# Patient Record
Sex: MALE | Race: WHITE | HISPANIC OR LATINO | Employment: OTHER | URBAN - METROPOLITAN AREA
[De-identification: names, ages, dates, MRNs, and addresses within clinical notes are randomized per-mention and may not be internally consistent; named-entity substitution may affect disease eponyms.]

---

## 2017-02-14 ENCOUNTER — ALLSCRIPTS OFFICE VISIT (OUTPATIENT)
Dept: OTHER | Facility: OTHER | Age: 62
End: 2017-02-14

## 2017-02-14 ENCOUNTER — HOSPITAL ENCOUNTER (OUTPATIENT)
Dept: RADIOLOGY | Facility: HOSPITAL | Age: 62
Discharge: HOME | End: 2017-02-14
Attending: ORTHOPAEDIC SURGERY
Payer: COMMERCIAL

## 2017-02-14 DIAGNOSIS — Z96.652 PRESENCE OF LEFT ARTIFICIAL KNEE JOINT: ICD-10-CM

## 2017-02-14 PROCEDURE — 73560 X-RAY EXAM OF KNEE 1 OR 2: CPT

## 2017-05-16 ENCOUNTER — ALLSCRIPTS OFFICE VISIT (OUTPATIENT)
Dept: OTHER | Facility: OTHER | Age: 62
End: 2017-05-16

## 2017-05-16 DIAGNOSIS — M17.11 PRIMARY OSTEOARTHRITIS OF RIGHT KNEE: ICD-10-CM

## 2017-06-21 ENCOUNTER — GENERIC CONVERSION - ENCOUNTER (OUTPATIENT)
Dept: OTHER | Facility: OTHER | Age: 62
End: 2017-06-21

## 2017-06-21 DIAGNOSIS — E11.9 TYPE 2 DIABETES MELLITUS WITHOUT COMPLICATIONS (HCC): ICD-10-CM

## 2017-06-21 DIAGNOSIS — M17.11 PRIMARY OSTEOARTHRITIS OF RIGHT KNEE: ICD-10-CM

## 2017-12-21 ENCOUNTER — ALLSCRIPTS OFFICE VISIT (OUTPATIENT)
Dept: OTHER | Facility: OTHER | Age: 62
End: 2017-12-21

## 2017-12-22 NOTE — PROGRESS NOTES
Assessment   1  Primary osteoarthritis of right knee (715 16) (M17 11)   2  Acute bilateral low back pain with bilateral sciatica (724 2,724 3) (M54 42,M54 41)      71-year-old male with arthritic right knee that would benefit from injection of corticosteroid for pain relief purposes  It is advised, excepted, administers outlined above  With regards back and bilateral lower extremity pain, he requires a spinal pain evaluation for workup and treatment of this disabling malady  I would welcome the opportunity see back in 6 weeks time for follow-up       Plan   Acute bilateral low back pain with bilateral sciatica    · *1 - [address] and treat     patient with back pain as well as bilateral sciatica  Status: Active  Requested for:    [id number]  Care Summary provided  : Yes   · Follow-up visit in 6 weeks Evaluation and Treatment  Follow-up  Status: Hold For -    Scheduling  Requested for: 53Nzk1022    History of Present Illness   HPI: Six months since his most recent visit, this 71-year-old male describes persistent pain in the right knee  His attempts at weight loss have not been met with significant success  He describes return to pain at the level of the right knee joint, the pain is made worse bearing weight, the pain worsens with increased activities  It has begun to interfere greatly with his pursuit of the activities of daily living  Recently, however this patient describes onset of back and bilateral lower extremity symptoms that consist with back pain and bilateral sciatica  Review of Systems        Constitutional: No fever or chills, feels well, no tiredness, no recent weight loss or weight gain  Eyes: No complaints of red eyes, no eyesight problems  ENT: no complaints of loss of hearing, no nosebleeds, no sore throat  Cardiovascular: No complaints of chest pain, no palpitations, no leg claudication or lower extremity edema        Respiratory: No complaints of shortness of breath, no wheezing, no cough  Gastrointestinal: No complaints of abdominal pain, no constipation, no nausea or vomiting, no diarrhea or bloody stools  Genitourinary: No complaints of dysuria or incontinence, no hesitancy, no nocturia  Musculoskeletal: as noted in HPI  Integumentary: No complaints of skin rash or lesion, no itching or dry skin, no skin wounds  Neurological: No complaints of headache, no confusion, no numbness or tingling, no dizziness  Psychiatric: No suicidal thoughts, no anxiety, no depression  Endocrine: No muscle weakness, no frequent urination, no excessive thirst, no feelings of weakness  Active Problems   1  Acute bilateral low back pain with bilateral sciatica (724 2,724 3) (M54 42,M54 41)   2  Aftercare following left knee joint replacement surgery (V54 81,V43 65) (Z47 1,Z96 652)   3  Asymptomatic varicose veins, unspecified laterality (454 9) (I83 90)   4  Bilateral lower extremity edema (782 3) (R60 0)   5  Cellulitis of left ankle (682 6) (L03 116)   6  Diabetes (250 00) (E11 9)   7  Discoloration of skin of lower leg (709 00) (L81 9)   8  Hypertension (401 9) (I10)   9  Knee pain, bilateral (719 46) (M25 561,M25 562)   10  Primary osteoarthritis of left knee (715 16) (M17 12)   11  Primary osteoarthritis of right knee (715 16) (M17 11)   12  S/P total knee replacement using cement, left (V43 65) (P74 572)    Past Medical History      The active problems and past medical history were reviewed and updated today  Surgical History      The surgical history was reviewed and updated today  Family History   Father    · Family history of lung cancer (V16 1) (Z80 1)  Family History    · Family history of Arthritis (716 90) (M19 90)   · Family history of Osteoporosis (733 00) (M81 0)    Social History    · Caffeine use (V49 89) (F15 90)   · Never a smoker    Current Meds    1   Budesonide 32 MCG/ACT Nasal Suspension; Therapy: 93YTV4603 to (Evaluate:27Aug2015) Recorded   2  Cephalexin 500 MG Oral Tablet (Cephalexin Monohydrate); Take 4 tablets, one hour     prior to dental procedures; Therapy: 36Rbf8285 to (Last Rx:46Tiw3748)  Requested for: 21Jul2016 Ordered   3  CloNIDine HCl - 0 1 MG Oral Tablet; Therapy: 23XXV4875 to (Evaluate:28Jun2015) Recorded   4  Ferrous Sulfate 325 (65 Fe) MG Oral Tablet; TAKE 1 TABLET TWICE DAILY; Therapy: 93DIU6046 to (Evaluate:21Aug2017)  Requested for: 82BSR3193; Last     Rx:22Jun2017 Ordered   5  Folic Acid 1 MG Oral Tablet; TAKE 1 TABLET DAILY AS DIRECTED; Therapy: 77XXI6450 to (Evaluate:21Aug2017)  Requested for: 26WCJ7221; Last     Rx:22Jun2017 Ordered   6  Gabapentin 100 MG Oral Capsule; Therapy: (Recorded:36Epk4050) to Recorded   7  Hydrocodone-Acetaminophen  MG Oral Tablet; Therapy: 85TMT6343 to (Evaluate:28Jun2015) Recorded   8  Hydrocodone-Acetaminophen 5-325 MG Oral Tablet; Therapy: (Recorded:73Tbh5700) to Recorded   9  Ketoconazole 2 % External Cream;     Therapy: 56Mhz9020 to (Evaluate:28Jun2015) Recorded   10  Lisinopril-Hydrochlorothiazide 10-12 5 MG Oral Tablet; Therapy: 72Xaj3420 to (Evaluate:27Aug2015) Recorded   11  LORazepam 0 5 MG Oral Tablet; Therapy: 46VKS4339 to (Evaluate:28Jun2015) Recorded   12  MetFORMIN HCl  MG Oral Tablet Extended Release 24 Hour; Therapy: 24PNG9631 to (Evaluate:27Aug2015) Recorded   13  OxyCONTIN 40 MG Oral Tablet ER 12 Hour Abuse-Deterrent; Therapy: 76MBO9480 to (Evaluate:28Jun2015) Recorded   14  OxyCONTIN 60 MG Oral Tablet ER 12 Hour Abuse-Deterrent; Therapy: 07Egq8623 to (Evaluate:13Jun2015) Recorded   15  OxyCONTIN 80 MG Oral Tablet ER 12 Hour Abuse-Deterrent; Therapy: 30YYL6680 to () Recorded   16  Pantoprazole Sodium 40 MG Oral Tablet Delayed Release; Therapy: 31FPO1580 to (Evaluate:71Ogt0723) Recorded   17   Pramipexole Dihydrochloride 0 25 MG Oral Tablet; Therapy: 78LOM7550 to (Evaluate:13Jun2015) Recorded   18  Promethazine HCl - 25 MG Oral Tablet; Therapy: 79Vtz7014 to (Evaluate:05Jun2015) Recorded   19  Sulfamethoxazole-Trimethoprim 800-160 MG Oral Tablet (Bactrim DS); TAKE 1 TABLET      TWICE DAILY UNTIL FINISHED; Therapy: 10YEK6275 to (Evaluate:26Jun2016)  Requested for: 02CZR6017; Last      Rx:16Jun2016 Ordered   20  Vitamin C 500 MG Oral Tablet; Take 1 tablet twice daily; Therapy: 62FRW8987 to (Evaluate:21Aug2017)  Requested for: 29BXL3977; Last      Rx:22Jun2017 Ordered   21  Vitamin D (Ergocalciferol) 14425 UNIT Oral Capsule; Therapy: 64LHK0174 to (Evaluate:27Aug2015) Recorded    Allergies   1  No Known Drug Allergies    Vitals   Signs   Heart Rate: 647  Systolic: 326  Diastolic: 75  Height: 5 ft 6 in  Weight: 264 lb 8 oz  BMI Calculated: 42 69  BSA Calculated: 2 25    Physical Exam   Gait pattern is antalgic  Breathing is not labored  Lumbar spine is there is a midline tenderness  There is no paraspinal spasm  The right thighs devoid of atrophy right knee is in varus  There is no effusion today  There is bony enlargement in this medially  There is crepitation flexion extension  There is no palpable warmth of the synovium  He has a positive straight leg raise bilaterally at 45Â°  Procedure      Procedure: Injection of the right knee joint  Indication:  Joint pain-- and-- Osteoarthritis  Risk, benefits and alternatives were discussed with the patient  Verbal consent was obtained prior to the procedure  Alcohol was used to prep the area  ethyl chloride spray was used as a topical anesthetic  Using sterile technique, the aspiration/injection needle was then directed from a Anterolateral aspect  A 22-gauge was used to inject 2 mL of 1% Lidocaine,-- 2 mL of 0 25% Bupivacaine-- and-- 2 mL of 6mg/mL betamethasone  A bandage was applied  the patient tolerated the procedure well  Complications: none  Follow-up in the office in 6 week(s)  Future Appointments      Date/Time Provider Specialty Site   02/01/2018 01:00 PM SHARON Rincon   Orthopedic Surgery [facility]     Signatures    Electronically signed by : SHARON Boyle ; Dec 21 2017  6:15PM EST                       (Author)

## 2018-01-07 ENCOUNTER — HOSPITAL ENCOUNTER (EMERGENCY)
Facility: HOSPITAL | Age: 63
Discharge: HOME | End: 2018-01-07
Attending: EMERGENCY MEDICINE
Payer: COMMERCIAL

## 2018-01-07 VITALS
BODY MASS INDEX: 42.61 KG/M2 | SYSTOLIC BLOOD PRESSURE: 134 MMHG | HEART RATE: 73 BPM | DIASTOLIC BLOOD PRESSURE: 78 MMHG | RESPIRATION RATE: 17 BRPM | OXYGEN SATURATION: 94 % | WEIGHT: 264 LBS | TEMPERATURE: 97.7 F

## 2018-01-07 DIAGNOSIS — M79.606 LEG PAIN: Primary | ICD-10-CM

## 2018-01-07 LAB
CRP SERPL QL: 9.9 MG/L
ERYTHROCYTE [SEDIMENTATION RATE] IN BLOOD: 28 MM/HOUR (ref 0–10)

## 2018-01-07 PROCEDURE — 99285 EMERGENCY DEPT VISIT HI MDM: CPT

## 2018-01-07 PROCEDURE — 86140 C-REACTIVE PROTEIN: CPT | Performed by: EMERGENCY MEDICINE

## 2018-01-07 PROCEDURE — 36415 COLL VENOUS BLD VENIPUNCTURE: CPT | Performed by: EMERGENCY MEDICINE

## 2018-01-07 PROCEDURE — 96374 THER/PROPH/DIAG INJ IV PUSH: CPT

## 2018-01-07 PROCEDURE — 85652 RBC SED RATE AUTOMATED: CPT | Performed by: EMERGENCY MEDICINE

## 2018-01-07 RX ORDER — IBUPROFEN 800 MG/1
800 TABLET ORAL 3 TIMES DAILY
Qty: 21 TABLET | Refills: 0 | Status: SHIPPED | OUTPATIENT
Start: 2018-01-07 | End: 2019-10-07 | Stop reason: ALTCHOICE

## 2018-01-07 RX ORDER — KETOROLAC TROMETHAMINE 30 MG/ML
15 INJECTION, SOLUTION INTRAMUSCULAR; INTRAVENOUS ONCE
Status: COMPLETED | OUTPATIENT
Start: 2018-01-07 | End: 2018-01-07

## 2018-01-07 RX ADMIN — KETOROLAC TROMETHAMINE 15 MG: 30 INJECTION, SOLUTION INTRAMUSCULAR at 16:37

## 2018-01-07 NOTE — ED PROVIDER NOTES
History  Chief Complaint   Patient presents with    Extremity Weakness     Patient reports weakness in legs that has been progressively been getting worse since "the hols"  Denies CP and SOB     This is a 60-year-old male presenting to the emergency department for evaluation of bilateral lower extremity pain and weakness  He states that his symptoms have been getting progressively worse "since the  "approximately 2-3 weeks ago  He describes some aching pain in the bilateral proximal lower extremities mostly in the back on his gluteal area radiating down towards the knees  This is equal bilaterally  He has had no numbness or tingling  He states that he has diabetes and has baseline decreased feeling in his feet this is not changed  Additionally he has some increased low back pain  Patient has had sciatica before but this feels different  He states that the pain has caused difficulty with walking and particularly over the past several days he has been getting worse  The patient does have bilateral numbness in his feet but states this is chronic and unchanged recently  It is due to his longstanding diabetes  He does state that recently has had some difficulty controlling his sugars that his A1c is about 6  He denies any fevers or chills  He denies any back tenderness  He denies any saddle anesthesia or bowel or bladder incontinence  Prior to Admission Medications   Prescriptions Last Dose Informant Patient Reported? Taking? Ergocalciferol (VITAMIN D2 PO) 2018 at Unknown time  Yes Yes   Sig: Take 50,000 Units by mouth once a week  HYDROcodone-acetaminophen (NORCO) 5-325 mg per tablet Unknown at Unknown time  No No   Si tabs po q4-6 hrs prn pain   LORazepam (ATIVAN) 0 5 mg tablet Past Week at Unknown time  Yes Yes   Sig: Take 0 5 mg by mouth daily at bedtime     albuterol (PROVENTIL HFA,VENTOLIN HFA) 90 mcg/act inhaler 2018 at Unknown time  Yes Yes   Sig: Inhale 2 puffs every 6 (six) hours as needed for wheezing  ascorbic acid (VITAMIN C) 500 mg tablet 2018 at Unknown time  Yes Yes   Sig: Take 500 mg by mouth 2 (two) times a day  budesonide (RINOCORT AQUA) 32 MCG/ACT nasal spray 2018 at Unknown time  Yes Yes   Si spray into each nostril 2 (two) times a day  cloNIDine (CATAPRES) 0 1 mg tablet 2018 at Unknown time  Yes Yes   Sig: Take 0 1 mg by mouth 3 (three) times a day  ferrous sulfate 325 (65 Fe) mg tablet 2018 at Unknown time  Yes Yes   Sig: Take 325 mg by mouth 2 (two) times a day  fluticasone (FLOVENT HFA) 220 mcg/act inhaler 2018 at Unknown time  Yes Yes   Sig: Inhale 1 puff daily as needed  folic acid (FOLVITE) 1 mg tablet 2018 at Unknown time  Yes Yes   Sig: Take 1 mg by mouth daily  furosemide (LASIX) 40 mg tablet   Yes No   Sig: Take 40 mg by mouth 2 (two) times a day as needed  glipiZIDE (GLUCOTROL) 5 mg tablet 2018 at Unknown time  Yes Yes   Sig: Take 5 mg by mouth daily  lisinopril-hydrochlorothiazide (PRINZIDE,ZESTORETIC) 10-12 5 MG per tablet 2018 at Unknown time  Yes Yes   Sig: Take 1 tablet by mouth daily  metFORMIN (GLUCOPHAGE-XR) 750 mg 24 hr tablet 2018 at Unknown time  Yes Yes   Sig: Take 750 mg by mouth 2 (two) times a day  potassium chloride (K-DUR) 10 mEq tablet 2018 at Unknown time  Yes Yes   Sig: Take 10 mEq by mouth 2 (two) times a day as needed  testosterone (ANDROGEL) 1% Unknown at Unknown time  Yes No   Sig: Apply 50 mg topically daily        Facility-Administered Medications: None       Past Medical History:   Diagnosis Date    Acid reflux     Anxiety     Arthritis     Cellulitis     left ankle    Diabetes mellitus ([address] )     Hypertension     Sleep apnea     Twitching        Past Surgical History:   Procedure Laterality Date    APPENDECTOMY      CHOLECYSTECTOMY      KNEE ARTHROSCOPY      UT TOTAL KNEE ARTHROPLASTY Left 2016    Procedure: TOTAL  KNEE REPLACEMENT ;  Surgeon: Beau Marks MD;  Location: [facility];  Service: Orthopedics       Family History   Problem Relation Age of Onset    Diabetes Mother     Diabetes Father     Cancer Father      I have reviewed and agree with the history as documented  Social History   Substance Use Topics    Smoking status: Former Smoker     Quit date: 1997    Smokeless tobacco: Never Used    Alcohol use No        Review of Systems   Constitutional: Negative for appetite change, chills, fatigue and fever  HENT: Negative for sneezing and sore throat  Eyes: Negative for visual disturbance  Respiratory: Negative for cough, choking, chest tightness, shortness of breath and wheezing  Cardiovascular: Negative for chest pain and palpitations  Gastrointestinal: Negative for abdominal pain, constipation, diarrhea, nausea and vomiting  Genitourinary: Negative for difficulty urinating and dysuria  Musculoskeletal: Positive for arthralgias, gait problem and myalgias  Neurological: Positive for weakness  Negative for dizziness, light-headedness, numbness and headaches  All other systems reviewed and are negative  Physical Exam  ED Triage Vitals [01/07/18 1310]   Temperature Pulse Respirations Blood Pressure SpO2   97 7 °F (36 5 °C) 105 18 152/99 95 %      Temp Source Heart Rate Source Patient Position - Orthostatic VS BP Location FiO2 (%)   Oral Monitor Sitting Left arm --      Pain Score       9           Orthostatic Vital Signs  Vitals:    01/07/18 1600 01/07/18 1700 01/07/18 1710 01/07/18 1817   BP: 146/77 122/68 122/68 134/78   Pulse: 82 82 76 73   Patient Position - Orthostatic VS:   Lying Lying       Physical Exam   Constitutional: He is oriented to person, place, and time  He appears well-developed and well-nourished  No distress  HENT:   Head: Normocephalic and atraumatic  Mouth/Throat: Oropharynx is clear and moist    Eyes: EOM are normal  Pupils are equal, round, and reactive to light     Neck: No JVD present  No tracheal deviation present  Cardiovascular: Normal rate, regular rhythm, normal heart sounds and intact distal pulses  Exam reveals no gallop and no friction rub  No murmur heard  Pulmonary/Chest: Effort normal and breath sounds normal  No respiratory distress  He has no wheezes  He has no rales  Abdominal: Soft  Bowel sounds are normal  He exhibits no distension  There is no tenderness  There is no rebound and no guarding  Musculoskeletal:   Strength is 5/5 in the bilateral extremities  Patient has 5/5 proximal flexion and extension the hips  There is some tenderness over the muscles of posterior proximal thighs bilaterally  The patient has intact sensation   Neurological: He is alert and oriented to person, place, and time  No cranial nerve deficit  He exhibits normal muscle tone  GCS eye subscore is 4  GCS verbal subscore is 5  GCS motor subscore is 6    5/5 strength in all extremities  The patient does have gait difficulty which is likely secondary to pain with bearing weight and ambulating  Skin: Skin is warm and dry  He is not diaphoretic  No pallor  Psychiatric: He has a normal mood and affect  His behavior is normal    Nursing note and vitals reviewed        ED Medications  Medications   ketorolac (TORADOL) injection 15 mg (15 mg Intravenous Given 1/7/18 1637)       Diagnostic Studies  Results Reviewed     Procedure Component Value Units Date/Time    Sedimentation rate, automated [[id number]]  (Abnormal) Collected:  01/07/18 1710    Lab Status:  Final result Specimen:  Blood from Arm, Right Updated:  01/07/18 1832     Sed Rate 28 (H) mm/hour     C-reactive protein [[id number]]  (Abnormal) Collected:  01/07/18 1642    Lab Status:  Final result Specimen:  Blood from Arm, Left Updated:  01/07/18 1721     CRP 9 9 (H) mg/L                  No orders to display         Procedures  Procedures      Phone Consults  ED Phone Contact    ED Course  ED Course MDM  Number of Diagnoses or Management Options  Leg pain:   Diagnosis management comments: 55-year-old male with low back pain and bilateral leg pain  This is likely spinal stenosis versus polymyositis versus sciatic pain which she has had past   Bedside ultrasound does not reveal any large abdominal aortic aneurysms  Will check ESR and CRP and give NSAIDs  Will defer on steroid treatment given his difficulty with controlling his blood sugars recently  Will likely refer to orthopedics for further evaluation    CritCare Time    Disposition  Final diagnoses:   Leg pain     Time reflects when diagnosis was documented in both MDM as applicable and the Disposition within this note     Time User Action Codes Description Comment    1/7/2018  6:15 PM Arti Douglas Add [M79 606] Leg pain       ED Disposition     ED Disposition Condition Comment    Discharge  Christen Serum discharge to home/self care      Condition at discharge: Stable        Follow-up Information     Follow up With Specialties Details Why [address]gron MD Orthopedic Surgery   [address] [place] [address] [address] [place] [address]          Discharge Medication List as of 1/7/2018  6:39 PM      START taking these medications    Details   ibuprofen (MOTRIN) 800 mg tablet Take 1 tablet by mouth 3 (three) times a day, Starting Sun 1/7/2018, Print         CONTINUE these medications which have NOT CHANGED    Details   albuterol (PROVENTIL HFA,VENTOLIN HFA) 90 mcg/act inhaler Inhale 2 puffs every 6 (six) hours as needed for wheezing , Until Discontinued, Historical Med      ascorbic acid (VITAMIN C) 500 mg tablet Take 500 mg by mouth 2 (two) times a day   , Until Discontinued, Historical Med      budesonide (RINOCORT AQUA) 32 MCG/ACT nasal spray 1 spray into each nostril 2 (two) times a day   , Until Discontinued, Historical Med      cloNIDine (CATAPRES) 0 1 mg tablet Take 0 1 mg by mouth 3 (three) times a day , Until Discontinued, Historical Med      Ergocalciferol (VITAMIN D2 PO) Take 50,000 Units by mouth once a week , Until Discontinued, Historical Med      ferrous sulfate 325 (65 Fe) mg tablet Take 325 mg by mouth 2 (two) times a day   , Until Discontinued, Historical Med      fluticasone (FLOVENT HFA) 220 mcg/act inhaler Inhale 1 puff daily as needed  , Until Discontinued, Historical Med      folic acid (FOLVITE) 1 mg tablet Take 1 mg by mouth daily  , Until Discontinued, Historical Med      glipiZIDE (GLUCOTROL) 5 mg tablet Take 5 mg by mouth daily  , Until Discontinued, Historical Med      lisinopril-hydrochlorothiazide (PRINZIDE,ZESTORETIC) 10-12 5 MG per tablet Take 1 tablet by mouth daily  , Until Discontinued, Historical Med      LORazepam (ATIVAN) 0 5 mg tablet Take 0 5 mg by mouth daily at bedtime  , Until Discontinued, Historical Med      metFORMIN (GLUCOPHAGE-XR) 750 mg 24 hr tablet Take 750 mg by mouth 2 (two) times a day   , Until Discontinued, Historical Med      potassium chloride (K-DUR) 10 mEq tablet Take 10 mEq by mouth 2 (two) times a day as needed  , Until Discontinued, Historical Med      furosemide (LASIX) 40 mg tablet Take 40 mg by mouth 2 (two) times a day as needed  , Until Discontinued, Historical Med      HYDROcodone-acetaminophen (NORCO) 5-325 mg per tablet 2 tabs po q4-6 hrs prn pain, No Print      testosterone (ANDROGEL) 1% Apply 50 mg topically daily  , Until Discontinued, Historical Med           No discharge procedures on file  ED Provider  Attending physically available and evaluated Marco Whaley I managed the patient along with the ED Attending      Electronically Signed by         Arnaud Soliman MD  Resident  01/07/18 7938

## 2018-01-07 NOTE — ED NOTES
Dr Flash Kruse at the bedside explaining results to pt and discharging pt     Nel Goel, AMANDEEP  01/07/18 6933

## 2018-01-07 NOTE — DISCHARGE INSTRUCTIONS
Leg Pain   WHAT YOU NEED TO KNOW:   Leg pain may be caused by a variety of health conditions  Your tests did not show any broken bones or blood clots  DISCHARGE INSTRUCTIONS:   Return to the emergency department if:   · You have a fever  · Your leg starts to swell  · Your leg pain gets worse  · You have numbness or tingling in your leg or toes  · You cannot put any weight on or move your leg  Contact your healthcare provider if:   · Your pain does not decrease, even after treatment  · You have questions or concerns about your condition or care  Medicines:   · NSAIDs , such as ibuprofen, help decrease swelling, pain, and fever  This medicine is available with or without a doctor's order  NSAIDs can cause stomach bleeding or kidney problems in certain people  If you take blood thinner medicine, always ask your healthcare provider if NSAIDs are safe for you  Always read the medicine label and follow directions  · Take your medicine as directed  Contact your healthcare provider if you think your medicine is not helping or if you have side effects  Tell him of her if you are allergic to any medicine  Keep a list of the medicines, vitamins, and herbs you take  Include the amounts, and when and why you take them  Bring the list or the pill bottles to follow-up visits  Carry your medicine list with you in case of an emergency  Follow up with your healthcare provider as directed: You may need more tests to find the cause of your leg pain  You may need to see an orthopedic specialist or a physical therapist  Write down your questions so you remember to ask them during your visits  Manage your leg pain:   · Rest  your injured leg so that it can heal  You may need an immobilizer, brace, or splint to limit the movement of your leg  You may need to avoid putting any weight on your leg for at least 48 hours  Return to normal activities as directed      · Ice  the injury for 20 minutes every 4 hours for up to 24 hours, or as directed  Use an ice pack, or put crushed ice in a plastic bag  Cover it with a towel to protect your skin  Ice helps prevent tissue damage and decreases swelling and pain  · Elevate  your injured leg above the level of your heart as often as you can  This will help decrease swelling and pain  If possible, prop your leg on pillows or blankets to keep the area elevated comfortably  · Use assistive devices as directed  You may need to use a cane or crutches  Assistive devices help decrease pain and pressure on your leg when you walk  Ask your healthcare provider for more information about assistive devices and how to use them correctly  · Maintain a healthy weight  Extra body weight can cause pressure and pain in your hip, knee, and ankle joints  Ask your healthcare provider how much you should weigh  Ask him to help you create a weight loss plan if you are overweight  © 2017 [employer] Information is for End User's use only and may not be sold, redistributed or otherwise used for commercial purposes  All illustrations and images included in CareNotes® are the copyrighted property of [employer] , [employer]  or [employer]  The above information is an  only  It is not intended as medical advice for individual conditions or treatments  Talk to your doctor, nurse or pharmacist before following any medical regimen to see if it is safe and effective for you

## 2018-01-07 NOTE — ED ATTENDING ATTESTATION
Jassi Washington MD, saw and evaluated the patient  I have discussed the patient with the resident/non-physician practitioner and agree with the resident's/non-physician practitioner's findings, Plan of Care, and MDM as documented in the resident's/non-physician practitioner's note, except where noted  All available labs and Radiology studies were reviewed  At this point I agree with the current assessment done in the Emergency Department  I have conducted an independent evaluation of this patient a history and physical is as follows: osteoarthritis  Now progressive weakness with ambulating over several weeks  Now using walker  OK at rest  DM with poor sugar control  No red flags  Strength testing ok         Critical Care Time  CritCare Time    Procedures

## 2018-01-10 NOTE — PROGRESS NOTES
Preliminary Nursing Report                Patient Information    Initial Encounter Entry Date:   2016 2:34 PM EST (Automated Transmission Automated Transmission)       Last Modified:   {Erlin Lloyd}              Legal Name: Justin Dent        Social  Number:        YOB: 1955        Age (years): 64        Gender: M        Body Mass Index (BMI): 43 kg/m2        Height: 66 in  Weight: 265 lbs (120 kgs)           Address:   [place] [id number]               Phone: [phone]   (consent to leave messages)        Email:        Ethnicity: Decline to State        Taoism:        Marital Status:        Preferred Language: English        Race: Other Race                    Patient Insurance Information        Primary Insurance Information Carrier Name: {Primary  CarrierName}           Carrier Address:   {Primary  CarrierAddress}              Carrier Phone: {Primary  CarrierPhone}          Group Number: {Primary  GroupNumber}          Policy Number: {Primary  PolicyNumber}          Insured Name: {Primary  InsuredName}          Insured : {Primary  InsuredDOB}          Relationship to Insured: {Primary  RelationshiptoInsured}           Secondary Insurance Information Carrier Name: {Secondary  CarrierName}           Carrier Address:   {Secondary  CarrierAddress}              Carrier Phone: {Secondary  CarrierPhone}          Group Number: {Secondary  GroupNumber}          Policy Number: {Secondary  PolicyNumber}          Insured Name: {Secondary  InsuredName}          Insured : {Secondary  InsuredDOB}          Relationship to Insured: {Secondary  RelationshiptoInsured}                       Health Profile   Booking #:   Sophia Mayen #: [id number]               DOS: 2016    Surgery : TOTAL KNEE REPLACEMENT    Add'l Procedures/Notes:     Surgery Risk: Intermediate          Precautions     Diabetes                   Allergies    No Known Drug Allergies Medications    Budesonide 32 MCG/ACT Nasal Suspension       CloNIDine HCl - 0 1 MG Oral Tablet       Hydrocodone-Acetaminophen  MG Oral Tablet       Ketoconazole 2 % External Cream       Lisinopril-Hydrochlorothiazide 10-12 5 MG Oral Tablet       LORazepam 0 5 MG Oral Tablet       MetFORMIN HCl  MG Oral Tablet Extended Release 24 Hour       Mupirocin 2 % External Ointment       OxyCONTIN 40 MG Oral Tablet ER 12 Hour Abuse-Deterrent       OxyCONTIN 60 MG Oral Tablet ER 12 Hour Abuse-Deterrent       OxyCONTIN 80 MG Oral Tablet ER 12 Hour Abuse-Deterrent       Pantoprazole Sodium 40 MG Oral Tablet Delayed Release       Pramipexole Dihydrochloride 0 25 MG Oral Tablet       Promethazine HCl - 25 MG Oral Tablet       Sulfamethoxazole-Trimethoprim 800-160 MG Oral Tablet       Vitamin D (Ergocalciferol) 46294 UNIT Oral Capsule               Conditions    Asymptomatic varicose veins, unspecified laterality       Bilateral lower extremity edema       Cellulitis of left ankle       Diabetes       Discoloration of skin of lower leg       Hypertension       Knee pain, bilateral       Primary osteoarthritis of left knee       Primary osteoarthritis of right knee               Family History    None             Surgical History    None             Social History    Caffeine use       Never a smoker                               Patient Instructions       ? ACE/ARB (Blood Pressure Medication) 1  Please continue the following medications up to the evening before surgery, but do not take it on the day of surgery  Please restart your medications as soon as clinically feasible  Triggered by: Lisinopril-Hydrochlorothiazide 10-12 5 MG Oral Tablet         ? Alpha 2 Agonist (Blood Pressure Medication) 2, 4, 6, 3, 5  Please continue to take this medication on your normal schedule  If this is an oral medication and you take in the morning, you may do so with a sip of water    Triggered by: CloNIDine HCl - 0 1 MG Oral Tablet completed and normal within last six months, repeat test is not necessary  Triggered by: Diabetes, Hypertension         ? Blood Glucose on Day of Surgery t  Please check the blood sugar on the morning of surgery  Triggered by: Diabetes         ? Complete Blood Count (CBC) t, client, client  If test was completed and normal within last six months, repeat test is not necessary  Triggered by: Cellulitis of left ankle, Age or Facility Rec         ? Electrocardiogram (ECG) t  Patient does not need new test if normal ECG is present within the last six months and no change in clinical condition  Triggered by: Diabetes, Hypertension, Age or Facility Rec         ? Hemoglobin A1c (HbA1c) client  If test was completed and normal within the last three months, repeat test is not necessary  Triggered by: Diabetes, Age or Facility Rec         ? Type and Screen client  Type and Screen - Blood: If there is anticipated or possible large blood loss with this procedure, then a Type and Screen for Blood should be ordered  Triggered by: Age or Facility Rec               Consultations       ? Primary Care Physician Evaluation   Primary care physician may need to evaluate patient prior to surgery  This is likely NOT necessary if the listed conditions are chronic and stable  Triggered by: Cellulitis of left ankle               Miscellaneous Questions         Question: Are you able to walk up a flight of stairs, walk up a hill or do heavy housework WITHOUT having chest pain or shortness of breath? Answer: YES                   Allergies/Conditions/Medications Not Found        The following were not recognized by our system when generating the recommendations  Please consider if this would impact any preoperative protocols  ? Caffeine use       ? Discoloration of skin of lower leg       ? Never a smoker       ? Budesonide 32 MCG/ACT Nasal Suspension       ? Hydrocodone-Acetaminophen  MG Oral Tablet       ?  LORazepam 0 5 MG Oral Tablet                  Appointment Information         Date:    07/13/2016        Location:    [facility]        Address:           Directions:                      Footnotes revision 14      ?? Denotes a free-text entry  Legal Disclaimer: Any and all recommendations and services provided herein are designed to assist in the preoperative care of the patient  Nothing contained herein is designed to replace, eliminate or alleviate the responsibility of the attending physician to supervise and determine the patient?s preoperative care and course of treatment  Failure to provide complete, accurate information may negatively impact the system?s ability to recommend the proper preoperative protocol  THE ATTENDING PHYSICIAN IS RESPONSIBLE TO REVIEW THE SUGGESTED PREOPERATIVE PROTOCOLS/COURSE OF TREATMENT AND PRESCRIBE THE FINAL COURSE OF PREOPERATIVE TREATMENT IN CONSULTATION WITH THE PATIENT  THE ePREOP SYSTEM AND ITS MATERIALS ARE PROVIDED ? AS IS? WITHOUT WARRANTY OF ANY KIND, EXPRESS OR IMPLIED, INCLUDING, BUT NOT LIMITED TO, WARRANTIES OF PERFORMANCE OR MERCHANTABILITY OR FITNESS FOR A PARTICULAR PURPOSE  PATIENT AND PHYSICIANS HEREBY AGREE THAT THEIR USE OF THE MATERIALS AND RESOURCES ACT AS A CONSENT TO RELEASE AND WAIVE ePREOP FROM ANY AND ALL CLAIMS OF WARRANTY, TORT OR CONTRACT LAW OF ANY KIND  Electronically signed by:Zack QUINTEROS    Jul 7 2016  5:39PM EST

## 2018-01-12 VITALS
DIASTOLIC BLOOD PRESSURE: 69 MMHG | WEIGHT: 263 LBS | SYSTOLIC BLOOD PRESSURE: 118 MMHG | HEART RATE: 77 BPM | BODY MASS INDEX: 42.27 KG/M2 | HEIGHT: 66 IN

## 2018-01-12 NOTE — PROCEDURES
Procedures by Yudith Miller RN at 7/12/2016   8:55 AM      Author:  Yudith Miller RN Service:  (none) Author Type:  Registered Nurse     Filed:  7/12/2016  8:58 AM Date of Service:  7/12/2016  8:55 AM Status:  Signed     :  Yudith Miller RN (Registered Nurse)         Procedure Orders:       1  Insert PICC line [[id number]] ordered by Bradly Chapman MD at 07/12/16 0854                    Insert PICC line  Date/Time: 7/12/2016 8:55 AM  Performed by: Gregg Nissen  Authorized by: Ted Ojeda     Patient location:  Bedside  Other Assisting Provider:  Yes (comment)    Consent:     Consent obtained:  Written    Consent given by:  Patient    Procedural risks discussed: consent obtained by MD     Alternatives discussed: consent obtained by md   Universal protocol:     Procedure explained and questions answered to patient or proxy's satisfaction: yes      Relevant documents present and verified: yes      Test results available and properly labeled: yes       Imaging studies available: yes      Required blood products, implants, devices, and special equipment available: yes      Site/side marked: yes      Immediately prior to procedure, a time out was called: yes      Patient identity confirmed:  Verbally with patient and hospital-assigned identification number  Pre-procedure details:     Hand hygiene: Hand hygiene performed prior to insertion      Sterile barrier technique: All elements of maximal sterile technique followed      Skin preparation:  ChloraPrep    Skin preparation agent: Skin preparation agent completely dried prior to procedure    Indications:     PICC line indications: vascular access    Anesthesia (see MAR for exact dosages):      Anesthesia method:  Local infiltration    Local anesthetic:  Lidocaine 1% w/o epi  Procedure details:     Location:  Basilic    Vessel type: vein      Laterality:  Right    Approach: percutaneous technique used      Patient position:  Flat    Catheter size:  5 Fr Landmarks identified: yes      Ultrasound guidance: yes      Sterile ultrasound techniques: Sterile gel and sterile probe covers were used      Number of attempts:  2    Successful placement: no      Total catheter length (cm):  43    Catheter out on skin (cm):  25    Max flow rate:  999    Arm circumference:  37  Post-procedure details:     Post-procedure:  Dressing applied    Assessment:  Blood return through all ports    Patient tolerance of procedure: Tolerated with difficulty  Comments:      Unable to advance PICC line to proper position  Pt sent to IR for PICC repo  Dr Ayala Flair office made aware  Pt escorted to radiology in wheelchair                       Received for:Provider  EPIC   Jul 12 2016  8:57AM [facility] Standard Time

## 2018-01-13 VITALS — SYSTOLIC BLOOD PRESSURE: 144 MMHG | HEART RATE: 81 BPM | DIASTOLIC BLOOD PRESSURE: 83 MMHG

## 2018-01-13 NOTE — PROGRESS NOTES
Preliminary Nursing Report                Patient Information    Initial Encounter Entry Date:   2017 3:39 PM EST (Automated Transmission Automated Transmission)       Last Modified:   {MattH  ModifiedOn}              Legal Name: Ofelia Pierce        Social Security Number:        YOB: 1955        Age (years): 58        Gender: M        Body Mass Index (BMI): 42 kg/m2        Height: 66 in  Weight: 263 lbs (119 kgs)           Address:   [place] [id number]               Phone: [phone]   (consent to leave messages)        Email:        Ethnicity: Decline to State        [place]:        Marital Status:        Preferred Language: English        Race: Other Race                    Patient Insurance Information        Primary Insurance Information Carrier Name: {Primary  CarrierName}           Carrier Address:   {Primary  CarrierAddress}              Carrier Phone: {Primary  CarrierPhone}          Group Number: {Primary  GroupNumber}          Policy Number: {Primary  PolicyNumber}          Insured Name: {Primary  InsuredName}          Insured : {Primary  InsuredDOB}          Relationship to Insured: {Primary  RelationshiptoInsured}           Secondary Insurance Information Carrier Name: {Secondary  CarrierName}           Carrier Address:   {Secondary  CarrierAddress}              Carrier Phone: {Secondary  CarrierPhone}          Group Number: {Secondary  GroupNumber}          Policy Number: {Secondary  PolicyNumber}          Insured Name: {Secondary  InsuredName}          Insured : {Secondary  InsuredDOB}          Relationship to Insured: {Secondary  RelationshiptoInsured}                       Health Profile   Booking #:   [facility] #: [id number]               DOS: 2017    Surgery : TOTAL KNEE REPLACEMENT    Add'l Procedures/Notes:     Surgery Risk: Intermediate          Precautions     Age or Facility Rec       BMI       Diabetes       Hypertension Allergies    No Known Drug Allergies             Medications    Budesonide 32 MCG/ACT Nasal Suspension       Cephalexin 500 MG Oral Tablet       CloNIDine HCl - 0 1 MG Oral Tablet       Gabapentin 100 MG Oral Capsule       Hydrocodone-Acetaminophen  MG Oral Tablet       Hydrocodone-Acetaminophen 5-325 MG Oral Tablet       Ketoconazole 2 % External Cream       Lisinopril-Hydrochlorothiazide 10-12 5 MG Oral Tablet       LORazepam 0 5 MG Oral Tablet       MetFORMIN HCl  MG Oral Tablet Extended Release 24 Hour       OxyCONTIN 40 MG Oral Tablet ER 12 Hour Abuse-Deterrent       OxyCONTIN 60 MG Oral Tablet ER 12 Hour Abuse-Deterrent       OxyCONTIN 80 MG Oral Tablet ER 12 Hour Abuse-Deterrent       Pantoprazole Sodium 40 MG Oral Tablet Delayed Release       Pramipexole Dihydrochloride 0 25 MG Oral Tablet       Promethazine HCl - 25 MG Oral Tablet       Sulfamethoxazole-Trimethoprim 800-160 MG Oral Tablet       Vitamin D (Ergocalciferol) 34788 UNIT Oral Capsule               Conditions    Aftercare following left knee joint replacement surgery       Asymptomatic varicose veins, unspecified laterality       Bilateral lower extremity edema       Cellulitis of left ankle       Diabetes       Discoloration of skin of lower leg       Hypertension       Knee pain, bilateral       Primary osteoarthritis of left knee       Primary osteoarthritis of right knee       S/P total knee replacement using cement, left               Family History    None             Surgical History    None             Social History    Caffeine use       Never a smoker                               Patient Instructions       Medical Procedure  ERP NPO Instructions TJ  Please do not eat anything for at least 8 hours prior to your surgery  You may continue drinking clear liquids up until 2 hours before surgery  Please drink provided carbohydrate beverage and finish 2 hours before surgery   If you are supposed to take any of your medications, do so with a sip of water  If you have any questions, please contact your institution for further instructions  Lisinopril-Hydrochlorothiazide 10-12 5 MG Oral Tablet  Medication Instruction (ACE/ARB - Blood Pressure Medication) 1  Please continue the following medications up to the evening before surgery, but do not take it on the day of surgery  Please restart your medications as soon as clinically feasible  CloNIDine HCl - 0 1 MG Oral Tablet  Medication Instruction (Alpha 2 Agonist- Blood Pressure Medication) 2, 4, 6, 3, 5  Please continue to take this medication on your normal schedule  If this is an oral medication and you take in the morning, you may do so with a sip of water  LORazepam 0 5 MG Oral Tablet  Medication Instruction (Benzodiazepine) 15  Please continue the following medications, if needed, up to and including the day of surgery (with a sip of water)  Diabetes, , MetFORMIN HCl  MG Oral Tablet Extended Release 24 Hour  Medication Instruction (Diabetic Medication)   Please decrease your morning insulin dose to one-half of normal dose  If you are taking oral diabetes medications, the morning dose should be omitted  If taking metformin (Glucophage), discontinue the medication for 24 hours prior to surgery  If you have an insulin pump, continue at a basal rate only  Pramipexole Dihydrochloride 0 25 MG Oral Tablet  Medication Instruction (Dopamine Agonists) 30, 31, 32  Please continue to take this medication on your normal schedule  If this is an oral medication and you take in the morning, you may do so with a sip of water  Pantoprazole Sodium 40 MG Oral Tablet Delayed Release  Medication Instruction (Antacids) 34, 35  Please continue to take this medication on your normal schedule  If this is an oral medication and you take in the morning, you may do so with a sip of water           Gabapentin 100 MG Oral Capsule  Medication Instruction (Neurological Medication) 8  Please continue to take this medication on your normal schedule  If this is an oral medication and you take in the morning, you may do so with a sip of water  OxyCONTIN 60 MG Oral Tablet ER 12 Hour Abuse-Deterrent, , OxyCONTIN 80 MG Oral Tablet ER 12 Hour Abuse-Deterrent, , OxyCONTIN 40 MG Oral Tablet ER 12 Hour Abuse-Deterrent, , Hydrocodone-Acetaminophen 5-325 MG Oral Tablet  Medication Instruction (Opioids - Pain Medication) 62  Please continue the following medications, if needed, up to and including the day of surgery (with a sip of water)  Medical Procedure  ERP-Education TJ  Preoperative counseling, education, and expectations provided               Testing Considerations       ? Basic Metabolic Panel (BMP) t  If test was completed and normal within last six months, repeat test is not necessary  Triggered by: Diabetes, Hypertension         ? Blood Glucose on Day of Surgery t  Please check the blood sugar on the morning of surgery  Triggered by: Diabetes         ? Complete Blood Count (CBC) t, client, client  If test was completed and normal within last six months, repeat test is not necessary  Triggered by: Cellulitis of left ankle, Age or Facility Rec         ? Electrocardiogram (ECG) t  Patient does not need new test if normal ECG is present within the last six months and no change in clinical condition  Triggered by: Diabetes, Hypertension, Age or Facility Rec         ? Hemoglobin A1c (HbA1c) client, client  If test was completed and normal within the last three months, repeat test is not necessary  Triggered by: Diabetes, Age or Facility Rec         ? Type and Screen client  Type and Screen - Blood: If there is anticipated or possible large blood loss with this procedure, then a Type and Screen for Blood should be ordered  Triggered by: Age or Facility Rec               Consultations       ?  Primary Care Physician Evaluation   Primary care physician may need to evaluate patient prior to surgery  This is likely NOT necessary if the listed conditions are chronic and stable  Triggered by: Cellulitis of left ankle         ? Sleep Apnea Screening   Patient is at high risk for obstructive sleep apnea  Consider need for further evaluation  Triggered by: Hypertension, Age or Facility Rec, BMI               Miscellaneous Questions         Question: Are you able to walk up a flight of stairs, walk up a hill or do heavy housework WITHOUT having chest pain or shortness of breath? Answer: YES                   Allergies/Conditions/Medications Not Found        The following were not recognized by our system when generating the recommendations  Please consider if this would impact any preoperative protocols  ? Caffeine use       ? Discoloration of skin of lower leg       ? Never a smoker                  Appointment Information         Date:    09/06/2017        Location:    [facility]        Address:           Directions:                      Footnotes revision 14      ?? Denotes a free-text entry  Legal Disclaimer: Any and all recommendations and services provided herein are designed to assist in the preoperative care of the patient  Nothing contained herein is designed to replace, eliminate or alleviate the responsibility of the attending physician to supervise and determine the patient?s preoperative care and course of treatment  Failure to provide complete, accurate information may negatively impact the system?s ability to recommend the proper preoperative protocol  THE ATTENDING PHYSICIAN IS RESPONSIBLE TO REVIEW THE SUGGESTED PREOPERATIVE PROTOCOLS/COURSE OF TREATMENT AND PRESCRIBE THE FINAL COURSE OF PREOPERATIVE TREATMENT IN CONSULTATION WITH THE PATIENT  THE [employer] SYSTEM AND ITS MATERIALS ARE PROVIDED ? AS IS? WITHOUT WARRANTY OF ANY KIND, EXPRESS OR IMPLIED, INCLUDING, BUT NOT LIMITED TO, WARRANTIES OF PERFORMANCE OR MERCHANTABILITY OR FITNESS FOR A PARTICULAR PURPOSE  PATIENT AND PHYSICIANS HEREBY AGREE THAT THEIR USE OF THE MATERIALS AND RESOURCES ACT AS A CONSENT TO RELEASE AND WAIVE ePREOP FROM ANY AND ALL CLAIMS OF WARRANTY, TORT OR CONTRACT LAW OF ANY KIND  Electronically signed by:Zack QUINTEROS    Jun 22 2017  5:05PM EST

## 2018-01-18 NOTE — PROGRESS NOTES
Preliminary Nursing Report                Patient Information    Initial Encounter Entry Date:   2016 4:20 PM EST (Automated Transmission Automated Transmission)       Last Modified:   {ParH  ModifiedOn}              Legal Name: Yudith España        Social  Number:        YOB: 1955        Age (years): 64        Gender: M        Body Mass Index (BMI): 42 kg/m2        Height: 66 in  Weight: 260 lbs (118 kgs)           Address:   [place] [id number]               Phone: [phone]   (consent to leave messages)        Email:        Ethnicity: Decline to State        Alevism:        Marital Status:        Preferred Language: English        Race: Other Race                    Patient Insurance Information        Primary Insurance Information Carrier Name: {Primary  CarrierName}           Carrier Address:   {Primary  CarrierAddress}              Carrier Phone: {Primary  CarrierPhone}          Group Number: {Primary  GroupNumber}          Policy Number: {Primary  PolicyNumber}          Insured Name: {Primary  InsuredName}          Insured : {Primary  InsuredDOB}          Relationship to Insured: {Primary  RelationshiptoInsured}           Secondary Insurance Information Carrier Name: {Secondary  CarrierName}           Carrier Address:   {Secondary  CarrierAddress}              Carrier Phone: {Secondary  CarrierPhone}          Group Number: {Secondary  GroupNumber}          Policy Number: {Secondary  PolicyNumber}          Insured Name: {Secondary  InsuredName}          Insured : {Secondary  InsuredDOB}          Relationship to Insured: {Secondary  RelationshiptoInsured}                       Health Profile   Booking #:   Serg Savage #: [id number]               DOS: 2016    Surgery : TOTAL KNEE REPLACEMENT    Add'l Procedures/Notes:     Surgery Risk: Intermediate          Precautions     Diabetes                   Allergies    No Known Drug Allergies Medications    Budesonide 32 MCG/ACT Nasal Suspension       CloNIDine HCl - 0 1 MG Oral Tablet       Farxiga 10 MG Oral Tablet       Hydrocodone-Acetaminophen  MG Oral Tablet       Ketoconazole 2 % External Cream       Lisinopril-Hydrochlorothiazide 10-12 5 MG Oral Tablet       LORazepam 0 5 MG Oral Tablet       MetFORMIN HCl  MG Oral Tablet Extended Release 24 Hour       Mupirocin 2 % External Ointment       OxyCONTIN 40 MG Oral Tablet ER 12 Hour Abuse-Deterrent       OxyCONTIN 60 MG Oral Tablet ER 12 Hour Abuse-Deterrent       OxyCONTIN 80 MG Oral Tablet ER 12 Hour Abuse-Deterrent       Pantoprazole Sodium 40 MG Oral Tablet Delayed Release       Pramipexole Dihydrochloride 0 25 MG Oral Tablet       Promethazine HCl - 25 MG Oral Tablet       Vitamin D (Ergocalciferol) 03766 UNIT Oral Capsule               Conditions    Diabetes       Hypertension       Knee pain, bilateral       Primary osteoarthritis of left knee       Primary osteoarthritis of right knee               Family History    None             Surgical History    None             Social History    Caffeine use       Never a smoker                               Patient Instructions       ? NPO Instructions   The day before surgery it is recommended to have a light dinner at your usual time and you are allowed a light snack early in the evening  Do not eat anything heavy or eat a big meal after 7pm  Do not eat or drink anything after midnight prior to your surgery  If you are supposed to take any of your medications, do so with a sip of water  Failure to follow these instructions can lead to an increased risk of lung complications and may result in a delay or cancellation of your procedure  If you have any questions, contact your institution for further instructions  No candy, no gum, no mints, no chewing tobacco   Triggered by: Medical Procedure Risk         ?  ACE/ARB (Blood Pressure Medication) 1  Please continue the following medications up to the evening before surgery, but do not take it on the day of surgery  Please restart your medications as soon as clinically feasible  Triggered by: Lisinopril-Hydrochlorothiazide 10-12 5 MG Oral Tablet         ? Alpha 2 Agonist (Blood Pressure Medication) 2, 4, 6, 3, 5  Please continue to take this medication on your normal schedule  If this is an oral medication and you take in the morning, you may do so with a sip of water  Triggered by: CloNIDine HCl - 0 1 MG Oral Tablet         ? Antacids 34, 35  Please continue to take this medication on your normal schedule  If this is an oral medication and you take in the morning, you may do so with a sip of water  Triggered by: Pantoprazole Sodium 40 MG Oral Tablet Delayed Release         ? Diabetic Medication   Please decrease your morning insulin dose to one-half of normal dose  If you are taking oral diabetes medications, the morning dose should be omitted  If taking metformin (Glucophage), discontinue the medication for 24 hours prior to surgery  If you have an insulin pump, continue at a basal rate only  Triggered by: Diabetes, , MetFORMIN HCl  MG Oral Tablet Extended Release 24 Hour         ? Dopamine Agonists 30, 31, 32  Please continue to take this medication on your normal schedule  If this is an oral medication and you take in the morning, you may do so with a sip of water  Triggered by: Pramipexole Dihydrochloride 0 25 MG Oral Tablet         ? Opioids (Pain Medication) 62  Please continue the following medications, if needed, up to and including the day of surgery (with a sip of water)  Triggered by: OxyCONTIN 60 MG Oral Tablet ER 12 Hour Abuse-Deterrent, , OxyCONTIN 80 MG Oral Tablet ER 12 Hour Abuse-Deterrent, , OxyCONTIN 40 MG Oral Tablet ER 12 Hour Abuse-Deterrent               Testing Considerations       ? Basic Metabolic Panel (BMP) t  If test was completed and normal within last six months, repeat test is not necessary    Triggered by: Diabetes, Hypertension         ? Blood Glucose on Day of Surgery t  Please check the blood sugar on the morning of surgery  Triggered by: Diabetes         ? Complete Blood Count (CBC) t, client, client  If test was completed and normal within last six months, repeat test is not necessary  Triggered by: Age or Facility Rec         ? Electrocardiogram (ECG) t  Patient does not need new test if normal ECG is present within the last six months and no change in clinical condition  Triggered by: Diabetes, Hypertension, Age or Facility Rec         ? Hemoglobin A1c (HbA1c) client  If test was completed and normal within the last three months, repeat test is not necessary  Triggered by: Diabetes, Age or Facility Rec         ? Type and Screen client  Type and Screen - Blood: If there is anticipated or possible large blood loss with this procedure, then a Type and Screen for Blood should be ordered  Triggered by: Age or Facility Rec               Consultations       ? Pain Management Notes client  The patient has listed conditions or takes medication that may affect their pain management  Triggered by: OxyCONTIN 60 MG Oral Tablet ER 12 Hour Abuse-Deterrent, , OxyCONTIN 80 MG Oral Tablet ER 12 Hour Abuse-Deterrent, , OxyCONTIN 40 MG Oral Tablet ER 12 Hour Abuse-Deterrent, Age or Facility Rec               Miscellaneous Questions         Question: Are you able to walk up a flight of stairs, walk up a hill or do heavy housework WITHOUT having chest pain or shortness of breath? Answer: YES                   Allergies/Conditions/Medications Not Found        The following were not recognized by our system when generating the recommendations  Please consider if this would impact any preoperative protocols  ? Caffeine use       ? Never a smoker       ? Budesonide 32 MCG/ACT Nasal Suspension       ? Farxiga 10 MG Oral Tablet       ? Hydrocodone-Acetaminophen  MG Oral Tablet       ?  LORazepam 0 5 MG Oral Tablet Appointment Information         Date:    06/29/2016        Location:    [facility]        Address:           Directions:                      Footnotes revision 14      ?? Denotes a free-text entry  Legal Disclaimer: Any and all recommendations and services provided herein are designed to assist in the preoperative care of the patient  Nothing contained herein is designed to replace, eliminate or alleviate the responsibility of the attending physician to supervise and determine the patient?s preoperative care and course of treatment  Failure to provide complete, accurate information may negatively impact the system?s ability to recommend the proper preoperative protocol  THE ATTENDING PHYSICIAN IS RESPONSIBLE TO REVIEW THE SUGGESTED PREOPERATIVE PROTOCOLS/COURSE OF TREATMENT AND PRESCRIBE THE FINAL COURSE OF PREOPERATIVE TREATMENT IN CONSULTATION WITH THE PATIENT  THE ePREOP SYSTEM AND ITS MATERIALS ARE PROVIDED ? AS IS? WITHOUT WARRANTY OF ANY KIND, EXPRESS OR IMPLIED, INCLUDING, BUT NOT LIMITED TO, WARRANTIES OF PERFORMANCE OR MERCHANTABILITY OR FITNESS FOR A PARTICULAR PURPOSE  PATIENT AND PHYSICIANS HEREBY AGREE THAT THEIR USE OF THE MATERIALS AND RESOURCES ACT AS A CONSENT TO RELEASE AND WAIVE ePREOP FROM ANY AND ALL CLAIMS OF WARRANTY, TORT OR CONTRACT LAW OF ANY KIND  Electronically signed by:Zack QUINTEROS    May 17 2016  7:38AM EST

## 2018-01-19 ENCOUNTER — CLINICAL SUPPORT (OUTPATIENT)
Dept: OTHER | Facility: OTHER | Age: 63
End: 2018-01-19

## 2018-01-19 DIAGNOSIS — M48.062 SPINAL STENOSIS OF LUMBAR REGION WITH NEUROGENIC CLAUDICATION: ICD-10-CM

## 2018-01-19 DIAGNOSIS — M54.16 RADICULOPATHY OF LUMBAR REGION: ICD-10-CM

## 2018-01-20 NOTE — CONSULTS
Assessment   Assessed    1  Lumbar stenosis with neurogenic claudication (724 03) (M48 062)   2  Lumbar radiculopathy (724 4) (M54 16)   3  Lumbar herniated disc (722 10) (M51 26)    Plan   Lumbar radiculopathy    · Gabapentin 100 MG Oral Capsule; TAKE 1 CAPSULE BEDTIME MAY INCREASE TO    3 CAPSULES AT BEDTIME AS TOLERATED   Rx By: Nataly Ortega; Dispense: 30 Days ; #:90 Capsule; Refill: 2;For: Lumbar radiculopathy; KEVIN = N; Verified Transmission to [address] [place] [address]; Last Updated By: System, SureScAudigence; 1/19/2018 8:52:18 AM  Lumbar radiculopathy, Lumbar stenosis with neurogenic claudication    · *1 - [facility]  Consult: evaluate and treat    please evaluate if pt is a candidate for Gianna based therapy  Status: Active     Requested for: 82IBV9371   Ordered; For: Lumbar radiculopathy, Lumbar stenosis with neurogenic claudication; Ordered By: Nataly Ortega Performed:  Due: 79ZLR2899  Care Summary provided  : Yes   · 1 - Jerzy ROCHA, [facility]  (Orthopedic Surgery) Co-Management  Consult: evaluate and    treat  Status: Active  Requested for: 45USQ5915   Ordered; For: Lumbar radiculopathy, Lumbar stenosis with neurogenic claudication; Ordered By: Nataly Ortega Performed:  Due: 22ZCK9455  Care Summary provided  : Yes      77-year-old male presenting for initial consultation regarding lumbosacral back pain with radiculopathy in the L4, L5, and S1 distributions of his lower extremities secondary to multilevel lumbar stenosis from L3-4 to L5-S1  The patient has not done any physical therapy for his low back and lower extremity symptoms, nor has he tried any membrane stabilizers  The patient is currently managed on high-dose opioid medications including oxycodone ER 80 mg q 8 hours and Norco 10/325 mg q 6 hours p r n  by his PCP  The patient states that this regimen gives him mild-to-moderate relief       I did have a nohemi discussion with the patient regarding his opioid medications as he is currently on 400 oral morphine equivalents daily which is more than 4 times the recommended dosing for chronic non cancer pain  I advised the patient that it would be in his best interest to discuss with his PCP about trying to wean down on these opioid medications as much as possible as I do feel that he is hyperalgesia from the medication  The patient did verbalize understanding  It does appear that he has a genuine desire to wean down and off of the medication if possible  I discussed the various treatment modalities including membrane stabilizers, physical therapy, epidural steroid injections, and I do feel that considering the degree of stenosis, a surgical consultation is warranted  The patient did verbalize understanding regarding this as well in wanted to speak with the surgeon before undergoing any epidural steroid injections which I think is more than reasonable  1  I will provide the patient with a referral for Dr Kitty Gutiérrez for surgical consultation      2  the patient will continue with his opioid prescriptions per his PCP and the above-mentioned recommendations were made      3  I will initiate gabapentin 100 mg q h s  and titrate  Up to 300 mg q h s  for his neuropathic complaints  I did discuss with the patient that occasionally will need to titrate up to 1800 mg daily to reach optimal therapeutic effect  The patient was apprised of the most common side effects of gabapentin and he was given a titration schedule at today's visit  4  the patient may benefit from bilateral L5 TFESI, however will await recommendations from Orthopedic Spine      5  I will provide the patient with a prescription for physical therapy to reduced pain and improved function     6  I will follow up the patient in 2 months       Discussion/Summary   The patient has the current Goals: Reduced pain and improved function  The patent has the current Barriers: Obesity, diabetes, osteoarthritis, and lumbar stenosis      Patient is able to Self-Care  Possible side effects of new medications were reviewed with the patient/guardian today  The treatment plan was reviewed with the patient/guardian  The patient/guardian understands and agrees with the treatment plan    The patient was counseled regarding diagnostic results,-- instructions for management,-- risk factor reductions,-- prognosis,-- patient and family education,-- impressions,-- risks and benefits of treatment options-- and-- importance of compliance with treatment  total time of encounter was 30 minutes  Self Referrals: No      Chief Complaint   Chief Complaints    1  Back Pain  Low back and leg pain      History of Present Illness   This is a very pleasant 57-year-old male referred by Dr Salomon Mishra for lumbosacral back pain that radiates into the posterolateral aspects of his lower extremities and occasionally into the anterior aspects of the shins to the feet with associated numbness and subjective weakness  He denies any bladder or bowel incontinence or saddle anesthesia  The patient did have a left total knee replacement in July of 2016 and has recovered quite well from this  He is also being treated by Orthopedics for arthritis of the right knee and recently had an injection  He has done physical therapy for his knees, however never for his low back  The patient states that his low back pain has been longstanding, however his lower extremity symptoms have occurred over the past 2-4 months  He denies any trauma or inciting event  patient did have a recent MRI of his lumbar spine which reveals degenerative disc disease and disc bulges / herniations at L3-4, L4-5, and L5-S1 resulting in moderate central and bilateral foraminal stenosis at L3-4  There is moderate to severe central with moderate to severe bilateral foraminal stenosis at L4-5  There is moderate to severe central, with mild left and moderate right foraminal stenosis at L5-S1   There was grade 1 spondylolisthesis of L5-S1  patient is currently taking ibuprofen p r n ,Meloxicam 15 mg daily p r n , oxycodone ER 80 mg q 8 hours, and Norco 10/325 mg q 6 hours p r n  which is provided by his PCP  The patient has never tried any membrane stabilizers yet  He has been on opioid medications for a few years now  This regimen gives him mild to moderate relief  patient rates his pain a 10/10 on the pain is nearly constant  The pain is worse in the morning  The pain is described as burning, shooting, and numbness  The pain is decreased with lying down, sitting, and relaxation  The pain is increased with standing, bending at the waist, walking, exercise, and bowel movements  He has got moderate relief from exercise and excellent relief from heat and ice application  He currently ambulates with a cane and walker  have personally reviewed and/or updated the patient's past medical history, past surgical history, family history, social history, allergies, and vital signs today  Other than as stated above, the patient denies any interval changes in medications, medical condition, mental condition, symptoms, or allergies since the last office visit  Referring physician is  Dr [facility]      Primary Care physician is  Dulce Maria Quevedo presents with complaints of constant episodes of bilateral lower back pain, described as burning, radiating to the bilateral buttock and bilateral lower leg  On a scale of 1 to 10, the patient rates the pain as 10  Review of Systems        Constitutional: no fever,-- no recent weight gain-- and-- no recent weight loss  Eyes: double vision-- and-- blurry vision  Cardiovascular: chest pain, but-- no palpitations-- and-- no lower extremity edema  Respiratory: wheezing, but-- no complaints of shortness of breath        Musculoskeletal: difficulty walking, but-- no muscle weakness,-- no joint stiffness,-- no joint swelling,-- no limb swelling,-- no pain in extremity-- and-- no decreased range of motion  Neurological: no dizziness,-- no difficulty swallowing,-- no memory loss,-- no loss of consciousness-- and-- no seizures  Gastrointestinal: constipation, but-- no nausea,-- no vomiting-- and-- no diarrhea  Genitourinary: difficulty initiating urine stream, but-- no genital pain-- and-- no frequent urination  Integumentary: no complaints of skin rash  Psychiatric: no depression  Endocrine: no excessive thirst,-- no adrenal disease,-- no hypothyroidism-- and-- no hyperthyroidism  Hematologic/Lymphatic: no tendency for easy bruising-- and-- no tendency for easy bleeding  ROS reviewed  Active Problems   Problems    1  Acute bilateral low back pain with bilateral sciatica (724 2,724 3) (M54 42,M54 41)   2  Aftercare following left knee joint replacement surgery (V54 81,V43 65) (Z47 1,Z96 652)   3  Asymptomatic varicose veins, unspecified laterality (454 9) (I83 90)   4  Bilateral lower extremity edema (782 3) (R60 0)   5  Cellulitis of left ankle (682 6) (L03 116)   6  Diabetes (250 00) (E11 9)   7  Discoloration of skin of lower leg (709 00) (L81 9)   8  Hypertension (401 9) (I10)   9  Knee pain, bilateral (719 46) (M25 561,M25 562)   10  Primary osteoarthritis of left knee (715 16) (M17 12)   11  Primary osteoarthritis of right knee (715 16) (M17 11)   12  S/P total knee replacement using cement, left (V43 65) (A75 805)    Past Medical History   Problems    1  History of Anxiety (300 00) (F41 9)   2  History of asthma (V12 69) (Z87 09)   3  History of diabetes mellitus (V12 29) (Z86 39)   4  History of gastroesophageal reflux (GERD) (V12 79) (Z87 19)   5  Hypertension (401 9) (I10)   6  Primary osteoarthritis of left knee (715 16) (M17 12)   7  Primary osteoarthritis of right knee (715 16) (M17 11)    Surgical History   Problems    1  History of Appendectomy   2  History of Cataract Surgery   3  History of Cholecystectomy   4   History of Knee Replacement    Family History   Father    1  Family history of lung cancer (V16 1) (Z80 1)  Unknown    2  Family history of diabetes mellitus (V18 0) (Z83 3)   3  Family history of hypertension (V17 49) (Z82 49)  Family History    4  Family history of Arthritis (716 90) (M19 90)   5  Family history of Osteoporosis (733 00) (M81 0)    Social History   Problems    · Caffeine use (V49 89) (F15 90)   ·    · Never a smoker   · No alcohol use    Current Meds    1  Budesonide 32 MCG/ACT Nasal Suspension; Therapy: 74LYT5551 to (Evaluate:27Aug2015) Recorded   2  Cephalexin 500 MG Oral Tablet; Take 4 tablets, one hour prior to dental procedures; Therapy: 84Ojp1752 to (Last Rx:21Jul2016)  Requested for: 21Jul2016 Ordered   3  CloNIDine HCl - 0 1 MG Oral Tablet; Therapy: 17HZS6142 to (Evaluate:28Jun2015) Recorded   4  Ferrous Sulfate 325 (65 Fe) MG Oral Tablet; TAKE 1 TABLET TWICE DAILY; Therapy: 47PXE2321 to (Evaluate:21Aug2017)  Requested for: 51INH3819; Last     Rx:22Jun2017 Ordered   5  Hydrocodone-Acetaminophen  MG Oral Tablet; Therapy: 58FJE4217 to (Evaluate:28Jun2015) Recorded   6  Ketoconazole 2 % External Cream;     Therapy: 44Trw5616 to (Evaluate:28Jun2015) Recorded   7  Lisinopril-Hydrochlorothiazide 10-12 5 MG Oral Tablet; Therapy: 70Lgk4681 to (Evaluate:27Aug2015) Recorded   8  LORazepam 0 5 MG Oral Tablet; Therapy: 77MEV3317 to (Evaluate:28Jun2015) Recorded   9  MetFORMIN HCl  MG Oral Tablet Extended Release 24 Hour; Therapy: 74MWR3832 to (Evaluate:27Aug2015) Recorded   10  OxyCONTIN 80 MG Oral Tablet ER 12 Hour Abuse-Deterrent; Therapy: 06JRW0641 to (490 08 485) Recorded   11  Pantoprazole Sodium 40 MG Oral Tablet Delayed Release; Therapy: 39THI4797 to (Evaluate:27Aug2015) Recorded   12  Vitamin D (Ergocalciferol) 41162 UNIT Oral Capsule; Therapy: 65PEQ1021 to (Evaluate:27Aug2015) Recorded    Allergies   Medication    1   No Known Drug Allergies    Vitals   Vital Signs    Recorded: 94LNI1498 07:53AM   Temperature 98 3 F   Heart Rate 428   Systolic 217   Diastolic 73   Height 5 ft 6 in   Weight 264 lb    BMI Calculated 42 61   BSA Calculated 2 25   Pain Scale 8     Physical Exam        Constitutional      General appearance: Abnormal  -- obese  Eyes      Sclera: anicteric      HEENT      Hearing grossly intact  Neck      Neck: Supple, symmetric, trachea midline, no masses  Pulmonary      Respiratory effort: Even and unlabored  Abdomen      Abdomen: Soft, non-tender, non-distended  Skin      Skin and subcutaneous tissue: Normal without rashes or lesions, well hydrated  Psychiatric      Mood and affect: Mood and affect appropriate  Neurologic      the muscle tone was normal      Musculoskeletal      Gait and station: Abnormal  -- antalgic gait and ambulates with walker  Lumbar/Sacral Spine examination demonstrates Lumbosacral Spine:      Appearance: Normal       Tenderness: right paraspinal,-- left paraspinal,-- right sciatic notch-- and-- left sciatic notch  Palpatory Findings include bilateral muscle spasms  Lumbosacral Spine Sensory:   -- Decreased sensation to light touch on the posterolateral aspects of his calves from the knee to the ankles  ROM Lumbosacral Spine: Full except as noted: Flexion was restricted-- and-- was painful  Extension was restricted-- and-- was painful  Left lateral flexion was restricted  Right lateral flexion was restricted  ROM Hips: Full except as noted: Left hip internal rotation was restricted-- and-- was not painful  Right hip internal rotation was restricted-- and-- was not painful  Foot and ankle strength was normal bilaterally  Knee strength was normal bilaterally  Hip strength was normal bilaterally        Evaluation of Muscle Stretch Reflexes on the right side demonstrates 2/4 Knee Jerk Reflex,-- 2/4 Ankle Jerk Reflex-- and-- negative right ankle clonus  Evaluation of Muscle Stretch Reflexes on the left side demonstrates negative left ankle clonus, but-- 2/4 Knee Jerk Reflex-- and-- 2/4 Ankle Jerk Reflex  Special Tests: positive Straight Leg Raise on right-- and-- positive Straight Leg Raise on left, but-- negative Zack's Maneuver on right-- and-- negative Zack's Maneuver on lef  Results/Data      Results     I personally reviewed the films/images in the office today  Future Appointments      Date/Time Provider Specialty Site   02/01/2018 01:00 PM SHARON Huston   Orthopedic Surgery [facility]     Signatures    Electronically signed by : Alexsander Ann DO; Jan 19 2018  4:21PM EST                       (Author)

## 2018-01-22 VITALS
WEIGHT: 264.5 LBS | BODY MASS INDEX: 42.51 KG/M2 | SYSTOLIC BLOOD PRESSURE: 139 MMHG | HEIGHT: 66 IN | HEART RATE: 103 BPM | DIASTOLIC BLOOD PRESSURE: 75 MMHG

## 2018-01-22 VITALS
DIASTOLIC BLOOD PRESSURE: 73 MMHG | SYSTOLIC BLOOD PRESSURE: 141 MMHG | HEART RATE: 103 BPM | HEIGHT: 66 IN | BODY MASS INDEX: 42.43 KG/M2 | TEMPERATURE: 98.3 F | WEIGHT: 264 LBS

## 2018-01-29 ENCOUNTER — EVALUATION (OUTPATIENT)
Dept: PHYSICAL THERAPY | Facility: REHABILITATION | Age: 63
End: 2018-01-29
Payer: COMMERCIAL

## 2018-01-29 DIAGNOSIS — M48.062 SPINAL STENOSIS OF LUMBAR REGION WITH NEUROGENIC CLAUDICATION: Primary | ICD-10-CM

## 2018-01-29 DIAGNOSIS — M54.16 RADICULOPATHY OF LUMBAR REGION: ICD-10-CM

## 2018-01-29 PROCEDURE — 97110 THERAPEUTIC EXERCISES: CPT | Performed by: PHYSICAL THERAPIST

## 2018-01-29 PROCEDURE — 97162 PT EVAL MOD COMPLEX 30 MIN: CPT | Performed by: PHYSICAL THERAPIST

## 2018-01-29 PROCEDURE — G8991 OTHER PT/OT GOAL STATUS: HCPCS | Performed by: PHYSICAL THERAPIST

## 2018-01-29 PROCEDURE — G8990 OTHER PT/OT CURRENT STATUS: HCPCS | Performed by: PHYSICAL THERAPIST

## 2018-01-29 NOTE — PROGRESS NOTES
PT Evaluation     Today's date: 2018  Patient name: Jason Roque  : 1955  MRN: [id number]  Referring provider: Brad Velarde DO  Dx:   Encounter Diagnoses   Name Primary?  Spinal stenosis of lumbar region with neurogenic claudication Yes    Radiculopathy of lumbar region                   Assessment  Impairments: abnormal gait, abnormal or restricted ROM, impaired balance, impaired physical strength, lacks appropriate home exercise program and pain with function    Assessment details: Patient presents with pain, decreased lumbar ROM, decreased hip and core strength, decreased LE flexibility, postural deficits and decreased function secondary to lumbar radiculopathy  Patient would benefit from skilled PT intervention to address these issues and to maximize function  Thank you for the referral   Understanding of Dx/Px/POC: good   Prognosis: good    Goals  Short Term:  Pt will report decreased levels of pain by at least 2 subjective ratings in 4 weeks  Pt will demonstrate improved ROM by at least 25% in 4 weeks  Pt will demonstrate improved strength by 1/2 grade MMT in 4 weeks  Long Term:   Pt will be independent in their HEP in 8 weeks  Pt will demonstrate improved FOTO, > 44  Pt will be independent with all ADL's    Plan    Planned modality interventions: thermotherapy: hydrocollator packs, cryotherapy and TENS  Planned therapy interventions: abdominal trunk stabilization, aquatic therapy, joint mobilization, manual therapy, neuromuscular re-education, patient education, postural training, therapeutic exercise, home exercise program and flexibility  Frequency: 2x week  Duration in weeks: 6        Subjective Evaluation    History of Present Illness  Mechanism of injury: Pt is a 58 y o male with a c/o ongoing back pain for the last 2 months of insidious onset  Pt states he was feeling unbalanced and had shooting pain down b/l LE's to his toes  Pt saw PCP and was referred to Pain Management    Pt had MRI of lumbar spine, which showed multilevel nerve compression, as per pt  Pt was offered injections, but pt has deferred at this time  Pt is now referred for PT at this time and Pt was also advised to schedule appointment with Surgeon  Pt reports pain/difficulty with everything (dressing, cooking, showering, household chores (unable currently), standing (limited tolerance to 15 mins), ambulation (currently using RW which he started to use 2 weeks ago), steps (using SPC), sleep (intermittent disturbance)  Pt would like to resume exercising at the [facility]  Pain  At best pain ratin  At worst pain rating: 10  Location: lumbar spine radiating down b/l LE (posterior aspect) to his feet  Relieving factors: medications, change in position and heat      Diagnostic Tests  MRI studies: abnormal        Objective     Special Questions      Additional Special Questions  Patient denies loss of bowel/bladder control, saddle anesthesia, unexplained weight loss, history of cancer  Neurological Testing     Reflexes   Left   Patellar (L4): trace (1+)  Achilles (S1): normal (2+)    Right   Patellar (L4): normal (2+)  Achilles (S1): normal (2+)    Active Range of Motion     Additional Active Range of Motion Details  Lumbar flexion=75%  Repeated flexion=increased tenseness lumbar spine  Extension=25% with pain  Repeated extension=numbness in lumbar spine  BSB=25% with pain  B rot=50% with pain    Strength/Myotome Testing     Left Hip   Planes of Motion   Flexion: 4  Abduction: 2    Right Hip   Planes of Motion   Flexion: 4-  Abduction: 3-    Left Knee   Flexion: 5  Extension: 5    Right Knee   Flexion: 5  Extension: 5    Left Ankle/Foot   Dorsiflexion: 5    Right Ankle/Foot   Dorsiflexion: 5    Tests       Thoracic   Negative slump  Lumbar   Negative slump  Additional Tests Details  (-)SLR, (-)ASLR, (+)ROMY for tightness, DKTC=relief, FRANCOIS=not tested  Decreased flexibility b/l Hs, piriformis    Poor tolerance to laying prone  Mild TTP R lumbar paraspinals  Precautions: Oa, diabetes, HTN, L TKA      Daily Treatment Diary     Manual  1/29            None Currently                                                                     Exercise Diary  1/29            Recumbent bike             Knee to chest stretch             HS stretch             Piriformis stretch             LTR-if able             DLS ab brace             DLS march             DLS kicks             Bridges             [facility]             Posture-chair             Standing hip abd/ext             Mini squats             TB LPD             TB rows                                                                                  Modalities  1/29            MH/CP PRN

## 2018-01-31 ENCOUNTER — OFFICE VISIT (OUTPATIENT)
Dept: PHYSICAL THERAPY | Facility: REHABILITATION | Age: 63
End: 2018-01-31
Payer: COMMERCIAL

## 2018-01-31 DIAGNOSIS — M54.16 RADICULOPATHY OF LUMBAR REGION: ICD-10-CM

## 2018-01-31 DIAGNOSIS — M48.062 SPINAL STENOSIS OF LUMBAR REGION WITH NEUROGENIC CLAUDICATION: Primary | ICD-10-CM

## 2018-01-31 PROCEDURE — 97110 THERAPEUTIC EXERCISES: CPT

## 2018-01-31 PROCEDURE — 97112 NEUROMUSCULAR REEDUCATION: CPT

## 2018-01-31 NOTE — PROGRESS NOTES
Daily Note     Today's date: 2018  Patient name: Dwayne Sandifer  : 1955  MRN: [id number]  Referring provider: Robinson Orlando DO  Dx: No diagnosis found  Subjective: Pt reported intermittent discomfort noted; R > L       Objective: See treatment diary below  Daily Treatment Diary      Manual                       None Currently                                                                                                                             Exercise Diary                       Recumbent bike  10 mins                     Knee to chest stretch  10"x10                     HS stretch  10"x10                     Piriformis stretch  10"x10                     LTR-if able                       DLS ab brace  3"Z 2min                     DLS  ea                      DLS kicks  03 ea                      Bridges                       [facility]                       Posture-chair  2 min                     Standing hip abd/ext                       Mini squats                       TB LPD OTB 10                     TB rows OTb 10                                                                                                                                                   Modalities                       MH/CP PRN  home                                                                          Assessment: Tolerated treatment well  Patient demonstrated fatigue post treatment and exhibited good technique with therapeutic exercises  Good tolerance with initiation of session  Deferred modalities to home  Plan: Continue per plan of care

## 2018-02-05 ENCOUNTER — OFFICE VISIT (OUTPATIENT)
Dept: PHYSICAL THERAPY | Facility: REHABILITATION | Age: 63
End: 2018-02-05
Payer: COMMERCIAL

## 2018-02-05 DIAGNOSIS — M48.062 SPINAL STENOSIS OF LUMBAR REGION WITH NEUROGENIC CLAUDICATION: Primary | ICD-10-CM

## 2018-02-05 DIAGNOSIS — M54.16 RADICULOPATHY OF LUMBAR REGION: ICD-10-CM

## 2018-02-05 PROCEDURE — 97110 THERAPEUTIC EXERCISES: CPT

## 2018-02-05 PROCEDURE — 97112 NEUROMUSCULAR REEDUCATION: CPT

## 2018-02-05 NOTE — PROGRESS NOTES
Daily Note     Today's date: 2018  Patient name: Raul Nino  : 1955  MRN: [id number]  Referring provider: Mari Hazel DO  Dx:   Encounter Diagnoses   Name Primary?  Spinal stenosis of lumbar region with neurogenic claudication Yes    Radiculopathy of lumbar region                   Subjective: Pt reported soreness and fatigue after Lv  Objective: See treatment diary below  Daily Treatment Diary      Manual                       None Currently                                                                                                                             Exercise Diary    2                   Recumbent bike  10 mins  10 min                   Knee to chest stretch  10"x10  10"x10                   HS stretch  10"x10  10"x10                   Piriformis stretch  10"x10  10"x10                   LTR-if able                       DLS ab brace  4"F 2min  5"x2 min                   DLS  ea   32 ea                    DLS kicks   ea   15 ea                    Bridges                       [facility]                       Posture-chair  2 min  2 min                   Standing hip abd/ext                       Mini squats                       TB LPD OTB 10  15                   TB rows OTb 10  15                                                                                                                                                 Modalities                       MH/CP PRN  home                                                                               Assessment: Tolerated treatment well  Patient demonstrated fatigue post treatment  Constant Vc"s needed for correct technique throughout session  Plan: Continue per plan of care

## 2018-02-07 ENCOUNTER — APPOINTMENT (OUTPATIENT)
Dept: PHYSICAL THERAPY | Facility: REHABILITATION | Age: 63
End: 2018-02-07
Payer: COMMERCIAL

## 2018-02-08 ENCOUNTER — TELEPHONE (OUTPATIENT)
Dept: PAIN MEDICINE | Facility: MEDICAL CENTER | Age: 63
End: 2018-02-08

## 2018-02-08 ENCOUNTER — TELEPHONE (OUTPATIENT)
Dept: PAIN MEDICINE | Facility: CLINIC | Age: 63
End: 2018-02-08

## 2018-02-08 NOTE — TELEPHONE ENCOUNTER
S/W pt  Pt stated that he was seen by Rica Yanez on 1/19/18  Pt continues to go to PT  Pt unable to work at this time  and has not worked since 1/19/18  Pt is using a cane and sometimes a  walker to walk  Pt has been for his consultation with Dr Celeste Robin  Pt states, he works for [employer] and needs a  letter for being out of work  Advise will make JW aware and call back with his recommendations

## 2018-02-08 NOTE — TELEPHONE ENCOUNTER
[facility]- Patient called inquiring if Dr Tyson Moyer will give him a letter for work  Stated he wsa in the office on 01/19/18 but forgot to ask for the letter  Patient stated he has been out of work   C/b [phone]

## 2018-02-09 ENCOUNTER — OFFICE VISIT (OUTPATIENT)
Dept: PHYSICAL THERAPY | Facility: REHABILITATION | Age: 63
End: 2018-02-09
Payer: COMMERCIAL

## 2018-02-09 DIAGNOSIS — M48.062 SPINAL STENOSIS OF LUMBAR REGION WITH NEUROGENIC CLAUDICATION: Primary | ICD-10-CM

## 2018-02-09 DIAGNOSIS — M54.16 RADICULOPATHY OF LUMBAR REGION: ICD-10-CM

## 2018-02-09 PROCEDURE — 97110 THERAPEUTIC EXERCISES: CPT

## 2018-02-09 PROCEDURE — 97112 NEUROMUSCULAR REEDUCATION: CPT

## 2018-02-09 NOTE — PROGRESS NOTES
Daily Note     Today's date: 2018  Patient name: Kaykay Arce  : 1955  MRN: [id number]  Referring provider: Rica Quinn DO  Dx:   Encounter Diagnoses   Name Primary?  Spinal stenosis of lumbar region with neurogenic claudication Yes    Radiculopathy of lumbar region                   Subjective: Pt reported intermittent R low back soreness  Pt noted some relief since starting PT  Objective: See treatment diary below  Manual                       None Currently                                                                                                                             Exercise Diary                   Recumbent bike  10 mins  10 min  10 min                 Knee to chest stretch  10"x10  10"x10  10"x10                 HS stretch  10"x10  10"x10  10"x10                 Piriformis stretch  10"x10  10"x10  10"x10                 LTR-if able                       DLS ab brace  0"C 2min  5"x2 min  5"x2 min                 DLS  ea   89 ea   10 ea                  DLS kicks  33 ea   12 ea   10 ea                  Bridges                       Clamshells                       Posture-chair  2 min  2 min  2 min                 Standing hip abd/ext      10 ea                  Mini squats      10                 TB LPD OTB 10  15  15                 TB rows OTb 10  15  15                                                                                                                                               Modalities                       MH/CP PRN  home                                                                            Assessment: Tolerated treatment well  Patient exhibited good technique with therapeutic exercises  Added standing abd/ext and mini squats to good tolerance  MH deferred to home  Plan: Continue per plan of care

## 2018-02-09 NOTE — TELEPHONE ENCOUNTER
Our office does not provide work notes other than for the days of the patient's procedures or visits  The patient will need to go to his primary care physician for this

## 2018-02-12 ENCOUNTER — OFFICE VISIT (OUTPATIENT)
Dept: PHYSICAL THERAPY | Facility: REHABILITATION | Age: 63
End: 2018-02-12
Payer: COMMERCIAL

## 2018-02-12 DIAGNOSIS — M48.062 SPINAL STENOSIS OF LUMBAR REGION WITH NEUROGENIC CLAUDICATION: Primary | ICD-10-CM

## 2018-02-12 DIAGNOSIS — M54.16 RADICULOPATHY OF LUMBAR REGION: ICD-10-CM

## 2018-02-12 PROCEDURE — 97112 NEUROMUSCULAR REEDUCATION: CPT

## 2018-02-12 PROCEDURE — 97110 THERAPEUTIC EXERCISES: CPT

## 2018-02-12 NOTE — PROGRESS NOTES
Daily Note     Today's date: 2018  Patient name: Estelle Garza  : 1955  MRN: [id number]  Referring provider: Rivka Feliciano DO  Dx:   Encounter Diagnoses   Name Primary?  Spinal stenosis of lumbar region with neurogenic claudication Yes    Radiculopathy of lumbar region                   Subjective: Pt reported intermittent discomfort noted  Pt compliant with HEP  Objective: See treatment diary below  FOTO 47; initial FOTO 15  Manual                       None Currently                                                                                                                             Exercise Diary                 Recumbent bike  10 mins  10 min  10 min  10 min               Knee to chest stretch  10"x10  10"x10  10"x10  10"x10               HS stretch  10"x10  10"x10  10"x10  10"x10               Piriformis stretch  10"x10  10"x10  10"x10  10"x10               LTR-if able                       DLS ab brace  5"U 2min  5"x2 min  5"x2 min  5"x2 min               DLS  ea   51 ea   10 ea   10 ea                DLS kicks  60 ea   77 ea   10 ea  10 ea                Bridges                       Clamshells                       Posture-chair  2 min  2 min  2 min  2 min               Standing hip abd/ext      10 ea   10 ea                Mini squats      10  10               TB LPD OTB 10  15  15  gtb 20               TB rows OTb 10  15  15  gtb 20                                                                                                                                             Modalities                       MH/CP PRN  home                                                                            Assessment: Tolerated treatment well  Patient exhibited good technique with therapeutic exercises  Constant Vc"s needed for correct technique  Good tolerance with progressions  Plan: Continue per plan of care   MD appt 2-19 (awais); 3-16 (lucinda)

## 2018-02-14 ENCOUNTER — OFFICE VISIT (OUTPATIENT)
Dept: PHYSICAL THERAPY | Facility: REHABILITATION | Age: 63
End: 2018-02-14
Payer: COMMERCIAL

## 2018-02-14 DIAGNOSIS — M54.16 RADICULOPATHY OF LUMBAR REGION: ICD-10-CM

## 2018-02-14 DIAGNOSIS — M48.062 SPINAL STENOSIS OF LUMBAR REGION WITH NEUROGENIC CLAUDICATION: Primary | ICD-10-CM

## 2018-02-14 PROCEDURE — 97112 NEUROMUSCULAR REEDUCATION: CPT

## 2018-02-14 PROCEDURE — 97110 THERAPEUTIC EXERCISES: CPT

## 2018-02-14 NOTE — PROGRESS NOTES
Daily Note     Today's date: 2018  Patient name: Delvin Barnhart  : 1955  MRN: [id number]  Referring provider: Rom Adan DO  Dx:   Encounter Diagnoses   Name Primary?  Spinal stenosis of lumbar region with neurogenic claudication Yes    Radiculopathy of lumbar region                   Subjective: Pt reported feeling okay today  A little more soreness noted  Objective: See treatment diary below  Manual                       None Currently                                                                                                                             Exercise Diary               Recumbent bike  10 mins  10 min  10 min  10 min  10 min             Knee to chest stretch  10"x10  10"x10  10"x10  10"x10  10"x10             HS stretch  10"x10  10"x10  10"x10  10"x10  10"x10             Piriformis stretch  10"x10  10"x10  10"x10  10"x10  10"x10             LTR-if able                       DLS ab brace  4"Y 2min  5"x2 min  5"x2 min  5"x2 min  5"x2 min             DLS  ea   76 ea   10 ea   10 ea   15 ea              DLS kicks  58 ea   71 ea   10 ea  10 ea   15 ea              Bridges                       Clamshells                       Posture-chair  2 min  2 min  2 min  2 min  2 mins             Standing hip abd/ext      10 ea   10 ea   20 ea              Mini squats      10  10  20             TB LPD OTB 10  15  15  gtb 20  gtb 20             TB rows OTb 10  15  15  gtb 20  gtb 20                                                                                                                                           Modalities                       MH/CP PRN  home                                                                         Assessment: Tolerated treatment well  Patient demonstrated fatigue post treatment and exhibited good technique with therapeutic exercises  Good tolerance with progressions  Challenged with piriformis stretch  Plan: Continue per plan of care

## 2018-02-19 ENCOUNTER — OFFICE VISIT (OUTPATIENT)
Dept: PHYSICAL THERAPY | Facility: REHABILITATION | Age: 63
End: 2018-02-19
Payer: COMMERCIAL

## 2018-02-19 ENCOUNTER — HOSPITAL ENCOUNTER (OUTPATIENT)
Dept: RADIOLOGY | Facility: HOSPITAL | Age: 63
Discharge: HOME | End: 2018-02-19
Attending: ORTHOPAEDIC SURGERY
Payer: COMMERCIAL

## 2018-02-19 ENCOUNTER — OFFICE VISIT (OUTPATIENT)
Dept: OBGYN CLINIC | Facility: HOSPITAL | Age: 63
End: 2018-02-19
Payer: COMMERCIAL

## 2018-02-19 VITALS
SYSTOLIC BLOOD PRESSURE: 132 MMHG | BODY MASS INDEX: 42.43 KG/M2 | HEART RATE: 90 BPM | DIASTOLIC BLOOD PRESSURE: 72 MMHG | WEIGHT: 264 LBS | HEIGHT: 66 IN

## 2018-02-19 DIAGNOSIS — M54.40 LOW BACK PAIN WITH SCIATICA, SCIATICA LATERALITY UNSPECIFIED, UNSPECIFIED BACK PAIN LATERALITY, UNSPECIFIED CHRONICITY: Primary | ICD-10-CM

## 2018-02-19 DIAGNOSIS — M54.16 RADICULOPATHY OF LUMBAR REGION: Primary | ICD-10-CM

## 2018-02-19 PROCEDURE — 97110 THERAPEUTIC EXERCISES: CPT

## 2018-02-19 PROCEDURE — 72110 X-RAY EXAM L-2 SPINE 4/>VWS: CPT

## 2018-02-19 PROCEDURE — 97140 MANUAL THERAPY 1/> REGIONS: CPT

## 2018-02-19 PROCEDURE — 97112 NEUROMUSCULAR REEDUCATION: CPT

## 2018-02-19 PROCEDURE — 99213 OFFICE O/P EST LOW 20 MIN: CPT | Performed by: ORTHOPAEDIC SURGERY

## 2018-02-19 NOTE — PROGRESS NOTES
Daily Note     Today's date: 2018  Patient name: Marc Arias  : 1955  MRN: [id number]  Referring provider: Martell Cheney DO  Dx:   Encounter Diagnosis   Name Primary?  Radiculopathy of lumbar region Yes                  Subjective: Pt states that he is not feeling well today  Objective: See treatment diary below  Manual                       None Currently                                                                                                                             Exercise Diary             Recumbent bike  10 mins  10 min  10 min  10 min  10 min  10 min           Knee to chest stretch  10"x10  10"x10  10"x10  10"x10  10"x10  10"x10           HS stretch  10"x10  10"x10  10"x10  10"x10  10"x10  10"x10           Piriformis stretch  10"x10  10"x10  10"x10  10"x10  10"x10  manual           LTR-if able            10"x10           DLS ab brace  0"G 2min  5"x2 min  5"x2 min  5"x2 min  5"x2 min  5"x2 min           DLS  ea   83 ea   10 ea   10 ea   15 ea   15 ea           DLS kicks  95 ea   22 ea   10 ea  10 ea   15 ea   15 ea           Bridges            5" 2x10           Clamshells                       Posture-chair  2 min  2 min  2 min  2 min  2 mins             Standing hip abd/ext      10 ea   10 ea   20 ea   20 ea           Mini squats      10  10  20             TB LPD OTB 10  15  15  gtb 20  gtb 20  gtb 20           TB rows OTb 10  15  15  gtb 20  gtb 20  gtb 20                                                                                                                                         Modalities                       MH/CP PRN  home                                                                         Assessment: Patient requires frequent verbal and tactile cuing to correct TE form  Plan: Continue per plan of care

## 2018-02-19 NOTE — PROGRESS NOTES
Assessment/Plan:    Low back pain with sciatica  Multilevel lumbar DDD  Ambulatory dysfunction/neurogenic claudication  Unfortunately patient does not bring his MRI of the lumbar spine for my review today nor does he brings a report  On physical exam he is ambulating with the assistance of a walker  X-rays today in the office demonstrates multilevel areas of spondylotic changes and DDD  He will return in the very near future with his MRI for my review for further recommendations  Problem List Items Addressed This Visit     Low back pain with sciatica - Primary     Multilevel lumbar DDD  Ambulatory dysfunction/neurogenic claudication  Unfortunately patient does not bring his MRI of the lumbar spine for my review today nor does he brings a report  On physical exam he is ambulating with the assistance of a walker  X-rays today in the office demonstrates multilevel areas of spondylotic changes and DDD  He will return in the very near future with his MRI for my review for further recommendations  Relevant Orders    XR spine lumbar minimum 4 views non injury            Subjective:      Patient ID: Raul Nino is a 58 y o  male  HPI  Patient presents to the office for back and bilateral leg pain that started insidiously 7 weeks ago  He was referred to us by Dr Reggie Cho  He feels unsteady on his feet  His pain increases with activity such as walking  He has been going to physical therapy with no relief of symptoms  He has not had any steroid injections  He has a history of diabetes  He has a surgical history of left TKA by Dr Tran Dural     The following portions of the patient's history were reviewed and updated as appropriate: current medications, past family history, past medical history, past social history, past surgical history and problem list     Review of Systems   Constitutional: Negative  HENT: Negative  Eyes: Negative  Respiratory: Negative  Cardiovascular: Negative  Gastrointestinal: Negative  Endocrine: Negative  Genitourinary: Negative  Skin: Negative  Allergic/Immunologic: Negative  Hematological: Negative  Psychiatric/Behavioral: Negative  Objective:      /72   Pulse 90   Ht 5' 6" (1 676 m)   Wt 120 kg (264 lb)   BMI 42 61 kg/m²          Physical Exam   Constitutional: He is oriented to person, place, and time  He appears well-developed and well-nourished  HENT:   Head: Normocephalic and atraumatic  Eyes: Pupils are equal, round, and reactive to light  Cardiovascular: Intact distal pulses  Pulmonary/Chest: Breath sounds normal    Neurological: He is alert and oriented to person, place, and time  Skin: Skin is warm and dry  Psychiatric: He has a normal mood and affect  His behavior is normal        Patient ambulates with assistance of a walker  Modified straight leg raise negative bilaterally  Strength 5/5 L2-S1 bilaterally  Sensation intact bilaterally  Lower extremities are warm and well perfused  Imaging  X-ray lumbar spine demonstrates multilevel areas of spondylosis with significant anterior osteophyte formation  Disc space narrowing and grade 1 spondylolisthesis at L5-S1

## 2018-02-19 NOTE — ASSESSMENT & PLAN NOTE
Multilevel lumbar DDD  Ambulatory dysfunction/neurogenic claudication  Unfortunately patient does not bring his MRI of the lumbar spine for my review today nor does he brings a report  On physical exam he is ambulating with the assistance of a walker  X-rays today in the office demonstrates multilevel areas of spondylotic changes and DDD  He will return in the very near future with his MRI for my review for further recommendations

## 2018-02-21 ENCOUNTER — OFFICE VISIT (OUTPATIENT)
Dept: PHYSICAL THERAPY | Facility: REHABILITATION | Age: 63
End: 2018-02-21
Payer: COMMERCIAL

## 2018-02-21 DIAGNOSIS — M48.062 SPINAL STENOSIS OF LUMBAR REGION WITH NEUROGENIC CLAUDICATION: ICD-10-CM

## 2018-02-21 DIAGNOSIS — M54.16 RADICULOPATHY OF LUMBAR REGION: Primary | ICD-10-CM

## 2018-02-21 PROCEDURE — 97112 NEUROMUSCULAR REEDUCATION: CPT

## 2018-02-21 PROCEDURE — 97110 THERAPEUTIC EXERCISES: CPT

## 2018-02-21 NOTE — PROGRESS NOTES
Daily Note     Today's date: 2018  Patient name: Jose Dye  : 1955  MRN: [id number]  Referring provider: Mikayla Green DO  Dx:   Encounter Diagnosis     ICD-10-CM    1  Radiculopathy of lumbar region M54 16    2  Spinal stenosis of lumbar region with neurogenic claudication M48 [id number]                   Subjective: Pt reported feeling good today  Intermittent discomfort noted  Objective: See treatment diary below  Manual                       None Currently                                                                                                                             Exercise Diary           Recumbent bike  10 mins  10 min  10 min  10 min  10 min  10 min  10 min         Knee to chest stretch  10"x10  10"x10  10"x10  10"x10  10"x10  10"x10  10"x10         HS stretch  10"x10  10"x10  10"x10  10"x10  10"x10  10"x10  10"x10         Piriformis stretch  10"x10  10"x10  10"x10  10"x10  10"x10  manual  10"x10         LTR-if able            10"x10  10"x10         DLS ab brace  5"K 2min  5"x2 min  5"x2 min  5"x2 min  5"x2 min  5"x2 min  5"x2 min         DLS  ea   72 ea   10 ea   10 ea   15 ea   15 ea  15 ea          DLS kicks  10 ea   99 ea   10 ea   10 ea   15 ea   15 ea  15 ea          Bridges            5" 2x10  3"x15         Clamshells                       Posture-chair  2 min  2 min  2 min  2 min  2 mins    2 min         Standing hip abd/ext      10 ea   10 ea   20 ea   20 ea  20 ea          Mini squats      10  10  20    20         TB LPD OTB 10  15  15  gtb 20  gtb 20  gtb 20  gtb 20         TB rows OTb 10  15  15  gtb 20  gtb 20  gtb 20  gtb 20                                                                                                                                       Modalities                       MH/CP PRN  home                                                                            Assessment: Tolerated treatment well  Patient demonstrated fatigue post treatment and exhibited good technique with therapeutic exercises  Continue to progress as tolerated  Plan: Continue per plan of care   MD appt 3/16

## 2018-02-26 ENCOUNTER — OFFICE VISIT (OUTPATIENT)
Dept: PHYSICAL THERAPY | Facility: REHABILITATION | Age: 63
End: 2018-02-26
Payer: COMMERCIAL

## 2018-02-26 ENCOUNTER — TELEPHONE (OUTPATIENT)
Dept: OBGYN CLINIC | Facility: HOSPITAL | Age: 63
End: 2018-02-26

## 2018-02-26 DIAGNOSIS — M54.16 RADICULOPATHY OF LUMBAR REGION: Primary | ICD-10-CM

## 2018-02-26 DIAGNOSIS — M48.062 SPINAL STENOSIS OF LUMBAR REGION WITH NEUROGENIC CLAUDICATION: ICD-10-CM

## 2018-02-26 PROCEDURE — 97110 THERAPEUTIC EXERCISES: CPT | Performed by: PHYSICAL THERAPIST

## 2018-02-26 PROCEDURE — 97112 NEUROMUSCULAR REEDUCATION: CPT | Performed by: PHYSICAL THERAPIST

## 2018-02-26 NOTE — TELEPHONE ENCOUNTER
Caller: patient  Call back number: [phone]  patient's doctor: Dr Terrie Diego    Patient states he dropped off his tests for review  He states he received a call from the doctor to speak about the MRI results  He is just returning that call   Please advise

## 2018-02-26 NOTE — PROGRESS NOTES
Daily Note     Today's date: 2018  Patient name: Abby Fry  : 1955  MRN: [id number]  Referring provider: Alo Mathew DO  Dx:   Encounter Diagnosis     ICD-10-CM    1  Radiculopathy of lumbar region M54 16    2  Spinal stenosis of lumbar region with neurogenic claudication M48 062               1on1 [id number] and pt performed remaining TE's as part of Fitness program       Subjective: Pt reports decreased N/T in his Le's  Pt also notes improved LE strength and has been able to take some steps while walking without use of RW  Objective: See treatment diary below  Manual                       None Currently                                                                                                                             Exercise Diary         Recumbent bike  10 mins  10 min  10 min  10 min  10 min  10 min  10 min  10'       Knee to chest stretch  10"x10  10"x10  10"x10  10"x10  10"x10  10"x10  10"x10  10"x10       HS stretch  10"x10  10"x10  10"x10  10"x10  10"x10  10"x10  10"x10  10"x10       Piriformis stretch  10"x10  10"x10  10"x10  10"x10  10"x10  manual  10"x10  10"x10       LTR-if able            10"x10  10"x10  10"x10       DLS ab brace  5"B 2min  5"x2 min  5"x2 min  5"x2 min  5"x2 min  5"x2 min  5"x2 min  5"x2 mins       DLS  ea   28 ea   10 ea   10 ea   15 ea   15 ea  15 ea   15ea       DLS kicks  58 ea   30 ea   10 ea   10 ea   15 ea   15 ea  15 ea  Bibi Smiles       Bridges            5" 2x10  3"x15  3"x15       Clamshells                       Posture-chair  2 min  2 min  2 min  2 min  2 mins    2 min  2 min       Standing hip abd/ext      10 ea   10 ea   20 ea   20 ea  20 ea   20ea       Mini squats      10  10  20    20  20       TB LPD OTB 10  15  15  gtb 20  gtb 20  gtb 20  gtb 20  gtb 20       TB rows OTb 10  15  15  gtb 20  gtb 20  gtb 20  gtb 20  gtb 20                                                                                                                                     Modalities  1/31                     MH/CP PRN  home                                                                           Assessment: Tolerated treatment well  VC's required for correct technique with Te's  No adverse response to today's session  Patient would benefit from continued PT      Plan: Continue per plan of care

## 2018-02-26 NOTE — TELEPHONE ENCOUNTER
Dr Kitty Gutiérrez called patient back and left a message  If he calls back he will need to schedule an appointment with us for review either this Wednesday or next Monday    Thanks

## 2018-02-28 ENCOUNTER — OFFICE VISIT (OUTPATIENT)
Dept: PHYSICAL THERAPY | Facility: REHABILITATION | Age: 63
End: 2018-02-28
Payer: COMMERCIAL

## 2018-02-28 DIAGNOSIS — M54.16 RADICULOPATHY OF LUMBAR REGION: Primary | ICD-10-CM

## 2018-02-28 PROCEDURE — 97112 NEUROMUSCULAR REEDUCATION: CPT

## 2018-02-28 PROCEDURE — 97110 THERAPEUTIC EXERCISES: CPT

## 2018-02-28 NOTE — PROGRESS NOTES
Daily Note     Today's date: 2018  Patient name: Malik Pablo  : 1955  MRN: [id number]  Referring provider: Za Montilla DO  Dx:   Encounter Diagnosis     ICD-10-CM    1  Radiculopathy of lumbar region M54 16                   Subjective: Pt  reports no change in status upon arrival         Objective: See treatment diary below    Manual                       None Currently                                                                                                                             Exercise Diary       Recumbent bike  10 mins  10 min  10 min  10 min  10 min  10 min  10 min  10'  10'     Knee to chest stretch  10"x10  10"x10  10"x10  10"x10  10"x10  10"x10  10"x10  10"x10  10"x10     HS stretch  10"x10  10"x10  10"x10  10"x10  10"x10  10"x10  10"x10  10"x10  10"x10     Piriformis stretch  10"x10  10"x10  10"x10  10"x10  10"x10  manual  10"x10  10"x10  10"x10     LTR-if able            10"x10  10"x10  10"x10  10"x10     DLS ab brace  4"C 2min  5"x2 min  5"x2 min  5"x2 min  5"x2 min  5"x2 min  5"x2 min  5"x2 mins  5"x2'     DLS  ea   37 ea   10 ea   10 ea   15 ea   15 ea  15 ea   64OT  04MQ     DLS kicks  63 ea   91 ea   10 ea   10 ea   15 ea   15 ea  15 ea  Sadia Altamirano Sadia Altamirano     Bridges            5" 2x10  3"x15  3"x15  3"x15     Clamshells                       Posture-chair  2 min  2 min  2 min  2 min  2 mins    2 min  2 min  2'     Standing hip abd/ext      10 ea   10 ea   20 ea   20 ea  20 ea   20ea  20ea     Mini squats      10  10  20    20  20  20     TB LPD OTB 10  15  15  gtb 20  gtb 20  gtb 20  gtb 20  gtb 20  GTB 20     TB rows OTb 10  15  15  gtb 20  gtb 20  gtb 20  gtb 20  gtb 20  GTB 20                                                                                                                                   Modalities                       MH/CP PRN  home                                                                             Assessment: Tolerated treatment fair  Patient demonstrated fatigue post treatment and would benefit from continued PT   Pt  able to complete all exercises with no increase in pain during or after session  Pt 1:1 with PTA [id number], IEP for duration  Plan: Continue per plan of care

## 2018-03-05 ENCOUNTER — OFFICE VISIT (OUTPATIENT)
Dept: PHYSICAL THERAPY | Facility: REHABILITATION | Age: 63
End: 2018-03-05
Payer: COMMERCIAL

## 2018-03-05 DIAGNOSIS — M48.062 SPINAL STENOSIS OF LUMBAR REGION WITH NEUROGENIC CLAUDICATION: ICD-10-CM

## 2018-03-05 DIAGNOSIS — M54.16 RADICULOPATHY OF LUMBAR REGION: Primary | ICD-10-CM

## 2018-03-05 PROCEDURE — 97112 NEUROMUSCULAR REEDUCATION: CPT

## 2018-03-05 NOTE — PROGRESS NOTES
Daily Note     Today's date: 3/5/2018  Patient name: Lucia Lange  : 1955  MRN: [id number]  Referring provider: Jason Rivera DO  Dx:   Encounter Diagnosis     ICD-10-CM    1  Radiculopathy of lumbar region M54 16    2  Spinal stenosis of lumbar region with neurogenic claudication M48 062                   Subjective: Pt reported improved symptoms today  Objective: See treatment diary below  Manual                       None Currently                                                                                                                             Exercise Diary  1/31  2/5  2/9  2/12  2/14  2/19  2/21  2/26  2/28  3/5   Recumbent bike  10 mins  10 min  10 min  10 min  10 min  10 min  10 min  10'  10'  10 min   Knee to chest stretch  10"x10  10"x10  10"x10  10"x10  10"x10  10"x10  10"x10  10"x10  10"x10  10"x10   HS stretch  10"x10  10"x10  10"x10  10"x10  10"x10  10"x10  10"x10  10"x10  10"x10  10"x10   Piriformis stretch  10"x10  10"x10  10"x10  10"x10  10"x10  manual  10"x10  10"x10  10"x10  10"x10   LTR-if able            10"x10  10"x10  10"x10  10"x10  10"x5 ea  DLS ab brace  2"K 2min  5"x2 min  5"x2 min  5"x2 min  5"x2 min  5"x2 min  5"x2 min  5"x2 mins  5"x2'  5"x2 min   DLS  ea   32 ea   10 ea   10 ea   15 ea   15 ea  15 ea   31HX  20VM  15 ea  DLS kicks  45 ea   87 ea   10 ea  10 ea   15 ea   15 ea  15 ea  Emmanuel High  15 ea  Bridges            5" 2x10  3"x15  3"x15  3"x15  3"x15   Clamshells                       Posture-chair  2 min  2 min  2 min  2 min  2 mins    2 min  2 min  2'  2 min   Standing hip abd/ext      10 ea   10 ea   20 ea   20 ea  20 ea   20ea  20ea  20 ea     Mini squats      10  10  20    20  20  20  20   TB LPD 10  15  15  gtb 20  gtb 20  gtb 20  gtb 20  gtb 20  GTB 20  gtb 20   TB rows  10  15  15  gtb 20  gtb 20  gtb 20  gtb 20  gtb 20  GTB 20  gtb 20                                                                                                                                 Modalities  1/31                     MH/CP PRN  home                                                                            Assessment: Tolerated treatment well  Patient exhibited good technique with therapeutic exercises  Constant Vc's needed for correct technique throughout session  Plan: Continue per plan of care   1 on 1 with PTA for 25 mins; performed exercises under fitness program

## 2018-03-07 ENCOUNTER — APPOINTMENT (OUTPATIENT)
Dept: PHYSICAL THERAPY | Facility: REHABILITATION | Age: 63
End: 2018-03-07
Payer: COMMERCIAL

## 2018-03-09 ENCOUNTER — TELEPHONE (OUTPATIENT)
Dept: PAIN MEDICINE | Facility: CLINIC | Age: 63
End: 2018-03-09

## 2018-03-09 NOTE — TELEPHONE ENCOUNTER
lmom for pt to cb need to r/s appt on 3/16 change in provider schedule   Please r/s to next avail sovs

## 2018-03-12 ENCOUNTER — OFFICE VISIT (OUTPATIENT)
Dept: OBGYN CLINIC | Facility: HOSPITAL | Age: 63
End: 2018-03-12
Payer: COMMERCIAL

## 2018-03-12 ENCOUNTER — EVALUATION (OUTPATIENT)
Dept: PHYSICAL THERAPY | Facility: REHABILITATION | Age: 63
End: 2018-03-12
Payer: COMMERCIAL

## 2018-03-12 VITALS
BODY MASS INDEX: 42.43 KG/M2 | HEIGHT: 66 IN | WEIGHT: 264 LBS | SYSTOLIC BLOOD PRESSURE: 164 MMHG | HEART RATE: 77 BPM | DIASTOLIC BLOOD PRESSURE: 80 MMHG

## 2018-03-12 DIAGNOSIS — M48.062 SPINAL STENOSIS OF LUMBAR REGION WITH NEUROGENIC CLAUDICATION: ICD-10-CM

## 2018-03-12 DIAGNOSIS — M51.36 DDD (DEGENERATIVE DISC DISEASE), LUMBAR: ICD-10-CM

## 2018-03-12 DIAGNOSIS — M54.16 RADICULOPATHY OF LUMBAR REGION: Primary | ICD-10-CM

## 2018-03-12 DIAGNOSIS — M48.061 SPINAL STENOSIS OF LUMBAR REGION WITHOUT NEUROGENIC CLAUDICATION: ICD-10-CM

## 2018-03-12 DIAGNOSIS — G89.29 CHRONIC BILATERAL LOW BACK PAIN WITH BILATERAL SCIATICA: Primary | ICD-10-CM

## 2018-03-12 DIAGNOSIS — M54.41 CHRONIC BILATERAL LOW BACK PAIN WITH BILATERAL SCIATICA: Primary | ICD-10-CM

## 2018-03-12 DIAGNOSIS — M54.42 CHRONIC BILATERAL LOW BACK PAIN WITH BILATERAL SCIATICA: Primary | ICD-10-CM

## 2018-03-12 PROBLEM — M51.369 DDD (DEGENERATIVE DISC DISEASE), LUMBAR: Status: ACTIVE | Noted: 2018-03-12

## 2018-03-12 PROCEDURE — G8990 OTHER PT/OT CURRENT STATUS: HCPCS | Performed by: PHYSICAL THERAPIST

## 2018-03-12 PROCEDURE — 99213 OFFICE O/P EST LOW 20 MIN: CPT | Performed by: ORTHOPAEDIC SURGERY

## 2018-03-12 PROCEDURE — 97110 THERAPEUTIC EXERCISES: CPT | Performed by: PHYSICAL THERAPIST

## 2018-03-12 PROCEDURE — G8991 OTHER PT/OT GOAL STATUS: HCPCS | Performed by: PHYSICAL THERAPIST

## 2018-03-12 PROCEDURE — 97112 NEUROMUSCULAR REEDUCATION: CPT | Performed by: PHYSICAL THERAPIST

## 2018-03-12 NOTE — PROGRESS NOTES
Assessment/Plan:    Patient seen examined by Dr Ni Batres and myself  He presents with chronic low back pain radiating down both lower extremities with numbness and tingling  These symptoms have significantly improved and basically resolved with PT and oral medications  MRI of the lumbar spine without contrast reveals multilevel lumbar degenerative disc disease as well as moderate to severe central canal stenosis worse at L3-4 and L4-5  Neurologically he is intact L2 through S1  At this time our recommendation is to continue physical therapy and gabapentin  No surgical intervention from our standpoint given his improvement  Can follow up with us on a p r n  basis  He knows to call the office with any questions  Problem List Items Addressed This Visit     Low back pain with sciatica - Primary    DDD (degenerative disc disease), lumbar    Spinal stenosis, lumbar            Subjective:      Patient ID: Dwayne Sandifer is a 58 y o  male  HPI     The patient is a 28-year-old male who presents for a follow-up appointment  He presents for MRI review  Today the patient states that over the last month or so his transverse low back pain that radiates down both buttocks thigh down to his feet has significantly improved if not resolved with physical therapy and gabapentin  These symptoms were associated with intermittent numbness and tingling of the anterior thighs and shin region as well as reported saddle anesthesia  The symptoms as I have also resolved  He denies any bowel or bladder incontinence  His legs feel significantly stronger and he has no news today  The following portions of the patient's history were reviewed and updated as appropriate: allergies, current medications, past family history, past medical history, past social history, past surgical history and problem list     Review of Systems   Constitutional: Positive for activity change  Negative for chills, diaphoresis, fatigue and fever  Respiratory: Negative for cough, shortness of breath and wheezing  Cardiovascular: Negative for chest pain and leg swelling  Gastrointestinal: Negative for abdominal pain, constipation and vomiting  Genitourinary: Negative for decreased urine volume, difficulty urinating and urgency  Musculoskeletal: Positive for arthralgias  Negative for back pain  Skin: Negative for rash and wound  Neurological: Negative for weakness and numbness  Objective:      /80   Pulse 77   Ht 5' 6" (1 676 m)   Wt 120 kg (264 lb)   BMI 42 61 kg/m²          Physical Exam   Constitutional: He is oriented to person, place, and time  He appears well-developed and well-nourished  No distress  HENT:   Head: Normocephalic and atraumatic  Cardiovascular: Intact distal pulses  Pulmonary/Chest: Effort normal  No respiratory distress  He has no wheezes  Abdominal: Soft  Musculoskeletal: He exhibits no edema or tenderness  Neurological: He is alert and oriented to person, place, and time  Skin: Skin is warm and dry  No rash noted  He is not diaphoretic  No erythema  Psychiatric: He has a normal mood and affect  Nursing note and vitals reviewed  No acute distress  Gait is normal  Inspection reveals no open wounds or erythema  Lumbosacral region and SI joints are nontender to palpation  Negative modified straight leg raise bilaterally, negative ROMY bilaterally  Strength is 5/5 bilateral lower extremities L2 through S1, sensation is equal and intact  Reflexes at L4 and S1 are hypoactive bilaterally  Feet are warm well perfused there is no calf tenderness or pitting edema

## 2018-03-12 NOTE — PROGRESS NOTES
PT Re-Evaluation     Today's date: 3/12/2018  Patient name: Dwayne Sandifer  : 1955  MRN: [id number]  Referring provider: Robinson Olrando DO  Dx:   Encounter Diagnosis     ICD-10-CM    1  Radiculopathy of lumbar region M54 16    2  Spinal stenosis of lumbar region with neurogenic claudication M48 062                   Assessment  Impairments: abnormal gait, abnormal or restricted ROM, activity intolerance, impaired balance, impaired physical strength and pain with function    Assessment details: Pt has demonstrated improvement in lumbar AROM, LE flexibility, LE strength, and function with a decrease in pain since starting therapy  Pt is no longer experiencing radicular pain in his Le's  Pt continues to present with intermittent LBP, decreased lumbar AROM, decreased LE flexibility, decreased LE strength, and decreased activity tolerance  Pt would benefit from continued skilled PT intervention to address these issues and to maximize function      Understanding of Dx/Px/POC: good   Prognosis: good    Goals  Short Term:  Pt will report decreased levels of pain by at least 2 subjective ratings in 4 weeks-met  Pt will demonstrate improved ROM by at least 25% in 4 weeks-partially met  Pt will demonstrate improved strength by 1/2 grade MMT in 4 weeks-met  Long Term:   Pt will be independent in their HEP in 8 weeks-partially met  Pt will demonstrate improved FOTO, > 44-partially met  Pt will be independent with all ADL's-partially met    Plan  Planned modality interventions: thermotherapy: hydrocollator packs and cryotherapy  Planned therapy interventions: abdominal trunk stabilization, balance, body mechanics training, manual therapy, neuromuscular re-education, patient education, postural training, therapeutic exercise, flexibility and home exercise program  Frequency: 2x week  Duration in weeks: 6  Treatment plan discussed with: patient        Subjective Evaluation    History of Present Illness  Mechanism of injury: Pt reports 50% improvement since starting Pt  FOTO improved to (was 15 at eval)  Pt reports some pain/difficulty persists with car transfers, static standing (still reports 15 min tolerance), bending down, household chores (limited tolerance), steps (SPC and railing), ambulation (starting to wean from use of RW at home, but still uses when outdoors due to still feeling unbalanced at times)  Pt notes improvement with dressing, cooking, showering, and sleep  Pt is also no longer experiencing shooting pain down either Le  Pain  At best pain ratin  At worst pain ratin  Location: back          Objective     Active Range of Motion     Additional Active Range of Motion Details  L/S AROM flex:  Wnl, ext=25% with c/o tightness  BSB=50% with some tightness and B rot=75% with some tightness     Strength/Myotome Testing     Left Hip   Planes of Motion   Flexion: 4+  Abduction: 3+    Right Hip   Planes of Motion   Flexion: 4+  Abduction: 3+          Precautions: Oa, HTN, diabetes, L TKA  Daily Treatment Diary       Manual                       None Currently                                                                                                                             Exercise Diary  3/12            Recumbent bike  10 mins            Knee to chest stretch  10"x10            HS stretch  10"x10            Piriformis stretch  10"x10            LTR-if able  10"x10ea            DLS ab brace  2"H 2min            DLS  ea  DLS kicks  64 ea              Bridges  3"x15            Clamshells  10ea                     Posture-chair  2 min            Standing hip abd/ext  20ea            Mini squats  20            TB LPD gtb 20            TB rows  gtb 20                                                                                                                                          Modalities                       MH/CP PRN  home                                                                         Updated measurements and functional status taken this session      1on1 205-300 and performed remaining TE's as part of Fitness program

## 2018-03-14 ENCOUNTER — OFFICE VISIT (OUTPATIENT)
Dept: PHYSICAL THERAPY | Facility: REHABILITATION | Age: 63
End: 2018-03-14
Payer: COMMERCIAL

## 2018-03-14 DIAGNOSIS — M48.062 SPINAL STENOSIS OF LUMBAR REGION WITH NEUROGENIC CLAUDICATION: ICD-10-CM

## 2018-03-14 DIAGNOSIS — M54.16 RADICULOPATHY OF LUMBAR REGION: Primary | ICD-10-CM

## 2018-03-14 PROCEDURE — 97110 THERAPEUTIC EXERCISES: CPT

## 2018-03-14 PROCEDURE — 97112 NEUROMUSCULAR REEDUCATION: CPT

## 2018-03-14 NOTE — PROGRESS NOTES
Daily Note     Today's date: 3/14/2018  Patient name: Marc Arias  : 1955  MRN: [id number]  Referring provider: Martell Cheney DO  Dx:   Encounter Diagnosis     ICD-10-CM    1  Radiculopathy of lumbar region M54 16    2  Spinal stenosis of lumbar region with neurogenic claudication M48 062                   Subjective: Pt reports improvement upon arrival, using 2 SPC instead of walker for this session  Objective: See treatment diary below    Precautions: Oa, HTN, diabetes, L TKA  Daily Treatment Diary       Manual                       None Currently                                                                                                                             Exercise Diary  3/12 3/14           Recumbent bike  10 mins 10'           Knee to chest stretch  10"x10 10x10"           HS stretch  10"x10 10x10"           Piriformis stretch  10"x10 10x10"           LTR-if able  10"x10ea 10x10"ea           DLS ab brace  9"U 2min 5"x 2min           DLS  ea  15ea           DLS kicks  92 ea  15ea           Bridges  3"x15 3"x15           Clamshells  10ea  10ea                   Posture-chair  2 min 2'           Standing hip abd/ext  20ea 20ea           Mini squats  20 20           TB LPD gtb 20 GTB 20           TB rows  gtb 20 GTB 20                                                                                                                                         Modalities                       MH/CP PRN  home                                                                         Assessment: Tolerated treatment fair  Patient demonstrated fatigue post treatment and would benefit from continued PT  Pt requires extra time to complete exercises  Adjusted SPC for size  Pt did fairly well with exercises, noted he feels a little more mobile  Pt  able to complete all exercises with no increase in pain during or after session  Pt  1:1 with PTA for entirety          Plan: Continue per plan of care

## 2018-03-19 ENCOUNTER — OFFICE VISIT (OUTPATIENT)
Dept: PHYSICAL THERAPY | Facility: REHABILITATION | Age: 63
End: 2018-03-19
Payer: COMMERCIAL

## 2018-03-19 DIAGNOSIS — M54.16 RADICULOPATHY OF LUMBAR REGION: Primary | ICD-10-CM

## 2018-03-19 DIAGNOSIS — M48.062 SPINAL STENOSIS OF LUMBAR REGION WITH NEUROGENIC CLAUDICATION: ICD-10-CM

## 2018-03-19 PROCEDURE — 97110 THERAPEUTIC EXERCISES: CPT

## 2018-03-19 PROCEDURE — 97112 NEUROMUSCULAR REEDUCATION: CPT

## 2018-03-19 NOTE — PROGRESS NOTES
Daily Note     Today's date: 3/19/2018  Patient name: Dwayne Sandifer  : 1955  MRN: [id number]  Referring provider: Robinson Orlando DO  Dx:   Encounter Diagnosis     ICD-10-CM    1  Radiculopathy of lumbar region M54 16    2  Spinal stenosis of lumbar region with neurogenic claudication M48 062                   Subjective: Pt arrived to PT with 1 SPC  He fatigues but not as much as he use to  He also noted able to perform stairs  Objective: See treatment diary below  Manual                       None Currently                                                                                                                             Exercise Diary  3/12 3/14 3/19                 Recumbent bike  10 mins 10'  10 min                 Knee to chest stretch  10"x10 10x10"  10"x10                 HS stretch  10"x10 10x10"  10"x10                 Piriformis stretch  10"x10 10x10"  10"x10                 LTR-if able  10"x10ea 10x10"ea  10"x10 ea                  DLS ab brace  7"X 2min 5"x 2min  5"x2 min                 DLS  ea  15ea  15 ea                  DLS kick ea  15ea  15 ea                  Bridges  3"x15 3"x15  3"x15                 Clamshells  10ea  10ea Illyria Lary ea                  Posture-chair  2 min 2'  2 min                 Standing hip abd/ext  20ea 20ea  20 ea                  Mini squats  20 20  20                 TB LPD gtb 20 GTB 20  gtb 30                 TB rows  gtb 20 GTB 20  gtb 30                                                                                                                                               Modalities                       MH/CP PRN  home                                                                            Assessment: Tolerated treatment well  Patient demonstrated fatigue post treatment and exhibited good technique with therapeutic exercises  Relief noted with self stretches  VC's needed for correct technique         Plan: Continue per plan of care   1 on 1 with PTA for 60 mins; performed exercises as part of fitness program

## 2018-03-21 ENCOUNTER — APPOINTMENT (OUTPATIENT)
Dept: PHYSICAL THERAPY | Facility: REHABILITATION | Age: 63
End: 2018-03-21
Payer: COMMERCIAL

## 2018-03-23 ENCOUNTER — OFFICE VISIT (OUTPATIENT)
Dept: PHYSICAL THERAPY | Facility: REHABILITATION | Age: 63
End: 2018-03-23
Payer: COMMERCIAL

## 2018-03-23 DIAGNOSIS — M54.16 RADICULOPATHY OF LUMBAR REGION: Primary | ICD-10-CM

## 2018-03-23 DIAGNOSIS — M48.062 SPINAL STENOSIS OF LUMBAR REGION WITH NEUROGENIC CLAUDICATION: ICD-10-CM

## 2018-03-23 PROCEDURE — 97110 THERAPEUTIC EXERCISES: CPT

## 2018-03-23 PROCEDURE — 97112 NEUROMUSCULAR REEDUCATION: CPT

## 2018-03-23 NOTE — PROGRESS NOTES
Daily Note     Today's date: 3/23/2018  Patient name: Liss Funes  : 1955  MRN: [id number]  Referring provider: Nabil Dinh DO  Dx:   Encounter Diagnosis     ICD-10-CM    1  Radiculopathy of lumbar region M54 16    2  Spinal stenosis of lumbar region with neurogenic claudication M48 062                   Subjective: Pt reported intermittent symptoms from day to day  Objective: See treatment diary below  Manual                       None Currently                                                                                                                             Exercise Diary  3/12 3/14 3/19  3/23               Recumbent bike  10 mins 10'  10 min  10 min               Knee to chest stretch  10"x10 10x10"  10"x10  10"x10               HS stretch  10"x10 10x10"  10"x10  10"x10               Piriformis stretch  10"x10 10x10"  10"x10  10"x10               LTR-if able  10"x10ea 10x10"ea  10"x10 ea   np               DLS ab brace  6"Q 2min 5"x 2min  5"x2 min  5"x2min               DLS march  10 ea  15ea  15 ea   15 ea                DLS kicks  13 ea  15ea  15 ea   17 ea                Bridges  3"x15 3"x15  3"x15  3"x15               Clamshells  10ea  10ea  15 ea   15 ea                Posture-chair  2 min 2'  2 min  2 min               Standing hip abd/ext  20ea 20ea  20 ea   20 ea                Mini squats  20 20  20  20               TB LPD gtb 20 GTB 20  gtb 30  gtb 30               TB rows  gtb 20 GTB 20  gtb 30  gtb 30                leg press       40# x20                                                                                                                     Modalities                       MH/CP PRN  home                                                                            Assessment: Tolerated treatment well  Patient exhibited good technique with therapeutic exercises  VC's needed when performing self stretches due to overstretching   Added leg press to good tolerance  Plan: Continue per plan of care

## 2018-03-26 ENCOUNTER — OFFICE VISIT (OUTPATIENT)
Dept: PHYSICAL THERAPY | Facility: REHABILITATION | Age: 63
End: 2018-03-26
Payer: COMMERCIAL

## 2018-03-26 DIAGNOSIS — M54.16 RADICULOPATHY OF LUMBAR REGION: Primary | ICD-10-CM

## 2018-03-26 PROCEDURE — 97112 NEUROMUSCULAR REEDUCATION: CPT | Performed by: PHYSICAL THERAPIST

## 2018-03-26 PROCEDURE — 97110 THERAPEUTIC EXERCISES: CPT | Performed by: PHYSICAL THERAPIST

## 2018-03-26 NOTE — PROGRESS NOTES
Daily Note     Today's date: 3/26/2018  Patient name: Sacha Woods  : 1955  MRN: [id number]  Referring provider: Charles Escobar DO  Dx:   Encounter Diagnosis     ICD-10-CM    1  Radiculopathy of lumbar region M54 16               1on1 230-325 and performed remaining TE's as part of Fitness program       Subjective: Pt reports increased soreness in lumbar spine yesterday for reasons unknown  Pt performed HEP an applied heat which helped to decrease his symptoms  Minimal soreness reported today  Pt presents into clinic ambulating with 1 SPC this session (was using RW previously and 2 SPC's last week)  Objective: See treatment diary below  Exercise Diary  3/12 3/14 3/19  3/23  3/26             Recumbent bike  10 mins 10'  10 min  10 min  10'             Knee to chest stretch  10"x10 10x10"  10"x10  10"x10  10"x10             HS stretch  10"x10 10x10"  10"x10  10"x10  10"x10             Piriformis stretch  10"x10 10x10"  10"x10  10"x10  10"x10             LTR-if able  10"x10ea 10x10"ea  10"x10 ea   np  10"x10             DLS ab brace  9"G 2min 5"x 2min  5"x2 min  5"x2min  5"x2'             DLS  ea  15ea  15 ea  Luba Null             DLS kicks  12 ea  15ea  15 ea  Luba Null             Bridges  3"x15 3"x15  3"x15  3"x15  3"x15             Clamshells  10ea  10ea  15 ea  Luba Null             Posture-chair  2 min 2'  2 min  2 min  2'             Standing hip abd/ext  20ea 20ea  20 ea   20 ea   20ea             Mini squats  20 20  20  20  20             TB LPD gtb 20 GTB 20  gtb 30  gtb 30  gtb 30  btb           TB rows  gtb 20 GTB 20  gtb 30  gtb 30  btb 30              leg press       40# x20  55#, 2x10                                                                                                                   Assessment: Tolerated treatment well  VC's required for correct technique with Te's  No pain with TE performance    Patient would benefit from continued PT      Plan: Continue per plan of care

## 2018-03-28 ENCOUNTER — OFFICE VISIT (OUTPATIENT)
Dept: PHYSICAL THERAPY | Facility: REHABILITATION | Age: 63
End: 2018-03-28
Payer: COMMERCIAL

## 2018-03-28 DIAGNOSIS — M48.062 SPINAL STENOSIS OF LUMBAR REGION WITH NEUROGENIC CLAUDICATION: ICD-10-CM

## 2018-03-28 DIAGNOSIS — M54.16 RADICULOPATHY OF LUMBAR REGION: Primary | ICD-10-CM

## 2018-03-28 PROCEDURE — 97112 NEUROMUSCULAR REEDUCATION: CPT

## 2018-03-28 PROCEDURE — 97110 THERAPEUTIC EXERCISES: CPT

## 2018-03-28 NOTE — PROGRESS NOTES
Daily Note     Today's date: 3/28/2018  Patient name: Raul Nino  : 1955  MRN: [id number]  Referring provider: Mari Hazel DO  Dx:   Encounter Diagnosis     ICD-10-CM    1  Radiculopathy of lumbar region M54 16    2  Spinal stenosis of lumbar region with neurogenic claudication M48 062                   Subjective: Pt reported intermittent discomfort in low back  Objective: See treatment diary below  Exercise Diary  3/12 3/14 3/19  3/23  3/26  3/28           Recumbent bike  10 mins 10'  10 min  10 min  10'  10'           Knee to chest stretch  10"x10 10x10"  10"x10  10"x10  10"x10  10"x10           HS stretch  10"x10 10x10"  10"x10  10"x10  10"x10  10"x10           Piriformis stretch  10"x10 10x10"  10"x10  10"x10  10"x10  10"x10           LTR-if able  10"x10ea 10x10"ea  10"x10 ea   np  10"x10  np           DLS ab brace  3"J 2min 5"x 2min  5"x2 min  5"x2min  5"x2'  5"x2 min           DLS  ea  15ea  15 ea   15 ea   15  15 ea            DLS kicks  54 ea  15ea  15 ea   16 ea   14  15 ea            Bridges Sapphire Narendra           Clamshells  10ea  10ea  15 ea   15 ea   13  13 ea            Posture-chair  2 min 2'  2 min  2 min  2'  2'           Standing hip abd/ext  20ea 20ea  20 ea   20 ea   20ea  30 ea            Mini squats  20 20  20  20  20  30           TB LPD gtb 20 GTB 20  gtb 30  gtb 30  gtb 30  btb 30           TB rows  gtb 20 GTB 20  gtb 30  gtb 30  btb 30  btb 30            leg press       40# x20  55#, 2x10  70# 20x                                                                                                                     Assessment: Tolerated treatment well  Patient demonstrated fatigue post treatment and exhibited good technique with therapeutic exercises  Occasional cuing needed for correct technique  Pt performs modalities at home  Plan: Continue per plan of care

## 2018-04-02 ENCOUNTER — APPOINTMENT (OUTPATIENT)
Dept: PHYSICAL THERAPY | Facility: REHABILITATION | Age: 63
End: 2018-04-02
Payer: COMMERCIAL

## 2018-04-04 ENCOUNTER — OFFICE VISIT (OUTPATIENT)
Dept: PHYSICAL THERAPY | Facility: REHABILITATION | Age: 63
End: 2018-04-04
Payer: COMMERCIAL

## 2018-04-04 DIAGNOSIS — M48.062 SPINAL STENOSIS OF LUMBAR REGION WITH NEUROGENIC CLAUDICATION: ICD-10-CM

## 2018-04-04 DIAGNOSIS — M54.16 RADICULOPATHY OF LUMBAR REGION: Primary | ICD-10-CM

## 2018-04-04 PROCEDURE — 97110 THERAPEUTIC EXERCISES: CPT

## 2018-04-04 PROCEDURE — 97112 NEUROMUSCULAR REEDUCATION: CPT

## 2018-04-04 NOTE — PROGRESS NOTES
Daily Note     Today's date: 2018  Patient name: Nils Murray  : 1955  MRN: [id number]  Referring provider: Harper Arce DO  Dx:   Encounter Diagnosis     ICD-10-CM    1  Radiculopathy of lumbar region M54 16    2  Spinal stenosis of lumbar region with neurogenic claudication M48 062                   Subjective: Pt reported intermittent discomfort  Objective: See treatment diary below  Exercise Diary  3/12 3/14 3/19  3/23  3/26  3/28  4/4         Recumbent bike  10 mins 10'  10 min  10 min  10'  10'  10 min         Knee to chest stretch  10"x10 10x10"  10"x10  10"x10  10"x10  10"x10  10"x10         HS stretch  10"x10 10x10"  10"x10  10"x10  10"x10  10"x10  10"x10         Piriformis stretch  10"x10 10x10"  10"x10  10"x10  10"x10  10"x10  10"x10         LTR-if able  10"x10ea 10x10"ea  10"x10 ea   np  10"x10  np           DLS ab brace  4"I 2min 5"x 2min  5"x2 min  5"x2min  5"x2'  5"x2 min  5"x2 min         DLS march  62 ea  15ea  15 ea   15 ea   15  15 ea   15 ea          DLS kicks  36 ea  15ea  15 Beula Victor Manuel Husain  10ea Mataalii Brusett  12 ea   15 ea   14  14 ea   16 ea          Posture-chair  2 min 2'  2 min  2 min  2'  2'  2'         Standing hip abd/ext  20ea 20ea  20 ea   20 ea   20ea  30 ea   30 ea          Mini squats  20 20  20  20  20  30  30         TB LPD gtb 20 GTB 20  gtb 30  gtb 30  gtb 30  btb 30  btb 30         TB rows  gtb 20 GTB 20  gtb 30  gtb 30  btb 30  btb 30  btb 30          leg press       40# x20  55#, 2x10  70# 20x  70#x30                                                                                                                Assessment: Tolerated treatment well  Patient demonstrated fatigue post treatment and exhibited good technique with therapeutic exercises  Pt moving slower today due to rainy damp weather  Plan: Continue per plan of care   1 on 1 with PTA for 70 mins; performed rest of session under fitness program

## 2018-04-09 ENCOUNTER — OFFICE VISIT (OUTPATIENT)
Dept: PHYSICAL THERAPY | Facility: REHABILITATION | Age: 63
End: 2018-04-09
Payer: COMMERCIAL

## 2018-04-09 DIAGNOSIS — M54.16 RADICULOPATHY OF LUMBAR REGION: Primary | ICD-10-CM

## 2018-04-09 DIAGNOSIS — M48.062 SPINAL STENOSIS OF LUMBAR REGION WITH NEUROGENIC CLAUDICATION: ICD-10-CM

## 2018-04-09 PROCEDURE — 97110 THERAPEUTIC EXERCISES: CPT

## 2018-04-09 PROCEDURE — 97112 NEUROMUSCULAR REEDUCATION: CPT

## 2018-04-09 NOTE — PROGRESS NOTES
Daily Note     Today's date: 2018  Patient name: Dominic Carmona  : 1955  MRN: [id number]  Referring provider: Sharyn Blakely DO  Dx:   Encounter Diagnosis     ICD-10-CM    1  Radiculopathy of lumbar region M54 16    2  Spinal stenosis of lumbar region with neurogenic claudication M48 062                   Subjective: Pt reported improved ambulation without assistive device while walking around his home  Objective: See treatment diary below  Exercise Diary  3/12 3/14 3/19  3/23  3/26  3/28  4/4 4/9       Recumbent bike  10 mins 10'  10 min  10 min  10'  10'  10 min  10 min       Knee to chest stretch  10"x10 10x10"  10"x10  10"x10  10"x10  10"x10  10"x10  10"x10       HS stretch  10"x10 10x10"  10"x10  10"x10  10"x10  10"x10  10"x10  10"x10       Piriformis stretch  10"x10 10x10"  10"x10  10"x10  10"x10  10"x10  10"x10  10"x10       LTR-if able  10"x10ea 10x10"ea  10"x10 ea   np  10"x10  np           DLS ab brace  6"Q 2min 5"x 2min  5"x2 min  5"x2min  5"x2'  5"x2 min  5"x2 min  5"x2 min       DLS  ea  15ea  15 ea   15 ea   15  15 ea   15 ea  [id number]        DLS kicks  10 ea  15ea [place] Harps       Clamshells  10ea [facility] ea   15 ea   13  13 ea   17 ea   15 ea        Posture-chair  2 min 2'  2 min  2 min  2'  2'  2'  2'       Standing hip abd/ext  20ea 20ea  20 ea   20 ea   20ea  30 ea   30 ea   30 ea        Mini squats  20 20  20  20  20  30  30  30       TB LPD gtb 20 GTB 20  gtb 30  gtb 30  gtb 30  btb 30  btb 30  btb 30       TB rows  gtb 20 GTB 20  gtb 30  gtb 30  btb 30  btb 30  btb 30  btb 30        leg press       40# x20  55#, 2x10  70# 20x  70#x30  85#x30                                                                                                             Assessment: Tolerated treatment well  Patient exhibited good technique with therapeutic exercises   VC's needed for correct technique  Plan: Continue per plan of care

## 2018-04-11 ENCOUNTER — OFFICE VISIT (OUTPATIENT)
Dept: PHYSICAL THERAPY | Facility: REHABILITATION | Age: 63
End: 2018-04-11
Payer: COMMERCIAL

## 2018-04-11 DIAGNOSIS — M54.16 RADICULOPATHY OF LUMBAR REGION: Primary | ICD-10-CM

## 2018-04-11 DIAGNOSIS — M48.062 SPINAL STENOSIS OF LUMBAR REGION WITH NEUROGENIC CLAUDICATION: ICD-10-CM

## 2018-04-11 PROCEDURE — 97112 NEUROMUSCULAR REEDUCATION: CPT | Performed by: PHYSICAL THERAPIST

## 2018-04-11 PROCEDURE — 97110 THERAPEUTIC EXERCISES: CPT | Performed by: PHYSICAL THERAPIST

## 2018-04-11 NOTE — PROGRESS NOTES
Daily Note     Today's date: 2018  Patient name: Angeles Mason  : 1955  MRN: [id number]  Referring provider: Inderjit Manning DO  Dx:   Encounter Diagnosis     ICD-10-CM    1  Radiculopathy of lumbar region M54 16    2  Spinal stenosis of lumbar region with neurogenic claudication M48 062                   Subjective: Pt reports some fatigue upon arrival and minimal pain in his knees  Pt notes most difficulty with ascending steps due to R knee pain  Pt states he feels 85% improved and has been ambulating more at home without use of SPC  Objective: See treatment diary below  Exercise Diary  3/12 3/14 3/19  3/23  3/26  3/28  4/4 4/9  4/11     Recumbent bike  10 mins 10'  10 min  10 min  10'  10'  10 min  10 min  10'     Knee to chest stretch  10"x10 10x10"  10"x10  10"x10  10"x10  10"x10  10"x10  10"x10  10"x10     HS stretch  10"x10 10x10"  10"x10  10"x10  10"x10  10"x10  10"x10  10"x10  10"x10     Piriformis stretch  10"x10 10x10"  10"x10  10"x10  10"x10  10"x10  10"x10  10"x10  10"x10     LTR-if able  10"x10ea 10x10"ea  10"x10 ea   np  10"x10  np           DLS ab brace  0"D 2min 5"x 2min  5"x2 min  5"x2min  5"x2'  5"x2 min  5"x2 min  5"x2 min  5"x2'     DLS  ea  15ea  15 ea   15 ea   15  15 ea   15 ea  Mckeon Spurr   20     DLS kicks  67 ea   15ea  15 Lena Ano     Clamshells  10ea Aniya Kiang  12 ea   15 ea   15  15 ea   12 ea   14 ea   21ea     Posture-chair  2 min 2'  2 min  2 min  2'  2'  2'  2'  2'     Standing hip abd/ext  20ea 20ea  20 ea   20 ea   20ea  30 ea   30 ea   30 ea   30ea     Mini squats  20 20  20  20  20  30  30  30  30     TB LPD gtb 20 GTB 20  gtb 30  gtb 30  gtb 30  btb 30  btb 30  btb 30  btb 30     TB rows  gtb 20 GTB 20  gtb 30  gtb 30  btb 30  btb 30  btb 30  btb 30  btb 30      leg press       40# x20  55#, 2x10  70# 20x  70#x30  85#x30  85# x30                                                                                                           Assessment: Tolerated treatment well  Able to progress reps with multiple TE's to good tolerance  No pain reported during or after session  Patient would benefit from continued PT      Plan: Continue per plan of care

## 2018-04-16 ENCOUNTER — OFFICE VISIT (OUTPATIENT)
Dept: PHYSICAL THERAPY | Facility: REHABILITATION | Age: 63
End: 2018-04-16
Payer: COMMERCIAL

## 2018-04-16 DIAGNOSIS — M54.16 RADICULOPATHY OF LUMBAR REGION: Primary | ICD-10-CM

## 2018-04-16 DIAGNOSIS — M48.062 SPINAL STENOSIS OF LUMBAR REGION WITH NEUROGENIC CLAUDICATION: ICD-10-CM

## 2018-04-16 PROCEDURE — 97112 NEUROMUSCULAR REEDUCATION: CPT

## 2018-04-16 PROCEDURE — 97110 THERAPEUTIC EXERCISES: CPT

## 2018-04-16 NOTE — PROGRESS NOTES
Daily Note     Today's date: 2018  Patient name: Wilfredo Wong  : 1955  MRN: [id number]  Referring provider: Stiven Ramos DO  Dx:   Encounter Diagnosis     ICD-10-CM    1  Radiculopathy of lumbar region M54 16    2  Spinal stenosis of lumbar region with neurogenic claudication M48 062                   Subjective: Pt reported increased symptoms since last Friday  Objective: See treatment diary below  Exercise Diary  3/12 3/14 3/19  3/23  3/26  3/28  4/4 4/9  4/11  4/16   Recumbent bike  10 mins 10'  10 min  10 min  10'  10'  10 min  10 min  10'  10'   Knee to chest stretch  10"x10 10x10"  10"x10  10"x10  10"x10  10"x10  10"x10  10"x10  10"x10  np   HS stretch  10"x10 10x10"  10"x10  10"x10  10"x10  10"x10  10"x10  10"x10  10"x10  10"x10   Piriformis stretch  10"x10 10x10"  10"x10  10"x10  10"x10  10"x10  10"x10  10"x10  10"x10  np   LTR-if able  10"x10ea 10x10"ea  10"x10 ea   np  10"x10  np        np   DLS ab brace  3"R 2min 5"x 2min  5"x2 min  5"x2min  5"x2'  5"x2 min  5"x2 min  5"x2 min  5"x2'  np   DLS  ea  15ea  15 ea   15 ea   15  15 ea   15 ea  Michelle Cortney   20  np   DLS kicks  56 ea  15ea Mollie Litten  np   Clamshells  10ea Simms Pill  15 ea   15 ea   15  15 ea   12 ea   14 ea   21ea  np   Posture-chair  2 min 2'  2 min  2 min  2'  2'  2'  2'  2'  2'   Standing hip abd/ext  20ea 20ea  20 ea   20 ea   20ea  30 ea   30 ea   30 ea   30ea  30 ea     Mini squats  20 20  20  20  20  30  30  30  30  30   TB LPD gtb 20 GTB 20  gtb 30  gtb 30  gtb 30  btb 30  btb 30  btb 30  btb 30 btb 30   TB rows  gtb 20 GTB 20  gtb 30  gtb 30  btb 30  btb 30  btb 30  btb 30  btb 30  btb 30    leg press       40# x20  55#, 2x10  70# 20x  70#x30  85#x30  85# x30  held                                                                                                          Assessment: Tolerated treatment fair  Patient demonstrated fatigue post treatment  Held most of exercises due to not feeling right  Monitor Nv  Plan: Continue per plan of care

## 2018-04-18 ENCOUNTER — OFFICE VISIT (OUTPATIENT)
Dept: PHYSICAL THERAPY | Facility: REHABILITATION | Age: 63
End: 2018-04-18
Payer: COMMERCIAL

## 2018-04-18 DIAGNOSIS — M54.16 RADICULOPATHY OF LUMBAR REGION: Primary | ICD-10-CM

## 2018-04-18 DIAGNOSIS — M48.062 SPINAL STENOSIS OF LUMBAR REGION WITH NEUROGENIC CLAUDICATION: ICD-10-CM

## 2018-04-18 PROCEDURE — 97112 NEUROMUSCULAR REEDUCATION: CPT

## 2018-04-18 PROCEDURE — 97110 THERAPEUTIC EXERCISES: CPT

## 2018-04-18 NOTE — PROGRESS NOTES
Daily Note     Today's date: 2018  Patient name: Wilfredo Wong  : 1955  MRN: [id number]  Referring provider: Stiven Ramos DO  Dx:   Encounter Diagnosis     ICD-10-CM    1  Radiculopathy of lumbar region M54 16    2  Spinal stenosis of lumbar region with neurogenic claudication M48 062                   Subjective: Pt reported improved symptoms since Lv  Objective: See treatment diary below  Exercise Diary              Recumbent bike  10 mins            Knee to chest stretch  10"x10          np   HS stretch  10"x10          10"x10   Piriformis stretch  10"x10          np   LTR-if able np             np   DLS ab brace  4"B 2min         5"x2'  np   DLS  ea           np   DLS kicks  35 ea           np   Bridges  3"x20          np   Clamshells  20ea          np   Posture-chair  2 min          2'   Standing hip abd/ext  30ea            Mini squats  30            TB LPD btb 30            TB rows  btb 30             leg press  85#x30                                                                                                                     Assessment: Tolerated treatment well  Patient demonstrated fatigue post treatment and exhibited good technique with therapeutic exercises  VC's needed for correct technique throughout session  Plan: Continue per plan of care   1 on  with PTA and PT

## 2018-04-20 ENCOUNTER — EVALUATION (OUTPATIENT)
Dept: PHYSICAL THERAPY | Facility: REHABILITATION | Age: 63
End: 2018-04-20
Payer: COMMERCIAL

## 2018-04-20 DIAGNOSIS — M54.16 RADICULOPATHY OF LUMBAR REGION: Primary | ICD-10-CM

## 2018-04-20 PROCEDURE — G8991 OTHER PT/OT GOAL STATUS: HCPCS | Performed by: PHYSICAL THERAPIST

## 2018-04-20 PROCEDURE — 97110 THERAPEUTIC EXERCISES: CPT | Performed by: PHYSICAL THERAPIST

## 2018-04-20 PROCEDURE — G8990 OTHER PT/OT CURRENT STATUS: HCPCS | Performed by: PHYSICAL THERAPIST

## 2018-04-20 NOTE — PROGRESS NOTES
PT Re-Evaluation     Today's date: 2018  Patient name: Dominic Carmona  : 1955  MRN: [id number]  Referring provider: Sharyn Blakely DO  Dx:   Encounter Diagnosis     ICD-10-CM    1  Radiculopathy of lumbar region M54 16                   Assessment    Assessment details: Pt has progressed well, demonstrating improvement in lumbar AROM, LE flexibility, hip/core strength, and function with a decrease in pain  Pt will be seen one more time to establish an ongoing HEP  Thank you for the referral     Understanding of Dx/Px/POC: good   Prognosis: good    Goals  Short Term:  Pt will report decreased levels of pain by at least 2 subjective ratings in 4 weeks-met  Pt will demonstrate improved ROM by at least 25% in 4 weeks-partially met  Pt will demonstrate improved strength by 1/2 grade MMT in 4 weeks-met  Long Term:   Pt will be independent in their HEP in 8 weeks-partially met  Pt will demonstrate improved FOTO, > 44- met  Pt will be independent with all ADL's-met    Plan  Planned therapy interventions: abdominal trunk stabilization, manual therapy, neuromuscular re-education, patient education, postural training, therapeutic exercise, flexibility and home exercise program  Treatment plan discussed with: patient  Plan details: Pt will be seen 1 more time to establish an ongoing HEP  Subjective Evaluation    History of Present Illness  Mechanism of injury: Pt reports at least 85% improvement since starting therapy  FOTO improved to 60 (was 36 at eval)  Pt reports some pain/difficulty persists with steps (reciprocal but slow paced, primarily due to R knee pain/OA), static standing>15 mins  Pt reports improvement with car transfers, bending down, household chores, ambulation (using SPC now and no longer using Rw)  Pt taking gabapentin at night only      Pain  At best pain ratin  At worst pain ratin  Location: lumbar spine          Objective     Active Range of Motion     Lumbar   Flexion: [facility]    Additional Active Range of Motion Details  Ext=50%, BSB=75% and B rot=75%  Strength/Myotome Testing     Left Hip   Planes of Motion   Flexion: 5  Abduction: 5    Right Hip   Planes of Motion   Flexion: 5  Abduction: 5          Precautions: Oa, HTN, diabetes, L TKA    Daily Treatment Diary   Exercise Diary  4/18 4/20                   Recumbent bike  10 mins  10'                   Knee to chest stretch  10"x10                    HS stretch  10"x10                    Piriformis stretch  10"x10                    LTR-if able np                    DLS ab brace  8"H 2min                   DLS march 20 ea                     DLS kicks  34 ea                     Bridges  3"x20                    Clamshells  20ea                    Posture-chair  2 min                    Standing hip abd/ext  30ea                    Mini squats  30                    TB LPD btb 30                    TB rows  btb 30                     leg press  85#x30                                                                                              Updated measurements and functional status taken this session

## 2018-04-23 ENCOUNTER — OFFICE VISIT (OUTPATIENT)
Dept: PHYSICAL THERAPY | Facility: REHABILITATION | Age: 63
End: 2018-04-23
Payer: COMMERCIAL

## 2018-04-23 DIAGNOSIS — M54.16 RADICULOPATHY OF LUMBAR REGION: Primary | ICD-10-CM

## 2018-04-23 PROCEDURE — 97110 THERAPEUTIC EXERCISES: CPT | Performed by: PHYSICAL THERAPIST

## 2018-04-23 PROCEDURE — 97112 NEUROMUSCULAR REEDUCATION: CPT | Performed by: PHYSICAL THERAPIST

## 2018-04-23 NOTE — PROGRESS NOTES
Daily Note     Today's date: 2018  Patient name: Justin Joyce  : 1955  MRN: [id number]  Referring provider: Kishore Ordonez DO  Dx:   Encounter Diagnosis     ICD-10-CM    1  Radiculopathy of lumbar region M54 16                   Subjective: Pt offers no new complaints upon arrival        Objective: See treatment diary below  Exercise Diary                     Recumbent bike  10 mins  10'                   Knee to chest stretch  10"x10  10"x10                   HS stretch  10"x10  10"x10                   Piriformis stretch  10"x10  10"x10                   LTR-if able np                     DLS ab brace  0"D 2min  5"x2'                   DLS  PY   24                   DLS kicks  17 XA   32                   Bridges  3"x20  3"x20                   Clamshells  20ea  20                   Posture-chair  2 min  2'                   Standing hip abd/ext  30ea  30ea                   Mini squats  30  30                   TB LPD btb 30  btb 30                   TB rows  btb 30  btb 30                    leg press  85#x30 85#x30                                                                         Assessment: Tolerated treatment well  Patient exhibited good technique with therapeutic exercises and demonstrates good understanding of current TE program         Plan: Pt will be d/c to ongoing HEP  Updated HEP was reviewed and issued to pt

## 2018-04-25 ENCOUNTER — APPOINTMENT (OUTPATIENT)
Dept: PHYSICAL THERAPY | Facility: REHABILITATION | Age: 63
End: 2018-04-25
Payer: COMMERCIAL

## 2018-04-27 ENCOUNTER — APPOINTMENT (OUTPATIENT)
Dept: PHYSICAL THERAPY | Facility: REHABILITATION | Age: 63
End: 2018-04-27
Payer: COMMERCIAL

## 2018-05-06 ENCOUNTER — APPOINTMENT (OUTPATIENT)
Dept: LAB | Facility: HOSPITAL | Age: 63
End: 2018-05-06
Payer: COMMERCIAL

## 2018-05-06 ENCOUNTER — TRANSCRIBE ORDERS (OUTPATIENT)
Dept: LAB | Facility: HOSPITAL | Age: 63
End: 2018-05-06

## 2018-05-06 DIAGNOSIS — E78.00 PURE HYPERCHOLESTEROLEMIA: ICD-10-CM

## 2018-05-06 DIAGNOSIS — E11.9 DIABETES MELLITUS WITHOUT COMPLICATION (HCC): Primary | ICD-10-CM

## 2018-05-06 DIAGNOSIS — I10 ESSENTIAL HYPERTENSION, MALIGNANT: ICD-10-CM

## 2018-05-06 DIAGNOSIS — E11.9 DIABETES MELLITUS WITHOUT COMPLICATION (HCC): ICD-10-CM

## 2018-05-06 LAB
ALBUMIN SERPL BCP-MCNC: 3.5 G/DL (ref 3.5–5)
ALP SERPL-CCNC: 93 U/L (ref 46–116)
ALT SERPL W P-5'-P-CCNC: 24 U/L (ref 12–78)
ANION GAP SERPL CALCULATED.3IONS-SCNC: 5 MMOL/L (ref 4–13)
AST SERPL W P-5'-P-CCNC: 17 U/L (ref 5–45)
BILIRUB SERPL-MCNC: 0.63 MG/DL (ref 0.2–1)
BUN SERPL-MCNC: 36 MG/DL (ref 5–25)
CALCIUM SERPL-MCNC: 8.5 MG/DL (ref 8.3–10.1)
CHLORIDE SERPL-SCNC: 96 MMOL/L (ref 100–108)
CHOLEST SERPL-MCNC: 153 MG/DL (ref 50–200)
CO2 SERPL-SCNC: 33 MMOL/L (ref 21–32)
CREAT SERPL-MCNC: 1.34 MG/DL (ref 0.6–1.3)
CREAT UR-MCNC: 129 MG/DL
EST. AVERAGE GLUCOSE BLD GHB EST-MCNC: 163 MG/DL
GFR SERPL CREATININE-BSD FRML MDRD: 56 ML/MIN/1.73SQ M
GLUCOSE P FAST SERPL-MCNC: 146 MG/DL (ref 65–99)
HBA1C MFR BLD: 7.3 % (ref 4.2–6.3)
HDLC SERPL-MCNC: 52 MG/DL (ref 40–60)
LDLC SERPL CALC-MCNC: 84 MG/DL (ref 0–100)
MICROALBUMIN UR-MCNC: 18.6 MG/L (ref 0–20)
MICROALBUMIN/CREAT 24H UR: 14 MG/G CREATININE (ref 0–30)
NONHDLC SERPL-MCNC: 101 MG/DL
POTASSIUM SERPL-SCNC: 4.6 MMOL/L (ref 3.5–5.3)
PROT SERPL-MCNC: 8.1 G/DL (ref 6.4–8.2)
SODIUM SERPL-SCNC: 134 MMOL/L (ref 136–145)
TRIGL SERPL-MCNC: 84 MG/DL

## 2018-05-06 PROCEDURE — 82043 UR ALBUMIN QUANTITATIVE: CPT

## 2018-05-06 PROCEDURE — 36415 COLL VENOUS BLD VENIPUNCTURE: CPT

## 2018-05-06 PROCEDURE — 80053 COMPREHEN METABOLIC PANEL: CPT

## 2018-05-06 PROCEDURE — 80061 LIPID PANEL: CPT

## 2018-05-06 PROCEDURE — 82570 ASSAY OF URINE CREATININE: CPT

## 2018-05-06 PROCEDURE — 83036 HEMOGLOBIN GLYCOSYLATED A1C: CPT

## 2018-05-08 ENCOUNTER — TELEPHONE (OUTPATIENT)
Dept: PAIN MEDICINE | Facility: CLINIC | Age: 63
End: 2018-05-08

## 2018-05-15 ENCOUNTER — TELEPHONE (OUTPATIENT)
Dept: OBGYN CLINIC | Facility: HOSPITAL | Age: 63
End: 2018-05-15

## 2018-05-15 NOTE — TELEPHONE ENCOUNTER
Zina Trevizo from Dr Wyatt Horvath office says she did not receive the records, Please refax # [phone]

## 2018-05-23 ENCOUNTER — TELEPHONE (OUTPATIENT)
Dept: NEUROLOGY | Facility: CLINIC | Age: 63
End: 2018-05-23

## 2018-08-29 ENCOUNTER — OFFICE VISIT (OUTPATIENT)
Dept: NEUROLOGY | Facility: CLINIC | Age: 63
End: 2018-08-29
Payer: COMMERCIAL

## 2018-08-29 VITALS
SYSTOLIC BLOOD PRESSURE: 128 MMHG | DIASTOLIC BLOOD PRESSURE: 78 MMHG | BODY MASS INDEX: 42.43 KG/M2 | WEIGHT: 264 LBS | RESPIRATION RATE: 14 BRPM | HEIGHT: 66 IN | HEART RATE: 76 BPM

## 2018-08-29 DIAGNOSIS — R26.89 IMBALANCE: ICD-10-CM

## 2018-08-29 DIAGNOSIS — M25.562 ARTHRALGIA OF BOTH KNEES: ICD-10-CM

## 2018-08-29 DIAGNOSIS — R41.3 SHORT-TERM MEMORY LOSS: ICD-10-CM

## 2018-08-29 DIAGNOSIS — G31.84 MILD COGNITIVE IMPAIRMENT: ICD-10-CM

## 2018-08-29 DIAGNOSIS — G25.3 MYOCLONUS: Primary | ICD-10-CM

## 2018-08-29 DIAGNOSIS — M25.561 ARTHRALGIA OF BOTH KNEES: ICD-10-CM

## 2018-08-29 PROCEDURE — 99244 OFF/OP CNSLTJ NEW/EST MOD 40: CPT | Performed by: PSYCHIATRY & NEUROLOGY

## 2018-08-29 NOTE — LETTER
August 30, 2018     Linnette Acuña DO  [address] [place] [address]    Patient: Isra Hills   YOB: 1955   Date of Visit: 8/29/2018       Dear Dr Hawkins Doctor: Thank you for referring Isra Hills to me for evaluation  Below are my notes for this consultation  If you have questions, please do not hesitate to call me  I look forward to following your patient along with you  Sincerely,        Cali Randa Kerr DO        CC: No Recipients  Aylin Kerr DO  8/29/2018 12:35 PM  Sign at close encounter  Patient ID: Isra Hills is a 61 y o  male  Assessment/Plan:    No problem-specific Assessment & Plan notes found for this encounter  Diagnoses and all orders for this visit:    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 twice a day- on review of his recent blood work 5/2018 Cr 1 34/ BUN 36  Certainly worsening renal dysfunction can cause myoclonus compounded on his pain medication  -     EEG awake or drowsy routine; Future  -     Vitamin B12; Future  -     Folate; Future  -     TSH, 3rd generation with T4 reflex; Future  -     TU Screen w/ Reflex to Titer/Pattern; Future  -     Protein electrophoresis, serum; Future  -     Immunoglobulin free LT chains blood; Future  -     Rheumatoid Factor; Future  -     Sjogren's Antibodies; Future  -     Comprehensive metabolic panel; Future  -     Ammonia; Future  -     MRI brain with and without contrast; Future    Short-term memory loss  -     EEG awake or drowsy routine; Future  -     Vitamin B12; Future  -     Folate; Future  -     TSH, 3rd generation with T4 reflex; Future  -     TU Screen w/ Reflex to Titer/Pattern; Future  -     Protein electrophoresis, serum; Future  -     Immunoglobulin free LT chains blood; Future  -     Sjogren's Antibodies; Future  -     Comprehensive metabolic panel;  Future  -     MRI brain with and without contrast; Future    Mild cognitive impairment  -     Comprehensive metabolic panel; Future    Imbalance  -     Vitamin B12; Future  -     Folate; Future  -     TSH, 3rd generation with T4 reflex; Future  -     Protein electrophoresis, serum; Future  -     Immunoglobulin free LT chains blood; Future  -     Sjogren's Antibodies; Future  -     Comprehensive metabolic panel; Future    Arthralgia of both knees               Subjective:    HPI       Mr Wen Wallis is a pleasant 60 yo male, hx L spine DDD and  seen in consultation for jerking movements that can happen in any part of his body- states can occur once a twice a day and he has no control over it  States memory not as good as before but also on lots of pain medication oxycontin 80 12 hr tab and has been weaning him off the medication- starting with hydrocodone recently  Tells me the jerking movements can happen any time of the day and no trigger other than when he is tired and about to fall asleep- increased frequency  No confusion with the jerking movements specifically  States years ago he had episodes of altered awareness for which he had extensive work up including EEG- MRI brain which was negative per him and states after his life stressors resolved he came back to normal      States has significant arthritis and b/l severe knee pain states s/p left knee surgery and now having sevee right knee pain and low back pain and seeing Dr Bertrand and Dr Linda- spine and pain specialists respectively  States did PT and this is helpful and now back to work  States always misplacing things new since pain medication dose increase  No hx similar prior  States at one time had slurred speech for a year with prolonged speaking, giving speeches etc, but associated with being more anxious  States no personal history of seizures, states mom with epilepsy    States no hx of head injury, head trauma, no hx of stroke or TIA  Does have HTN- well controlled and DM moderately well controlled 7 3 HgbA1C recently  Does walk with a cane, balance issues due to significant MSK issues- see above  Denies tobacco use now- states 25 years ago last   States no alcohol use now- 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  Denies personal hx CA, ICH, cerebral aneurysm  States no family hx of above other than lung CA in dad  The following portions of the patient's history were reviewed and updated as appropriate: allergies, current medications, past family history, past medical history, past social history, past surgical history and problem list          Objective: There were no vitals taken for this visit  Physical Exam    Neurological Exam      ROS:    Review of Systems   Constitutional: Negative  Negative for appetite change and fever  HENT: Negative  Negative for hearing loss, tinnitus, trouble swallowing and voice change  Hoarseness     Eyes: Negative  Negative for photophobia and pain  Respiratory: Negative  Negative for shortness of breath  Cardiovascular: Negative  Negative for palpitations  Gastrointestinal: Positive for abdominal pain  Negative for nausea and vomiting  Endocrine: Negative  Negative for cold intolerance and heat intolerance  Genitourinary: Negative  Negative for dysuria, frequency and urgency  Hair loss  Erection difficulties  Loss of sexual drive   Musculoskeletal: Negative  Negative for myalgias and neck pain  Muscle pain  Joint pain  Pain while walking    Skin: Negative  Negative for rash  Neurological: Positive for tremors  Negative for dizziness, seizures, syncope, facial asymmetry, speech difficulty, weakness, light-headedness, numbness and headaches  Increased sleepiness  Trouble falling asleep  Waking up at night  Memory problems  Balance problems  Difficulty walking  Twitching     Hematological: Negative  Does not bruise/bleed easily  Psychiatric/Behavioral: Negative for confusion, hallucinations and sleep disturbance  The patient is nervous/anxious           Depression

## 2018-08-29 NOTE — PROGRESS NOTES
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 twice a day- on review of his recent blood work 5/2018 Cr 1 34/ BUN 36  Certainly worsening renal dysfunction can cause myoclonus compounded on his pain medication- he is weaning from pain meds with help from his PCP per him  Further work up as below  -     EEG awake or drowsy routine; Future  -     Vitamin B12; Future  -     Folate; Future  -     TSH, 3rd generation with T4 reflex; Future  -     TU Screen w/ Reflex to Titer/Pattern; Future  -     Protein electrophoresis, serum; Future  -     Immunoglobulin free LT chains blood; Future  -     Rheumatoid Factor; Future  -     Sjogren's Antibodies; Future  -     Comprehensive metabolic panel; Future  -     Ammonia; Future  -     MRI brain with and without contrast; Future    Short-term memory loss  -     EEG awake or drowsy routine; Future  -     Vitamin B12; Future  -     Folate; Future  -     TSH, 3rd generation with T4 reflex; Future  -     TU Screen w/ Reflex to Titer/Pattern; Future  -     Protein electrophoresis, serum; Future  -     Immunoglobulin free LT chains blood; Future  -     Sjogren's Antibodies; Future  -     Comprehensive metabolic panel; Future  -     MRI brain with and without contrast; Future    Mild cognitive impairment- highly suspect secondary to CNS affecting medications that he is on- opiates for refractory knee pain- arthritis  -     Comprehensive metabolic panel; Future    Imbalance  -     Vitamin B12; Future  -     Folate; Future  -     TSH, 3rd generation with T4 reflex; Future  -     Protein electrophoresis, serum; Future  -     Immunoglobulin free LT chains blood; Future  -     Sjogren's Antibodies; Future  -     Comprehensive metabolic panel;  Future    Arthralgia of both knees      Follow up in three months time         Subjective:    HPI       Mr Radha Mittal is a pleasant 60 yo male, hx L spine DDD and  seen in consultation for jerking movements that can happen in any part of his body- states can occur once a twice a day and he has no control over it  States memory not as good as before but also on lots of pain medication oxycontin 80 12 hr tab and has been weaning him off the medication- starting with hydrocodone recently  Tells me the jerking movements can happen any time of the day and no trigger other than when he is tired and about to fall asleep- increased frequency  No confusion with the jerking movements specifically  States years ago he had episodes of altered awareness for which he had extensive work up including EEG- MRI brain which was negative per him and states after his life stressors resolved he came back to normal      States has significant arthritis and b/l severe knee pain states s/p left knee surgery and now having sevee right knee pain and low back pain and seeing Dr Bertrand and Dr Linda- spine and pain specialists respectively  States did PT and this is helpful and now back to work  States always misplacing things new since pain medication dose increase  No hx similar prior  States at one time had slurred speech for a year with prolonged speaking, giving speeches etc, but associated with being more anxious  States no personal history of seizures, states mom with epilepsy  States no hx of head injury, head trauma, no hx of stroke or TIA  Does have HTN- well controlled and DM moderately well controlled 7 3 HgbA1C recently  Does walk with a cane, balance issues due to significant MSK issues- see above  Denies tobacco use now- states 25 years ago last   States no alcohol use now- 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  Denies personal hx CA, ICH, cerebral aneurysm    States no family hx of above other than lung CA in dad  The following portions of the patient's history were reviewed and updated as appropriate: allergies, current medications, past family history, past medical history, past social history, past surgical history and problem list          Objective:    Blood pressure 128/78, pulse 76, resp  rate 14, height 5' 6" (1 676 m), weight 120 kg (264 lb)  Physical Exam   Constitutional: He appears well-developed and well-nourished  HENT:   Head: Normocephalic and atraumatic  Eyes: Conjunctivae and EOM are normal  Pupils are equal, round, and reactive to light  Neck: Normal range of motion  Neck supple  Cardiovascular: Normal rate and regular rhythm  Pulmonary/Chest: Effort normal    Musculoskeletal: Normal range of motion  Neurological: He has normal strength  A sensory deficit is present  Coordination normal    Reflex Scores:       Patellar reflexes are 1+ on the right side and 1+ on the left side  Achilles reflexes are 1+ on the right side and 1+ on the left side  Nursing note and vitals reviewed  Neurological Exam    Mental Status  The patient is alert and oriented to person, place, time, and situation  and recalls 2 of 3 objects at five minutes  He has no dysarthria  He is able to name object, read and repeat  He has normal attention span and concentration  He follows multi-step commands  He has a normal fund of knowledge  Cranial Nerves    CN II: The patient's visual acuity and visual fields are normal   CN III, IV, VI: The patient's pupils are equally round and reactive to light and ocular movements are normal   CN V: The patient has normal facial sensation  CN VII:  The patient has symmetric facial movement  CN VIII:  The patient's hearing is normal   CN IX, X: The patient has symmetric palate movement and normal gag reflex  CN XI: The patient's shoulder shrug strength is normal   CN XII: The patient's tongue is midline without atrophy or fasciculations  Motor  The patient has normal muscle bulk throughout  His overall muscle tone is normal throughout  His strength is 5/5 throughout all four extremities  Limited ROM knee flexion ext right knee and secondary 4/5 strength     Sensory  The patient's sensation is to light touch, temperature and vibration  He has a dermatomal sensory deficit  He has no right-sided and no left-sided hemispatial neglect  Reduced sensation to vibration distal LE, PP, temp     Reflexes  Deep tendon reflexes are 2+ and symmetric except as noted  Right                     Left  Patellar                                 1+                         1+  Achilles                                1+                         1+    Gait and Coordination   He has a wide stance  He has abnormal tandem gait  Romberg's sign is negative  He has normal coordination bilaterally  Wide based antalgic gait  ROS:    Review of Systems   Constitutional: Negative  Negative for appetite change and fever  HENT: Negative  Negative for hearing loss, tinnitus, trouble swallowing and voice change  Hoarseness     Eyes: Negative  Negative for photophobia and pain  Respiratory: Negative  Negative for shortness of breath  Cardiovascular: Negative  Negative for palpitations  Gastrointestinal: Positive for abdominal pain  Negative for nausea and vomiting  Endocrine: Negative  Negative for cold intolerance and heat intolerance  Genitourinary: Negative  Negative for dysuria, frequency and urgency  Hair loss  Erection difficulties  Loss of sexual drive   Musculoskeletal: Negative  Negative for myalgias and neck pain  Muscle pain  Joint pain  Pain while walking    Skin: Negative  Negative for rash  Neurological: Positive for tremors  Negative for dizziness, seizures, syncope, facial asymmetry, speech difficulty, weakness, light-headedness, numbness and headaches          Increased sleepiness  Trouble falling asleep  Waking up at night  Memory problems  Balance problems  Difficulty walking  Twitching     Hematological: Negative  Does not bruise/bleed easily  Psychiatric/Behavioral: Negative for confusion, hallucinations and sleep disturbance  The patient is nervous/anxious           Depression

## 2018-09-12 ENCOUNTER — TRANSCRIBE ORDERS (OUTPATIENT)
Dept: LAB | Facility: HOSPITAL | Age: 63
End: 2018-09-12

## 2018-09-12 ENCOUNTER — APPOINTMENT (OUTPATIENT)
Dept: LAB | Facility: HOSPITAL | Age: 63
End: 2018-09-12
Attending: PSYCHIATRY & NEUROLOGY
Payer: COMMERCIAL

## 2018-09-12 DIAGNOSIS — R26.89 IMBALANCE: ICD-10-CM

## 2018-09-12 DIAGNOSIS — G25.3 MYOCLONUS: ICD-10-CM

## 2018-09-12 DIAGNOSIS — G31.84 MILD COGNITIVE IMPAIRMENT: ICD-10-CM

## 2018-09-12 DIAGNOSIS — R41.3 SHORT-TERM MEMORY LOSS: ICD-10-CM

## 2018-09-12 DIAGNOSIS — G25.3 MYOCLONUS: Primary | ICD-10-CM

## 2018-09-12 LAB
ALBUMIN SERPL BCP-MCNC: 3.2 G/DL (ref 3.5–5)
ALP SERPL-CCNC: 89 U/L (ref 46–116)
ALT SERPL W P-5'-P-CCNC: 24 U/L (ref 12–78)
AMMONIA PLAS-SCNC: 16 UMOL/L (ref 11–35)
ANION GAP SERPL CALCULATED.3IONS-SCNC: 5 MMOL/L (ref 4–13)
AST SERPL W P-5'-P-CCNC: 19 U/L (ref 5–45)
BILIRUB SERPL-MCNC: 0.9 MG/DL (ref 0.2–1)
BUN SERPL-MCNC: 15 MG/DL (ref 5–25)
CALCIUM SERPL-MCNC: 8.8 MG/DL (ref 8.3–10.1)
CHLORIDE SERPL-SCNC: 96 MMOL/L (ref 100–108)
CO2 SERPL-SCNC: 32 MMOL/L (ref 21–32)
CREAT SERPL-MCNC: 0.96 MG/DL (ref 0.6–1.3)
FOLATE SERPL-MCNC: 18.6 NG/ML (ref 3.1–17.5)
GFR SERPL CREATININE-BSD FRML MDRD: 84 ML/MIN/1.73SQ M
GLUCOSE P FAST SERPL-MCNC: 108 MG/DL (ref 65–99)
POTASSIUM SERPL-SCNC: 4 MMOL/L (ref 3.5–5.3)
PROT SERPL-MCNC: 8.1 G/DL (ref 6.4–8.2)
SODIUM SERPL-SCNC: 133 MMOL/L (ref 136–145)
TSH SERPL DL<=0.05 MIU/L-ACNC: 2.6 UIU/ML (ref 0.36–3.74)
VIT B12 SERPL-MCNC: 521 PG/ML (ref 100–900)

## 2018-09-12 PROCEDURE — 84443 ASSAY THYROID STIM HORMONE: CPT

## 2018-09-12 PROCEDURE — 86235 NUCLEAR ANTIGEN ANTIBODY: CPT

## 2018-09-12 PROCEDURE — 86039 ANTINUCLEAR ANTIBODIES (ANA): CPT

## 2018-09-12 PROCEDURE — 82746 ASSAY OF FOLIC ACID SERUM: CPT

## 2018-09-12 PROCEDURE — 84165 PROTEIN E-PHORESIS SERUM: CPT

## 2018-09-12 PROCEDURE — 86430 RHEUMATOID FACTOR TEST QUAL: CPT

## 2018-09-12 PROCEDURE — 82607 VITAMIN B-12: CPT

## 2018-09-12 PROCEDURE — 86038 ANTINUCLEAR ANTIBODIES: CPT

## 2018-09-12 PROCEDURE — 82140 ASSAY OF AMMONIA: CPT

## 2018-09-12 PROCEDURE — 83883 ASSAY NEPHELOMETRY NOT SPEC: CPT

## 2018-09-12 PROCEDURE — 80053 COMPREHEN METABOLIC PANEL: CPT

## 2018-09-12 PROCEDURE — 36415 COLL VENOUS BLD VENIPUNCTURE: CPT

## 2018-09-12 PROCEDURE — 84165 PROTEIN E-PHORESIS SERUM: CPT | Performed by: PATHOLOGY

## 2018-09-13 ENCOUNTER — HOSPITAL ENCOUNTER (OUTPATIENT)
Dept: NEUROLOGY | Facility: HOSPITAL | Age: 63
Discharge: HOME | End: 2018-09-13
Attending: PSYCHIATRY & NEUROLOGY
Payer: COMMERCIAL

## 2018-09-13 DIAGNOSIS — G25.3 MYOCLONUS: ICD-10-CM

## 2018-09-13 DIAGNOSIS — R41.3 SHORT-TERM MEMORY LOSS: ICD-10-CM

## 2018-09-13 LAB
ENA SS-A AB SER-ACNC: <0.2 AI (ref 0–0.9)
ENA SS-B AB SER-ACNC: 0.3 AI (ref 0–0.9)
KAPPA LC FREE SER-MCNC: 47.2 MG/L (ref 3.3–19.4)
KAPPA LC FREE/LAMBDA FREE SER: 1.99 {RATIO} (ref 0.26–1.65)
LAMBDA LC FREE SERPL-MCNC: 23.7 MG/L (ref 5.7–26.3)
RHEUMATOID FACT SER QL LA: NEGATIVE

## 2018-09-13 PROCEDURE — 95816 EEG AWAKE AND DROWSY: CPT

## 2018-09-13 PROCEDURE — 95816 EEG AWAKE AND DROWSY: CPT | Performed by: PSYCHIATRY & NEUROLOGY

## 2018-09-14 LAB
ALBUMIN SERPL ELPH-MCNC: 3.81 G/DL (ref 3.5–5)
ALBUMIN SERPL ELPH-MCNC: 48.2 % (ref 52–65)
ALPHA1 GLOB SERPL ELPH-MCNC: 0.36 G/DL (ref 0.1–0.4)
ALPHA1 GLOB SERPL ELPH-MCNC: 4.6 % (ref 2.5–5)
ALPHA2 GLOB SERPL ELPH-MCNC: 0.95 G/DL (ref 0.4–1.2)
ALPHA2 GLOB SERPL ELPH-MCNC: 12 % (ref 7–13)
ANA HOMOGEN SER QL IF: NORMAL
ANA HOMOGEN TITR SER: NORMAL {TITER}
BETA GLOB ABNORMAL SERPL ELPH-MCNC: 0.55 G/DL (ref 0.4–0.8)
BETA1 GLOB SERPL ELPH-MCNC: 6.9 % (ref 5–13)
BETA2 GLOB SERPL ELPH-MCNC: 7.1 % (ref 2–8)
BETA2+GAMMA GLOB SERPL ELPH-MCNC: 0.56 G/DL (ref 0.2–0.5)
GAMMA GLOB ABNORMAL SERPL ELPH-MCNC: 1.67 G/DL (ref 0.5–1.6)
GAMMA GLOB SERPL ELPH-MCNC: 21.2 % (ref 12–22)
IGG/ALB SER: 0.93 {RATIO} (ref 1.1–1.8)
PROT SERPL-MCNC: 7.9 G/DL (ref 6.4–8.2)
RYE IGE QN: POSITIVE

## 2018-09-24 DIAGNOSIS — D89.2 HYPERGAMMAGLOBULINEMIA: Primary | ICD-10-CM

## 2018-09-25 ENCOUNTER — TELEPHONE (OUTPATIENT)
Dept: NEUROLOGY | Facility: CLINIC | Age: 63
End: 2018-09-25

## 2018-09-25 NOTE — TELEPHONE ENCOUNTER
Called and left a message on pt's answering machine for a call back    ----- Message from [address], DO sent at 9/24/2018 10:07 AM EDT -----  Regarding: abnormal blood work  Please let the patient know that some of his proteins in his blood are abnormal and I am referring him to hematology/oncology for further work up to see if this is something that needs to just be monitored or evaluated and treated

## 2018-09-27 DIAGNOSIS — R94.01 ABNORMAL EEG: Primary | ICD-10-CM

## 2018-09-28 ENCOUNTER — TELEPHONE (OUTPATIENT)
Dept: NEUROLOGY | Facility: CLINIC | Age: 63
End: 2018-09-28

## 2018-09-28 NOTE — TELEPHONE ENCOUNTER
----- Message from [address], DO sent at 9/27/2018  5:20 PM EDT -----  Regarding: schedule amb 24 hr eeg  Please let patient know that his routine EEG showed slowing and I would like to evaluate this further with 24 hour EEG  Please schedule    At this time no evidence of seizures    Thanks

## 2018-09-28 NOTE — TELEPHONE ENCOUNTER
I called and spoke to pt and advised him of results, he is agreeable to 24 hr EEG  Call transferred to [facility] to assist with scheduling

## 2018-10-04 NOTE — TELEPHONE ENCOUNTER
LMOM for patient to call back  4th attempt to contact patient, letter mailed home to patient to contact our office

## 2018-10-25 ENCOUNTER — TRANSCRIBE ORDERS (OUTPATIENT)
Dept: ADMINISTRATIVE | Facility: HOSPITAL | Age: 63
End: 2018-10-25

## 2018-10-25 DIAGNOSIS — G47.33 OBSTRUCTIVE SLEEP APNEA (ADULT) (PEDIATRIC): Primary | ICD-10-CM

## 2018-10-26 ENCOUNTER — TRANSCRIBE ORDERS (OUTPATIENT)
Dept: SLEEP CENTER | Facility: CLINIC | Age: 63
End: 2018-10-26

## 2019-02-08 ENCOUNTER — APPOINTMENT (OUTPATIENT)
Dept: LAB | Facility: HOSPITAL | Age: 64
End: 2019-02-08
Payer: COMMERCIAL

## 2019-02-08 ENCOUNTER — TRANSCRIBE ORDERS (OUTPATIENT)
Dept: LAB | Facility: HOSPITAL | Age: 64
End: 2019-02-08

## 2019-02-08 DIAGNOSIS — E08.00 DIABETES MELLITUS DUE TO UNDERLYING CONDITION WITH HYPEROSMOLARITY WITHOUT COMA, WITHOUT LONG-TERM CURRENT USE OF INSULIN (HCC): ICD-10-CM

## 2019-02-08 DIAGNOSIS — I10 ESSENTIAL HYPERTENSION, MALIGNANT: ICD-10-CM

## 2019-02-08 DIAGNOSIS — E08.00 DIABETES MELLITUS DUE TO UNDERLYING CONDITION WITH HYPEROSMOLARITY WITHOUT COMA, WITHOUT LONG-TERM CURRENT USE OF INSULIN (HCC): Primary | ICD-10-CM

## 2019-02-08 LAB
ALBUMIN SERPL BCP-MCNC: 3.5 G/DL (ref 3.5–5)
ALP SERPL-CCNC: 87 U/L (ref 46–116)
ALT SERPL W P-5'-P-CCNC: 23 U/L (ref 12–78)
ANION GAP SERPL CALCULATED.3IONS-SCNC: 6 MMOL/L (ref 4–13)
AST SERPL W P-5'-P-CCNC: 21 U/L (ref 5–45)
BILIRUB SERPL-MCNC: 0.78 MG/DL (ref 0.2–1)
BUN SERPL-MCNC: 22 MG/DL (ref 5–25)
CALCIUM SERPL-MCNC: 8.8 MG/DL (ref 8.3–10.1)
CHLORIDE SERPL-SCNC: 98 MMOL/L (ref 100–108)
CO2 SERPL-SCNC: 33 MMOL/L (ref 21–32)
CREAT SERPL-MCNC: 1.08 MG/DL (ref 0.6–1.3)
EST. AVERAGE GLUCOSE BLD GHB EST-MCNC: 137 MG/DL
GFR SERPL CREATININE-BSD FRML MDRD: 73 ML/MIN/1.73SQ M
GLUCOSE P FAST SERPL-MCNC: 122 MG/DL (ref 65–99)
HBA1C MFR BLD: 6.4 % (ref 4.2–6.3)
POTASSIUM SERPL-SCNC: 3.9 MMOL/L (ref 3.5–5.3)
PROT SERPL-MCNC: 7.8 G/DL (ref 6.4–8.2)
SODIUM SERPL-SCNC: 137 MMOL/L (ref 136–145)

## 2019-02-08 PROCEDURE — 83036 HEMOGLOBIN GLYCOSYLATED A1C: CPT

## 2019-02-08 PROCEDURE — 36415 COLL VENOUS BLD VENIPUNCTURE: CPT

## 2019-02-08 PROCEDURE — 80053 COMPREHEN METABOLIC PANEL: CPT

## 2019-06-07 ENCOUNTER — OFFICE VISIT (OUTPATIENT)
Dept: VASCULAR SURGERY | Facility: CLINIC | Age: 64
End: 2019-06-07
Payer: COMMERCIAL

## 2019-06-07 VITALS
SYSTOLIC BLOOD PRESSURE: 134 MMHG | HEART RATE: 88 BPM | BODY MASS INDEX: 42.59 KG/M2 | TEMPERATURE: 99.1 F | DIASTOLIC BLOOD PRESSURE: 86 MMHG | WEIGHT: 265 LBS | HEIGHT: 66 IN

## 2019-06-07 DIAGNOSIS — I87.2 CHRONIC VENOUS INSUFFICIENCY: Primary | ICD-10-CM

## 2019-06-07 DIAGNOSIS — R60.0 BILATERAL LOWER EXTREMITY EDEMA: ICD-10-CM

## 2019-06-07 PROCEDURE — 99213 OFFICE O/P EST LOW 20 MIN: CPT | Performed by: NURSE PRACTITIONER

## 2019-06-07 RX ORDER — CEPHALEXIN 500 MG/1
CAPSULE ORAL
COMMUNITY
Start: 2019-03-25

## 2019-06-21 ENCOUNTER — APPOINTMENT (OUTPATIENT)
Dept: LAB | Facility: HOSPITAL | Age: 64
End: 2019-06-21
Payer: COMMERCIAL

## 2019-06-21 ENCOUNTER — TRANSCRIBE ORDERS (OUTPATIENT)
Dept: LAB | Facility: HOSPITAL | Age: 64
End: 2019-06-21

## 2019-06-21 DIAGNOSIS — E08.00 DIABETES MELLITUS DUE TO UNDERLYING CONDITION WITH HYPEROSMOLARITY WITHOUT COMA, WITHOUT LONG-TERM CURRENT USE OF INSULIN (HCC): Primary | ICD-10-CM

## 2019-06-21 DIAGNOSIS — E78.00 PURE HYPERCHOLESTEROLEMIA: ICD-10-CM

## 2019-06-21 DIAGNOSIS — E29.1 3-OXO-5 ALPHA-STEROID DELTA 4-DEHYDROGENASE DEFICIENCY: ICD-10-CM

## 2019-06-21 DIAGNOSIS — I10 ESSENTIAL HYPERTENSION, MALIGNANT: ICD-10-CM

## 2019-06-21 DIAGNOSIS — E08.00 DIABETES MELLITUS DUE TO UNDERLYING CONDITION WITH HYPEROSMOLARITY WITHOUT COMA, WITHOUT LONG-TERM CURRENT USE OF INSULIN (HCC): ICD-10-CM

## 2019-06-21 LAB
ALBUMIN SERPL BCP-MCNC: 3.6 G/DL (ref 3.5–5)
ALP SERPL-CCNC: 82 U/L (ref 46–116)
ALT SERPL W P-5'-P-CCNC: 24 U/L (ref 12–78)
ANION GAP SERPL CALCULATED.3IONS-SCNC: 1 MMOL/L (ref 4–13)
AST SERPL W P-5'-P-CCNC: 20 U/L (ref 5–45)
BILIRUB SERPL-MCNC: 0.53 MG/DL (ref 0.2–1)
BUN SERPL-MCNC: 21 MG/DL (ref 5–25)
CALCIUM SERPL-MCNC: 9.3 MG/DL (ref 8.3–10.1)
CHLORIDE SERPL-SCNC: 103 MMOL/L (ref 100–108)
CHOLEST SERPL-MCNC: 171 MG/DL (ref 50–200)
CO2 SERPL-SCNC: 34 MMOL/L (ref 21–32)
CREAT SERPL-MCNC: 1.11 MG/DL (ref 0.6–1.3)
CREAT UR-MCNC: 110 MG/DL
EST. AVERAGE GLUCOSE BLD GHB EST-MCNC: 171 MG/DL
GFR SERPL CREATININE-BSD FRML MDRD: 70 ML/MIN/1.73SQ M
GLUCOSE SERPL-MCNC: 129 MG/DL (ref 65–140)
HBA1C MFR BLD: 7.6 % (ref 4.2–6.3)
HDLC SERPL-MCNC: 53 MG/DL (ref 40–60)
LDLC SERPL CALC-MCNC: 94 MG/DL (ref 0–100)
MICROALBUMIN UR-MCNC: 13.5 MG/L (ref 0–20)
MICROALBUMIN/CREAT 24H UR: 12 MG/G CREATININE (ref 0–30)
NONHDLC SERPL-MCNC: 118 MG/DL
POTASSIUM SERPL-SCNC: 4.5 MMOL/L (ref 3.5–5.3)
PROT SERPL-MCNC: 8 G/DL (ref 6.4–8.2)
SODIUM SERPL-SCNC: 138 MMOL/L (ref 136–145)
TRIGL SERPL-MCNC: 119 MG/DL

## 2019-06-21 PROCEDURE — 84402 ASSAY OF FREE TESTOSTERONE: CPT

## 2019-06-21 PROCEDURE — 80053 COMPREHEN METABOLIC PANEL: CPT

## 2019-06-21 PROCEDURE — 82043 UR ALBUMIN QUANTITATIVE: CPT

## 2019-06-21 PROCEDURE — 80061 LIPID PANEL: CPT

## 2019-06-21 PROCEDURE — 36415 COLL VENOUS BLD VENIPUNCTURE: CPT

## 2019-06-21 PROCEDURE — 83036 HEMOGLOBIN GLYCOSYLATED A1C: CPT

## 2019-06-21 PROCEDURE — 84403 ASSAY OF TOTAL TESTOSTERONE: CPT

## 2019-06-21 PROCEDURE — 82570 ASSAY OF URINE CREATININE: CPT

## 2019-06-22 LAB
TESTOST FREE SERPL-MCNC: 3.3 PG/ML (ref 6.6–18.1)
TESTOST SERPL-MCNC: 77 NG/DL (ref 264–916)

## 2019-07-01 RX ORDER — OXYCODONE HYDROCHLORIDE 60 MG/1
TABLET, FILM COATED, EXTENDED RELEASE ORAL
COMMUNITY
Start: 2019-06-14 | End: 2020-05-04 | Stop reason: ALTCHOICE

## 2019-07-01 RX ORDER — POTASSIUM CHLORIDE 750 MG/1
TABLET, EXTENDED RELEASE ORAL
COMMUNITY
Start: 2019-06-13

## 2019-07-01 RX ORDER — NALOXONE HYDROCHLORIDE 4 MG/.1ML
SPRAY NASAL
COMMUNITY
Start: 2019-06-13

## 2019-07-03 ENCOUNTER — HOSPITAL ENCOUNTER (OUTPATIENT)
Dept: RADIOLOGY | Facility: HOSPITAL | Age: 64
Discharge: HOME | End: 2019-07-03
Attending: ORTHOPAEDIC SURGERY
Payer: COMMERCIAL

## 2019-07-03 ENCOUNTER — OFFICE VISIT (OUTPATIENT)
Dept: OBGYN CLINIC | Facility: HOSPITAL | Age: 64
End: 2019-07-03
Payer: COMMERCIAL

## 2019-07-03 VITALS
SYSTOLIC BLOOD PRESSURE: 126 MMHG | DIASTOLIC BLOOD PRESSURE: 71 MMHG | HEART RATE: 76 BPM | HEIGHT: 66 IN | BODY MASS INDEX: 42.59 KG/M2 | WEIGHT: 265 LBS

## 2019-07-03 DIAGNOSIS — Z96.652 HX OF TOTAL KNEE ARTHROPLASTY, LEFT: ICD-10-CM

## 2019-07-03 DIAGNOSIS — Z47.1 AFTERCARE FOLLOWING LEFT KNEE JOINT REPLACEMENT SURGERY: Primary | ICD-10-CM

## 2019-07-03 DIAGNOSIS — Z47.1 AFTERCARE FOLLOWING LEFT KNEE JOINT REPLACEMENT SURGERY: ICD-10-CM

## 2019-07-03 DIAGNOSIS — M17.11 ARTHRITIS OF RIGHT KNEE: ICD-10-CM

## 2019-07-03 DIAGNOSIS — G89.29 CHRONIC PAIN OF RIGHT KNEE: ICD-10-CM

## 2019-07-03 DIAGNOSIS — M17.11 LOCALIZED OSTEOARTHRITIS OF RIGHT KNEE: ICD-10-CM

## 2019-07-03 DIAGNOSIS — Z96.652 AFTERCARE FOLLOWING LEFT KNEE JOINT REPLACEMENT SURGERY: Primary | ICD-10-CM

## 2019-07-03 DIAGNOSIS — Z96.652 AFTERCARE FOLLOWING LEFT KNEE JOINT REPLACEMENT SURGERY: ICD-10-CM

## 2019-07-03 DIAGNOSIS — M25.561 CHRONIC PAIN OF RIGHT KNEE: ICD-10-CM

## 2019-07-03 PROCEDURE — 73562 X-RAY EXAM OF KNEE 3: CPT

## 2019-07-03 PROCEDURE — 99213 OFFICE O/P EST LOW 20 MIN: CPT | Performed by: ORTHOPAEDIC SURGERY

## 2019-07-03 RX ORDER — ERGOCALCIFEROL 1.25 MG/1
CAPSULE ORAL
COMMUNITY
Start: 2019-06-30

## 2019-07-03 NOTE — PROGRESS NOTES
Assessment:  1  Aftercare following left knee joint replacement surgery  XR knee 1 or 2 vw left   2  Localized osteoarthritis of right knee     3  Chronic pain of right knee     4  Hx of total knee arthroplasty, left       Patient Active Problem List   Diagnosis    S/P total knee replacement    Diabetes type 2, uncontrolled ([address] )    HTN (hypertension)    GERD (gastroesophageal reflux disease)    Anxiety    Sleep apnea    Acute blood loss anemia    Low back pain with sciatica    DDD (degenerative disc disease), lumbar    Spinal stenosis, lumbar    Myoclonus    Chronic venous insufficiency    Bilateral lower extremity edema    BMI 40 0-44 9, [facility])           Bassem Umanzor is experiencing progressively worsening right knee pain  He has responded well to left total knee arthroplasty in the past   He is diabetic and his current BMI is over 42  He is very familiar with the surgical procedure  A long discussion was had with him regarding the need to decrease his BMI and facilitate fitness prior to scheduling a right total knee arthroplasty  He understands the target BMI is under 40  He is eager and willing to his target BMI to this range  A cortisone injection was offered to him today but he has not had significant relief from this in the past   He will follow up with us in 2-3 months to check on his progress with weight loss  Subjective:     Patient ID:    Chief Complaint:Edgar Funmi Ko 59 y o  male      HPI    Patient comes in today with regards to right knee pain  He is a 60-year-old male who is known to our practice for previous left total knee arthroplasty done by Dr Jose Alejandro Ram a few years ago  He responded well to this surgery and is here to discuss possible right total knee arthroplasty as his pain has been escalating over the past few years    He is currently walking with assistive devices and has had multiple injections to the right knee in the past       The following portions of the patient's history were reviewed and updated as appropriate: allergies, current medications, past family history, past social history, past surgical history and problem list         Social History     Socioeconomic History    Marital status: /Civil Union     Spouse name: Not on file    Number of children: Not on file    Years of education: BS    Highest education level: Not on file   Occupational History    Occupation: Attendance officer   Social Needs    Financial resource strain: Not on file    Food insecurity:     Worry: Not on file     Inability: Not on file    Transportation needs:     Medical: Not on file     Non-medical: Not on file   Tobacco Use    Smoking status: Former Smoker     Last attempt to quit:      Years since quittin 5    Smokeless tobacco: Never Used   Substance and Sexual Activity    Alcohol use: No    Drug use: No    Sexual activity: Not on file   Lifestyle    Physical activity:     Days per week: Not on file     Minutes per session: Not on file    Stress: Not on file   Relationships    Social connections:     Talks on phone: Not on file     Gets together: Not on file     Attends Yazdanism service: Not on file     Active member of club or organization: Not on file     Attends meetings of clubs or organizations: Not on file     Relationship status: Not on file    Intimate partner violence:     Fear of current or ex partner: Not on file     Emotionally abused: Not on file     Physically abused: Not on file     Forced sexual activity: Not on file   Other Topics Concern    Not on file   Social History Narrative    Not on file     Past Medical History:   Diagnosis Date    Acid reflux     Anxiety     Arthritis     Cellulitis     left ankle    Diabetes mellitus ([facility] [address] )     Hypertension     Sleep apnea     Twitching      Past Surgical History:   Procedure Laterality Date    APPENDECTOMY      CHOLECYSTECTOMY      KNEE ARTHROSCOPY      ME TOTAL KNEE ARTHROPLASTY Left 7/13/2016    Procedure: TOTAL  KNEE REPLACEMENT ;  Surgeon: Markos Mittal MD;  Location: [facility];  Service: Orthopedics     No Known Allergies  Current Outpatient Medications on File Prior to Visit   Medication Sig Dispense Refill    albuterol (PROVENTIL HFA,VENTOLIN HFA) 90 mcg/act inhaler Inhale 2 puffs every 6 (six) hours as needed for wheezing   ascorbic acid (VITAMIN C) 500 mg tablet Take 500 mg by mouth 2 (two) times a day   ASPIRIN 81 PO Take by mouth      budesonide (RINOCORT AQUA) 32 MCG/ACT nasal spray 1 spray into each nostril 2 (two) times a day   cephalexin (KEFLEX) 500 mg capsule       cloNIDine (CATAPRES) 0 1 mg tablet Take 0 1 mg by mouth 3 (three) times a day   Dulaglutide (TRULICITY) 9 68 KZ/5 9WL SOPN Inject 0 75 mg under the skin once a week      Ergocalciferol (VITAMIN D2 PO) Take 50,000 Units by mouth once a week   ergocalciferol (VITAMIN D2) 50,000 units       ferrous sulfate 325 (65 Fe) mg tablet Take 325 mg by mouth 2 (two) times a day   fluticasone (FLOVENT HFA) 220 mcg/act inhaler Inhale 1 puff daily as needed   folic acid (FOLVITE) 1 mg tablet Take 1 mg by mouth daily   furosemide (LASIX) 40 mg tablet Take 40 mg by mouth 2 (two) times a day as needed   glipiZIDE (GLUCOTROL) 5 mg tablet Take 5 mg by mouth daily   HYDROcodone-acetaminophen (NORCO)  mg per tablet       HYDROcodone-acetaminophen (NORCO) 5-325 mg per tablet 2 tabs po q4-6 hrs prn pain (Patient not taking: Reported on 8/29/2018 ) 60 tablet 0    ibuprofen (MOTRIN) 800 mg tablet Take 1 tablet by mouth 3 (three) times a day (Patient not taking: Reported on 8/29/2018 ) 21 tablet 0    lisinopril-hydrochlorothiazide (PRINZIDE,ZESTORETIC) 10-12 5 MG per tablet Take 1 tablet by mouth daily   LORazepam (ATIVAN) 0 5 mg tablet Take 0 5 mg by mouth daily at bedtime        metFORMIN (GLUCOPHAGE-XR) 750 mg 24 hr tablet Take 750 mg by mouth 2 (two) times a day   NARCAN 4 MG/0 1ML LIQD       OXYCONTIN 40 MG 12 hr tablet       OXYCONTIN 60 MG 12 hr tablet       OXYCONTIN 80 MG 12 hr tablet Take 80 mg by mouth every 8 (eight) hours        pantoprazole (PROTONIX) 40 mg tablet Take 40 mg by mouth daily        potassium chloride (K-DUR) 10 mEq tablet Take 10 mEq by mouth 2 (two) times a day as needed   potassium chloride (K-DUR,KLOR-CON) 10 mEq tablet       testosterone (ANDROGEL) 1% Apply 50 mg topically daily   urea (CARMOL) 40 %        No current facility-administered medications on file prior to visit  Objective:    Review of Systems   Constitutional: Negative  HENT: Negative  Eyes: Negative  Respiratory: Negative  Cardiovascular: Negative  Gastrointestinal: Negative  Endocrine: Negative  Genitourinary: Negative  Musculoskeletal: Negative  Skin: Negative  Allergic/Immunologic: Negative  Neurological: Negative  Hematological: Negative  Psychiatric/Behavioral: Negative  Right Knee Exam     Range of Motion   Extension: 0   Flexion: 110     Tests   Varus: negative Valgus: negative  Patellar apprehension: negative    Other   Erythema: absent  Scars: absent  Sensation: normal  Pulse: present  Swelling: none  Effusion: effusion present      Left Knee Exam     Other   Scars: present  Effusion: no effusion present            Physical Exam   Constitutional: He is oriented to person, place, and time  He appears well-developed and well-nourished  HENT:   Head: Normocephalic and atraumatic  Eyes: EOM are normal    Neck: Normal range of motion  Cardiovascular: Regular rhythm  Pulmonary/Chest: Effort normal    Musculoskeletal:        Right knee: He exhibits effusion  Left knee: He exhibits no effusion  Neurological: He is alert and oriented to person, place, and time  Skin: Skin is warm and dry  Psychiatric: He has a normal mood and affect   His behavior is normal  Thought content normal    Nursing note and vitals reviewed  I have personally reviewed pertinent films in PACS and my interpretation is Severe and advanced medial compartment osteoarthritis with genu varum deformity  Portions of the record may have been created with voice recognition software   Occasional wrong word or "sound a like" substitutions may have occurred due to the inherent limitations of voice recognition software   Read the chart carefully and recognize, using context, where substitutions have occurred

## 2019-07-29 ENCOUNTER — TELEPHONE (OUTPATIENT)
Dept: OBGYN CLINIC | Facility: HOSPITAL | Age: 64
End: 2019-07-29

## 2019-07-29 NOTE — TELEPHONE ENCOUNTER
Patient is calling   [phone]    Patient is asking if Dr Rosa Isela Riggins could tell him how much weight he needs to lose so he can have his surgery  Please advise

## 2019-07-31 NOTE — TELEPHONE ENCOUNTER
Called and spoke with patient  Pt was questioning how much weight he should lose to be able to schedule surgery with Dr Mario Sauer  Pt was advised that at 7/3 [facility], he weighed in at 265lbs  His BMI at that visit was 42 77  To satisfy [employer] Guidelines, he needs to be at a 10% weight loss or BMI <40  Pt advised that in order to proceed, he would need to lose 18lbs to be at his goal weight of 247lbs  247lbs with his current height of Felicie Gram will put him at a BMI of 39 9  Pt was offered a referral to non-surgical weight management of which pt declined  He reports he has had much success with the atkins diet in the past and reports he plans to try this  Pt was given my name and contact information for any updates he may have  Pt denies having any other questions at this time  Pt encouraged to call me with any questions, concerns or issues

## 2019-08-02 ENCOUNTER — TRANSCRIBE ORDERS (OUTPATIENT)
Dept: LAB | Facility: HOSPITAL | Age: 64
End: 2019-08-02

## 2019-08-02 ENCOUNTER — APPOINTMENT (OUTPATIENT)
Dept: LAB | Facility: HOSPITAL | Age: 64
End: 2019-08-02
Payer: COMMERCIAL

## 2019-08-02 DIAGNOSIS — I10 ESSENTIAL HYPERTENSION, MALIGNANT: Primary | ICD-10-CM

## 2019-08-02 DIAGNOSIS — I10 ESSENTIAL HYPERTENSION, MALIGNANT: ICD-10-CM

## 2019-08-02 LAB
ANION GAP SERPL CALCULATED.3IONS-SCNC: 2 MMOL/L (ref 4–13)
BUN SERPL-MCNC: 20 MG/DL (ref 5–25)
CALCIUM SERPL-MCNC: 9.1 MG/DL (ref 8.3–10.1)
CHLORIDE SERPL-SCNC: 99 MMOL/L (ref 100–108)
CO2 SERPL-SCNC: 34 MMOL/L (ref 21–32)
CREAT SERPL-MCNC: 1.15 MG/DL (ref 0.6–1.3)
GFR SERPL CREATININE-BSD FRML MDRD: 67 ML/MIN/1.73SQ M
GLUCOSE SERPL-MCNC: 164 MG/DL (ref 65–140)
POTASSIUM SERPL-SCNC: 4.3 MMOL/L (ref 3.5–5.3)
SODIUM SERPL-SCNC: 135 MMOL/L (ref 136–145)

## 2019-08-02 PROCEDURE — 80048 BASIC METABOLIC PNL TOTAL CA: CPT

## 2019-08-02 PROCEDURE — 36415 COLL VENOUS BLD VENIPUNCTURE: CPT

## 2019-09-17 ENCOUNTER — TRANSCRIBE ORDERS (OUTPATIENT)
Dept: LAB | Facility: HOSPITAL | Age: 64
End: 2019-09-17

## 2019-09-17 ENCOUNTER — APPOINTMENT (OUTPATIENT)
Dept: LAB | Facility: HOSPITAL | Age: 64
End: 2019-09-17
Payer: COMMERCIAL

## 2019-09-17 DIAGNOSIS — E29.1 HYPOGONADISM MALE: Primary | ICD-10-CM

## 2019-09-17 DIAGNOSIS — E29.1 HYPOGONADISM MALE: ICD-10-CM

## 2019-09-17 LAB
HCT VFR BLD AUTO: 44.8 % (ref 36.5–49.3)
HGB BLD-MCNC: 13.3 G/DL (ref 12–17)
TESTOST SERPL-MCNC: 346 NG/DL (ref 95–948)

## 2019-09-17 PROCEDURE — 84403 ASSAY OF TOTAL TESTOSTERONE: CPT

## 2019-09-17 PROCEDURE — 85014 HEMATOCRIT: CPT

## 2019-09-17 PROCEDURE — 36415 COLL VENOUS BLD VENIPUNCTURE: CPT

## 2019-09-17 PROCEDURE — 85018 HEMOGLOBIN: CPT

## 2019-10-02 ENCOUNTER — OFFICE VISIT (OUTPATIENT)
Dept: OBGYN CLINIC | Facility: HOSPITAL | Age: 64
End: 2019-10-02
Payer: COMMERCIAL

## 2019-10-02 VITALS
DIASTOLIC BLOOD PRESSURE: 72 MMHG | SYSTOLIC BLOOD PRESSURE: 156 MMHG | BODY MASS INDEX: 43.07 KG/M2 | HEIGHT: 66 IN | WEIGHT: 268 LBS | HEART RATE: 80 BPM

## 2019-10-02 DIAGNOSIS — M17.11 PRIMARY OSTEOARTHRITIS OF RIGHT KNEE: Primary | ICD-10-CM

## 2019-10-02 PROCEDURE — 99213 OFFICE O/P EST LOW 20 MIN: CPT | Performed by: ORTHOPAEDIC SURGERY

## 2019-10-02 PROCEDURE — 20610 DRAIN/INJ JOINT/BURSA W/O US: CPT | Performed by: ORTHOPAEDIC SURGERY

## 2019-10-02 RX ORDER — BUPIVACAINE HYDROCHLORIDE 2.5 MG/ML
2 INJECTION, SOLUTION INFILTRATION; PERINEURAL
Status: COMPLETED | OUTPATIENT
Start: 2019-10-02 | End: 2019-10-02

## 2019-10-02 RX ORDER — LIDOCAINE HYDROCHLORIDE 10 MG/ML
2 INJECTION, SOLUTION INFILTRATION; PERINEURAL
Status: COMPLETED | OUTPATIENT
Start: 2019-10-02 | End: 2019-10-02

## 2019-10-02 RX ADMIN — LIDOCAINE HYDROCHLORIDE 2 ML: 10 INJECTION, SOLUTION INFILTRATION; PERINEURAL at 16:59

## 2019-10-02 RX ADMIN — BUPIVACAINE HYDROCHLORIDE 2 ML: 2.5 INJECTION, SOLUTION INFILTRATION; PERINEURAL at 16:59

## 2019-10-02 NOTE — PROGRESS NOTES
59 y o male presenting for follow-up of a right knee osteoarthritis  Patient is seen in the office multiple times in the past, surgical intervention has been discussed, however he has not been able to meet to the preop requirements of weight loss or diabetes control  Patient states he has been trying to discontinue his chronic opioid use, which has led to an increase in his appetite and some weight gain  No interval change in his knee pain since last exam     Review of Systems  Review of systems negative unless otherwise specified in HPI    Past Medical History  Past Medical History:   Diagnosis Date    Acid reflux     Anxiety     Arthritis     Cellulitis     left ankle    Diabetes mellitus ([address] )     Hypertension     Sleep apnea     Twitching        Past Surgical History  Past Surgical History:   Procedure Laterality Date    APPENDECTOMY      CHOLECYSTECTOMY      KNEE ARTHROSCOPY      [facility] TOTAL KNEE ARTHROPLASTY Left 7/13/2016    Procedure: TOTAL  KNEE REPLACEMENT ;  Surgeon: Army Eva MD;  [facility]: [facility];  Service: Orthopedics       Current Medications  Current Outpatient Medications on File Prior to Visit   Medication Sig Dispense Refill    albuterol (PROVENTIL HFA,VENTOLIN HFA) 90 mcg/act inhaler Inhale 2 puffs every 6 (six) hours as needed for wheezing   aspirin (ECOTRIN LOW STRENGTH) 81 mg EC tablet Take 81 mg by mouth daily      budesonide (RINOCORT AQUA) 32 MCG/ACT nasal spray 1 spray into each nostril 2 (two) times a day   cephalexin (KEFLEX) 500 mg capsule       cloNIDine (CATAPRES) 0 1 mg tablet Take 0 1 mg by mouth 3 (three) times a day   Dulaglutide (TRULICITY) 6 36 HS/7 3WO SOPN Inject 0 75 mg under the skin once a week      ergocalciferol (VITAMIN D2) 50,000 units       ferrous sulfate 325 (65 Fe) mg tablet Take 325 mg by mouth 2 (two) times a day   fluticasone (FLOVENT HFA) 220 mcg/act inhaler Inhale 1 puff daily as needed        folic acid (FOLVITE) 1 mg tablet Take 1 mg by mouth daily   furosemide (LASIX) 40 mg tablet Take 40 mg by mouth 2 (two) times a day as needed   glipiZIDE (GLUCOTROL) 5 mg tablet Take 5 mg by mouth daily   HYDROcodone-acetaminophen (NORCO)  mg per tablet       HYDROcodone-acetaminophen (NORCO) 5-325 mg per tablet 2 tabs po q4-6 hrs prn pain (Patient not taking: Reported on 8/29/2018 ) 60 tablet 0    ibuprofen (MOTRIN) 800 mg tablet Take 1 tablet by mouth 3 (three) times a day (Patient not taking: Reported on 8/29/2018 ) 21 tablet 0    lisinopril-hydrochlorothiazide (PRINZIDE,ZESTORETIC) 10-12 5 MG per tablet Take 1 tablet by mouth 2 (two) times a day      LORazepam (ATIVAN) 0 5 mg tablet Take 0 5 mg by mouth daily at bedtime   metFORMIN (GLUCOPHAGE-XR) 750 mg 24 hr tablet Take 750 mg by mouth 2 (two) times a day   NARCAN 4 MG/0 1ML LIQD       OXYCONTIN 40 MG 12 hr tablet       OXYCONTIN 60 MG 12 hr tablet       OXYCONTIN 80 MG 12 hr tablet Take 80 mg by mouth every 8 (eight) hours        pantoprazole (PROTONIX) 40 mg tablet Take 40 mg by mouth daily        potassium chloride (K-DUR,KLOR-CON) 10 mEq tablet       testosterone (ANDROGEL) 1% Place 50 mg on the skin      urea (CARMOL) 40 %        No current facility-administered medications on file prior to visit          Recent Labs [facility])  0   Lab Value Date/Time    HCT 44 8 09/17/2019 1246    HCT 48 7 01/11/2014 0918    HGB 13 3 09/17/2019 1246    HGB 16 0 01/11/2014 0918    WBC 11 06 (H) 07/18/2016 2047    ESR 28 (H) 01/07/2018 1710    CRP 9 9 (H) 01/07/2018 1642    GLUCOSE 120 10/04/2014 0805    HGBA1C 7 6 (H) 06/21/2019 1125    HGBA1C 6 0 (H) 10/04/2014 0805         Physical exam  · General: Awake, Alert, Oriented  · Eyes: Pupils equal, round and reactive to light  · Heart: regular rate and rhythm  · Lungs: No audible wheezing  · Abdomen: soft  MSK right knee  -skin intact over the knee without any erythema or ecchymosis  He does have some cellulitis and edema to the distal aspect of the lower leg and foot  -no effusion  -nontender to palpation  -stable to valgus/varus, Lachman's, posterior drawer  -range of motion from 0-100 degrees  -motor intact hip flexion, knee flexion/extension, ankle dorsiflexion/plantar flexion, EHL/FHL  -sensation intact L3-S1  -2+ PT pulse    Imaging  Imaging reviewed with the patient  Previous x-rays show significant degenerative changes, worse in the medial femoral compartment    Procedure  Large joint arthrocentesis: R knee  Date/Time: 10/2/2019 4:59 PM  Consent given by: patient  Site marked: site marked  Timeout: Immediately prior to procedure a time out was called to verify the correct patient, procedure, equipment, support staff and site/side marked as required   Supporting Documentation  Indications: pain   Procedure Details  Location: knee - R knee  Preparation: Patient was prepped and draped in the usual sterile fashion  Needle size: 22 G  Approach: anterolateral  Medications administered: 2 mL bupivacaine 0 25 %; 2 mL lidocaine 1 % (60mg toradol)    Patient tolerance: patient tolerated the procedure well with no immediate complications  Dressing:  Sterile dressing applied            Assessment/Plan:   59 y o male presenting for follow-up for right knee osteoarthritis  Patient was offered and administered a toradol injection to the knee for treatment of his symptoms  Patient continues to express interest in operative intervention for the right knee, but understands that he is not medically optimized at this point  Patient was counseled on weight management, as well as improving control of his diabetes  We will be happy to see the patient again in 3 months for repeat examination, and potential surgery scheduling once preop criteria are met

## 2019-10-08 ENCOUNTER — OFFICE VISIT (OUTPATIENT)
Dept: VASCULAR SURGERY | Facility: CLINIC | Age: 64
End: 2019-10-08
Payer: COMMERCIAL

## 2019-10-08 VITALS
HEART RATE: 84 BPM | HEIGHT: 66 IN | SYSTOLIC BLOOD PRESSURE: 126 MMHG | WEIGHT: 264 LBS | TEMPERATURE: 99.8 F | BODY MASS INDEX: 42.43 KG/M2 | DIASTOLIC BLOOD PRESSURE: 70 MMHG

## 2019-10-08 DIAGNOSIS — I87.2 CHRONIC VENOUS INSUFFICIENCY: Primary | ICD-10-CM

## 2019-10-08 DIAGNOSIS — R60.0 BILATERAL LOWER EXTREMITY EDEMA: ICD-10-CM

## 2019-10-08 PROCEDURE — 99213 OFFICE O/P EST LOW 20 MIN: CPT | Performed by: NURSE PRACTITIONER

## 2019-10-08 NOTE — PATIENT INSTRUCTIONS
Continue compression on a daily basis, remove at night  Try to do 30 minutes of physical activity a day  Do take time to sit down and elevate your legs periodically  Use moisturizers daily to maintain skin integrity  Weight loss  Follow up with me in 6 months to recheck your legs  Varicose Veins   AMBULATORY CARE:   Varicose veins  are veins that become large, twisted, and swollen  They are common on the back of the calves, knees, and thighs  Varicose veins are caused by valves in your veins that do not work properly  This causes blood to collect and increase pressure in the veins of your legs  The increased pressure causes your veins to stretch, get larger, swell, and twist        Common symptoms include the following: Your symptoms may be worse after you stand or sit for long periods of time  You may have any of the following:  · Blue, purple, or bulging veins in your legs     · Pain, swelling, or muscle cramps in your legs    · Feeling of fatigue or heaviness in your legs    · Cramping in your legs  Seek care immediately if:   · You have a wound that does not heal or is infected  · You have an injury that has broken your skin and caused your varicose veins to bleed  · Your leg is swollen and hard  · You notice that your legs or feet are turning blue or black  · Your leg feels warm, tender, and painful  It may look swollen and red  Contact your healthcare provider if:   · You have pain in your leg that does not go away or gets worse  · You notice sudden large bruising on your legs  · You have a rash on your leg  · Your symptoms keep you from doing your daily activities  · You have questions or concerns about your condition or care  Treatment of varicose veins  aims to decrease symptoms, improve appearance, and prevent further problems  Treatment will depend on which veins are affected and how severe your condition is  You may need procedures to treat or remove your varicose veins  For example, your healthcare provider may inject a solution or use a laser to close the varicose veins  Surgery to remove long veins may also be done  Ask your healthcare provider for more information about procedures used to treat varicose veins  Manage varicose veins:   · Do not sit or stand for long periods of time  This can cause the blood to collect in your legs and make your symptoms worse  Bend or rotate your ankles several times every hour  Walk around for a few minutes every hour to get blood moving in your legs  · Do not cross your legs when you sit  This decreases blood flow to your feet and can make your symptoms worse  · Do not wear tight clothing or shoes  Do not wear high-heeled shoes  Do not wear clothes that are tight around the waist or knees  · Maintain a healthy weight  Being overweight or obese can make your varicose veins worse  Ask your healthcare provider how much you should weigh  Ask him or her to help you create a weight loss plan if you are overweight  · Wear pressure stockings as directed  The stockings are tight and put pressure on your legs  They improve blood flow and help prevent clots  · Elevate your legs  Keep them above the level of your heart for 15 to 30 minutes several times a day  You can also prop the end of your bed up slightly to elevate your legs while you sleep  This will help blood to flow back to your heart  · Get regular exercise  Talk to your healthcare provider about the best exercise plan for you  Exercise can improve blood flow to your legs and feet  Follow up with your healthcare provider as directed:  Write down your questions so you remember to ask them during your visits  © 2017 [employer] Information is for End User's use only and may not be sold, redistributed or otherwise used for commercial purposes   All illustrations and images included in CareNotes® are the copyrighted property of [employer]  or [employer]  The above information is an  only  It is not intended as medical advice for individual conditions or treatments  Talk to your doctor, nurse or pharmacist before following any medical regimen to see if it is safe and effective for you

## 2019-10-08 NOTE — PROGRESS NOTES
Assessment/Plan:  54-year-old obese male with DM, lumbar stenosis, DDD,OA bilateral knee s/p L TKR, chronic bilateral lower extremity swelling/venous insufficiency who returns to the office for 4 month follow-up  -He is consistently using compression and notes improvement in lower extremity swelling and discomfort  -Continue compression  -Weight loss would be beneficial  -Regular exercise  Recommended 30 minutes of exercise daily  -Periodic leg elevation  -Moisturizers to maintain skin integrity  -Follow up in the office with me in 6 months to recheck his legs         Problem List Items Addressed This Visit        Cardiovascular and Mediastinum    Chronic venous insufficiency - Primary       Other    Bilateral lower extremity edema            Subjective:      Patient ID: June Alcala is a 59 y o  male  Patient returns today to re-evaluate leg swelling w/ use of compression  He states that he has been wearing compression regularly which has significantly improved his symptoms of leg pain and swelling  HPI  54-year-old obese male with DM, lumbar stenosis, DDD,OA bilateral knee s/p L TKR, chronic bilateral lower extremity swelling/venous insufficiency who returns to the office for 4 month follow-up  Patient has been wearing compression stockings consistently and notes large improvement in lower extremity swelling and discomfort  He is using moisturizers daily  He is attempting to go to the gym 3 times per week and does bicycling or aqua aerobics  No issues with ulcerations or weeping  There were several occasions where he fell asleep in his compression socks in the dog into the back of his calf  Counseled on removing compression at night    He is also attempting to wean off of narcotics with the help of his prescribing doctor  The following portions of the patient's history were reviewed and updated as appropriate: allergies, current medications, past family history, past medical history, past social history, past surgical history and problem list   Review of systems     Review of Systems   Constitutional: Positive for activity change and fatigue  HENT: Negative  Eyes: Negative  Respiratory: Positive for apnea  Cardiovascular: Positive for leg swelling  Painful veins   Gastrointestinal: Positive for abdominal pain, constipation and diarrhea  Endocrine: Negative  Genitourinary: Negative  Musculoskeletal: Positive for back pain, gait problem, joint swelling and neck stiffness  Leg pain  Leg cramping with walking   Skin: Positive for color change  Allergic/Immunologic: Negative  Hematological: Negative  Psychiatric/Behavioral: Positive for confusion and hallucinations  The patient is nervous/anxious  Objective:    I have reviewed and made appropriate changes to the review of systems input by the medical assistant      Vitals:    10/08/19 1410   BP: 126/70   BP Location: Right arm   Patient Position: Sitting   Pulse: 84   Temp: 99 8 °F (37 7 °C)   TempSrc: Tympanic   Weight: 120 kg (264 lb)   Height: 5' 6" (1 676 m)       Patient Active Problem List   Diagnosis    S/P total knee replacement    Diabetes type 2, uncontrolled ([address] )    HTN (hypertension)    GERD (gastroesophageal reflux disease)    Anxiety    Sleep apnea    Acute blood loss anemia    Low back pain with sciatica    DDD (degenerative disc disease), lumbar    Spinal stenosis, lumbar    Myoclonus    Chronic venous insufficiency    Bilateral lower extremity edema    BMI 40 0-44 9, adult (HCC)    Primary osteoarthritis of right knee    Chronic pain of right knee       Past Surgical History:   Procedure Laterality Date    APPENDECTOMY      CHOLECYSTECTOMY      KNEE ARTHROSCOPY      [place] TOTAL KNEE ARTHROPLASTY Left 7/13/2016    Procedure: TOTAL  KNEE REPLACEMENT ;  Surgeon: Alvaro Lombardi MD;  Location: [facility];  Service: Orthopedics       Family History   Problem Relation Age of Onset    Diabetes Mother     Diabetes Father     Cancer Father        Social History     Socioeconomic History    Marital status: /Civil Union     Spouse name: Not on file    Number of children: Not on file    Years of education: BS    Highest education level: Not on file   Occupational History    Occupation: Attendance officer   Social Needs    Financial resource strain: Not on file    Food insecurity:     Worry: Not on file     Inability: Not on file    Transportation needs:     Medical: Not on file     Non-medical: Not on file   Tobacco Use    Smoking status: Former Smoker     Last attempt to quit:      Years since quittin 7    Smokeless tobacco: Never Used   Substance and Sexual Activity    Alcohol use: No    Drug use: No    Sexual activity: Not on file   Lifestyle    Physical activity:     Days per week: Not on file     Minutes per session: Not on file    Stress: Not on file   Relationships    Social connections:     Talks on phone: Not on file     Gets together: Not on file     Attends Jewish service: Not on file     Active member of club or organization: Not on file     Attends meetings of clubs or organizations: Not on file     Relationship status: Not on file    Intimate partner violence:     Fear of current or ex partner: Not on file     Emotionally abused: Not on file     Physically abused: Not on file     Forced sexual activity: Not on file   Other Topics Concern    Not on file   Social History Narrative    Not on file       No Known Allergies      Current Outpatient Medications:     albuterol (PROVENTIL HFA,VENTOLIN HFA) 90 mcg/act inhaler, Inhale 2 puffs every 6 (six) hours as needed for wheezing , Disp: , Rfl:     aspirin (ECOTRIN LOW STRENGTH) 81 mg EC tablet, Take 81 mg by mouth daily, Disp: , Rfl:     budesonide (RINOCORT AQUA) 32 MCG/ACT nasal spray, 1 spray into each nostril 2 (two) times a day   , Disp: , Rfl:     cloNIDine (CATAPRES) 0 1 mg tablet, Take 0 1 mg by mouth 3 (three) times a day , Disp: , Rfl:     Dulaglutide (TRULICITY) 9 90 PN/5 3JG SOPN, Inject 0 75 mg under the skin once a week, Disp: , Rfl:     ergocalciferol (VITAMIN D2) 50,000 units, , Disp: , Rfl:     fluticasone (FLOVENT HFA) 220 mcg/act inhaler, Inhale 1 puff daily as needed  , Disp: , Rfl:     furosemide (LASIX) 40 mg tablet, Take 40 mg by mouth 2 (two) times a day as needed  , Disp: , Rfl:     HYDROcodone-acetaminophen (NORCO)  mg per tablet, , Disp: , Rfl:     lisinopril-hydrochlorothiazide (PRINZIDE,ZESTORETIC) 10-12 5 MG per tablet, Take 1 tablet by mouth 2 (two) times a day, Disp: , Rfl:     LORazepam (ATIVAN) 0 5 mg tablet, Take 0 5 mg by mouth daily at bedtime  , Disp: , Rfl:     metFORMIN (GLUCOPHAGE-XR) 750 mg 24 hr tablet, Take 750 mg by mouth 2 (two) times a day   , Disp: , Rfl:     OXYCONTIN 40 MG 12 hr tablet, , Disp: , Rfl:     OXYCONTIN 60 MG 12 hr tablet, , Disp: , Rfl:     pantoprazole (PROTONIX) 40 mg tablet, Take 40 mg by mouth daily  , Disp: , Rfl:     potassium chloride (K-DUR,KLOR-CON) 10 mEq tablet, , Disp: , Rfl:     testosterone (ANDROGEL) 1%, Place 50 mg on the skin, Disp: , Rfl:     urea (CARMOL) 40 %, , Disp: , Rfl:     cephalexin (KEFLEX) 500 mg capsule, , Disp: , Rfl:     NARCAN 4 MG/0 1ML LIQD, , Disp: , Rfl:     /70 (BP Location: Right arm, Patient Position: Sitting)   Pulse 84   Temp 99 8 °F (37 7 °C) (Tympanic)   Ht 5' 6" (1 676 m)   Wt 120 kg (264 lb)   BMI 42 61 kg/m²          Physical Exam   Constitutional: He is oriented to person, place, and time  He appears well-developed and well-nourished  HENT:   Head: Normocephalic and atraumatic  Eyes: EOM are normal    Neck: Neck supple  Cardiovascular: Normal heart sounds  Pulses:       Femoral pulses are 2+ on the right side, and 2+ on the left side  Dorsalis pedis pulses are 0 on the right side, and 0 on the left side          Posterior tibial pulses are 0 on the right side, and 0 on the left side  Pulmonary/Chest: Effort normal and breath sounds normal    Abdominal: Soft  Bowel sounds are normal    Abdomen is obese   Musculoskeletal: Normal range of motion  He exhibits edema  1+ BLE edema    Neurological: He is alert and oriented to person, place, and time  Skin: Skin is warm  Skin is dry with chronic venous insufficiency and mild lipodermatosclerosis   Psychiatric: His behavior is normal    Nursing note and vitals reviewed

## 2019-10-08 NOTE — LETTER
October 8, 2019     [address]  [address]  [address] [address]    Patient: Jayda Luz   YOB: 1955   Date of Visit: 10/8/2019       Dear Dr Tre Martinez: Thank you for referring Jayda Luz to me for evaluation  Below are my notes for this consultation  If you have questions, please do not hesitate to call me  I look forward to following your patient along with you  Sincerely,        DAYSI León        CC: No Recipients  DAYSI León  10/8/2019  2:56 PM  Sign at close encounter  Assessment/Plan:  69-year-old obese male with DM, lumbar stenosis, DDD,OA bilateral knee s/p L TKR, chronic bilateral lower extremity swelling/venous insufficiency who returns to the office for 4 month follow-up  -He is consistently using compression and notes improvement in lower extremity swelling and discomfort  -Continue compression  -Weight loss would be beneficial  -Regular exercise  Recommended 30 minutes of exercise daily  -Periodic leg elevation  -Moisturizers to maintain skin integrity  -Follow up in the office with me in 6 months to recheck his legs         Problem List Items Addressed This Visit        Cardiovascular and Mediastinum    Chronic venous insufficiency - Primary       Other    Bilateral lower extremity edema            Subjective:      Patient ID: Jayda Luz is a 59 y o  male  Patient returns today to re-evaluate leg swelling w/ use of compression  He states that he has been wearing compression regularly which has significantly improved his symptoms of leg pain and swelling  HPI  69-year-old obese male with DM, lumbar stenosis, DDD,OA bilateral knee s/p L TKR, chronic bilateral lower extremity swelling/venous insufficiency who returns to the office for 4 month follow-up  Patient has been wearing compression stockings consistently and notes large improvement in lower extremity swelling and discomfort    He is using moisturizers daily  He is attempting to go to the gym 3 times per week and does bicycling or aqua aerobics  No issues with ulcerations or weeping  There were several occasions where he fell asleep in his compression socks in the dog into the back of his calf  Counseled on removing compression at night  He is also attempting to wean off of narcotics with the help of his prescribing doctor  The following portions of the patient's history were reviewed and updated as appropriate: allergies, current medications, past family history, past medical history, past social history, past surgical history and problem list   Review of systems     Review of Systems   Constitutional: Positive for activity change and fatigue  HENT: Negative  Eyes: Negative  Respiratory: Positive for apnea  Cardiovascular: Positive for leg swelling  Painful veins   Gastrointestinal: Positive for abdominal pain, constipation and diarrhea  Endocrine: Negative  Genitourinary: Negative  Musculoskeletal: Positive for back pain, gait problem, joint swelling and neck stiffness  Leg pain  Leg cramping with walking   Skin: Positive for color change  Allergic/Immunologic: Negative  Hematological: Negative  Psychiatric/Behavioral: Positive for confusion and hallucinations  The patient is nervous/anxious  Objective:    I have reviewed and made appropriate changes to the review of systems input by the medical assistant      Vitals:    10/08/19 1410   BP: 126/70   BP Location: Right arm   Patient Position: Sitting   Pulse: 84   Temp: 99 8 °F (37 7 °C)   TempSrc: Tympanic   Weight: 120 kg (264 lb)   Height: 5' 6" (1 676 m)       Patient Active Problem List   Diagnosis    S/P total knee replacement    Diabetes type 2, uncontrolled ([address] )    HTN (hypertension)    GERD (gastroesophageal reflux disease)    Anxiety    Sleep apnea    Acute blood loss anemia    Low back pain with sciatica    DDD (degenerative disc disease), lumbar    Spinal stenosis, lumbar    Myoclonus    Chronic venous insufficiency    Bilateral lower extremity edema    BMI 40 0-44 9, adult (HCC)    Primary osteoarthritis of right knee    Chronic pain of right knee       Past Surgical History:   Procedure Laterality Date    APPENDECTOMY      CHOLECYSTECTOMY      KNEE ARTHROSCOPY      OK TOTAL KNEE ARTHROPLASTY Left 2016    Procedure: TOTAL  KNEE REPLACEMENT ;  Surgeon: Tu Gallegos MD;  [facility];  Service: Orthopedics       Family History   Problem Relation Age of Onset    Diabetes Mother     Diabetes Father     Cancer Father        Social History     Socioeconomic History    Marital status: /Civil Union     Spouse name: Not on file    Number of children: Not on file    Years of education: BS    Highest education level: Not on file   Occupational History    Occupation: Attendance officer   Social Needs    Financial resource strain: Not on file    Food insecurity:     Worry: Not on file     Inability: Not on file    Transportation needs:     Medical: Not on file     Non-medical: Not on file   Tobacco Use    Smoking status: Former Smoker     Last attempt to quit:      Years since quittin 7    Smokeless tobacco: Never Used   Substance and Sexual Activity    Alcohol use: No    Drug use: No    Sexual activity: Not on file   Lifestyle    Physical activity:     Days per week: Not on file     Minutes per session: Not on file    Stress: Not on file   Relationships    Social connections:     Talks on phone: Not on file     Gets together: Not on file     Attends Evangelical service: Not on file     Active member of club or organization: Not on file     Attends meetings of clubs or organizations: Not on file     Relationship status: Not on file    Intimate partner violence:     Fear of current or ex partner: Not on file     Emotionally abused: Not on file     Physically abused: Not on file     Forced sexual activity: Not on file   Other Topics Concern    Not on file   Social History Narrative    Not on file       No Known Allergies      Current Outpatient Medications:     albuterol (PROVENTIL HFA,VENTOLIN HFA) 90 mcg/act inhaler, Inhale 2 puffs every 6 (six) hours as needed for wheezing , Disp: , Rfl:     aspirin (ECOTRIN LOW STRENGTH) 81 mg EC tablet, Take 81 mg by mouth daily, Disp: , Rfl:     budesonide (RINOCORT AQUA) 32 MCG/ACT nasal spray, 1 spray into each nostril 2 (two) times a day   , Disp: , Rfl:     cloNIDine (CATAPRES) 0 1 mg tablet, Take 0 1 mg by mouth 3 (three) times a day , Disp: , Rfl:     Dulaglutide (TRULICITY) 7 12 YX/4 0GV SOPN, Inject 0 75 mg under the skin once a week, Disp: , Rfl:     ergocalciferol (VITAMIN D2) 50,000 units, , Disp: , Rfl:     fluticasone (FLOVENT HFA) 220 mcg/act inhaler, Inhale 1 puff daily as needed  , Disp: , Rfl:     furosemide (LASIX) 40 mg tablet, Take 40 mg by mouth 2 (two) times a day as needed  , Disp: , Rfl:     HYDROcodone-acetaminophen (NORCO)  mg per tablet, , Disp: , Rfl:     lisinopril-hydrochlorothiazide (PRINZIDE,ZESTORETIC) 10-12 5 MG per tablet, Take 1 tablet by mouth 2 (two) times a day, Disp: , Rfl:     LORazepam (ATIVAN) 0 5 mg tablet, Take 0 5 mg by mouth daily at bedtime  , Disp: , Rfl:     metFORMIN (GLUCOPHAGE-XR) 750 mg 24 hr tablet, Take 750 mg by mouth 2 (two) times a day   , Disp: , Rfl:     OXYCONTIN 40 MG 12 hr tablet, , Disp: , Rfl:     OXYCONTIN 60 MG 12 hr tablet, , Disp: , Rfl:     pantoprazole (PROTONIX) 40 mg tablet, Take 40 mg by mouth daily  , Disp: , Rfl:     potassium chloride (K-DUR,KLOR-CON) 10 mEq tablet, , Disp: , Rfl:     testosterone (ANDROGEL) 1%, Place 50 mg on the skin, Disp: , Rfl:     urea (CARMOL) 40 %, , Disp: , Rfl:     cephalexin (KEFLEX) 500 mg capsule, , Disp: , Rfl:     NARCAN 4 MG/0 1ML LIQD, , Disp: , Rfl:     /70 (BP Location: Right arm, Patient Position: Sitting)   Pulse 84   Temp 99 8 °F (37 7 °C) (Tympanic)   Ht 5' 6" (1 676 m)   Wt 120 kg (264 lb)   BMI 42 61 kg/m²           Physical Exam   Constitutional: He is oriented to person, place, and time  He appears well-developed and well-nourished  HENT:   Head: Normocephalic and atraumatic  Eyes: EOM are normal    Neck: Neck supple  Cardiovascular: Normal heart sounds  Pulses:       Femoral pulses are 2+ on the right side, and 2+ on the left side  Dorsalis pedis pulses are 0 on the right side, and 0 on the left side  Posterior tibial pulses are 0 on the right side, and 0 on the left side  Pulmonary/Chest: Effort normal and breath sounds normal    Abdominal: Soft  Bowel sounds are normal    Abdomen is obese   Musculoskeletal: Normal range of motion  He exhibits edema  1+ BLE edema    Neurological: He is alert and oriented to person, place, and time  Skin: Skin is warm  Skin is dry with chronic venous insufficiency and mild lipodermatosclerosis   Psychiatric: His behavior is normal    Nursing note and vitals reviewed

## 2020-02-01 ENCOUNTER — TRANSCRIBE ORDERS (OUTPATIENT)
Dept: LAB | Facility: HOSPITAL | Age: 65
End: 2020-02-01

## 2020-02-01 ENCOUNTER — APPOINTMENT (OUTPATIENT)
Dept: LAB | Facility: HOSPITAL | Age: 65
End: 2020-02-01
Payer: COMMERCIAL

## 2020-02-01 DIAGNOSIS — E29.1 3-OXO-5 ALPHA-STEROID DELTA 4-DEHYDROGENASE DEFICIENCY: Primary | ICD-10-CM

## 2020-02-01 DIAGNOSIS — E29.1 3-OXO-5 ALPHA-STEROID DELTA 4-DEHYDROGENASE DEFICIENCY: ICD-10-CM

## 2020-02-01 LAB
HCT VFR BLD AUTO: 42.8 % (ref 36.5–49.3)
HGB BLD-MCNC: 12.8 G/DL (ref 12–17)
TESTOST SERPL-MCNC: 191 NG/DL (ref 95–948)

## 2020-02-01 PROCEDURE — 84403 ASSAY OF TOTAL TESTOSTERONE: CPT

## 2020-02-01 PROCEDURE — 36415 COLL VENOUS BLD VENIPUNCTURE: CPT

## 2020-02-01 PROCEDURE — 85018 HEMOGLOBIN: CPT

## 2020-02-01 PROCEDURE — 85014 HEMATOCRIT: CPT

## 2020-03-14 ENCOUNTER — APPOINTMENT (OUTPATIENT)
Dept: LAB | Facility: HOSPITAL | Age: 65
End: 2020-03-14
Payer: COMMERCIAL

## 2020-03-14 ENCOUNTER — TRANSCRIBE ORDERS (OUTPATIENT)
Dept: LAB | Facility: HOSPITAL | Age: 65
End: 2020-03-14

## 2020-03-14 DIAGNOSIS — E29.1 HYPOGONADISM IN MALE: Primary | ICD-10-CM

## 2020-03-14 DIAGNOSIS — E29.1 HYPOGONADISM IN MALE: ICD-10-CM

## 2020-03-14 LAB
HCT VFR BLD AUTO: 42.5 % (ref 36.5–49.3)
HGB BLD-MCNC: 12.7 G/DL (ref 12–17)
TESTOST SERPL-MCNC: 296 NG/DL (ref 95–948)

## 2020-03-14 PROCEDURE — 84403 ASSAY OF TOTAL TESTOSTERONE: CPT

## 2020-03-14 PROCEDURE — 85014 HEMATOCRIT: CPT

## 2020-03-14 PROCEDURE — 36415 COLL VENOUS BLD VENIPUNCTURE: CPT

## 2020-03-14 PROCEDURE — 85018 HEMOGLOBIN: CPT

## 2020-03-24 ENCOUNTER — OFFICE VISIT (OUTPATIENT)
Dept: NEUROLOGY | Facility: CLINIC | Age: 65
End: 2020-03-24
Payer: COMMERCIAL

## 2020-03-24 VITALS
HEART RATE: 88 BPM | SYSTOLIC BLOOD PRESSURE: 131 MMHG | DIASTOLIC BLOOD PRESSURE: 65 MMHG | WEIGHT: 272 LBS | HEIGHT: 66 IN | BODY MASS INDEX: 43.71 KG/M2

## 2020-03-24 DIAGNOSIS — G25.0 TREMOR, ESSENTIAL: ICD-10-CM

## 2020-03-24 DIAGNOSIS — R41.3 MEMORY LOSS: ICD-10-CM

## 2020-03-24 DIAGNOSIS — G47.30 SLEEP APNEA, UNSPECIFIED TYPE: ICD-10-CM

## 2020-03-24 DIAGNOSIS — R53.82 CHRONIC FATIGUE: ICD-10-CM

## 2020-03-24 DIAGNOSIS — G25.3 MYOCLONUS: Primary | ICD-10-CM

## 2020-03-24 PROCEDURE — 99215 OFFICE O/P EST HI 40 MIN: CPT | Performed by: PSYCHIATRY & NEUROLOGY

## 2020-03-24 NOTE — PROGRESS NOTES
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 but does report this as he did last office visit in 2018  His EEG then routine did not show any epileptiform discharges but did show diffuse slowing and FIRDA  I did recommend a 24 hour EEG but patient states the flashing lights and hyperventilation made him uncomfortable and did not schedule this  I discussed with him the importance of this including trying to identify the etiology of 1st myoclonic jerks if these are epileptic versus metabolic in etiology with his chronic kidney disease  Patient agreeable  -     EEG awake or drowsy routine; Future--pending this if needed will order 24 hour EEG   -     MRI brain with and without contrast; Future  -     BUN; Future  -     Creatinine, serum; Future    Memory loss-- overt short-term memory, history not consistent with dementia  -     MRI brain with and without contrast; Future  -     BUN; Future  -     Creatinine, serum; Future    Tremor, essential--worsening, right greater than left  Patient deferred medication trial at this time  If he is interested in the future with try low-dose primidone at nighttime  mg   -     BUN; Future  -     Creatinine, serum; Future    Sleep apnea, unspecified type-- discussed importance of talking to his sleep doctor to make sure that he find a mask that he likes as he is amenable to trying a CPAP device to further help his memory and reduce his vascular risk factors    Chronic fatigue  - treating his underlying sleep apnea will likely help this as well       Discussed above at length and patient agreeable with the above treatment plan  Will follow up with us in 4-6 months time    If above testing abnormal and/or patient with new neurologic symptoms he was encouraged to call us and will see him with sooner  total face-to-face time with the patient at least 45 minutes and that greater than 50% of that time was spent counseling or coordinating care as detailed above including reviewing chart blood work other pertinent notes with patient as requested    Subjective:    HPI       Mr Angela Cota is a pleasant 58 yo male seen in follow up with myoclonic jerking intermittent possible metabolic etiology-- renal dysfunction  Pt does report that he has jerking movements in his sleep also  States his wife notes it  Often when going to sleep early on but at times wakes him from deep sleep  States not waking up confused or biting tongue or bedwetting  EEG with no epileptiform discharges but did have periods of bifrontal intermittent rhythmic delta activity  Does have sleep apnea and states only rarely uses it due to the discomfort of it  States is looking into seeing a different sleep doctor and better machine that he can tolerate  Also reports tremors with shaking while eating/drinking  States saw hematology as recommended given abnormal SPEP and they were not concerned  Saw [facility] per him- I do not see the notes  States he will try to have the notes faxed over to us  Mom with seizures s/p head trauma  Brother with tremor  Denies other memory loss or focal neurologic deficits or headaches            The following portions of the patient's history were reviewed and updated as appropriate: allergies, current medications, past family history, past medical history, past social history, past surgical history and problem list and ROS         Objective:    Blood pressure 131/65, pulse 88, height 5' 6" (1 676 m), weight 123 kg (272 lb)  Physical Exam   Constitutional: He appears well-developed and well-nourished  HENT:   Head: Normocephalic and atraumatic  Eyes: Pupils are equal, round, and reactive to light  Conjunctivae are normal    Neck: Normal range of motion  Neck supple     Pulmonary/Chest: Effort normal    Musculoskeletal: Normal range of motion  Neurological: He is alert  He has normal strength  Reflex Scores:       Bicep reflexes are 2+ on the right side and 2+ on the left side  Patellar reflexes are 2+ on the right side and 2+ on the left side  Nursing note and vitals reviewed  Neurological Exam  Mental Status  Alert  Oriented to person, place, time and situation  Recalls 3 of 3 objects immediately  At 15 minutes recalls 2 of 3 elements  no dysarthria present  Language is fluent with no aphasia  Able to perform serial calculations  Cranial Nerves  CN II: Visual fields full to confrontation  CN III, IV, VI: Extraocular movements intact bilaterally  Pupils equal round and reactive to light bilaterally  CN V: Facial sensation is normal   CN VII: Full and symmetric facial movement  CN VIII: Hearing is normal   CN IX, X: Palate elevates symmetrically  Normal gag reflex  CN XI: Shoulder shrug strength is normal   CN XII: Tongue midline without atrophy or fasciculations  Motor   Normal muscle tone  The following abnormal movements were seen: Strength is 5/5 throughout all four extremities  Intention tremor worse right ue  Sensory  Light touch is normal in upper and lower extremities  No right-sided hemispatial neglect  No left-sided hemispatial neglect  Reflexes                                           Right                      Left  Biceps                                 2+                         2+  Patellar                                2+                         2+    Coordination  Right: Finger-to-nose normal   Left: Finger-to-nose normal     Gait  Casual gait: Wide stance  Reduced stride length  Uses cane as needed  ROS:    Review of Systems   Constitutional: Negative  Negative for appetite change and fever  HENT: Negative  Negative for hearing loss, tinnitus, trouble swallowing and voice change  Eyes: Negative  Negative for photophobia and pain  Respiratory: Negative  Negative for shortness of breath  Cardiovascular: Negative  Negative for palpitations  Gastrointestinal: Negative  Negative for nausea and vomiting  Endocrine: Negative  Negative for cold intolerance  Genitourinary: Negative  Negative for dysuria, frequency and urgency  Musculoskeletal: Negative  Negative for myalgias and neck pain  Skin: Negative  Negative for rash  Neurological: Positive for tremors (in both hands)  Negative for dizziness, seizures, syncope, facial asymmetry, speech difficulty, weakness, light-headedness, numbness and headaches  Hematological: Negative  Does not bruise/bleed easily  Psychiatric/Behavioral: Negative  Negative for confusion, hallucinations and sleep disturbance (not well)

## 2020-03-24 NOTE — PATIENT INSTRUCTIONS
Schedule MRI in 4 months  Schedule routine EEG in 2 hours  Please address your sleep apnea  Exercise daily at least 30 minutes a day, 5 days a week

## 2020-04-06 ENCOUNTER — TELEPHONE (OUTPATIENT)
Dept: RADIOLOGY | Facility: HOSPITAL | Age: 65
End: 2020-04-06

## 2020-04-14 ENCOUNTER — TELEPHONE (OUTPATIENT)
Dept: ADMINISTRATIVE | Facility: HOSPITAL | Age: 65
End: 2020-04-14

## 2020-04-27 ENCOUNTER — TRANSCRIBE ORDERS (OUTPATIENT)
Dept: ADMINISTRATIVE | Age: 65
End: 2020-04-27

## 2020-04-27 ENCOUNTER — APPOINTMENT (OUTPATIENT)
Dept: LAB | Age: 65
End: 2020-04-27
Payer: COMMERCIAL

## 2020-04-27 DIAGNOSIS — I10 ESSENTIAL HYPERTENSION: ICD-10-CM

## 2020-04-27 DIAGNOSIS — E55.9 VITAMIN D DEFICIENCY: ICD-10-CM

## 2020-04-27 DIAGNOSIS — E11.69 TYPE 2 DIABETES MELLITUS WITH OTHER SPECIFIED COMPLICATION, UNSPECIFIED WHETHER LONG TERM INSULIN USE (HCC): Primary | ICD-10-CM

## 2020-04-27 DIAGNOSIS — E11.69 TYPE 2 DIABETES MELLITUS WITH OTHER SPECIFIED COMPLICATION, UNSPECIFIED WHETHER LONG TERM INSULIN USE (HCC): ICD-10-CM

## 2020-04-27 LAB
ALBUMIN SERPL BCP-MCNC: 3.7 G/DL (ref 3.5–5)
ALP SERPL-CCNC: 100 U/L (ref 46–116)
ALT SERPL W P-5'-P-CCNC: 24 U/L (ref 12–78)
ANION GAP SERPL CALCULATED.3IONS-SCNC: 7 MMOL/L (ref 4–13)
AST SERPL W P-5'-P-CCNC: 17 U/L (ref 5–45)
BACTERIA UR QL AUTO: ABNORMAL /HPF
BILIRUB SERPL-MCNC: 0.68 MG/DL (ref 0.2–1)
BILIRUB UR QL STRIP: ABNORMAL
BUN SERPL-MCNC: 20 MG/DL (ref 5–25)
CALCIUM SERPL-MCNC: 9.1 MG/DL (ref 8.3–10.1)
CAOX CRY URNS QL MICRO: ABNORMAL /HPF
CHLORIDE SERPL-SCNC: 102 MMOL/L (ref 100–108)
CLARITY UR: CLEAR
CO2 SERPL-SCNC: 30 MMOL/L (ref 21–32)
COLOR UR: YELLOW
CREAT SERPL-MCNC: 1.23 MG/DL (ref 0.6–1.3)
CREAT UR-MCNC: 197 MG/DL
EST. AVERAGE GLUCOSE BLD GHB EST-MCNC: 151 MG/DL
GFR SERPL CREATININE-BSD FRML MDRD: 62 ML/MIN/1.73SQ M
GLUCOSE SERPL-MCNC: 162 MG/DL (ref 65–140)
GLUCOSE UR STRIP-MCNC: ABNORMAL MG/DL
HBA1C MFR BLD: 6.9 %
HGB UR QL STRIP.AUTO: NEGATIVE
HYALINE CASTS #/AREA URNS LPF: ABNORMAL /LPF
KETONES UR STRIP-MCNC: ABNORMAL MG/DL
LEUKOCYTE ESTERASE UR QL STRIP: NEGATIVE
MICROALBUMIN UR-MCNC: 36.2 MG/L (ref 0–20)
MICROALBUMIN/CREAT 24H UR: 18 MG/G CREATININE (ref 0–30)
NITRITE UR QL STRIP: NEGATIVE
NON-SQ EPI CELLS URNS QL MICRO: ABNORMAL /HPF
PH UR STRIP.AUTO: 5.5 [PH]
POTASSIUM SERPL-SCNC: 4.8 MMOL/L (ref 3.5–5.3)
PROT SERPL-MCNC: 8.3 G/DL (ref 6.4–8.2)
PROT UR STRIP-MCNC: NEGATIVE MG/DL
RBC #/AREA URNS AUTO: ABNORMAL /HPF
SODIUM SERPL-SCNC: 139 MMOL/L (ref 136–145)
SP GR UR STRIP.AUTO: 1.02 (ref 1–1.03)
UROBILINOGEN UR QL STRIP.AUTO: 0.2 E.U./DL
WBC #/AREA URNS AUTO: ABNORMAL /HPF

## 2020-04-27 PROCEDURE — 82043 UR ALBUMIN QUANTITATIVE: CPT | Performed by: INTERNAL MEDICINE

## 2020-04-27 PROCEDURE — 81001 URINALYSIS AUTO W/SCOPE: CPT | Performed by: INTERNAL MEDICINE

## 2020-04-27 PROCEDURE — 80053 COMPREHEN METABOLIC PANEL: CPT

## 2020-04-27 PROCEDURE — 82306 VITAMIN D 25 HYDROXY: CPT

## 2020-04-27 PROCEDURE — 82570 ASSAY OF URINE CREATININE: CPT | Performed by: INTERNAL MEDICINE

## 2020-04-27 PROCEDURE — 83036 HEMOGLOBIN GLYCOSYLATED A1C: CPT

## 2020-04-27 PROCEDURE — 36415 COLL VENOUS BLD VENIPUNCTURE: CPT

## 2020-05-01 LAB
25(OH)D2 SERPL-MCNC: 45 NG/ML
25(OH)D3 SERPL-MCNC: 4.3 NG/ML
25(OH)D3+25(OH)D2 SERPL-MCNC: 49 NG/ML

## 2020-05-04 ENCOUNTER — OFFICE VISIT (OUTPATIENT)
Dept: PODIATRY | Facility: CLINIC | Age: 65
End: 2020-05-04
Payer: MEDICARE

## 2020-05-04 VITALS — BODY MASS INDEX: 42.49 KG/M2 | WEIGHT: 264.4 LBS | HEIGHT: 66 IN

## 2020-05-04 DIAGNOSIS — E11.42 DIABETIC POLYNEUROPATHY ASSOCIATED WITH TYPE 2 DIABETES MELLITUS (HCC): Primary | ICD-10-CM

## 2020-05-04 PROCEDURE — 99203 OFFICE O/P NEW LOW 30 MIN: CPT | Performed by: PODIATRIST

## 2020-05-04 RX ORDER — DULOXETIN HYDROCHLORIDE 30 MG/1
30 CAPSULE, DELAYED RELEASE ORAL 2 TIMES DAILY
Qty: 60 CAPSULE | Refills: 0 | Status: SHIPPED | OUTPATIENT
Start: 2020-05-04 | End: 2020-06-03

## 2020-05-04 RX ORDER — TESTOSTERONE GEL, 1% 10 MG/G
100 GEL TRANSDERMAL
COMMUNITY
Start: 2020-02-24 | End: 2020-08-22

## 2020-06-01 ENCOUNTER — OFFICE VISIT (OUTPATIENT)
Dept: PODIATRY | Facility: CLINIC | Age: 65
End: 2020-06-01
Payer: MEDICARE

## 2020-06-01 VITALS
HEIGHT: 66 IN | DIASTOLIC BLOOD PRESSURE: 88 MMHG | BODY MASS INDEX: 41.33 KG/M2 | WEIGHT: 257.2 LBS | SYSTOLIC BLOOD PRESSURE: 140 MMHG | HEART RATE: 80 BPM

## 2020-06-01 DIAGNOSIS — E11.42 DIABETIC POLYNEUROPATHY ASSOCIATED WITH TYPE 2 DIABETES MELLITUS (HCC): Primary | ICD-10-CM

## 2020-06-01 PROCEDURE — 99213 OFFICE O/P EST LOW 20 MIN: CPT | Performed by: PODIATRIST

## 2020-06-01 RX ORDER — BUPRENORPHINE AND NALOXONE 8; 2 MG/1; MG/1
FILM, SOLUBLE BUCCAL; SUBLINGUAL
COMMUNITY
Start: 2020-04-15

## 2020-06-01 RX ORDER — DULOXETIN HYDROCHLORIDE 30 MG/1
30 CAPSULE, DELAYED RELEASE ORAL 2 TIMES DAILY
Qty: 180 CAPSULE | Refills: 0 | Status: SHIPPED | OUTPATIENT
Start: 2020-06-01 | End: 2020-12-14

## 2020-06-25 ENCOUNTER — HOSPITAL ENCOUNTER (OUTPATIENT)
Dept: RADIOLOGY | Facility: HOSPITAL | Age: 65
Discharge: HOME | End: 2020-06-25
Attending: PSYCHIATRY & NEUROLOGY
Payer: MEDICARE

## 2020-06-25 ENCOUNTER — TRANSCRIBE ORDERS (OUTPATIENT)
Dept: ADMINISTRATIVE | Facility: HOSPITAL | Age: 65
End: 2020-06-25

## 2020-06-25 DIAGNOSIS — R41.3 MEMORY LOSS: ICD-10-CM

## 2020-06-25 DIAGNOSIS — G25.3 MYOCLONUS: ICD-10-CM

## 2020-06-25 PROCEDURE — 70551 MRI BRAIN STEM W/O DYE: CPT

## 2020-06-29 ENCOUNTER — TELEPHONE (OUTPATIENT)
Dept: NEUROLOGY | Facility: CLINIC | Age: 65
End: 2020-06-29

## 2020-06-29 DIAGNOSIS — I67.9 SMALL VESSEL DISEASE, CEREBROVASCULAR: Primary | ICD-10-CM

## 2020-07-10 ENCOUNTER — HOSPITAL ENCOUNTER (OUTPATIENT)
Dept: RADIOLOGY | Facility: HOSPITAL | Age: 65
Discharge: HOME | End: 2020-07-10
Attending: PSYCHIATRY & NEUROLOGY
Payer: MEDICARE

## 2020-07-10 DIAGNOSIS — I67.9 SMALL VESSEL DISEASE, CEREBROVASCULAR: ICD-10-CM

## 2020-07-10 PROCEDURE — 70552 MRI BRAIN STEM W/DYE: CPT

## 2020-07-10 PROCEDURE — A9585 GADOBUTROL INJECTION: HCPCS | Performed by: PSYCHIATRY & NEUROLOGY

## 2020-07-10 RX ADMIN — GADOBUTROL 11 ML: 604.72 INJECTION INTRAVENOUS at 13:27

## 2020-10-28 ENCOUNTER — OFFICE VISIT (OUTPATIENT)
Dept: OBGYN CLINIC | Facility: HOSPITAL | Age: 65
End: 2020-10-28
Payer: MEDICARE

## 2020-10-28 VITALS
SYSTOLIC BLOOD PRESSURE: 161 MMHG | BODY MASS INDEX: 40.82 KG/M2 | HEIGHT: 66 IN | WEIGHT: 254 LBS | DIASTOLIC BLOOD PRESSURE: 77 MMHG | HEART RATE: 71 BPM

## 2020-10-28 DIAGNOSIS — M17.11 ARTHRITIS OF RIGHT KNEE: Primary | ICD-10-CM

## 2020-10-28 PROCEDURE — 20610 DRAIN/INJ JOINT/BURSA W/O US: CPT | Performed by: ORTHOPAEDIC SURGERY

## 2020-10-28 PROCEDURE — 99213 OFFICE O/P EST LOW 20 MIN: CPT | Performed by: ORTHOPAEDIC SURGERY

## 2020-10-28 RX ORDER — BUPIVACAINE HYDROCHLORIDE 2.5 MG/ML
2 INJECTION, SOLUTION INFILTRATION; PERINEURAL
Status: COMPLETED | OUTPATIENT
Start: 2020-10-28 | End: 2020-10-28

## 2020-10-28 RX ORDER — LIDOCAINE HYDROCHLORIDE 5 MG/ML
2 INJECTION, SOLUTION INFILTRATION; PERINEURAL
Status: COMPLETED | OUTPATIENT
Start: 2020-10-28 | End: 2020-10-28

## 2020-10-28 RX ORDER — BETAMETHASONE SODIUM PHOSPHATE AND BETAMETHASONE ACETATE 3; 3 MG/ML; MG/ML
6 INJECTION, SUSPENSION INTRA-ARTICULAR; INTRALESIONAL; INTRAMUSCULAR; SOFT TISSUE
Status: COMPLETED | OUTPATIENT
Start: 2020-10-28 | End: 2020-10-28

## 2020-10-28 RX ADMIN — LIDOCAINE HYDROCHLORIDE 2 ML: 5 INJECTION, SOLUTION INFILTRATION; PERINEURAL at 16:08

## 2020-10-28 RX ADMIN — BUPIVACAINE HYDROCHLORIDE 2 ML: 2.5 INJECTION, SOLUTION INFILTRATION; PERINEURAL at 16:08

## 2020-10-28 RX ADMIN — BETAMETHASONE SODIUM PHOSPHATE AND BETAMETHASONE ACETATE 6 MG: 3; 3 INJECTION, SUSPENSION INTRA-ARTICULAR; INTRALESIONAL; INTRAMUSCULAR; SOFT TISSUE at 16:08

## 2020-12-07 ENCOUNTER — TELEPHONE (OUTPATIENT)
Dept: NEUROLOGY | Facility: CLINIC | Age: 65
End: 2020-12-07

## 2020-12-09 ENCOUNTER — TELEPHONE (OUTPATIENT)
Dept: NEUROLOGY | Facility: CLINIC | Age: 65
End: 2020-12-09

## 2020-12-14 ENCOUNTER — TELEMEDICINE (OUTPATIENT)
Dept: NEUROLOGY | Facility: CLINIC | Age: 65
End: 2020-12-14
Payer: MEDICARE

## 2020-12-14 VITALS — BODY MASS INDEX: 40.98 KG/M2 | HEIGHT: 66 IN | WEIGHT: 255 LBS

## 2020-12-14 DIAGNOSIS — G31.84 MINIMAL COGNITIVE IMPAIRMENT: ICD-10-CM

## 2020-12-14 DIAGNOSIS — G47.52 RBD (REM BEHAVIORAL DISORDER): ICD-10-CM

## 2020-12-14 DIAGNOSIS — R25.1 TREMOR: ICD-10-CM

## 2020-12-14 DIAGNOSIS — G25.3 MYOCLONUS: Primary | ICD-10-CM

## 2020-12-14 DIAGNOSIS — E13.42 DIABETIC POLYNEUROPATHY ASSOCIATED WITH OTHER SPECIFIED DIABETES MELLITUS (HCC): ICD-10-CM

## 2020-12-14 DIAGNOSIS — G47.30 SLEEP APNEA, UNSPECIFIED TYPE: ICD-10-CM

## 2020-12-14 PROBLEM — E11.40 DIABETIC NEUROPATHY (HCC): Status: ACTIVE | Noted: 2020-12-14

## 2020-12-14 PROCEDURE — 99443 PR PHYS/QHP TELEPHONE EVALUATION 21-30 MIN: CPT | Performed by: PSYCHIATRY & NEUROLOGY

## 2020-12-22 ENCOUNTER — TRANSCRIBE ORDERS (OUTPATIENT)
Dept: LAB | Facility: HOSPITAL | Age: 65
End: 2020-12-22

## 2020-12-22 ENCOUNTER — LAB (OUTPATIENT)
Dept: LAB | Facility: HOSPITAL | Age: 65
End: 2020-12-22
Payer: MEDICARE

## 2020-12-22 DIAGNOSIS — N39.0 URINARY TRACT INFECTION WITHOUT HEMATURIA, SITE UNSPECIFIED: Primary | ICD-10-CM

## 2020-12-22 LAB
BACTERIA UR QL AUTO: ABNORMAL /HPF
BILIRUB UR QL STRIP: NEGATIVE
CLARITY UR: ABNORMAL
COLOR UR: YELLOW
GLUCOSE UR STRIP-MCNC: NEGATIVE MG/DL
HGB UR QL STRIP.AUTO: ABNORMAL
HYALINE CASTS #/AREA URNS LPF: ABNORMAL /LPF
KETONES UR STRIP-MCNC: NEGATIVE MG/DL
LEUKOCYTE ESTERASE UR QL STRIP: ABNORMAL
NITRITE UR QL STRIP: NEGATIVE
NON-SQ EPI CELLS URNS QL MICRO: ABNORMAL /HPF
PH UR STRIP.AUTO: 5.5 [PH]
PROT UR STRIP-MCNC: ABNORMAL MG/DL
RBC #/AREA URNS AUTO: ABNORMAL /HPF
SP GR UR STRIP.AUTO: 1.01 (ref 1–1.03)
UROBILINOGEN UR QL STRIP.AUTO: 1 E.U./DL
WBC #/AREA URNS AUTO: ABNORMAL /HPF

## 2020-12-22 PROCEDURE — 87186 SC STD MICRODIL/AGAR DIL: CPT

## 2020-12-22 PROCEDURE — 81001 URINALYSIS AUTO W/SCOPE: CPT

## 2020-12-22 PROCEDURE — 87086 URINE CULTURE/COLONY COUNT: CPT

## 2020-12-24 LAB — BACTERIA UR CULT: ABNORMAL

## 2021-01-25 ENCOUNTER — TELEPHONE (OUTPATIENT)
Dept: LAB | Facility: HOSPITAL | Age: 66
End: 2021-01-25

## 2021-01-26 ENCOUNTER — TELEPHONE (OUTPATIENT)
Dept: LAB | Facility: HOSPITAL | Age: 66
End: 2021-01-26

## 2021-01-28 ENCOUNTER — OFFICE VISIT (OUTPATIENT)
Dept: OBGYN CLINIC | Facility: HOSPITAL | Age: 66
End: 2021-01-28
Payer: COMMERCIAL

## 2021-01-28 VITALS
BODY MASS INDEX: 41.3 KG/M2 | WEIGHT: 257 LBS | SYSTOLIC BLOOD PRESSURE: 147 MMHG | HEIGHT: 66 IN | HEART RATE: 76 BPM | DIASTOLIC BLOOD PRESSURE: 68 MMHG

## 2021-01-28 DIAGNOSIS — M17.11 PRIMARY OSTEOARTHRITIS OF RIGHT KNEE: Primary | ICD-10-CM

## 2021-01-28 DIAGNOSIS — M25.561 CHRONIC PAIN OF RIGHT KNEE: ICD-10-CM

## 2021-01-28 DIAGNOSIS — G89.29 CHRONIC PAIN OF RIGHT KNEE: ICD-10-CM

## 2021-01-28 PROCEDURE — 99213 OFFICE O/P EST LOW 20 MIN: CPT | Performed by: ORTHOPAEDIC SURGERY

## 2021-01-28 PROCEDURE — 20610 DRAIN/INJ JOINT/BURSA W/O US: CPT | Performed by: ORTHOPAEDIC SURGERY

## 2021-01-28 RX ORDER — LIDOCAINE HYDROCHLORIDE 10 MG/ML
2 INJECTION, SOLUTION INFILTRATION; PERINEURAL
Status: COMPLETED | OUTPATIENT
Start: 2021-01-28 | End: 2021-01-28

## 2021-01-28 RX ORDER — BUPIVACAINE HYDROCHLORIDE 2.5 MG/ML
2 INJECTION, SOLUTION INFILTRATION; PERINEURAL
Status: COMPLETED | OUTPATIENT
Start: 2021-01-28 | End: 2021-01-28

## 2021-01-28 RX ORDER — BETAMETHASONE SODIUM PHOSPHATE AND BETAMETHASONE ACETATE 3; 3 MG/ML; MG/ML
12 INJECTION, SUSPENSION INTRA-ARTICULAR; INTRALESIONAL; INTRAMUSCULAR; SOFT TISSUE
Status: COMPLETED | OUTPATIENT
Start: 2021-01-28 | End: 2021-01-28

## 2021-01-28 RX ORDER — TESTOSTERONE GEL, 1% 10 MG/G
GEL TRANSDERMAL
COMMUNITY
Start: 2021-01-26

## 2021-01-28 RX ORDER — SULFAMETHOXAZOLE AND TRIMETHOPRIM 800; 160 MG/1; MG/1
1 TABLET ORAL 2 TIMES DAILY
COMMUNITY
Start: 2020-12-28

## 2021-01-28 RX ADMIN — LIDOCAINE HYDROCHLORIDE 2 ML: 10 INJECTION, SOLUTION INFILTRATION; PERINEURAL at 14:49

## 2021-01-28 RX ADMIN — BUPIVACAINE HYDROCHLORIDE 2 ML: 2.5 INJECTION, SOLUTION INFILTRATION; PERINEURAL at 14:49

## 2021-01-28 RX ADMIN — BETAMETHASONE SODIUM PHOSPHATE AND BETAMETHASONE ACETATE 12 MG: 3; 3 INJECTION, SUSPENSION INTRA-ARTICULAR; INTRALESIONAL; INTRAMUSCULAR; SOFT TISSUE at 14:49

## 2021-01-28 NOTE — PROGRESS NOTES
72 y o male  Presents for ongoing care to his right knee  He reports return to weight-bearing pain in the right knee  He has pain level right knee joint, the pain is made worse bearing weight, the pain increases with increased activities  His most recent injection as result in relief of his symptoms, however recently they have come back to the right knee    Review of Systems  Review of systems negative unless otherwise specified in HPI    Past Medical History  Past Medical History:   Diagnosis Date    Acid reflux     Anxiety     Arthritis     Cellulitis     left ankle    Diabetes mellitus ([address] )     Hypertension     Sleep apnea     Twitching        Past Surgical History  Past Surgical History:   Procedure Laterality Date    APPENDECTOMY      CHOLECYSTECTOMY      KNEE ARTHROSCOPY      AL TOTAL KNEE ARTHROPLASTY Left 7/13/2016    Procedure: TOTAL  KNEE REPLACEMENT ;  Surgeon: Carl Leone MD;  [facility];  Service: Orthopedics       Current Medications  Current Outpatient Medications on File Prior to Visit   Medication Sig Dispense Refill    albuterol (PROVENTIL HFA,VENTOLIN HFA) 90 mcg/act inhaler Inhale 2 puffs every 6 (six) hours as needed for wheezing   aspirin (ECOTRIN LOW STRENGTH) 81 mg EC tablet Take 81 mg by mouth daily      budesonide (RINOCORT AQUA) 32 MCG/ACT nasal spray 1 spray into each nostril 2 (two) times a day   buprenorphine-naloxone (SUBOXONE) 8-2 mg       cephalexin (KEFLEX) 500 mg capsule       cloNIDine (CATAPRES) 0 1 mg tablet Take 0 1 mg by mouth 3 (three) times a day        Dulaglutide (TRULICITY) 5 56 NX/0 0DY SOPN Inject 0 75 mg under the skin once a week      DULoxetine (CYMBALTA) 30 mg delayed release capsule Take 1 capsule (30 mg total) by mouth 2 (two) times a day 60 capsule 0    DULoxetine (CYMBALTA) 30 mg delayed release capsule Take 1 capsule (30 mg total) by mouth 2 (two) times a day 180 capsule 0    ergocalciferol (VITAMIN D2) 50,000 units       fluticasone (FLOVENT HFA) 220 mcg/act inhaler Inhale 1 puff daily as needed   furosemide (LASIX) 40 mg tablet Take 40 mg by mouth 2 (two) times a day as needed   HYDROcodone-acetaminophen (NORCO)  mg per tablet       lisinopril-hydrochlorothiazide (PRINZIDE,ZESTORETIC) 10-12 5 MG per tablet Take 1 tablet by mouth 2 (two) times a day      LORazepam (ATIVAN) 0 5 mg tablet Take 0 5 mg by mouth daily at bedtime   metFORMIN (GLUCOPHAGE-XR) 750 mg 24 hr tablet Take 750 mg by mouth 2 (two) times a day   NARCAN 4 MG/0 1ML LIQD       pantoprazole (PROTONIX) 40 mg tablet Take 40 mg by mouth daily        potassium chloride (K-DUR,KLOR-CON) 10 mEq tablet       sulfamethoxazole-trimethoprim (BACTRIM DS) 800-160 mg per tablet Take 1 tablet by mouth 2 (two) times a day      testosterone (ANDROGEL) 1%       urea (CARMOL) 40 %        No current facility-administered medications on file prior to visit  Recent Labs [facility])  0   Lab Value Date/Time    HCT 42 5 03/14/2020 0813    HCT 48 7 01/11/2014 0918    HGB 12 7 03/14/2020 0813    HGB 16 0 01/11/2014 0918    WBC 11 06 (H) 07/18/2016 2047    ESR 28 (H) 01/07/2018 1710    CRP 9 9 (H) 01/07/2018 1642    GLUCOSE 120 10/04/2014 0805    HGBA1C 6 9 (H) 04/27/2020 1346    HGBA1C 6 0 (H) 10/04/2014 0805         Physical exam  · General: Awake, Alert, Oriented  · Eyes: Pupils equal, round and reactive to light  · Heart: regular rate and rhythm  · Lungs: No audible wheezing  · Abdomen: soft   gait pattern has very little antalgia  Right hip is good mobility  Right thigh has no atrophy right knee is in varus  There is no effusion  There is bony enlargement tenderness medially  There is crepitation flexion extension  There is no palpable warmth the synovium  Calf compartments right soft supple    Toes on the right foot are warm, sensate, mobile    Imaging   no new x-rays accompany him    Procedure   an injection of corticosteroid is provided for the right knee joint  It is documented below      Large joint arthrocentesis: R knee  Universal Protocol:  Consent given by: patient    Supporting Documentation  Indications: pain   Procedure Details  Location: knee - R knee  Needle size: 22 G  Ultrasound guidance: no  Medications administered: 2 mL bupivacaine 0 25 %; 2 mL lidocaine 1 %; 12 mg betamethasone acetate-betamethasone sodium phosphate 6 (3-3) mg/mL    Patient tolerance: patient tolerated the procedure well with no immediate complications  Dressing:  Sterile dressing applied            Assessment/Plan:   72 y  o male  Who has return of symptomatic osteoarthritis the right knee  His symptoms with pain and dysfunction  The diagnosis was discussed the patient  Treatment options including risks, benefits, alternatives discussed in detail  Injection of corticosteroid is indicated for pain relief purposes  It is advised, except, administers outlined above    Next office follow-up in 3 months time for repeat physical exam   He understands weight loss is critical to the safe discussed treatments right knee, and also will be necessary in order to consider scheduling a right total knee replacement

## 2021-02-08 ENCOUNTER — APPOINTMENT (OUTPATIENT)
Dept: LAB | Facility: HOSPITAL | Age: 66
End: 2021-02-08
Payer: COMMERCIAL

## 2021-02-08 DIAGNOSIS — E11.9 DIABETES MELLITUS WITHOUT COMPLICATION (HCC): Primary | ICD-10-CM

## 2021-02-08 LAB
25(OH)D3 SERPL-MCNC: 53.6 NG/ML (ref 30–100)
EST. AVERAGE GLUCOSE BLD GHB EST-MCNC: 143 MG/DL
HBA1C MFR BLD: 6.6 %

## 2021-02-08 PROCEDURE — 83036 HEMOGLOBIN GLYCOSYLATED A1C: CPT

## 2021-02-08 PROCEDURE — 82306 VITAMIN D 25 HYDROXY: CPT

## 2021-02-25 ENCOUNTER — TRANSCRIBE ORDERS (OUTPATIENT)
Dept: ADMINISTRATIVE | Facility: HOSPITAL | Age: 66
End: 2021-02-25

## 2021-02-25 DIAGNOSIS — N31.9 NEUROMUSCULAR DYSFUNCTION OF BLADDER, UNSPECIFIED: Primary | ICD-10-CM

## 2021-03-03 ENCOUNTER — HOSPITAL ENCOUNTER (OUTPATIENT)
Dept: RADIOLOGY | Facility: HOSPITAL | Age: 66
Discharge: HOME | End: 2021-03-03
Attending: UROLOGY
Payer: COMMERCIAL

## 2021-03-03 ENCOUNTER — TRANSCRIBE ORDERS (OUTPATIENT)
Dept: LAB | Facility: HOSPITAL | Age: 66
End: 2021-03-03

## 2021-03-03 ENCOUNTER — APPOINTMENT (OUTPATIENT)
Dept: LAB | Facility: HOSPITAL | Age: 66
End: 2021-03-03
Attending: UROLOGY
Payer: COMMERCIAL

## 2021-03-03 DIAGNOSIS — N13.8 ENLARGED PROSTATE WITH URINARY OBSTRUCTION: ICD-10-CM

## 2021-03-03 DIAGNOSIS — N40.1 ENLARGED PROSTATE WITH URINARY OBSTRUCTION: Primary | ICD-10-CM

## 2021-03-03 DIAGNOSIS — N40.1 ENLARGED PROSTATE WITH URINARY OBSTRUCTION: ICD-10-CM

## 2021-03-03 DIAGNOSIS — N31.9 NEUROMUSCULAR DYSFUNCTION OF BLADDER, UNSPECIFIED: ICD-10-CM

## 2021-03-03 DIAGNOSIS — N13.8 ENLARGED PROSTATE WITH URINARY OBSTRUCTION: Primary | ICD-10-CM

## 2021-03-03 LAB — PSA SERPL-MCNC: <0.1 NG/ML (ref 0–4)

## 2021-03-03 PROCEDURE — G0103 PSA SCREENING: HCPCS

## 2021-03-03 PROCEDURE — 36415 COLL VENOUS BLD VENIPUNCTURE: CPT

## 2021-03-03 PROCEDURE — 51798 US URINE CAPACITY MEASURE: CPT

## 2021-04-29 ENCOUNTER — TELEPHONE (OUTPATIENT)
Dept: PSYCHIATRY | Facility: CLINIC | Age: 66
End: 2021-04-29

## 2021-04-29 NOTE — TELEPHONE ENCOUNTER
Pt called looking to set up w/ a psychiatrist  I informed pt  call ADRIENNE Alvarez to put in a referral in the sytem   Once in I will forward information to Intake

## 2021-08-17 ENCOUNTER — TELEPHONE (OUTPATIENT)
Dept: OBGYN CLINIC | Facility: HOSPITAL | Age: 66
End: 2021-08-17

## 2021-08-30 RX ORDER — TESTOSTERONE 12.5 MG/1.25G
GEL TOPICAL
COMMUNITY
Start: 2021-02-24

## 2021-08-30 RX ORDER — ATORVASTATIN CALCIUM 20 MG/1
TABLET, FILM COATED ORAL
COMMUNITY
Start: 2021-06-16

## 2021-08-30 RX ORDER — PIOGLITAZONEHYDROCHLORIDE 15 MG/1
TABLET ORAL
COMMUNITY
Start: 2021-08-04

## 2021-08-31 ENCOUNTER — OFFICE VISIT (OUTPATIENT)
Dept: OBGYN CLINIC | Facility: HOSPITAL | Age: 66
End: 2021-08-31
Payer: COMMERCIAL

## 2021-08-31 VITALS
HEIGHT: 66 IN | BODY MASS INDEX: 42.65 KG/M2 | WEIGHT: 265.4 LBS | HEART RATE: 67 BPM | SYSTOLIC BLOOD PRESSURE: 123 MMHG | DIASTOLIC BLOOD PRESSURE: 67 MMHG

## 2021-08-31 DIAGNOSIS — M17.11 PRIMARY OSTEOARTHRITIS OF RIGHT KNEE: Primary | ICD-10-CM

## 2021-08-31 PROCEDURE — 20610 DRAIN/INJ JOINT/BURSA W/O US: CPT | Performed by: ORTHOPAEDIC SURGERY

## 2021-08-31 PROCEDURE — 99213 OFFICE O/P EST LOW 20 MIN: CPT | Performed by: ORTHOPAEDIC SURGERY

## 2021-08-31 RX ORDER — BUPIVACAINE HYDROCHLORIDE 2.5 MG/ML
2 INJECTION, SOLUTION INFILTRATION; PERINEURAL
Status: COMPLETED | OUTPATIENT
Start: 2021-08-31 | End: 2021-08-31

## 2021-08-31 RX ORDER — LIDOCAINE HYDROCHLORIDE 10 MG/ML
2 INJECTION, SOLUTION INFILTRATION; PERINEURAL
Status: COMPLETED | OUTPATIENT
Start: 2021-08-31 | End: 2021-08-31

## 2021-08-31 RX ORDER — BETAMETHASONE SODIUM PHOSPHATE AND BETAMETHASONE ACETATE 3; 3 MG/ML; MG/ML
12 INJECTION, SUSPENSION INTRA-ARTICULAR; INTRALESIONAL; INTRAMUSCULAR; SOFT TISSUE
Status: COMPLETED | OUTPATIENT
Start: 2021-08-31 | End: 2021-08-31

## 2021-08-31 RX ADMIN — BETAMETHASONE SODIUM PHOSPHATE AND BETAMETHASONE ACETATE 12 MG: 3; 3 INJECTION, SUSPENSION INTRA-ARTICULAR; INTRALESIONAL; INTRAMUSCULAR; SOFT TISSUE at 11:25

## 2021-08-31 RX ADMIN — LIDOCAINE HYDROCHLORIDE 2 ML: 10 INJECTION, SOLUTION INFILTRATION; PERINEURAL at 11:25

## 2021-08-31 RX ADMIN — BUPIVACAINE HYDROCHLORIDE 2 ML: 2.5 INJECTION, SOLUTION INFILTRATION; PERINEURAL at 11:25

## 2021-11-15 NOTE — PROGRESS NOTES
Assessment:  1  Primary osteoarthritis of right knee  Large joint arthrocentesis       Plan:  · Patient provided with a right knee CS injection today, tolerated well  · Activities as tolerated  · Patient was educated on maintaining a healthy lifestyle with proper weight management/loss and physician recommended home exercise program/routine for regular fitness  · Patient will call for sooner appointment if he is able to reduce his BMI  To do next visit:  Return in about 3 months (around 2021)  The above stated was discussed in layman's terms and the patient expressed understanding  All questions were answered to the patient's satisfaction  Subjective:   Everardo Sales is a 77 y o  male who presents for follow up of right knee pain, patient has known osteoarthritis and has been treating conservatively with CS injections  He received and injection at his last visit which helped briefly for a few months  He has increased pain with WB activities and relieved by rest   Patient would like to consider right TKA however he had to reduce his BMI         Past Medical History:   Diagnosis Date    Acid reflux     Anxiety     Arthritis     Cellulitis     left ankle    Diabetes mellitus ([address] )     Hypertension     Sleep apnea     Twitching        Past Surgical History:   Procedure Laterality Date    APPENDECTOMY      CHOLECYSTECTOMY      KNEE ARTHROSCOPY      [place] TOTAL KNEE ARTHROPLASTY Left 2016    Procedure: TOTAL  KNEE REPLACEMENT ;  Surgeon: Abi Ko MD;  [facility];  Service: Orthopedics       Family History   Problem Relation Age of Onset    Diabetes Mother     Diabetes Father     Cancer Father        Social History     Occupational History    Occupation: Attendance officer   Tobacco Use    Smoking status: Former Smoker     Quit date:      Years since quittin 6    Smokeless tobacco: Never Used   Vaping Use    Vaping Use: Never used   Substance and Sexual Activity    Alcohol use: No    Drug use: No    Sexual activity: Not on file         Current Outpatient Medications:     Testosterone 12 5 MG/ACT (1%) GEL, Apply 2 pump presses to clean, dry skin every morning, Disp: , Rfl:     albuterol (PROVENTIL HFA,VENTOLIN HFA) 90 mcg/act inhaler, Inhale 2 puffs every 6 (six) hours as needed for wheezing , Disp: , Rfl:     aspirin (ECOTRIN LOW STRENGTH) 81 mg EC tablet, Take 81 mg by mouth daily, Disp: , Rfl:     atorvastatin (LIPITOR) 20 mg tablet, , Disp: , Rfl:     budesonide (RINOCORT AQUA) 32 MCG/ACT nasal spray, 1 spray into each nostril 2 (two) times a day   , Disp: , Rfl:     buprenorphine-naloxone (SUBOXONE) 8-2 mg, , Disp: , Rfl:     cephalexin (KEFLEX) 500 mg capsule, , Disp: , Rfl:     cloNIDine (CATAPRES) 0 1 mg tablet, Take 0 1 mg by mouth 3 (three) times a day , Disp: , Rfl:     Dulaglutide (TRULICITY) 3 24 GA/0 6GM SOPN, Inject 0 75 mg under the skin once a week, Disp: , Rfl:     DULoxetine (CYMBALTA) 30 mg delayed release capsule, Take 1 capsule (30 mg total) by mouth 2 (two) times a day, Disp: 180 capsule, Rfl: 0    ergocalciferol (VITAMIN D2) 50,000 units, , Disp: , Rfl:     fluticasone (FLOVENT HFA) 220 mcg/act inhaler, Inhale 1 puff daily as needed  , Disp: , Rfl:     furosemide (LASIX) 40 mg tablet, Take 40 mg by mouth 2 (two) times a day as needed  , Disp: , Rfl:     HYDROcodone-acetaminophen (NORCO)  mg per tablet, , Disp: , Rfl:     lisinopril-hydrochlorothiazide (PRINZIDE,ZESTORETIC) 10-12 5 MG per tablet, Take 1 tablet by mouth 2 (two) times a day, Disp: , Rfl:     LORazepam (ATIVAN) 0 5 mg tablet, Take 0 5 mg by mouth daily at bedtime  , Disp: , Rfl:     metFORMIN (GLUCOPHAGE) 850 mg tablet, , Disp: , Rfl:     metFORMIN (GLUCOPHAGE-XR) 750 mg 24 hr tablet, Take 750 mg by mouth 2 (two) times a day   , Disp: , Rfl:     NARCAN 4 MG/0 1ML LIQD, , Disp: , Rfl:     pantoprazole (PROTONIX) 40 mg tablet, Take 40 mg by mouth daily  , Disp: , Rfl:     pioglitazone (ACTOS) 15 mg tablet, , Disp: , Rfl:     potassium chloride (K-DUR,KLOR-CON) 10 mEq tablet, , Disp: , Rfl:     sulfamethoxazole-trimethoprim (BACTRIM DS) 800-160 mg per tablet, Take 1 tablet by mouth 2 (two) times a day, Disp: , Rfl:     testosterone (ANDROGEL) 1%, , Disp: , Rfl:     urea (CARMOL) 40 %, , Disp: , Rfl:     No Known Allergies      Objective:  Vitals:    08/31/21 1046   BP: 123/67   Pulse: 67       Review of Systems   Constitutional: Negative for chills and fever  HENT: Negative for drooling and hearing loss  Eyes: Negative for visual disturbance  Respiratory: Negative for cough and shortness of breath  Cardiovascular: Negative for chest pain  Gastrointestinal: Negative for abdominal pain  Skin: Negative for rash  Psychiatric/Behavioral: Negative for agitation  Right Knee Exam     Tenderness   The patient is experiencing tenderness in the medial joint line and lateral joint line  Range of Motion   Extension: 0   Flexion: 120     Tests   Varus: negative Valgus: negative    Other   Erythema: absent  Sensation: normal  Pulse: present  Swelling: none  Effusion: no effusion present            Physical Exam  Vitals reviewed  Constitutional:       Appearance: He is well-developed  HENT:      Head: Normocephalic  Eyes:      Pupils: Pupils are equal, round, and reactive to light  Pulmonary:      Effort: Pulmonary effort is normal    Musculoskeletal:      Right knee: No effusion  Skin:     General: Skin is warm and dry             Diagnostics, reviewed and taken today if performed as documented:    None performed        Procedures, if performed today:    Large joint arthrocentesis: R knee  Universal Protocol:  Consent given by: patient  Patient understanding: patient states understanding of the procedure being performed  Site marked: the operative site was marked  Patient identity confirmed: verbally with patient    Supporting Documentation  Indications: pain   Procedure Details  Location: knee - R knee  Needle size: 22 G  Ultrasound guidance: no  Approach: lateral  Medications administered: 2 mL bupivacaine 0 25 %; 2 mL lidocaine 1 %; 12 mg betamethasone acetate-betamethasone sodium phosphate 6 (3-3) mg/mL    Patient tolerance: patient tolerated the procedure well with no immediate complications  Dressing:  Sterile dressing applied        Scribe Attestation    I,:  Duy Gleason am acting as a scribe while in the presence of the attending physician :       I,:  Markos Mittal MD personally performed the services described in this documentation    as scribed in my presence :               Portions of the record may have been created with voice recognition software  Occasional wrong word or "sound a like" substitutions may have occurred due to the inherent limitations of voice recognition software  Read the chart carefully and recognize, using context, where substitutions have occurred  Patient was seen and examined with the resident team today.  I agree with Dr. Zimmerman's assessment and plan with the following exceptions/additions:     Briefly, this is a 21yo gentleman with no PMH, native of [place] w/extensive international travel, who p/w night sweats and productive cough associated with dyspnea, found to have a R-sided pleural effusion and tree-and-bud lesions on chest CT c/f TB.  He had a [facility] QuantiFeron on 11/11 that resulted positive on 11/15.    #TB R/O - ROS in light of a positive QuantiFeron raises suspicion for active TB; ID consult to address TB probability, while 2nd and 3rd AF[employer] result (1st negative thus far)  #R-sided, exudative pleural effusion - s/p chest tube w/no output overnight; Pulm following, appreciate assistance; f/u ADA   #ZI - no signs or symptoms of acute or chronic blood loss; c/w PO iron  #DVT PPx - ambulatory  #Dispo - TBD     Minda Bernal  [phone] Patient was seen and examined with the resident team today.  I agree with Dr. Zimmerman's assessment and plan with the following exceptions/additions:     Briefly, this is a 19yo gentleman with no PMH, native of [place] w/extensive international travel, who p/w night sweats and productive cough associated with dyspnea, found to have a R-sided pleural effusion and tree-and-bud lesions on chest CT c/f TB.  He had a [facility] QuantiFeron on 11/11 that resulted positive on 11/15.    #TB R/O - ROS in light of a positive QuantiFeron raises suspicion for active TB; ID consult to address TB probability, while 2nd and 3rd AFB result (1st negative thus far); c/w airborne isolation for now   #R-sided, exudative pleural effusion - s/p chest tube w/no output overnight; Pulm following, appreciate assistance; f/u ADA   #ZI - no signs or symptoms of acute or chronic blood loss; c/w PO iron  #DVT PPx - ambulatory  #Dispo - TBD     Minda Bernal  [phone]

## 2021-11-18 ENCOUNTER — OFFICE VISIT (OUTPATIENT)
Dept: PODIATRY | Facility: CLINIC | Age: 66
End: 2021-11-18
Payer: COMMERCIAL

## 2021-11-18 VITALS
HEIGHT: 66 IN | WEIGHT: 280 LBS | BODY MASS INDEX: 45 KG/M2 | HEART RATE: 96 BPM | DIASTOLIC BLOOD PRESSURE: 70 MMHG | SYSTOLIC BLOOD PRESSURE: 169 MMHG

## 2021-11-18 DIAGNOSIS — E11.42 DIABETIC POLYNEUROPATHY ASSOCIATED WITH TYPE 2 DIABETES MELLITUS (HCC): Primary | ICD-10-CM

## 2021-11-18 DIAGNOSIS — I73.9 PERIPHERAL VASCULAR DISEASE, UNSPECIFIED (HCC): ICD-10-CM

## 2021-11-18 PROCEDURE — 99213 OFFICE O/P EST LOW 20 MIN: CPT | Performed by: PODIATRIST

## 2021-11-18 RX ORDER — DULOXETIN HYDROCHLORIDE 30 MG/1
30 CAPSULE, DELAYED RELEASE ORAL 2 TIMES DAILY
Qty: 60 CAPSULE | Refills: 0 | Status: SHIPPED | OUTPATIENT
Start: 2021-11-18 | End: 2021-12-18

## 2021-11-18 RX ORDER — SILODOSIN 8 MG/1
CAPSULE ORAL
COMMUNITY
Start: 2021-11-15

## 2021-12-09 ENCOUNTER — TELEPHONE (OUTPATIENT)
Dept: PAIN MEDICINE | Facility: MEDICAL CENTER | Age: 66
End: 2021-12-09

## 2021-12-13 ENCOUNTER — OFFICE VISIT (OUTPATIENT)
Dept: PODIATRY | Facility: CLINIC | Age: 66
End: 2021-12-13
Payer: COMMERCIAL

## 2021-12-13 VITALS
HEART RATE: 88 BPM | SYSTOLIC BLOOD PRESSURE: 135 MMHG | DIASTOLIC BLOOD PRESSURE: 73 MMHG | WEIGHT: 286.6 LBS | BODY MASS INDEX: 46.06 KG/M2 | HEIGHT: 66 IN

## 2021-12-13 DIAGNOSIS — I73.9 PERIPHERAL VASCULAR DISEASE, UNSPECIFIED (HCC): ICD-10-CM

## 2021-12-13 DIAGNOSIS — E11.42 DIABETIC POLYNEUROPATHY ASSOCIATED WITH TYPE 2 DIABETES MELLITUS (HCC): Primary | ICD-10-CM

## 2021-12-13 PROCEDURE — 99212 OFFICE O/P EST SF 10 MIN: CPT | Performed by: PODIATRIST

## 2021-12-13 RX ORDER — CLOTRIMAZOLE AND BETAMETHASONE DIPROPIONATE 10; .64 MG/G; MG/G
CREAM TOPICAL
COMMUNITY
Start: 2021-12-01

## 2022-01-13 ENCOUNTER — HOSPITAL ENCOUNTER (OUTPATIENT)
Dept: NON INVASIVE DIAGNOSTICS | Facility: HOSPITAL | Age: 67
Discharge: HOME | End: 2022-01-13
Payer: COMMERCIAL

## 2022-01-13 VITALS
WEIGHT: 286 LBS | HEART RATE: 88 BPM | HEIGHT: 66 IN | DIASTOLIC BLOOD PRESSURE: 73 MMHG | SYSTOLIC BLOOD PRESSURE: 135 MMHG | BODY MASS INDEX: 45.96 KG/M2

## 2022-01-13 DIAGNOSIS — R01.1 CARDIAC MURMUR, UNSPECIFIED: ICD-10-CM

## 2022-01-13 LAB
AORTIC ROOT: 3.5 CM
AORTIC VALVE MEAN VELOCITY: 12.1 M/S
APICAL FOUR CHAMBER EJECTION FRACTION: 69 %
ASCENDING AORTA: 3.3 CM
AV AREA BY CONTINUOUS VTI: 2.7 CM2
AV AREA PEAK VELOCITY: 2.5 CM2
AV LVOT MEAN GRADIENT: 4 MMHG
AV LVOT PEAK GRADIENT: 10 MMHG
AV MEAN GRADIENT: 8 MMHG
AV PEAK GRADIENT: 18 MMHG
AV VALVE AREA: 2.73 CM2
AV VELOCITY RATIO: 0.74
DOP CALC AO PEAK VEL: 2.14 M/S
DOP CALC AO VTI: 37.34 CM
DOP CALC LVOT AREA: 3.14 CM2
DOP CALC LVOT DIAMETER: 2 CM
DOP CALC LVOT PEAK VEL VTI: 32.52 CM
DOP CALC LVOT PEAK VEL: 1.58 M/S
DOP CALC LVOT STROKE INDEX: 44.2 ML/M2
DOP CALC LVOT STROKE VOLUME: 102.11 CM3
E WAVE DECELERATION TIME: 274 MS
FRACTIONAL SHORTENING: 40 % (ref 28–44)
INTERVENTRICULAR SEPTUM IN DIASTOLE (PARASTERNAL SHORT AXIS VIEW): 1.1 CM
LEFT ATRIUM AREA SYSTOLE SINGLE PLANE A4C: 19.6 CM2
LEFT ATRIUM SIZE: 3.7 CM
LEFT INTERNAL DIMENSION IN SYSTOLE: 2.4 CM (ref 2.1–4)
LEFT VENTRICULAR INTERNAL DIMENSION IN DIASTOLE: 4 CM (ref 12.36–18.43)
LEFT VENTRICULAR POSTERIOR WALL IN END DIASTOLE: 1.4 CM
LEFT VENTRICULAR STROKE VOLUME: 52 ML
MV E'TISSUE VEL-SEP: 8 CM/S
MV PEAK A VEL: 0.9 M/S
MV PEAK E VEL: 108 CM/S
MV STENOSIS PRESSURE HALF TIME: 0 MS
RIGHT ATRIUM AREA SYSTOLE A4C: 13.2 CM2
RIGHT VENTRICLE ID DIMENSION: 3.8 CM
SL CV LV EF: 65
SL CV PED ECHO LEFT VENTRICLE DIASTOLIC VOLUME (MOD BIPLANE) 2D: 72 ML
SL CV PED ECHO LEFT VENTRICLE SYSTOLIC VOLUME (MOD BIPLANE) 2D: 20 ML
Z-SCORE OF LEFT VENTRICULAR DIMENSION IN END SYSTOLE: -13.28

## 2022-01-13 PROCEDURE — 93306 TTE W/DOPPLER COMPLETE: CPT | Performed by: INTERNAL MEDICINE

## 2022-01-13 PROCEDURE — 93306 TTE W/DOPPLER COMPLETE: CPT

## 2022-01-24 ENCOUNTER — CONSULT (OUTPATIENT)
Dept: PAIN MEDICINE | Facility: CLINIC | Age: 67
End: 2022-01-24
Payer: COMMERCIAL

## 2022-01-24 VITALS — HEIGHT: 66 IN | BODY MASS INDEX: 45.48 KG/M2 | WEIGHT: 283 LBS

## 2022-01-24 DIAGNOSIS — G89.29 CHRONIC LOW BACK PAIN WITH BILATERAL SCIATICA, UNSPECIFIED BACK PAIN LATERALITY: ICD-10-CM

## 2022-01-24 DIAGNOSIS — M47.816 LUMBAR SPONDYLOSIS: Primary | ICD-10-CM

## 2022-01-24 DIAGNOSIS — M54.2 NECK PAIN: ICD-10-CM

## 2022-01-24 DIAGNOSIS — M48.061 SPINAL STENOSIS OF LUMBAR REGION, UNSPECIFIED WHETHER NEUROGENIC CLAUDICATION PRESENT: ICD-10-CM

## 2022-01-24 DIAGNOSIS — M51.36 DDD (DEGENERATIVE DISC DISEASE), LUMBAR: ICD-10-CM

## 2022-01-24 DIAGNOSIS — M25.511 RIGHT SHOULDER PAIN, UNSPECIFIED CHRONICITY: ICD-10-CM

## 2022-01-24 DIAGNOSIS — M54.41 CHRONIC LOW BACK PAIN WITH BILATERAL SCIATICA, UNSPECIFIED BACK PAIN LATERALITY: ICD-10-CM

## 2022-01-24 DIAGNOSIS — M54.42 CHRONIC LOW BACK PAIN WITH BILATERAL SCIATICA, UNSPECIFIED BACK PAIN LATERALITY: ICD-10-CM

## 2022-01-24 PROCEDURE — 99204 OFFICE O/P NEW MOD 45 MIN: CPT | Performed by: ANESTHESIOLOGY

## 2022-01-24 NOTE — PROGRESS NOTES
Assessment  1  Lumbar spondylosis    2  Chronic low back pain with bilateral sciatica, unspecified back pain laterality    3  Neck pain    4  Right shoulder pain, unspecified chronicity    5  DDD (degenerative disc disease), lumbar    6  Spinal stenosis of lumbar region, unspecified whether neurogenic claudication present        Plan  79-year-old male with a history of diabetes and diabetic neuropathy last seen in 2018, referred by Dr Radha Suarez for interval consultation regarding neck pain with numbness and paresthesias in his fingers in both hands and lumbosacral back pain with associated subjective weakness in his lower extremities  Patient also complains of right shoulder pain  He denies any specific trauma or inciting event although he has had some mechanical ground level falls over the years which she feels have contributed to the worsening of symptoms  Most recent x-ray of the lumbar spine from 2018 shows mild dextroscoliosis with degenerative disc disease and spondylosis  Patient did have an MRI of the lumbar spine back in 2018 which I am unable to see the images or report  However, per Dr Chloé Turner last office note, the patient did have multilevel degenerative disc disease and stenosis  I do not have any imaging of the cervical spine or shoulder to review  The patient has not done any recent formal physical therapy or chiropractic treatment  He was previously managed by Dr Rosi Allen on high-dose opioids which was then converted to Suboxone as the patient was having difficulty transitioning off of high-dose opioid medications  The patient was having side effects from Suboxone and this was discontinued as well per the patient  The patient was taking duloxetine and has tried gabapentin in the past, however was unable tolerate side effects  He is currently taking Tylenol and ibuprofen p r n  with minimal to mild relief     The patient's neck and low back pain seems to be multifactorial including myofascial and facet mediated components  No evidence of sacroiliitis  Hand symptoms may be secondary to diabetic neuropathy verses entrapment neuropathy verses cervical radiculopathy  Lower extremity symptoms may be secondary to deconditioning verses lumbar radiculopathy verses peripheral neuropathy  Patient does not have evidence of full rotator cuff tear on exam, but does have evidence of shoulder pathology on exam     1  I will order an updated x-ray of the lumbar spine as well as an x-ray of the cervical spine and right shoulder  2  I will prescribe physical therapy to reduced pain and improved function  3  Patient may continue with Tylenol p r n  and I advised the patient to avoid NSAIDs secondary to chronic renal disease   4  I will follow up the patient in 6 weeks and if he does not respond to conservative treatment we may consider an MRI of the cervical and lumbar spine without contrast      My impressions and treatment recommendations were discussed in detail with the patient who verbalized understanding and had no further questions  Discharge instructions were provided  I personally saw and examined the patient and I agree with the above discussed plan of care  No orders of the defined types were placed in this encounter  No orders of the defined types were placed in this encounter  History of Present Illness    Carmelo Contreras is a 77 y o  male with a history of diabetes and diabetic neuropathy last seen in 2018, referred by Dr Morteza Ramirez for interval consultation regarding neck pain with numbness and paresthesias in his fingers in both hands and lumbosacral back pain with associated subjective weakness in his lower extremities  Patient also complains of right shoulder pain  He denies any specific trauma or inciting event although he has had some mechanical ground level falls over the years which she feels have contributed to the worsening of symptoms    He denies any bladder or bowel incontinence or saddle anesthesia  He denies any balance issues  Most recent x-ray of the lumbar spine from 2018 shows mild dextroscoliosis with degenerative disc disease and spondylosis  Patient did have an MRI of the lumbar spine back in 2018 which I am unable to see the images or report  However, per Dr Cristela Chavez last office note, the patient did have multilevel degenerative disc disease and stenosis  I do not have any imaging of the cervical spine or shoulder to review  The patient has not done any recent formal physical therapy or chiropractic treatment  He was previously managed by Dr Michael Gilman on high-dose opioids which was then converted to Suboxone as the patient was having difficulty transitioning off of high-dose opioid medications  The patient was having side effects from Suboxone and this was discontinued as well per the patient  The patient was taking duloxetine and has tried gabapentin in the past, however was unable tolerate side effects  He is currently taking Tylenol and ibuprofen p r n  with minimal to mild relief  The patient rates his pain 5/10 on the pain is occasional   The pain is worse at night and described as shooting  The pain is increased with bending, walking, exercise, relaxation, and sneezing  He does find some relief with heat and ice application  Other than as stated above, the patient denies any interval changes in medications, medical condition, mental condition, symptoms, or allergies since the last office visit  I have personally reviewed and/or updated the patient's past medical history, past surgical history, family history, social history, current medications, allergies, and vital signs today  Review of Systems   Constitutional: Positive for chills and unexpected weight change  Negative for fever  HENT: Negative for trouble swallowing  Eyes: Positive for pain and visual disturbance  Respiratory: Positive for shortness of breath  Negative for wheezing  Cardiovascular: Positive for leg swelling  Negative for chest pain and palpitations  Gastrointestinal: Positive for abdominal pain  Negative for constipation, diarrhea, nausea and vomiting  Endocrine: Negative for cold intolerance, heat intolerance and polydipsia  Genitourinary: Positive for difficulty urinating and frequency  Musculoskeletal: Positive for arthralgias and myalgias  Negative for gait problem and joint swelling  Skin: Positive for wound  Negative for rash  Neurological: Negative for dizziness, seizures, syncope, weakness and headaches  Hematological: Does not bruise/bleed easily  Psychiatric/Behavioral: Positive for decreased concentration  Negative for dysphoric mood  Anxiety   All other systems reviewed and are negative        Patient Active Problem List   Diagnosis    S/P total knee replacement    Diabetes [facility] 2, uncontrolled [facility]    HTN (hypertension)    GERD (gastroesophageal reflux disease)    Anxiety    Sleep apnea    Acute blood loss anemia    Low back pain with sciatica    DDD (degenerative disc disease), lumbar    Spinal stenosis, lumbar    Myoclonus    Chronic venous insufficiency    Bilateral lower extremity edema    BMI 40 0-44 9, adult [facility] [address]    Primary osteoarthritis of right knee    Chronic pain of right knee    Diabetic neuropathy ([facility]    RBD (REM behavioral disorder)       Past Medical History:   Diagnosis Date    Acid reflux     Anxiety     Arthritis     Cellulitis     left ankle    Diabetes mellitus ([facility]     Hypertension     Sleep apnea     Twitching        Past Surgical History:   Procedure Laterality Date    APPENDECTOMY      CHOLECYSTECTOMY      KNEE ARTHROSCOPY      [place] TOTAL KN[place] ARTHROPLASTY Left 7/13/2016    Procedure: TOTAL  KNEE REPLACEMENT ;  Surgeon: Beau Marks MD;  Location[facility];  Service: Orthopedics       Family History   Problem Relation Age of Onset    Diabetes Mother    [facility] Diabetes Father     Cancer Father        Social History     Occupational History    Occupation: Attendance officer   Tobacco Use    Smoking status: Former Smoker     Quit date:      Years since quittin 0    Smokeless tobacco: Never Used   Vaping Use    Vaping Use: Never used   Substance and Sexual Activity    Alcohol use: No    Drug use: No    Sexual activity: Not on file       Current Outpatient Medications on File Prior to Visit   Medication Sig    albuterol (PROVENTIL HFA,VENTOLIN HFA) 90 mcg/act inhaler Inhale 2 puffs every 6 (six) hours as needed for wheezing   aspirin (ECOTRIN LOW STRENGTH) 81 mg EC tablet Take 81 mg by mouth daily    atorvastatin (LIPITOR) 20 mg tablet     budesonide (RINOCORT AQUA) 32 MCG/ACT nasal spray 1 spray into each nostril 2 (two) times a day   buprenorphine-naloxone (SUBOXONE) 8-2 mg     cephalexin (KEFLEX) 500 mg capsule     cloNIDine (CATAPRES) 0 1 mg tablet Take 0 1 mg by mouth 3 (three) times a day   clotrimazole-betamethasone (LOTRISONE) 1-0 05 % cream     Dulaglutide (TRULICITY) 9 26 KQ/5 9GK SOPN Inject 0 75 mg under the skin once a week    DULoxetine (Cymbalta) 30 mg delayed release capsule Take 1 capsule (30 mg total) by mouth 2 (two) times a day (Patient not taking: Reported on 2021 )    ergocalciferol (VITAMIN D2) 50,000 units     fluticasone (FLOVENT HFA) 220 mcg/act inhaler Inhale 1 puff daily as needed   furosemide (LASIX) 40 mg tablet Take 40 mg by mouth 2 (two) times a day as needed   HYDROcodone-acetaminophen (NORCO)  mg per tablet     lisinopril-hydrochlorothiazide (PRINZIDE,ZESTORETIC) 10-12 5 MG per tablet Take 1 tablet by mouth 2 (two) times a day    LORazepam (ATIVAN) 0 5 mg tablet Take 0 5 mg by mouth daily at bedtime   metFORMIN (GLUCOPHAGE) 850 mg tablet  (Patient not taking: Reported on 2021 )    metFORMIN (GLUCOPHAGE-XR) 750 mg 24 hr tablet Take 750 mg by mouth 2 (two) times a day        NARCAN 4 MG/0 1ML LIQD     pantoprazole (PROTONIX) 40 mg tablet Take 40 mg by mouth daily      pioglitazone (ACTOS) 15 mg tablet     potassium chloride (K-DUR,KLOR-CON) 10 mEq tablet     Silodosin 8 MG CAPS     sulfamethoxazole-trimethoprim (BACTRIM DS) 800-160 mg per tablet Take 1 tablet by mouth 2 (two) times a day    testosterone (ANDROGEL) 1%     Testosterone 12 5 MG/ACT (1%) GEL Apply 2 pump presses to clean, dry skin every morning    urea (CARMOL) 40 %      No current facility-administered medications on file prior to visit  No Known Allergies    Physical Exam    There were no vitals taken for this visit  Constitutional: normal, well developed, well nourished, alert, in no distress and non-toxic and no overt pain behavior  Eyes: anicteric  HEENT: grossly intact  Neck: supple, symmetric, trachea midline and no masses   Pulmonary:even and unlabored  Cardiovascular:No edema or pitting edema present  Skin:Normal without rashes or lesions and well hydrated  Psychiatric:Mood and affect appropriate  Neurologic:Cranial Nerves II-XII grossly intact  Musculoskeletal:normal gait  Bilateral cervical paraspinals and trapezii tender to palpation ropy in texture  Limited range of motion of cervical spine in all planes secondary to pain  Bilateral biceps, triceps, brachioradialis, patellar, and Achilles reflexes were 1/4 and symmetrical   Negative Brown's reflex bilaterally  No clonus noted bilaterally  Bilateral upper extremity strength 5/5 in all muscle groups  Sensation intact to light touch in C5 through T1 dermatomes bilaterally  Negative Spurling's bilaterally  Negative Tinel's over bilateral wrists and cubital tunnels  Positive Neer and Calle test on the right  Bilateral lumbar paraspinals mildly tender to palpation ropy in texture from L2-L5  Bilateral SI joints nontender to palpation  Bilateral lower extremity strength 5/5 in all muscle groups    Sensation intact to light touch in L3 through S1 dermatomes bilaterally  Negative straight leg raise bilaterally  Negative Zack's test bilaterally  Imaging        PACS Images     Show images for XR spine lumbar minimum 4 views non injury    Study Result    Narrative & Impression   LUMBAR SPINE     INDICATION:  Lower back pain  PAIN AND NUMBNESS GO INTO BOTH LEGS     COMPARISON: None     VIEWS:  XR SPINE LUMBAR MINIMUM 4 VIEWS NON INJURY           FINDINGS:     Mild dextroscoliosis upper lumbar spine  Slight levoscoliosis lower lumbar spine      There is no evidence of acute fracture or destructive osseous lesion      Advanced multilevel degenerative endplate osteophyte formation  There is multilevel bony bridging most pronounced at L2-3 and L3-4  The pedicles appear intact      Atherosclerotic calcifications noted      IMPRESSION:     Moderate to advanced multilevel spondylosis with a mild S-shaped scoliosis    No evidence of instability         Workstation performed: [id number]

## 2022-01-24 NOTE — PATIENT INSTRUCTIONS
Chronic Back Pain   WHAT YOU NEED TO KNOW:   What is chronic back pain? Chronic back pain is back pain that lasts 3 months or longer  This may include pain that has not been controlled or does not improve with treatment  Your back pain may cause weakness or pain that spreads to your arms or legs  What causes or increases my risk for chronic back pain? Conditions that affect the spine, joints, or muscles can cause back pain  These may include arthritis, spinal stenosis (narrowing of the spinal column), muscle tension, or breakdown of the spinal discs  The following increase your risk for back pain:  · Aging    · Lack of regular physical activity     · Repeated bending, lifting, or twisting, or lifting heavy items    · Obesity or pregnancy     · Injury from a fall or accident    · Driving, sitting, or standing for long periods    · Bad posture while sitting or standing    How is chronic back pain diagnosed? Your healthcare provider will ask if you have any medical conditions  He or she may ask if you have a history of back pain and how it started  He or she may watch you stand and walk, and check your range of motion  Show him or her where you feel pain and what makes it better or worse  Describe the pain, how bad it is, and how long it lasts  Tell your provider if your pain worsens at night or when you lie on your back  How is chronic back pain treated? · NSAIDs  help decrease swelling and pain or fever  This medicine is available with or without a doctor's order  NSAIDs can cause stomach bleeding or kidney problems in certain people  If you take blood thinner medicine, always ask your healthcare provider if NSAIDs are safe for you  Always read the medicine label and follow directions  · Acetaminophen  decreases pain and fever  It is available without a doctor's order  Ask how much to take and how often to take it  Follow directions   Read the labels of all other medicines you are using to see if they also contain acetaminophen, or ask your doctor or pharmacist  Acetaminophen can cause liver damage if not taken correctly  Do not use more than 4 grams (4,000 milligrams) total of acetaminophen in one day  · Prescription pain medicine  called narcotics or opioids may be given for certain types of chronic pain  Ask your healthcare provider how to take this medicine safely  · Muscle relaxers  help decrease pain and muscle spasms  · Steroids  decrease inflammation that causes pain  · Anesthetic  medicines may be injected in or around a nerve to block pain signals from the nerves  · Antidepressants  may be used to help decrease or prevent the symptoms of depression or anxiety  They are also used to treat nerve pain  How can I manage my symptoms? · Apply ice for 15 to 20 minutes every hour, or as directed  Use an ice pack, or put crushed ice in a plastic bag  Cover it with a towel before you apply it to your skin  Ice decreases pain and helps prevent tissue damage  · Apply heat for 20 to 30 minutes every 2 hours, or as directed  Heat helps decrease pain and muscle spasms  · Use massage to loosen tense muscles  Massage may relieve back pain caused by tight muscles  Regular massages may help prevent this kind of back pain  · Ask about acupuncture for pain relief  Back pain is sometimes relieved with acupuncture  Talk to your healthcare provider before you get this treatment to make sure it is safe for you  What else can I do to relieve or prevent back pain? · Manage stress  Stress can cause back pain or make it worse  Some ways to reduce stress are listening to music, meditating, or using aromatherapy  It may help to talk with a therapist about anything that is causing you stress  Your healthcare provider can give you more information  · Stay active as much as you can without causing more pain  Ask your healthcare provider what exercises are right for you   Do not sit or lie down for long periods  This could make your back pain worse  Yoga or similar gentle movements may help relieve pain and tension in your back  Go slowly and do not strain your back as you do any movement  · Be careful when you lift heavy objects  Do not lift anything heavy until your pain is gone  Never strain your back when you lift a heavy item  If possible, ask someone to help you  · Go to physical therapy as directed  A physical therapist can teach you exercises to help improve movement and strength, and to decrease pain  When should I call my doctor? · You have severe pain  · You have new numbness, tingling, or weakness, especially in your lower back, legs, arms, or genital area  · You lose control of your bladder or bowel movements  · You have a fever or sudden weight loss  · You have new or worse pain  · You have questions or concerns about your condition or care  CARE AGREEMENT:   You have the right to help plan your care  Learn about your health condition and how it may be treated  Discuss treatment options with your healthcare providers to decide what care you want to receive  You always have the right to refuse treatment  The above information is an  only  It is not intended as medical advice for individual conditions or treatments  Talk to your doctor, nurse or pharmacist before following any medical regimen to see if it is safe and effective for you  © Copyright [employer] 2021 Information is for End User's use only and may not be sold, redistributed or otherwise used for commercial purposes   All illustrations and images included in CareNotes® are the copyrighted property of A D [employer] M [employer]  or [address] [place] [address] authorGENpape

## 2022-01-25 NOTE — PROGRESS NOTES
1/27/2022    Vu Johnson  1955  [id number]      Assessment  -BPH with lower urinary tract symptoms     Discussion/Plan  Silva Rojas is a 77 y o  male who presents in consultation     1  BPH with lower urinary tract symptoms- urine dip in the office today appears negative for infection or blood, PVR assessment is 14 mL  No significant findings noted on digital rectal examination today  His recent PSA from 9/22/2021 was 0 03  Continue annual prostate cancer screening  He reports an improvement in his urinary symptoms, since PCP started him on Rapaflo  He will continue to take 8mg daily  Reviewed mechanism of action, potential side effects, and administration instructions  Encouraged patient to avoid bladder irritants and decrease coffee intake  He is not interested in additional testing or treatment for hypogonadism  Follow up in 6 months for re-evaluation of his urinary pattern and PVR assessment  Next PSA due in September 2022  He was instructed to call sooner with any issues     -All questions answered, patient agrees with plan      History of Present Illness  77 y o  male who presents in consultation today for evaluation of BPH  Patient referred by his PCP  He reports increased episodes of urinary frequency, nocturia, and post void dribbling over the last few months  He was started on Rapaflo 8mg daily by his PCP  He has noticed some improvement in his urinary pattern  Patient denies any additional lower urinary tract symptoms, gross hematuria, or dysuria  He states he has not received a digital rectal examination in several years  Last PSA from 9/22/2021 was 0 03  PSA trend noted below  He denies any strong family history of prostate or urologic malignancy  Additional history includes hypogonadism  Patient had previously been treated with Androgel by endocrinology at [facility]  However, he states he discontinued medication >1 year ago as it was cost prohibitive    He states he is asymptomatic  No additional urologic history, surgical intervention or instrumentation  PSAs  2021 0 03  2021 0 02  3/3/2021 <0 1  10/4/2014 0 2    Review of Systems  Review of Systems   Constitutional: Negative  HENT: Negative  Respiratory: Negative  Cardiovascular: Negative  Gastrointestinal: Negative  Genitourinary: Positive for frequency  Negative for decreased urine volume, difficulty urinating, dysuria, flank pain, hematuria and urgency  Musculoskeletal: Negative  Skin: Negative  Neurological: Negative  Psychiatric/Behavioral: Negative          Past Medical History  Past Medical History:   Diagnosis Date    Acid reflux     Anxiety     Arthritis     Cellulitis     left ankle    Diabetes mellitus ([address] )     Hypertension     Sleep apnea     Twitching        Past Social History  Past Surgical History:   Procedure Laterality Date    APPENDECTOMY      CHOLECYSTECTOMY      KNEE ARTHROSCOPY      MD TOTAL KNEE ARTHROPLASTY Left 2016    Procedure: TOTAL  KNEE REPLACEMENT ;  Surgeon: Prashant Krishnan MD;  Location: [facility];  Service: Orthopedics       Past Family History  Family History   Problem Relation Age of Onset    Diabetes Mother     Diabetes Father     Cancer Father        Past Social history  Social History     Socioeconomic History    Marital status: /Civil Union     Spouse name: Not on file    Number of children: Not on file    Years of education: BS    Highest education level: Not on file   Occupational History    Occupation: Attendance officer   Tobacco Use    Smoking status: Former Smoker     Quit date:      Years since quittin 0    Smokeless tobacco: Never Used   Vaping Use    Vaping Use: Never used   Substance and Sexual Activity    Alcohol use: No    Drug use: No    Sexual activity: Not on file   Other Topics Concern    Not on file   Social History Narrative    Not on file     Social Determinants of Health Financial Resource Strain: Not on file   Food Insecurity: Not on file   Transportation Needs: Not on file   Physical Activity: Not on file   Stress: Not on file   Social Connections: Not on file   Intimate Partner Violence: Not on file   Housing Stability: Not on file       Current Medications  Current Outpatient Medications   Medication Sig Dispense Refill    albuterol (PROVENTIL HFA,VENTOLIN HFA) 90 mcg/act inhaler Inhale 2 puffs every 6 (six) hours as needed for wheezing   aspirin (ECOTRIN LOW STRENGTH) 81 mg EC tablet Take 81 mg by mouth daily      atorvastatin (LIPITOR) 20 mg tablet       cloNIDine (CATAPRES) 0 1 mg tablet Take 0 1 mg by mouth 3 (three) times a day   ergocalciferol (VITAMIN D2) 50,000 units       fluticasone (FLOVENT HFA) 220 mcg/act inhaler Inhale 1 puff daily as needed   lisinopril-hydrochlorothiazide (PRINZIDE,ZESTORETIC) 10-12 5 MG per tablet Take 1 tablet by mouth 2 (two) times a day      mupirocin (BACTROBAN) 2 % ointment       pantoprazole (PROTONIX) 40 mg tablet Take 40 mg by mouth daily        Silodosin 8 MG CAPS       budesonide (RINOCORT AQUA) 32 MCG/ACT nasal spray 1 spray into each nostril 2 (two) times a day  (Patient not taking: Reported on 1/24/2022 )      buprenorphine-naloxone (SUBOXONE) 8-2 mg  (Patient not taking: Reported on 1/24/2022 )      cephalexin (KEFLEX) 500 mg capsule  (Patient not taking: Reported on 1/24/2022 )      clotrimazole-betamethasone (LOTRISONE) 1-0 05 % cream  (Patient not taking: Reported on 1/24/2022 )      Dulaglutide (TRULICITY) 7 44 IC/1 3US SOPN Inject 0 75 mg under the skin once a week (Patient not taking: Reported on 1/24/2022 )      DULoxetine (Cymbalta) 30 mg delayed release capsule Take 1 capsule (30 mg total) by mouth 2 (two) times a day (Patient not taking: Reported on 12/13/2021 ) 60 capsule 0    furosemide (LASIX) 40 mg tablet Take 40 mg by mouth 2 (two) times a day as needed   (Patient not taking: Reported on 1/24/2022 )      HYDROcodone-acetaminophen [facility])  mg per tablet  (Patient not taking: Reported on 1/24/2022 )      LORazepam (ATIVAN) 0 5 mg tablet Take 0 5 mg by mouth daily at bedtime  (Patient not taking: Reported on 1/24/2022 )      metFORMIN (GLUCOPHAGE) 850 mg tablet  (Patient not taking: Reported on 12/13/2021 )      metFORMIN (GLUCOPHAGE-XR) 750 mg 24 hr tablet Take 750 mg by mouth 2 (two) times a day  (Patient not taking: Reported on 1/24/2022 )      NARCAN 4 MG/0 1ML LIQD  (Patient not taking: Reported on 1/24/2022 )      pioglitazone (ACTOS) 15 mg tablet  (Patient not taking: Reported on 1/24/2022 )      potassium chloride (K-DUR,KLOR-CON) 10 mEq tablet  (Patient not taking: Reported on 1/24/2022 )      sulfamethoxazole-trimethoprim (BACTRIM DS) 800-160 mg per tablet Take 1 tablet by mouth 2 (two) times a day (Patient not taking: Reported on 1/24/2022 )      testosterone (ANDROGEL) 1%  (Patient not taking: Reported on 1/24/2022 )      Testosterone 12 5 MG/ACT (1%) GEL Apply 2 pump presses to clean, dry skin every morning (Patient not taking: Reported on 1/24/2022)      urea (CARMOL) 40 %  (Patient not taking: Reported on 1/24/2022 )       No current facility-administered medications for this visit  Allergies  No Known Allergies    Past Medical History, Social History, Family History, medications and allergies were reviewed  Vitals  Vitals:    01/27/22 0839   BP: 132/80   Pulse: 79   SpO2: 93%   Weight: 128 kg (283 lb)   Height: 5' 6" (1 676 m)       Physical Exam  Physical Exam  Constitutional:       Appearance: Normal appearance  He is well-developed  He is obese  HENT:      Head: Normocephalic  Eyes:      Pupils: Pupils are equal, round, and reactive to light  Pulmonary:      Effort: Pulmonary effort is normal    Abdominal:      Palpations: Abdomen is soft     Genitourinary:     Prostate: Normal       Rectum: Normal       Comments: Prostate 45gm, smooth, nontender, no nodules  Musculoskeletal:         General: Normal range of motion  Cervical back: Normal range of motion  Skin:     General: Skin is warm and dry  Neurological:      General: No focal deficit present  Mental Status: He is alert and oriented to person, place, and time  Psychiatric:         Mood and Affect: Mood normal          Behavior: Behavior normal          Thought Content:  Thought content normal          Judgment: Judgment normal          Results    I have personally reviewed all pertinent lab results and reviewed with patient  Lab Results   Component Value Date    PSA <0 1 03/03/2021    PSA 0 2 10/04/2014     Lab Results   Component Value Date    GLUCOSE 120 10/04/2014    CALCIUM 9 3 02/08/2021     10/04/2014    K 6 0 (H) 02/08/2021    CO2 32 02/08/2021     02/08/2021    BUN 46 (H) 02/08/2021    CREATININE 1 81 (H) 02/08/2021     Lab Results   Component Value Date    WBC 11 06 (H) 07/18/2016    HGB 12 7 03/14/2020    HCT 42 5 03/14/2020    MCV 89 07/18/2016     07/18/2016     Recent Results (from the past 1 hour(s))   POCT urine dip    Collection Time: 01/27/22  8:45 AM   Result Value Ref Range    LEUKOCYTE ESTERASE,UA -     NITRITE,UA -     SL AMB POCT UROBILINOGEN 0 2     POCT URINE PROTEIN -      PH,UA 5 0     BLOOD,UA -     SPECIFIC GRAVITY,UA 1 015     KETONES,UA -     BILIRUBIN,UA -     GLUCOSE, UA -      COLOR,UA yellow     CLARITY,UA clear    POCT Measure PVR    Collection Time: 01/27/22  8:46 AM   Result Value Ref Range    POST-VOID RESIDUAL VOLUME, ML POC 14 mL

## 2022-01-27 ENCOUNTER — OFFICE VISIT (OUTPATIENT)
Dept: UROLOGY | Facility: AMBULATORY SURGERY CENTER | Age: 67
End: 2022-01-27
Payer: COMMERCIAL

## 2022-01-27 VITALS
BODY MASS INDEX: 45.48 KG/M2 | WEIGHT: 283 LBS | HEIGHT: 66 IN | SYSTOLIC BLOOD PRESSURE: 132 MMHG | HEART RATE: 79 BPM | DIASTOLIC BLOOD PRESSURE: 80 MMHG | OXYGEN SATURATION: 93 %

## 2022-01-27 DIAGNOSIS — N13.8 BPH WITH OBSTRUCTION/LOWER URINARY TRACT SYMPTOMS: Primary | ICD-10-CM

## 2022-01-27 DIAGNOSIS — N40.1 BPH WITH OBSTRUCTION/LOWER URINARY TRACT SYMPTOMS: Primary | ICD-10-CM

## 2022-01-27 LAB
POST-VOID RESIDUAL VOLUME, ML POC: 14 ML
SL AMB  POCT GLUCOSE, UA: NORMAL
SL AMB LEUKOCYTE ESTERASE,UA: NORMAL
SL AMB POCT BILIRUBIN,UA: NORMAL
SL AMB POCT BLOOD,UA: NORMAL
SL AMB POCT CLARITY,UA: CLEAR
SL AMB POCT COLOR,UA: YELLOW
SL AMB POCT KETONES,UA: NORMAL
SL AMB POCT NITRITE,UA: NORMAL
SL AMB POCT PH,UA: 5
SL AMB POCT SPECIFIC GRAVITY,UA: 1.01
SL AMB POCT URINE PROTEIN: NORMAL
SL AMB POCT UROBILINOGEN: 0.2

## 2022-01-27 PROCEDURE — 81002 URINALYSIS NONAUTO W/O SCOPE: CPT | Performed by: NURSE PRACTITIONER

## 2022-01-27 PROCEDURE — 51798 US URINE CAPACITY MEASURE: CPT | Performed by: NURSE PRACTITIONER

## 2022-01-27 PROCEDURE — 99204 OFFICE O/P NEW MOD 45 MIN: CPT | Performed by: NURSE PRACTITIONER

## 2022-01-27 NOTE — PATIENT INSTRUCTIONS
BLADDER HEALTH    WHAT IS CONSIDERED NORMAL?  The average bladder can hold about 2 cups of urine before it needs to be emptied   The normal range of voiding urine is 6 to 8 times during a 24 hour period  As we get older, our bladder capacity can get smaller and we may need to pass urine more frequently but usually not more than every 2 hours   Urine should flow easily without discomfort in a good, steady stream until the bladder is empty  No pushing or straining is necessary to empty the bladder   An urge is a signal that you feel as the bladder stretches to fill with urine  Urges can be felt even if the bladder is not full  Urges are not commands to go to the toilet, merely a signal and can be controlled  WHAT ARE GOOD BLADDER HABITS?  Take your time when emptying your bladder  Dont strain or push to empty your bladder  Make sure you empty your bladder completely each time you pass urine  Do not rush the process   Consistently ignoring the urge to go (waiting more than 4 hours between toileting) or urinating too infrequently may be convenient but not healthy for your bladder   Avoid going to the toilet just in case or more often than every 2 hours  It is usually not necessary to go when you feel the first urge  Try to go only when your bladder is full  Urgency and frequency of urination can be improved by retraining the bladder and spacing your fluid intake throughout the day  Practice good toilet habits  Dont let your bladder control your life  TIPS TO MAINTAIN GOOD BLADDER HABITS   Maintain a good fluid intake  Depending on your body size and environment, drink 6 -8 cups (8 oz each) of fluid per day unless otherwise advised by your doctor  Not enough fluid creates a foul odor and dark color of the urine     Limit the amount of caffeine (coffee, cola, chocolate or tea) and citrus foods that you consume as these foods can be associated with increased sensation of urinary urgency and frequency   Limit the amount of alcohol you drink  Alcohol increases urine production and makes it difficult for the brain to coordinate bladder control   Avoid constipation by maintaining a balanced diet of dietary fiber   Cigarette smoking is also irritating to the bladder surface and is associated with bladder cancer  In addition, the coughing associated with smoking may lead to increased incontinent episodes because of the increased pressure  HOW DIET CAN AFFECT YOUR BLADDER  Although there is no particular "diet" that can cure bladder control, there are certain dietary suggestions you can use to help control the problem  There are 2 points to consider when evaluating how your habits and diet may affect your bladder:    1  Foods and fluids can irritate the bladder  Some foods and beverages are thought to contribute to bladder leakage and irritability  However their effect on the bladder is not completely understood and you may want to see if eliminating one or all of these items improves your bladder control  If you are unable to give them up completely, it is recommended that you use the following items in moderation:   Acidic beverages and foods (orange juice, grapefruit juice, lemonade etc)   Alcoholic beverages   Vinegar   Coffee (regular and decaf)   Tea (regular and decaf)   Caffeinated beverages   Carbonated beverages          2  Drinking enough and the right kinds of fluids  Many people with bladder control issues decrease their intake of liquids in hope that they will need to urinate less frequently or have less urinary leakage   You should not restrict fluids to control your bladder  While a decrease in liquid intake does result in a decrease in the volume of urine, the smaller amount of urine may be more highly concentrated         Highly concentrated, dark yellow urine is irritating to the bladder surface and may actually cause you to go to the bathroom more frequently   It also encourages the growth of bacteria, which may lead to infections resulting in incontinence   Substitutions for Bladder Irritants: water is always the best beverage choice    Grape and apple juice are thirst quenchers are good selections and are not as irritating to the bladder   o Low acid fruits:  Pears, apricots, papaya, watermelon  o For coffee drinkers: KAVA®, Postum®, Peggy®, Kaffree Daniela®  o For tea drinkers:  non-citrus or herbal and sun brewed tea

## 2022-02-01 ENCOUNTER — EVALUATION (OUTPATIENT)
Dept: PHYSICAL THERAPY | Facility: REHABILITATION | Age: 67
End: 2022-02-01
Payer: COMMERCIAL

## 2022-02-01 DIAGNOSIS — G89.29 CHRONIC LOW BACK PAIN WITH BILATERAL SCIATICA, UNSPECIFIED BACK PAIN LATERALITY: ICD-10-CM

## 2022-02-01 DIAGNOSIS — M54.2 NECK PAIN: Primary | ICD-10-CM

## 2022-02-01 DIAGNOSIS — M54.41 CHRONIC LOW BACK PAIN WITH BILATERAL SCIATICA, UNSPECIFIED BACK PAIN LATERALITY: ICD-10-CM

## 2022-02-01 DIAGNOSIS — M54.42 CHRONIC LOW BACK PAIN WITH BILATERAL SCIATICA, UNSPECIFIED BACK PAIN LATERALITY: ICD-10-CM

## 2022-02-01 PROCEDURE — 97110 THERAPEUTIC EXERCISES: CPT | Performed by: PHYSICAL THERAPIST

## 2022-02-01 PROCEDURE — 97162 PT EVAL MOD COMPLEX 30 MIN: CPT | Performed by: PHYSICAL THERAPIST

## 2022-02-01 NOTE — PROGRESS NOTES
PT Evaluation     Today's date: 2022  Patient name: Be Platt  : 1955  MRN: [id number]  Referring provider: Liudmila Ayon DO  Dx:   Encounter Diagnosis     ICD-10-CM    1  Neck pain  M54 2 Ambulatory referral to Physical Therapy   2  Chronic low back pain with bilateral sciatica, unspecified back pain laterality  M54 41 Ambulatory referral to Physical Therapy    G89 29     M54 42                   Assessment  Assessment details: Patient presents with decreased cervical and lumbar ROM, decreased hip/core and scapular strength, postural deficits and decreased function secondary to chronic LBP and neck pain  Patient would benefit from skilled PT intervention to address these issues and to maximize function  Thank you for the referral   Impairments: abnormal or restricted ROM, activity intolerance, impaired balance, impaired physical strength, lacks appropriate home exercise program, pain with function, poor posture  and poor body mechanics  Other impairment: impaired flexibility  Understanding of Dx/Px/POC: good   Prognosis: good    Goals  Short Term:  Pt will report decreased levels of pain by at least 2 subjective ratings in 4 weeks  Pt will demonstrate improved ROM by at least 25% in 4 weeks  Pt will demonstrate improved strength by 1/2 grade MMT in 4 weeks  Long Term:   Pt will be independent in their HEP in 8 weeks  Pt will demonstrate improved FOTO, > predicted level  Pt will be independent with all ADL's  Pt will be able to ambulate community distances with max 3/10 LBP    Plan  Plan details: Patient was educated in HEP and Plan of Care  All questions were answered to pt's satisfaction      Patient would benefit from: skilled physical therapy  Planned therapy interventions: manual therapy, joint mobilization, abdominal trunk stabilization, neuromuscular re-education, patient education, postural training, strengthening, stretching, therapeutic activities, therapeutic exercise, flexibility, graded exercise, home exercise program, transfer training and body mechanics training  Frequency: 2x week  Duration in weeks: 8  Plan of Care beginning date: 2/1/2022  Plan of Care expiration date: 3/29/2022  Treatment plan discussed with: patient        Subjective Evaluation    History of Present Illness  Mechanism of injury: Pt is a 77 y o male with a c/o ongoing intermittent LBP since 2018 of insidious onset, as well as neck pain for the last few months, which pt attributes to falling at that time and bracing self with R outstrethed arm  Pt saw Dr Ronda Mcknight recently  MD ordered radiographs of cervical spine, lumbar spine and R shoulder, but pt has not had this done yet  Pt is now referred for PT  Pt reports pain/difficulty with ambulation (limited tolerance due to LBP; utilizes electric carts at larger stores; uses SPC majority of time and feels more stable with using it), sit to stand transfers, supine <-->sit transfers, steps (reciprocal with SPC and railing), dressing (bending to macy pants; wife assists with compression socks and sneakers), household activities (only doing light tasks), sleep (prefers the recliner), reaching [address] with R UE (c/o R shoulder and cervical pain), turning head while driving  Pain  Location: cervical spine 10/10 at worst; 3/10 at best; lumbar spine 10/10 at worst, 6/10 at best  Relieving factors: medications    Hand dominance: right      Diagnostic Tests  X-ray: abnormal (L/S in 2018)  Treatments  Previous treatment: physical therapy and medication  Patient Goals  Patient goals for therapy: decreased pain, increased strength, independence with ADLs/IADLs and improved balance          Objective     Concurrent Complaints      Additional Special Questions  Patient denies loss of bowel/bladder control, saddle anesthesia, unexplained weight loss, history of cancer  Patient denies diplopia, dysarthria, dysphagia, dizziness, drop attacks, nausea      Postural Observations  Seated posture: poor    Additional Postural Observation Details  Slouched sitting posture, fwd head and rounded shoulders  Tenderness     Additional Tenderness Details  Mild TTP upper thoracic SP's  Mild TTP b/l lumbar paraspinals  Neurological Testing     Reflexes   Left   Biceps (C5/C6): trace (1+)  Brachioradialis (C6): trace (1+)  Triceps (C7): trace (1+)  Patellar (L4): normal (2+)  Achilles (S1): trace (1+)  Clonus sign: negative    Right   Biceps (C5/C6): trace (1+)  Brachioradialis (C6): trace (1+)  Triceps (C7): trace (1+)  Patellar (L4): trace (1+)  Achilles (S1): absent (0)  Clonus sign: negative    Additional Neurological Details  Pt reports intermittent L UE N/T with prolonged laying on left side and subsides when takes pressure off of it  Pt also reports constant b/l feet N/T due to diabetic neuropathy        Active Range of Motion   Cervical/Thoracic Spine       Cervical    Flexion: 30 degrees  with pain  Extension: 24 degrees     with pain  Left lateral flexion: 14 degrees     with pain  Right lateral flexion: 13 degrees     with pain  Left rotation: 50 degrees  Right rotation: 55 degrees    with pain  Left Shoulder   Flexion: WFL  Abduction: 125 degrees   External rotation 0°: WFL  External rotation BTH: WFL  Internal rotation BTB: WFL    Right Shoulder   Flexion: WFL and with pain  Abduction: 125 degrees with pain  External rotation 0°: WFL  External rotation BTH: WFL and with pain  Internal rotation BTB: [facility]    Additional Active Range of Motion Details  L/S AROM (% of normal):  Flex=75% without pain ; repeated=mild lumbar pain  Ext=25% without pain ; repeated=some pain in lumbar spine and b/l knees  RSB=50% with R sided trunk pain  LSB=50% with b/l LBP  R rot=50% without pain  L rot=50% without pain    Joint Play   Joints within functional limits: C1 and C2     Hypomobile: L5     Pain: L1, L2, L3 and L4     Strength/Myotome Testing   Cervical Spine     Left   Interossei strength (t1): 5    Right Interossei strength (t1): 5    Left Shoulder     Planes of Motion   Flexion: 5   Abduction: 5   External rotation at 0°: 5   Internal rotation at 0°: 5     Right Shoulder     Planes of Motion   Flexion: 5 (mild pain)   Abduction: 5   External rotation at 0°: 4+   Internal rotation at 0°: 5     Left Elbow   Flexion: 5  Extension: 5    Right Elbow   Flexion: 5  Extension: 5    Left Wrist/Hand   Wrist extension: 5  Wrist flexion: 5  Thumb extension: 5    Right Wrist/Hand   Wrist extension: 5  Wrist flexion: 5  Thumb extension: 5    Left Hip   Planes of Motion   Flexion: 4+  Abduction: 4  External rotation: 4-    Right Hip   Planes of Motion   Flexion: 4+  Abduction: 4  External rotation: 4-    Left Knee   Flexion: 5  Extension: 5    Right Knee   Flexion: 5  Extension: 5    Left Ankle/Foot   Dorsiflexion: 5  Plantar flexion: 5 (seated)    Right Ankle/Foot   Dorsiflexion: 5  Plantar flexion: 5 (seated)    Tests   Cervical   Negative vertical compression, cervical distraction, alar ligament test and Sharp-Tatyana test      Left   Negative Spurling's Test A and cervical flexion-rotation test      Right   Negative Spurling's Test A and cervical flexion-rotation test      Lumbar   Positive prone instability   Negative vertical compression and SIJ compression  Left   Negative crossed SLR and passive SLR  Right   Negative crossed SLR and passive SLR  Left Pelvic Girdle/Sacrum   Negative: active SLR test      Right Pelvic Girdle/Sacrum   Negative: active SLR test      Left Hip   Negative ROMY  Right Hip   Negative ROMY  Additional Tests Details  (+)scapular assistance for increased c/s rotation  Decreased b/l UT and SCM  Decreased b/l HS and piriformis flexibility noted                 Precautions: anxiety, diabetes, OA, HTN, L TKA, chronic LBP      Manuals 2/1            C/S PROM BSB and B rot             B/L HS and piriformis streching                                       Neuro Re-Ed TA w/LPD             TA w/rows             TB b/l ER w/scap squeeze             TA w/multifidus press             Standing pball crush                                        Ther Ex             Nu-step             Serratus wall slides             C/S SNAGS b/l rot             TA w/march             TA w/kicks             TA w/bridges             Standing hip abd/ext             Pt education +HEP instruction TP 8 mins            Ther Activity             Posture training in chair             Sit to stand             Gait Training                                       Modalities

## 2022-02-03 ENCOUNTER — OFFICE VISIT (OUTPATIENT)
Dept: PHYSICAL THERAPY | Facility: REHABILITATION | Age: 67
End: 2022-02-03
Payer: COMMERCIAL

## 2022-02-03 DIAGNOSIS — M54.2 NECK PAIN: Primary | ICD-10-CM

## 2022-02-03 DIAGNOSIS — G89.29 CHRONIC LOW BACK PAIN WITH BILATERAL SCIATICA, UNSPECIFIED BACK PAIN LATERALITY: ICD-10-CM

## 2022-02-03 DIAGNOSIS — M54.42 CHRONIC LOW BACK PAIN WITH BILATERAL SCIATICA, UNSPECIFIED BACK PAIN LATERALITY: ICD-10-CM

## 2022-02-03 DIAGNOSIS — M54.41 CHRONIC LOW BACK PAIN WITH BILATERAL SCIATICA, UNSPECIFIED BACK PAIN LATERALITY: ICD-10-CM

## 2022-02-03 PROCEDURE — 97112 NEUROMUSCULAR REEDUCATION: CPT | Performed by: PHYSICAL THERAPIST

## 2022-02-03 PROCEDURE — 97110 THERAPEUTIC EXERCISES: CPT | Performed by: PHYSICAL THERAPIST

## 2022-02-03 PROCEDURE — 97140 MANUAL THERAPY 1/> REGIONS: CPT | Performed by: PHYSICAL THERAPIST

## 2022-02-03 NOTE — PROGRESS NOTES
Daily Note     Today's date: 2/3/2022  Patient name: Mahesh Badillo  : 1955  MRN: [id number]  Referring provider: Luly Jones DO  Dx:   Encounter Diagnosis     ICD-10-CM    1  Neck pain  M54 2    2  Chronic low back pain with bilateral sciatica, unspecified back pain laterality  M54 41     G89 29     M54 42                   Subjective: Pt reports experiencing an increase in neck pain yesterday while at great grandson's birthday  Pt has yet to attempt his HEP  Objective: See treatment diary below      Assessment: Tolerated treatment well  No increase in pain reported during or after session  Pt had more difficulty and restriction with L cervical rotation this session  Monitor response NV  Patient would benefit from continued PT      Plan: Continue per plan of care  Progress treatment as tolerated         Precautions: anxiety, diabetes, OA, HTN, L TKA, chronic LBP      Manuals  2/3           C/S PROM BSB and B rot  TP 4 mins           B/L HS and piriformis streching  TP 6 mins                                     Neuro Re-Ed             TA w/LPD  otb 2x10           TA w/rows  otb 2x10           TB b/l ER w/scap squeeze             TA w/multifidus press             Standing pball crush   5"x15                                     Ther Ex             Nu-step  L4x7'           Serratus wall slides  5"x10           C/S SNAGS b/l rot  5"x10ea           TA w/march             TA w/kicks             TA w/bridges             Standing hip abd/ext             Pt education +HEP instruction TP 8 mins            Ther Activity             Posture training in chair             Sit to stand             Gait Training                                       Modalities

## 2022-02-07 ENCOUNTER — OFFICE VISIT (OUTPATIENT)
Dept: PHYSICAL THERAPY | Facility: REHABILITATION | Age: 67
End: 2022-02-07
Payer: COMMERCIAL

## 2022-02-07 DIAGNOSIS — M54.2 NECK PAIN: Primary | ICD-10-CM

## 2022-02-07 DIAGNOSIS — G89.29 CHRONIC LOW BACK PAIN WITH BILATERAL SCIATICA, UNSPECIFIED BACK PAIN LATERALITY: ICD-10-CM

## 2022-02-07 DIAGNOSIS — M54.42 CHRONIC LOW BACK PAIN WITH BILATERAL SCIATICA, UNSPECIFIED BACK PAIN LATERALITY: ICD-10-CM

## 2022-02-07 DIAGNOSIS — M54.41 CHRONIC LOW BACK PAIN WITH BILATERAL SCIATICA, UNSPECIFIED BACK PAIN LATERALITY: ICD-10-CM

## 2022-02-07 PROCEDURE — 97112 NEUROMUSCULAR REEDUCATION: CPT | Performed by: PHYSICAL THERAPIST

## 2022-02-07 PROCEDURE — 97110 THERAPEUTIC EXERCISES: CPT | Performed by: PHYSICAL THERAPIST

## 2022-02-07 NOTE — PROGRESS NOTES
Daily Note     Today's date: 2022  Patient name: Lucia Lange  : 1955  MRN: [id number]  Referring provider: Jason Rivera DO  Dx:   Encounter Diagnosis     ICD-10-CM    1  Neck pain  M54 2    2  Chronic low back pain with bilateral sciatica, unspecified back pain laterality  M54 41     G89 29     M54 42                   Subjective: Pt reports feeling alright after last session, but had some difficulty with walking over the weekend due to intermittent LBP  Objective: See treatment diary below      Assessment: Tolerated treatment well  VC's required for correct technique with TE's  Patient would benefit from continued PT      Plan: Progress treatment as tolerated         Precautions: anxiety, diabetes, OA, HTN, L TKA, chronic LBP      Manuals  2/3 2/7          C/S PROM BSB and B rot  TP 4 mins TP 4 mins          B/L HS and piriformis streching  TP 6 mins TP 6 mins                                    Neuro Re-Ed             TA w/LPD  otb 2x10 otb 2x10          TA w/rows  otb 2x10 otb 2x10          TB b/l ER w/scap squeeze             TA w/multifidus press   otb 3" x15ea          Standing pball crush   5"x15 5"x15                                    Ther Ex             Nu-step  L4x7' L4x10'          Serratus wall slides  5"x10 5"x10          C/S SNAGS b/l rot  5"x10ea 5"x10ea          TA w/march             TA w/kicks             TA w/bridges             Standing hip abd/ext             Pt education +HEP instruction TP 8 mins            Ther Activity             Posture training in chair             Sit to stand             Gait Training                                       Modalities

## 2022-02-08 ENCOUNTER — OFFICE VISIT (OUTPATIENT)
Dept: GASTROENTEROLOGY | Facility: CLINIC | Age: 67
End: 2022-02-08
Payer: COMMERCIAL

## 2022-02-08 VITALS
WEIGHT: 288 LBS | DIASTOLIC BLOOD PRESSURE: 66 MMHG | HEIGHT: 66 IN | SYSTOLIC BLOOD PRESSURE: 122 MMHG | BODY MASS INDEX: 46.28 KG/M2 | TEMPERATURE: 96.7 F

## 2022-02-08 DIAGNOSIS — Z12.11 COLON CANCER SCREENING: ICD-10-CM

## 2022-02-08 DIAGNOSIS — R11.0 NAUSEA IN ADULT: ICD-10-CM

## 2022-02-08 DIAGNOSIS — R14.0 ABDOMINAL BLOATING: ICD-10-CM

## 2022-02-08 DIAGNOSIS — D64.9 ANEMIA, UNSPECIFIED TYPE: ICD-10-CM

## 2022-02-08 DIAGNOSIS — R63.0 LACK OF APPETITE: ICD-10-CM

## 2022-02-08 DIAGNOSIS — R76.8 HEPATITIS C ANTIBODY POSITIVE IN BLOOD: Primary | ICD-10-CM

## 2022-02-08 PROCEDURE — 99205 OFFICE O/P NEW HI 60 MIN: CPT | Performed by: PHYSICIAN ASSISTANT

## 2022-02-08 RX ORDER — SEMAGLUTIDE 1.34 MG/ML
1 INJECTION, SOLUTION SUBCUTANEOUS WEEKLY
COMMUNITY

## 2022-02-08 NOTE — PROGRESS NOTES
[facility] - Outpatient Consultation  Quique Blank 77 y o  male MRN: [id number]  Encounter: [id number]      Assessment and Plan    1  Nausea  2  Lack of appetite  3  Abdominal bloating  The patient has a generalized feeling of malaise with increased fatigue and shortness of breath as well as some GI complaints including nausea, lack of appetite, and abdominal bloating  He is currently taking pantoprazole 40 milligrams once daily  Reports an EGD many years ago  4  Anemia  Most recent hemoglobin 1/14/2022 was 11 1  It appears as though the patient has had chronic anemia for at least 1 year with his baseline between 10 7 to 11 4  The patient denies any overt GI bleeding    5  Hep C  The patient states that he had routine hepatitis-C screening his antibody came back positive  He did not have a viral load obtained  He does have a history of IV drug use and he does have 1 tattoo that was not done at a tattoo [facility]  No other risk factors  His liver enzymes are within normal limits    6  Colon cancer screening  Last colonoscopy was 10+ years ago  Denies any abnormal bowel movements or blood in his stool  Recent fecal occult blood test negative  For the patient's hepatitisC + ab we will obtain a hep C viral load  If the patient has a detectable viral load will pursue treatment  As for his other symptoms I discussed both upper endoscopy and colonoscopy  The patient is interested in these but not at this current time as he is not feeling well  I encouraged him to visit his primary care physician as many of his symptoms are not GI related, he plans to schedule a visit  Will see him back in 3-4 months or sooner if needed  In the meantime he will continue on pantoprazole 40 milligrams once daily      ______________________________________________________________________    History of Present Illness  Quique Blank is a 77 y o  male with HTN, HLD, DM2, CKD, and sleep apnea here for consultation of hepatitis C  The patient states that he had routine hepatitis-C screening his antibody came back positive  He did not have a viral load obtained  He does have a history of IV drug use and he does have 1 tattoo that was not done at a tattoo parlor  No other risk factors  His liver enzymes are within normal limits    He does presents today with lower overall feeling of not feeling well  He states that he has been extra fatigued, short of breath, and having difficulty sleeping  From a GI standpoint he feels nauseous, has a lack of appetite, and some abdominal bloating  No unintentional weight loss or large symptoms  Recent HGB 11 1  He continues on pantoprazole 40 milligrams once daily  His last EGD and colonoscopy will probably over 10 years ago  He did have a recent fecal occult blood test which was negative  Review of Systems   Constitutional: Positive for fatigue  Negative for activity change, appetite change, chills, fever and unexpected weight change  HENT: Negative for sore throat and trouble swallowing  Respiratory: Positive for shortness of breath  Negative for cough and choking  Gastrointestinal: Positive for abdominal distention and nausea  Negative for abdominal pain, anal bleeding, blood in stool, constipation, diarrhea, rectal pain and vomiting  Musculoskeletal: Positive for gait problem  Negative for back pain  Psychiatric/Behavioral: Negative for confusion         Past Medical History  Past Medical History:   Diagnosis Date    Acid reflux     Anxiety     Arthritis     Cellulitis     left ankle    Diabetes mellitus ([address] )     Hypertension     Sleep apnea     Twitching        Past Social history  Past Surgical History:   Procedure Laterality Date    APPENDECTOMY      CHOLECYSTECTOMY      KNEE ARTHROSCOPY      NV TOTAL KNEE ARTHROPLASTY Left 7/13/2016    Procedure: TOTAL  KNEE REPLACEMENT ;  Surgeon: Beau Marks MD;  Location: [facility]; Service: Orthopedics     Social History     Socioeconomic History    Marital status: /Civil Union     Spouse name: Not on file    Number of children: Not on file    Years of education: BS    Highest education level: Not on file   Occupational History    Occupation: Attendance officer   Tobacco Use    Smoking status: Former Smoker     Quit date:      Years since quittin 1    Smokeless tobacco: Never Used   Vaping Use    Vaping Use: Never used   Substance and Sexual Activity    Alcohol use: No    Drug use: No    Sexual activity: Not on file   Other Topics Concern    Not on file   Social History Narrative    Not on file     Social Determinants of Health     Financial Resource Strain: Not on file   Food Insecurity: Not on file   Transportation Needs: Not on file   Physical Activity: Not on file   Stress: Not on file   Social Connections: Not on file   Intimate Partner Violence: Not on file   Housing Stability: Not on file     Social History     Substance and Sexual Activity   Alcohol Use No     Social History     Substance and Sexual Activity   Drug Use No     Social History     Tobacco Use   Smoking Status Former Smoker    Quit date:     Years since quittin 1   Smokeless Tobacco Never Used       Past Family History  Family History   Problem Relation Age of Onset    Diabetes Mother     Diabetes Father     Cancer Father        Current Medications  Current Outpatient Medications   Medication Sig Dispense Refill    albuterol (PROVENTIL HFA,VENTOLIN HFA) 90 mcg/act inhaler Inhale 2 puffs every 6 (six) hours as needed for wheezing   aspirin (ECOTRIN LOW STRENGTH) 81 mg EC tablet Take 81 mg by mouth daily      atorvastatin (LIPITOR) 20 mg tablet       budesonide (RINOCORT AQUA) 32 MCG/ACT nasal spray 1 spray into each nostril 2 (two) times a day     (Patient not taking: Reported on 2022 )      buprenorphine-naloxone (SUBOXONE) 8-2 mg  (Patient not taking: Reported on 1/24/2022 )      cephalexin (KEFLEX) 500 mg capsule  (Patient not taking: Reported on 1/24/2022 )      cloNIDine (CATAPRES) 0 1 mg tablet Take 0 1 mg by mouth 3 (three) times a day   clotrimazole-betamethasone (LOTRISONE) 1-0 05 % cream  (Patient not taking: Reported on 1/24/2022 )      Dulaglutide (TRULICITY) 3 49 VT/2 6TQ SOPN Inject 0 75 mg under the skin once a week (Patient not taking: Reported on 1/24/2022 )      DULoxetine (Cymbalta) 30 mg delayed release capsule Take 1 capsule (30 mg total) by mouth 2 (two) times a day (Patient not taking: Reported on 12/13/2021 ) 60 capsule 0    ergocalciferol (VITAMIN D2) 50,000 units       fluticasone (FLOVENT HFA) 220 mcg/act inhaler Inhale 1 puff daily as needed   furosemide (LASIX) 40 mg tablet Take 40 mg by mouth 2 (two) times a day as needed  (Patient not taking: Reported on 1/24/2022 )      HYDROcodone-acetaminophen [facility])  mg per tablet  (Patient not taking: Reported on 1/24/2022 )      lisinopril-hydrochlorothiazide (PRINZIDE,ZESTORETIC) 10-12 5 MG per tablet Take 1 tablet by mouth 2 (two) times a day      LORazepam (ATIVAN) 0 5 mg tablet Take 0 5 mg by mouth daily at bedtime  (Patient not taking: Reported on 1/24/2022 )      metFORMIN (GLUCOPHAGE) 850 mg tablet  (Patient not taking: Reported on 12/13/2021 )      metFORMIN (GLUCOPHAGE-XR) 750 mg 24 hr tablet Take 750 mg by mouth 2 (two) times a day     (Patient not taking: Reported on 1/24/2022 )      mupirocin (BACTROBAN) 2 % ointment       NARCAN 4 MG/0 1ML LIQD  (Patient not taking: Reported on 1/24/2022 )      pantoprazole (PROTONIX) 40 mg tablet Take 40 mg by mouth daily        pioglitazone (ACTOS) 15 mg tablet  (Patient not taking: Reported on 1/24/2022 )      potassium chloride (K-DUR,KLOR-CON) 10 mEq tablet  (Patient not taking: Reported on 1/24/2022 )      Silodosin 8 MG CAPS       sulfamethoxazole-trimethoprim (BACTRIM DS) 800-160 mg per tablet Take 1 tablet by mouth 2 (two) times a day (Patient not taking: Reported on 1/24/2022 )      testosterone (ANDROGEL) 1%  (Patient not taking: Reported on 1/24/2022 )      Testosterone 12 5 MG/ACT (1%) GEL Apply 2 pump presses to clean, dry skin every morning (Patient not taking: Reported on 1/24/2022)      urea (CARMOL) 40 %  (Patient not taking: Reported on 1/24/2022 )       No current facility-administered medications for this visit  Allergies  No Known Allergies      The following portions of the patient's history were reviewed and updated as appropriate: allergies, current medications, past medical history, past social history, past surgical history and problem list       Vitals  There were no vitals filed for this visit  Physical Exam  Constitutional   General appearance: Patient is seated and in no acute distress, well appearing and well nourished  Head and Face   Head and face: Normal     Eyes   Conjunctiva and lids: No erythema, swelling or discharge  Anicteric  Ears, Nose, Mouth, and Throat   Hearing: Normal     Neck: Supple, trachea midline  Pulmonary   Respiratory effort: No increased work of breathing or signs of respiratory distress  Lungs: Clear to ascultation, no wheezes, rhonchi, or rales  Cardiovascular   Heart: Regular rate and rhythm, no murmurs gallops or rubs   Examination of extremities for edema and/or varicosities: Normal     Abdomen   Abdomen: Soft, non-tender, no masses, no organomegaly  Normal bowel sounds  Musculoskeletal   Gait and station: Walks with a walker   Skin   Skin and subcutaneous tissue: Warm, dry, and intact  No visible jaundice, lesions or rashes  Psychiatric   Judgment and insight: Normal  Recent and remote memory:  Normal  Mood and affect: Normal      Results  No visits with results within 1 Day(s) from this visit     Latest known visit with results is:   Office Visit on 01/27/2022   Component Date Value    LEUKOCYTE ESTERASE,UA 01/27/2022 -     NITRITE,UA 01/27/2022 -  SL AMB POCT UROBILINOGEN 01/27/2022 0 2     POCT URINE PROTEIN 01/27/2022 -      PH,UA 01/27/2022 5 0     BLOOD,UA 01/27/2022 -     SPECIFIC GRAVITY,UA 01/27/2022 1 015     KETONES,UA 01/27/2022 -     BILIRUBIN,UA 01/27/2022 -     GLUCOSE, UA 01/27/2022 -      COLOR,UA 01/27/2022 yellow     CLARITY,UA 01/27/2022 clear     POST-VOID RESIDUAL VOLUM* 01/27/2022 14        Radiology Results  Echo complete w/ contrast if indicated    Result Date: 1/13/2022  Narrative: Nayejustice Chávez  Left Ventricle: Left ventricular cavity size is normal  The left ventricular ejection fraction is 65%  Systolic function is normal  Wall motion is normal  Diastolic function is normal for age  Wall thickness is mildly increased    Right Ventricle: Right ventricular cavity size is normal  Systolic function is normal    Left Atrium: The atrium is mildly dilated    Mitral Valve: There is trace regurgitation  Impression: Preserved biventricular function with normal chamber sizes  No significant valve disease  Orders  No orders of the defined types were placed in this encounter

## 2022-02-10 ENCOUNTER — APPOINTMENT (OUTPATIENT)
Dept: PHYSICAL THERAPY | Facility: REHABILITATION | Age: 67
End: 2022-02-10
Payer: COMMERCIAL

## 2022-02-14 ENCOUNTER — APPOINTMENT (OUTPATIENT)
Dept: PHYSICAL THERAPY | Facility: REHABILITATION | Age: 67
End: 2022-02-14
Payer: COMMERCIAL

## 2022-02-17 ENCOUNTER — APPOINTMENT (OUTPATIENT)
Dept: PHYSICAL THERAPY | Facility: REHABILITATION | Age: 67
End: 2022-02-17
Payer: COMMERCIAL

## 2022-02-21 ENCOUNTER — APPOINTMENT (OUTPATIENT)
Dept: PHYSICAL THERAPY | Facility: REHABILITATION | Age: 67
End: 2022-02-21
Payer: COMMERCIAL

## 2022-02-23 ENCOUNTER — HOSPITAL ENCOUNTER (OUTPATIENT)
Dept: RADIOLOGY | Facility: HOSPITAL | Age: 67
Discharge: HOME | End: 2022-02-23
Attending: ANESTHESIOLOGY
Payer: COMMERCIAL

## 2022-02-23 DIAGNOSIS — M25.511 RIGHT SHOULDER PAIN, UNSPECIFIED CHRONICITY: ICD-10-CM

## 2022-02-23 DIAGNOSIS — M54.2 NECK PAIN: ICD-10-CM

## 2022-02-23 DIAGNOSIS — M54.41 CHRONIC LOW BACK PAIN WITH BILATERAL SCIATICA, UNSPECIFIED BACK PAIN LATERALITY: ICD-10-CM

## 2022-02-23 DIAGNOSIS — G89.29 CHRONIC LOW BACK PAIN WITH BILATERAL SCIATICA, UNSPECIFIED BACK PAIN LATERALITY: ICD-10-CM

## 2022-02-23 DIAGNOSIS — M54.42 CHRONIC LOW BACK PAIN WITH BILATERAL SCIATICA, UNSPECIFIED BACK PAIN LATERALITY: ICD-10-CM

## 2022-02-23 PROCEDURE — 73030 X-RAY EXAM OF SHOULDER: CPT

## 2022-02-23 PROCEDURE — 72100 X-RAY EXAM L-S SPINE 2/3 VWS: CPT

## 2022-02-23 PROCEDURE — 72050 X-RAY EXAM NECK SPINE 4/5VWS: CPT

## 2022-02-24 ENCOUNTER — OFFICE VISIT (OUTPATIENT)
Dept: PHYSICAL THERAPY | Facility: REHABILITATION | Age: 67
End: 2022-02-24
Payer: COMMERCIAL

## 2022-02-24 DIAGNOSIS — M54.41 CHRONIC LOW BACK PAIN WITH BILATERAL SCIATICA, UNSPECIFIED BACK PAIN LATERALITY: ICD-10-CM

## 2022-02-24 DIAGNOSIS — G89.29 CHRONIC LOW BACK PAIN WITH BILATERAL SCIATICA, UNSPECIFIED BACK PAIN LATERALITY: ICD-10-CM

## 2022-02-24 DIAGNOSIS — M54.2 NECK PAIN: Primary | ICD-10-CM

## 2022-02-24 DIAGNOSIS — M54.42 CHRONIC LOW BACK PAIN WITH BILATERAL SCIATICA, UNSPECIFIED BACK PAIN LATERALITY: ICD-10-CM

## 2022-02-24 PROCEDURE — 97110 THERAPEUTIC EXERCISES: CPT | Performed by: PHYSICAL THERAPIST

## 2022-02-24 PROCEDURE — 97112 NEUROMUSCULAR REEDUCATION: CPT | Performed by: PHYSICAL THERAPIST

## 2022-02-24 NOTE — PROGRESS NOTES
Daily Note     Today's date: 2022  Patient name: Omid Amaya  : 1955  MRN: [id number]  Referring provider: René Ortiz DO  Dx:   Encounter Diagnosis     ICD-10-CM    1  Neck pain  M54 2    2  Chronic low back pain with bilateral sciatica, unspecified back pain laterality  M54 41     G89 29     M54 42            1on1 [id number] and performed remaining TE's as part of IEP  Subjective: Pt returns to PT after missing a few weeks due to having COVID and being hospitalized for several days  Pt is finally feeling better and was cleared to resume PT by his PCP  Pt notes he has started performing some of his HEP and his neck has improved  Pt's chief complaint if that of LBP  Objective: See treatment diary below      Assessment: Tolerated treatment well  Good tolerance to progressions this session  Pt unable to perform bridges due to pain/weakness  Patient would benefit from continued PT      Plan: Continue per plan of care  Progress treatment as tolerated         Precautions: anxiety, diabetes, OA, HTN, L TKA, chronic LBP      Manuals  2/3 2/7 2/24         C/S PROM BSB and B rot  TP 4 mins TP 4 mins np         B/L HS and piriformis streching  TP 6 mins TP 6 mins TP 4 mins                                   Neuro Re-Ed             TA w/LPD  otb 2x10 otb 2x10 otb 2x10         TA w/rows  otb 2x10 otb 2x10 otb 2x10         TB b/l ER w/scap squeeze             TA w/multifidus press   otb 3" x15ea otb 3"x15ea         Standing pball crush   5"x15 5"x15 5"x15                                   Ther Ex             Nu-step  L4x7' L4x10' L4x10'         Serratus wall slides  5"x10 5"x10 np         C/S SNAGS b/l rot  5"x10ea 5"x10ea np         TA w/march    x10         TA w/kicks    x10         TA w/bridges    np-unable         TA w/glute squeeze    5"x15         Standing hip abd/ext    x15ea         Pt education +HEP instruction TP 8 mins            Ther Activity             Posture training in chair             Sit to stand    x10         Gait Training                                       Modalities

## 2022-02-28 ENCOUNTER — OFFICE VISIT (OUTPATIENT)
Dept: PHYSICAL THERAPY | Facility: REHABILITATION | Age: 67
End: 2022-02-28
Payer: COMMERCIAL

## 2022-02-28 DIAGNOSIS — M54.2 NECK PAIN: Primary | ICD-10-CM

## 2022-02-28 DIAGNOSIS — M54.42 CHRONIC LOW BACK PAIN WITH BILATERAL SCIATICA, UNSPECIFIED BACK PAIN LATERALITY: ICD-10-CM

## 2022-02-28 DIAGNOSIS — M54.41 CHRONIC LOW BACK PAIN WITH BILATERAL SCIATICA, UNSPECIFIED BACK PAIN LATERALITY: ICD-10-CM

## 2022-02-28 DIAGNOSIS — G89.29 CHRONIC LOW BACK PAIN WITH BILATERAL SCIATICA, UNSPECIFIED BACK PAIN LATERALITY: ICD-10-CM

## 2022-02-28 PROCEDURE — 97110 THERAPEUTIC EXERCISES: CPT | Performed by: PHYSICAL THERAPIST

## 2022-02-28 PROCEDURE — 97112 NEUROMUSCULAR REEDUCATION: CPT | Performed by: PHYSICAL THERAPIST

## 2022-02-28 NOTE — PROGRESS NOTES
Daily Note     Today's date: 2022  Patient name: Sandra Mckeon  : 1955  MRN: [id number]  Referring provider: Memo Zavala DO  Dx:   Encounter Diagnosis     ICD-10-CM    1  Neck pain  M54 2    2  Chronic low back pain with bilateral sciatica, unspecified back pain laterality  M54 41     G89 29     M54 42                   Subjective: Pt reports his neck and back are acting up from having his 2 y o grandson this weekend  Objective: See treatment diary below      Assessment: Tolerated treatment well  VC's required for correct technique with majority of exercises  Pt was unable to lay supine this session to perform table exercises due to complaints of breathing difficulty when laying down  Patient would benefit from continued PT      Plan: Continue per plan of care  Progress treatment as tolerated         Precautions: anxiety, diabetes, OA, HTN, L TKA, chronic LBP      Manuals 2/1 2/3 2/7 2/24 2/28        C/S PROM BSB and B rot  TP 4 mins TP 4 mins np np        B/L HS and piriformis streching  TP 6 mins TP 6 mins TP 4 mins np                                  Neuro Re-Ed             TA w/LPD  otb 2x10 otb 2x10 otb 2x10 otb 2x10        TA w/rows  otb 2x10 otb 2x10 otb 2x10 otb 2x10        TB b/l ER w/scap squeeze             TA w/multifidus press   otb 3" x15ea otb 3"x15ea otb 3"x15ea        Standing pball crush   5"x15 5"x15 5"x15 5"x15                                  Ther Ex             Nu-step  L4x7' L4x10' L4x10' L4x10'        Serratus wall slides  5"x10 5"x10 np 5"x10        C/S SNAGS b/l rot  5"x10ea 5"x10ea np 5"x10ea        TA w/march    x10 standing pbal crush w/march x10ea        TA w/kicks    x10 np        TA w/bridges    np-unable         TA w/glute squeeze    5"x15 np        Standing hip abd/ext    x15ea 2x10ea        Pt education +HEP instruction TP 8 mins            Ther Activity             Posture training in chair             Sit to stand    x10 x10        Gait Training Modalities

## 2022-03-03 ENCOUNTER — APPOINTMENT (OUTPATIENT)
Dept: PHYSICAL THERAPY | Facility: REHABILITATION | Age: 67
End: 2022-03-03
Payer: COMMERCIAL

## 2022-03-07 ENCOUNTER — APPOINTMENT (OUTPATIENT)
Dept: PHYSICAL THERAPY | Facility: REHABILITATION | Age: 67
End: 2022-03-07
Payer: COMMERCIAL

## 2022-03-09 ENCOUNTER — FOLLOW UP (OUTPATIENT)
Dept: URBAN - METROPOLITAN AREA CLINIC 6 | Facility: CLINIC | Age: 67
End: 2022-03-09

## 2022-03-09 ENCOUNTER — OFFICE VISIT (OUTPATIENT)
Dept: PAIN MEDICINE | Facility: CLINIC | Age: 67
End: 2022-03-09
Payer: COMMERCIAL

## 2022-03-09 VITALS
HEIGHT: 66 IN | WEIGHT: 268 LBS | HEART RATE: 81 BPM | DIASTOLIC BLOOD PRESSURE: 63 MMHG | BODY MASS INDEX: 43.07 KG/M2 | SYSTOLIC BLOOD PRESSURE: 136 MMHG

## 2022-03-09 DIAGNOSIS — Z96.1: ICD-10-CM

## 2022-03-09 DIAGNOSIS — E13.42 DIABETIC POLYNEUROPATHY ASSOCIATED WITH OTHER SPECIFIED DIABETES MELLITUS (HCC): ICD-10-CM

## 2022-03-09 DIAGNOSIS — M54.42 CHRONIC LOW BACK PAIN WITH BILATERAL SCIATICA, UNSPECIFIED BACK PAIN LATERALITY: ICD-10-CM

## 2022-03-09 DIAGNOSIS — M51.36 DDD (DEGENERATIVE DISC DISEASE), LUMBAR: ICD-10-CM

## 2022-03-09 DIAGNOSIS — G89.29 CHRONIC LOW BACK PAIN WITH BILATERAL SCIATICA, UNSPECIFIED BACK PAIN LATERALITY: ICD-10-CM

## 2022-03-09 DIAGNOSIS — M54.41 CHRONIC LOW BACK PAIN WITH BILATERAL SCIATICA, UNSPECIFIED BACK PAIN LATERALITY: ICD-10-CM

## 2022-03-09 DIAGNOSIS — M48.061 SPINAL STENOSIS OF LUMBAR REGION WITHOUT NEUROGENIC CLAUDICATION: ICD-10-CM

## 2022-03-09 DIAGNOSIS — M47.816 LUMBAR SPONDYLOSIS: ICD-10-CM

## 2022-03-09 DIAGNOSIS — E11.9: ICD-10-CM

## 2022-03-09 DIAGNOSIS — G89.4 CHRONIC PAIN SYNDROME: Primary | ICD-10-CM

## 2022-03-09 PROCEDURE — 92014 COMPRE OPH EXAM EST PT 1/>: CPT

## 2022-03-09 PROCEDURE — 99213 OFFICE O/P EST LOW 20 MIN: CPT | Performed by: NURSE PRACTITIONER

## 2022-03-09 ASSESSMENT — VISUAL ACUITY
OD_CC: 20/60
OS_CC: 20/25-2
OU_CC: J1-1

## 2022-03-09 ASSESSMENT — TONOMETRY
OD_IOP_MMHG: 8
OS_IOP_MMHG: 7

## 2022-03-09 NOTE — PROGRESS NOTES
Assessment:  1  Chronic pain syndrome    2  Diabetic polyneuropathy associated with other specified diabetes mellitus ([address] )    3  Chronic low back pain with bilateral sciatica, unspecified back pain laterality    4  DDD (degenerative disc disease), lumbar    5  Lumbar spondylosis    6  Spinal stenosis of lumbar region without neurogenic claudication        Plan:  1  Patient may benefit from lumbar medial branch blocks, however he would 1st like to continue with more physical therapy to see if his back pain improves   2  I offered to order an MRI lumbar spine but again patient like to hold off on MRI until he completes more physical therapy   3  Patient may continue Tylenol p r n  and should not exceed more than 3000 mg in 24 hours   4  Patient will avoid NSAIDs secondary to renal disease  5  Patient will follow-up in 4 weeks or sooner if needed    M*Modal software was used to dictate this note  It may contain errors with dictating incorrect words or incorrect spelling  Please contact the provider directly with any questions  History of Present Illness: The patient is a 77 y o  male with a history of diabetes and diabetic neuropathy last seen on 1/24/22 who presents for a follow up office visit in regards to chronic low back pain with subjective weakness in the lower extremities  The patient denies bowel or bladder incontinence or saddle anesthesia  Her last office visit, the patient started participating in physical therapy for low back complaints, however unfortunately hospitalized with COVID February 8th which put physical therapy on hold until recently  He has completed 5 sessions total so far and has noticed some improvement of his low back pain  He has taken high dose opioids in the past and suboxone He has tried gabapentin was unable to tolerate side effects  He is currently taking duloxetine as prescribed by podiatry    Patient rates his pain a 5/10 on the numeric pain rating scale    He states his pain is intermittent in nature bothersome the morning  He characterizes the pain as sharp    I have personally reviewed and/or updated the patient's past medical history, past surgical history, family history, social history, current medications, allergies, and vital signs today  Review of Systems:    Review of Systems   Respiratory: Negative for shortness of breath  Cardiovascular: Negative for chest pain  Gastrointestinal: Negative for constipation, diarrhea, nausea and vomiting  Musculoskeletal: Negative for arthralgias, gait problem, joint swelling and myalgias  Skin: Negative for rash  Neurological: Negative for dizziness, seizures and weakness  All other systems reviewed and are negative          Past Medical History:   Diagnosis Date    Acid reflux     Anxiety     Arthritis     Cellulitis     left ankle    Diabetes mellitus ([address] )     Hypertension     Sleep apnea     Twitching        Past Surgical History:   Procedure Laterality Date    APPENDECTOMY      CHOLECYSTECTOMY      KNEE ARTHROSCOPY      SC TOTAL KNEE ARTHROPLASTY Left 2016    Procedure: TOTAL  KNEE REPLACEMENT ;  Surgeon: Julia Judge MD;  Location: [facility];  Service: Orthopedics       Family History   Problem Relation Age of Onset    Diabetes Mother     Diabetes Father     Cancer Father        Social History     Occupational History    Occupation: Attendance officer   Tobacco Use    Smoking status: Former Smoker     Quit date:      Years since quittin 2    Smokeless tobacco: Never Used   Vaping Use    Vaping Use: Never used   Substance and Sexual Activity    Alcohol use: No    Drug use: No    Sexual activity: Not on file         Current Outpatient Medications:     albuterol (PROVENTIL HFA,VENTOLIN HFA) 90 mcg/act inhaler, Inhale 2 puffs every 6 (six) hours as needed for wheezing , Disp: , Rfl:     aspirin (ECOTRIN LOW STRENGTH) 81 mg EC tablet, Take 81 mg by mouth daily, Disp: , Rfl:    atorvastatin (LIPITOR) 20 mg tablet, , Disp: , Rfl:     budesonide (RINOCORT AQUA) 32 MCG/ACT nasal spray, 1 spray into each nostril 2 (two) times a day  (Patient not taking: Reported on 1/24/2022 ), Disp: , Rfl:     buprenorphine-naloxone (SUBOXONE) 8-2 mg, , Disp: , Rfl:     cephalexin (KEFLEX) 500 mg capsule, , Disp: , Rfl:     cloNIDine (CATAPRES) 0 1 mg tablet, Take 0 1 mg by mouth 3 (three) times a day , Disp: , Rfl:     clotrimazole-betamethasone (LOTRISONE) 1-0 05 % cream, , Disp: , Rfl:     Dulaglutide (TRULICITY) 5 66 KM/1 9EH SOPN, Inject 0 75 mg under the skin once a week (Patient not taking: Reported on 1/24/2022 ), Disp: , Rfl:     DULoxetine (Cymbalta) 30 mg delayed release capsule, Take 1 capsule (30 mg total) by mouth 2 (two) times a day (Patient not taking: Reported on 12/13/2021 ), Disp: 60 capsule, Rfl: 0    ergocalciferol (VITAMIN D2) 50,000 units, , Disp: , Rfl:     fluticasone (FLOVENT HFA) 220 mcg/act inhaler, Inhale 1 puff daily as needed  , Disp: , Rfl:     furosemide (LASIX) 40 mg tablet, Take 40 mg by mouth 2 (two) times a day as needed  (Patient not taking: Reported on 1/24/2022 ), Disp: , Rfl:     HYDROcodone-acetaminophen (NORCO)  mg per tablet, , Disp: , Rfl:     lisinopril-hydrochlorothiazide (PRINZIDE,ZESTORETIC) 10-12 5 MG per tablet, Take 1 tablet by mouth 2 (two) times a day, Disp: , Rfl:     LORazepam (ATIVAN) 0 5 mg tablet, Take 0 5 mg by mouth daily at bedtime  (Patient not taking: Reported on 1/24/2022 ), Disp: , Rfl:     metFORMIN (GLUCOPHAGE) 850 mg tablet, , Disp: , Rfl:     metFORMIN (GLUCOPHAGE-XR) 750 mg 24 hr tablet, Take 750 mg by mouth 2 (two) times a day     (Patient not taking: Reported on 1/24/2022 ), Disp: , Rfl:     mupirocin (BACTROBAN) 2 % ointment, , Disp: , Rfl:     NARCAN 4 MG/0 1ML LIQD, , Disp: , Rfl:     pantoprazole (PROTONIX) 40 mg tablet, Take 40 mg by mouth daily  , Disp: , Rfl:     pioglitazone (ACTOS) 15 mg tablet,  , Disp: , Rfl:     potassium chloride (K-DUR,KLOR-CON) 10 mEq tablet, , Disp: , Rfl:     semaglutide, 1 mg/dose, (Ozempic, 1 MG/DOSE,) 4 MG/3ML SOPN injection pen, Inject 1 mg under the skin once a week, Disp: , Rfl:     Silodosin 8 MG CAPS, , Disp: , Rfl:     sulfamethoxazole-trimethoprim (BACTRIM DS) 800-160 mg per tablet, Take 1 tablet by mouth 2 (two) times a day (Patient not taking: Reported on 1/24/2022 ), Disp: , Rfl:     testosterone (ANDROGEL) 1%, , Disp: , Rfl:     Testosterone 12 5 MG/ACT (1%) GEL, Apply 2 pump presses to clean, dry skin every morning (Patient not taking: Reported on 1/24/2022), Disp: , Rfl:     urea (CARMOL) 40 %, , Disp: , Rfl:     No Known Allergies    Physical Exam:    /63   Pulse 81   Ht 5' 6" (1 676 m)   Wt 122 kg (268 lb)   BMI 43 26 kg/m²     Constitutional:normal, well developed, well nourished, alert, in no distress and non-toxic and no overt pain behavior  Eyes:anicteric  HEENT:grossly intact  Neck:supple, symmetric, trachea midline and no masses   Pulmonary:even and unlabored  Cardiovascular:No edema or pitting edema present  Skin:Normal without rashes or lesions and well hydrated  Psychiatric:Mood and affect appropriate  Neurologic:Cranial Nerves II-XII grossly intact  Musculoskeletal:slightly antalgic gait but steady without assistive devices      Imaging  No orders to display         No orders of the defined types were placed in this encounter

## 2022-03-10 ENCOUNTER — APPOINTMENT (OUTPATIENT)
Dept: PHYSICAL THERAPY | Facility: REHABILITATION | Age: 67
End: 2022-03-10
Payer: COMMERCIAL

## 2022-03-14 ENCOUNTER — OFFICE VISIT (OUTPATIENT)
Dept: PODIATRY | Facility: CLINIC | Age: 67
End: 2022-03-14
Payer: COMMERCIAL

## 2022-03-14 VITALS
HEIGHT: 66 IN | SYSTOLIC BLOOD PRESSURE: 116 MMHG | DIASTOLIC BLOOD PRESSURE: 65 MMHG | HEART RATE: 101 BPM | BODY MASS INDEX: 44.52 KG/M2 | WEIGHT: 277 LBS

## 2022-03-14 DIAGNOSIS — I73.9 PERIPHERAL VASCULAR DISEASE, UNSPECIFIED (HCC): ICD-10-CM

## 2022-03-14 DIAGNOSIS — E11.42 DIABETIC POLYNEUROPATHY ASSOCIATED WITH TYPE 2 DIABETES MELLITUS (HCC): Primary | ICD-10-CM

## 2022-03-14 PROCEDURE — 99212 OFFICE O/P EST SF 10 MIN: CPT | Performed by: PODIATRIST

## 2022-03-14 NOTE — PROGRESS NOTES
Patient presents for diabetic foot examination and care  Patient dealing with severe edema in both lower extremities  He has a watery drainage from the legs at a minor wound on the left lower extremity  He has been prescribed compression stockings but was not wearing them today  He is on Lasix on a regular basis  On exam, pedal edema bilateral   There are no palpable pulses due to the edema  Elongated toenails present and were trimmed  Advised patient to return to the compression stockings  Explained to him that the serous drainage should dissipate if the edema is under control

## 2022-03-15 ENCOUNTER — OFFICE VISIT (OUTPATIENT)
Dept: PHYSICAL THERAPY | Facility: REHABILITATION | Age: 67
End: 2022-03-15
Payer: COMMERCIAL

## 2022-03-15 DIAGNOSIS — G89.29 CHRONIC LOW BACK PAIN WITH BILATERAL SCIATICA, UNSPECIFIED BACK PAIN LATERALITY: ICD-10-CM

## 2022-03-15 DIAGNOSIS — M54.2 NECK PAIN: Primary | ICD-10-CM

## 2022-03-15 DIAGNOSIS — M54.42 CHRONIC LOW BACK PAIN WITH BILATERAL SCIATICA, UNSPECIFIED BACK PAIN LATERALITY: ICD-10-CM

## 2022-03-15 DIAGNOSIS — M54.41 CHRONIC LOW BACK PAIN WITH BILATERAL SCIATICA, UNSPECIFIED BACK PAIN LATERALITY: ICD-10-CM

## 2022-03-15 PROCEDURE — 97112 NEUROMUSCULAR REEDUCATION: CPT | Performed by: PHYSICAL THERAPIST

## 2022-03-15 PROCEDURE — 97110 THERAPEUTIC EXERCISES: CPT | Performed by: PHYSICAL THERAPIST

## 2022-03-15 NOTE — PROGRESS NOTES
Daily Note     Today's date: 3/15/2022  Patient name: Dwayne Sandifer  : 1955  MRN: [id number]  Referring provider: Robinson Orlando DO  Dx:   Encounter Diagnosis     ICD-10-CM    1  Neck pain  M54 2    2  Chronic low back pain with bilateral sciatica, unspecified back pain laterality  M54 41     G89 29     M54 42                   Subjective: Pt returns after a 2 week hiatus due to having another illness  Pt states his neck and low back have not been bad lately  Objective: See treatment diary below      Assessment: Tolerated treatment well  VC's required for proper execution of TE's  No pain with TE performance, but pt fatigued post session  Patient would benefit from continued PT      Plan: Continue per plan of care        Precautions: anxiety, diabetes, OA, HTN, L TKA, chronic LBP      Manuals 2/1 2/3 2/7 2/24 2/28 3/15       C/S PROM BSB and B rot  TP 4 mins TP 4 mins np np        B/L HS and piriformis streching  TP 6 mins TP 6 mins TP 4 mins np                                  Neuro Re-Ed             TA w/LPD  otb 2x10 otb 2x10 otb 2x10 otb 2x10 leyda 10# 2x10       TA w/rows  otb 2x10 otb 2x10 otb 2x10 otb 2x10 leyda 12# 2x10       TB b/l ER w/scap squeeze      otb 3"x15       TA w/multifidus press   otb 3" x15ea otb 3"x15ea otb 3"x15ea keiser7# 3"x15ea  seated       Standing pball crush   5"x15 5"x15 5"x15 5"x15 5"x15                                 Ther Ex             Nu-step  L4x7' L4x10' L4x10' L4x10' L5x10'       Serratus wall slides  5"x10 5"x10 np 5"x10 5"x10       C/S SNAGS b/l rot  5"x10ea 5"x10ea np 5"x10ea 5"x10ea       TA w/march    x10 standing pbal crush w/march x10ea standing pball crush w/march x10ea       TA w/kicks    x10 np        TA w/bridges    np-unable         TA w/glute squeeze    5"x15 np        Standing hip abd/ext    x15ea 2x10ea 2x10ea       Pt education +HEP instruction TP 8 mins            Ther Activity             Posture training in chair             Sit to stand x10 x10 x10       Gait Training                                       Modalities

## 2022-03-17 ENCOUNTER — APPOINTMENT (OUTPATIENT)
Dept: PHYSICAL THERAPY | Facility: REHABILITATION | Age: 67
End: 2022-03-17
Payer: COMMERCIAL

## 2022-03-22 ENCOUNTER — APPOINTMENT (OUTPATIENT)
Dept: PHYSICAL THERAPY | Facility: REHABILITATION | Age: 67
End: 2022-03-22
Payer: COMMERCIAL

## 2022-03-24 ENCOUNTER — APPOINTMENT (OUTPATIENT)
Dept: PHYSICAL THERAPY | Facility: REHABILITATION | Age: 67
End: 2022-03-24
Payer: COMMERCIAL

## 2022-03-29 ENCOUNTER — APPOINTMENT (OUTPATIENT)
Dept: PHYSICAL THERAPY | Facility: REHABILITATION | Age: 67
End: 2022-03-29
Payer: COMMERCIAL

## 2022-03-31 ENCOUNTER — APPOINTMENT (OUTPATIENT)
Dept: PHYSICAL THERAPY | Facility: REHABILITATION | Age: 67
End: 2022-03-31
Payer: COMMERCIAL

## 2022-04-04 ENCOUNTER — TELEPHONE (OUTPATIENT)
Dept: PAIN MEDICINE | Facility: CLINIC | Age: 67
End: 2022-04-04

## 2022-04-04 DIAGNOSIS — G89.29 CHRONIC BILATERAL LOW BACK PAIN WITH BILATERAL SCIATICA: ICD-10-CM

## 2022-04-04 DIAGNOSIS — M54.41 CHRONIC BILATERAL LOW BACK PAIN WITH BILATERAL SCIATICA: ICD-10-CM

## 2022-04-04 DIAGNOSIS — M54.42 CHRONIC BILATERAL LOW BACK PAIN WITH BILATERAL SCIATICA: ICD-10-CM

## 2022-04-04 DIAGNOSIS — M54.2 NECK PAIN: Primary | ICD-10-CM

## 2022-04-04 NOTE — TELEPHONE ENCOUNTER
Pt called in to get a script for Physical therapy   Please be advised thank you    Pt can be reached @  [phone]

## 2022-04-11 NOTE — PROGRESS NOTES
Pt has not been seen for PT in a month and will therefore be d/c at this time due to non-compliance

## 2022-05-12 ENCOUNTER — OFFICE VISIT (OUTPATIENT)
Dept: SLEEP CENTER | Facility: CLINIC | Age: 67
End: 2022-05-12
Payer: COMMERCIAL

## 2022-05-12 VITALS
WEIGHT: 270.4 LBS | OXYGEN SATURATION: 92 % | HEART RATE: 85 BPM | HEIGHT: 66 IN | DIASTOLIC BLOOD PRESSURE: 66 MMHG | SYSTOLIC BLOOD PRESSURE: 126 MMHG | BODY MASS INDEX: 43.46 KG/M2

## 2022-05-12 DIAGNOSIS — G47.33 OSA (OBSTRUCTIVE SLEEP APNEA): Primary | ICD-10-CM

## 2022-05-12 DIAGNOSIS — G47.30 SLEEP APNEA: ICD-10-CM

## 2022-05-12 DIAGNOSIS — F51.02 ADJUSTMENT INSOMNIA: Primary | ICD-10-CM

## 2022-05-12 PROCEDURE — 99203 OFFICE O/P NEW LOW 30 MIN: CPT | Performed by: INTERNAL MEDICINE

## 2022-05-12 RX ORDER — DOXYCYCLINE HYCLATE 100 MG/1
CAPSULE ORAL
COMMUNITY
Start: 2022-03-23

## 2022-05-12 RX ORDER — ZOLPIDEM TARTRATE 5 MG/1
5 TABLET ORAL
Qty: 1 TABLET | Refills: 0 | Status: SHIPPED | OUTPATIENT
Start: 2022-05-12

## 2022-05-12 RX ORDER — ZINC GLUCONATE 50 MG
50 TABLET ORAL DAILY
COMMUNITY

## 2022-05-12 NOTE — PROGRESS NOTES
Consultation - Sleep Center   On license of [facility] Civil : 1955  MRN: [id number]      Assessment:  Of for the purpose of treating the patient has excessive daytime sleepiness as well as the potential for deteriorating cardiovascular status, the patient will be restarted on therapy for his severe SUSY  Plan:  Split study    Follow up: After testing    History of Present Illness:   79 y o male with history of severe SUSY (AHI = 60 3)  The patient was diagnosed in , but over 10 years ago stopped using his CPAP for unclear reasons  He was initially titrated to a CPAP of 10 cm and subsequently to 18 cm  He has had recent problems with lower extremity edema and has a history of hypertension  His recent echocardiogram was unremarkable  He complains of excessive daytime sleepiness        Review of Systems      Genitourinary need to urinate more than twice a night and difficulty with erection   Cardiology ankle/leg swelling   Gastrointestinal none   Neurology difficulty with memory and balance problems   Constitutional none   Integumentary none   Psychiatry none   Musculoskeletal joint pain, back pain, sciatica and leg cramps   Pulmonary none   ENT none   Endocrine frequent urination   Hematological none         I have reviewed and updated the review of systems as necessary    Historical Information    Past Medical History:  GERD, diabetes, hypertension, venous insufficiency, back pain, degenerative disc disease, arthritis    Family History: non-contributory    Social History     Socioeconomic History    Marital status: /Civil Union     Spouse name: None    Number of children: None    Years of education: BS    Highest education level: None   Occupational History    Occupation: Attendance officer   Tobacco Use    Smoking status: Former Smoker     Quit date:      Years since quittin 3    Smokeless tobacco: Never Used   Vaping Use    Vaping Use: Never used   Substance and Sexual Activity    Alcohol use: No    Drug use: No    Sexual activity: None   Other Topics Concern    None   Social History Narrative    None     Social Determinants of Health     Financial Resource Strain: Not on file   Food Insecurity: Not on file   Transportation Needs: Not on file   Physical Activity: Not on file   Stress: Not on file   Social Connections: Not on file   Intimate Partner Violence: Not on file   Housing Stability: Not on file         Sleep Schedule: unremarkable    Snoring:  Yes    Witnessed Apnea:  Yes    Medications/Allergies:    Current Outpatient Medications:     albuterol (PROVENTIL HFA,VENTOLIN HFA) 90 mcg/act inhaler, Inhale 2 puffs every 6 (six) hours as needed for wheezing , Disp: , Rfl:     aspirin (ECOTRIN LOW STRENGTH) 81 mg EC tablet, Take 81 mg by mouth daily, Disp: , Rfl:     atorvastatin (LIPITOR) 20 mg tablet, , Disp: , Rfl:     budesonide (RINOCORT AQUA) 32 MCG/ACT nasal spray, 1 spray into each nostril in the morning and 1 spray in the evening , Disp: , Rfl:     cloNIDine (CATAPRES) 0 1 mg tablet, Take 0 1 mg by mouth 3 (three) times a day , Disp: , Rfl:     Dulaglutide 0 75 MG/0 5ML SOPN, Inject 0 75 mg under the skin once a week, Disp: , Rfl:     ergocalciferol (VITAMIN D2) 50,000 units, , Disp: , Rfl:     furosemide (LASIX) 40 mg tablet, Take 40 mg by mouth as needed in the morning and 40 mg as needed in the evening , Disp: , Rfl:     potassium chloride (K-DUR,KLOR-CON) 10 mEq tablet, , Disp: , Rfl:     zinc gluconate 50 mg tablet, Take 50 mg by mouth in the morning , Disp: , Rfl:     buprenorphine-naloxone (SUBOXONE) 8-2 mg, , Disp: , Rfl:     cephalexin (KEFLEX) 500 mg capsule, , Disp: , Rfl:     clotrimazole-betamethasone (LOTRISONE) 1-0 05 % cream, , Disp: , Rfl:     doxycycline hyclate (VIBRAMYCIN) 100 mg capsule, , Disp: , Rfl:     DULoxetine (Cymbalta) 30 mg delayed release capsule, Take 1 capsule (30 mg total) by mouth 2 (two) times a day (Patient not taking: Reported on 12/13/2021 ), Disp: 60 capsule, Rfl: 0    fluticasone (FLOVENT HFA) 220 mcg/act inhaler, Inhale 1 puff daily as needed  (Patient not taking: Reported on 5/12/2022), Disp: , Rfl:     HYDROcodone-acetaminophen (NORCO)  mg per tablet, , Disp: , Rfl:     lisinopril-hydrochlorothiazide (PRINZIDE,ZESTORETIC) 10-12 5 MG per tablet, Take 1 tablet by mouth 2 (two) times a day (Patient not taking: Reported on 5/12/2022), Disp: , Rfl:     LORazepam (ATIVAN) 0 5 mg tablet, Take 0 5 mg by mouth daily at bedtime  (Patient not taking: No sig reported), Disp: , Rfl:     metFORMIN (GLUCOPHAGE) 850 mg tablet, , Disp: , Rfl:     metFORMIN (GLUCOPHAGE-XR) 750 mg 24 hr tablet, Take 750 mg by mouth 2 (two) times a day     (Patient not taking: No sig reported), Disp: , Rfl:     mupirocin (BACTROBAN) 2 % ointment, , Disp: , Rfl:     NARCAN 4 MG/0 1ML LIQD, , Disp: , Rfl:     pantoprazole (PROTONIX) 40 mg tablet, Take 40 mg by mouth daily   (Patient not taking: Reported on 5/12/2022), Disp: , Rfl:     pioglitazone (ACTOS) 15 mg tablet,   (Patient not taking: Reported on 5/12/2022), Disp: , Rfl:     semaglutide, 1 mg/dose, (Ozempic, 1 MG/DOSE,) 4 MG/3ML SOPN injection pen, Inject 1 mg under the skin once a week (Patient not taking: Reported on 5/12/2022), Disp: , Rfl:     Silodosin 8 MG CAPS, , Disp: , Rfl:     sulfamethoxazole-trimethoprim (BACTRIM DS) 800-160 mg per tablet, Take 1 tablet by mouth 2 (two) times a day (Patient not taking: No sig reported), Disp: , Rfl:     testosterone (ANDROGEL) 1%, , Disp: , Rfl:     Testosterone 12 5 MG/ACT (1%) GEL, Apply 2 pump presses to clean, dry skin every morning (Patient not taking: No sig reported), Disp: , Rfl:     urea (CARMOL) 40 %, , Disp: , Rfl:         No notes on file                  Objective:    Vital Signs:   Vitals:    05/12/22 1000   BP: 126/66   Pulse: 85   SpO2: 92%   Weight: 123 kg (270 lb 6 4 oz)   Height: 5' 6" (1 676 m)     Neck Circumference: 18 5      [place] Sleepiness Scale: Total score: 15    Physical Exam:    General: Alert, appropriate, cooperative, overweight    Head: NC/AT, mild retrognathia    Nose: No septal deviation, nares not obstructed, mucosa normal    Throat: Airway diminished, tongue base thickened, no tonsils visualized    Neck: Normal, no thyromegaly or lymphadenopathy, no JVD    Heart: RR, normal S1 and S2, no murmurs    Chest: Clear bilaterally    Extremity: No clubbing, cyanosis, severe edema    Skin: Warm, dry    Neuro: No motor abnormalities, cranial nerves appear intact    Sleep Study Results:   AHI = 60 3  PAP Pressure: CPAP: 18 cm      Counseling / Coordination of Care  A description of the counseling / coordination of care: We discussed the pathophysiology of obstructive sleep apnea as well as the possible treatment options  We also discussed the rationale for positive airway pressure therapy  Board Certified Sleep Specialist    Portions of the record may have been created with voice recognition software  Occasional wrong word or "sound a like" substitutions may have occurred due to the inherent limitations of voice recognition software  Read the chart carefully and recognize, using context, where substitutions have occurred

## 2022-06-07 ENCOUNTER — TELEPHONE (OUTPATIENT)
Dept: GASTROENTEROLOGY | Facility: CLINIC | Age: 67
End: 2022-06-07

## 2022-06-07 DIAGNOSIS — K59.09 OTHER CONSTIPATION: Primary | ICD-10-CM

## 2022-06-07 RX ORDER — POLYETHYLENE GLYCOL 3350 17 G/17G
17 POWDER, FOR SOLUTION ORAL DAILY
Qty: 30 EACH | Refills: 3 | Status: SHIPPED | OUTPATIENT
Start: 2022-06-07

## 2022-06-07 NOTE — TELEPHONE ENCOUNTER
OV 2/8/22 Aziza Davison   Hx: Anemia, Hep C, nausea  F/U appt 6/30/22 Nelda COLE  Pt complaining of new onset constipation within the last 2 weeks  Reports straining with BM, abdominal fulness, BM every "couple of days", occasional nausea, occasional "stomach ache", and abdominal fullness  Denies emesis, diarrhea, BRBPR, or any other appreciating symptoms  Pt currently is taking miralax and dulcolax intermittently and notes some improvement  Recs:  1  Start 1 capful miralax daily and can take OTC dulcolax prn if necessary  Advised to call office if no improvement after 1 week  2  Keep f/u appt on 6/30  3   Will send script for miralax per pt request, please send to [facility]

## 2022-06-07 NOTE — TELEPHONE ENCOUNTER
Patients GI provider:  SARAI Blair    Number to return call: [phone]    Reason for call: Pt's PCP office calling stating pt has had a change in bowel movements and would like pt to be seen sooner as well as having a colonoscopy      Scheduled procedure/appointment date if applicable: Appt 8/87/53

## 2022-06-30 ENCOUNTER — TELEPHONE (OUTPATIENT)
Dept: OTHER | Facility: OTHER | Age: 67
End: 2022-06-30

## 2022-06-30 NOTE — TELEPHONE ENCOUNTER
Patient was called but did not answer  A  message was left for the patient to call us back at his earliest convenience to schedule his appt  I advised the patient to call [phone]

## 2022-07-18 ENCOUNTER — OFFICE VISIT (OUTPATIENT)
Dept: PODIATRY | Facility: CLINIC | Age: 67
End: 2022-07-18
Payer: COMMERCIAL

## 2022-07-18 VITALS
HEIGHT: 66 IN | HEART RATE: 71 BPM | BODY MASS INDEX: 43.07 KG/M2 | DIASTOLIC BLOOD PRESSURE: 75 MMHG | WEIGHT: 268 LBS | SYSTOLIC BLOOD PRESSURE: 146 MMHG

## 2022-07-18 DIAGNOSIS — E11.42 DIABETIC POLYNEUROPATHY ASSOCIATED WITH TYPE 2 DIABETES MELLITUS (HCC): Primary | ICD-10-CM

## 2022-07-18 DIAGNOSIS — B35.3 TINEA PEDIS OF BOTH FEET: ICD-10-CM

## 2022-07-18 DIAGNOSIS — B35.1 ONYCHOMYCOSIS: ICD-10-CM

## 2022-07-18 PROCEDURE — 99212 OFFICE O/P EST SF 10 MIN: CPT | Performed by: PODIATRIST

## 2022-07-18 RX ORDER — DOCUSATE SODIUM 100 MG/1
100 CAPSULE, LIQUID FILLED ORAL 2 TIMES DAILY
COMMUNITY
Start: 2022-07-04

## 2022-07-18 NOTE — PROGRESS NOTES
Patient presents for diabetic foot care  Patient is doing much better compared to his last visit  Leg edema has decreased and there is no drainage from his legs  On exam, all toenails are dystrophic secondary to onychomycosis  There are no palpable pedal pulses  Skin on the soles of both feet is dry and scaly  This is secondary to chronic tinea  Treatment:  Trimmed multiple dystrophic nails  Recommended over-the-counter antifungal creams for the soles of his feet  Patient will be rescheduled in 3 months

## 2022-07-29 ENCOUNTER — TELEPHONE (OUTPATIENT)
Dept: HEMATOLOGY ONCOLOGY | Facility: CLINIC | Age: 67
End: 2022-07-29

## 2022-07-29 NOTE — TELEPHONE ENCOUNTER
Reached out to patient to schedule new patient appointment, left vm with [facility] number to return call

## 2022-08-09 NOTE — PROGRESS NOTES
[address] [facility] [place] [address] [address] [id number]  Hematology Ambulatory Consult  Sacha Woods, 1955, [id number]  8/10/2022    Assessment/Plan:  1  Normocytic anemia  2  Abdominal bloating  3  Generalized abdominal pain  Sacha Woods is a 71-year-old male seen in consultation for normocytic anemia  This is likely due to chronic disease or chronic blood loss  His iron panel demonstrates that he is chronically iron deficient and previously was unable to tolerate oral iron  He is not very active and is not very symptomatic with this anemia  I explained that his goal hemoglobin he should be around 12  He is seeing GI later this month to schedule EGD and colonoscopy  I stressed the importance of keeping this follow-up to determine a cause for his symptoms and rule out blood loss causing his anemia  He has impaired kidney function which could also be playing a role in his anemia  I explained in detail the workup listed below including folate, iron panel, MMA, retic count, SPEP, erythropoietin along with an abdominal ultrasound  Previously his liver function has been normal though his spouse thinks he looks more yellow  I will call him when I have the results of this blood work to discuss any intervention or further workup needed at that time  He will follow-up with me in the office in about 2 months after completing colonoscopy an EGD for complete workup  - CBC and differential; Future  - Comprehensive metabolic panel; Future  - Folate; Future  - Iron Panel (Includes Ferritin, Iron Sat%, Iron, and TIBC); Future  - Methylmalonic acid, serum; Future  - Reticulocytes; Future  - Protein electrophoresis, serum; Future  - Erythropoietin; Future  - US abdomen complete; Future      The patient is scheduled for follow-up in approximately 2 months  Patient voiced agreement and understanding to the above   Patient knows to call the Hematology/Oncology office with any questions and concerns regarding the above  Barrier(s) to care: None  The patient is able to self care     -------------------------------------------------------------------------------------------------------    Chief Complaint   Patient presents with    Consult     Anemia        Referring provider:  Aj Charles MD  [address] [facility],  [address]    History of present illness:  Marleny Azevedo is a 71-year-old male with past medical history of type 2 diabetes, diabetic neuropathy, GERD, sleep apnea, hypertension, chronic venous insufficiency, and degenerative disc disease  He seen in consultation at the request of his PCP for anemia  This appears to be a chronic problem over the last couple years his hemoglobin has been in the high 10-11 range with a normal MCV  Most recently his iron panel demonstrated signs of chronic iron deficiency with a ferritin of 25, iron saturation 13%, TIBC 285, serum iron 37  He has some episodes of elevated creatinine which could be playing a role in his anemia but also has some GI symptoms concerning for blood loss to the GI tract  His last colonoscopy was more than 10 years ago and has a follow-up with Gastroenterology later this month to schedule an EGD with colonoscopy  He has had a few episodes of dark tardy stools but denies any bright red blood per rectum  Has abdominal pain and bloating with associated constipation  He has increased fatigue over the last couple years  He also has been dealing with diuretics for lower extremity swelling  He has tried to take oral iron in the past which has caused him significant GI upset which caused him to discontinue taking this  He denies ever being told that he or a family member is diagnosed with thalassemia  He denies history of gastric bypass  He denies pagophagia, pica, restless leg syndrome, brittle nails, thinning hair, or pallor  He denies hematuria     He denies lightheadedness, dizziness, shortness of breath, MC, palpitations, or chest pain  He denies fevers, chills, unintentional weight loss, nausea, vomiting, diarrhea, petechiae/purpura, unexplained ecchymosis  06/16/2016:  Hemoglobin 14 8, MCV 92, platelets 359, WBC 8 4  03/26/2021:  Hemoglobin 10 8, MCV 90, platelets 570, WBC 6 5  02/13/2022:  Hemoglobin 11 1, MCV 84, platelets 244, WBC 7 9  07/06/2022:  Hemoglobin 10 8, MCV 84, platelets 937, WBC 7   Ferritin 25, iron saturation 13%, TIBC 285, serum iron 37    Review of Systems   Constitutional: Positive for fever  Negative for activity change, appetite change, fatigue and unexpected weight change  HENT: Negative for trouble swallowing and voice change  Eyes: Negative for photophobia and visual disturbance  Respiratory: Negative for cough, chest tightness and shortness of breath  Cardiovascular: Positive for leg swelling (chronic)  Negative for chest pain and palpitations  Gastrointestinal: Positive for abdominal distention, abdominal pain and constipation  Negative for anal bleeding, blood in stool, diarrhea, nausea and vomiting  Endocrine: Negative for cold intolerance and heat intolerance  Genitourinary: Negative for dysuria, hematuria and urgency  Musculoskeletal: Positive for gait problem (walks with a cane)  Negative for arthralgias, back pain, joint swelling and myalgias  Skin: Negative for pallor and rash  Neurological: Negative for dizziness, tremors, weakness, light-headedness, numbness and headaches  Hematological: Negative for adenopathy  Does not bruise/bleed easily  Psychiatric/Behavioral: Negative for confusion and sleep disturbance         Patient Active Problem List   Diagnosis    S/P total knee replacement    Diabetes type 2, uncontrolled    HTN (hypertension)    GERD (gastroesophageal reflux disease)    Anxiety    Sleep apnea    Acute blood loss anemia    Low back pain with sciatica    DDD (degenerative disc disease), lumbar    Spinal stenosis of lumbar region    Myoclonus    Chronic venous insufficiency    Bilateral lower extremity edema    BMI 40 0-44 9, adult (HCC)    Primary osteoarthritis of right knee    Chronic pain of right knee    Diabetic neuropathy (HCC)    RBD (REM behavioral disorder)    Lumbar spondylosis    Neck pain    Right shoulder pain    Chronic low back pain with bilateral sciatica    Chronic pain syndrome       Past Medical History:   Diagnosis Date    Acid reflux     Anxiety     Arthritis     Cellulitis     left ankle    Diabetes mellitus ([address] )     Hypertension     Sleep apnea     Twitching        Past Surgical History:   Procedure Laterality Date    APPENDECTOMY      CHOLECYSTECTOMY      KNEE ARTHROSCOPY      LA TOTAL KNEE ARTHROPLASTY Left 2016    Procedure: TOTAL  KNEE REPLACEMENT ;  Surgeon: Roby Ma MD;  Location: [facility];  Service: Orthopedics       Family History   Problem Relation Age of Onset    Diabetes Mother     Diabetes Father     Cancer Father        Social History     Socioeconomic History    Marital status: /Civil Union     Spouse name: None    Number of children: None    Years of education: BS    Highest education level: None   Occupational History    Occupation: Attendance officer   Tobacco Use    Smoking status: Former Smoker     Quit date:      Years since quittin 6    Smokeless tobacco: Never Used   Vaping Use    Vaping Use: Never used   Substance and Sexual Activity    Alcohol use: No    Drug use: No    Sexual activity: None   Other Topics Concern    None   Social History Narrative    None     Social Determinants of Health     Financial Resource Strain: Not on file   Food Insecurity: Not on file   Transportation Needs: Not on file   Physical Activity: Not on file   Stress: Not on file   Social Connections: Not on file   Intimate Partner Violence: Not on file   Housing Stability: Not on file         Current Outpatient Medications:     albuterol (PROVENTIL HFA,VENTOLIN HFA) 90 mcg/act inhaler, Inhale 2 puffs every 6 (six) hours as needed for wheezing , Disp: , Rfl:     aspirin (ECOTRIN LOW STRENGTH) 81 mg EC tablet, Take 81 mg by mouth daily, Disp: , Rfl:     atorvastatin (LIPITOR) 20 mg tablet, , Disp: , Rfl:     budesonide (RINOCORT AQUA) 32 MCG/ACT nasal spray, 1 spray into each nostril in the morning and 1 spray in the evening , Disp: , Rfl:     cloNIDine (CATAPRES) 0 1 mg tablet, Take 0 1 mg by mouth 3 (three) times a day , Disp: , Rfl:     docusate sodium (COLACE) 100 mg capsule, Take 100 mg by mouth 2 (two) times a day, Disp: , Rfl:     ergocalciferol (VITAMIN D2) 50,000 units, , Disp: , Rfl:     furosemide (LASIX) 40 mg tablet, Take 40 mg by mouth as needed in the morning and 40 mg as needed in the evening , Disp: , Rfl:     HYDROcodone-acetaminophen (NORCO)  mg per tablet, , Disp: , Rfl:     metFORMIN (GLUCOPHAGE-XR) 750 mg 24 hr tablet, Take 750 mg by mouth 2 (two) times a day, Disp: , Rfl:     mupirocin (BACTROBAN) 2 % ointment, , Disp: , Rfl:     pioglitazone (ACTOS) 15 mg tablet, , Disp: , Rfl:     polyethylene glycol (MIRALAX) 17 g packet, Take 17 g by mouth daily, Disp: 30 each, Rfl: 3    potassium chloride (K-DUR,KLOR-CON) 10 mEq tablet, , Disp: , Rfl:     zinc gluconate 50 mg tablet, Take 50 mg by mouth in the morning , Disp: , Rfl:     zolpidem (AMBIEN) 5 mg tablet, Take 1 tablet (5 mg total) by mouth daily at bedtime as needed for sleep For sleep study, Disp: 1 tablet, Rfl: 0    Blood Glucose Monitoring Suppl (ONE TOUCH ULTRA 2) w/Device KIT, USE TO TEST BLOOD GLUCOSE, Disp: , Rfl:     buprenorphine-naloxone (SUBOXONE) 8-2 mg, , Disp: , Rfl:     cephalexin (KEFLEX) 500 mg capsule, , Disp: , Rfl:     clotrimazole-betamethasone (LOTRISONE) 1-0 05 % cream, , Disp: , Rfl:     doxycycline hyclate (VIBRAMYCIN) 100 mg capsule, , Disp: , Rfl:     Dulaglutide 0 75 MG/0 5ML SOPN, Inject 0 75 mg under the skin once a week (Patient not taking: Reported on 8/10/2022), Disp: , Rfl:     DULoxetine (Cymbalta) 30 mg delayed release capsule, Take 1 capsule (30 mg total) by mouth 2 (two) times a day (Patient not taking: Reported on 12/13/2021 ), Disp: 60 capsule, Rfl: 0    fluticasone (FLOVENT HFA) 220 mcg/act inhaler, Inhale 1 puff daily as needed  (Patient not taking: No sig reported), Disp: , Rfl:     Lancets (Safety Lancet 30G/Pressure Act) MISC, USE TO TEST BLOOD GLUCOSE TWICE DAILY, Disp: , Rfl:     lisinopril-hydrochlorothiazide (PRINZIDE,ZESTORETIC) 10-12 5 MG per tablet, Take 1 tablet by mouth 2 (two) times a day (Patient not taking: No sig reported), Disp: , Rfl:     LORazepam (ATIVAN) 0 5 mg tablet, Take 0 5 mg by mouth daily at bedtime   (Patient not taking: No sig reported), Disp: , Rfl:     metFORMIN (GLUCOPHAGE) 850 mg tablet, , Disp: , Rfl:     NARCAN 4 MG/0 1ML LIQD, , Disp: , Rfl:     OneTouch Ultra test strip, USE TO TEST BLOOD GLUCOSE TWICE DAILY, Disp: , Rfl:     pantoprazole (PROTONIX) 40 mg tablet, Take 40 mg by mouth daily   (Patient not taking: No sig reported), Disp: , Rfl:     polyethylene glycol (GLYCOLAX) 17 GM/SCOOP powder, DISSOLVE 17 GRAMS IN 8 OZ OF FLUID LIQUID DRINK DAILY AS DIRECTED, Disp: , Rfl:     semaglutide, 1 mg/dose, (Ozempic, 1 MG/DOSE,) 4 MG/3ML SOPN injection pen, Inject 1 mg under the skin once a week (Patient not taking: No sig reported), Disp: , Rfl:     Silodosin 8 MG CAPS, , Disp: , Rfl:     sulfamethoxazole-trimethoprim (BACTRIM DS) 800-160 mg per tablet, Take 1 tablet by mouth 2 (two) times a day (Patient not taking: No sig reported), Disp: , Rfl:     testosterone (ANDROGEL) 1%, , Disp: , Rfl:     Testosterone 12 5 MG/ACT (1%) GEL, Apply 2 pump presses to clean, dry skin every morning (Patient not taking: No sig reported), Disp: , Rfl:     urea (CARMOL) 40 %, , Disp: , Rfl:     No Known Allergies    Objective:  BP 132/60 (BP Location: Left arm, Patient Position: Sitting, Cuff Size: Large)   Pulse 82   Resp 16   Ht 5' 6" (1 676 m)   Wt 120 kg (264 lb)   SpO2 96%   BMI 42 61 kg/m²   Physical Exam  Constitutional:       General: He is not in acute distress  Appearance: Normal appearance  He is not ill-appearing  HENT:      Head: Normocephalic and atraumatic  Eyes:      Extraocular Movements: Extraocular movements intact  Conjunctiva/sclera: Conjunctivae normal    Cardiovascular:      Rate and Rhythm: Normal rate and regular rhythm  Pulses: Normal pulses  Heart sounds: Normal heart sounds  No murmur heard  Pulmonary:      Effort: Pulmonary effort is normal  No respiratory distress  Breath sounds: Normal breath sounds  Abdominal:      General: Bowel sounds are normal  There is no distension  Palpations: Abdomen is soft  Tenderness: There is no abdominal tenderness  Musculoskeletal:         General: Normal range of motion  Cervical back: Normal range of motion  No tenderness  Right lower leg: Edema present  Left lower leg: Edema present  Lymphadenopathy:      Cervical: No cervical adenopathy  Skin:     General: Skin is warm and dry  Capillary Refill: Capillary refill takes less than 2 seconds  Coloration: Skin is not pale  Findings: No bruising or lesion  Neurological:      General: No focal deficit present  Mental Status: He is alert and oriented to person, place, and time  Mental status is at baseline  Motor: No weakness  Gait: Gait abnormal (walks with a cane)  Psychiatric:         Mood and Affect: Mood normal          Behavior: Behavior normal          Thought Content:  Thought content normal          Judgment: Judgment normal          Result Review  Labs:   Labs reviewed in [facility]  Lab Results   Component Value Date    WBC 11 06 (H) 07/18/2016    HGB 12 7 03/14/2020    HCT 42 5 03/14/2020    MCV 89 07/18/2016     07/18/2016     Lab Results   Component Value Date     10/04/2014    SODIUM 140 02/08/2021    K 6 0 (H) 02/08/2021     02/08/2021    CO2 32 02/08/2021    ANIONGAP 8 10/04/2014    AGAP 2 (L) 02/08/2021    BUN 46 (H) 02/08/2021    CREATININE 1 81 (H) 02/08/2021    GLUC 125 02/08/2021    GLUF 122 (H) 02/08/2019    CALCIUM 9 3 02/08/2021    AST 13 02/08/2021    ALT 21 02/08/2021    ALKPHOS 90 02/08/2021    PROT 7 9 10/04/2014    TP 7 7 02/08/2021    BILITOT 0 76 10/04/2014    TBILI 0 61 02/08/2021    EGFR 38 02/08/2021         Imaging:  No relevant imaging to review     Please note: This report has been generated by a voice recognition software system  Therefore there may be syntax, spelling, and/or grammatical errors  Please call if you have any questions

## 2022-08-10 ENCOUNTER — CONSULT (OUTPATIENT)
Dept: HEMATOLOGY ONCOLOGY | Facility: CLINIC | Age: 67
End: 2022-08-10
Payer: COMMERCIAL

## 2022-08-10 ENCOUNTER — APPOINTMENT (OUTPATIENT)
Dept: LAB | Facility: CLINIC | Age: 67
End: 2022-08-10
Payer: COMMERCIAL

## 2022-08-10 VITALS
DIASTOLIC BLOOD PRESSURE: 60 MMHG | BODY MASS INDEX: 42.43 KG/M2 | RESPIRATION RATE: 16 BRPM | SYSTOLIC BLOOD PRESSURE: 132 MMHG | WEIGHT: 264 LBS | OXYGEN SATURATION: 96 % | HEIGHT: 66 IN | HEART RATE: 82 BPM

## 2022-08-10 DIAGNOSIS — D64.9 NORMOCYTIC ANEMIA: Primary | ICD-10-CM

## 2022-08-10 DIAGNOSIS — R10.84 GENERALIZED ABDOMINAL PAIN: ICD-10-CM

## 2022-08-10 DIAGNOSIS — R14.0 ABDOMINAL BLOATING: ICD-10-CM

## 2022-08-10 DIAGNOSIS — E46 PROTEIN-CALORIE MALNUTRITION, UNSPECIFIED SEVERITY (HCC): ICD-10-CM

## 2022-08-10 DIAGNOSIS — D64.9 NORMOCYTIC ANEMIA: ICD-10-CM

## 2022-08-10 LAB
ALBUMIN SERPL BCP-MCNC: 3.6 G/DL (ref 3.5–5)
ALP SERPL-CCNC: 93 U/L (ref 34–104)
ALT SERPL W P-5'-P-CCNC: 14 U/L (ref 7–52)
ANION GAP SERPL CALCULATED.3IONS-SCNC: 4 MMOL/L (ref 4–13)
AST SERPL W P-5'-P-CCNC: 18 U/L (ref 13–39)
BASOPHILS # BLD AUTO: 0.03 THOUSANDS/ΜL (ref 0–0.1)
BASOPHILS NFR BLD AUTO: 0 % (ref 0–1)
BILIRUB SERPL-MCNC: 0.49 MG/DL (ref 0.2–1)
BUN SERPL-MCNC: 21 MG/DL (ref 5–25)
CALCIUM SERPL-MCNC: 9.3 MG/DL (ref 8.4–10.2)
CHLORIDE SERPL-SCNC: 98 MMOL/L (ref 96–108)
CO2 SERPL-SCNC: 36 MMOL/L (ref 21–32)
CREAT SERPL-MCNC: 1.01 MG/DL (ref 0.6–1.3)
EOSINOPHIL # BLD AUTO: 0.2 THOUSAND/ΜL (ref 0–0.61)
EOSINOPHIL NFR BLD AUTO: 3 % (ref 0–6)
ERYTHROCYTE [DISTWIDTH] IN BLOOD BY AUTOMATED COUNT: 15.3 % (ref 11.6–15.1)
FERRITIN SERPL-MCNC: 23 NG/ML (ref 8–388)
FOLATE SERPL-MCNC: >20 NG/ML (ref 3.1–17.5)
GFR SERPL CREATININE-BSD FRML MDRD: 76 ML/MIN/1.73SQ M
GLUCOSE P FAST SERPL-MCNC: 141 MG/DL (ref 65–99)
HCT VFR BLD AUTO: 37 % (ref 36.5–49.3)
HGB BLD-MCNC: 10.9 G/DL (ref 12–17)
IMM GRANULOCYTES # BLD AUTO: 0.03 THOUSAND/UL (ref 0–0.2)
IMM GRANULOCYTES NFR BLD AUTO: 0 % (ref 0–2)
IRON SATN MFR SERPL: 13 % (ref 20–50)
IRON SERPL-MCNC: 36 UG/DL (ref 65–175)
LYMPHOCYTES # BLD AUTO: 1.08 THOUSANDS/ΜL (ref 0.6–4.47)
LYMPHOCYTES NFR BLD AUTO: 15 % (ref 14–44)
MCH RBC QN AUTO: 26.9 PG (ref 26.8–34.3)
MCHC RBC AUTO-ENTMCNC: 29.5 G/DL (ref 31.4–37.4)
MCV RBC AUTO: 91 FL (ref 82–98)
MONOCYTES # BLD AUTO: 0.39 THOUSAND/ΜL (ref 0.17–1.22)
MONOCYTES NFR BLD AUTO: 5 % (ref 4–12)
NEUTROPHILS # BLD AUTO: 5.52 THOUSANDS/ΜL (ref 1.85–7.62)
NEUTS SEG NFR BLD AUTO: 77 % (ref 43–75)
NRBC BLD AUTO-RTO: 0 /100 WBCS
PLATELET # BLD AUTO: 168 THOUSANDS/UL (ref 149–390)
PMV BLD AUTO: 10.9 FL (ref 8.9–12.7)
POTASSIUM SERPL-SCNC: 4.3 MMOL/L (ref 3.5–5.3)
PROT SERPL-MCNC: 7.8 G/DL (ref 6.4–8.4)
RBC # BLD AUTO: 4.05 MILLION/UL (ref 3.88–5.62)
RETICS # AUTO: ABNORMAL 10*3/UL (ref 14356–105094)
RETICS # CALC: 1.96 % (ref 0.37–1.87)
SODIUM SERPL-SCNC: 138 MMOL/L (ref 135–147)
TIBC SERPL-MCNC: 274 UG/DL (ref 250–450)
WBC # BLD AUTO: 7.25 THOUSAND/UL (ref 4.31–10.16)

## 2022-08-10 PROCEDURE — 82746 ASSAY OF FOLIC ACID SERUM: CPT

## 2022-08-10 PROCEDURE — 83550 IRON BINDING TEST: CPT

## 2022-08-10 PROCEDURE — 84165 PROTEIN E-PHORESIS SERUM: CPT

## 2022-08-10 PROCEDURE — 80053 COMPREHEN METABOLIC PANEL: CPT

## 2022-08-10 PROCEDURE — 83918 ORGANIC ACIDS TOTAL QUANT: CPT

## 2022-08-10 PROCEDURE — 85045 AUTOMATED RETICULOCYTE COUNT: CPT

## 2022-08-10 PROCEDURE — 82728 ASSAY OF FERRITIN: CPT

## 2022-08-10 PROCEDURE — 84165 PROTEIN E-PHORESIS SERUM: CPT | Performed by: PATHOLOGY

## 2022-08-10 PROCEDURE — 36415 COLL VENOUS BLD VENIPUNCTURE: CPT

## 2022-08-10 PROCEDURE — 99204 OFFICE O/P NEW MOD 45 MIN: CPT

## 2022-08-10 PROCEDURE — 82668 ASSAY OF ERYTHROPOIETIN: CPT

## 2022-08-10 PROCEDURE — 83540 ASSAY OF IRON: CPT

## 2022-08-10 PROCEDURE — 85025 COMPLETE CBC W/AUTO DIFF WBC: CPT

## 2022-08-10 RX ORDER — ALCOHOL ANTISEPTIC PADS
PADS, MEDICATED (EA) TOPICAL
COMMUNITY
Start: 2022-08-04

## 2022-08-10 RX ORDER — POLYETHYLENE GLYCOL 3350 17 G/17G
POWDER, FOR SOLUTION ORAL
COMMUNITY
Start: 2022-06-07

## 2022-08-10 RX ORDER — BLOOD SUGAR DIAGNOSTIC
STRIP MISCELLANEOUS
COMMUNITY
Start: 2022-08-04

## 2022-08-10 RX ORDER — BLOOD-GLUCOSE METER
EACH MISCELLANEOUS
COMMUNITY
Start: 2022-08-04

## 2022-08-11 LAB
ALBUMIN SERPL ELPH-MCNC: 3.57 G/DL (ref 3.5–5)
ALBUMIN SERPL ELPH-MCNC: 47 % (ref 52–65)
ALPHA1 GLOB SERPL ELPH-MCNC: 0.4 G/DL (ref 0.1–0.4)
ALPHA1 GLOB SERPL ELPH-MCNC: 5.3 % (ref 2.5–5)
ALPHA2 GLOB SERPL ELPH-MCNC: 0.99 G/DL (ref 0.4–1.2)
ALPHA2 GLOB SERPL ELPH-MCNC: 13 % (ref 7–13)
BETA GLOB ABNORMAL SERPL ELPH-MCNC: 0.46 G/DL (ref 0.4–0.8)
BETA1 GLOB SERPL ELPH-MCNC: 6.1 % (ref 5–13)
BETA2 GLOB SERPL ELPH-MCNC: 7.6 % (ref 2–8)
BETA2+GAMMA GLOB SERPL ELPH-MCNC: 0.58 G/DL (ref 0.2–0.5)
EPO SERPL-ACNC: 25.6 MIU/ML (ref 2.6–18.5)
GAMMA GLOB ABNORMAL SERPL ELPH-MCNC: 1.6 G/DL (ref 0.5–1.6)
GAMMA GLOB SERPL ELPH-MCNC: 21 % (ref 12–22)
IGG/ALB SER: 0.89 {RATIO} (ref 1.1–1.8)
PROT PATTERN SERPL ELPH-IMP: ABNORMAL
PROT SERPL-MCNC: 7.6 G/DL (ref 6.4–8.2)

## 2022-08-14 LAB — METHYLMALONATE SERPL-SCNC: 277 NMOL/L (ref 0–378)

## 2022-08-17 ENCOUNTER — TELEPHONE (OUTPATIENT)
Dept: HEMATOLOGY ONCOLOGY | Facility: CLINIC | Age: 67
End: 2022-08-17

## 2022-08-17 DIAGNOSIS — D62 ACUTE BLOOD LOSS ANEMIA: ICD-10-CM

## 2022-08-17 DIAGNOSIS — D50.9 IRON DEFICIENCY ANEMIA, UNSPECIFIED IRON DEFICIENCY ANEMIA TYPE: Primary | ICD-10-CM

## 2022-08-17 RX ORDER — SODIUM CHLORIDE 9 MG/ML
20 INJECTION, SOLUTION INTRAVENOUS ONCE
Status: CANCELLED | OUTPATIENT
Start: 2022-08-24

## 2022-08-17 NOTE — TELEPHONE ENCOUNTER
----- Message from [facility] sent at 8/17/2022  3:33 PM EDT -----  Can we arrange for IV feraheme?     Thanks

## 2022-08-18 ENCOUNTER — TELEPHONE (OUTPATIENT)
Dept: HEMATOLOGY ONCOLOGY | Facility: CLINIC | Age: 67
End: 2022-08-18

## 2022-08-18 DIAGNOSIS — D62 ACUTE BLOOD LOSS ANEMIA: ICD-10-CM

## 2022-08-18 DIAGNOSIS — D50.9 IRON DEFICIENCY ANEMIA, UNSPECIFIED IRON DEFICIENCY ANEMIA TYPE: Primary | ICD-10-CM

## 2022-08-18 RX ORDER — SODIUM CHLORIDE 9 MG/ML
20 INJECTION, SOLUTION INTRAVENOUS ONCE
Status: CANCELLED | OUTPATIENT
Start: 2022-08-29

## 2022-08-18 NOTE — TELEPHONE ENCOUNTER
CALL TRANSFER   Reason for patient call? Patient returning call from Sepideh Babcock    Patient's primary physician? Olivia Alonso RN call was transferred to and time it was transferred? Sepideh Babcock @ 9:19AM   Informed patient that the message will be forwarded to the team and someone will get back to them as soon as possible    Did you relay this information to the patient?  Yes

## 2022-08-18 NOTE — TELEPHONE ENCOUNTER
Spoke with patient  Discussed blood work results  He is aware Vicki العلي is recommending IV iron  We discussed we will schedule him for feraheme and if this is not approved, Venofer will be ordered instead  Patient prefers [facility] infusion  Patient is aware someone from infusion scheduling will be giving him a call back with the dates and times

## 2022-08-18 NOTE — TELEPHONE ENCOUNTER
Please schedule patient for Marlon  Patient is requesting any day the week of 8/29 as he is available that week  Thank you!

## 2022-08-20 ENCOUNTER — HOSPITAL ENCOUNTER (OUTPATIENT)
Dept: RADIOLOGY | Facility: HOSPITAL | Age: 67
Discharge: HOME | End: 2022-08-20
Payer: COMMERCIAL

## 2022-08-20 DIAGNOSIS — R14.0 ABDOMINAL BLOATING: ICD-10-CM

## 2022-08-20 DIAGNOSIS — D64.9 NORMOCYTIC ANEMIA: ICD-10-CM

## 2022-08-20 DIAGNOSIS — R10.84 GENERALIZED ABDOMINAL PAIN: ICD-10-CM

## 2022-08-20 PROCEDURE — 76700 US EXAM ABDOM COMPLETE: CPT

## 2022-08-22 NOTE — PROGRESS NOTES
8/25/2022    Marco Whaley  1955  [id number]      Assessment  -BPH with lower urinary tract symptoms  -Hypogonadism    Discussion/Plan  Lisa Williamson is a 79 y o  male being managed by our office    1  BPH with lower urinary tract symptoms- PVR in the office today is 6 mL  Patient reports improvement in his urinary symptoms  He will remain off Rapaflo and continue to monitor his symptoms  We reviewed dietary and behavior modifications  Order for PSA was provided to patient  We will call with his results  We will continue annual prostate cancer screening  2  Hypogonadism- patient will continue to follow with endocrinology for monitoring and management of low testosterone  He is asymptomatic at this time  Call with results of PSA  He will then follow-up in 1 year for re-evaluation of his urinary symptoms with PVR, PSA, and ELLIE  He was advised to call sooner with any questions or issues     -All questions answered, patient agrees with plan      History of Present Illness  79 y o  male with a history of BPH presents today for follow up  Patient recently seen in consultation in January 2022  He had been on Rapaflo 8 mg daily which was initially ordered by his PCP, but states he discontinued medication due to improvement of his urinary symptoms  Patient has noticed decreased urinary frequency and is no longer getting up at night to urinate  He denies any gross hematuria or dysuria  His last PSA in September 2021 was 0 03  Patient denies any strong family history of prostate cancer  He continues to follow with endocrinology  Testosterone supplementation has been on hold  He states he is asymptomatic, but recent testosterone level from 07/28/2022 was 45  Review of Systems  Review of Systems   Constitutional: Negative  HENT: Negative  Respiratory: Negative  Cardiovascular: Negative  Gastrointestinal: Negative      Genitourinary: Negative for decreased urine volume, difficulty urinating, dysuria, flank pain, hematuria and urgency  Musculoskeletal: Negative  Skin: Negative  Neurological: Negative  Psychiatric/Behavioral: Negative            Past Medical History  Past Medical History:   Diagnosis Date    Acid reflux     Anxiety     Arthritis     Cellulitis     left ankle    Diabetes mellitus ([address] )     Hypertension     Sleep apnea     Twitching        Past Social History  Past Surgical History:   Procedure Laterality Date    APPENDECTOMY      CHOLECYSTECTOMY      KNEE ARTHROSCOPY      NH TOTAL KNEE ARTHROPLASTY Left 2016    Procedure: TOTAL  KNEE REPLACEMENT ;  Surgeon: Mae Zhou MD;  [facility];  Service: Orthopedics       Past Family History  Family History   Problem Relation Age of Onset    Diabetes Mother     Diabetes Father     Cancer Father        Past Social history  Social History     Socioeconomic History    Marital status: /Civil Union     Spouse name: Not on file    Number of children: Not on file    Years of education: BS    Highest education level: Not on file   Occupational History    Occupation: Attendance officer   Tobacco Use    Smoking status: Former Smoker     Quit date:      Years since quittin 6    Smokeless tobacco: Never Used   Vaping Use    Vaping Use: Never used   Substance and Sexual Activity    Alcohol use: No    Drug use: No    Sexual activity: Not on file   Other Topics Concern    Not on file   Social History Narrative    Not on file     Social Determinants of Health     Financial Resource Strain: Not on file   Food Insecurity: Not on file   Transportation Needs: Not on file   Physical Activity: Not on file   Stress: Not on file   Social Connections: Not on file   Intimate Partner Violence: Not on file   Housing Stability: Not on file       Current Medications  Current Outpatient Medications   Medication Sig Dispense Refill    albuterol (PROVENTIL HFA,VENTOLIN HFA) 90 mcg/act inhaler Inhale 2 puffs every 6 (six) hours as needed for wheezing   aspirin (ECOTRIN LOW STRENGTH) 81 mg EC tablet Take 81 mg by mouth daily      atorvastatin (LIPITOR) 20 mg tablet       Blood Glucose Monitoring Suppl (ONE TOUCH ULTRA 2) w/Device KIT USE TO TEST BLOOD GLUCOSE      budesonide (RINOCORT AQUA) 32 MCG/ACT nasal spray 1 spray into each nostril in the morning and 1 spray in the evening   cloNIDine (CATAPRES) 0 1 mg tablet Take 0 1 mg by mouth 3 (three) times a day   docusate sodium (COLACE) 100 mg capsule Take 100 mg by mouth 2 (two) times a day      ergocalciferol (VITAMIN D2) 50,000 units       furosemide (LASIX) 40 mg tablet Take 40 mg by mouth as needed in the morning and 40 mg as needed in the evening   HYDROcodone-acetaminophen (NORCO)  mg per tablet       HYDROcodone-acetaminophen (NORCO) 5-325 mg per tablet Take 1 tablet by mouth every 4 to 6 hours as needed for pain      Lancets (Safety Lancet 30G/Pressure Act) MISC USE TO TEST BLOOD GLUCOSE TWICE DAILY      metFORMIN (GLUCOPHAGE-XR) 750 mg 24 hr tablet Take 750 mg by mouth 2 (two) times a day      mupirocin (BACTROBAN) 2 % ointment       OneTouch Ultra test strip USE TO TEST BLOOD GLUCOSE TWICE DAILY      pioglitazone (ACTOS) 15 mg tablet       polyethylene glycol (GLYCOLAX) 17 GM/SCOOP powder DISSOLVE 17 GRAMS IN 8 OZ OF FLUID LIQUID DRINK DAILY AS DIRECTED      polyethylene glycol (MIRALAX) 17 g packet Take 17 g by mouth daily 30 each 3    potassium chloride (K-DUR,KLOR-CON) 10 mEq tablet       Trulicity 4 5 JQ/6 0ZK SOPN       zinc gluconate 50 mg tablet Take 50 mg by mouth in the morning        zolpidem (AMBIEN) 5 mg tablet Take 1 tablet (5 mg total) by mouth daily at bedtime as needed for sleep For sleep study 1 tablet 0    buprenorphine-naloxone (SUBOXONE) 8-2 mg  (Patient not taking: No sig reported)      cephalexin (KEFLEX) 500 mg capsule  (Patient not taking: No sig reported)      clotrimazole-betamethasone (LOTRISONE) 1-0 05 % cream  (Patient not taking: No sig reported)      doxycycline hyclate (VIBRAMYCIN) 100 mg capsule  (Patient not taking: No sig reported)      Dulaglutide 0 75 MG/0 5ML SOPN Inject 0 75 mg under the skin once a week (Patient not taking: No sig reported)      DULoxetine (Cymbalta) 30 mg delayed release capsule Take 1 capsule (30 mg total) by mouth 2 (two) times a day (Patient not taking: Reported on 12/13/2021 ) 60 capsule 0    fluticasone (FLOVENT HFA) 220 mcg/act inhaler Inhale 1 puff daily as needed  (Patient not taking: No sig reported)      lisinopril-hydrochlorothiazide (PRINZIDE,ZESTORETIC) 10-12 5 MG per tablet Take 1 tablet by mouth 2 (two) times a day (Patient not taking: No sig reported)      LORazepam (ATIVAN) 0 5 mg tablet Take 0 5 mg by mouth daily at bedtime  (Patient not taking: No sig reported)      metFORMIN (GLUCOPHAGE) 850 mg tablet  (Patient not taking: No sig reported)      NARCAN 4 MG/0 1ML LIQD  (Patient not taking: No sig reported)      pantoprazole (PROTONIX) 40 mg tablet Take 40 mg by mouth daily   (Patient not taking: No sig reported)      semaglutide, 1 mg/dose, (Ozempic, 1 MG/DOSE,) 4 MG/3ML SOPN injection pen Inject 1 mg under the skin once a week (Patient not taking: No sig reported)      sulfamethoxazole-trimethoprim (BACTRIM DS) 800-160 mg per tablet Take 1 tablet by mouth 2 (two) times a day (Patient not taking: No sig reported)      testosterone (ANDROGEL) 1%  (Patient not taking: No sig reported)      Testosterone 12 5 MG/ACT (1%) GEL Apply 2 pump presses to clean, dry skin every morning (Patient not taking: No sig reported)      urea (CARMOL) 40 %  (Patient not taking: No sig reported)       No current facility-administered medications for this visit  Allergies  No Known Allergies    Past Medical History, Social History, Family History, medications and allergies were reviewed      Vitals  Vitals:    08/25/22 1119   BP: 134/74   Pulse: 88 Weight: 120 kg (264 lb)   Height: 5' 6" (1 676 m)       Physical Exam  Physical Exam  Constitutional:       Appearance: Normal appearance  He is well-developed  He is obese  HENT:      Head: Normocephalic  Eyes:      Pupils: Pupils are equal, round, and reactive to light  Pulmonary:      Effort: Pulmonary effort is normal    Abdominal:      Palpations: Abdomen is soft  Musculoskeletal:         General: Normal range of motion  Cervical back: Normal range of motion  Comments: Ambulates with cane   Skin:     General: Skin is warm and dry  Neurological:      General: No focal deficit present  Mental Status: He is alert and oriented to person, place, and time  Psychiatric:         Mood and Affect: Mood normal          Behavior: Behavior normal          Thought Content:  Thought content normal          Judgment: Judgment normal          Results    I have personally reviewed all pertinent lab results and reviewed with patient  Lab Results   Component Value Date    PSA <0 1 03/03/2021    PSA 0 2 10/04/2014     Lab Results   Component Value Date    GLUCOSE 120 10/04/2014    CALCIUM 9 3 08/10/2022     10/04/2014    K 4 3 08/10/2022    CO2 36 (H) 08/10/2022    CL 98 08/10/2022    BUN 21 08/10/2022    CREATININE 1 01 08/10/2022     Lab Results   Component Value Date    WBC 7 25 08/10/2022    HGB 10 9 (L) 08/10/2022    HCT 37 0 08/10/2022    MCV 91 08/10/2022     08/10/2022     Recent Results (from the past 1 hour(s))   POCT Measure PVR    Collection Time: 08/25/22 11:29 AM   Result Value Ref Range    POST-VOID RESIDUAL VOLUME, ML POC 6 mL

## 2022-08-25 ENCOUNTER — OFFICE VISIT (OUTPATIENT)
Dept: UROLOGY | Facility: AMBULATORY SURGERY CENTER | Age: 67
End: 2022-08-25
Payer: COMMERCIAL

## 2022-08-25 VITALS
WEIGHT: 264 LBS | SYSTOLIC BLOOD PRESSURE: 134 MMHG | HEIGHT: 66 IN | BODY MASS INDEX: 42.43 KG/M2 | DIASTOLIC BLOOD PRESSURE: 74 MMHG | HEART RATE: 88 BPM

## 2022-08-25 DIAGNOSIS — Z12.5 SCREENING FOR PROSTATE CANCER: ICD-10-CM

## 2022-08-25 DIAGNOSIS — E29.1 HYPOGONADISM IN MALE: ICD-10-CM

## 2022-08-25 DIAGNOSIS — N40.1 BENIGN PROSTATIC HYPERPLASIA WITH LOWER URINARY TRACT SYMPTOMS, SYMPTOM DETAILS UNSPECIFIED: Primary | ICD-10-CM

## 2022-08-25 LAB — POST-VOID RESIDUAL VOLUME, ML POC: 6 ML

## 2022-08-25 PROCEDURE — 51798 US URINE CAPACITY MEASURE: CPT | Performed by: NURSE PRACTITIONER

## 2022-08-25 PROCEDURE — 99213 OFFICE O/P EST LOW 20 MIN: CPT | Performed by: NURSE PRACTITIONER

## 2022-08-25 RX ORDER — HYDROCODONE BITARTRATE AND ACETAMINOPHEN 5; 325 MG/1; MG/1
1 TABLET ORAL
COMMUNITY
Start: 2022-05-24

## 2022-08-25 RX ORDER — DULAGLUTIDE 4.5 MG/.5ML
INJECTION, SOLUTION SUBCUTANEOUS
COMMUNITY
Start: 2022-08-22

## 2022-08-26 ENCOUNTER — OFFICE VISIT (OUTPATIENT)
Dept: GASTROENTEROLOGY | Facility: CLINIC | Age: 67
End: 2022-08-26
Payer: COMMERCIAL

## 2022-08-26 VITALS
TEMPERATURE: 99.1 F | BODY MASS INDEX: 41.78 KG/M2 | HEIGHT: 66 IN | HEART RATE: 88 BPM | SYSTOLIC BLOOD PRESSURE: 146 MMHG | WEIGHT: 260 LBS | DIASTOLIC BLOOD PRESSURE: 87 MMHG

## 2022-08-26 DIAGNOSIS — K59.09 OTHER CONSTIPATION: ICD-10-CM

## 2022-08-26 DIAGNOSIS — D50.8 OTHER IRON DEFICIENCY ANEMIA: Primary | ICD-10-CM

## 2022-08-26 DIAGNOSIS — R11.0 NAUSEA IN ADULT: ICD-10-CM

## 2022-08-26 DIAGNOSIS — B18.2 HEPATITIS C VIRUS CARRIER STATE (HCC): ICD-10-CM

## 2022-08-26 PROCEDURE — 99214 OFFICE O/P EST MOD 30 MIN: CPT | Performed by: PHYSICIAN ASSISTANT

## 2022-08-26 NOTE — PATIENT INSTRUCTIONS
Scheduled date of colonoscopy/egd  (as of today):10/19/22  Physician performing colonoscopy: Dr Yifan Jensen  Location of colonoscopy: [facility]   Bowel prep reviewed with patient: golytely/dulcolax  Instructions reviewed with patient by: Shakira   Clearances:   n/a

## 2022-08-26 NOTE — PROGRESS NOTES
[facility] - Outpatient Follow-up Note  Evon Lefort 79 y o  male MRN: [id number]  Encounter: [id number]      Assessment and Plan    1  Nausea  The patient has chronic nausea  He previously had acid reflux and was on pantoprazole but has discontinued this secondary to concerns for long time side-effects  He states when he discontinue the pantoprazole his nausea did not worsen and he has no had return of his reflux  - can assess on EGD    2  Constipation   The patient states he typically has a bowel movement every 2-3 days  If he goes longer he will take miralax with relief  - continue miralax as needed    3  Iron def anemia  Most recent hemoglobin 10 9 with his iron panels demonstrating that he is chronically iron deficient  Previously was unable to tolerate oral iron and will be starting IV iron in the near future  He denies any overt GI bleeding  He has undergone an EGD and colonoscopy many years ago  He believes 10+ years ago  - recommend EGD and colon for evaluation the patient is agreeable  - GoLYTELY/Dulcolax bowel prep instructions reviewed and provided     4  Hep C  The patient had routine hepatitis-C screening his antibody came back positive  hep C PCR was checked and was undetectable  His liver enzymes are within normal limits and a recent abdominal ultrasound revealed some hepatomegaly but no other concerns  He does have a history of IV drug use and he does have 1 tattoo that was not done at a tattoo parlor  No other risk factors  His liver enzymes are within normal limits  - no hep C treatment required given his undetectable viral load    Follow up after EGD and colonoscopy     ______________________________________________________________________    History of Present Illness  Evon Lefort is a 79 y o  male with DM 2 and HTN here for follow up evaluation of iron deficiency anemia  This appears to have been chronic over the last two years   MCV normal  Most recently his iron panel demonstrated signs of chronic iron deficiency with a ferritin of 25, iron saturation 13%, TIBC 285, serum iron 37  He denies any overt GI bleeding  He does have some nausea and constipation but no other GI complaints  His nausea has been chronic  He previously had reflux and was on pantoprazole but discontinued this with no worsening of his nausea and no return of his reflux  For his constipation he typically has a bowel movement every 2-3 days if he does not he will take MiraLax with relief  His last colonoscopy was 10+ years ago  He states that he did have a prior endoscopy but this was many years ago as well  Review of Systems   Constitutional: Negative for activity change, appetite change, chills, fatigue, fever and unexpected weight change  Gastrointestinal: Positive for constipation  Negative for abdominal distention, abdominal pain, anal bleeding, blood in stool, diarrhea, nausea, rectal pain and vomiting  Musculoskeletal: Negative for back pain and gait problem  Psychiatric/Behavioral: Negative for confusion         Past Medical History  Past Medical History:   Diagnosis Date    Acid reflux     Anxiety     Arthritis     Cellulitis     left ankle    Diabetes mellitus ([facility] [address]     Hypertension     Sleep apnea     Twitching        Past Social history  Past Surgical History:   Procedure Laterality Date    APPENDECTOMY      CHOLECYSTECTOMY      KNEE ARTHROSCOPY      [place] TOTAL KNEE ARTHROPLASTY Left 7/13/2016    Procedure: TOTAL  KNEE REPLACEMENT ;  Surgeon: Kiera Moyer MD;  Location: [facility];  Service: Orthopedics     Social History     Socioeconomic History    Marital status: /Civil Union     Spouse name: Not on file    Number of children: Not on file    Years of education: BS    Highest education level: Not on file   Occupational History    Occupation: Attendance officer   Tobacco Use    Smoking status: Former Smoker     Quit date: 1997 Years since quittin 6    Smokeless tobacco: Never Used   Vaping Use    Vaping Use: Never used   Substance and Sexual Activity    Alcohol use: No    Drug use: No    Sexual activity: Not on file   Other Topics Concern    Not on file   Social History Narrative    Not on file     Social Determinants of Health     Financial Resource Strain: Not on file   Food Insecurity: Not on file   Transportation Needs: Not on file   Physical Activity: Not on file   Stress: Not on file   Social Connections: Not on file   Intimate Partner Violence: Not on file   Housing Stability: Not on file     Social History     Substance and Sexual Activity   Alcohol Use No     Social History     Substance and Sexual Activity   Drug Use No     Social History     Tobacco Use   Smoking Status Former Smoker    Quit date:     Years since quittin 6   Smokeless Tobacco Never Used       Past Family History  Family History   Problem Relation Age of Onset    Diabetes Mother     Diabetes Father     Cancer Father        Current Medications  Current Outpatient Medications   Medication Sig Dispense Refill    albuterol (PROVENTIL HFA,VENTOLIN HFA) 90 mcg/act inhaler Inhale 2 puffs every 6 (six) hours as needed for wheezing   aspirin (ECOTRIN LOW STRENGTH) 81 mg EC tablet Take 81 mg by mouth daily      atorvastatin (LIPITOR) 20 mg tablet       Blood Glucose Monitoring Suppl (ONE TOUCH ULTRA 2) w/Device KIT USE TO TEST BLOOD GLUCOSE      budesonide (RINOCORT AQUA) 32 MCG/ACT nasal spray 1 spray into each nostril in the morning and 1 spray in the evening   cloNIDine (CATAPRES) 0 1 mg tablet Take 0 1 mg by mouth 3 (three) times a day        docusate sodium (COLACE) 100 mg capsule Take 100 mg by mouth 2 (two) times a day      ergocalciferol (VITAMIN D2) 50,000 units       fluticasone (FLOVENT HFA) 220 mcg/act inhaler Inhale 1 puff daily as needed      furosemide (LASIX) 40 mg tablet Take 40 mg by mouth as needed in the morning and 40 mg as needed in the evening   HYDROcodone-acetaminophen (NORCO)  mg per tablet       HYDROcodone-acetaminophen (NORCO) 5-325 mg per tablet Take 1 tablet by mouth every 4 to 6 hours as needed for pain      Lancets (Safety Lancet 30G/Pressure Act) MISC USE TO TEST BLOOD GLUCOSE TWICE DAILY      mupirocin (BACTROBAN) 2 % ointment       OneTouch Ultra test strip USE TO TEST BLOOD GLUCOSE TWICE DAILY      pioglitazone (ACTOS) 15 mg tablet       polyethylene glycol (MIRALAX) 17 g packet Take 17 g by mouth daily (Patient taking differently: Take 17 g by mouth daily As needed) 30 each 3    potassium chloride (K-DUR,KLOR-CON) 10 mEq tablet       Trulicity 4 5 MC/6 2MP SOPN       zinc gluconate 50 mg tablet Take 50 mg by mouth in the morning   zolpidem (AMBIEN) 5 mg tablet Take 1 tablet (5 mg total) by mouth daily at bedtime as needed for sleep For sleep study 1 tablet 0    buprenorphine-naloxone (SUBOXONE) 8-2 mg  (Patient not taking: No sig reported)      cephalexin (KEFLEX) 500 mg capsule  (Patient not taking: No sig reported)      clotrimazole-betamethasone (LOTRISONE) 1-0 05 % cream  (Patient not taking: No sig reported)      doxycycline hyclate (VIBRAMYCIN) 100 mg capsule  (Patient not taking: No sig reported)      Dulaglutide 0 75 MG/0 5ML SOPN Inject 0 75 mg under the skin once a week (Patient not taking: No sig reported)      DULoxetine (Cymbalta) 30 mg delayed release capsule Take 1 capsule (30 mg total) by mouth 2 (two) times a day (Patient not taking: Reported on 12/13/2021 ) 60 capsule 0    lisinopril-hydrochlorothiazide (PRINZIDE,ZESTORETIC) 10-12 5 MG per tablet Take 1 tablet by mouth 2 (two) times a day (Patient not taking: No sig reported)      LORazepam (ATIVAN) 0 5 mg tablet Take 0 5 mg by mouth daily at bedtime   (Patient not taking: No sig reported)      metFORMIN (GLUCOPHAGE) 850 mg tablet  (Patient not taking: No sig reported)      metFORMIN (GLUCOPHAGE-XR) 750 mg 24 hr tablet Take 750 mg by mouth 2 (two) times a day (Patient not taking: Reported on 8/26/2022)      NARCAN 4 MG/0 1ML LIQD  (Patient not taking: No sig reported)      pantoprazole (PROTONIX) 40 mg tablet Take 40 mg by mouth daily   (Patient not taking: No sig reported)      polyethylene glycol (GLYCOLAX) 17 GM/SCOOP powder DISSOLVE 17 GRAMS IN 8 OZ OF FLUID LIQUID DRINK DAILY AS DIRECTED (Patient not taking: Reported on 8/26/2022)      semaglutide, 1 mg/dose, (Ozempic, 1 MG/DOSE,) 4 MG/3ML SOPN injection pen Inject 1 mg under the skin once a week (Patient not taking: No sig reported)      sulfamethoxazole-trimethoprim (BACTRIM DS) 800-160 mg per tablet Take 1 tablet by mouth 2 (two) times a day (Patient not taking: No sig reported)      testosterone (ANDROGEL) 1%  (Patient not taking: No sig reported)      Testosterone 12 5 MG/ACT (1%) GEL Apply 2 pump presses to clean, dry skin every morning (Patient not taking: No sig reported)      urea (CARMOL) 40 %  (Patient not taking: No sig reported)       No current facility-administered medications for this visit  Allergies  No Known Allergies      The following portions of the patient's history were reviewed and updated as appropriate: allergies, current medications, past medical history, past social history, past surgical history and problem list       Vitals  Vitals:    08/26/22 1005   BP: 146/87   BP Location: Left arm   Patient Position: Sitting   Cuff Size: Adult   Pulse: 88   Temp: 99 1 °F (37 3 °C)   TempSrc: Tympanic   Weight: 118 kg (260 lb)   Height: 5' 6" (1 676 m)       Physical Exam  Constitutional   General appearance: Patient is seated and in no acute distress, well appearing and well nourished  Head and Face   Head and face: Normal     Eyes   Conjunctiva and lids: No erythema, swelling or discharge  Anicteric  Ears, Nose, Mouth, and Throat   Hearing: Normal     Neck: Supple, trachea midline    Pulmonary Respiratory effort: No increased work of breathing or signs of respiratory distress  Lungs: Clear to ascultation, no wheezes, rhonchi, or rales  Cardiovascular   Heart: Regular rate and rhythm, no murmurs gallops or rubs   Examination of extremities for edema and/or varicosities: Normal     Abdomen   Abdomen: Soft, non-tender, no masses, no organomegaly  Normal bowel sounds  Musculoskeletal   Gait and station: Normal     Skin   Skin and subcutaneous tissue: Warm, dry, and intact  No visible jaundice, lesions or rashes  Psychiatric   Judgment and insight: Normal  Recent and remote memory:  Normal  Mood and affect: Normal      Results  No visits with results within 1 Day(s) from this visit  Latest known visit with results is:   Office Visit on 08/25/2022   Component Date Value    POST-VOID RESIDUAL VOLUM* 08/25/2022 6        Radiology Results  US abdomen complete    Result Date: 8/25/2022  Narrative: ABDOMEN ULTRASOUND, COMPLETE INDICATION:   D64 9: Anemia, unspecified R14 0: Abdominal distension (gaseous) R10 84: Generalized abdominal pain  COMPARISON:  None TECHNIQUE:   Real-time ultrasound of the abdomen was performed with a curvilinear transducer with both volumetric sweeps and still imaging techniques  FINDINGS: PANCREAS:  Mostly obscured by bowel gas  Perfusion AORTA AND IVC:  Visualized portions are normal for patient age  LIVER: Size:  Moderately enlarged  The liver measures 20 3 cm in the midclavicular line  Contour:  Surface contour is smooth  Parenchyma:  Echogenicity and echotexture are within normal limits  No liver mass identified  Limited imaging of the main portal vein shows it to be patent and hepatopetal  BILIARY: Patient has undergone cholecystectomy  No intrahepatic biliary dilatation  CBD measures 6 0 mm  No choledocholithiasis  KIDNEY: Right kidney measures 11 9 x 6 1 x 5 0  cm  Volume 188 4 mL Kidney within normal limits  Left kidney measures 11 8 x 5 8 x 4 4 cm   Volume 158 0 mL Kidney within normal limits  SPLEEN: Measures 12 0 x 12 2 x 4 2 cm  Volume 326 7 mL Within normal limits  ASCITES:  None  Impression: Cholecystectomy  Hepatomegaly  Workstation performed: [id number]       Orders  No orders of the defined types were placed in this encounter

## 2022-08-29 ENCOUNTER — HOSPITAL ENCOUNTER (OUTPATIENT)
Dept: INFUSION CENTER | Facility: HOSPITAL | Age: 67
Discharge: HOME | End: 2022-08-29
Attending: INTERNAL MEDICINE

## 2022-08-29 VITALS — SYSTOLIC BLOOD PRESSURE: 137 MMHG | HEART RATE: 70 BPM | DIASTOLIC BLOOD PRESSURE: 68 MMHG | TEMPERATURE: 97.2 F

## 2022-08-29 DIAGNOSIS — D62 ACUTE BLOOD LOSS ANEMIA: ICD-10-CM

## 2022-08-29 DIAGNOSIS — D50.9 IRON DEFICIENCY ANEMIA, UNSPECIFIED IRON DEFICIENCY ANEMIA TYPE: ICD-10-CM

## 2022-08-29 NOTE — PROGRESS NOTES
After seven attempts for IV access, by 3 RNs pt requested to reschedule appt at this time  Request granted

## 2022-09-02 DIAGNOSIS — D62 ACUTE BLOOD LOSS ANEMIA: ICD-10-CM

## 2022-09-02 DIAGNOSIS — D50.9 IRON DEFICIENCY ANEMIA, UNSPECIFIED IRON DEFICIENCY ANEMIA TYPE: Primary | ICD-10-CM

## 2022-09-02 RX ORDER — SODIUM CHLORIDE 9 MG/ML
20 INJECTION, SOLUTION INTRAVENOUS ONCE
Status: CANCELLED | OUTPATIENT
Start: 2022-09-06

## 2022-09-06 ENCOUNTER — HOSPITAL ENCOUNTER (OUTPATIENT)
Dept: INFUSION CENTER | Facility: HOSPITAL | Age: 67
Discharge: HOME | End: 2022-09-06
Attending: INTERNAL MEDICINE
Payer: COMMERCIAL

## 2022-09-06 VITALS
DIASTOLIC BLOOD PRESSURE: 74 MMHG | TEMPERATURE: 96.8 F | SYSTOLIC BLOOD PRESSURE: 151 MMHG | RESPIRATION RATE: 18 BRPM | HEART RATE: 74 BPM

## 2022-09-06 DIAGNOSIS — D50.9 IRON DEFICIENCY ANEMIA, UNSPECIFIED IRON DEFICIENCY ANEMIA TYPE: Primary | ICD-10-CM

## 2022-09-06 DIAGNOSIS — D62 ACUTE BLOOD LOSS ANEMIA: ICD-10-CM

## 2022-09-06 PROCEDURE — 96365 THER/PROPH/DIAG IV INF INIT: CPT

## 2022-09-06 RX ORDER — SODIUM CHLORIDE 9 MG/ML
20 INJECTION, SOLUTION INTRAVENOUS ONCE
Status: CANCELLED | OUTPATIENT
Start: 2022-09-13

## 2022-09-06 RX ORDER — SODIUM CHLORIDE 9 MG/ML
20 INJECTION, SOLUTION INTRAVENOUS ONCE
Status: COMPLETED | OUTPATIENT
Start: 2022-09-06 | End: 2022-09-06

## 2022-09-06 RX ADMIN — FERUMOXYTOL 510 MG: 510 INJECTION INTRAVENOUS at 09:00

## 2022-09-06 RX ADMIN — SODIUM CHLORIDE 20 ML/HR: 0.9 INJECTION, SOLUTION INTRAVENOUS at 08:43

## 2022-09-09 ENCOUNTER — HOSPITAL ENCOUNTER (OUTPATIENT)
Dept: SLEEP CENTER | Facility: CLINIC | Age: 67
Discharge: HOME | End: 2022-09-09
Payer: COMMERCIAL

## 2022-09-09 DIAGNOSIS — G47.33 OSA (OBSTRUCTIVE SLEEP APNEA): ICD-10-CM

## 2022-09-09 PROCEDURE — 95811 POLYSOM 6/>YRS CPAP 4/> PARM: CPT | Performed by: INTERNAL MEDICINE

## 2022-09-09 PROCEDURE — 95811 POLYSOM 6/>YRS CPAP 4/> PARM: CPT

## 2022-09-09 NOTE — PROGRESS NOTES
Sleep Study Documentation    Pre-Sleep Study       Sleep testing procedure explained to patient:YES    Patient napped prior to study:NO    Caffeine:Dayshift worker after 12PM   Caffeine use:YES- soda  12 ounces    Alcohol:Dayshift workers after 5PM: Alcohol use:NO    Typical day for patient:YES       Study Documentation    Sleep Study Indications: SUSY, snoring, excessive daytime sleepiness, witnessed apnea, diabetes type 2, HTN, GERD, anxiety, low back pain, neck pain, right shoulder pain,     Sleep Study: Split Optimal PAP pressure: 14cm  Leak:Small  Snore:Eliminated  REM Obtained:yes  Supplemental O2: no    Minimum SaO2 67%  Baseline SaO2 92%  PAP mask tried (list all)Medium Resmed airfit F20 full face  PAP mask choice (final)Medium Resmed airfit F20 full face  PAP mask type:full face  PAP pressure at which snoring was eliminated 10cm  Minimum SaO2 at final PAP pressure 90%  Mode of Therapy:CPAP  ETCO2:No  CPAP changed to BiPAP:No    Mode of Therapy:CPAP    EKG abnormalities: no     EEG abnormalities: no    Sleep Study Recorded < 2 hours: N/A    Sleep Study Recorded > 2 hours but incomplete study: N/A    Sleep Study Recorded 6 hours but no sleep obtained: NO    Patient classification: retired       Post-Sleep Study    Medication used at bedtime or during sleep study:YES prescription sleep aid    Patient reports time it took to fall asleep:less than 20 minutes    Patient reports waking up during study:1 to 2 times  Patient reports returning to sleep without difficulty  Patient reports sleeping 6 to 8 hours without dreaming  Patient reports sleep during study:better than usual    Patient rated sleepiness: Not sleepy or tired    PAP treatment:yes: Post PAP treatment patient reports feeling better and  would wear PAP mask at home

## 2022-09-13 ENCOUNTER — HOSPITAL ENCOUNTER (OUTPATIENT)
Dept: INFUSION CENTER | Facility: HOSPITAL | Age: 67
Discharge: HOME | End: 2022-09-13
Attending: INTERNAL MEDICINE
Payer: COMMERCIAL

## 2022-09-13 VITALS
TEMPERATURE: 97 F | HEART RATE: 78 BPM | SYSTOLIC BLOOD PRESSURE: 131 MMHG | RESPIRATION RATE: 20 BRPM | DIASTOLIC BLOOD PRESSURE: 65 MMHG

## 2022-09-13 DIAGNOSIS — D50.9 IRON DEFICIENCY ANEMIA, UNSPECIFIED IRON DEFICIENCY ANEMIA TYPE: ICD-10-CM

## 2022-09-13 DIAGNOSIS — D62 ACUTE BLOOD LOSS ANEMIA: Primary | ICD-10-CM

## 2022-09-13 PROCEDURE — 96365 THER/PROPH/DIAG IV INF INIT: CPT

## 2022-09-13 RX ORDER — SODIUM CHLORIDE 9 MG/ML
20 INJECTION, SOLUTION INTRAVENOUS ONCE
Status: COMPLETED | OUTPATIENT
Start: 2022-09-13 | End: 2022-09-13

## 2022-09-13 RX ORDER — SODIUM CHLORIDE 9 MG/ML
20 INJECTION, SOLUTION INTRAVENOUS ONCE
Status: CANCELLED | OUTPATIENT
Start: 2022-09-13

## 2022-09-13 RX ADMIN — FERUMOXYTOL 510 MG: 510 INJECTION INTRAVENOUS at 14:12

## 2022-09-13 RX ADMIN — SODIUM CHLORIDE 20 ML/HR: 9 INJECTION, SOLUTION INTRAVENOUS at 14:11

## 2022-09-13 NOTE — PROGRESS NOTES
Patient received Feraheme infusion without incident  AVS declined  Patient left ambulatory in stable condition

## 2022-09-15 ENCOUNTER — TELEPHONE (OUTPATIENT)
Dept: SLEEP CENTER | Facility: CLINIC | Age: 67
End: 2022-09-15

## 2022-09-15 NOTE — TELEPHONE ENCOUNTER
Called patient  Advised split study is resulted  Diagnostic portion of study shows severe SUSY (AHI-38  2) effectively treated with CPAP  Dr Tamra Barnard has placed an order for APAP  DME setup scheduled 9/29/22 in [place]  Compliance follow-up with Dr Tamra Barnard 2/22/2023  Patient placed on cancellation list for sooner appointment date within 31-90 day compliance window (10/31-12/28)  Rx for CPAP and clinicals sent to [facility] via [place]

## 2022-09-20 ENCOUNTER — OFFICE VISIT (OUTPATIENT)
Dept: OBGYN CLINIC | Facility: HOSPITAL | Age: 67
End: 2022-09-20
Payer: COMMERCIAL

## 2022-09-20 VITALS
DIASTOLIC BLOOD PRESSURE: 74 MMHG | HEART RATE: 71 BPM | HEIGHT: 66 IN | SYSTOLIC BLOOD PRESSURE: 145 MMHG | WEIGHT: 260 LBS | BODY MASS INDEX: 41.78 KG/M2

## 2022-09-20 DIAGNOSIS — M25.561 CHRONIC PAIN OF RIGHT KNEE: ICD-10-CM

## 2022-09-20 DIAGNOSIS — G89.29 CHRONIC PAIN OF RIGHT KNEE: ICD-10-CM

## 2022-09-20 DIAGNOSIS — M17.11 PRIMARY OSTEOARTHRITIS OF RIGHT KNEE: Primary | ICD-10-CM

## 2022-09-20 PROCEDURE — 20610 DRAIN/INJ JOINT/BURSA W/O US: CPT | Performed by: ORTHOPAEDIC SURGERY

## 2022-09-20 PROCEDURE — 99213 OFFICE O/P EST LOW 20 MIN: CPT | Performed by: ORTHOPAEDIC SURGERY

## 2022-09-20 RX ORDER — BETAMETHASONE SODIUM PHOSPHATE AND BETAMETHASONE ACETATE 3; 3 MG/ML; MG/ML
12 INJECTION, SUSPENSION INTRA-ARTICULAR; INTRALESIONAL; INTRAMUSCULAR; SOFT TISSUE
Status: COMPLETED | OUTPATIENT
Start: 2022-09-20 | End: 2022-09-20

## 2022-09-20 RX ORDER — LIDOCAINE HYDROCHLORIDE 10 MG/ML
2 INJECTION, SOLUTION INFILTRATION; PERINEURAL
Status: COMPLETED | OUTPATIENT
Start: 2022-09-20 | End: 2022-09-20

## 2022-09-20 RX ORDER — BUPIVACAINE HYDROCHLORIDE 2.5 MG/ML
2 INJECTION, SOLUTION INFILTRATION; PERINEURAL
Status: COMPLETED | OUTPATIENT
Start: 2022-09-20 | End: 2022-09-20

## 2022-09-20 RX ADMIN — BUPIVACAINE HYDROCHLORIDE 2 ML: 2.5 INJECTION, SOLUTION INFILTRATION; PERINEURAL at 13:58

## 2022-09-20 RX ADMIN — BETAMETHASONE SODIUM PHOSPHATE AND BETAMETHASONE ACETATE 12 MG: 3; 3 INJECTION, SUSPENSION INTRA-ARTICULAR; INTRALESIONAL; INTRAMUSCULAR; SOFT TISSUE at 13:58

## 2022-09-20 RX ADMIN — LIDOCAINE HYDROCHLORIDE 2 ML: 10 INJECTION, SOLUTION INFILTRATION; PERINEURAL at 13:58

## 2022-09-20 NOTE — PROGRESS NOTES
Assessment:   Diagnosis ICD-10-CM Associated Orders   1  Primary osteoarthritis of right knee  M17 11 Large joint arthrocentesis: R knee   2  Chronic pain of right knee  M25 561 Large joint arthrocentesis: R knee    G89 29        Plan:  Diagnosis, treatment options and associated risks were discussed with the patient including no treatment, nonsurgical treatment and potential for surgical intervention  The patient was given the opportunity to ask questions regarding each  It was explained to the patient that based upon his elevated BMI, at this point in time, this is not a surgical problem  Treatment for the above diagnoses and associated symptoms of this nature is generally conservative including a physician directed physical therapy program, improved fitness/weight loss, anti-inflammatories, and potentially various injections  He was offered, accepted, performed injection of cortisone to his right knee today for symptomatic relief  He tolerated procedure well  Ice and post injection protocol advised  Weightbearing activities as tolerated  Quality of life decision to pursue elective right total knee arthroplasty however he needs to have have his BMI reduced to 40 or below  To do next visit:  Return in about 3 months (around 12/20/2022) for re-check  The above stated was discussed in layman's terms and the patient expressed understanding  All questions were answered to the patient's satisfaction  Scribe Attestation    I,:  Michelle Clark am acting as a scribe while in the presence of the attending physician :       I,:  Taty Pedro MD personally performed the services described in this documentation    as scribed in my presence :             Subjective:   Delvin Barnhart is a 79 y o  male who presents today repeat evaluation of his right knee due to return of pain  He has known osteoarthritis    He was last seen greater than 1 year ago where he responded favorably to an injection of estrelladiana  He presents today with increased medial knee pain with weight-bearing activities  He uses a single-point cane for ambulatory assistance  He has a history of left TKA from 2016 is doing very well and is pleased  He is currently undergoing a weight loss plan as well as improved measures to manage his diabetes        Review of systems negative unless otherwise specified in HPI  Review of Systems    Past Medical History:   Diagnosis Date    Acid reflux     Anxiety     Arthritis     Cellulitis     left ankle    Diabetes mellitus ([address] )     Hypertension     Sleep apnea     Twitching        Past Surgical History:   Procedure Laterality Date    APPENDECTOMY      CHOLECYSTECTOMY      KNEE ARTHROSCOPY      MT TOTAL KNEE ARTHROPLASTY Left 2016    Procedure: TOTAL  KNEE REPLACEMENT ;  Surgeon: Mae Zhou MD;  Location: [facility];  Service: Orthopedics       Family History   Problem Relation Age of Onset    Diabetes Mother     Diabetes Father     Cancer Father        Social History     Occupational History    Occupation: Attendance officer   Tobacco Use    Smoking status: Former Smoker     Quit date:      Years since quittin 7    Smokeless tobacco: Never Used   Vaping Use    Vaping Use: Never used   Substance and Sexual Activity    Alcohol use: No    Drug use: No    Sexual activity: Not on file         Current Outpatient Medications:     albuterol (PROVENTIL HFA,VENTOLIN HFA) 90 mcg/act inhaler, Inhale 2 puffs every 6 (six) hours as needed for wheezing , Disp: , Rfl:     aspirin (ECOTRIN LOW STRENGTH) 81 mg EC tablet, Take 81 mg by mouth daily, Disp: , Rfl:     atorvastatin (LIPITOR) 20 mg tablet, , Disp: , Rfl:     bisacodyl (DULCOLAX) 5 mg EC tablet, Take 1 tablet (5 mg total) by mouth once for 1 dose, Disp: 2 tablet, Rfl: 0    Blood Glucose Monitoring Suppl (ONE TOUCH ULTRA 2) w/Device KIT, USE TO TEST BLOOD GLUCOSE, Disp: , Rfl:     budesonide (RINOCORT AQUA) 32 MCG/ACT nasal spray, 1 spray into each nostril in the morning and 1 spray in the evening , Disp: , Rfl:     buprenorphine-naloxone (SUBOXONE) 8-2 mg, , Disp: , Rfl:     cephalexin (KEFLEX) 500 mg capsule, , Disp: , Rfl:     cloNIDine (CATAPRES) 0 1 mg tablet, Take 0 1 mg by mouth 3 (three) times a day , Disp: , Rfl:     clotrimazole-betamethasone (LOTRISONE) 1-0 05 % cream, , Disp: , Rfl:     docusate sodium (COLACE) 100 mg capsule, Take 100 mg by mouth 2 (two) times a day, Disp: , Rfl:     doxycycline hyclate (VIBRAMYCIN) 100 mg capsule, , Disp: , Rfl:     Dulaglutide 0 75 MG/0 5ML SOPN, Inject 0 75 mg under the skin once a week (Patient not taking: No sig reported), Disp: , Rfl:     DULoxetine (Cymbalta) 30 mg delayed release capsule, Take 1 capsule (30 mg total) by mouth 2 (two) times a day (Patient not taking: Reported on 12/13/2021 ), Disp: 60 capsule, Rfl: 0    ergocalciferol (VITAMIN D2) 50,000 units, , Disp: , Rfl:     fluticasone (FLOVENT HFA) 220 mcg/act inhaler, Inhale 1 puff daily as needed, Disp: , Rfl:     furosemide (LASIX) 40 mg tablet, Take 40 mg by mouth as needed in the morning and 40 mg as needed in the evening , Disp: , Rfl:     HYDROcodone-acetaminophen (NORCO)  mg per tablet, , Disp: , Rfl:     HYDROcodone-acetaminophen (NORCO) 5-325 mg per tablet, Take 1 tablet by mouth every 4 to 6 hours as needed for pain, Disp: , Rfl:     Lancets (Safety Lancet 30G/Pressure Act) MISC, USE TO TEST BLOOD GLUCOSE TWICE DAILY, Disp: , Rfl:     lisinopril-hydrochlorothiazide (PRINZIDE,ZESTORETIC) 10-12 5 MG per tablet, Take 1 tablet by mouth 2 (two) times a day (Patient not taking: No sig reported), Disp: , Rfl:     LORazepam (ATIVAN) 0 5 mg tablet, Take 0 5 mg by mouth daily at bedtime   (Patient not taking: No sig reported), Disp: , Rfl:     metFORMIN (GLUCOPHAGE) 850 mg tablet, , Disp: , Rfl:     metFORMIN (GLUCOPHAGE-XR) 750 mg 24 hr tablet, Take 750 mg by mouth 2 (two) times a day (Patient not taking: Reported on 8/26/2022), Disp: , Rfl:     mupirocin (BACTROBAN) 2 % ointment, , Disp: , Rfl:     NARCAN 4 MG/0 1ML LIQD, , Disp: , Rfl:     OneTouch Ultra test strip, USE TO TEST BLOOD GLUCOSE TWICE DAILY, Disp: , Rfl:     pantoprazole (PROTONIX) 40 mg tablet, Take 40 mg by mouth daily   (Patient not taking: No sig reported), Disp: , Rfl:     pioglitazone (ACTOS) 15 mg tablet, , Disp: , Rfl:     polyethylene glycol (GLYCOLAX) 17 GM/SCOOP powder, DISSOLVE 17 GRAMS IN 8 OZ OF FLUID LIQUID DRINK DAILY AS DIRECTED (Patient not taking: Reported on 8/26/2022), Disp: , Rfl:     polyethylene glycol (GOLYTELY) 4000 mL solution, Take 4,000 mL by mouth once for 1 dose, Disp: 4000 mL, Rfl: 0    polyethylene glycol (MIRALAX) 17 g packet, Take 17 g by mouth daily (Patient taking differently: Take 17 g by mouth daily As needed), Disp: 30 each, Rfl: 3    potassium chloride (K-DUR,KLOR-CON) 10 mEq tablet, , Disp: , Rfl:     semaglutide, 1 mg/dose, (Ozempic, 1 MG/DOSE,) 4 MG/3ML SOPN injection pen, Inject 1 mg under the skin once a week (Patient not taking: No sig reported), Disp: , Rfl:     sulfamethoxazole-trimethoprim (BACTRIM DS) 800-160 mg per tablet, Take 1 tablet by mouth 2 (two) times a day (Patient not taking: No sig reported), Disp: , Rfl:     testosterone (ANDROGEL) 1%, , Disp: , Rfl:     Testosterone 12 5 MG/ACT (1%) GEL, Apply 2 pump presses to clean, dry skin every morning (Patient not taking: No sig reported), Disp: , Rfl:     Trulicity 4 5 OW/8 9SC SOPN, , Disp: , Rfl:     urea (CARMOL) 40 %, , Disp: , Rfl:     zinc gluconate 50 mg tablet, Take 50 mg by mouth in the morning , Disp: , Rfl:     zolpidem (AMBIEN) 5 mg tablet, Take 1 tablet (5 mg total) by mouth daily at bedtime as needed for sleep For sleep study, Disp: 1 tablet, Rfl: 0    No Known Allergies         Vitals:    09/20/22 1347   BP: 145/74   Pulse: 71       Objective:                    Right Knee Exam     Muscle Strength   The patient has normal right knee strength  Tenderness   The patient is experiencing tenderness in the medial joint line  Range of Motion   Extension: 0   Flexion: 120 (with crepitation and stiffness)     Other   Erythema: absent  Sensation: normal  Swelling: mild  Effusion: effusion (trace) present    Comments:    Varus alignment with bony enlargement medially             Diagnostics, reviewed and taken today if performed as documented:    None performed          Procedures, if performed today:    Large joint arthrocentesis: R knee  Universal Protocol:  Consent: Verbal consent obtained  Risks and benefits: risks, benefits and alternatives were discussed  Consent given by: patient  Time out: Immediately prior to procedure a "time out" was called to verify the correct patient, procedure, equipment, support staff and site/side marked as required  Timeout called at: 9/20/2022 1:56 PM   Patient understanding: patient states understanding of the procedure being performed  Site marked: the operative site was marked  Patient identity confirmed: verbally with patient    Supporting Documentation  Indications: pain and diagnostic evaluation   Procedure Details  Location: knee - R knee  Preparation: Patient was prepped and draped in the usual sterile fashion  Needle size: 22 G  Ultrasound guidance: no  Approach: anterolateral  Medications administered: 12 mg betamethasone acetate-betamethasone sodium phosphate 6 (3-3) mg/mL; 2 mL lidocaine 1 %; 2 mL bupivacaine 0 25 %    Patient tolerance: patient tolerated the procedure well with no immediate complications  Dressing:  Sterile dressing applied              Portions of the record may have been created with voice recognition software  Occasional wrong word or "sound a like" substitutions may have occurred due to the inherent limitations of voice recognition software  Read the chart carefully and recognize, using context, where substitutions have occurred

## 2022-09-29 ENCOUNTER — APPOINTMENT (OUTPATIENT)
Dept: LAB | Facility: CLINIC | Age: 67
End: 2022-09-29
Payer: COMMERCIAL

## 2022-09-29 DIAGNOSIS — Z12.5 SCREENING FOR PROSTATE CANCER: ICD-10-CM

## 2022-09-29 LAB
DME PARACHUTE DELIVERY DATE EXPECTED: NORMAL
DME PARACHUTE DELIVERY DATE REQUESTED: NORMAL
DME PARACHUTE ITEM DESCRIPTION: NORMAL
DME PARACHUTE ORDER STATUS: NORMAL
DME PARACHUTE SUPPLIER NAME: NORMAL
DME PARACHUTE SUPPLIER PHONE: NORMAL
PSA SERPL-MCNC: <0.1 NG/ML (ref 0–4)

## 2022-09-29 PROCEDURE — 36415 COLL VENOUS BLD VENIPUNCTURE: CPT

## 2022-09-29 PROCEDURE — G0103 PSA SCREENING: HCPCS

## 2022-10-06 ENCOUNTER — TELEPHONE (OUTPATIENT)
Dept: HEMATOLOGY ONCOLOGY | Facility: CLINIC | Age: 67
End: 2022-10-06

## 2022-10-06 DIAGNOSIS — D62 ACUTE BLOOD LOSS ANEMIA: ICD-10-CM

## 2022-10-06 DIAGNOSIS — D64.9 NORMOCYTIC ANEMIA: ICD-10-CM

## 2022-10-06 DIAGNOSIS — D50.9 IRON DEFICIENCY ANEMIA, UNSPECIFIED IRON DEFICIENCY ANEMIA TYPE: Primary | ICD-10-CM

## 2022-10-06 NOTE — TELEPHONE ENCOUNTER
CALL RETURN FORM   Reason for patient call? Patient's spouse, Brianna Irizarry, calling to speak with Sheldon  She states patient had some infusion treatments and he still doesn't feel well  He recently had blood work and she would like to discuss results-    Patient's primary oncologist? Sheldon   Name of person the patient was calling for? Sheldon   Any additional information to add, if applicable? [phone]   Informed patient that the message will be forwarded to the team and someone will get back to them as soon as possible    Did you relay this information to the patient?  yes

## 2022-10-06 NOTE — TELEPHONE ENCOUNTER
Called and spoke with patient and wife Tala Castrejon  Patient states he has been experiencing nausea, decreased appetite, bloating, fatigue and pallor over the past 3-4 weeks  He denies fever, chills, vomiting, diarrhea, shortness of breath  Patient states he has checked his blood pressure and oxygen levels and both were normal  Patient has been eating and drinking well  He is scheduled for a colonoscopy on 10/19  He has been using pepto bismol with some relief  Discussed with Romulo Stephen  Patient to have cbc, cmp and iron panel completed  He can continue pepto bismol for bloating  Called patient and wife back  Discussed recommendations  Patient will go for blood work tomorrow at [facility]  He will continue to use pepto bismol and we discussed he can try gas-x as well  He is aware we will call once we have blood work results back to discuss further recommendations

## 2022-10-06 NOTE — TELEPHONE ENCOUNTER
Attempted to call [facility] back to discuss, no answer  Left voicemail to call back with my direct number

## 2022-10-07 ENCOUNTER — APPOINTMENT (OUTPATIENT)
Dept: LAB | Facility: CLINIC | Age: 67
End: 2022-10-07
Payer: COMMERCIAL

## 2022-10-07 DIAGNOSIS — D50.9 IRON DEFICIENCY ANEMIA, UNSPECIFIED IRON DEFICIENCY ANEMIA TYPE: ICD-10-CM

## 2022-10-07 DIAGNOSIS — D62 ACUTE BLOOD LOSS ANEMIA: ICD-10-CM

## 2022-10-07 DIAGNOSIS — D64.9 NORMOCYTIC ANEMIA: ICD-10-CM

## 2022-10-07 LAB
ALBUMIN SERPL BCP-MCNC: 3.1 G/DL (ref 3.5–5)
ALP SERPL-CCNC: 107 U/L (ref 46–116)
ALT SERPL W P-5'-P-CCNC: 22 U/L (ref 12–78)
ANION GAP SERPL CALCULATED.3IONS-SCNC: 6 MMOL/L (ref 4–13)
AST SERPL W P-5'-P-CCNC: 17 U/L (ref 5–45)
BASOPHILS # BLD AUTO: 0.02 THOUSANDS/ΜL (ref 0–0.1)
BASOPHILS NFR BLD AUTO: 0 % (ref 0–1)
BILIRUB SERPL-MCNC: 0.76 MG/DL (ref 0.2–1)
BUN SERPL-MCNC: 17 MG/DL (ref 5–25)
CALCIUM ALBUM COR SERPL-MCNC: 9.8 MG/DL (ref 8.3–10.1)
CALCIUM SERPL-MCNC: 9.1 MG/DL (ref 8.3–10.1)
CHLORIDE SERPL-SCNC: 99 MMOL/L (ref 96–108)
CO2 SERPL-SCNC: 32 MMOL/L (ref 21–32)
CREAT SERPL-MCNC: 1.2 MG/DL (ref 0.6–1.3)
EOSINOPHIL # BLD AUTO: 0.17 THOUSAND/ΜL (ref 0–0.61)
EOSINOPHIL NFR BLD AUTO: 3 % (ref 0–6)
ERYTHROCYTE [DISTWIDTH] IN BLOOD BY AUTOMATED COUNT: 16.3 % (ref 11.6–15.1)
FERRITIN SERPL-MCNC: 191 NG/ML (ref 8–388)
GFR SERPL CREATININE-BSD FRML MDRD: 62 ML/MIN/1.73SQ M
GLUCOSE P FAST SERPL-MCNC: 131 MG/DL (ref 65–99)
HCT VFR BLD AUTO: 39 % (ref 36.5–49.3)
HGB BLD-MCNC: 12.1 G/DL (ref 12–17)
IMM GRANULOCYTES # BLD AUTO: 0.02 THOUSAND/UL (ref 0–0.2)
IMM GRANULOCYTES NFR BLD AUTO: 0 % (ref 0–2)
IRON SATN MFR SERPL: 28 % (ref 20–50)
IRON SERPL-MCNC: 64 UG/DL (ref 65–175)
LYMPHOCYTES # BLD AUTO: 1.07 THOUSANDS/ΜL (ref 0.6–4.47)
LYMPHOCYTES NFR BLD AUTO: 18 % (ref 14–44)
MCH RBC QN AUTO: 28.3 PG (ref 26.8–34.3)
MCHC RBC AUTO-ENTMCNC: 31 G/DL (ref 31.4–37.4)
MCV RBC AUTO: 91 FL (ref 82–98)
MONOCYTES # BLD AUTO: 0.54 THOUSAND/ΜL (ref 0.17–1.22)
MONOCYTES NFR BLD AUTO: 9 % (ref 4–12)
NEUTROPHILS # BLD AUTO: 4.14 THOUSANDS/ΜL (ref 1.85–7.62)
NEUTS SEG NFR BLD AUTO: 70 % (ref 43–75)
NRBC BLD AUTO-RTO: 0 /100 WBCS
PLATELET # BLD AUTO: 154 THOUSANDS/UL (ref 149–390)
PMV BLD AUTO: 11.2 FL (ref 8.9–12.7)
POTASSIUM SERPL-SCNC: 3.9 MMOL/L (ref 3.5–5.3)
PROT SERPL-MCNC: 7.9 G/DL (ref 6.4–8.4)
RBC # BLD AUTO: 4.28 MILLION/UL (ref 3.88–5.62)
SODIUM SERPL-SCNC: 137 MMOL/L (ref 135–147)
TIBC SERPL-MCNC: 232 UG/DL (ref 250–450)
WBC # BLD AUTO: 5.96 THOUSAND/UL (ref 4.31–10.16)

## 2022-10-07 PROCEDURE — 36415 COLL VENOUS BLD VENIPUNCTURE: CPT

## 2022-10-07 PROCEDURE — 83540 ASSAY OF IRON: CPT

## 2022-10-07 PROCEDURE — 83550 IRON BINDING TEST: CPT

## 2022-10-07 PROCEDURE — 85025 COMPLETE CBC W/AUTO DIFF WBC: CPT

## 2022-10-07 PROCEDURE — 82728 ASSAY OF FERRITIN: CPT

## 2022-10-07 PROCEDURE — 80053 COMPREHEN METABOLIC PANEL: CPT

## 2022-10-12 ENCOUNTER — TELEPHONE (OUTPATIENT)
Dept: SLEEP CENTER | Facility: CLINIC | Age: 67
End: 2022-10-12

## 2022-10-12 NOTE — TELEPHONE ENCOUNTER
Patient left message on the nurse line inquiring if anyone had found a ring in the room he used in the [facility] sleep lab for the split-night sleep study on 9/9/22  Message sent via Teams to staff about any missing rings in the lost and found  No ring was found in the lost and found  Called patient  Left message to advise that no ring was found in the [facility] sleep lab

## 2022-10-18 ENCOUNTER — TELEPHONE (OUTPATIENT)
Dept: HEMATOLOGY ONCOLOGY | Facility: CLINIC | Age: 67
End: 2022-10-18

## 2022-10-18 ENCOUNTER — OFFICE VISIT (OUTPATIENT)
Dept: HEMATOLOGY ONCOLOGY | Facility: CLINIC | Age: 67
End: 2022-10-18
Payer: COMMERCIAL

## 2022-10-18 ENCOUNTER — OFFICE VISIT (OUTPATIENT)
Dept: PODIATRY | Facility: CLINIC | Age: 67
End: 2022-10-18
Payer: COMMERCIAL

## 2022-10-18 VITALS
OXYGEN SATURATION: 98 % | DIASTOLIC BLOOD PRESSURE: 98 MMHG | RESPIRATION RATE: 16 BRPM | HEART RATE: 82 BPM | TEMPERATURE: 97.3 F | HEIGHT: 66 IN | WEIGHT: 260 LBS | SYSTOLIC BLOOD PRESSURE: 156 MMHG | BODY MASS INDEX: 41.78 KG/M2

## 2022-10-18 VITALS
WEIGHT: 259.4 LBS | SYSTOLIC BLOOD PRESSURE: 128 MMHG | HEART RATE: 71 BPM | DIASTOLIC BLOOD PRESSURE: 69 MMHG | BODY MASS INDEX: 41.69 KG/M2 | HEIGHT: 66 IN

## 2022-10-18 DIAGNOSIS — E46 PROTEIN-CALORIE MALNUTRITION, UNSPECIFIED SEVERITY (HCC): ICD-10-CM

## 2022-10-18 DIAGNOSIS — B35.3 TINEA PEDIS OF BOTH FEET: Primary | ICD-10-CM

## 2022-10-18 DIAGNOSIS — E11.9 CONTROLLED TYPE 2 DIABETES MELLITUS WITHOUT COMPLICATION, WITHOUT LONG-TERM CURRENT USE OF INSULIN (HCC): ICD-10-CM

## 2022-10-18 DIAGNOSIS — D50.9 IRON DEFICIENCY ANEMIA, UNSPECIFIED IRON DEFICIENCY ANEMIA TYPE: Primary | ICD-10-CM

## 2022-10-18 DIAGNOSIS — D64.9 NORMOCYTIC ANEMIA: ICD-10-CM

## 2022-10-18 PROCEDURE — 99213 OFFICE O/P EST LOW 20 MIN: CPT

## 2022-10-18 PROCEDURE — 99213 OFFICE O/P EST LOW 20 MIN: CPT | Performed by: PODIATRIST

## 2022-10-18 RX ORDER — TERBINAFINE HYDROCHLORIDE 250 MG/1
250 TABLET ORAL DAILY
Qty: 14 TABLET | Refills: 0 | Status: SHIPPED | OUTPATIENT
Start: 2022-10-18 | End: 2022-11-01

## 2022-10-18 RX ORDER — TERBINAFINE HYDROCHLORIDE 250 MG/1
250 TABLET ORAL DAILY
Qty: 14 TABLET | Refills: 0 | Status: SHIPPED | OUTPATIENT
Start: 2022-10-18 | End: 2022-10-18

## 2022-10-18 NOTE — TELEPHONE ENCOUNTER
JOHANNY for patient that we will need to R/S his appt  To a 6 month f/u with Charissa Osullivan in April

## 2022-10-18 NOTE — PROGRESS NOTES
Assessment/Plan:    Discussed principles of diabetic foot care  There are no palpable pedal pulses but sensorium is intact  Skin on the soles of both feet dry and scaly  It is not responded to topical medication  Patient placed on oral Lamisil, dispense 14, 1 tablet daily for 14 days  Reappoint 4 weeks  No problem-specific Assessment & Plan notes found for this encounter  Diagnoses and all orders for this visit:    Tinea pedis of both feet  -     Discontinue: terbinafine (LamISIL) 250 mg tablet; Take 1 tablet (250 mg total) by mouth daily for 14 days  -     terbinafine (LamISIL) 250 mg tablet; Take 1 tablet (250 mg total) by mouth daily for 14 days    Controlled type 2 diabetes mellitus without complication, without long-term current use of insulin ([facility])          Subjective:      Patient ID: Quique Blank is a 79 y o  male  HPI     Patient, a 14-year-old type 2 diabetic male presents for examination and care  Chief complaint involves scaling on the soles of both feet  Patient utilizing spray antifungal on the foot but it is not helpful  Also a moisturizer but it does not seem helpful  Patient denies numbness or tingling at this time  I personally reviewed a comprehensive metabolic panel dated 05/53/3576  Liver enzymes were within normal limits  I personally reviewed A1c dated 05/13/2022  It was 6 2  The following portions of the patient's history were reviewed and updated as appropriate: allergies, current medications, past family history, past medical history, past social history, past surgical history and problem list     Review of Systems   Gastrointestinal:        GERD   Skin: Positive for rash  Neurological: Negative for numbness  Objective:      /69   Pulse 71   Ht 5' 6" (1 676 m)   Wt 118 kg (259 lb 6 4 oz)   BMI 41 87 kg/m²          Physical Exam  Cardiovascular:      Pulses: Pulses are weak             Dorsalis pedis pulses are 0 on the right side and 0 on the left side  Posterior tibial pulses are 0 on the right side and 0 on the left side  Feet:      Right foot:      Skin integrity: Dry skin present  Left foot:      Skin integrity: Dry skin present  Diabetic Foot Exam    Patient's shoes and socks removed  Right Foot/Ankle   Right Foot Inspection  Skin Exam: dry skin  Toe Exam: ROM and strength within normal limits  Sensory   Vibration: intact  Proprioception: intact  Monofilament testing: intact    Vascular  Capillary refills: elevated  The right DP pulse is 0  The right PT pulse is 0  Right Toe  - Comprehensive Exam  Ecchymosis: none  Arch: normal  Hammertoes: absent  Claw Toes: absent  Swelling: none   Tenderness: none         Left Foot/Ankle  Left Foot Inspection  Skin Exam: dry skin  Toe Exam: ROM and strength within normal limits  Sensory   Vibration: intact  Proprioception: intact  Monofilament testing: intact    Vascular  Capillary refills: elevated  The left DP pulse is 0  The left PT pulse is 0       Left Toe  - Comprehensive Exam  Ecchymosis: none  Arch: normal  Hammertoes: absent  Claw toes: absent  Swelling: none   Tenderness: none           Assign Risk Category  No deformity present  No loss of protective sensation  Weak pulses  Risk: 1

## 2022-10-18 NOTE — PROGRESS NOTES
[facility]  [address] [place] [id number]  Hematology Ambulatory Follow-Up  Liss Funes, 1955, [id number]  10/18/2022    Assessment/Plan:    1  Iron deficiency anemia, unspecified iron deficiency anemia type  2  Normocytic anemia  Mr Roslyn Suarez is a 66-year-old seen in follow-up for normocytic anemia  He was found to a ferritin 23 with a hemoglobin of 10 9  He received 2 doses of IV Feraheme and tolerated these well without any side effects  Since the infusion he was feeling slightly better  His ferritin has increased to 191 with a hemoglobin of 12 1  He continues to complain of abdominal bloating and nausea  He is scheduled to undergo colonoscopy with EGD tomorrow  If there is no source of blood loss found these procedures we can discuss further workup if necessary  He may require capsule endoscopy  This will also depend on how long he can maintain his ferritin levels to an adequate amount  Since he responded well to the IV iron he will repeat labs every 3 months with a follow-up with me in 6 months  He knows to call the office if if symptoms worsen prior to this and we can repeat blood work sooner  With the next set of blood work I will also assess for B12 and folate deficencies again     - CBC and differential; Standing  - Iron Panel (Includes Ferritin, Iron Sat%, Iron, and TIBC); Standing  - Vitamin B12; Standing  - Methylmalonic acid, serum; Standing  - CBC and differential  - Iron Panel (Includes Ferritin, Iron Sat%, Iron, and TIBC)  - Vitamin B12  - Methylmalonic acid, serum      The patient is scheduled for follow-up in approximately 6 months  Patient voiced agreement and understanding to the above  Patient knows to call the Hematology/Oncology office with any questions and concerns regarding the above        Barrier(s) to care: None  The patient is able to self care   ------------------------------------------------------------------------------------------------------    Chief Complaint   Patient presents with   • Follow-up       History of present illness:   Corina Brito is a 29-year-old male with past medical history of type 2 diabetes, diabetic neuropathy, GERD, sleep apnea, hypertension, chronic venous insufficiency, and degenerative disc disease  He was originally seen in consultation in August of 2022 for anemia  His normal hemoglobin ranged in the high 10-11 with a normal MCV  His iron panel demonstrated chronic iron deficiency with a ferritin of 25, iron saturation 13%, TIBC 285, serum iron 37  He was referred to Gastroenterology for GI workup and is scheduled tomorrow for his colonoscopy an EGD  He has some episodes of dark tarry stools and has abdominal pain with bloating  He is unable to tolerate oral iron due to GI upset and increasing constipation  06/16/2016:  Hemoglobin 14 8, MCV 92, platelets 825, WBC 8 4  03/26/2021:  Hemoglobin 10 8, MCV 90, platelets 665, WBC 6 5  02/13/2022:  Hemoglobin 11 1, MCV 84, platelets 419, WBC 7 9  07/06/2022:  Hemoglobin 10 8, MCV 84, platelets 321, WBC 7              Ferritin 25, iron saturation 13%, TIBC 285, serum iron 37  08/10/2022:  Hemoglobin 10 9, MCV 91, platelets 100, WBC 9 68   Ferritin 23, iron saturation 13%, TIBC 274, serum iron 36  Feraheme IV x2 doses: 9/6 and 9/13  10/07/2022:  Hemoglobin 12 1, MCV 91, platelets 206, WBC 6 67   Ferritin 191, iron saturation 28%, TIBC 232, serum iron 64    Interval history:  He tolerated IV Feraheme well without any side effects  He did have some difficulty with IV access the 1st time but otherwise did well  He is feeling better since the infusions but over the last few days has felt some nausea with bloating and constipation  He is scheduled tomorrow for colonoscopy an EGD Gastroenterology  Otherwise his fatigue is improving      Review of Systems   Constitutional: Positive for fever (improving)  Negative for activity change, appetite change, fatigue and unexpected weight change  HENT: Negative for trouble swallowing and voice change  Eyes: Negative for photophobia and visual disturbance  Respiratory: Negative for cough, chest tightness and shortness of breath  Cardiovascular: Positive for leg swelling (chronic)  Negative for chest pain and palpitations  Gastrointestinal: Positive for abdominal distention, abdominal pain, constipation and nausea  Negative for anal bleeding, blood in stool, diarrhea and vomiting  Endocrine: Negative for cold intolerance and heat intolerance  Genitourinary: Negative for dysuria, hematuria and urgency  Musculoskeletal: Positive for gait problem (walks with a cane)  Negative for arthralgias, back pain, joint swelling and myalgias  Skin: Negative for pallor and rash  Neurological: Negative for dizziness, tremors, weakness, light-headedness, numbness and headaches  Hematological: Negative for adenopathy  Does not bruise/bleed easily  Psychiatric/Behavioral: Negative for confusion and sleep disturbance         Patient Active Problem List   Diagnosis   • S/P total knee replacement   • Diabetes type 2, uncontrolled   • HTN (hypertension)   • GERD (gastroesophageal reflux disease)   • Anxiety   • SUSY (obstructive sleep apnea)   • Acute blood loss anemia   • Low back pain with sciatica   • DDD (degenerative disc disease), lumbar   • Spinal stenosis of lumbar region   • Myoclonus   • Chronic venous insufficiency   • Bilateral lower extremity edema   • BMI 40 0-44 9, adult ([facility])   • Primary osteoarthritis of right knee   • Chronic pain of right knee   • Diabetic neuropathy ([facility])   • RBD (REM behavioral disorder)   • Lumbar spondylosis   • Neck pain   • Right shoulder pain   • Chronic low back pain with bilateral sciatica   • Chronic pain syndrome   • Iron deficiency anemia, unspecified       Past Medical History:   Diagnosis Date • Acid reflux    • Anxiety    • Arthritis    • Cellulitis     left ankle   • Diabetes mellitus ([address] )    • Hypertension    • Sleep apnea    • Twitching        Past Surgical History:   Procedure Laterality Date   • APPENDECTOMY     • CHOLECYSTECTOMY     • KNEE ARTHROSCOPY     • [place] TOTAL KNEE ARTHROPLASTY Left 2016    Procedure: TOTAL  KNEE REPLACEMENT ;  Surgeon: Geneva Rogers MD;  [facility];  Service: Orthopedics       Family History   Problem Relation Age of Onset   • Diabetes Mother    • Diabetes Father    • Cancer Father        Social History     Socioeconomic History   • Marital status: /Civil Union     Spouse name: None   • Number of children: None   • Years of education: BS   • Highest education level: None   Occupational History   • Occupation: Attendance officer   Tobacco Use   • Smoking status: Former Smoker     Quit date:      Years since quittin 8   • Smokeless tobacco: Never Used   Vaping Use   • Vaping Use: Never used   Substance and Sexual Activity   • Alcohol use: No   • Drug use: No   • Sexual activity: None   Other Topics Concern   • None   Social History Narrative   • None     Social Determinants of Health     Financial Resource Strain: Not on file   Food Insecurity: Not on file   Transportation Needs: Not on file   Physical Activity: Not on file   Stress: Not on file   Social Connections: Not on file   Intimate Partner Violence: Not on file   Housing Stability: Not on file         Current Outpatient Medications:   •  albuterol (PROVENTIL HFA,VENTOLIN HFA) 90 mcg/act inhaler, Inhale 2 puffs every 6 (six) hours as needed for wheezing , Disp: , Rfl:   •  aspirin (ECOTRIN LOW STRENGTH) 81 mg EC tablet, Take 81 mg by mouth daily, Disp: , Rfl:   •  Blood Glucose Monitoring Suppl (ONE TOUCH ULTRA 2) w/Device KIT, USE TO TEST BLOOD GLUCOSE, Disp: , Rfl:   •  budesonide (RINOCORT AQUA) 32 MCG/ACT nasal spray, 1 spray into each nostril in the morning and 1 spray in the evening , Disp: , Rfl:   •  cloNIDine (CATAPRES) 0 1 mg tablet, Take 0 1 mg by mouth 3 (three) times a day , Disp: , Rfl:   •  docusate sodium (COLACE) 100 mg capsule, Take 100 mg by mouth 2 (two) times a day, Disp: , Rfl:   •  ergocalciferol (VITAMIN D2) 50,000 units, , Disp: , Rfl:   •  fluticasone (FLOVENT HFA) 220 mcg/act inhaler, Inhale 1 puff daily as needed, Disp: , Rfl:   •  furosemide (LASIX) 40 mg tablet, Take 40 mg by mouth as needed in the morning and 40 mg as needed in the evening , Disp: , Rfl:   •  Lancets (Safety Lancet 30G/Pressure Act) MISC, USE TO TEST BLOOD GLUCOSE TWICE DAILY, Disp: , Rfl:   •  lisinopril-hydrochlorothiazide (PRINZIDE,ZESTORETIC) 10-12 5 MG per tablet, Take 1 tablet by mouth 2 (two) times a day, Disp: , Rfl:   •  LORazepam (ATIVAN) 0 5 mg tablet, Take 0 5 mg by mouth daily at bedtime, Disp: , Rfl:   •  OneTouch Ultra test strip, USE TO TEST BLOOD GLUCOSE TWICE DAILY, Disp: , Rfl:   •  polyethylene glycol (GLYCOLAX) 17 GM/SCOOP powder, , Disp: , Rfl:   •  potassium chloride (K-DUR,KLOR-CON) 10 mEq tablet, , Disp: , Rfl:   •  testosterone (ANDROGEL) 1%, , Disp: , Rfl:   •  Testosterone 12 5 MG/ACT (1%) GEL, Apply 2 pump presses to clean, dry skin every morning, Disp: , Rfl:   •  Trulicity 4 5 TI/5 8ZJ SOPN, , Disp: , Rfl:   •  zinc gluconate 50 mg tablet, Take 50 mg by mouth in the morning , Disp: , Rfl:   •  atorvastatin (LIPITOR) 20 mg tablet, , Disp: , Rfl:   •  bisacodyl (DULCOLAX) 5 mg EC tablet, Take 1 tablet (5 mg total) by mouth once for 1 dose, Disp: 2 tablet, Rfl: 0  •  buprenorphine-naloxone (SUBOXONE) 8-2 mg, , Disp: , Rfl:   •  cephalexin (KEFLEX) 500 mg capsule, , Disp: , Rfl:   •  clotrimazole-betamethasone (LOTRISONE) 1-0 05 % cream, , Disp: , Rfl:   •  doxycycline hyclate (VIBRAMYCIN) 100 mg capsule, , Disp: , Rfl:   •  Dulaglutide 0 75 MG/0 5ML SOPN, Inject 0 75 mg under the skin once a week (Patient not taking: No sig reported), Disp: , Rfl:   •  DULoxetine (Cymbalta) 30 mg delayed release capsule, Take 1 capsule (30 mg total) by mouth 2 (two) times a day (Patient not taking: Reported on 12/13/2021 ), Disp: 60 capsule, Rfl: 0  •  HYDROcodone-acetaminophen (NORCO)  mg per tablet, , Disp: , Rfl:   •  HYDROcodone-acetaminophen (NORCO) 5-325 mg per tablet, Take 1 tablet by mouth every 4 to 6 hours as needed for pain (Patient not taking: Reported on 10/18/2022), Disp: , Rfl:   •  metFORMIN (GLUCOPHAGE) 850 mg tablet, , Disp: , Rfl:   •  metFORMIN (GLUCOPHAGE-XR) 750 mg 24 hr tablet, Take 750 mg by mouth 2 (two) times a day (Patient not taking: Reported on 10/18/2022), Disp: , Rfl:   •  mupirocin (BACTROBAN) 2 % ointment, , Disp: , Rfl:   •  NARCAN 4 MG/0 1ML LIQD, , Disp: , Rfl:   •  pantoprazole (PROTONIX) 40 mg tablet, Take 40 mg by mouth daily   (Patient not taking: No sig reported), Disp: , Rfl:   •  pioglitazone (ACTOS) 15 mg tablet, , Disp: , Rfl:   •  polyethylene glycol (GOLYTELY) 4000 mL solution, Take 4,000 mL by mouth once for 1 dose, Disp: 4000 mL, Rfl: 0  •  polyethylene glycol (MIRALAX) 17 g packet, Take 17 g by mouth daily (Patient not taking: Reported on 10/18/2022), Disp: 30 each, Rfl: 3  •  semaglutide, 1 mg/dose, (Ozempic, 1 MG/DOSE,) 4 MG/3ML SOPN injection pen, Inject 1 mg under the skin once a week (Patient not taking: No sig reported), Disp: , Rfl:   •  sulfamethoxazole-trimethoprim (BACTRIM DS) 800-160 mg per tablet, Take 1 tablet by mouth 2 (two) times a day (Patient not taking: No sig reported), Disp: , Rfl:   •  urea (CARMOL) 40 %, , Disp: , Rfl:   •  zolpidem (AMBIEN) 5 mg tablet, Take 1 tablet (5 mg total) by mouth daily at bedtime as needed for sleep For sleep study (Patient not taking: Reported on 10/18/2022), Disp: 1 tablet, Rfl: 0    No Known Allergies    Objective:  /98 (BP Location: Right arm, Patient Position: Sitting, Cuff Size: Large)   Pulse 82   Temp (!) 97 3 °F (36 3 °C) (Tympanic)   Resp 16   Ht 5' 6" (1 676 m)   Wt 118 kg (260 lb)   SpO2 98%   BMI 41 97 kg/m²    Physical Exam  Constitutional:       General: He is not in acute distress  Appearance: Normal appearance  He is not ill-appearing  HENT:      Head: Normocephalic and atraumatic  Eyes:      Extraocular Movements: Extraocular movements intact  Conjunctiva/sclera: Conjunctivae normal    Cardiovascular:      Rate and Rhythm: Normal rate and regular rhythm  Pulses: Normal pulses  Heart sounds: Normal heart sounds  No murmur heard  Pulmonary:      Effort: Pulmonary effort is normal  No respiratory distress  Breath sounds: Normal breath sounds  Abdominal:      General: Bowel sounds are normal  There is no distension  Palpations: Abdomen is soft  Tenderness: There is no abdominal tenderness  Musculoskeletal:         General: Normal range of motion  Cervical back: Normal range of motion  No tenderness  Right lower leg: Edema present  Left lower leg: Edema present  Lymphadenopathy:      Cervical: No cervical adenopathy  Skin:     General: Skin is warm and dry  Capillary Refill: Capillary refill takes less than 2 seconds  Coloration: Skin is not pale  Findings: No bruising or lesion  Neurological:      General: No focal deficit present  Mental Status: He is alert and oriented to person, place, and time  Mental status is at baseline  Motor: No weakness  Gait: Gait abnormal (walks with a cane)  Psychiatric:         Mood and Affect: Mood normal          Behavior: Behavior normal          Thought Content:  Thought content normal          Judgment: Judgment normal          Result Review  Labs:  Appointment on 10/07/2022   Component Date Value Ref Range Status   • WBC 10/07/2022 5 96  4 31 - 10 16 Thousand/uL Final   • RBC 10/07/2022 4 28  3 88 - 5 62 Million/uL Final   • Hemoglobin 10/07/2022 12 1  12 0 - 17 0 g/dL Final   • Hematocrit 10/07/2022 39 0  36 5 - 49 3 % Final   • MCV 10/07/2022 91  82 - 98 fL Final   • MCH 10/07/2022 28 3  26 8 - 34 3 pg Final   • MCHC 10/07/2022 31 0 (A) 31 4 - 37 4 g/dL Final   • RDW 10/07/2022 16 3 (A) 11 6 - 15 1 % Final   • MPV 10/07/2022 11 2  8 9 - 12 7 fL Final   • Platelets 29/43/2151 154  149 - 390 Thousands/uL Final   • nRBC 10/07/2022 0  /100 WBCs Final   • Neutrophils Relative 10/07/2022 70  43 - 75 % Final   • Immat GRANS % 10/07/2022 0  0 - 2 % Final   • Lymphocytes Relative 10/07/2022 18  14 - 44 % Final   • Monocytes Relative 10/07/2022 9  4 - 12 % Final   • Eosinophils Relative 10/07/2022 3  0 - 6 % Final   • Basophils Relative 10/07/2022 0  0 - 1 % Final   • Neutrophils Absolute 10/07/2022 4 14  1 85 - 7 62 Thousands/µL Final   • Immature Grans Absolute 10/07/2022 0 02  0 00 - 0 20 Thousand/uL Final   • Lymphocytes Absolute 10/07/2022 1 07  0 60 - 4 47 Thousands/µL Final   • Monocytes Absolute 10/07/2022 0 54  0 17 - 1 22 Thousand/µL Final   • Eosinophils Absolute 10/07/2022 0 17  0 00 - 0 61 Thousand/µL Final   • Basophils Absolute 10/07/2022 0 02  0 00 - 0 10 Thousands/µL Final   • Sodium 10/07/2022 137  135 - 147 mmol/L Final   • Potassium 10/07/2022 3 9  3 5 - 5 3 mmol/L Final   • Chloride 10/07/2022 99  96 - 108 mmol/L Final   • CO2 10/07/2022 32  21 - 32 mmol/L Final   • ANION GAP 10/07/2022 6  4 - 13 mmol/L Final   • BUN 10/07/2022 17  5 - 25 mg/dL Final   • Creatinine 10/07/2022 1 20  0 60 - 1 30 mg/dL Final    Standardized to IDMS reference method   • Glucose, Fasting 10/07/2022 131 (A) 65 - 99 mg/dL Final    Specimen collection should occur prior to Sulfasalazine administration due to the potential for falsely depressed results  Specimen collection should occur prior to Sulfapyridine administration due to the potential for falsely elevated results     • Calcium 10/07/2022 9 1  8 3 - 10 1 mg/dL Final   • Corrected Calcium 10/07/2022 9 8  8 3 - 10 1 mg/dL Final   • AST 10/07/2022 17  5 - 45 U/L Final    Specimen collection should occur prior to Sulfasalazine administration due to the potential for falsely depressed results  • ALT 10/07/2022 22  12 - 78 U/L Final    Specimen collection should occur prior to Sulfasalazine and/or Sulfapyridine administration due to the potential for falsely depressed results  • Alkaline Phosphatase 10/07/2022 107  46 - 116 U/L Final   • Total Protein 10/07/2022 7 9  6 4 - 8 4 g/dL Final   • Albumin 10/07/2022 3 1 (A) 3 5 - 5 0 g/dL Final   • Total Bilirubin 10/07/2022 0 76  0 20 - 1 00 mg/dL Final    Use of this assay is not recommended for patients undergoing treatment with eltrombopag due to the potential for falsely elevated results  • eGFR 10/07/2022 62  ml/min/1 73sq m Final   • Iron Saturation 10/07/2022 28  20 - 50 % Final   • TIBC 10/07/2022 232 (A) 250 - 450 ug/dL Final   • Iron 10/07/2022 64 (A) 65 - 175 ug/dL Final    Patients treated with metal-binding drugs (ie  Deferoxamine) may have depressed iron values  • Ferritin 10/07/2022 191  8 - 388 ng/mL Final   Appointment on 09/29/2022   Component Date Value Ref Range Status   • PSA 09/29/2022 <0 1  0 0 - 4 0 ng/mL Final    American Urological Association Guidelines define biochemical recurrence of prostate cancer as a detectable or rising PSA value post-radical prostatectomy that is greater than or equal to 0 2 ng/mL with a second confirmatory level of greater than or equal to 0 2 ng/mL  Imaging:   No relevant imaging to review    Please note: This report has been generated by a voice recognition software system  Therefore there may be syntax, spelling, and/or grammatical errors  Please call if you have any questions

## 2022-10-19 ENCOUNTER — ANESTHESIA (OUTPATIENT)
Dept: GASTROENTEROLOGY | Facility: HOSPITAL | Age: 67
End: 2022-10-19

## 2022-10-19 ENCOUNTER — ANESTHESIA EVENT (OUTPATIENT)
Dept: GASTROENTEROLOGY | Facility: HOSPITAL | Age: 67
End: 2022-10-19

## 2022-10-19 ENCOUNTER — HOSPITAL ENCOUNTER (OUTPATIENT)
Dept: GASTROENTEROLOGY | Facility: HOSPITAL | Age: 67
Setting detail: OUTPATIENT SURGERY
Discharge: HOME | End: 2022-10-19
Attending: INTERNAL MEDICINE
Payer: COMMERCIAL

## 2022-10-19 VITALS
OXYGEN SATURATION: 94 % | RESPIRATION RATE: 16 BRPM | DIASTOLIC BLOOD PRESSURE: 72 MMHG | TEMPERATURE: 97.9 F | HEART RATE: 73 BPM | SYSTOLIC BLOOD PRESSURE: 129 MMHG

## 2022-10-19 DIAGNOSIS — D50.8 OTHER IRON DEFICIENCY ANEMIA: ICD-10-CM

## 2022-10-19 LAB — GLUCOSE SERPL-MCNC: 83 MG/DL (ref 65–140)

## 2022-10-19 PROCEDURE — 82948 REAGENT STRIP/BLOOD GLUCOSE: CPT

## 2022-10-19 RX ORDER — PROPOFOL 10 MG/ML
INJECTION, EMULSION INTRAVENOUS AS NEEDED
Status: DISCONTINUED | OUTPATIENT
Start: 2022-10-19 | End: 2022-10-19

## 2022-10-19 RX ORDER — SODIUM CHLORIDE 9 MG/ML
INJECTION, SOLUTION INTRAVENOUS CONTINUOUS PRN
Status: DISCONTINUED | OUTPATIENT
Start: 2022-10-19 | End: 2022-10-19

## 2022-10-19 RX ORDER — SODIUM CHLORIDE, SODIUM LACTATE, POTASSIUM CHLORIDE, CALCIUM CHLORIDE 600; 310; 30; 20 MG/100ML; MG/100ML; MG/100ML; MG/100ML
50 INJECTION, SOLUTION INTRAVENOUS CONTINUOUS
Status: CANCELLED | OUTPATIENT
Start: 2022-10-19

## 2022-10-19 RX ORDER — PROPOFOL 10 MG/ML
INJECTION, EMULSION INTRAVENOUS CONTINUOUS PRN
Status: DISCONTINUED | OUTPATIENT
Start: 2022-10-19 | End: 2022-10-19

## 2022-10-19 RX ADMIN — PROPOFOL 100 MG: 10 INJECTION, EMULSION INTRAVENOUS at 11:27

## 2022-10-19 RX ADMIN — PROPOFOL 150 MCG/KG/MIN: 10 INJECTION, EMULSION INTRAVENOUS at 11:27

## 2022-10-19 RX ADMIN — SODIUM CHLORIDE: 0.9 INJECTION, SOLUTION INTRAVENOUS at 10:57

## 2022-10-19 NOTE — ANESTHESIA POSTPROCEDURE EVALUATION
Post-Op Assessment Note    CV Status:  Stable    Pain management: adequate     Mental Status:  Alert and awake   Hydration Status:  Euvolemic   PONV Controlled:  Controlled   Airway Patency:  Patent      Post Op Vitals Reviewed: Yes            No complications documented      /63 (10/19/22 1155)    Temp 97 9 °F (36 6 °C) (10/19/22 1155)    Pulse 93 (10/19/22 1155)   Resp 18 (10/19/22 1155)    SpO2 95 % (10/19/22 1155) Prior Auth request    EMGALITY 120 mg/ml Inj Lill

## 2022-10-19 NOTE — H&P
History and Physical - [facility]  Maegan Dang 79 y o  male MRN: [id number]                  HPI: Maegan Dang is a 79y o  year old male who presents for EGD/colonoscopy      REVIEW OF SYSTEMS: Per the HPI, and otherwise unremarkable      Historical Information   Past Medical History:   Diagnosis Date   • Acid reflux    • Anxiety    • Arthritis    • Cellulitis     left ankle   • Diabetes mellitus ([address] )    • Hypertension    • Sleep apnea    • Twitching      Past Surgical History:   Procedure Laterality Date   • APPENDECTOMY     • CHOLECYSTECTOMY     • KNEE ARTHROSCOPY     • CT TOTAL KNEE ARTHROPLASTY Left 2016    Procedure: TOTAL  KNEE REPLACEMENT ;  Surgeon: Ceasr Bauer MD;  [facility];  Service: Orthopedics     Social History   Social History     Substance and Sexual Activity   Alcohol Use No     Social History     Substance and Sexual Activity   Drug Use No     Social History     Tobacco Use   Smoking Status Former Smoker   • Quit date:    • Years since quittin 8   Smokeless Tobacco Never Used     Family History   Problem Relation Age of Onset   • Diabetes Mother    • Diabetes Father    • Cancer Father        Meds/Allergies       Current Outpatient Medications:   •  aspirin (ECOTRIN LOW STRENGTH) 81 mg EC tablet  •  Blood Glucose Monitoring Suppl (ONE TOUCH ULTRA 2) w/Device KIT  •  cloNIDine (CATAPRES) 0 1 mg tablet  •  docusate sodium (COLACE) 100 mg capsule  •  ergocalciferol (VITAMIN D2) 50,000 units  •  furosemide (LASIX) 40 mg tablet  •  potassium chloride (K-DUR,KLOR-CON) 10 mEq tablet  •  Trulicity 4 5 YV/2 2NK SOPN  •  zinc gluconate 50 mg tablet  •  albuterol (PROVENTIL HFA,VENTOLIN HFA) 90 mcg/act inhaler  •  atorvastatin (LIPITOR) 20 mg tablet  •  bisacodyl (DULCOLAX) 5 mg EC tablet  •  budesonide (RINOCORT AQUA) 32 MCG/ACT nasal spray  •  buprenorphine-naloxone (SUBOXONE) 8-2 mg  •  cephalexin (KEFLEX) 500 mg capsule  •  clotrimazole-betamethasone (LOTRISONE) 1-0 05 % cream  •  doxycycline hyclate (VIBRAMYCIN) 100 mg capsule  •  Dulaglutide 0 75 MG/0 5ML SOPN  •  DULoxetine (Cymbalta) 30 mg delayed release capsule  •  fluticasone (FLOVENT HFA) 220 mcg/act inhaler  •  HYDROcodone-acetaminophen (NORCO)  mg per tablet  •  HYDROcodone-acetaminophen (NORCO) 5-325 mg per tablet  •  Lancets (Safety Lancet 30G/Pressure Act) MISC  •  lisinopril-hydrochlorothiazide (PRINZIDE,ZESTORETIC) 10-12 5 MG per tablet  •  LORazepam (ATIVAN) 0 5 mg tablet  •  metFORMIN (GLUCOPHAGE) 850 mg tablet  •  metFORMIN (GLUCOPHAGE-XR) 750 mg 24 hr tablet  •  mupirocin (BACTROBAN) 2 % ointment  •  NARCAN 4 MG/0 1ML LIQD  •  OneTouch Ultra test strip  •  pantoprazole (PROTONIX) 40 mg tablet  •  pioglitazone (ACTOS) 15 mg tablet  •  polyethylene glycol (GLYCOLAX) 17 GM/SCOOP powder  •  polyethylene glycol (GOLYTELY) 4000 mL solution  •  polyethylene glycol (MIRALAX) 17 g packet  •  semaglutide, 1 mg/dose, (Ozempic, 1 MG/DOSE,) 4 MG/3ML SOPN injection pen  •  sulfamethoxazole-trimethoprim (BACTRIM DS) 800-160 mg per tablet  •  terbinafine (LamISIL) 250 mg tablet  •  testosterone (ANDROGEL) 1%  •  Testosterone 12 5 MG/ACT (1%) GEL  •  urea (CARMOL) 40 %  •  zolpidem (AMBIEN) 5 mg tablet    No Known Allergies    Objective     /76   Pulse 66   Temp 97 7 °F (36 5 °C) (Tympanic)   Resp 16   SpO2 96%       PHYSICAL EXAM    Gen: NAD  Head: NCAT  CV: RRR  CHEST: Clear  ABD: soft, NT/ND  EXT: no edema      ASSESSMENT/PLAN:  This is a 79y o  year old male here for EGD/colonoscopy, and he is stable and optimized for his procedure

## 2022-10-19 NOTE — ANESTHESIA PREPROCEDURE EVALUATION
Procedure:  COLONOSCOPY  EGD    Relevant Problems   CARDIO   (+) HTN (hypertension)      GI/HEPATIC   (+) GERD (gastroesophageal reflux disease)      HEMATOLOGY   (+) Acute blood loss anemia   (+) Iron deficiency anemia, unspecified      MUSCULOSKELETAL   (+) Chronic low back pain with bilateral sciatica   (+) DDD (degenerative disc disease), lumbar   (+) Low back pain with sciatica   (+) Lumbar spondylosis   (+) Primary osteoarthritis of right knee      NEURO/PSYCH   (+) Anxiety   (+) Chronic low back pain with bilateral sciatica   (+) Chronic pain syndrome   (+) Diabetic neuropathy (HCC)      PULMONARY   (+) SUSY (obstructive sleep apnea)   morbid obesity BMI 42     Physical Exam    Airway    Mallampati score: II  TM Distance: >3 FB  Neck ROM: full     Dental   No notable dental hx     Cardiovascular      Pulmonary      Other Findings        Anesthesia Plan  ASA Score- 3     Anesthesia Type- IV sedation with anesthesia with ASA Monitors  Additional Monitors:   Airway Plan:           Plan Factors-Exercise tolerance (METS): >4 METS  Chart reviewed  Patient summary reviewed  Patient is not a current smoker  Induction- intravenous  Postoperative Plan-     Informed Consent- Anesthetic plan and risks discussed with patient  I personally reviewed this patient with the CRNA  Discussed and agreed on the Anesthesia Plan with the CRNA  Collette Rutledge

## 2022-10-20 ENCOUNTER — TELEPHONE (OUTPATIENT)
Dept: HEMATOLOGY ONCOLOGY | Facility: CLINIC | Age: 67
End: 2022-10-20

## 2022-10-20 NOTE — TELEPHONE ENCOUNTER
Appointment Cancellation Or Reschedule     Person calling In self   If other than patient calling, are they listed on the communication consent form? self   Provider Michael   Office Visit Date and Time 2/20 8:30Am   Office Visit Location [facility]   Did patient want to reschedule their office appointment? If so, when was it scheduled to? Yes, 4/3 10Am   Did you have STAR scheduled for this appointment? no   Do you need STAR set up for your new appointment? If yes, please send to "PATIENT RIDESHARE" pool for STAR rescheduling no   If you are cancelling appointment, can we notify STAR to cancel ride? If yes, please send to "PATIENT RIDESHARE" pool for STAR to cancel service no   Is this patient calling to reschedule an infusion appointment? no   When is their next infusion appointment? no   Is this patient a Chemo patient? no   Reason for Cancellation or Reschedule sched conflict     If the patient is a treatment patient, please route this to the office nurse  If the patient is not on treatment, please route to the office MA  If the patient is a surgical oncology patient, please route to surg/onc clinical pool

## 2022-11-11 LAB

## 2022-11-21 ENCOUNTER — OFFICE VISIT (OUTPATIENT)
Dept: PODIATRY | Facility: CLINIC | Age: 67
End: 2022-11-21

## 2022-11-21 VITALS
HEART RATE: 72 BPM | SYSTOLIC BLOOD PRESSURE: 163 MMHG | WEIGHT: 257.8 LBS | HEIGHT: 66 IN | DIASTOLIC BLOOD PRESSURE: 74 MMHG | BODY MASS INDEX: 41.43 KG/M2

## 2022-11-21 DIAGNOSIS — E11.9 CONTROLLED TYPE 2 DIABETES MELLITUS WITHOUT COMPLICATION, WITHOUT LONG-TERM CURRENT USE OF INSULIN (HCC): ICD-10-CM

## 2022-11-21 DIAGNOSIS — B35.3 TINEA PEDIS OF BOTH FEET: Primary | ICD-10-CM

## 2022-11-21 RX ORDER — TESTOSTERONE 16.2 MG/G
GEL TRANSDERMAL
COMMUNITY
Start: 2022-11-01

## 2022-11-21 NOTE — PROGRESS NOTES
Patient presents for assessment of his response to 14 days of Lamisil  Patient has dry scaly skin on the soles of both feet  Prior to utilizing Lamisil he was using Lotrimin AF and Tinactin spray  On exam, skin still appears scaly on the soles of both feet  The oral Lamisil was not successful in relieving this  Patient relates no pruritus  Patient advised to utilize [facility] [place], "Healing"on a daily basis  He is rescheduled in 10 weeks

## 2022-12-20 ENCOUNTER — OFFICE VISIT (OUTPATIENT)
Dept: OBGYN CLINIC | Facility: HOSPITAL | Age: 67
End: 2022-12-20

## 2022-12-20 VITALS
HEART RATE: 80 BPM | WEIGHT: 264 LBS | BODY MASS INDEX: 42.43 KG/M2 | HEIGHT: 66 IN | DIASTOLIC BLOOD PRESSURE: 67 MMHG | SYSTOLIC BLOOD PRESSURE: 143 MMHG

## 2022-12-20 DIAGNOSIS — M25.561 CHRONIC PAIN OF RIGHT KNEE: ICD-10-CM

## 2022-12-20 DIAGNOSIS — M17.11 PRIMARY OSTEOARTHRITIS OF RIGHT KNEE: Primary | ICD-10-CM

## 2022-12-20 DIAGNOSIS — G89.29 CHRONIC PAIN OF RIGHT KNEE: ICD-10-CM

## 2022-12-20 RX ORDER — LIDOCAINE HYDROCHLORIDE 10 MG/ML
2 INJECTION, SOLUTION INFILTRATION; PERINEURAL
Status: COMPLETED | OUTPATIENT
Start: 2022-12-20 | End: 2022-12-20

## 2022-12-20 RX ORDER — BETAMETHASONE SODIUM PHOSPHATE AND BETAMETHASONE ACETATE 3; 3 MG/ML; MG/ML
12 INJECTION, SUSPENSION INTRA-ARTICULAR; INTRALESIONAL; INTRAMUSCULAR; SOFT TISSUE
Status: COMPLETED | OUTPATIENT
Start: 2022-12-20 | End: 2022-12-20

## 2022-12-20 RX ORDER — BUPIVACAINE HYDROCHLORIDE 2.5 MG/ML
2 INJECTION, SOLUTION INFILTRATION; PERINEURAL
Status: COMPLETED | OUTPATIENT
Start: 2022-12-20 | End: 2022-12-20

## 2022-12-20 RX ADMIN — LIDOCAINE HYDROCHLORIDE 2 ML: 10 INJECTION, SOLUTION INFILTRATION; PERINEURAL at 11:22

## 2022-12-20 RX ADMIN — BETAMETHASONE SODIUM PHOSPHATE AND BETAMETHASONE ACETATE 12 MG: 3; 3 INJECTION, SUSPENSION INTRA-ARTICULAR; INTRALESIONAL; INTRAMUSCULAR; SOFT TISSUE at 11:22

## 2022-12-20 RX ADMIN — BUPIVACAINE HYDROCHLORIDE 2 ML: 2.5 INJECTION, SOLUTION INFILTRATION; PERINEURAL at 11:22

## 2022-12-20 NOTE — PROGRESS NOTES
Assessment:  1  Primary osteoarthritis of right knee  Injection procedure prior authorization      2  Chronic pain of right knee  Injection procedure prior authorization          Plan:  Right knee osteoarthritis  The patient was provided with right knee steroid injection  The patient tolerated the procedure well  Right knee visco-supplement was ordered as patient has on-going knee pain, has tried steroid injections, activity modification, oral medications and has significant osteoarthritic changes on radiograph  The patient should follow up in 3 months  To do next visit:  Return in about 3 months (around 3/20/2023) for visco     The above stated was discussed in layman's terms and the patient expressed understanding  All questions were answered to the patient's satisfaction  Scribe Attestation    I,:  Lanie Boo am acting as a scribe while in the presence of the attending physician :       I,:  Peyton Laura MD personally performed the services described in this documentation    as scribed in my presence :             Subjective:   Britney Rosario is a 79 y o  male who presents for follow up of right knee  He is s/p right knee steroid injection with 3 months benefit, 9/20/2022  Today he complains of return of right generalized knee pain  He does use a cane for ambulation          Review of systems negative unless otherwise specified in HPI    Past Medical History:   Diagnosis Date   • Acid reflux    • Anxiety    • Arthritis    • Cellulitis     left ankle   • Diabetes mellitus ([facility] [place] [address] )    • Hypertension    • Sleep apnea    • Twitching        Past Surgical History:   Procedure Laterality Date   • APPENDECTOMY     • CHOLECYSTECTOMY     • KNEE ARTHROSCOPY     • AL TOTAL KNEE ARTHROPLASTY Left 7/13/2016    Procedure: TOTAL  KNEE REPLACEMENT ;  Surgeon: Tahira Valentine MD;  [facility];  Service: Orthopedics       Family History   Problem Relation Age of Onset   • Diabetes Mother    • Diabetes Father    • Cancer Father        Social History     Occupational History   • Occupation: Attendance officer   Tobacco Use   • Smoking status: Former     Types: Cigarettes     Quit date:      Years since quittin 9   • Smokeless tobacco: Never   Vaping Use   • Vaping Use: Never used   Substance and Sexual Activity   • Alcohol use: No   • Drug use: No   • Sexual activity: Not on file         Current Outpatient Medications:   •  albuterol (PROVENTIL HFA,VENTOLIN HFA) 90 mcg/act inhaler, Inhale 2 puffs every 6 (six) hours as needed for wheezing , Disp: , Rfl:   •  aspirin (ECOTRIN LOW STRENGTH) 81 mg EC tablet, Take 81 mg by mouth daily, Disp: , Rfl:   •  atorvastatin (LIPITOR) 20 mg tablet, , Disp: , Rfl:   •  bisacodyl (DULCOLAX) 5 mg EC tablet, Take 1 tablet (5 mg total) by mouth once for 1 dose, Disp: 2 tablet, Rfl: 0  •  Blood Glucose Monitoring Suppl (ONE TOUCH ULTRA 2) w/Device KIT, USE TO TEST BLOOD GLUCOSE, Disp: , Rfl:   •  budesonide (RINOCORT AQUA) 32 MCG/ACT nasal spray, 1 spray into each nostril in the morning and 1 spray in the evening , Disp: , Rfl:   •  buprenorphine-naloxone (SUBOXONE) 8-2 mg, , Disp: , Rfl:   •  cephalexin (KEFLEX) 500 mg capsule, , Disp: , Rfl:   •  cloNIDine (CATAPRES) 0 1 mg tablet, Take 0 1 mg by mouth 3 (three) times a day , Disp: , Rfl:   •  clotrimazole-betamethasone (LOTRISONE) 1-0 05 % cream, , Disp: , Rfl:   •  docusate sodium (COLACE) 100 mg capsule, Take 100 mg by mouth 2 (two) times a day, Disp: , Rfl:   •  doxycycline hyclate (VIBRAMYCIN) 100 mg capsule, , Disp: , Rfl:   •  Dulaglutide 0 75 MG/0 5ML SOPN, Inject 0 75 mg under the skin once a week (Patient not taking: Reported on 8/10/2022), Disp: , Rfl:   •  DULoxetine (Cymbalta) 30 mg delayed release capsule, Take 1 capsule (30 mg total) by mouth 2 (two) times a day (Patient not taking: Reported on 2021 ), Disp: 60 capsule, Rfl: 0  •  ergocalciferol (VITAMIN D2) 50,000 units, , Disp: , Rfl:   • fluticasone (FLOVENT HFA) 220 mcg/act inhaler, Inhale 1 puff daily as needed, Disp: , Rfl:   •  furosemide (LASIX) 40 mg tablet, Take 40 mg by mouth as needed in the morning and 40 mg as needed in the evening , Disp: , Rfl:   •  HYDROcodone-acetaminophen (NORCO)  mg per tablet, , Disp: , Rfl:   •  HYDROcodone-acetaminophen (NORCO) 5-325 mg per tablet, Take 1 tablet by mouth every 4 to 6 hours as needed for pain (Patient not taking: Reported on 10/18/2022), Disp: , Rfl:   •  Lancets (Safety Lancet 30G/Pressure Act) MISC, USE TO TEST BLOOD GLUCOSE TWICE DAILY, Disp: , Rfl:   •  lisinopril-hydrochlorothiazide (PRINZIDE,ZESTORETIC) 10-12 5 MG per tablet, Take 1 tablet by mouth 2 (two) times a day, Disp: , Rfl:   •  LORazepam (ATIVAN) 0 5 mg tablet, Take 0 5 mg by mouth daily at bedtime, Disp: , Rfl:   •  metFORMIN (GLUCOPHAGE) 850 mg tablet, , Disp: , Rfl:   •  metFORMIN (GLUCOPHAGE-XR) 750 mg 24 hr tablet, Take 750 mg by mouth 2 (two) times a day (Patient not taking: Reported on 10/18/2022), Disp: , Rfl:   •  mupirocin (BACTROBAN) 2 % ointment, , Disp: , Rfl:   •  NARCAN 4 MG/0 1ML LIQD, , Disp: , Rfl:   •  OneTouch Ultra test strip, USE TO TEST BLOOD GLUCOSE TWICE DAILY, Disp: , Rfl:   •  pantoprazole (PROTONIX) 40 mg tablet, Take 40 mg by mouth daily   (Patient not taking: Reported on 5/12/2022), Disp: , Rfl:   •  pioglitazone (ACTOS) 15 mg tablet, , Disp: , Rfl:   •  polyethylene glycol (GLYCOLAX) 17 GM/SCOOP powder, , Disp: , Rfl:   •  polyethylene glycol (GOLYTELY) 4000 mL solution, Take 4,000 mL by mouth once for 1 dose, Disp: 4000 mL, Rfl: 0  •  polyethylene glycol (MIRALAX) 17 g packet, Take 17 g by mouth daily (Patient not taking: Reported on 10/18/2022), Disp: 30 each, Rfl: 3  •  potassium chloride (K-DUR,KLOR-CON) 10 mEq tablet, , Disp: , Rfl:   •  semaglutide, 1 mg/dose, (Ozempic, 1 MG/DOSE,) 4 MG/3ML SOPN injection pen, Inject 1 mg under the skin once a week (Patient not taking: Reported on 5/12/2022), Disp: , Rfl:   •  sulfamethoxazole-trimethoprim (BACTRIM DS) 800-160 mg per tablet, Take 1 tablet by mouth 2 (two) times a day (Patient not taking: Reported on 1/24/2022), Disp: , Rfl:   •  testosterone (ANDROGEL) 1 62 % TD gel pump, PLACE 1 ACTUATION ON THE SKIN EVERY MORNING , Disp: , Rfl:   •  Trulicity 4 5 LX/7 9QN SOPN, , Disp: , Rfl:   •  urea (CARMOL) 40 %, , Disp: , Rfl:   •  zinc gluconate 50 mg tablet, Take 50 mg by mouth in the morning , Disp: , Rfl:   •  zolpidem (AMBIEN) 5 mg tablet, Take 1 tablet (5 mg total) by mouth daily at bedtime as needed for sleep For sleep study (Patient not taking: Reported on 10/18/2022), Disp: 1 tablet, Rfl: 0    No Known Allergies         Vitals:    12/20/22 1025   BP: 143/67   Pulse: 80       Objective:  Physical exam  · General: Awake, Alert, Oriented  · Eyes: Pupils equal, round and reactive to light  · Heart: regular rate and rhythm  · Lungs: No audible wheezing  · Abdomen: soft                    Ortho Exam  Right knee:  Varus alignment  TTP over medial joint line with bony enlargement  No erythema or ecchymosis  No effusion or swelling  Normal strength  Good ROM with crepitus   Calf compartments soft and supple  Sensation intact  Toes are warm sensate and mobile      Diagnostics, reviewed and taken today if performed as documented:    None performed    Procedures, if performed today:    Large joint arthrocentesis: R knee  Universal Protocol:  Consent: Verbal consent obtained  Risks and benefits: risks, benefits and alternatives were discussed  Consent given by: patient  Time out: Immediately prior to procedure a "time out" was called to verify the correct patient, procedure, equipment, support staff and site/side marked as required    Timeout called at: 12/20/2022 11:15 AM   Patient understanding: patient states understanding of the procedure being performed  Site marked: the operative site was marked  Patient identity confirmed: verbally with patient    Supporting Documentation  Indications: pain   Procedure Details  Location: knee - R knee  Preparation: Patient was prepped and draped in the usual sterile fashion  Needle size: 22 G  Ultrasound guidance: no  Approach: anterolateral  Medications administered: 12 mg betamethasone acetate-betamethasone sodium phosphate 6 (3-3) mg/mL; 2 mL bupivacaine 0 25 %; 2 mL lidocaine 1 %    Patient tolerance: patient tolerated the procedure well with no immediate complications  Dressing:  Sterile dressing applied            Portions of the record may have been created with voice recognition software  Occasional wrong word or "sound a like" substitutions may have occurred due to the inherent limitations of voice recognition software  Read the chart carefully and recognize, using context, where substitutions have occurred

## 2023-01-13 ENCOUNTER — TELEPHONE (OUTPATIENT)
Dept: GASTROENTEROLOGY | Facility: CLINIC | Age: 68
End: 2023-01-13

## 2023-01-13 DIAGNOSIS — A04.8 H. PYLORI INFECTION: Primary | ICD-10-CM

## 2023-01-13 RX ORDER — OMEPRAZOLE 40 MG/1
40 CAPSULE, DELAYED RELEASE ORAL 2 TIMES DAILY
Qty: 28 CAPSULE | Refills: 0 | Status: SHIPPED | OUTPATIENT
Start: 2023-01-13 | End: 2023-01-27

## 2023-01-13 RX ORDER — TETRACYCLINE HYDROCHLORIDE 500 MG/1
500 CAPSULE ORAL 4 TIMES DAILY
Qty: 56 CAPSULE | Refills: 0 | Status: SHIPPED | OUTPATIENT
Start: 2023-01-13 | End: 2023-01-27

## 2023-01-13 RX ORDER — METRONIDAZOLE 250 MG/1
250 TABLET ORAL 4 TIMES DAILY
Qty: 56 TABLET | Refills: 0 | Status: SHIPPED | OUTPATIENT
Start: 2023-01-13 | End: 2023-01-27

## 2023-01-13 RX ORDER — BISMUTH SUBSALICYLATE 262 MG/1
262 TABLET, CHEWABLE ORAL
Qty: 56 TABLET | Refills: 0 | Status: SHIPPED | OUTPATIENT
Start: 2023-01-13 | End: 2023-01-27

## 2023-01-13 NOTE — TELEPHONE ENCOUNTER
PT returning call regarding test  Pt would like to speak with Pili  Pt can be reached at [phone]

## 2023-01-16 NOTE — TELEPHONE ENCOUNTER
Prior auth not needed per insurance and [facility]  Copay is $100  Left detailed message for patient and our number if he has any questions

## 2023-01-16 NOTE — TELEPHONE ENCOUNTER
Pt called in to follow up on medication tetracycline  Advised pt per notes Prior auth is needed for medication  Once completed will call pt back

## 2023-02-14 ENCOUNTER — TELEPHONE (OUTPATIENT)
Dept: VASCULAR SURGERY | Facility: CLINIC | Age: 68
End: 2023-02-14

## 2023-02-16 ENCOUNTER — OFFICE VISIT (OUTPATIENT)
Dept: VASCULAR SURGERY | Facility: CLINIC | Age: 68
End: 2023-02-16

## 2023-02-16 VITALS
SYSTOLIC BLOOD PRESSURE: 120 MMHG | HEART RATE: 69 BPM | WEIGHT: 257 LBS | HEIGHT: 66 IN | DIASTOLIC BLOOD PRESSURE: 70 MMHG | BODY MASS INDEX: 41.3 KG/M2

## 2023-02-16 DIAGNOSIS — E08.41 DIABETIC MONONEUROPATHY ASSOCIATED WITH DIABETES MELLITUS DUE TO UNDERLYING CONDITION (HCC): ICD-10-CM

## 2023-02-16 DIAGNOSIS — I87.2 CHRONIC VENOUS INSUFFICIENCY: Primary | ICD-10-CM

## 2023-02-16 NOTE — PATIENT INSTRUCTIONS
Will order compression stockings thigh high 15-20  Venous ultrasound in 2 weeks  Follow up in 6 weeks

## 2023-02-16 NOTE — ASSESSMENT & PLAN NOTE
Bilateral venous stasis changes  No prior venous surgery or DVT  Has had prior ulcers on the left  Also has bilateral leg swelling    Will order stockings thigh high 15-20  LEVDR 2 weeks  RTC 6 weeks Dr Louise Crook to review

## 2023-02-16 NOTE — PROGRESS NOTES
Assessment/Plan:    Chronic venous insufficiency  Bilateral venous stasis changes  No prior venous surgery or DVT  Has had prior ulcers on the left  Also has bilateral leg swelling    Will order stockings thigh high 15-20  LEVDR 2 weeks  RTC 6 weeks Dr Avni Cervantes to review       Diagnoses and all orders for this visit:    Chronic venous insufficiency  -     VAS reflux lower limb venous duplex study with reflux assessment, complete bilateral; Future  -     Compression Stocking    Diabetic mononeuropathy associated with diabetes mellitus due to underlying condition ([facility] )    BMI 40 0-44 9, adult ([facility] )          Subjective:      Patient ID: Rob Rutherford is a 79 y o  male  Pt is new and is a self ref  Pt is here to eval elke leg discoloration w/ no testing  Pt c/o elke redness and purple discoloration in calfs to ankles w/ warmness  Pt says he has an wound on both calfs  Pt is wearing compression socks  Pt is taking ASA  Pt is taking ASA  Pt is a  Former smoker  Has bilateral leg swelling and pigmented changes on both legs  Denies claudication, family history of AAA, TIA, CVA, venous surgery, and VTE  Has had an ulcer on the left prior  HPI    The following portions of the patient's history were reviewed and updated as appropriate: allergies, current medications, past family history, past medical history, past social history, past surgical history and problem list     Review of Systems   Constitutional: Negative  HENT: Negative  Eyes: Negative  Respiratory: Negative  Cardiovascular: Negative  Gastrointestinal: Negative  Endocrine: Negative  Genitourinary: Negative  Musculoskeletal: Negative  Skin: Negative  Allergic/Immunologic: Negative  Neurological: Negative  Hematological: Negative  Psychiatric/Behavioral: Negative  I have reviewed the ROS above and made changes as needed        Objective:      /70 (BP Location: Right arm, Patient Position: Sitting, Cuff Size: Large)   Pulse 69   Ht 5' 6" (1 676 m)   Wt 117 kg (257 lb)   BMI 41 48 kg/m²          Physical Exam      General  Exam: alert, awake, oriented, no distress, consistent with stated age    [de-identified]  Exam: warm, dry, no gross lesions, no bruises and normal color    Head and Neck  Exam: supple, no bruits, trachea midline, no JVD, no mass or palpable nodes    Eye  Exam: extraoccular movements intact, no scleral icterus, sclera clear, pupils equal round and reactive to light    ENMT  Exam: oral mucosa pink and moist    Chest and Lung  Exam: chest normal without deformity, bilaterally expansive, clear to auscultation    Cardiovascular  Exam: regular rate, regular rhythm, no murmurs, no rubs or gallops    Adbomen  Exam: soft, non-tender, non-distended, no pulsatile abdominal masses, no abdominal bruit    Peripheral Vascular  Exam: no clubbing of the digits of the upper extremity, no cyanosis both feet are warm, radial pulses 2+ bilaterally, skin well perfused,  No widened popliteal pulse noted bilaterally    Moderate bilateral LE edema  Pigmented changes of both calves  No open wounds  Varicosities right medial thigh and calf      Upper Extremity:  Palpation: Radial pulse- Bilateral 2+    Lower Extremity:  Palpation: Femoral pulse- Bilateral 2+         Popliteal pulse - Bilateral 2+        Dorsalis Pedis - Bilateral 2+           Neurologic  Exam:alert, non-focal, oriented x 3, cranial nerves II-XII grossly intact      Venous Clinical Severity Scores (VCSS)  Item Absent   (0 points) Mild   (1 point) Moderate   (2 points) Severe   (3 points)   Pain [] None [] Occasional [x] Daily [] Daily limiting   Varicose veins [] None [] Few [x] Calf or thigh [] Calf and thigh   Venous edema [] None [] Foot and ankle [x] Above ankle, below knee [] To knee of above   Skin pigmentation [] None [x] Perimalleolar [] Diffuse, lower 1/3 calf [] Wider, above lower 1/3 calf   Inflammation [] None [x] Perimalleolar [] Diffuse, lower 1/3 calf [] Wider, above lower 1/3 calf   Induration [] None [x] Perimalleolar [] Diffuse, lower 1/3 calf [] Wider, above lower 1/3 calf   No  active ulcers [x] None [] 1 [] 2 [] ? 3   Active ulcer size [x] None [] <2 cm [] 2 - 6 cm [] >6 cm   Ulcer duration [x] None [] <3 months [] 3 - 12 months [] >1 year   Compression therapy [] None [] Intermittent [] Most days [x] Fully comply   Total 9          CEAP Clinical Classification  [x] Symptomatic   [] Asymptomatic     [] Class 0 No visible or palpable signs of venous disease   [] Class 1 Telangiectasies or reticular veins   [] Class 2 Varicose veins; distinguished from reticular veins by a diameter of 3mm or more   [] Class 3 Edema   [] Class 4 Changes in skin and subcutaneous tissue secondary to CVD    [] Class 4a Pigmentation or eczema   [] Class 4b Lipodermatosclerosis or atrophie som   [x] Class 5 Healed venous ulcer   [] Class 6 Active venous ulcer         Left CEAP 5  Right CEAP 4

## 2023-02-22 ENCOUNTER — OFFICE VISIT (OUTPATIENT)
Dept: SLEEP CENTER | Facility: CLINIC | Age: 68
End: 2023-02-22

## 2023-02-22 VITALS
WEIGHT: 256 LBS | DIASTOLIC BLOOD PRESSURE: 62 MMHG | HEIGHT: 66 IN | SYSTOLIC BLOOD PRESSURE: 120 MMHG | BODY MASS INDEX: 41.14 KG/M2

## 2023-02-22 DIAGNOSIS — G47.33 OBSTRUCTIVE SLEEP APNEA SYNDROME: Primary | ICD-10-CM

## 2023-02-22 NOTE — PROGRESS NOTES
Progress Note - Sleep Center   Marleny Mathurgood KZO:[birth year] MRN: [id number]      Reason for Visit:  79 y o male here for annual follow-up    Assessment:  Doing well on current therapy of CPAP 14 cm for severe SUSY (AHI = 38 2)  The patient uses a full facemask  Plan:  Continue same    Follow up: One year    History of Present Illness:  History of SUSY on PAP therapy  Fully compliant and deriving benefit      Historical Information    Past Medical History:   Past Medical History:   Diagnosis Date   • Acid reflux    • Anxiety    • Arthritis    • Cellulitis     left ankle   • Diabetes mellitus ([address] )    • Hypertension    • Sleep apnea    • Twitching          Past Surgical History:   Past Surgical History:   Procedure Laterality Date   • APPENDECTOMY     • CHOLECYSTECTOMY     • KNEE ARTHROSCOPY     • IN ARTHRP KNE CONDYLE&PLATU MEDIAL&LAT COMPARTMENTS Left 2016    Procedure: TOTAL  KNEE REPLACEMENT ;  Surgeon: Claudia Garcia MD;  Location: [facility];  Service: Orthopedics       Social History:   Social History     Socioeconomic History   • Marital status: /Civil Union     Spouse name: Not on file   • Number of children: Not on file   • Years of education: BS   • Highest education level: Not on file   Occupational History   • Occupation: Attendance officer   Tobacco Use   • Smoking status: Former     Types: Cigarettes     Quit date:      Years since quittin 1   • Smokeless tobacco: Never   Vaping Use   • Vaping Use: Never used   Substance and Sexual Activity   • Alcohol use: No   • Drug use: No   • Sexual activity: Not on file   Other Topics Concern   • Not on file   Social History Narrative   • Not on file     Social Determinants of Health     Financial Resource Strain: Not on file   Food Insecurity: Not on file   Transportation Needs: Not on file   Physical Activity: Not on file   Stress: Not on file   Social Connections: Not on file   Intimate Partner Violence: Not on file   Housing Stability: Not on file       Family History:   Family History   Problem Relation Age of Onset   • Diabetes Mother    • Diabetes Father    • Cancer Father        Medications/Allergies:      Current Outpatient Medications:   •  aspirin (ECOTRIN LOW STRENGTH) 81 mg EC tablet, Take 81 mg by mouth daily, Disp: , Rfl:   •  Blood Glucose Monitoring Suppl (ONE TOUCH ULTRA 2) w/Device KIT, USE TO TEST BLOOD GLUCOSE, Disp: , Rfl:   •  cloNIDine (CATAPRES) 0 1 mg tablet, Take 0 1 mg by mouth 3 (three) times a day , Disp: , Rfl:   •  ergocalciferol (VITAMIN D2) 50,000 units, , Disp: , Rfl:   •  furosemide (LASIX) 40 mg tablet, Take 40 mg by mouth as needed in the morning and 40 mg as needed in the evening , Disp: , Rfl:   •  Lancets (Safety Lancet 30G/Pressure Act) MISC, USE TO TEST BLOOD GLUCOSE TWICE DAILY, Disp: , Rfl:   •  OneTouch Ultra test strip, USE TO TEST BLOOD GLUCOSE TWICE DAILY, Disp: , Rfl:   •  testosterone (ANDROGEL) 1 62 % TD gel pump, PLACE 1 ACTUATION ON THE SKIN EVERY MORNING , Disp: , Rfl:   •  Trulicity 4 5 ZO/2 5EO SOPN, , Disp: , Rfl:   •  zinc gluconate 50 mg tablet, Take 50 mg by mouth in the morning , Disp: , Rfl:   •  albuterol (PROVENTIL HFA,VENTOLIN HFA) 90 mcg/act inhaler, Inhale 2 puffs every 6 (six) hours as needed for wheezing  (Patient not taking: Reported on 2/16/2023), Disp: , Rfl:   •  atorvastatin (LIPITOR) 20 mg tablet, , Disp: , Rfl:   •  bisacodyl (DULCOLAX) 5 mg EC tablet, Take 1 tablet (5 mg total) by mouth once for 1 dose (Patient not taking: Reported on 2/16/2023), Disp: 2 tablet, Rfl: 0  •  budesonide (RINOCORT AQUA) 32 MCG/ACT nasal spray, 1 spray into each nostril in the morning and 1 spray in the evening   (Patient not taking: Reported on 2/16/2023), Disp: , Rfl:   •  buprenorphine-naloxone (SUBOXONE) 8-2 mg, , Disp: , Rfl:   •  cephalexin (KEFLEX) 500 mg capsule, , Disp: , Rfl:   •  clotrimazole-betamethasone (LOTRISONE) 1-0 05 % cream, , Disp: , Rfl:   •  docusate sodium (COLACE) 100 mg capsule, Take 100 mg by mouth 2 (two) times a day (Patient not taking: Reported on 2/16/2023), Disp: , Rfl:   •  doxycycline hyclate (VIBRAMYCIN) 100 mg capsule, , Disp: , Rfl:   •  Dulaglutide 0 75 MG/0 5ML SOPN, Inject 0 75 mg under the skin once a week (Patient not taking: Reported on 8/10/2022), Disp: , Rfl:   •  DULoxetine (Cymbalta) 30 mg delayed release capsule, Take 1 capsule (30 mg total) by mouth 2 (two) times a day (Patient not taking: Reported on 12/13/2021), Disp: 60 capsule, Rfl: 0  •  fluticasone (FLOVENT HFA) 220 mcg/act inhaler, Inhale 1 puff daily as needed (Patient not taking: Reported on 2/16/2023), Disp: , Rfl:   •  HYDROcodone-acetaminophen (NORCO)  mg per tablet, , Disp: , Rfl:   •  HYDROcodone-acetaminophen (NORCO) 5-325 mg per tablet, Take 1 tablet by mouth every 4 to 6 hours as needed for pain (Patient not taking: Reported on 10/18/2022), Disp: , Rfl:   •  lisinopril-hydrochlorothiazide (PRINZIDE,ZESTORETIC) 10-12 5 MG per tablet, Take 1 tablet by mouth 2 (two) times a day (Patient not taking: Reported on 2/16/2023), Disp: , Rfl:   •  LORazepam (ATIVAN) 0 5 mg tablet, Take 0 5 mg by mouth daily at bedtime (Patient not taking: Reported on 2/16/2023), Disp: , Rfl:   •  metFORMIN (GLUCOPHAGE) 850 mg tablet, , Disp: , Rfl:   •  metFORMIN (GLUCOPHAGE-XR) 750 mg 24 hr tablet, Take 750 mg by mouth 2 (two) times a day (Patient not taking: Reported on 10/18/2022), Disp: , Rfl:   •  mupirocin (BACTROBAN) 2 % ointment, , Disp: , Rfl:   •  NARCAN 4 MG/0 1ML LIQD, , Disp: , Rfl:   •  omeprazole (PriLOSEC) 40 MG capsule, Take 1 capsule (40 mg total) by mouth 2 (two) times a day for 14 days (Patient not taking: Reported on 2/16/2023), Disp: 28 capsule, Rfl: 0  •  pioglitazone (ACTOS) 15 mg tablet, , Disp: , Rfl:   •  polyethylene glycol (GLYCOLAX) 17 GM/SCOOP powder, , Disp: , Rfl:   •  polyethylene glycol (GOLYTELY) 4000 mL solution, Take 4,000 mL by mouth once for 1 dose (Patient not taking: Reported on 2/16/2023), Disp: 4000 mL, Rfl: 0  •  polyethylene glycol (MIRALAX) 17 g packet, Take 17 g by mouth daily (Patient not taking: Reported on 10/18/2022), Disp: 30 each, Rfl: 3  •  potassium chloride (K-DUR,KLOR-CON) 10 mEq tablet, , Disp: , Rfl:   •  semaglutide, 1 mg/dose, (Ozempic, 1 MG/DOSE,) 4 MG/3ML SOPN injection pen, Inject 1 mg under the skin once a week (Patient not taking: Reported on 5/12/2022), Disp: , Rfl:   •  sulfamethoxazole-trimethoprim (BACTRIM DS) 800-160 mg per tablet, Take 1 tablet by mouth 2 (two) times a day (Patient not taking: Reported on 1/24/2022), Disp: , Rfl:   •  urea (CARMOL) 40 %, , Disp: , Rfl:   •  zolpidem (AMBIEN) 5 mg tablet, Take 1 tablet (5 mg total) by mouth daily at bedtime as needed for sleep For sleep study (Patient not taking: Reported on 10/18/2022), Disp: 1 tablet, Rfl: 0          Objective      Vital Signs:   Vitals:    02/22/23 1340   BP: 120/62     [place] Sleepiness Scale: Total score: 9        Physical Exam:    General: Alert, appropriate, cooperative, overweight    Head: NC/AT    Skin: Warm, dry    Neuro: No motor abnormalities, cranial nerves appear intact    Extremity: No clubbing, cyanosis      DME Provider: [employer]        Counseling / Coordination of Care   I have spent 15 minutes with the patient today in which greater than 50% of this time was spent in counseling/coordination of care regarding: equipment and compliance  Board Certified Sleep Specialist    Portions of the record may have been created with voice recognition software  Occasional wrong word or "sound a like" substitutions may have occurred due to the inherent limitations of voice recognition software  Read the chart carefully and recognize, using context, where substitutions have occurred

## 2023-02-23 ENCOUNTER — TELEPHONE (OUTPATIENT)
Dept: HEMATOLOGY ONCOLOGY | Facility: CLINIC | Age: 68
End: 2023-02-23

## 2023-02-23 ENCOUNTER — TELEPHONE (OUTPATIENT)
Dept: SLEEP CENTER | Facility: CLINIC | Age: 68
End: 2023-02-23

## 2023-02-23 NOTE — TELEPHONE ENCOUNTER
Appointment Cancellation Or Reschedule     Person calling In self   If someone other than patient calling, are they listed on the communication consent form? self   Provider Graves   Office Visit Date and Time 4/3   Office Visit Location [facility]   Did patient want to reschedule their office appointment? If so, when was it scheduled to? Yes, 4/4 1:30pm   Did you have STAR scheduled for this appointment? no   Do you need STAR set up for your new appointment? If yes, please send to "PATIENT RIDESHARE" pool for STAR rescheduling no   Is this patient calling to reschedule an infusion appointment? no   When is their next infusion appointment? no   Is this patient a Chemo patient? no   Reason for Cancellation or Reschedule Provider request   Was No show policy reviewed with patient if patient canceling within 24 hours? yes     If the patient is cancelling an appointment and needs their STAR Transport cancelled, please route to [facility]  If the patient is a treatment patient, please route this to the office nurse  If the patient is not on treatment, please route to the Clerical pool based on location  If the patient is a surgical oncology patient, please route to surg/onc clinical pool  Route message as high priority if same day cancellation

## 2023-02-23 NOTE — TELEPHONE ENCOUNTER
L/M to R/S appt for 4/3  with Mason Durham will not be at [facility] on that day  Mason De La Cruz is only at [facility] Tuesday and Wednesday  She will now be in [facility] on Monday, Thursday and Friday

## 2023-02-24 LAB
DME PARACHUTE DELIVERY DATE ACTUAL: NORMAL
DME PARACHUTE DELIVERY DATE REQUESTED: NORMAL
DME PARACHUTE ITEM DESCRIPTION: NORMAL
DME PARACHUTE ORDER STATUS: NORMAL
DME PARACHUTE SUPPLIER NAME: NORMAL
DME PARACHUTE SUPPLIER PHONE: NORMAL

## 2023-03-03 ENCOUNTER — HOSPITAL ENCOUNTER (OUTPATIENT)
Dept: NON INVASIVE DIAGNOSTICS | Facility: CLINIC | Age: 68
Discharge: HOME | End: 2023-03-03

## 2023-03-03 DIAGNOSIS — I87.2 CHRONIC VENOUS INSUFFICIENCY: ICD-10-CM

## 2023-03-06 ENCOUNTER — APPOINTMENT (OUTPATIENT)
Dept: LAB | Facility: CLINIC | Age: 68
End: 2023-03-06

## 2023-03-06 DIAGNOSIS — D68.00 VON WILLEBRAND'S DISEASE: ICD-10-CM

## 2023-03-06 DIAGNOSIS — A04.8 H. PYLORI INFECTION: ICD-10-CM

## 2023-03-06 LAB
BASOPHILS # BLD AUTO: 0.03 THOUSANDS/ÂΜL (ref 0–0.1)
BASOPHILS NFR BLD AUTO: 0 % (ref 0–1)
EOSINOPHIL # BLD AUTO: 0.34 THOUSAND/ÂΜL (ref 0–0.61)
EOSINOPHIL NFR BLD AUTO: 4 % (ref 0–6)
ERYTHROCYTE [DISTWIDTH] IN BLOOD BY AUTOMATED COUNT: 14.2 % (ref 11.6–15.1)
FERRITIN SERPL-MCNC: 93 NG/ML (ref 8–388)
HCT VFR BLD AUTO: 43 % (ref 36.5–49.3)
HGB BLD-MCNC: 13.1 G/DL (ref 12–17)
IMM GRANULOCYTES # BLD AUTO: 0.01 THOUSAND/UL (ref 0–0.2)
IMM GRANULOCYTES NFR BLD AUTO: 0 % (ref 0–2)
IRON SATN MFR SERPL: 18 % (ref 20–50)
IRON SERPL-MCNC: 59 UG/DL (ref 65–175)
LYMPHOCYTES # BLD AUTO: 1.53 THOUSANDS/ÂΜL (ref 0.6–4.47)
LYMPHOCYTES NFR BLD AUTO: 19 % (ref 14–44)
MCH RBC QN AUTO: 28.5 PG (ref 26.8–34.3)
MCHC RBC AUTO-ENTMCNC: 30.5 G/DL (ref 31.4–37.4)
MCV RBC AUTO: 94 FL (ref 82–98)
MONOCYTES # BLD AUTO: 0.66 THOUSAND/ÂΜL (ref 0.17–1.22)
MONOCYTES NFR BLD AUTO: 8 % (ref 4–12)
NEUTROPHILS # BLD AUTO: 5.65 THOUSANDS/ÂΜL (ref 1.85–7.62)
NEUTS SEG NFR BLD AUTO: 69 % (ref 43–75)
NRBC BLD AUTO-RTO: 0 /100 WBCS
PLATELET # BLD AUTO: 190 THOUSANDS/UL (ref 149–390)
PMV BLD AUTO: 11.4 FL (ref 8.9–12.7)
RBC # BLD AUTO: 4.6 MILLION/UL (ref 3.88–5.62)
TIBC SERPL-MCNC: 325 UG/DL (ref 250–450)
VIT B12 SERPL-MCNC: 1590 PG/ML (ref 100–900)
WBC # BLD AUTO: 8.22 THOUSAND/UL (ref 4.31–10.16)

## 2023-03-07 LAB — H PYLORI AG STL QL IA: NEGATIVE

## 2023-03-08 ENCOUNTER — ESTABLISHED COMPREHENSIVE EXAM (OUTPATIENT)
Dept: URBAN - METROPOLITAN AREA CLINIC 6 | Facility: CLINIC | Age: 68
End: 2023-03-08

## 2023-03-08 DIAGNOSIS — E11.9: ICD-10-CM

## 2023-03-08 DIAGNOSIS — Z96.1: ICD-10-CM

## 2023-03-08 DIAGNOSIS — H26.493: ICD-10-CM

## 2023-03-08 PROCEDURE — 92014 COMPRE OPH EXAM EST PT 1/>: CPT

## 2023-03-08 ASSESSMENT — VISUAL ACUITY
OS_CC: 20/30
OD_CC: 20/60

## 2023-03-08 ASSESSMENT — TONOMETRY
OD_IOP_MMHG: 10
OS_IOP_MMHG: 10

## 2023-03-09 LAB — METHYLMALONATE SERPL-SCNC: 229 NMOL/L (ref 0–378)

## 2023-03-23 ENCOUNTER — OFFICE VISIT (OUTPATIENT)
Dept: VASCULAR SURGERY | Facility: CLINIC | Age: 68
End: 2023-03-23

## 2023-03-23 VITALS
HEART RATE: 76 BPM | DIASTOLIC BLOOD PRESSURE: 78 MMHG | HEIGHT: 66 IN | SYSTOLIC BLOOD PRESSURE: 130 MMHG | BODY MASS INDEX: 40.66 KG/M2 | OXYGEN SATURATION: 95 % | WEIGHT: 253 LBS

## 2023-03-23 DIAGNOSIS — I87.2 CHRONIC VENOUS INSUFFICIENCY: Primary | ICD-10-CM

## 2023-03-23 NOTE — PROGRESS NOTES
Assessment/Plan:    Chronic venous insufficiency  Seen previously by myself for venous stasis dermatitis  Had LEVDR and returns today to discuss results  He is not a candidate for venous intervention   Recommended lifelong graded compression stockings which he has an appt to be fitted for  Current symptoms are edema and hyperpigmentation  RTC PRN       Diagnoses and all orders for this visit:    Chronic venous insufficiency          Subjective:      Patient ID: Liss Funes is a 79 y o  male  Patient presents today to review LEVDR done 3/7/23  Pt c/o BLE, L>R, VV, pain and swelling along w/ discoloration of calves  Pt states his legs feel "hot" today  Pt states he does wear compression  Pt is taking ASA 81 mg  HPI    The following portions of the patient's history were reviewed and updated as appropriate: allergies, current medications, past family history, past medical history, past social history, past surgical history and problem list   No changes since last visit  Here to discuss results of LEVDR      Review of Systems   Cardiovascular: Positive for leg swelling (BLE)  Musculoskeletal: Negative  Skin: Positive for color change (BLE)  I have reviewed the ROS above and made changes as needed        Objective:      /78 (BP Location: Left arm, Patient Position: Sitting, Cuff Size: Standard)   Pulse 76   Ht 5' 6" (1 676 m)   Wt 115 kg (253 lb)   SpO2 95%   BMI 40 84 kg/m²          Physical Exam      General  Exam: alert, awake, oriented, no distress, consistent with stated age    Integumentary  Exam: warm, dry, no gross lesions, no bruises and normal color    Head and Neck  Exam: supple, no bruits, trachea midline, no JVD, no mass or palpable nodes    Eye  Exam: extraoccular movements intact, no scleral icterus, sclera clear, pupils equal round and reactive to light    ENMT  Exam: oral mucosa pink and moist    Chest and Lung  Exam: chest normal without deformity, bilaterally expansive, clear to auscultation    Cardiovascular  Exam: regular rate, regular rhythm, no murmurs, no rubs or gallops    Adbomen  Exam: soft, non-tender, non-distended, no pulsatile abdominal masses, no abdominal bruit    Peripheral Vascular  Exam: no clubbing of the digits of the upper extremity, no cyanosis,  mild bilateral leg edema, hyperpigmentation of left medial calf, both feet are warm, radial pulses 2+ bilaterally, skin well perfused, without and no varicosities      No widened popliteal pulse noted bilaterally    Upper Extremity:  Palpation: Radial pulse- Bilateral 2+    Lower Extremity:  Palpation: Femoral pulse- Bilateral 2+         Popliteal pulse - Bilateral 2+        Dorsalis Pedis - Bilateral 2+        Posterior tibial - Bilateral 2+    Neurologic  Exam:alert, non-focal, oriented x 3, cranial nerves II-XII grossly intact

## 2023-03-23 NOTE — PATIENT INSTRUCTIONS
Continue wearing graded compression stockings all day  Not a candidate for venous procedures  Follow up with Vascular as needed

## 2023-03-23 NOTE — ASSESSMENT & PLAN NOTE
Seen previously by myself for venous stasis dermatitis  Had RICHARD and returns today to discuss results  He is not a candidate for venous intervention   Recommended lifelong graded compression stockings which he has an appt to be fitted for  Current symptoms are edema and hyperpigmentation  RTC PRN

## 2023-03-30 ENCOUNTER — TELEPHONE (OUTPATIENT)
Dept: UROLOGY | Facility: MEDICAL CENTER | Age: 68
End: 2023-03-30

## 2023-03-30 NOTE — TELEPHONE ENCOUNTER
PT under care of: mahad Gao last seen: 8/25//22    PT calling today because:      PT symptoms are patient is- increase in leaking , pressure in back side and it feels hot, more urgency and frequency  Pt has been having constipation issues  Weak stream at times      Denies gross hematuria        PT can be reached at:

## 2023-03-31 ENCOUNTER — RA CDI HCC (OUTPATIENT)
Dept: OTHER | Facility: HOSPITAL | Age: 68
End: 2023-03-31

## 2023-03-31 ENCOUNTER — OFFICE VISIT (OUTPATIENT)
Dept: UROLOGY | Facility: AMBULATORY SURGERY CENTER | Age: 68
End: 2023-03-31

## 2023-03-31 VITALS
BODY MASS INDEX: 40.34 KG/M2 | SYSTOLIC BLOOD PRESSURE: 132 MMHG | HEART RATE: 83 BPM | HEIGHT: 66 IN | WEIGHT: 251 LBS | DIASTOLIC BLOOD PRESSURE: 70 MMHG | OXYGEN SATURATION: 98 %

## 2023-03-31 DIAGNOSIS — N40.1 BENIGN PROSTATIC HYPERPLASIA WITH LOWER URINARY TRACT SYMPTOMS, SYMPTOM DETAILS UNSPECIFIED: Primary | ICD-10-CM

## 2023-03-31 LAB

## 2023-03-31 RX ORDER — TAMSULOSIN HYDROCHLORIDE 0.4 MG/1
0.4 CAPSULE ORAL
Qty: 30 CAPSULE | Refills: 3 | Status: SHIPPED | OUTPATIENT
Start: 2023-03-31

## 2023-03-31 NOTE — PROGRESS NOTES
[facility]  coding opportunities     I11 [id number] and E66 [id number]     Chart Reviewed number of suggestions sent to Provider: 2     Patients Insurance     Medicare Insurance: [address] [place] [address]

## 2023-03-31 NOTE — PROGRESS NOTES
3/31/2023    Emiliana Peraza  1955  [id number]      Assessment  -BPH with lower urinary tract symptoms  -Hypogonadism    Discussion/Plan  Drew Sánchez is a 79 y o  male being managed by our office    1  BPH with lower urinary tract symptoms-urine dip in the office today appears negative for infection or blood  PVR assessment 117 mL  Prior ELLIE was unremarkable  I recommend patient restart an alpha-blocker for management of his urinary symptoms  Prescription for tamsulosin 0 4 mg daily was sent to his pharmacy  Reviewed mechanism of action, potential side effects, and administration instructions  Also reviewed dietary and behavioral modifications  Next PSA due in September 2023  Patient will continue to follow with endocrinology for history of hypogonadism  Follow-up in 3 months for reevaluation with PVR assessment  He was advised to call sooner with any questions or issues       -All questions answered, patient agrees with plan      History of Present Illness  79 y o  male with a history of BPH and hypogonadism presents today for follow up  Patient was seen in the office in August 2022  He had been on Rapaflo 8 mg daily, but discontinued medication due to improvement of his symptoms  He presents today with symptoms of increased urinary urgency, frequency, and post void dribbling  Patient had an episode of enuresis  He denies any gross hematuria or dysuria  Patient also notes sensation of warmth in lower extremities after urination  He does have a history of neuropathy  Last PSA from 9/29/2022 was <0 1  Patient denies any strong family history of prostate malignancy  He continues to follow with endocrinology for history of hypogonadism  Review of Systems  Review of Systems   Constitutional: Negative  HENT: Negative  Respiratory: Negative  Cardiovascular: Negative  Gastrointestinal: Negative  Genitourinary: Positive for frequency   Negative for decreased urine volume, difficulty urinating, dysuria, flank pain, hematuria and urgency  Musculoskeletal: Negative  Skin: Negative  Neurological: Negative  Psychiatric/Behavioral: Negative            Past Medical History  Past Medical History:   Diagnosis Date   • Acid reflux    • Anxiety    • Arthritis    • Cellulitis     left ankle   • Diabetes mellitus (HCC)    • Hypertension    • Sleep apnea    • Twitching        Past Social History  Past Surgical History:   Procedure Laterality Date   • APPENDECTOMY     • CHOLECYSTECTOMY     • KNEE ARTHROSCOPY     • AZ ARTHRP KNE CONDYLE&PLATU MEDIAL&LAT COMPARTMENTS Left 2016    Procedure: TOTAL  KNEE REPLACEMENT ;  Surgeon: Hawa Prater MD;  Location: [facility];  Service: Orthopedics       Past Family History  Family History   Problem Relation Age of Onset   • Diabetes Mother    • Diabetes Father    • Cancer Father        Past Social history  Social History     Socioeconomic History   • Marital status: /Civil Union     Spouse name: Not on file   • Number of children: Not on file   • Years of education: BS   • Highest education level: Not on file   Occupational History   • Occupation: Attendance officer   Tobacco Use   • Smoking status: Former     Types: Cigarettes     Quit date:      Years since quittin 2   • Smokeless tobacco: Never   Vaping Use   • Vaping Use: Never used   Substance and Sexual Activity   • Alcohol use: No   • Drug use: No   • Sexual activity: Not on file   Other Topics Concern   • Not on file   Social History Narrative   • Not on file     Social Determinants of Health     Financial Resource Strain: Not on file   Food Insecurity: Not on file   Transportation Needs: Not on file   Physical Activity: Not on file   Stress: Not on file   Social Connections: Not on file   Intimate Partner Violence: Not on file   Housing Stability: Not on file       Current Medications  Current Outpatient Medications   Medication Sig Dispense Refill   • albuterol (PROVENTIL HFA,VENTOLIN HFA) 90 mcg/act inhaler Inhale 2 puffs every 6 (six) hours as needed for wheezing  (Patient not taking: Reported on 2/16/2023)     • amoxicillin (AMOXIL) 500 mg capsule TAKE 1 CAPSULE BY MOUTH EVERY 8 HOURS UNTIL FINISHED     • aspirin (ECOTRIN LOW STRENGTH) 81 mg EC tablet Take 81 mg by mouth daily     • atorvastatin (LIPITOR) 20 mg tablet  (Patient not taking: Reported on 10/18/2022)     • bisacodyl (DULCOLAX) 5 mg EC tablet Take 1 tablet (5 mg total) by mouth once for 1 dose (Patient not taking: Reported on 2/16/2023) 2 tablet 0   • Blood Glucose Monitoring Suppl (ONE TOUCH ULTRA 2) w/Device KIT USE TO TEST BLOOD GLUCOSE     • budesonide (RINOCORT AQUA) 32 MCG/ACT nasal spray 1 spray into each nostril in the morning and 1 spray in the evening  (Patient not taking: Reported on 2/16/2023)     • buprenorphine-naloxone (SUBOXONE) 8-2 mg  (Patient not taking: Reported on 1/24/2022)     • cephalexin (KEFLEX) 500 mg capsule  (Patient not taking: Reported on 1/24/2022)     • chlorhexidine (PERIDEX) 0 12 % solution RINSE MOUTH WITH 15ML (1 CAPFUL) FOR 30 SECONDS IN MORNING AND EVENING AFTER BRUSHING, THEN SPIT     • cloNIDine (CATAPRES) 0 1 mg tablet Take 0 1 mg by mouth 3 (three) times a day       • clotrimazole-betamethasone (LOTRISONE) 1-0 05 % cream  (Patient not taking: Reported on 1/24/2022)     • docusate sodium (COLACE) 100 mg capsule Take 100 mg by mouth 2 (two) times a day (Patient not taking: Reported on 2/16/2023)     • doxycycline hyclate (VIBRAMYCIN) 100 mg capsule  (Patient not taking: Reported on 5/12/2022)     • Dulaglutide 0 75 MG/0 5ML SOPN Inject 0 75 mg under the skin once a week (Patient not taking: Reported on 8/10/2022)     • DULoxetine (Cymbalta) 30 mg delayed release capsule Take 1 capsule (30 mg total) by mouth 2 (two) times a day (Patient not taking: Reported on 12/13/2021) 60 capsule 0   • ergocalciferol (VITAMIN D2) 50,000 units      • fluticasone (FLOVENT HFA) 220 mcg/act inhaler Inhale 1 puff daily as needed (Patient not taking: Reported on 2/16/2023)     • furosemide (LASIX) 40 mg tablet Take 40 mg by mouth as needed in the morning and 40 mg as needed in the evening       • HYDROcodone-acetaminophen (NORCO)  mg per tablet  (Patient not taking: Reported on 10/18/2022)     • HYDROcodone-acetaminophen (NORCO) 5-325 mg per tablet Take 1 tablet by mouth every 4 to 6 hours as needed for pain (Patient not taking: Reported on 10/18/2022)     • ibuprofen (MOTRIN) 600 mg tablet TAKE 1 TABLET BY MOUTH EVERY 6 TO 8 HOURS AS NEEDED     • Lancets (Safety Lancet 30G/Pressure Act) MISC USE TO TEST BLOOD GLUCOSE TWICE DAILY     • lisinopril-hydrochlorothiazide (PRINZIDE,ZESTORETIC) 10-12 5 MG per tablet Take 1 tablet by mouth 2 (two) times a day (Patient not taking: Reported on 2/16/2023)     • LORazepam (ATIVAN) 0 5 mg tablet Take 0 5 mg by mouth daily at bedtime (Patient not taking: Reported on 2/16/2023)     • metFORMIN (GLUCOPHAGE) 850 mg tablet  (Patient not taking: Reported on 10/18/2022)     • metFORMIN (GLUCOPHAGE-XR) 750 mg 24 hr tablet Take 750 mg by mouth 2 (two) times a day (Patient not taking: Reported on 10/18/2022)     • mupirocin (BACTROBAN) 2 % ointment  (Patient not taking: Reported on 2/16/2023)     • NARCAN 4 MG/0 1ML LIQD  (Patient not taking: Reported on 1/24/2022)     • omeprazole (PriLOSEC) 40 MG capsule Take 1 capsule (40 mg total) by mouth 2 (two) times a day for 14 days (Patient not taking: Reported on 2/16/2023) 28 capsule 0   • OneTouch Ultra test strip USE TO TEST BLOOD GLUCOSE TWICE DAILY     • pioglitazone (ACTOS) 15 mg tablet  (Patient not taking: Reported on 2/16/2023)     • polyethylene glycol (GLYCOLAX) 17 GM/SCOOP powder  (Patient not taking: Reported on 2/16/2023)     • polyethylene glycol (GOLYTELY) 4000 mL solution Take 4,000 mL by mouth once for 1 dose (Patient not taking: Reported on 2/16/2023) 4000 mL 0   • polyethylene glycol (MIRALAX) 17 g packet Take 17 g by mouth daily (Patient not taking: Reported on 10/18/2022) 30 each 3   • potassium chloride (K-DUR,KLOR-CON) 10 mEq tablet  (Patient not taking: Reported on 2/16/2023)     • semaglutide, 1 mg/dose, (Ozempic, 1 MG/DOSE,) 4 MG/3ML SOPN injection pen Inject 1 mg under the skin once a week (Patient not taking: Reported on 5/12/2022)     • sulfamethoxazole-trimethoprim (BACTRIM DS) 800-160 mg per tablet Take 1 tablet by mouth 2 (two) times a day (Patient not taking: Reported on 1/24/2022)     • testosterone (ANDROGEL) 1 62 % TD gel pump PLACE 1 ACTUATION ON THE SKIN EVERY MORNING  • Trulicity 4 5 YZ/1 0MU SOPN      • urea (CARMOL) 40 %  (Patient not taking: Reported on 1/24/2022)     • zinc gluconate 50 mg tablet Take 50 mg by mouth in the morning  • zolpidem (AMBIEN) 5 mg tablet Take 1 tablet (5 mg total) by mouth daily at bedtime as needed for sleep For sleep study (Patient not taking: Reported on 10/18/2022) 1 tablet 0     No current facility-administered medications for this visit  Allergies  No Known Allergies    Past Medical History, Social History, Family History, medications and allergies were reviewed  Vitals  There were no vitals filed for this visit  Physical Exam  Physical Exam  Constitutional:       Appearance: Normal appearance  He is well-developed  HENT:      Head: Normocephalic  Eyes:      Pupils: Pupils are equal, round, and reactive to light  Pulmonary:      Effort: Pulmonary effort is normal    Abdominal:      Palpations: Abdomen is soft  Musculoskeletal:         General: Normal range of motion  Cervical back: Normal range of motion  Comments: Ambulates with cane     Skin:     General: Skin is warm and dry  Neurological:      General: No focal deficit present  Mental Status: He is alert and oriented to person, place, and time  Psychiatric:         Mood and Affect: Mood normal          Behavior: Behavior normal          Thought Content:  Thought content normal          Judgment: Judgment normal          Results    I have personally reviewed all pertinent lab results and reviewed with patient  Lab Results   Component Value Date    PSA <0 1 09/29/2022    PSA <0 1 03/03/2021    PSA 0 2 10/04/2014     Lab Results   Component Value Date    GLUCOSE 120 10/04/2014    CALCIUM 9 1 10/07/2022     10/04/2014    K 3 9 10/07/2022    CO2 32 10/07/2022    CL 99 10/07/2022    BUN 17 10/07/2022    CREATININE 1 20 10/07/2022     Lab Results   Component Value Date    WBC 8 22 03/06/2023    HGB 13 1 03/06/2023    HCT 43 0 03/06/2023    MCV 94 03/06/2023     03/06/2023     No results found for this or any previous visit (from the past 1 hour(s))

## 2023-04-04 ENCOUNTER — OFFICE VISIT (OUTPATIENT)
Dept: HEMATOLOGY ONCOLOGY | Facility: CLINIC | Age: 68
End: 2023-04-04

## 2023-04-04 VITALS
WEIGHT: 255 LBS | TEMPERATURE: 97.7 F | SYSTOLIC BLOOD PRESSURE: 140 MMHG | HEART RATE: 86 BPM | HEIGHT: 66 IN | OXYGEN SATURATION: 90 % | RESPIRATION RATE: 16 BRPM | DIASTOLIC BLOOD PRESSURE: 62 MMHG | BODY MASS INDEX: 40.98 KG/M2

## 2023-04-04 DIAGNOSIS — D64.9 NORMOCYTIC ANEMIA: ICD-10-CM

## 2023-04-04 DIAGNOSIS — E46 PROTEIN-CALORIE MALNUTRITION, UNSPECIFIED SEVERITY (HCC): ICD-10-CM

## 2023-04-04 DIAGNOSIS — D50.9 IRON DEFICIENCY ANEMIA, UNSPECIFIED IRON DEFICIENCY ANEMIA TYPE: Primary | ICD-10-CM

## 2023-04-04 NOTE — PROGRESS NOTES
[address]  [facility] [address] [address]  Hematology Ambulatory Follow-Up  Rey Bui, 1955, [id number]  4/4/2023    Assessment/Plan:    1  Iron deficiency anemia, unspecified iron deficiency anemia type  2  Normocytic anemia  3  Protein-calorie malnutrition, unspecified severity [facility])  Mr Adeel Daugherty is a 68-year-old male seen in follow-up for normocytic anemia  His anemia has now been corrected with most recent hemoglobin being 13 1 with an MCV of 94  Iron has been maintained after last IV iron in September 2022  Most recent iron saturation did slightly decreased 18% with a serum iron of 59  He was started on oral iron, Slow Fe, once daily  He is tolerating this well without increased constipation or bloating  He will continue on the oral iron supplementation at this time and repeat labs in 3 months and then again in 6 months prior to follow-up with me in the office  He did undergo GI work-up with EGD and colonoscopy for his abdominal bloating and nausea  He was treated with antibiotics for H  pylori infection  Since treatment his symptoms have not improved  I stressed the importance of him to schedule follow-up with gastroenterology to discuss the symptoms and further intervention/work-up for this problem  - CBC and differential; Future  - Comprehensive metabolic panel; Future  - Folate; Future  - Vitamin B12; Future  - Methylmalonic acid, serum; Future  - Iron Panel (Includes Ferritin, Iron Sat%, Iron, and TIBC); Future      The patient is scheduled for follow-up in approximately 6 months  Patient voiced agreement and understanding to the above  Patient knows to call the Hematology/Oncology office with any questions and concerns regarding the above        Barrier(s) to care: None  The patient is able to self care   ------------------------------------------------------------------------------------------------------    Chief Complaint   Patient presents with   • Follow-up       History of present illness:   Melanie Mendez is a 61-year-old male with past medical history of type 2 diabetes, diabetic neuropathy, GERD, sleep apnea, hypertension, chronic venous insufficiency, and degenerative disc disease  He was originally seen in consultation in August of 2022 for anemia  His normal hemoglobin ranged in the high 10-11 with a normal MCV  His iron panel demonstrated chronic iron deficiency with a ferritin of 25, iron saturation 13%, TIBC 285, serum iron 37  He was referred to Gastroenterology for GI workup for his episodes of dark tarry stools and has abdominal pain with bloating      06/16/2016:  Hemoglobin 14 8, MCV 92, platelets 797, WBC 8 4  03/26/2021:  Hemoglobin 10 8, MCV 90, platelets 925, WBC 6 5  02/13/2022:  Hemoglobin 11 1, MCV 84, platelets 951, WBC 7 9  07/06/2022:  Hemoglobin 10 8, MCV 84, platelets 782, WBC 7              Ferritin 25, iron saturation 13%, TIBC 285, serum iron 37  08/10/2022:  Hemoglobin 10 9, MCV 91, platelets 241, WBC 8 80              Ferritin 23, iron saturation 13%, TIBC 274, serum iron 36  Feraheme IV x2 doses: 9/6 and 9/13  10/07/2022:  Hemoglobin 12 1, MCV 91, platelets 327, WBC 2 50              Ferritin 191, iron saturation 28%, TIBC 232, serum iron 64  3/6/2023: Hemoglobin 13 1, MCV 94, platelets 970, WBC 0 22   Ferritin 93, iron saturation 18%, TIBC 325, serum iron 59      Interval history: He has been taking oral iron, Slow Fe, daily and tolerating this well  At first he did have increased constipation but currently this has been better controlled and he is having regular bowel movements  His colonoscopy found no evidence of acute blood loss with few small scattered diverticula  Farhat Portillo His EGD was relatively unremarkable as well but he was treated for H  pylori infection    Since this treatment he has not had relief in his abdominal bloating or pain  He is also having issues with lower extremity swelling and neuropathy  He is changing her primary care doctors and is seeing his new physician tomorrow  Review of Systems   Constitutional: Negative for activity change, appetite change, fatigue, fever and unexpected weight change  HENT: Negative for trouble swallowing and voice change  Eyes: Negative for photophobia and visual disturbance  Respiratory: Negative for cough, chest tightness and shortness of breath  Cardiovascular: Positive for leg swelling (chronic)  Negative for chest pain and palpitations  Gastrointestinal: Positive for abdominal distention and abdominal pain  Negative for anal bleeding, blood in stool, constipation, diarrhea, nausea and vomiting  Endocrine: Negative for cold intolerance and heat intolerance  Genitourinary: Negative for dysuria, hematuria and urgency  Musculoskeletal: Positive for gait problem (walks with a cane)  Negative for arthralgias, back pain, joint swelling and myalgias  Skin: Negative for pallor and rash  Neurological: Negative for dizziness, tremors, weakness, light-headedness, numbness and headaches  Hematological: Negative for adenopathy  Does not bruise/bleed easily  Psychiatric/Behavioral: Negative for confusion and sleep disturbance         Patient Active Problem List   Diagnosis   • S/P total knee replacement   • Diabetes type 2, uncontrolled   • HTN (hypertension)   • GERD (gastroesophageal reflux disease)   • Anxiety   • SUSY (obstructive sleep apnea)   • Acute blood loss anemia   • Low back pain with sciatica   • DDD (degenerative disc disease), lumbar   • Spinal stenosis of lumbar region   • Myoclonus   • Chronic venous insufficiency   • Bilateral lower extremity edema   • BMI 40 0-44 9, adult (HCC)   • Primary osteoarthritis of right knee   • Chronic pain of right knee   • Diabetic neuropathy ([facility] )   • RBD (REM behavioral disorder)   • Lumbar spondylosis   • Neck pain   • Right shoulder pain   • Chronic low back pain with bilateral sciatica   • Chronic pain syndrome   • Iron deficiency anemia, unspecified   • Obstructive sleep apnea syndrome       Past Medical History:   Diagnosis Date   • Acid reflux    • Anxiety    • Arthritis    • Cellulitis     left ankle   • Diabetes mellitus (HCC)    • Hypertension    • Sleep apnea    • Twitching        Past Surgical History:   Procedure Laterality Date   • APPENDECTOMY     • CHOLECYSTECTOMY     • KNEE ARTHROSCOPY     • CA ARTHRP KNE CONDYLE&PLATU MEDIAL&LAT COMPARTMENTS Left 2016    Procedure: TOTAL  KNEE REPLACEMENT ;  Surgeon: Cheri Waite MD;  Location: [facility];  Service: Orthopedics       Family History   Problem Relation Age of Onset   • Diabetes Mother    • Diabetes Father    • Cancer Father        Social History     Socioeconomic History   • Marital status: /Civil Union     Spouse name: None   • Number of children: None   • Years of education: BS   • Highest education level: None   Occupational History   • Occupation: Attendance officer   Tobacco Use   • Smoking status: Former     Types: Cigarettes     Quit date:      Years since quittin 2   • Smokeless tobacco: Never   Vaping Use   • Vaping Use: Never used   Substance and Sexual Activity   • Alcohol use: No   • Drug use: No   • Sexual activity: None   Other Topics Concern   • None   Social History Narrative   • None     Social Determinants of Health     Financial Resource Strain: Not on file   Food Insecurity: Not on file   Transportation Needs: Not on file   Physical Activity: Not on file   Stress: Not on file   Social Connections: Not on file   Intimate Partner Violence: Not on file   Housing Stability: Not on file         Current Outpatient Medications:   •  aspirin (ECOTRIN LOW STRENGTH) 81 mg EC tablet, Take 81 mg by mouth daily, Disp: , Rfl:   •  Blood Glucose Monitoring Suppl (ONE TOUCH ULTRA 2) w/Device KIT, USE TO TEST BLOOD GLUCOSE, Disp: , Rfl:   •  chlorhexidine (PERIDEX) 0 12 % solution, RINSE MOUTH WITH 15ML (1 CAPFUL) FOR 30 SECONDS IN MORNING AND EVENING AFTER BRUSHING, THEN SPIT, Disp: , Rfl:   •  cloNIDine (CATAPRES) 0 1 mg tablet, Take 0 1 mg by mouth 3 (three) times a day , Disp: , Rfl:   •  docusate sodium (COLACE) 100 mg capsule, Take 100 mg by mouth 2 (two) times a day, Disp: , Rfl:   •  ergocalciferol (VITAMIN D2) 50,000 units, , Disp: , Rfl:   •  furosemide (LASIX) 40 mg tablet, Take 40 mg by mouth as needed in the morning and 40 mg as needed in the evening , Disp: , Rfl:   •  ibuprofen (MOTRIN) 600 mg tablet, TAKE 1 TABLET BY MOUTH EVERY 6 TO 8 HOURS AS NEEDED, Disp: , Rfl:   •  metFORMIN (GLUCOPHAGE-XR) 750 mg 24 hr tablet, Take 750 mg by mouth 2 (two) times a day, Disp: , Rfl:   •  mupirocin (BACTROBAN) 2 % ointment, , Disp: , Rfl:   •  OneTouch Ultra test strip, USE TO TEST BLOOD GLUCOSE TWICE DAILY, Disp: , Rfl:   •  potassium chloride (K-DUR,KLOR-CON) 10 mEq tablet, , Disp: , Rfl:   •  tamsulosin (FLOMAX) 0 4 mg, Take 1 capsule (0 4 mg total) by mouth daily with dinner, Disp: 30 capsule, Rfl: 3  •  testosterone (ANDROGEL) 1 62 % TD gel pump, PLACE 1 ACTUATION ON THE SKIN EVERY MORNING , Disp: , Rfl:   •  Trulicity 4 5 GW/0 6BT SOPN, , Disp: , Rfl:   •  zinc gluconate 50 mg tablet, Take 50 mg by mouth in the morning , Disp: , Rfl:   •  albuterol (PROVENTIL HFA,VENTOLIN HFA) 90 mcg/act inhaler, Inhale 2 puffs every 6 (six) hours as needed for wheezing  (Patient not taking: Reported on 2/16/2023), Disp: , Rfl:   •  atorvastatin (LIPITOR) 20 mg tablet, , Disp: , Rfl:   •  bisacodyl (DULCOLAX) 5 mg EC tablet, Take 1 tablet (5 mg total) by mouth once for 1 dose (Patient not taking: Reported on 2/16/2023), Disp: 2 tablet, Rfl: 0  •  budesonide (RINOCORT AQUA) 32 MCG/ACT nasal spray, 1 spray into each nostril in the morning and 1 spray in the evening   (Patient not taking: Reported "on 2/16/2023), Disp: , Rfl:   •  buprenorphine-naloxone (SUBOXONE) 8-2 mg, , Disp: , Rfl:   •  fluticasone (FLOVENT HFA) 220 mcg/act inhaler, Inhale 1 puff daily as needed (Patient not taking: Reported on 2/16/2023), Disp: , Rfl:   •  HYDROcodone-acetaminophen (NORCO)  mg per tablet, , Disp: , Rfl:   •  LORazepam (ATIVAN) 0 5 mg tablet, Take 0 5 mg by mouth daily at bedtime (Patient not taking: Reported on 2/16/2023), Disp: , Rfl:   •  NARCAN 4 MG/0 1ML LIQD, , Disp: , Rfl:   •  pioglitazone (ACTOS) 15 mg tablet, , Disp: , Rfl:   •  polyethylene glycol (MIRALAX) 17 g packet, Take 17 g by mouth daily (Patient not taking: Reported on 10/18/2022), Disp: 30 each, Rfl: 3  •  sulfamethoxazole-trimethoprim (BACTRIM DS) 800-160 mg per tablet, Take 1 tablet by mouth 2 (two) times a day (Patient not taking: Reported on 1/24/2022), Disp: , Rfl:   •  urea (CARMOL) 40 %, , Disp: , Rfl:     No Known Allergies    Objective:  /62 (BP Location: Right arm, Patient Position: Sitting, Cuff Size: Adult)   Pulse 86   Temp 97 7 °F (36 5 °C) (Temporal)   Resp 16   Ht 5' 6\" (1 676 m)   Wt 116 kg (255 lb)   SpO2 90%   BMI 41 16 kg/m²    Physical Exam  Constitutional:       General: He is not in acute distress  Appearance: Normal appearance  He is not ill-appearing  HENT:      Head: Normocephalic and atraumatic  Eyes:      Extraocular Movements: Extraocular movements intact  Conjunctiva/sclera: Conjunctivae normal    Cardiovascular:      Rate and Rhythm: Normal rate and regular rhythm  Pulses: Normal pulses  Heart sounds: Normal heart sounds  No murmur heard  Pulmonary:      Effort: Pulmonary effort is normal  No respiratory distress  Breath sounds: Normal breath sounds  Abdominal:      General: Bowel sounds are normal  There is no distension  Palpations: Abdomen is soft  Tenderness: There is no abdominal tenderness  Musculoskeletal:         General: Normal range of motion        " Cervical back: Normal range of motion  No tenderness  Right lower leg: Edema present  Left lower leg: Edema present  Lymphadenopathy:      Cervical: No cervical adenopathy  Skin:     General: Skin is warm and dry  Capillary Refill: Capillary refill takes less than 2 seconds  Coloration: Skin is not pale  Findings: No bruising or lesion  Neurological:      General: No focal deficit present  Mental Status: He is alert and oriented to person, place, and time  Mental status is at baseline  Motor: No weakness  Gait: Gait abnormal (walks with a cane)  Psychiatric:         Mood and Affect: Mood normal          Behavior: Behavior normal          Thought Content:  Thought content normal          Judgment: Judgment normal          Result Review  Labs:  Office Visit on 03/31/2023   Component Date Value Ref Range Status   • LEUKOCYTE ESTERASE,UA 03/31/2023 -   Final   • Merry Hutch 03/31/2023 -   Final   • SL AMB POCT UROBILINOGEN 03/31/2023 0 2   Final   • POCT URINE PROTEIN 03/31/2023 -   Final   •  PH,UA 03/31/2023 5 0   Final   • BLOOD,UA 03/31/2023 -   Final   • SPECIFIC GRAVITY,UA 03/31/2023 1 020   Final   • [place] 03/31/2023 -   Final   • BILIRUBIN,UA 03/31/2023 -   Final   • GLUCOSE, UA 03/31/2023 -   Final   •  COLOR,UA 03/31/2023 yellow   Final   • CLARITY,UA 03/31/2023 clear   Final   • POST-VOID RESIDUAL VOLUME, ML POC 03/31/2023 117  mL Final   Appointment on 03/06/2023   Component Date Value Ref Range Status   • H pylori Ag, Stl 03/06/2023 Negative  Negative Final   Telephone on 02/23/2023   Component Date Value Ref Range Status   • Supplier Name 02/23/2023 [employer] [facility]   Final   • Supplier Phone Number 02/23/2023 [phone]   Final   • Order Status 02/23/2023 Completed   Final   • Delivery Request Date 02/23/2023 02/23/2023   Final   • Date Delivered  02/23/2023 02/23/2023   Final   • Item Description 02/23/2023 PAP Accessory   Final Qty: 1   • Item Description 02/23/2023 PAP Mask, Full Face, Fit Upon Setup, N/A, 1 per 3 months   Final    Qty: 1   • Item Description 02/23/2023 Humidifier Water Chamber, 1 per 6 months   Final    Qty: 1   • Item Description 02/23/2023 PAP Headgear, 1 per 6 months   Final    Qty: 1   • Item Description 02/23/2023 PAP Humidifier, Heated   Final    Qty: 1   • Item Description 02/23/2023 Heated PAP Tubing, 1 per 3 months   Final    Qty: 1   • Item Description 02/23/2023 Disposable PAP Filter, 2 per 1 month   Final    Qty: 1   • Item Description 02/23/2023 Non-Disposable PAP Filter, 1 per 6 months   Final    Qty: 1   • Item Description 02/23/2023 PAP Mask Interface Cushion, Full Face, 1 per 1 month   Final    Qty: 1       Imaging:   No relevant imaging to review     Please note: This report has been generated by a voice recognition software system  Therefore there may be syntax, spelling, and/or grammatical errors  Please call if you have any questions

## 2023-04-05 ENCOUNTER — OFFICE VISIT (OUTPATIENT)
Dept: FAMILY MEDICINE CLINIC | Facility: CLINIC | Age: 68
End: 2023-04-05

## 2023-04-05 VITALS
SYSTOLIC BLOOD PRESSURE: 138 MMHG | RESPIRATION RATE: 16 BRPM | BODY MASS INDEX: 39.96 KG/M2 | TEMPERATURE: 96.6 F | WEIGHT: 254.6 LBS | HEART RATE: 71 BPM | OXYGEN SATURATION: 96 % | DIASTOLIC BLOOD PRESSURE: 76 MMHG | HEIGHT: 67 IN

## 2023-04-05 DIAGNOSIS — I10 PRIMARY HYPERTENSION: ICD-10-CM

## 2023-04-05 DIAGNOSIS — R41.3 IMPAIRED MEMORY: ICD-10-CM

## 2023-04-05 DIAGNOSIS — J96.91 RESPIRATORY FAILURE WITH HYPOXIA AND HYPERCAPNIA, UNSPECIFIED CHRONICITY (HCC): ICD-10-CM

## 2023-04-05 DIAGNOSIS — R25.1 TREMOR: ICD-10-CM

## 2023-04-05 DIAGNOSIS — J96.92 RESPIRATORY FAILURE WITH HYPOXIA AND HYPERCAPNIA, UNSPECIFIED CHRONICITY (HCC): ICD-10-CM

## 2023-04-05 DIAGNOSIS — K21.9 GASTROESOPHAGEAL REFLUX DISEASE, UNSPECIFIED WHETHER ESOPHAGITIS PRESENT: Primary | ICD-10-CM

## 2023-04-05 DIAGNOSIS — I50.32 CHRONIC DIASTOLIC CONGESTIVE HEART FAILURE (HCC): ICD-10-CM

## 2023-04-05 DIAGNOSIS — F41.9 ANXIETY: ICD-10-CM

## 2023-04-05 RX ORDER — BUSPIRONE HYDROCHLORIDE 5 MG/1
5 TABLET ORAL 2 TIMES DAILY
Qty: 60 TABLET | Refills: 1 | Status: SHIPPED | OUTPATIENT
Start: 2023-04-05 | End: 2023-04-06 | Stop reason: SDUPTHER

## 2023-04-05 RX ORDER — PANTOPRAZOLE SODIUM 40 MG/1
40 TABLET, DELAYED RELEASE ORAL DAILY
Qty: 30 TABLET | Refills: 2 | Status: SHIPPED | OUTPATIENT
Start: 2023-04-05 | End: 2023-04-06 | Stop reason: SDUPTHER

## 2023-04-05 NOTE — PROGRESS NOTES
Name: Emiliana Peraza      : 1955      MRN: [id number]  Encounter Provider: Alex Castelan MD  Encounter Date: 2023   Encounter department: [facility]     59-year-old male here as a new patient with several concerns  Based on last A1c, diabetes is well controlled  Up-to-date for diabetic eye exam and request records from ophthalmology  Hypertension well controlled on clonidine 0 1 mg 3 times a day  Patient is felt increasingly anxious  Was previously on medications and saw a therapist/psychiatrist in the past   Currently not interested in psychotherapy  Did agree to pharmacotherapy  We will start BuSpar 5 mg twice daily and Klonopin daily as needed  PDMP was queried  In terms of his GI symptoms, likely due to dyspepsia  He has a known history of GERD and was previously on Protonix  Will resume Protonix at 40 mg daily  Advised not to consume foods that will exacerbate his acid reflux  Lastly we addressed his tremor  It appears patient also has cogwheel rigidity  Tremors are either an essential tremor or a manifestation of a movement disorder such as Parkinson's  Patient did see neurology in the past   Given his memory concerns, we will perform a cognitive evaluation at the next appointment and further investigate his tremor  Follow-up in 4 to 6 weeks or sooner as needed  1  Gastroesophageal reflux disease, unspecified whether esophagitis present  -     pantoprazole (PROTONIX) 40 mg tablet; Take 1 tablet (40 mg total) by mouth daily    2  Chronic diastolic congestive heart failure ([address] )    3  Respiratory failure with hypoxia and hypercapnia, unspecified chronicity ([address] )    4  Anxiety  -     busPIRone (BUSPAR) 5 mg tablet; Take 1 tablet (5 mg total) by mouth 2 (two) times a day    5  Tremor    6  Impaired memory    7  Primary hypertension    BMI Counseling: Body mass index is 40 48 kg/m²   The BMI is above normal  Nutrition recommendations include encouraging healthy choices of fruits and vegetables, limiting drinks that contain sugar, moderation in carbohydrate intake, increasing intake of lean protein, reducing intake of saturated and trans fat and reducing intake of cholesterol  Exercise recommendations include moderate physical activity 150 minutes/week  No pharmacotherapy was ordered  Rationale for BMI follow-up plan is due to patient being overweight or obese  BMI Counseling: Body mass index is 40 48 kg/m²  Follow-up plan was not completed due to elderly patient (72 years old) where weight reduction/weight gain would complicate underlying health condition such as: illness or physical disability  Subjective      Anna Luo is here as a new patient to discuss tremors, anxiety, gait disturbance, and GI symptoms  Last PCP was Dr Marina Watson and was last seen 1 month ago  He has a history of diabetes (well controlled), sleep apnea, chronic venous insufficiency, CHF, lower back pain, osteoarthritis of the knees, hypertension, diabetic neuropathy, and GERD  Patient states he was taking Lipitor but feared the potential side effects and decided to stop the med  He denies any side effects of the medication and accepts the risks of CVA/CAD  Blood sugars have been well controlled  Last A1c in January 2023 was 5 9  Fasting blood sugars are averaging in the 100s  Patient was recently started on tamsulosin for frequent urination  He reports occasional urinary incontinence  Over the last few weeks he has been having increased belching, gurgling in the stomach, and bloating  Was previously on pantoprazole which worked very well  He reports feeling shaky when he wakes up  Feels anxious and is easily agitated  This is affecting his relationship with his wife  He admits to being short tempered and not wanting to interact with people  He saw psychiatrist and therapist in the past   He was also on medications but does not recall the name    At one point he was doing well and didn't need the medication  Lastly he reports a tremor  Finds it difficult to initiate a step in the morning  Saw neurology in the past   No known history of Parkinson's or essential tremor  Does experience occasional myoclonus  Seeing Dr Lizandro Ruelas at [facility] for neuropathy  Receives treatments that help to alleviate the numbness or cold sensation in the legs      Review of Systems    Current Outpatient Medications on File Prior to Visit   Medication Sig   • albuterol (PROVENTIL HFA,VENTOLIN HFA) 90 mcg/act inhaler Inhale 2 puffs every 6 (six) hours as needed for wheezing As needed   • aspirin (ECOTRIN LOW STRENGTH) 81 mg EC tablet Take 81 mg by mouth daily   • bisacodyl (DULCOLAX) 5 mg EC tablet Take 1 tablet (5 mg total) by mouth once for 1 dose   • Blood Glucose Monitoring Suppl (ONE TOUCH ULTRA 2) w/Device KIT USE TO TEST BLOOD GLUCOSE   • budesonide (RINOCORT AQUA) 32 MCG/ACT nasal spray 1 spray into each nostril 2 (two) times a day As needed   • cloNIDine (CATAPRES) 0 1 mg tablet Take 0 1 mg by mouth 3 (three) times a day  • docusate sodium (COLACE) 100 mg capsule Take 100 mg by mouth 2 (two) times a day   • fluticasone (FLOVENT HFA) 220 mcg/act inhaler Inhale 1 puff daily as needed As needed   • furosemide (LASIX) 40 mg tablet Take 40 mg by mouth as needed in the morning and 40 mg as needed in the evening  • NARCAN 4 MG/0 1ML LIQD Has if needed   • OneTouch Ultra test strip USE TO TEST BLOOD GLUCOSE TWICE DAILY   • potassium chloride (K-DUR,KLOR-CON) 10 mEq tablet    • tamsulosin (FLOMAX) 0 4 mg Take 1 capsule (0 4 mg total) by mouth daily with dinner   • testosterone (ANDROGEL) 1 62 % TD gel pump PLACE 1 ACTUATION ON THE SKIN EVERY MORNING  • Trulicity 4 5 SW/1 3TH SOPN    • zinc gluconate 50 mg tablet Take 50 mg by mouth in the morning     • [DISCONTINUED] ibuprofen (MOTRIN) 600 mg tablet TAKE 1 TABLET BY MOUTH EVERY 6 TO 8 HOURS AS NEEDED   • chlorhexidine (PERIDEX) 0 12 % "solution RINSE MOUTH WITH 15ML (1 CAPFUL) FOR 30 SECONDS IN MORNING AND EVENING AFTER BRUSHING, THEN SPIT (Patient not taking: Reported on 4/5/2023)   • ergocalciferol (VITAMIN D2) 50,000 units  (Patient not taking: Reported on 4/5/2023)   • LORazepam (ATIVAN) 0 5 mg tablet Take 0 5 mg by mouth daily at bedtime (Patient not taking: Reported on 2/16/2023)   • mupirocin (BACTROBAN) 2 % ointment  (Patient not taking: Reported on 4/5/2023)   • pioglitazone (ACTOS) 15 mg tablet  (Patient not taking: Reported on 2/16/2023)   • [DISCONTINUED] atorvastatin (LIPITOR) 20 mg tablet  (Patient not taking: Reported on 4/5/2023)   • [DISCONTINUED] buprenorphine-naloxone (SUBOXONE) 8-2 mg  (Patient not taking: Reported on 1/24/2022)   • [DISCONTINUED] HYDROcodone-acetaminophen (NORCO)  mg per tablet  (Patient not taking: Reported on 10/18/2022)   • [DISCONTINUED] metFORMIN (GLUCOPHAGE-XR) 750 mg 24 hr tablet Take 750 mg by mouth 2 (two) times a day (Patient not taking: Reported on 4/5/2023)   • [DISCONTINUED] polyethylene glycol (MIRALAX) 17 g packet Take 17 g by mouth daily (Patient not taking: Reported on 10/18/2022)   • [DISCONTINUED] sulfamethoxazole-trimethoprim (BACTRIM DS) 800-160 mg per tablet Take 1 tablet by mouth 2 (two) times a day (Patient not taking: Reported on 1/24/2022)   • [DISCONTINUED] urea (CARMOL) 40 %  (Patient not taking: Reported on 1/24/2022)       Objective     /76   Pulse 71   Temp (!) 96 6 °F (35 9 °C)   Resp 16   Ht 5' 6 5\" (1 689 m)   Wt 115 kg (254 lb 9 6 oz)   SpO2 96%   BMI 40 48 kg/m²     Physical Exam  Vitals reviewed  Constitutional:       General: He is not in acute distress  Appearance: Normal appearance  He is not ill-appearing or toxic-appearing  HENT:      Head: Normocephalic and atraumatic        Right Ear: Tympanic membrane normal       Left Ear: Tympanic membrane normal       Mouth/Throat:      Mouth: Mucous membranes are moist    Eyes:      Extraocular " Movements: Extraocular movements intact  Cardiovascular:      Rate and Rhythm: Normal rate and regular rhythm  Heart sounds: No murmur heard  Pulmonary:      Effort: Pulmonary effort is normal  No respiratory distress  Breath sounds: Normal breath sounds  No wheezing  Abdominal:      General: Abdomen is flat  Palpations: There is no mass  Tenderness: There is no abdominal tenderness  There is no guarding or rebound  Hernia: A hernia is present  Comments: Ventral hernia  Umbilical hernia   Musculoskeletal:      Right lower leg: Edema (trace) present  Left lower leg: Edema (1+) present  Comments: Taco discoloration of the skin in the LE   Lymphadenopathy:      Cervical: No cervical adenopathy  Skin:     General: Skin is warm  Neurological:      Mental Status: He is alert and oriented to person, place, and time  Gait: Gait abnormal       Comments: Intention tremor  Abnormal finger-nose   Psychiatric:         Mood and Affect: Mood normal          Behavior: Behavior normal          Thought Content:  Thought content normal        Toyin Segura MD

## 2023-04-05 NOTE — TELEPHONE ENCOUNTER
Agreed,    I will put a letter in his chart, in anticipation of their request for 1
Boston Harris from the dental office called to confirm he does not need antibiotics anymore 
Patient made aware that he is greater than 2 years post total joint so no need for abx any more 
Patient sees Dr Montenegro    Patient would like to know if he needs to take antibiotics before he goes to the Dentist, pt has an appt today 08/17 @ 4:30pm     Please Advise  cb- [phone]
without difficulty

## 2023-04-06 ENCOUNTER — TELEPHONE (OUTPATIENT)
Dept: FAMILY MEDICINE CLINIC | Facility: CLINIC | Age: 68
End: 2023-04-06

## 2023-04-06 DIAGNOSIS — K21.9 GASTROESOPHAGEAL REFLUX DISEASE, UNSPECIFIED WHETHER ESOPHAGITIS PRESENT: ICD-10-CM

## 2023-04-06 DIAGNOSIS — F41.9 ANXIETY: ICD-10-CM

## 2023-04-06 RX ORDER — BUSPIRONE HYDROCHLORIDE 5 MG/1
5 TABLET ORAL 2 TIMES DAILY
Qty: 60 TABLET | Refills: 1 | Status: SHIPPED | OUTPATIENT
Start: 2023-04-06

## 2023-04-06 RX ORDER — PANTOPRAZOLE SODIUM 40 MG/1
40 TABLET, DELAYED RELEASE ORAL DAILY
Qty: 30 TABLET | Refills: 2 | Status: SHIPPED | OUTPATIENT
Start: 2023-04-06

## 2023-04-06 NOTE — TELEPHONE ENCOUNTER
Pharmacy did not receive medications      Medication:pantoprazole (PROTONIX) 40 mg tablet [[id number]]  Dosage:  How Often:Take 1 tablet (40 mg total) by mouth daily  Quantity:  30  Last Office Visit: 4/5/23  Next Office Visit: 5/17/23  Last refilled: written 4/5/23  How many pills left: 0  Pharmacy:   [facility], [address] [id number] [facility] [address] [address]  Phone: [phone] Fax: [phone]    Medication:busPIRone (BUSPAR) 5 mg tablet [[id number]]  Dosage:  How Often:Take 1 tablet (5 mg total) by mouth 2 (two) times a day  Quantity:  60  Last Office Visit: 4/5/23  Next Office Visit: 5/17/23  Last refilled: written 4/5/23  How many pills left: 0  Pharmacy:   [address], [address] [facility] [address] [address]  Phone: [phone] Fax: [phone]

## 2023-04-07 RX ORDER — CLONAZEPAM 0.5 MG/1
0.5 TABLET ORAL DAILY PRN
Qty: 30 TABLET | Refills: 0 | Status: SHIPPED | OUTPATIENT
Start: 2023-04-07

## 2023-04-22 DIAGNOSIS — N40.1 BENIGN PROSTATIC HYPERPLASIA WITH LOWER URINARY TRACT SYMPTOMS, SYMPTOM DETAILS UNSPECIFIED: ICD-10-CM

## 2023-04-23 RX ORDER — TAMSULOSIN HYDROCHLORIDE 0.4 MG/1
CAPSULE ORAL
Qty: 90 CAPSULE | Refills: 2 | Status: SHIPPED | OUTPATIENT
Start: 2023-04-23

## 2023-04-27 ENCOUNTER — OFFICE VISIT (OUTPATIENT)
Dept: PAIN MEDICINE | Facility: CLINIC | Age: 68
End: 2023-04-27

## 2023-04-27 VITALS
BODY MASS INDEX: 41.14 KG/M2 | WEIGHT: 256 LBS | HEART RATE: 68 BPM | SYSTOLIC BLOOD PRESSURE: 138 MMHG | DIASTOLIC BLOOD PRESSURE: 76 MMHG | HEIGHT: 66 IN

## 2023-04-27 DIAGNOSIS — E13.42 DIABETIC POLYNEUROPATHY ASSOCIATED WITH OTHER SPECIFIED DIABETES MELLITUS (HCC): ICD-10-CM

## 2023-04-27 DIAGNOSIS — M54.2 NECK PAIN: ICD-10-CM

## 2023-04-27 DIAGNOSIS — M54.40 LOW BACK PAIN WITH SCIATICA, SCIATICA LATERALITY UNSPECIFIED, UNSPECIFIED BACK PAIN LATERALITY, UNSPECIFIED CHRONICITY: Primary | ICD-10-CM

## 2023-04-27 DIAGNOSIS — M47.812 CERVICAL SPONDYLOSIS: ICD-10-CM

## 2023-04-27 DIAGNOSIS — M47.816 LUMBAR SPONDYLOSIS: ICD-10-CM

## 2023-04-27 NOTE — PROGRESS NOTES
Assessment  1  Low back pain with sciatica, sciatica laterality unspecified, unspecified back pain laterality, unspecified chronicity    2  Lumbar spondylosis    3  Diabetic polyneuropathy associated with other specified diabetes mellitus ([address] )    4  Cervical spondylosis    5  Neck pain        Plan  51-year-old male with a history of diabetic neuropathy, lumbar spondylosis, stenosis, last seen in March 2022, returning for interval follow-up regarding that radiates into bilateral upper extremities to the elbows and lumbosacral back pain that radiates into the posterior lateral aspect of bilateral lower extremities worse on the right than left with associated numbness  He denies any interval trauma  X-ray of the cervical spine from 2022 shows mild multilevel spondylosis  Right lumbar spine from 2022 demonstrates moderate to advanced multilevel spondylosis  Patient did do some physical therapy last year which was providing some transient relief  Unfortunately he lost follow-up with us  He has been taking Tylenol as needed with minimal relief  Unable to take NSAIDs secondary to kidney function  He was previously on high-dose opioid therapy and was subsequently transitioned to Suboxone  He is no longer taking this  He was taking duloxetine and gabapentin, however was unable to tolerate the side effects  The patient's neck and low back pain do have myofascial and likely facet mediated components  Upper and lower extremity symptoms may be radicular in nature versus peripheral neuropathy  1   I will prescribe physical therapy as I feel he may benefit from Gianna-based exercises for his cervical and lumbar complaints  2  I did offer the patient a trial of Lyrica which he declined  3  Patient may continue with Tylenol as needed and should not exceed more than 3000 mg in 24 hours  4    I will follow-up with the patient in 6 weeks and if he does not respond to conservative treatment we will order an MRI of the cervical and lumbar spine without contrast      My impressions and treatment recommendations were discussed in detail with the patient who verbalized understanding and had no further questions  Discharge instructions were provided  I personally saw and examined the patient and I agree with the above discussed plan of care  No orders of the defined types were placed in this encounter  No orders of the defined types were placed in this encounter  History of Present Illness    Orysia Him is a 79 y o  male with a history of diabetic neuropathy, lumbar spondylosis, stenosis, last seen in March 2022, returning for interval follow-up regarding that radiates into bilateral upper extremities to the elbows and lumbosacral back pain that radiates into the posterior lateral aspect of bilateral lower extremities worse on the right than left with associated numbness  He denies any interval trauma  Denies any bladder or bowel incontinence or saddle anesthesia  X-ray of the cervical spine from 2022 shows mild multilevel spondylosis  Right lumbar spine from 2022 demonstrates moderate to advanced multilevel spondylosis  Patient did do some physical therapy last year which was providing some transient relief  Unfortunately he lost follow-up with us  He has been taking Tylenol as needed with minimal relief  Unable to take NSAIDs secondary to kidney function  He was previously on high-dose opioid therapy and was subsequently transitioned to Suboxone  He is no longer taking this  He was taking duloxetine and gabapentin, however was unable to tolerate the side effects  The patient rates his pain an 8 out of 10 and the pain is worse in the evening  The pain is intermittent and described as sharp, pressure-like, shooting, and numbness  The pain is worse with standing, walking, bending, and exercise  The pain is alleviated with sitting, relaxation, and lying down      Other than as stated above, the patient denies any interval changes in medications, medical condition, mental condition, symptoms, or allergies since the last office visit  I have personally reviewed and/or updated the patient's past medical history, past surgical history, family history, social history, current medications, allergies, and vital signs today       Review of Systems    Patient Active Problem List   Diagnosis   • S/P total knee replacement   • Diabetes type 2, uncontrolled   • HTN (hypertension)   • GERD (gastroesophageal reflux disease)   • Anxiety   • SUSY (obstructive sleep apnea)   • Acute blood loss anemia   • Low back pain with sciatica   • DDD (degenerative disc disease), lumbar   • Spinal stenosis of lumbar region   • Myoclonus   • Chronic venous insufficiency   • Bilateral lower extremity edema   • BMI 40 0-44 9, adult ([facility])   • Primary osteoarthritis of right knee   • Chronic pain of right knee   • Diabetic neuropathy ([facility])   • RBD (REM behavioral disorder)   • Lumbar spondylosis   • Neck pain   • Right shoulder pain   • Chronic low back pain with bilateral sciatica   • Chronic pain syndrome   • Iron deficiency anemia, unspecified   • Obstructive sleep apnea syndrome   • Chronic diastolic congestive heart failure ([facility])   • Respiratory failure with hypoxia and hypercapnia, unspecified chronicity ([facility])       Past Medical History:   Diagnosis Date   • Acid reflux    • Anxiety    • Arthritis    • Cellulitis     left ankle   • Diabetes mellitus ([facility] [address] )    • Hypertension    • Sleep apnea    • Twitching        Past Surgical History:   Procedure Laterality Date   • APPENDECTOMY     • CHOLECYSTECTOMY     • KNEE ARTHROSCOPY     • NM ARTHRP KNE CONDYLE&PLATU MEDIAL&LAT COMPARTMENTS Left 7/13/2016    Procedure: TOTAL  KNEE REPLACEMENT ;  Surgeon: Arnav Jones MD;  Location: [facility];  Service: Orthopedics       Family History   Problem Relation Age of Onset   • Diabetes Mother    • Diabetes Father    • Cancer Father        Social History Occupational History   • Occupation: Attendance officer   • Occupation: Retired-    Tobacco Use   • Smoking status: Former     Types: Cigarettes     Quit date:      Years since quittin 3   • Smokeless tobacco: Never   Vaping Use   • Vaping Use: Never used   Substance and Sexual Activity   • Alcohol use: No   • Drug use: Not Currently     Comment: tried multiple drugs in the past   • Sexual activity: Not on file       Current Outpatient Medications on File Prior to Visit   Medication Sig   • albuterol (PROVENTIL HFA,VENTOLIN HFA) 90 mcg/act inhaler Inhale 2 puffs every 6 (six) hours as needed for wheezing As needed   • aspirin (ECOTRIN LOW STRENGTH) 81 mg EC tablet Take 81 mg by mouth daily   • Blood Glucose Monitoring Suppl (ONE TOUCH ULTRA 2) w/Device KIT USE TO TEST BLOOD GLUCOSE   • budesonide (RINOCORT AQUA) 32 MCG/ACT nasal spray 1 spray into each nostril 2 (two) times a day As needed   • busPIRone (BUSPAR) 5 mg tablet Take 1 tablet (5 mg total) by mouth 2 (two) times a day   • clonazePAM (KlonoPIN) 0 5 mg tablet Take 1 tablet (0 5 mg total) by mouth daily as needed for anxiety   • cloNIDine (CATAPRES) 0 1 mg tablet Take 0 1 mg by mouth 3 (three) times a day  • docusate sodium (COLACE) 100 mg capsule Take 100 mg by mouth 2 (two) times a day   • fluticasone (FLOVENT HFA) 220 mcg/act inhaler Inhale 1 puff daily as needed As needed   • furosemide (LASIX) 40 mg tablet Take 40 mg by mouth as needed in the morning and 40 mg as needed in the evening     • NARCAN 4 MG/0 1ML LIQD Has if needed   • OneTouch Ultra test strip USE TO TEST BLOOD GLUCOSE TWICE DAILY   • pantoprazole (PROTONIX) 40 mg tablet Take 1 tablet (40 mg total) by mouth daily   • pioglitazone (ACTOS) 15 mg tablet    • potassium chloride (K-DUR,KLOR-CON) 10 mEq tablet    • tamsulosin (FLOMAX) 0 4 mg TAKE 1 CAPSULE BY MOUTH EVERY DAY WITH DINNER   • testosterone (ANDROGEL) 1 62 % TD gel pump PLACE 1 ACTUATION ON THE SKIN EVERY "MORNING  • Testosterone 12 5 MG/ACT (1%) GEL PLACE 1 ACTUATION ON THE SKIN EVERY MORNING  • Trulicity 4 5 IS/2 1SL SOPN    • zinc gluconate 50 mg tablet Take 50 mg by mouth in the morning  • bisacodyl (DULCOLAX) 5 mg EC tablet Take 1 tablet (5 mg total) by mouth once for 1 dose   • chlorhexidine (PERIDEX) 0 12 % solution RINSE MOUTH WITH 15ML (1 CAPFUL) FOR 30 SECONDS IN MORNING AND EVENING AFTER BRUSHING, THEN SPIT (Patient not taking: Reported on 4/5/2023)   • ergocalciferol (VITAMIN D2) 50,000 units  (Patient not taking: Reported on 4/5/2023)   • LORazepam (ATIVAN) 0 5 mg tablet Take 0 5 mg by mouth daily at bedtime (Patient not taking: Reported on 2/16/2023)   • mupirocin (BACTROBAN) 2 % ointment  (Patient not taking: Reported on 4/5/2023)     No current facility-administered medications on file prior to visit  No Known Allergies    Physical Exam    /76   Pulse 68   Ht 5' 6\" (1 676 m)   Wt 116 kg (256 lb)   BMI 41 32 kg/m²     Constitutional: normal, well developed, well nourished, alert, in no distress and non-toxic and no overt pain behavior  Eyes: anicteric  HEENT: grossly intact  Neck: supple, symmetric, trachea midline and no masses   Pulmonary:even and unlabored  Cardiovascular:No edema or pitting edema present  Skin:Normal without rashes or lesions and well hydrated  Psychiatric:Mood and affect appropriate  Neurologic:Cranial Nerves II-XII grossly intact  Musculoskeletal:antalgic gait and ambulates with a cane  Bilateral cervical paraspinals and trapezii tender to palpation  Limited range of motion of cervical spine in all planes secondary to pain  Bilateral biceps, triceps, brachioradialis, patellar, and Achilles reflexes were 1 out of 4 and symmetrical   No clonus is noted bilaterally  Negative Brown's reflex bilaterally  Bilateral upper extremity strength 5 out of 5 in all muscle groups  Sensation intact to light touch in C5-T1 dermatomes bilaterally    Negative Spurling's " bilaterally  Bilateral lumbar paraspinals tender to palpation and ropey in texture  Bilateral SI joints nontender to palpation  Bilateral lower extremity strength 5 out of 5 in all muscle groups  Sensation intact to light touch in L3-S1 dermatomes bilaterally  Negative seated straight leg raise bilaterally  Imaging      PACS Images     Show images for X-ray lumbar spine 2 or 3 views  Study Result    Narrative & Impression   LUMBAR SPINE     INDICATION:   M54 41: Lumbago with sciatica, right side  G89 29: Other chronic pain  M54 42: Lumbago with sciatica, left side      COMPARISON:  February 19, 2018      VIEWS:  XR SPINE LUMBAR 2 OR 3 VIEWS INJURY        FINDINGS:     There are 5 non rib bearing lumbar vertebral bodies       There is no evidence of acute fracture or destructive osseous lesion      Mild dextroconvex lumbar scoliosis       Moderate to advanced multilevel degenerative changes of the lumbar spine with disc space narrowing, endplate sclerosis, prominent osteophytes and facet arthropathy    Findings are similar to prior exam      The pedicles appear intact      Cholecystectomy clips      IMPRESSION:     Moderate to advanced multilevel degenerative changes of the lumbar spine, similar to prior exam         Workstation performed: [id number]     PACS Images     Show images for XR shoulder 2+ vw right  Study Result    Narrative & Impression   RIGHT SHOULDER     INDICATION:   M25 511: Pain in right shoulder      COMPARISON:  None     VIEWS:  XR SHOULDER 2+ VW RIGHT        FINDINGS:     There is no acute fracture or dislocation      No significant degenerative changes      No lytic or blastic osseous lesion      Soft tissues are unremarkable      IMPRESSION:     No acute osseous abnormality            Workstation performed: [id number]     PACS Images     Show images for X-ray cervical spine complete 4 or 5 vw  Study Result    Narrative & Impression   CERVICAL SPINE     INDICATION:   M54 2: Cervicalgia      COMPARISON:  None     VIEWS:  XR SPINE CERVICAL COMPLETE 4 OR 5 VW NON INJURY         FINDINGS:     No fracture       Normal alignment without subluxation      Mild multilevel degenerative changes with prominent anterior osteophytes       The neuroforamina are patent      The prevertebral soft tissues are within normal limits        The lung apices are clear      IMPRESSION:     No acute osseous abnormality      Mild multilevel degenerative changes         Workstation performed: [id number]

## 2023-04-28 DIAGNOSIS — F41.9 ANXIETY: ICD-10-CM

## 2023-04-28 DIAGNOSIS — K21.9 GASTROESOPHAGEAL REFLUX DISEASE, UNSPECIFIED WHETHER ESOPHAGITIS PRESENT: ICD-10-CM

## 2023-04-28 RX ORDER — PANTOPRAZOLE SODIUM 40 MG/1
TABLET, DELAYED RELEASE ORAL
Qty: 90 TABLET | Refills: 1 | Status: SHIPPED | OUTPATIENT
Start: 2023-04-28

## 2023-04-30 RX ORDER — BUSPIRONE HYDROCHLORIDE 5 MG/1
TABLET ORAL
Qty: 180 TABLET | Refills: 1 | Status: SHIPPED | OUTPATIENT
Start: 2023-04-30

## 2023-05-02 DIAGNOSIS — F41.9 ANXIETY: ICD-10-CM

## 2023-05-02 RX ORDER — CLONAZEPAM 0.5 MG/1
0.5 TABLET ORAL DAILY PRN
Qty: 30 TABLET | Refills: 0 | Status: SHIPPED | OUTPATIENT
Start: 2023-05-05 | End: 2023-06-04

## 2023-05-02 NOTE — TELEPHONE ENCOUNTER
Medication Refill Request     Name clonazePAM (KlonoPIN) 0 5 mg tablet  Dose/Frequency Take 1 tablet (0 5 mg total) by mouth daily as needed for anxiety  Quantity 30  Verified pharmacy   [x]  Verified ordering Provider   [x]  Does patient have enough for the next 3 days?  Yes [x] No []

## 2023-05-09 ENCOUNTER — EVALUATION (OUTPATIENT)
Dept: PHYSICAL THERAPY | Facility: REHABILITATION | Age: 68
End: 2023-05-09

## 2023-05-09 DIAGNOSIS — M47.816 LUMBAR SPONDYLOSIS: ICD-10-CM

## 2023-05-09 DIAGNOSIS — M54.2 NECK PAIN: ICD-10-CM

## 2023-05-09 DIAGNOSIS — E13.42 DIABETIC POLYNEUROPATHY ASSOCIATED WITH OTHER SPECIFIED DIABETES MELLITUS (HCC): ICD-10-CM

## 2023-05-09 DIAGNOSIS — M47.812 CERVICAL SPONDYLOSIS: ICD-10-CM

## 2023-05-09 DIAGNOSIS — M54.40 LOW BACK PAIN WITH SCIATICA, SCIATICA LATERALITY UNSPECIFIED, UNSPECIFIED BACK PAIN LATERALITY, UNSPECIFIED CHRONICITY: Primary | ICD-10-CM

## 2023-05-09 NOTE — PROGRESS NOTES
PT Evaluation     Today's date: 2023  Patient name: Estella Brunner  : 1955  MRN: [id number]  Referring provider: Dakota Cordero DO  Dx:   Encounter Diagnosis     ICD-10-CM    1  Low back pain with sciatica, sciatica laterality unspecified, unspecified back pain laterality, unspecified chronicity  M54 40 Ambulatory referral to Physical Therapy      2  Lumbar spondylosis  M47 816 Ambulatory referral to Physical Therapy      3  Diabetic polyneuropathy associated with other specified diabetes mellitus ([facility] [address] )  E13 42 Ambulatory referral to Physical Therapy      4  Cervical spondylosis  M47 812 Ambulatory referral to Physical Therapy      5  Neck pain  M54 2 Ambulatory referral to Physical Therapy                     Assessment  Assessment details: Patient presents with  decreased ROM, decreased strength, postural deficits and decreased function secondary to chronic cervical and lumbar pain  Patient would benefit from skilled PT intervention to address these issues and to maximize function  Thank you for the referral   Impairments: abnormal or restricted ROM, activity intolerance, impaired physical strength, lacks appropriate home exercise program, pain with function, poor posture  and poor body mechanics  Understanding of Dx/Px/POC: good   Prognosis: good    Goals  Short Term:  Pt will report decreased levels of pain by at least 2 subjective ratings in 4 weeks  Pt will demonstrate improved c/s ROM by at least 10 degrees in 4 weeks  Pt will demonstrate improved strength by 1/2 grade MMT in 4 weeks  Long Term:   Pt will be independent in their HEP in 8 weeks  Pt will demonstrate improved FOTO, > predicted level  Pt will be independent with all ADL's  Pt will be perform household activities at prior/maximal level of functoin    Plan  Plan details: Patient was educated in [address] [place] [address] and Plan of Care  All questions were answered to pt's satisfaction      Patient would benefit from: skilled physical therapy  Planned therapy interventions: abdominal trunk stabilization, manual therapy, joint mobilization, body mechanics training, neuromuscular re-education, patient education, postural training, strengthening, stretching, therapeutic exercise, therapeutic activities, flexibility, home exercise program, graded exercise and transfer training  Frequency: 2x week  Duration in weeks: 8  Plan of Care beginning date: 5/9/2023  Plan of Care expiration date: 7/4/2023  Treatment plan discussed with: patient        Subjective Evaluation    History of Present Illness  Mechanism of injury: Pt is a 76 y o male with a c/o ongoing cervical and lumbar pain  Pt had some PT over a year ago, which was providing some relief  Unfortunately pt was lost to follow up to PT, as well as Pain Management  Pt states he developed COVID at that time and had a number of health problems due to that  Pt recently saw Pain management again due to progressive pain and is now referred back to PT  Pt reports pain/difficulty with sit to stand transfers, ambulation (using [facility]), household activities (limited tolerance), dressing, showering (requires shower chair due to limited standing tolerance)  Pt also has diabetic neuropathy which contributes to his limited standing tolerance  Neck pain is located on both sides and also c/o pain radiating down b/l upper arms to his elbows  Low back pain is located on both sides  Pt is also being treated for R knee OA and has been receiving injections      Pain  Location: cervical spine 10/10 at worst, 4/10at best; lumbar spine 9/10 at worst, 5/10 at best      Diagnostic Tests  X-ray: abnormal  Treatments  Previous treatment: physical therapy  Patient Goals  Patient goals for therapy: decreased pain, increased strength, independence with ADLs/IADLs, return to sport/leisure activities, increased motion and improved balance          Objective     Concurrent Complaints      Additional Special Questions  Patient denies loss of bowel/bladder control, saddle anesthesia, unexplained weight loss, history of cancer  Patient denies diplopia, dysarthria, dysphagia, dizziness, drop attacks, nausea, facial numbness  Postural Observations  Seated posture: poor    Additional Postural Observation Details  Fwd head and rounded shoulders noted       Tenderness     Additional Tenderness Details  No TTP noted  None noted    Neurological Testing     Reflexes   Left   Biceps (C5/C6): trace (1+)  Brachioradialis (C6): trace (1+)  Triceps (C7): trace (1+)  Patellar (L4): trace (1+)  Achilles (S1): trace (1+)  Clonus sign: negative    Right   Biceps (C5/C6): trace (1+)  Brachioradialis (C6): trace (1+)  Triceps (C7): trace (1+)  Patellar (L4): trace (1+)  Achilles (S1): trace (1+)  Clonus sign: negative    Active Range of Motion   Cervical/Thoracic Spine       Cervical    Flexion:  WFL  Extension: 20 degrees     with pain  Left lateral flexion: 18 degrees     with pain  Right lateral flexion: 12 degrees     with pain  Left rotation: 45 degrees  Right rotation: 45 degrees    with pain  Left Shoulder   External rotation BTH: WFL  Internal rotation BTB: [facility]    Right Shoulder   External rotation BTH: [facility]  Internal rotation BTB: [facility]    Additional Active Range of Motion Details  Shoulder flexion limited to about 120* b/l and ABD limited to 90* b/l  L/S AROM (% of normal):  Flex=50% without pain ; repeated=no change  Ext=25% without pain ; repeated=no change  RSB=25% with pain  LSB=50% without pain  R rot=50% with mild pain  L rot=50% with mild pain    Joint Play   Joints within functional limits: C1 and C2     Strength/Myotome Testing     Left Shoulder     Planes of Motion   Flexion: 5   Abduction: 5   External rotation at 0°: 4+   Internal rotation at 0°: 5     Right Shoulder     Planes of Motion   Flexion: 5   Abduction: 5   External rotation at 0°: 4+   Internal rotation at 0°: 5     Left Elbow   Flexion: 5  Extension: 5    Right Elbow   Flexion: 5  Extension: 5    Left Hip   Planes of Motion   Flexion: 4+  Abduction: 4+  External rotation: 5    Right Hip   Planes of Motion   Flexion: 4+  Abduction: 4+  External rotation: 5    Left Knee   Flexion: 4  Extension: 5    Right Knee   Flexion: 5  Extension: 5    Left Ankle/Foot   Dorsiflexion: 5  Plantar flexion: 5 (seated)    Right Ankle/Foot   Dorsiflexion: 5  Plantar flexion: 5 (seated)    Tests   Cervical   Negative vertical compression, cervical distraction, alar ligament test and Sharp-Tatyana test      Left   Negative Spurling's Test A and cervical flexion-rotation test      Right   Negative Spurling's Test A and cervical flexion-rotation test      Lumbar   Negative vertical compression  Left   Negative crossed SLR and passive SLR  Right   Negative crossed SLR and passive SLR       Left Pelvic Girdle/Sacrum   Negative: active SLR test      Right Pelvic Girdle/Sacrum   Positive: active SLR test      Additional Tests Details  (+)scap assistance for increased c/s rot  (-)active SLR w/sabilization on R             Precautions: anxiety, diabetes, OA, HTN, L TKA, chronic pain      Manuals 5/9            C/S PROM BSB and B rot                                                    Neuro Re-Ed             TA w/LPD             TA w/unilateral rows             TA w/multifidus press seated             Standing pball crush             TB b/l ER w/scap squeeze                                       Ther Ex             NS             Serratus wall slides             Cervical rotation SNAGS b/l             SLR x 3 in standing b/l             TA w/bridges                                       Pt education+ HEP instruction TP 8 mins            Ther Activity             TA w/marching in standing             Sit to stand             TA w/squats             Gait Training                                       Modalities

## 2023-05-16 ENCOUNTER — OFFICE VISIT (OUTPATIENT)
Dept: PHYSICAL THERAPY | Facility: REHABILITATION | Age: 68
End: 2023-05-16

## 2023-05-16 DIAGNOSIS — M47.812 CERVICAL SPONDYLOSIS: ICD-10-CM

## 2023-05-16 DIAGNOSIS — M54.40 LOW BACK PAIN WITH SCIATICA, SCIATICA LATERALITY UNSPECIFIED, UNSPECIFIED BACK PAIN LATERALITY, UNSPECIFIED CHRONICITY: Primary | ICD-10-CM

## 2023-05-16 DIAGNOSIS — M54.2 NECK PAIN: ICD-10-CM

## 2023-05-16 DIAGNOSIS — M47.816 LUMBAR SPONDYLOSIS: ICD-10-CM

## 2023-05-16 DIAGNOSIS — E13.42 DIABETIC POLYNEUROPATHY ASSOCIATED WITH OTHER SPECIFIED DIABETES MELLITUS (HCC): ICD-10-CM

## 2023-05-16 NOTE — PROGRESS NOTES
"Daily Note     Today's date: 2023  Patient name: Jin Chand  : 1955  MRN: [id number]  Referring provider: Deep Akins DO  Dx:   Encounter Diagnosis     ICD-10-CM    1  Low back pain with sciatica, sciatica laterality unspecified, unspecified back pain laterality, unspecified chronicity  M54 40       2  Lumbar spondylosis  M47 816       3  Diabetic polyneuropathy associated with other specified diabetes mellitus ([facility] [address] )  E13 42       4  Cervical spondylosis  M47 812       5  Neck pain  M54 2                      Subjective: Pt reported having good and bad days  He has been having issues with balance  Objective: See treatment diary below      Assessment: Tolerated treatment well  Patient demonstrated fatigue post treatment and exhibited good technique with therapeutic exercises  VC's needed for correct technique throughout initiation of session  Fatigue noted post session  Overall, tolerated all exercises well  Most challenged with bridges  Plan: Continue per plan of care  Precautions: anxiety, diabetes, OA, HTN, L TKA, chronic pain      Manuals            C/S PROM BSB and B rot  TC 8'                                                  Neuro Re-Ed             TA w/LPD  gtb x10            TA w/unilateral rows  gtb x10 ea           TA w/multifidus press seated  gtb 3\"x10           Standing pball crush  5\"x10           TB b/l ER w/scap squeeze  otb 3\"x10                                     Ther Ex             NS  L4x10'           Serratus wall slides             Cervical rotation SNAGS b/l  5\"x10 ea  SLR x 3 in standing b/l  10 ea  TA w/bridges  3\"x10                                     Pt education+ HEP instruction TP 8 mins            Ther Activity             TA w/marching in standing  10 ea             Sit to stand  10           TA w/squats  10           Gait Training                                       Modalities                                            "

## 2023-05-17 ENCOUNTER — OFFICE VISIT (OUTPATIENT)
Dept: FAMILY MEDICINE CLINIC | Facility: CLINIC | Age: 68
End: 2023-05-17

## 2023-05-17 VITALS
DIASTOLIC BLOOD PRESSURE: 86 MMHG | SYSTOLIC BLOOD PRESSURE: 138 MMHG | OXYGEN SATURATION: 95 % | BODY MASS INDEX: 41.03 KG/M2 | TEMPERATURE: 98 F | RESPIRATION RATE: 16 BRPM | HEART RATE: 80 BPM | WEIGHT: 254.2 LBS

## 2023-05-17 DIAGNOSIS — E13.40 OTHER SPECIFIED DIABETES MELLITUS WITH DIABETIC NEUROPATHY, WITHOUT LONG-TERM CURRENT USE OF INSULIN (HCC): ICD-10-CM

## 2023-05-17 DIAGNOSIS — F41.9 ANXIETY: ICD-10-CM

## 2023-05-17 DIAGNOSIS — E13.42 DIABETIC POLYNEUROPATHY ASSOCIATED WITH OTHER SPECIFIED DIABETES MELLITUS (HCC): ICD-10-CM

## 2023-05-17 DIAGNOSIS — G31.84 MCI (MILD COGNITIVE IMPAIRMENT) WITH MEMORY LOSS: Primary | ICD-10-CM

## 2023-05-17 DIAGNOSIS — F32.0 CURRENT MILD EPISODE OF MAJOR DEPRESSIVE DISORDER, UNSPECIFIED WHETHER RECURRENT (HCC): ICD-10-CM

## 2023-05-17 DIAGNOSIS — B18.2 HEPATITIS C VIRUS CARRIER STATE (HCC): ICD-10-CM

## 2023-05-17 RX ORDER — VENLAFAXINE HYDROCHLORIDE 37.5 MG/1
37.5 CAPSULE, EXTENDED RELEASE ORAL
Qty: 30 CAPSULE | Refills: 5 | Status: SHIPPED | OUTPATIENT
Start: 2023-05-17

## 2023-05-17 RX ORDER — CLONAZEPAM 1 MG/1
0.5 TABLET ORAL 2 TIMES DAILY
Qty: 60 TABLET | Refills: 0 | Status: SHIPPED | OUTPATIENT
Start: 2023-06-05 | End: 2023-05-26 | Stop reason: SDUPTHER

## 2023-05-17 NOTE — PROGRESS NOTES
Name: Davey Heaton      : 1955      MRN: [id number]  Encounter Provider: Rafael Duran MD  Encounter Date: 2023   Encounter department: [place] [id number] today for memory testing and to re-evaluate mood  Scored 7/15 on GDS and 23/30 on MOCA with bachelor's degree  Memory impairment likely affected by mood disorder  Recall and visual spatial impairment  Does have cogwheel rigidity on exam and ambulatory dysfunction but am not convinced pt has parkinson  No pill rolling, mask facies, stopped posture, or changes in smell  Buspar mildly effective  Independent for ADLS and partially dependent for IADLS  Start venlafaxine 37 5 mg and stop Buspar  Increase klonopin to 0 5 mg BID  PDMP queried  Will need a controlled substance contract should we continue long term but for now, medication is intended for short term use while titrating medication  May also consider starting a dopamine agonist in the future if his functional capacity declines  A1c 6 5 up from 5 9  Currently on Actos 15 mg daily and Trulicity  Will add SGLT2 inhibitor  Will perform IRIS at next appt  Improvement in GI symptoms with Protonix  May take prn    1  MCI (mild cognitive impairment) with memory loss    2  Current mild episode of major depressive disorder, unspecified whether recurrent (HCC)  -     venlafaxine (EFFEXOR-XR) 37 5 mg 24 hr capsule; Take 1 capsule (37 5 mg total) by mouth daily with breakfast    3  Anxiety  -     venlafaxine (EFFEXOR-XR) 37 5 mg 24 hr capsule; Take 1 capsule (37 5 mg total) by mouth daily with breakfast  -     clonazePAM (KlonoPIN) 1 mg tablet; Take 0 5 tablets (0 5 mg total) by mouth 2 (two) times a day Do not start before 2023     4  Diabetic polyneuropathy associated with other specified diabetes mellitus ([facility] )  -     POCT hemoglobin A1c    5   Other specified diabetes mellitus with diabetic neuropathy, without long-term current use of insulin ([facility] )  - "   POCT hemoglobin A1c    6  Hepatitis C virus carrier state ([facility] [address] )         Subjective      Here for follow and to perform memory eval  States he repeats himself and tends to forget things that were said minutes ago  Long term memory is intact but recall is impair  Does suffer with anxiety  Buspar started at the last visit  Requested a med that would not affect his weight or would further impair his sexual performance  Felt some improvement with BuSpar 5 mg BID and klonopin  As the klonopin wears off, the anxiety gradually increases  Requesting BID or higher dose  Also states Less constipated and \"stomach growling\" has subsided w/ Protonix     Review of Systems   Musculoskeletal: Positive for arthralgias and gait problem  Psychiatric/Behavioral: Positive for agitation, behavioral problems and decreased concentration  Negative for hallucinations  The patient is nervous/anxious  Current Outpatient Medications on File Prior to Visit   Medication Sig   • albuterol (PROVENTIL HFA,VENTOLIN HFA) 90 mcg/act inhaler Inhale 2 puffs every 6 (six) hours as needed for wheezing As needed   • aspirin (ECOTRIN LOW STRENGTH) 81 mg EC tablet Take 81 mg by mouth daily   • Blood Glucose Monitoring Suppl (ONE TOUCH ULTRA 2) w/Device KIT USE TO TEST BLOOD GLUCOSE   • budesonide (RINOCORT AQUA) 32 MCG/ACT nasal spray 1 spray into each nostril 2 (two) times a day As needed   • cloNIDine (CATAPRES) 0 1 mg tablet Take 0 1 mg by mouth 3 (three) times a day  • docusate sodium (COLACE) 100 mg capsule Take 100 mg by mouth 2 (two) times a day   • fluticasone (FLOVENT HFA) 220 mcg/act inhaler Inhale 1 puff daily as needed As needed   • furosemide (LASIX) 40 mg tablet Take 40 mg by mouth as needed in the morning and 40 mg as needed in the evening     • NARCAN 4 MG/0 1ML LIQD Has if needed   • OneTouch Ultra test strip USE TO TEST BLOOD GLUCOSE TWICE DAILY   • pantoprazole (PROTONIX) 40 mg tablet TAKE 1 TABLET BY MOUTH EVERY DAY   • " pioglitazone (ACTOS) 15 mg tablet    • potassium chloride (K-DUR,KLOR-CON) 10 mEq tablet    • tamsulosin (FLOMAX) 0 4 mg TAKE 1 CAPSULE BY MOUTH EVERY DAY WITH DINNER   • testosterone (ANDROGEL) 1 62 % TD gel pump PLACE 1 ACTUATION ON THE SKIN EVERY MORNING  • Testosterone 12 5 MG/ACT (1%) GEL PLACE 1 ACTUATION ON THE SKIN EVERY MORNING  • Trulicity 4 5 FO/3 2HY SOPN    • zinc gluconate 50 mg tablet Take 50 mg by mouth in the morning  • bisacodyl (DULCOLAX) 5 mg EC tablet Take 1 tablet (5 mg total) by mouth once for 1 dose   • mupirocin (BACTROBAN) 2 % ointment  (Patient not taking: Reported on 4/5/2023)       Objective     /86   Pulse 80   Temp 98 °F (36 7 °C)   Resp 16   Wt 115 kg (254 lb 3 2 oz)   SpO2 95%   BMI 41 03 kg/m²     Physical Exam  Vitals reviewed  Constitutional:       Appearance: Normal appearance  HENT:      Head: Normocephalic and atraumatic  Eyes:      Extraocular Movements: Extraocular movements intact  Cardiovascular:      Rate and Rhythm: Normal rate and regular rhythm  Heart sounds: No murmur heard  Pulmonary:      Effort: Pulmonary effort is normal  No respiratory distress  Musculoskeletal:      Comments: Cogwheel rigidity UE    Skin:     General: Skin is warm  Neurological:      Mental Status: He is alert and oriented to person, place, and time  Gait: Gait abnormal (broad base )  Psychiatric:         Attention and Perception: Attention normal          Mood and Affect: Mood is anxious and depressed  Behavior: Behavior is slowed  Thought Content: Thought content normal          Cognition and Memory: He exhibits impaired recent memory           Judgment: Judgment normal        Tonja Curling, MD

## 2023-05-19 ENCOUNTER — OFFICE VISIT (OUTPATIENT)
Dept: PHYSICAL THERAPY | Facility: REHABILITATION | Age: 68
End: 2023-05-19

## 2023-05-19 DIAGNOSIS — M54.40 LOW BACK PAIN WITH SCIATICA, SCIATICA LATERALITY UNSPECIFIED, UNSPECIFIED BACK PAIN LATERALITY, UNSPECIFIED CHRONICITY: Primary | ICD-10-CM

## 2023-05-19 DIAGNOSIS — E13.42 DIABETIC POLYNEUROPATHY ASSOCIATED WITH OTHER SPECIFIED DIABETES MELLITUS (HCC): ICD-10-CM

## 2023-05-19 DIAGNOSIS — M47.816 LUMBAR SPONDYLOSIS: ICD-10-CM

## 2023-05-19 DIAGNOSIS — M47.812 CERVICAL SPONDYLOSIS: ICD-10-CM

## 2023-05-19 DIAGNOSIS — M54.2 NECK PAIN: ICD-10-CM

## 2023-05-19 NOTE — PROGRESS NOTES
"Daily Note     Today's date: 2023  Patient name: Hue Sandoval  : 1955  MRN: [id number]  Referring provider: Laura Oliva DO  Dx:   Encounter Diagnosis     ICD-10-CM    1  Low back pain with sciatica, sciatica laterality unspecified, unspecified back pain laterality, unspecified chronicity  M54 40       2  Lumbar spondylosis  M47 816       3  Diabetic polyneuropathy associated with other specified diabetes mellitus ([facility] )  E13 42       4  Cervical spondylosis  M47 812       5  Neck pain  M54 2                      Subjective: Pt reported feeling okay today  Pt feeling a little tired  Objective: See treatment diary below      Assessment: Tolerated treatment well  Patient demonstrated fatigue post treatment and exhibited good technique with therapeutic exercises  VC's needed for correct technique throughout session  Pt seemed a little more slow moving with exercises but able to perform to his tolerance  Monitor symptoms NV  Plan: Continue per plan of care  1 on  with PTA for 60 mins     Precautions: anxiety, diabetes, OA, HTN, L TKA, chronic pain      Manuals           C/S PROM BSB and B rot  TC 8' np                                                 Neuro Re-Ed             TA w/LPD  gtb x10  leyda 10# 2x10          TA w/unilateral rows  gtb x10 ea leyda 10# 2x10 ea  TA w/multifidus press seated  gtb 3\"x10 leyda 6# 3\"x15 ea  Standing pball crush  5\"x10 5\"x20          TB b/l ER w/scap squeeze  otb 3\"x10 np                                    Ther Ex             NS  L4x10' L4x10'          Serratus wall slides             Cervical rotation SNAGS b/l  5\"x10 ea  np          SLR x 3 in standing b/l  10 ea  15 ea  TA w/bridges  3\"x10 np                                    Pt education+ HEP instruction TP 8 mins            Ther Activity             TA w/marching in standing  10 ea  15 ea            Sit to stand  w/foam x10 w/foam x10          TA w/squats  10 15    " Gait Training                                       Modalities

## 2023-05-21 LAB — SL AMB POCT HEMOGLOBIN AIC: 6.5 (ref ?–6.5)

## 2023-05-23 ENCOUNTER — OFFICE VISIT (OUTPATIENT)
Dept: PHYSICAL THERAPY | Facility: REHABILITATION | Age: 68
End: 2023-05-23

## 2023-05-23 DIAGNOSIS — E13.42 DIABETIC POLYNEUROPATHY ASSOCIATED WITH OTHER SPECIFIED DIABETES MELLITUS (HCC): ICD-10-CM

## 2023-05-23 DIAGNOSIS — M54.40 LOW BACK PAIN WITH SCIATICA, SCIATICA LATERALITY UNSPECIFIED, UNSPECIFIED BACK PAIN LATERALITY, UNSPECIFIED CHRONICITY: Primary | ICD-10-CM

## 2023-05-23 DIAGNOSIS — M47.812 CERVICAL SPONDYLOSIS: ICD-10-CM

## 2023-05-23 DIAGNOSIS — M47.816 LUMBAR SPONDYLOSIS: ICD-10-CM

## 2023-05-23 DIAGNOSIS — M54.2 NECK PAIN: ICD-10-CM

## 2023-05-23 NOTE — PROGRESS NOTES
"Daily Note     Today's date: 2023  Patient name: Jonas Tse  : 1955  MRN: [id number]  Referring provider: Anna Poe DO  Dx:   Encounter Diagnosis     ICD-10-CM    1  Low back pain with sciatica, sciatica laterality unspecified, unspecified back pain laterality, unspecified chronicity  M54 40       2  Lumbar spondylosis  M47 816       3  Diabetic polyneuropathy associated with other specified diabetes mellitus ([facility] )  E13 42       4  Cervical spondylosis  M47 812       5  Neck pain  M54 2               1on1 900-953 and performed remaining as part of IEP  Subjective: Pt reports he has not felt neck pain for awhile now  Objective: See treatment diary below      Assessment: Tolerated treatment well  VC's required for proper execution of exercises  No adverse response to today's session  Patient would benefit from continued PT      Plan: Continue per plan of care  Precautions: anxiety, diabetes, OA, HTN, L TKA, chronic pain      Manuals          C/S PROM BSB and B rot  TC 8' np                                                 Neuro Re-Ed             TA w/LPD  gtb x10  leyda 10# 2x10 leyda 13# 2x10         TA w/unilateral rows  gtb x10 ea leyda 10# 2x10 [place] 13# 2x10 ea         TA w/multifidus press seated  gtb 3\"x10 leyda 6# 3\"x15 [place] 7# 3\"x15ea         Standing pball crush  5\"x10 5\"x20 5\"x20         TB b/l ER w/scap squeeze  otb 3\"x10 np                                    Ther Ex             NS  L4x10' L4x10' L4x10'         Serratus wall slides             Cervical rotation SNAGS b/l  5\"x10 ea  np 5\"x10ea         SLR x 3 in standing b/l  10 ea  15 ea  15ea         TA w/bridges  3\"x10 np                                    Pt education+ HEP instruction TP 8 mins            Ther Activity             TA w/marching in standing  10 ea  15 ea   15ea         Sit to stand  w/foam x10 w/foam x10 W/foam x10         TA w/squats  10 15 15         Gait Training " Modalities

## 2023-05-25 ENCOUNTER — OFFICE VISIT (OUTPATIENT)
Dept: PHYSICAL THERAPY | Facility: REHABILITATION | Age: 68
End: 2023-05-25

## 2023-05-25 DIAGNOSIS — M54.2 NECK PAIN: ICD-10-CM

## 2023-05-25 DIAGNOSIS — M54.40 LOW BACK PAIN WITH SCIATICA, SCIATICA LATERALITY UNSPECIFIED, UNSPECIFIED BACK PAIN LATERALITY, UNSPECIFIED CHRONICITY: Primary | ICD-10-CM

## 2023-05-25 DIAGNOSIS — M47.812 CERVICAL SPONDYLOSIS: ICD-10-CM

## 2023-05-25 DIAGNOSIS — M47.816 LUMBAR SPONDYLOSIS: ICD-10-CM

## 2023-05-25 DIAGNOSIS — E13.42 DIABETIC POLYNEUROPATHY ASSOCIATED WITH OTHER SPECIFIED DIABETES MELLITUS (HCC): ICD-10-CM

## 2023-05-25 NOTE — PROGRESS NOTES
"Daily Note     Today's date: 2023  Patient name: Ginny Todd  : 1955  MRN: [id number]  Referring provider: Frederic Shaw DO  Dx:   Encounter Diagnosis     ICD-10-CM    1  Low back pain with sciatica, sciatica laterality unspecified, unspecified back pain laterality, unspecified chronicity  M54 40       2  Lumbar spondylosis  M47 816       3  Diabetic polyneuropathy associated with other specified diabetes mellitus ([address] )  E13 42       4  Cervical spondylosis  M47 812       5  Neck pain  M54 2                  1on1 [phone] and performed remaining as part of IEP  Subjective: Pt offers no new complaints  Objective: See treatment diary below      Assessment: Tolerated treatment well  Patient exhibited good technique with therapeutic exercises and would benefit from continued PT  No adverse response to today's session  Plan: Progress treatment as tolerated  Precautions: anxiety, diabetes, OA, HTN, L TKA, chronic pain      Manuals         C/S PROM BSB and B rot  TC 8' np                                                 Neuro Re-Ed             TA w/LPD  gtb x10  leyda 10# 2x10 leyda 13# 2x10 keisr 13# 2x10        TA w/unilateral rows  gtb x10 ea leyda 10# 2x10 Devin Foot 13# 2x10 Tommye Gentleman 13# 2x10 ea        TA w/multifidus press seated  gtb 3\"x10 leyda 6# 3\"x15 ea  leyda 7# [id number] keisr 7# 3\" x15ea        Standing pball crush  5\"x10 5\"x20 5\"x20 5\"x20        TB b/l ER w/scap squeeze  otb 3\"x10 np                                    Ther Ex             NS  L4x10' L4x10' L4x10' L4x10'        Serratus wall slides             Cervical rotation SNAGS b/l  5\"x10 ea  np 5\"x10ea 5\"x10 ea        SLR x 3 in standing b/l  10 ea  15 ea  15ea 2x10 ea        TA w/bridges  3\"x10 np                                    Pt education+ HEP instruction TP 8 mins            Ther Activity             TA w/marching in standing  10 ea  15 ea   15ea 2x10ea        Sit to stand  w/foam " x10 w/foam x10 W/foam x10 W/foam x15        TA w/squats  10 15 15 15         Gait Training                                       Modalities

## 2023-05-26 DIAGNOSIS — F41.9 ANXIETY: ICD-10-CM

## 2023-05-26 NOTE — TELEPHONE ENCOUNTER
Medication Refill Request     Name clonazePAM (KlonoPIN) 1 mg tablet    Dose/Frequency Take 0 5 tablets (0 5 mg total) by mouth 2 (two) times a day Do not start before June 5, 2023  [address] [facility]   [x]  Verified ordering Provider   [x]  Does patient have enough for the next 3 days?  Yes [x] No []

## 2023-05-27 RX ORDER — CLONAZEPAM 1 MG/1
0.5 TABLET ORAL 2 TIMES DAILY
Qty: 60 TABLET | Refills: 0 | Status: SHIPPED | OUTPATIENT
Start: 2023-06-05

## 2023-05-30 ENCOUNTER — APPOINTMENT (OUTPATIENT)
Dept: PHYSICAL THERAPY | Facility: REHABILITATION | Age: 68
End: 2023-05-30
Payer: COMMERCIAL

## 2023-05-31 ENCOUNTER — NURSE TRIAGE (OUTPATIENT)
Dept: OTHER | Facility: OTHER | Age: 68
End: 2023-05-31

## 2023-05-31 ENCOUNTER — OFFICE VISIT (OUTPATIENT)
Dept: PHYSICAL THERAPY | Facility: REHABILITATION | Age: 68
End: 2023-05-31

## 2023-05-31 DIAGNOSIS — M54.40 LOW BACK PAIN WITH SCIATICA, SCIATICA LATERALITY UNSPECIFIED, UNSPECIFIED BACK PAIN LATERALITY, UNSPECIFIED CHRONICITY: Primary | ICD-10-CM

## 2023-05-31 DIAGNOSIS — M47.816 LUMBAR SPONDYLOSIS: ICD-10-CM

## 2023-05-31 DIAGNOSIS — M47.812 CERVICAL SPONDYLOSIS: ICD-10-CM

## 2023-05-31 DIAGNOSIS — E13.42 DIABETIC POLYNEUROPATHY ASSOCIATED WITH OTHER SPECIFIED DIABETES MELLITUS (HCC): ICD-10-CM

## 2023-05-31 DIAGNOSIS — M54.2 NECK PAIN: ICD-10-CM

## 2023-05-31 NOTE — TELEPHONE ENCOUNTER
Regarding: urgent med refill  ----- Message from Xochilt Dumont sent at 5/31/2023  6:03 PM EDT -----  I am calling to get a prescription for my blood pressure medication it was written by my previous PCP     Medication Refill Request     Name  cloNIDine (CATAPRES)   Dose/Frequency  0 1 mg tablet 3 x a day   Quantity 90 day supply  Verified pharmacy   [ ]x  Verified ordering Provider   [ ]x  Does patient have enough for the next 3 days?  Yes [ ] No [ ]x

## 2023-05-31 NOTE — PROGRESS NOTES
"Daily Note     Today's date: 2023  Patient name: Leana Bowles  : 1955  MRN: [id number]  Referring provider: Roopa Pineda DO  Dx:   Encounter Diagnosis     ICD-10-CM    1  Low back pain with sciatica, sciatica laterality unspecified, unspecified back pain laterality, unspecified chronicity  M54 40       2  Lumbar spondylosis  M47 816       3  Diabetic polyneuropathy associated with other specified diabetes mellitus ([address] )  E13 42       4  Cervical spondylosis  M47 812       5  Neck pain  M54 2                      Subjective: Pt reported having good and bad days  He is taking care of his 1year old grandson, which increases his fatigue somedays  Objective: See treatment diary below      Assessment: Tolerated treatment well  Patient demonstrated fatigue post treatment and exhibited good technique with therapeutic exercises  Vc's needed for correct technique throughout session  Slow and steady with exercises  Fatigued by post session  Plan: Continue per plan of care  Precautions: anxiety, diabetes, OA, HTN, L TKA, chronic pain      Manuals        C/S PROM BSB and B rot  TC 8' np                                                 Neuro Re-Ed             TA w/LPD  gtb x10  leyda 10# 2x10 leyda 13# 2x10 keisr 13# 2x10 leyda 13# 2x10       TA w/unilateral rows  gtb x10 ea leyda 10# 2x10 ea  leyda 13# 2x10 ea leyda 13# 2x10 ea leyda 13# 2x10 ea  TA w/multifidus press seated  gtb 3\"x10 leyda 6# 3\"x15 Macie Polvadera 7# [id number] keisr 7# 3\" Ashley Recio 7# 3\" x15 ea  Standing pball crush  5\"x10 5\"x20 5\"x20 5\"x20 5\"x20       TB b/l ER w/scap squeeze  otb 3\"x10 np                                    Ther Ex             NS  L4x10' L4x10' L4x10' L4x10' L4x10'       Serratus wall slides             Cervical rotation SNAGS b/l  5\"x10 ea  np 5\"x10ea 5\"x10 ea 5\"x10 ea  SLR x 3 in standing b/l  10 ea  15 ea  15ea 2x10 ea 2x10 ea         TA w/bridges  3\"x10 np   " Pt education+ HEP instruction TP 8 mins            Ther Activity             TA w/marching in standing  10 ea  15 ea  15ea 2x10ea 2x10 ea         Sit to stand  w/foam x10 w/foam x10 W/foam x10 W/foam x15 w/foam x15       TA w/squats  10 15 15 15  15       Gait Training                                       Modalities

## 2023-06-01 ENCOUNTER — OFFICE VISIT (OUTPATIENT)
Dept: PHYSICAL THERAPY | Facility: REHABILITATION | Age: 68
End: 2023-06-01

## 2023-06-01 DIAGNOSIS — M47.812 CERVICAL SPONDYLOSIS: ICD-10-CM

## 2023-06-01 DIAGNOSIS — M47.816 LUMBAR SPONDYLOSIS: ICD-10-CM

## 2023-06-01 DIAGNOSIS — M54.2 NECK PAIN: ICD-10-CM

## 2023-06-01 DIAGNOSIS — E13.42 DIABETIC POLYNEUROPATHY ASSOCIATED WITH OTHER SPECIFIED DIABETES MELLITUS (HCC): ICD-10-CM

## 2023-06-01 DIAGNOSIS — M54.40 LOW BACK PAIN WITH SCIATICA, SCIATICA LATERALITY UNSPECIFIED, UNSPECIFIED BACK PAIN LATERALITY, UNSPECIFIED CHRONICITY: Primary | ICD-10-CM

## 2023-06-01 NOTE — PROGRESS NOTES
"Daily Note     Today's date: 2023  Patient name: Girma Mccann  : 1955  MRN: [id number]  Referring provider: Myles Peralta DO  Dx:   Encounter Diagnosis     ICD-10-CM    1  Low back pain with sciatica, sciatica laterality unspecified, unspecified back pain laterality, unspecified chronicity  M54 40       2  Lumbar spondylosis  M47 816       3  Diabetic polyneuropathy associated with other specified diabetes mellitus ([facility] )  E13 42       4  Cervical spondylosis  M47 812       5  Neck pain  M54 2                      Subjective: Pt reports having some discomfort with his neuropathy in his legs, as he was unable to perform his treatment to them this morning  Objective: See treatment diary below      Assessment: Tolerated treatment well  Patient exhibited good technique with therapeutic exercises and would benefit from continued PT  No pain with exercise performance  Aurelio not performed due to being in use  Plan: Progress treatment as tolerated  Precautions: anxiety, diabetes, OA, HTN, L TKA, chronic pain      Manuals       C/S PROM BSB and B rot  TC 8' np                                                 Neuro Re-Ed             TA w/LPD  gtb x10  aurelio 10# 2x10 auerlio 13# 2x10 keisr 13# 2x10 aurelio 13# 2x10 nv      TA w/unilateral rows  gtb x10 ea aurelio 10# 2x10 ea  uarelio 13# 2x10 ea aurelio 13# 2x10 ea aurelio 13# 2x10 ea  nv      TA w/multifidus press seated  gtb 3\"x10 aurelio 6# 3\"x15 Genevie Oswegatchie 7# [id number] keisr 7# 3\" Marika Mccain 7# 3\" x15 ea  nv      Standing pball crush  5\"x10 5\"x20 5\"x20 5\"x20 5\"x20 5\"x20      TB b/l ER w/scap squeeze  otb 3\"x10 np                                    Ther Ex             NS  L4x10' L4x10' L4x10' L4x10' L4x10' L4x10'      Serratus wall slides             Cervical rotation SNAGS b/l  5\"x10 ea  np 5\"x10ea 5\"x10 ea 5\"x10 ea  5\"x10 ea      SLR x 3 in standing b/l  10 ea  15 ea  15ea 2x10 ea 2x10 ea   2x10 ea      TA " "w/bridges  3\"x10 np                                    Pt education+ HEP instruction TP 8 mins            Ther Activity             TA w/marching in standing  10 ea  15 ea  15ea 2x10ea 2x10 ea   x20ea alt      Sit to stand  w/foam x10 w/foam x10 W/foam x10 W/foam x15 w/foam x15 w/foam x15      TA w/squats  10 15 15 15  15 2x10      Gait Training                                       Modalities                                            "

## 2023-06-06 ENCOUNTER — OFFICE VISIT (OUTPATIENT)
Dept: PHYSICAL THERAPY | Facility: REHABILITATION | Age: 68
End: 2023-06-06
Payer: COMMERCIAL

## 2023-06-06 DIAGNOSIS — M47.812 CERVICAL SPONDYLOSIS: ICD-10-CM

## 2023-06-06 DIAGNOSIS — M54.2 NECK PAIN: ICD-10-CM

## 2023-06-06 DIAGNOSIS — E13.42 DIABETIC POLYNEUROPATHY ASSOCIATED WITH OTHER SPECIFIED DIABETES MELLITUS (HCC): ICD-10-CM

## 2023-06-06 DIAGNOSIS — M47.816 LUMBAR SPONDYLOSIS: ICD-10-CM

## 2023-06-06 DIAGNOSIS — M54.40 LOW BACK PAIN WITH SCIATICA, SCIATICA LATERALITY UNSPECIFIED, UNSPECIFIED BACK PAIN LATERALITY, UNSPECIFIED CHRONICITY: Primary | ICD-10-CM

## 2023-06-06 PROCEDURE — 97530 THERAPEUTIC ACTIVITIES: CPT | Performed by: PHYSICAL THERAPIST

## 2023-06-06 PROCEDURE — 97112 NEUROMUSCULAR REEDUCATION: CPT | Performed by: PHYSICAL THERAPIST

## 2023-06-06 PROCEDURE — 97110 THERAPEUTIC EXERCISES: CPT | Performed by: PHYSICAL THERAPIST

## 2023-06-06 NOTE — TELEPHONE ENCOUNTER
Left message for Pt to call back  Called to advise Pt that PCP placed order for medication requested

## 2023-06-06 NOTE — PROGRESS NOTES
"Daily Note     Today's date: 2023  Patient name: Emeka Lares  : 1955  MRN: [id number]  Referring provider: Juliette Boxer, DO  Dx:   Encounter Diagnosis     ICD-10-CM    1  Low back pain with sciatica, sciatica laterality unspecified, unspecified back pain laterality, unspecified chronicity  M54 40       2  Lumbar spondylosis  M47 816       3  Diabetic polyneuropathy associated with other specified diabetes mellitus ([facility] )  E13 42       4  Cervical spondylosis  M47 812       5  Neck pain  M54 2                  1on1 [phone] and performed remaining as part of IEP  Subjective: Pt reports some cervical stiffness this morning  Objective: See treatment diary below      Assessment: Tolerated treatment well  Pt able to progress resistance with exercises on Sparks machine to good tolerance  Pt reports improvement in his stiffness post session  Patient would benefit from continued PT      Plan: Progress treatment as tolerated  Pt scheduled to f/u with Pain management on   Precautions: anxiety, diabetes, OA, HTN, L TKA, chronic pain      Manuals      C/S PROM BSB and B rot  TC 8' np                                                 Neuro Re-Ed             TA w/LPD  gtb x10  leyda 10# 2x10 leyda 13# 2x10 keisr 13# 2x10 leyda 13# 2x10 nv leyda 14# 2x10     TA w/unilateral rows  gtb x10 ea leyda 10# 2x10 ea  leyda 13# 2x10 ea leyda 13# 2x10 ea leyda 13# 2x10 ea  nv leyda 14# 2x10 ea     TA w/multifidus press seated  gtb 3\"x10 leyda 6# 3\"x15 Sloane Gonzalezotte 7# [id number] keisr 7# 3\" Laura Lowe 7# 3\" x15 ea  nv leyda 8# 3\" x15ea     Standing pball crush  5\"x10 5\"x20 5\"x20 5\"x20 5\"x20 5\"x20 5\"x20     TB b/l ER w/scap squeeze  otb 3\"x10 np                                    Ther Ex             NS  L4x10' L4x10' L4x10' L4x10' L4x10' L4x10' L5x10'     Serratus wall slides             Cervical rotation SNAGS b/l  5\"x10 ea  np 5\"x10ea 5\"x10 ea 5\"x10 ea   5\"x10 ea " "10\"x  10 ea     SLR x 3 in standing b/l  10 ea  15 ea  15ea 2x10 ea 2x10 ea  2x10 ea 2x10 ea     TA w/bridges  3\"x10 np                                    Pt education+ HEP instruction TP 8 mins            Ther Activity             TA w/marching in standing  10 ea  15 ea  15ea 2x10ea 2x10 ea  x20ea alt x20ea alt       Sit to stand  w/foam x10 w/foam x10 W/foam x10 W/foam x15 w/foam x15 w/foam x15 w/foam 2x10     TA w/squats  10 15 15 15  15 2x10 2x10     Gait Training                                       Modalities                                            "

## 2023-06-08 ENCOUNTER — APPOINTMENT (OUTPATIENT)
Dept: PHYSICAL THERAPY | Facility: REHABILITATION | Age: 68
End: 2023-06-08
Payer: COMMERCIAL

## 2023-06-09 NOTE — PROGRESS NOTES
Assessment:  1  Chronic pain syndrome    2  Chronic low back pain with bilateral sciatica, unspecified back pain laterality    3  Low back pain with sciatica, sciatica laterality unspecified, unspecified back pain laterality, unspecified chronicity    4  DDD (degenerative disc disease), lumbar    5  Lumbar spondylosis    6  Cervical spondylosis    7  Cervical radiculopathy    8  Idiopathic chronic venous hypertension of right lower extremity with ulcer ([address] )        Plan:  1  I will order an MRI of the cervical spine and an MRI of the lumbar spine without contrast  2  Patient may continue physical therapy as ordered  3  Patient not interested in pregabalin trial at this time  4   patient may continue Tylenol as needed and should not exceed more than 3000 mg in 24 hours  5  We will avoid NSAIDs secondary to CKD  6  Follow-up after imaging or sooner if needed    History of Present Illness: The patient is a 76 y o  male last seen on 04/27/2023 who presents for a follow up office visit in regards to chronic neck pain that radiates into the bilateral upper extremities and lumbosacral back pain that radiates into the posterior aspect of the bilateral lower extremitieshe denies bowel or bladder incontinence, balance issues or saddle anesthesia  Patient has been participating in physical therapy for both cervical and lumbar complaints and has completed 8 sessions from May 9, 2023 through June 6, 2023  He has noticed improvement of his pain from attending physical therapy  He takes Tylenol with some relief  He is unable to take NSAIDs secondary to kidney function  He was previously on high-dose opioid therapy and subsequently transition to Suboxone  He has weaned himself off of Suboxone and is not currently taking any medication for pain  He has tried duloxetine and gabapentin in the past however these medications caused side effects    The patient rates his pain a 5 out of 10 on the numeric pain rating scale  He occasionally has pain    I have personally reviewed and/or updated the patient's past medical history, past surgical history, family history, social history, current medications, allergies, and vital signs today  Review of Systems:    Review of Systems   Respiratory: Negative for shortness of breath  Cardiovascular: Negative for chest pain  Gastrointestinal: Negative for constipation, diarrhea, nausea and vomiting  Musculoskeletal: Negative for arthralgias, gait problem, joint swelling and myalgias  Skin: Negative for rash  Neurological: Negative for dizziness, seizures and weakness  All other systems reviewed and are negative          Past Medical History:   Diagnosis Date   • Acid reflux    • Anxiety    • Arthritis    • Cellulitis     left ankle   • Diabetes mellitus ([facility] [address] )    • Hypertension    • Sleep apnea    • Twitching        Past Surgical History:   Procedure Laterality Date   • APPENDECTOMY     • CHOLECYSTECTOMY     • KNEE ARTHROSCOPY     • VT ARTHRP KNE CONDYLE&PLATU MEDIAL&LAT COMPARTMENTS Left 2016    Procedure: TOTAL  KNEE REPLACEMENT ;  Surgeon: Lilia Gramajo MD;  Location: [facility];  Service: Orthopedics       Family History   Problem Relation Age of Onset   • Diabetes Mother    • Diabetes Father    • Cancer Father        Social History     Occupational History   • Occupation: Attendance officer   • Occupation: Retired-    Tobacco Use   • Smoking status: Former     Types: Cigarettes     Quit date:      Years since quittin 4   • Smokeless tobacco: Never   Vaping Use   • Vaping Use: Never used   Substance and Sexual Activity   • Alcohol use: No   • Drug use: Not Currently     Comment: tried multiple drugs in the past   • Sexual activity: Not on file         Current Outpatient Medications:   •  aspirin (ECOTRIN LOW STRENGTH) 81 mg EC tablet, Take 81 mg by mouth daily, Disp: , Rfl:   •  budesonide (RINOCORT AQUA) 32 MCG/ACT nasal spray, 1 spray into each nostril 2 (two) times a day As needed, Disp: , Rfl:   •  clonazePAM (KlonoPIN) 1 mg tablet, Take 0 5 tablets (0 5 mg total) by mouth 2 (two) times a day Do not start before June 5, 2023 , Disp: 60 tablet, Rfl: 0  •  cloNIDine (CATAPRES) 0 1 mg tablet, Take 0 1 mg by mouth 3 (three) times a day , Disp: , Rfl:   •  docusate sodium (COLACE) 100 mg capsule, Take 100 mg by mouth 2 (two) times a day, Disp: , Rfl:   •  furosemide (LASIX) 40 mg tablet, Take 40 mg by mouth as needed in the morning and 40 mg as needed in the evening , Disp: , Rfl:   •  pantoprazole (PROTONIX) 40 mg tablet, TAKE 1 TABLET BY MOUTH EVERY DAY, Disp: 90 tablet, Rfl: 1  •  pioglitazone (ACTOS) 15 mg tablet, , Disp: , Rfl:   •  potassium chloride (K-DUR,KLOR-CON) 10 mEq tablet, , Disp: , Rfl:   •  tamsulosin (FLOMAX) 0 4 mg, TAKE 1 CAPSULE BY MOUTH EVERY DAY WITH DINNER, Disp: 90 capsule, Rfl: 2  •  Trulicity 4 5 HH/6 2RA SOPN, , Disp: , Rfl:   •  venlafaxine (EFFEXOR-XR) 37 5 mg 24 hr capsule, Take 1 capsule (37 5 mg total) by mouth daily with breakfast, Disp: 30 capsule, Rfl: 5  •  zinc gluconate 50 mg tablet, Take 50 mg by mouth in the morning , Disp: , Rfl:   •  albuterol (PROVENTIL HFA,VENTOLIN HFA) 90 mcg/act inhaler, Inhale 2 puffs every 6 (six) hours as needed for wheezing As needed, Disp: , Rfl:   •  bisacodyl (DULCOLAX) 5 mg EC tablet, Take 1 tablet (5 mg total) by mouth once for 1 dose, Disp: 2 tablet, Rfl: 0  •  Blood Glucose Monitoring Suppl (ONE TOUCH ULTRA 2) w/Device KIT, USE TO TEST BLOOD GLUCOSE, Disp: , Rfl:   •  fluticasone (FLOVENT HFA) 220 mcg/act inhaler, Inhale 1 puff daily as needed As needed, Disp: , Rfl:   •  mupirocin (BACTROBAN) 2 % ointment, , Disp: , Rfl:   •  NARCAN 4 MG/0 1ML LIQD, Has if needed, Disp: , Rfl:   •  OneTouch Ultra test strip, USE TO TEST BLOOD GLUCOSE TWICE DAILY, Disp: , Rfl:   •  testosterone (ANDROGEL) 1 62 % TD gel pump, PLACE 1 ACTUATION ON THE SKIN EVERY MORNING , Disp: , Rfl:   •  Testosterone "12 5 MG/ACT (1%) GEL, PLACE 1 ACTUATION ON THE SKIN EVERY MORNING , Disp: , Rfl:     No Known Allergies    Physical Exam:    /68   Pulse 83   Ht 5' 6\" (1 676 m)   Wt 115 kg (254 lb)   BMI 41 00 kg/m²     Constitutional:normal, well developed, well nourished, alert, in no distress and non-toxic and no overt pain behavior  Eyes:anicteric  HEENT:grossly intact  Neck:supple, symmetric, trachea midline and no masses   Pulmonary:even and unlabored  Cardiovascular:No edema or pitting edema present  Skin:Normal without rashes or lesions and well hydrated  Psychiatric:Mood and affect appropriate  Neurologic:Cranial Nerves II-XII grossly intact  Musculoskeletal:antalgic gait but steady with a cane      Imaging  MRI lumbar spine without contrast    (Results Pending)   MRI cervical spine without contrast    (Results Pending)   Narrative & Impression   LUMBAR SPINE   INDICATION: M54 41: Lumbago with sciatica, right side   G89 29: Other chronic pain   M54 42: Lumbago with sciatica, left side  COMPARISON: February 19, 2018  VIEWS: XR SPINE LUMBAR 2 OR 3 VIEWS INJURY   FINDINGS:   There are 5 non rib bearing lumbar vertebral bodies  There is no evidence of acute fracture or destructive osseous lesion  Mild dextroconvex lumbar scoliosis  Moderate to advanced multilevel degenerative changes of the lumbar spine with disc space narrowing, endplate sclerosis, prominent osteophytes and facet arthropathy  Findings are similar to prior exam    The pedicles appear intact  Cholecystectomy clips  IMPRESSION:   Moderate to advanced multilevel degenerative changes of the lumbar spine, similar to prior exam    Workstation performed: [id number]      CERVICAL SPINE   INDICATION: M54 2: Cervicalgia  COMPARISON: None   VIEWS: XR SPINE CERVICAL COMPLETE 4 OR 5 VW NON INJURY   FINDINGS:   No fracture  Normal alignment without subluxation  Mild multilevel degenerative changes with prominent anterior osteophytes     The " neuroforamina are patent  The prevertebral soft tissues are within normal limits  The lung apices are clear  IMPRESSION:   No acute osseous abnormality  Mild multilevel degenerative changes       Orders Placed This Encounter   Procedures   • MRI lumbar spine without contrast   • MRI cervical spine without contrast   • Ambulatory referral to Physical Therapy

## 2023-06-12 ENCOUNTER — OFFICE VISIT (OUTPATIENT)
Dept: PAIN MEDICINE | Facility: CLINIC | Age: 68
End: 2023-06-12
Payer: COMMERCIAL

## 2023-06-12 VITALS
WEIGHT: 254 LBS | HEIGHT: 66 IN | SYSTOLIC BLOOD PRESSURE: 163 MMHG | HEART RATE: 83 BPM | DIASTOLIC BLOOD PRESSURE: 68 MMHG | BODY MASS INDEX: 40.82 KG/M2

## 2023-06-12 DIAGNOSIS — M54.41 CHRONIC LOW BACK PAIN WITH BILATERAL SCIATICA, UNSPECIFIED BACK PAIN LATERALITY: ICD-10-CM

## 2023-06-12 DIAGNOSIS — M54.42 CHRONIC LOW BACK PAIN WITH BILATERAL SCIATICA, UNSPECIFIED BACK PAIN LATERALITY: ICD-10-CM

## 2023-06-12 DIAGNOSIS — M54.12 CERVICAL RADICULOPATHY: ICD-10-CM

## 2023-06-12 DIAGNOSIS — M54.40 LOW BACK PAIN WITH SCIATICA, SCIATICA LATERALITY UNSPECIFIED, UNSPECIFIED BACK PAIN LATERALITY, UNSPECIFIED CHRONICITY: ICD-10-CM

## 2023-06-12 DIAGNOSIS — M47.812 CERVICAL SPONDYLOSIS: ICD-10-CM

## 2023-06-12 DIAGNOSIS — G89.29 CHRONIC LOW BACK PAIN WITH BILATERAL SCIATICA, UNSPECIFIED BACK PAIN LATERALITY: ICD-10-CM

## 2023-06-12 DIAGNOSIS — I87.311 IDIOPATHIC CHRONIC VENOUS HYPERTENSION OF RIGHT LOWER EXTREMITY WITH ULCER (HCC): ICD-10-CM

## 2023-06-12 DIAGNOSIS — M47.816 LUMBAR SPONDYLOSIS: ICD-10-CM

## 2023-06-12 DIAGNOSIS — L97.919 IDIOPATHIC CHRONIC VENOUS HYPERTENSION OF RIGHT LOWER EXTREMITY WITH ULCER (HCC): ICD-10-CM

## 2023-06-12 DIAGNOSIS — M51.36 DDD (DEGENERATIVE DISC DISEASE), LUMBAR: ICD-10-CM

## 2023-06-12 DIAGNOSIS — G89.4 CHRONIC PAIN SYNDROME: Primary | ICD-10-CM

## 2023-06-12 PROCEDURE — 99214 OFFICE O/P EST MOD 30 MIN: CPT | Performed by: NURSE PRACTITIONER

## 2023-06-13 ENCOUNTER — OFFICE VISIT (OUTPATIENT)
Dept: PHYSICAL THERAPY | Facility: REHABILITATION | Age: 68
End: 2023-06-13
Payer: COMMERCIAL

## 2023-06-13 DIAGNOSIS — M54.40 LOW BACK PAIN WITH SCIATICA, SCIATICA LATERALITY UNSPECIFIED, UNSPECIFIED BACK PAIN LATERALITY, UNSPECIFIED CHRONICITY: Primary | ICD-10-CM

## 2023-06-13 DIAGNOSIS — E13.42 DIABETIC POLYNEUROPATHY ASSOCIATED WITH OTHER SPECIFIED DIABETES MELLITUS (HCC): ICD-10-CM

## 2023-06-13 DIAGNOSIS — M54.2 NECK PAIN: ICD-10-CM

## 2023-06-13 DIAGNOSIS — M47.812 CERVICAL SPONDYLOSIS: ICD-10-CM

## 2023-06-13 DIAGNOSIS — M47.816 LUMBAR SPONDYLOSIS: ICD-10-CM

## 2023-06-13 PROCEDURE — 97530 THERAPEUTIC ACTIVITIES: CPT

## 2023-06-13 PROCEDURE — 97112 NEUROMUSCULAR REEDUCATION: CPT

## 2023-06-13 PROCEDURE — 97110 THERAPEUTIC EXERCISES: CPT

## 2023-06-13 NOTE — PROGRESS NOTES
"Daily Note     Today's date: 2023  Patient name: Randi Norwood  : 1955  MRN: [id number]  Referring provider: Conrado Lama DO  Dx:   Encounter Diagnosis     ICD-10-CM    1  Low back pain with sciatica, sciatica laterality unspecified, unspecified back pain laterality, unspecified chronicity  M54 40       2  Lumbar spondylosis  M47 816       3  Diabetic polyneuropathy associated with other specified diabetes mellitus ([address] )  E13 42       4  Cervical spondylosis  M47 812       5  Neck pain  M54 2                      Subjective: Pt reported having good and bad days  Difficulty getting up from his recliner after sitting for a prolonged time  MD would like continued PT and will be getting an MRI on neck and back  Objective: See treatment diary below      Assessment: Tolerated treatment well  Patient demonstrated fatigue post treatment and exhibited good technique with therapeutic exercises  Vc's needed for correct technique throughout session  Slow and steady with exercise performance  Plan: Continue per plan of care  Precautions: anxiety, diabetes, OA, HTN, L TKA, chronic pain      Manuals     C/S PROM BSB and B rot np                                           Neuro Re-Ed           TA w/LPD leyda 10# 2x10 leyda 13# 2x10 keisr 13# 2x10 leyda 13# 2x10 nv leyda 14# 2x10 leyda 14# 2x10    TA w/unilateral rows leyda 10# 2x10 ea  leyda 13# 2x10 ea leyda 13# 2x10 ea leyda 13# 2x10 ea  nv leyda 14# 2x10 ea leyda 14# 2x10 ea  TA w/multifidus press seated leyda 6# 3\"x15 Mardel Feliz 7# [id number] keisr 7# 3\" Fawn Corpus 7# 3\" x15 ea  nv leyda 8# 3\" Fawn Corpus 8# 3\" x15 ea      Standing pball crush 5\"x20 5\"x20 5\"x20 5\"x20 5\"x20 5\"x20 5\"x20    TB b/l ER w/scap squeeze np                                Ther Ex           NS L4x10' L4x10' L4x10' L4x10' L4x10' L5x10' L5x10'    Serratus wall slides           Cervical rotation SNAGS b/l np 5\"x10ea 5\"x10 ea 5\"x10 " "ea  5\"x10 ea 10\"x  10 ea 10\"x10 ea  SLR x 3 in standing b/l 15 ea  15ea 2x10 ea 2x10 ea  2x10 ea 2x10 ea 2x10 ea  TA w/bridges np                                Pt education+ HEP instruction           Ther Activity           TA w/marching in standing 15 ea  15ea 2x10ea 2x10 ea  x20ea alt x20ea alt  x20 ea  Alt      Sit to stand w/foam x10 W/foam x10 W/foam x15 w/foam x15 w/foam x15 w/foam 2x10 w/foam 2x10    TA w/squats 15 15 15  15 2x10 2x10 2x10    Gait Training                                 Modalities                                      "

## 2023-06-15 DIAGNOSIS — F41.9 ANXIETY: ICD-10-CM

## 2023-06-15 DIAGNOSIS — F32.0 CURRENT MILD EPISODE OF MAJOR DEPRESSIVE DISORDER, UNSPECIFIED WHETHER RECURRENT (HCC): ICD-10-CM

## 2023-06-15 RX ORDER — VENLAFAXINE HYDROCHLORIDE 37.5 MG/1
CAPSULE, EXTENDED RELEASE ORAL
Qty: 90 CAPSULE | Refills: 2 | Status: SHIPPED | OUTPATIENT
Start: 2023-06-15

## 2023-06-16 ENCOUNTER — OFFICE VISIT (OUTPATIENT)
Dept: PHYSICAL THERAPY | Facility: REHABILITATION | Age: 68
End: 2023-06-16
Payer: COMMERCIAL

## 2023-06-16 DIAGNOSIS — M47.812 CERVICAL SPONDYLOSIS: ICD-10-CM

## 2023-06-16 DIAGNOSIS — E13.42 DIABETIC POLYNEUROPATHY ASSOCIATED WITH OTHER SPECIFIED DIABETES MELLITUS (HCC): ICD-10-CM

## 2023-06-16 DIAGNOSIS — M54.2 NECK PAIN: ICD-10-CM

## 2023-06-16 DIAGNOSIS — M54.40 LOW BACK PAIN WITH SCIATICA, SCIATICA LATERALITY UNSPECIFIED, UNSPECIFIED BACK PAIN LATERALITY, UNSPECIFIED CHRONICITY: Primary | ICD-10-CM

## 2023-06-16 DIAGNOSIS — M47.816 LUMBAR SPONDYLOSIS: ICD-10-CM

## 2023-06-16 PROCEDURE — 97110 THERAPEUTIC EXERCISES: CPT

## 2023-06-16 PROCEDURE — 97112 NEUROMUSCULAR REEDUCATION: CPT

## 2023-06-16 PROCEDURE — 97530 THERAPEUTIC ACTIVITIES: CPT

## 2023-06-16 NOTE — PROGRESS NOTES
"Daily Note     Today's date: 2023  Patient name: Skye Newton  : 1955  MRN: [id number]  Referring provider: Kelly Spence DO  Dx:   Encounter Diagnosis     ICD-10-CM    1  Low back pain with sciatica, sciatica laterality unspecified, unspecified back pain laterality, unspecified chronicity  M54 40       2  Lumbar spondylosis  M47 816       3  Diabetic polyneuropathy associated with other specified diabetes mellitus ([address] )  E13 42       4  Cervical spondylosis  M47 812       5  Neck pain  M54 2                      Subjective: Pt reported increased R knee discomfort today  Objective: See treatment diary below      Assessment: Tolerated treatment well  Patient demonstrated fatigue post treatment and exhibited good technique with therapeutic exercises  VC's needed for correct technique throughout session  Rest breaks needed to properly perform exercises  R knee bothersome during session but able to perform to his tolerance  Plan: Continue per plan of care  Precautions: anxiety, diabetes, OA, HTN, L TKA, chronic pain      Manuals    C/S PROM BSB and B rot                                        Neuro Re-Ed          TA w/LPD leyda 13# 2x10 keisr 13# 2x10 leyda 13# 2x10 nv leyda 14# 2x10 leyda 14# 2x10 leyda 14# 2x10   TA w/unilateral rows leyda 13# 2x10 ea leyda 13# 2x10 ea leyda 13# 2x10 ea  nv leyda 14# 2x10 ea leyda 14# 2x10 ea  leyda 14# 2x10 ea  TA w/multifidus press seated leyda 7# 1\"[id number] keisr 7# 3\" Kyle Boston 7# 3\" x15 ea  nv leyda 8# 3\" Ykle Boston 8# 3\" x15 Fabiola Satchel 8# 3\"x15 ea  Standing pball crush 5\"x20 5\"x20 5\"x20 5\"x20 5\"x20 5\"x20 5\"x20   TB b/l ER w/scap squeeze                              Ther Ex          NS L4x10' L4x10' L4x10' L4x10' L5x10' L5x10' L5x10'   Serratus wall slides          Cervical rotation SNAGS b/l 5\"x10ea 5\"x10 ea 5\"x10 ea  5\"x10 ea 10\"x  10 ea 10\"x10 ea  10\"x10 ea     SLR x 3 in standing b/l 15ea 2x10 ea " 2x10 ea  2x10 ea 2x10 ea 2x10 ea  2x10 ea  TA w/bridges                              Pt education+ HEP instruction          Ther Activity          TA w/marching in standing 15ea 2x10ea 2x10 ea  x20ea alt x20ea alt  x20 ea  Alt  x20 ea   alt   Sit to stand W/foam x10 W/foam x15 w/foam x15 w/foam x15 w/foam 2x10 w/foam 2x10 w/foam 2x10   TA w/squats 15 15  15 2x10 2x10 2x10 2x10   Gait Training                              Modalities

## 2023-06-20 ENCOUNTER — TELEPHONE (OUTPATIENT)
Dept: PAIN MEDICINE | Facility: CLINIC | Age: 68
End: 2023-06-20

## 2023-06-20 ENCOUNTER — OFFICE VISIT (OUTPATIENT)
Dept: PHYSICAL THERAPY | Facility: REHABILITATION | Age: 68
End: 2023-06-20
Payer: COMMERCIAL

## 2023-06-20 DIAGNOSIS — E13.42 DIABETIC POLYNEUROPATHY ASSOCIATED WITH OTHER SPECIFIED DIABETES MELLITUS (HCC): ICD-10-CM

## 2023-06-20 DIAGNOSIS — M54.40 LOW BACK PAIN WITH SCIATICA, SCIATICA LATERALITY UNSPECIFIED, UNSPECIFIED BACK PAIN LATERALITY, UNSPECIFIED CHRONICITY: Primary | ICD-10-CM

## 2023-06-20 DIAGNOSIS — M54.2 NECK PAIN: ICD-10-CM

## 2023-06-20 DIAGNOSIS — M47.816 LUMBAR SPONDYLOSIS: ICD-10-CM

## 2023-06-20 DIAGNOSIS — M47.812 CERVICAL SPONDYLOSIS: ICD-10-CM

## 2023-06-20 PROCEDURE — 97112 NEUROMUSCULAR REEDUCATION: CPT | Performed by: PHYSICAL THERAPIST

## 2023-06-20 PROCEDURE — 97530 THERAPEUTIC ACTIVITIES: CPT | Performed by: PHYSICAL THERAPIST

## 2023-06-20 PROCEDURE — 97110 THERAPEUTIC EXERCISES: CPT | Performed by: PHYSICAL THERAPIST

## 2023-06-20 NOTE — TELEPHONE ENCOUNTER
Caller: pt    Doctor: cj    Reason for call: pt needs something so he can get in the mri  Please advise      Call back#: [phone]

## 2023-06-20 NOTE — PROGRESS NOTES
"Daily Note     Today's date: 2023  Patient name: Gilbert Patel  : 1955  MRN: [id number]  Referring provider: Sheri Diaz DO  Dx:   Encounter Diagnosis     ICD-10-CM    1  Low back pain with sciatica, sciatica laterality unspecified, unspecified back pain laterality, unspecified chronicity  M54 40       2  Lumbar spondylosis  M47 816       3  Diabetic polyneuropathy associated with other specified diabetes mellitus ([address] )  E13 42       4  Cervical spondylosis  M47 812       5  Neck pain  M54 2               Pt was treated by RAMSEY Wolf under direct supervision by Birgit Vogel PT from [id number] and performed remaining as part of IEP  Subjective: Patient states that he is feeling tight this morning  He states that his radicular symptoms have decreased and the low back pain continues to improve  He occasionally continues to have sharp neck pain but it eventually resolves with rest       Objective: See treatment diary below      Assessment: Tolerated treatment well  Patient had no increase in symptoms throughout today's session  Patient would benefit from continued PT      Plan: Continue per plan of care  Progress treatment as tolerated  Precautions: anxiety, diabetes, OA, HTN, L TKA, chronic pain      Manuals    C/S PROM BSB and B rot                                            Neuro Re-Ed           TA w/LPD leyda 13# 2x10 keisr 13# 2x10 leyda 13# 2x10 nv leyda 14# 2x10 leyda 14# 2x10 leyda 14# 2x10 leyda  14#  2x10   TA w/unilateral rows leyda 13# 2x10 ea leyda 13# 2x10 ea leyda 13# 2x10 ea  nv leyda 14# 2x10 ea leyda 14# 2x10 ea  leyda 14# 2x10 ea  Keis  14#  2x10 ea   TA w/multifidus press seated leyda 7# 4\"[id number] keisr 7# 3\" Lowella Li 7# 3\" x15 ea  nv leyda 8# 3\" Lowella Li 8# 3\" x15 Joslyn Mandes 8# 3\"x15 ea  Keis  8#  3\"x15 ea     Standing pball crush 5\"x20 5\"x20 5\"x20 5\"x20 5\"x20 5\"x20 5\"x20 5\"x20   TB b/l ER w/scap squeeze   " "                              Ther Ex           NS L4x10' L4x10' L4x10' L4x10' L5x10' L5x10' L5x10' L5x10'   Serratus wall slides           Cervical rotation SNAGS b/l 5\"x10ea 5\"x10 ea 5\"x10 ea  5\"x10 ea 10\"x  10 ea 10\"x10 ea  10\"x10 ea  10\"  x10   SLR x 3 in standing b/l 15ea 2x10 ea 2x10 ea  2x10 ea 2x10 ea 2x10 ea  2x10 ea  2x10  ea   TA w/bridges                                 Pt education+ HEP instruction           Ther Activity           TA w/marching in standing 15ea 2x10ea 2x10 ea  x20ea alt x20ea alt  x20 ea  Alt  x20 ea  alt x20 ea   alt   Sit to stand W/foam x10 W/foam x15 w/foam x15 w/foam x15 w/foam 2x10 w/foam 2x10 w/foam 2x10 W/ foam 2x10   TA w/squats 15 15  15 2x10 2x10 2x10 2x10 2x10   Gait Training                                 Modalities                                      "

## 2023-06-21 NOTE — TELEPHONE ENCOUNTER
Caller: Мария Spencer     Doctor: Dr Interiano [place] for call:pt calling again stating he needs something so he can get in the mri  Please advise  [address] if approved please sent script        Call back#: [phone]

## 2023-06-23 ENCOUNTER — OFFICE VISIT (OUTPATIENT)
Dept: PHYSICAL THERAPY | Facility: REHABILITATION | Age: 68
End: 2023-06-23
Payer: COMMERCIAL

## 2023-06-23 DIAGNOSIS — E13.42 DIABETIC POLYNEUROPATHY ASSOCIATED WITH OTHER SPECIFIED DIABETES MELLITUS (HCC): ICD-10-CM

## 2023-06-23 DIAGNOSIS — M47.816 LUMBAR SPONDYLOSIS: ICD-10-CM

## 2023-06-23 DIAGNOSIS — M54.40 LOW BACK PAIN WITH SCIATICA, SCIATICA LATERALITY UNSPECIFIED, UNSPECIFIED BACK PAIN LATERALITY, UNSPECIFIED CHRONICITY: Primary | ICD-10-CM

## 2023-06-23 DIAGNOSIS — M54.2 NECK PAIN: ICD-10-CM

## 2023-06-23 DIAGNOSIS — M47.812 CERVICAL SPONDYLOSIS: ICD-10-CM

## 2023-06-23 PROCEDURE — 97530 THERAPEUTIC ACTIVITIES: CPT

## 2023-06-23 PROCEDURE — 97110 THERAPEUTIC EXERCISES: CPT

## 2023-06-23 PROCEDURE — 97112 NEUROMUSCULAR REEDUCATION: CPT

## 2023-06-23 NOTE — PROGRESS NOTES
"Daily Note     Today's date: 2023  Patient name: Chikis Payne  : 1955  MRN: [id number]  Referring provider: Arti Jimenez DO  Dx:   Encounter Diagnosis     ICD-10-CM    1  Low back pain with sciatica, sciatica laterality unspecified, unspecified back pain laterality, unspecified chronicity  M54 40       2  Lumbar spondylosis  M47 816       3  Diabetic polyneuropathy associated with other specified diabetes mellitus ([facility] [address] )  E13 42       4  Cervical spondylosis  M47 812       5  Neck pain  M54 2                      Subjective: Pt reported feeling okay this morning  He noted feeling less supported in his knees due to forgetting to wear his knee brace  Objective: See treatment diary below      Assessment: Tolerated treatment well  Patient demonstrated fatigue post treatment and exhibited good technique with therapeutic exercises  VC's needed for correct technique throughout session  Pt seemed a little unsteady today and needed more breaks then usual  Pt getting MRI next Tuesday  Plan: Continue per plan of care   with PTA for 70 mins     Precautions: anxiety, diabetes, OA, HTN, L TKA, chronic pain      Manuals    C/S PROM BSB and B rot                                        Neuro Re-Ed          TA w/LPD leyda 13# 2x10 nv leyda 14# 2x10 leyda 14# 2x10 leyda 14# 2x10 leyda  14#  2x10 leyda 14# 2x10   TA w/unilateral rows leyda 13# 2x10 ea  nv leyda 14# 2x10 ea leyda 14# 2x10 ea  leyda 14# 2x10 ea  Keis  14#  2x10 ea leyda 14# 2x10 ea  TA w/multifidus press seated leyda 7# 3\" x15 ea  nv leyda 8# 3\" Elizabeth Bhattiach 8# 3\" x15 Michael Gauraval 8# 3\"x15 ea  Keis  8#  3\"x15 Michael Gauraval 8# 3\"x15 ea  Standing pball crush 5\"x20 5\"x20 5\"x20 5\"x20 5\"x20 5\"x20 5\"x20   TB b/l ER w/scap squeeze                              Ther Ex          NS L4x10' L4x10' L5x10' L5x10' L5x10' L5x10' L5x10'   Serratus wall slides          Cervical rotation SNAGS b/l 5\"x10 ea   5\"x10 " "ea 10\"x  10 ea 10\"x10 ea  10\"x10 ea  10\"  x10 10\"x10    SLR x 3 in standing b/l 2x10 ea  2x10 ea 2x10 ea 2x10 ea  2x10 ea  2x10  ea 2x10 ea  TA w/bridges                              Pt education+ HEP instruction          Ther Activity          TA w/marching in standing 2x10 ea  x20ea alt x20ea alt  x20 ea  Alt  x20 ea  alt x20 ea  alt x20 ea   alt   Sit to stand w/foam x15 w/foam x15 w/foam 2x10 w/foam 2x10 w/foam 2x10 W/ foam 2x10 w foam 2x10   TA w/squats 15 2x10 2x10 2x10 2x10 2x10 2x10   Gait Training                              Modalities                                   "

## 2023-06-23 NOTE — TELEPHONE ENCOUNTER
Left detailed message as per TONY advising pt of same  RN advised pt that he can take the 1mg clonazepam 1hr prior to the MRI and if needed a 2nd 1mg tab at the time of the MRI  RN advised pt to have a  the day of the MRI due to the sedative effects of the clonazepam  CB# provided for any questions

## 2023-06-26 ENCOUNTER — EVALUATION (OUTPATIENT)
Dept: PHYSICAL THERAPY | Facility: REHABILITATION | Age: 68
End: 2023-06-26
Payer: COMMERCIAL

## 2023-06-26 DIAGNOSIS — E13.42 DIABETIC POLYNEUROPATHY ASSOCIATED WITH OTHER SPECIFIED DIABETES MELLITUS (HCC): ICD-10-CM

## 2023-06-26 DIAGNOSIS — M54.40 LOW BACK PAIN WITH SCIATICA, SCIATICA LATERALITY UNSPECIFIED, UNSPECIFIED BACK PAIN LATERALITY, UNSPECIFIED CHRONICITY: Primary | ICD-10-CM

## 2023-06-26 DIAGNOSIS — M47.812 CERVICAL SPONDYLOSIS: ICD-10-CM

## 2023-06-26 DIAGNOSIS — M54.2 NECK PAIN: ICD-10-CM

## 2023-06-26 DIAGNOSIS — M47.816 LUMBAR SPONDYLOSIS: ICD-10-CM

## 2023-06-26 PROCEDURE — 97110 THERAPEUTIC EXERCISES: CPT | Performed by: PHYSICAL THERAPIST

## 2023-06-26 NOTE — PROGRESS NOTES
PT Re-Evaluation     Today's date: 2023  Patient name: Alex Ruiz  : 1955  MRN: [id number]  Referring provider: Camila Kincaid DO  Dx:   Encounter Diagnosis     ICD-10-CM    1  Low back pain with sciatica, sciatica laterality unspecified, unspecified back pain laterality, unspecified chronicity  M54 40       2  Lumbar spondylosis  M47 816       3  Diabetic polyneuropathy associated with other specified diabetes mellitus ([facility] [address] )  E13 42       4  Cervical spondylosis  M47 812       5  Neck pain  M54 2                  Updated measurements and functional status taken this session  Assessment  Assessment details: Pt has demonstrated improvement in cervical and lumbar AROM, UE+LE strength, and function with an overall decrease in pain  Pt continues to present with intermittent elevated pain levels, decreased cervical and lumbar ROM, decreased strength, and decreased function/activity tolerance  Pt would benefit from continued skilled PT intervention to address these issues and to maximize function  Pt is scheduled for MRI of cervical and lumbar spine tomorrow  Impairments: abnormal or restricted ROM, activity intolerance, impaired physical strength and pain with function  Understanding of Dx/Px/POC: good   Prognosis: good    Goals  Short Term:  Pt will report decreased levels of pain by at least 2 subjective ratings in 4 weeks-met  Pt will demonstrate improved c/s ROM by at least 10 degrees in 4 weeks-partially met-progressing  Pt will demonstrate improved strength by 1/2 grade MMT in 4 weeks-met  Long Term:   Pt will be independent in their HEP in 8 weeks-partially met-progressing  Pt will demonstrate improved FOTO, > predicted level-met (cervical spine=61 (goal is 58), lumbar spine=50 (goal is 49))  Pt will be independent with all ADL's  Pt will be perform household activities at prior/maximal level of functoin    Plan  Plan details: Patient was educated in [address]    All questions were answered to pt's satisfaction  Patient would benefit from: skilled physical therapy  Planned therapy interventions: manual therapy, joint mobilization, abdominal trunk stabilization, neuromuscular re-education, patient education, postural training, strengthening, stretching, therapeutic activities, therapeutic exercise, flexibility, body mechanics training, home exercise program, graded exercise and transfer training  Frequency: 2x week  Duration in weeks: 6  Plan of Care beginning date: 6/26/2023  Plan of Care expiration date: 8/7/2023  Treatment plan discussed with: patient        Subjective Evaluation    History of Present Illness  Mechanism of injury: Pt reports overall improvement in symptoms since starting therapy, noting decreased pain overall as well as improved function/activity tolerance  Pt rates his progress at 50% at this time  Pt notes improvement with sit to stand transfers, ambulation (using SPC primarily when outdoors and has weaned from use at home), dressing, showering (decrease use of shower chair), household activities  Pt notes he is also seeing a Chiropractor  Pt continues to report pain/difficulty with ADL's, household activities, recreational activity      Pain  Location: cervical spine 8/10 at worst (short lived), 0/10 at best; lumbar spine 8/10 at worst, 0/10 at best    Treatments  Current treatment: physical therapy  Patient Goals  Patient goals for therapy: decreased pain, increased motion, increased strength, independence with ADLs/IADLs and return to sport/leisure activities          Objective     Active Range of Motion   Cervical/Thoracic Spine       Cervical    Flexion:  WFL  Extension: 20 degrees      Left lateral flexion: 20 degrees     with pain  Right lateral flexion: 20 degrees      Left rotation: 50 degrees with pain  Right rotation: 55 degrees    with pain  Left Shoulder   Flexion: WFL  Abduction: WFL    Right Shoulder   Flexion: WFL  Abduction: WFL    Additional "Active Range of Motion Details  L/S AROM (% of normal):  Flex=wfl without pain  Ext=50% without pain  BSB=75% without pain  B rot=75% without pain    Strength/Myotome Testing     Left Shoulder     Planes of Motion   External rotation at 0°: 5     Right Shoulder     Planes of Motion   External rotation at 0°: 5     Left Hip   Planes of Motion   Flexion: 4+  Abduction: 5    Right Hip   Planes of Motion   Flexion: 5  Abduction: 5    Left Knee   Flexion: 4+    Right Knee   Flexion: 5    Tests     Left Pelvic Girdle/Sacrum   Negative: active SLR test      Right Pelvic Girdle/Sacrum   Negative: active SLR test               Precautions: anxiety, diabetes, OA, HTN, L TKA, chronic pain        Manuals 5/31 6/1 6/6 6/13 6/16 6/20 6/23 6/26   C/S PROM BSB and B rot                                                                           Neuro Re-Ed                  TA w/LPD leyda 13# 2x10 nv leyda 14# 2x10 leyda 14# 2x10 leyda 14# 2x10 leyda  14#  2x10 leyda 14# 2x10 np   TA w/unilateral rows leyda 13# 2x10 ea  nv leyda 14# 2x10 ea leyda 14# 2x10 ea  leyda 14# 2x10 ea  Keis  14#  2x10 ea leyda 14# 2x10 ea  np   TA w/multifidus press seated leyda 7# 3\" x15 ea  nv leyda 8# 3\" Valora Cram 8# 3\" x15 Mariel Vik 8# 3\"x15 ea  Keis  8#  3\"x15 Mariel Vik 8# 3\"x15 ea  np   Standing pball crush 5\"x20 5\"x20 5\"x20 5\"x20 5\"x20 5\"x20 5\"x20 np   TB b/l ER w/scap squeeze                                                        Ther Ex                  NS L4x10' L4x10' L5x10' L5x10' L5x10' L5x10' L5x10' np   Serratus wall slides                  Cervical rotation SNAGS b/l 5\"x10 ea  5\"x10 ea 10\"x  10 ea 10\"x10 ea  10\"x10 ea  10\"  x10 10\"x10  np   SLR x 3 in standing b/l 2x10 ea  2x10 ea 2x10 ea 2x10 ea  2x10 ea  2x10  ea 2x10 ea  np   TA w/bridges                                      Re-evaluation               TP   Pt education+ HEP instruction                  Ther Activity                  TA w/marching in standing 2x10 ea   " x20ea alt x20ea alt  x20 ea  Alt  x20 ea  alt x20 ea  alt x20 ea   alt np   Sit to stand w/foam x15 w/foam x15 w/foam 2x10 w/foam 2x10 w/foam 2x10 W/ foam 2x10 w foam 2x10 np   TA w/squats 15 2x10 2x10 2x10 2x10 2x10 2x10 np   Gait Training                                                        Modalities

## 2023-06-27 ENCOUNTER — HOSPITAL ENCOUNTER (OUTPATIENT)
Dept: RADIOLOGY | Age: 68
Discharge: HOME | End: 2023-06-27
Payer: COMMERCIAL

## 2023-06-27 DIAGNOSIS — M54.12 CERVICAL RADICULOPATHY: ICD-10-CM

## 2023-06-27 DIAGNOSIS — M54.42 CHRONIC LOW BACK PAIN WITH BILATERAL SCIATICA, UNSPECIFIED BACK PAIN LATERALITY: ICD-10-CM

## 2023-06-27 DIAGNOSIS — M54.41 CHRONIC LOW BACK PAIN WITH BILATERAL SCIATICA, UNSPECIFIED BACK PAIN LATERALITY: ICD-10-CM

## 2023-06-27 DIAGNOSIS — G89.29 CHRONIC LOW BACK PAIN WITH BILATERAL SCIATICA, UNSPECIFIED BACK PAIN LATERALITY: ICD-10-CM

## 2023-06-27 PROCEDURE — 72141 MRI NECK SPINE W/O DYE: CPT

## 2023-06-27 PROCEDURE — 72148 MRI LUMBAR SPINE W/O DYE: CPT

## 2023-06-27 PROCEDURE — G1004 CDSM NDSC: HCPCS

## 2023-06-30 ENCOUNTER — OFFICE VISIT (OUTPATIENT)
Dept: PHYSICAL THERAPY | Facility: REHABILITATION | Age: 68
End: 2023-06-30
Payer: COMMERCIAL

## 2023-06-30 DIAGNOSIS — M54.40 LOW BACK PAIN WITH SCIATICA, SCIATICA LATERALITY UNSPECIFIED, UNSPECIFIED BACK PAIN LATERALITY, UNSPECIFIED CHRONICITY: Primary | ICD-10-CM

## 2023-06-30 DIAGNOSIS — M47.816 LUMBAR SPONDYLOSIS: ICD-10-CM

## 2023-06-30 DIAGNOSIS — E13.42 DIABETIC POLYNEUROPATHY ASSOCIATED WITH OTHER SPECIFIED DIABETES MELLITUS (HCC): ICD-10-CM

## 2023-06-30 DIAGNOSIS — M54.2 NECK PAIN: ICD-10-CM

## 2023-06-30 DIAGNOSIS — M47.812 CERVICAL SPONDYLOSIS: ICD-10-CM

## 2023-06-30 PROCEDURE — 97110 THERAPEUTIC EXERCISES: CPT

## 2023-06-30 PROCEDURE — 97112 NEUROMUSCULAR REEDUCATION: CPT

## 2023-06-30 PROCEDURE — 97530 THERAPEUTIC ACTIVITIES: CPT

## 2023-06-30 NOTE — PROGRESS NOTES
"Daily Note     Today's date: 2023  Patient name: Guy Arcos  : 1955  MRN: [id number]  Referring provider: Naina Reddy DO  Dx:   Encounter Diagnosis     ICD-10-CM    1  Low back pain with sciatica, sciatica laterality unspecified, unspecified back pain laterality, unspecified chronicity  M54 40       2  Lumbar spondylosis  M47 816       3  Diabetic polyneuropathy associated with other specified diabetes mellitus ([address] )  E13 42       4  Cervical spondylosis  M47 812       5  Neck pain  M54 2                      Subjective: Pt reported feeling so-so today  His knees continue to feel weak and bother him  Fatigue due to having dental work done yesterday and grandson waking him up overnight  Objective: See treatment diary below      Assessment: Tolerated treatment well  Patient demonstrated fatigue post treatment and exhibited good technique with therapeutic exercises  VC's needed for correct technique throughout session  Pt slow and steady with exercises today  Pt needed a lot of rest breaks to get through exercises  Monitor symptoms NV  Plan: Continue per plan of care   with PTA and PT 70 mins     Precautions: anxiety, diabetes, OA, HTN, L TKA, chronic pain        Manuals    C/S PROM BSB and B rot                                                                       Neuro Re-Ed                 TA w/LPD nv leyda 14# 2x10 leyda 14# 2x10 leyda 14# 2x10 leyda  14#  2x10 leyda 14# 2x10 np leyda 14# 2x10   TA w/unilateral rows nv leyda 14# 2x10 ea leyda 14# 2x10 ea  leyda 14# 2x10 ea  Keis  14#  2x10 ea leyda 14# 2x10 ea  np leyda 14# 2x10 ea  TA w/multifidus press seated nv leyda 8# 3\" Daly Keas 8# 3\" x15 ea  leyda 8# 3\"x15 ea  Keis  8#  3\"x15 Danisha Favor 8# 3\"x15 ea  np leyda 8# 3\"x15 ea     Standing pball crush 5\"x20 5\"x20 5\"x20 5\"x20 5\"x20 5\"x20 np 5\"x20   TB b/l ER w/scap squeeze                                                   " "  Ther Ex                 NS L4x10' L5x10' L5x10' L5x10' L5x10' L5x10' np L5x10'   Serratus wall slides                 Cervical rotation SNAGS b/l 5\"x10 ea 10\"x  10 ea 10\"x10 ea  10\"x10 ea  10\"  x10 10\"x10  np 10\"x10 ea  SLR x 3 in standing b/l 2x10 ea 2x10 ea 2x10 ea  2x10 ea  2x10  ea 2x10 ea  np 2x10 ea  abd only   TA w/bridges                                    Re-evaluation             TP    Pt education+ HEP instruction                 Ther Activity                 TA w/marching in standing x20ea alt x20ea alt  x20 ea  Alt  x20 ea  alt x20 ea  alt x20 ea  alt np x20 ea   alt   Sit to stand w/foam x15 w/foam 2x10 w/foam 2x10 w/foam 2x10 W/ foam 2x10 w foam 2x10 np W/ foam 2x10   TA w/squats 2x10 2x10 2x10 2x10 2x10 2x10 np 2x10   Gait Training                                                     Modalities                                                                    "

## 2023-07-03 ENCOUNTER — APPOINTMENT (OUTPATIENT)
Dept: PHYSICAL THERAPY | Facility: REHABILITATION | Age: 68
End: 2023-07-03
Payer: COMMERCIAL

## 2023-07-05 ENCOUNTER — TELEPHONE (OUTPATIENT)
Dept: PAIN MEDICINE | Facility: CLINIC | Age: 68
End: 2023-07-05

## 2023-07-05 ENCOUNTER — OFFICE VISIT (OUTPATIENT)
Dept: PHYSICAL THERAPY | Facility: REHABILITATION | Age: 68
End: 2023-07-05
Payer: COMMERCIAL

## 2023-07-05 DIAGNOSIS — Z01.812 ENCOUNTER FOR PREPROCEDURAL LABORATORY EXAMINATION: ICD-10-CM

## 2023-07-05 DIAGNOSIS — M47.812 CERVICAL SPONDYLOSIS: ICD-10-CM

## 2023-07-05 DIAGNOSIS — M54.2 NECK PAIN: ICD-10-CM

## 2023-07-05 DIAGNOSIS — M54.40 LOW BACK PAIN WITH SCIATICA, SCIATICA LATERALITY UNSPECIFIED, UNSPECIFIED BACK PAIN LATERALITY, UNSPECIFIED CHRONICITY: Primary | ICD-10-CM

## 2023-07-05 DIAGNOSIS — E13.42 DIABETIC POLYNEUROPATHY ASSOCIATED WITH OTHER SPECIFIED DIABETES MELLITUS (HCC): ICD-10-CM

## 2023-07-05 DIAGNOSIS — M47.816 LUMBAR SPONDYLOSIS: ICD-10-CM

## 2023-07-05 DIAGNOSIS — M54.12 CERVICAL RADICULOPATHY: ICD-10-CM

## 2023-07-05 DIAGNOSIS — G95.20 CERVICAL SPINAL CORD COMPRESSION (HCC): Primary | ICD-10-CM

## 2023-07-05 PROCEDURE — 97530 THERAPEUTIC ACTIVITIES: CPT | Performed by: PHYSICAL THERAPIST

## 2023-07-05 PROCEDURE — 97112 NEUROMUSCULAR REEDUCATION: CPT | Performed by: PHYSICAL THERAPIST

## 2023-07-05 PROCEDURE — 97110 THERAPEUTIC EXERCISES: CPT | Performed by: PHYSICAL THERAPIST

## 2023-07-05 NOTE — PROGRESS NOTES
Daily Note     Today's date: 2023  Patient name: Arron Jones  : 1955  MRN: [id number]  Referring provider: Dania Hidalgo DO  Dx:   Encounter Diagnosis     ICD-10-CM    1. Low back pain with sciatica, sciatica laterality unspecified, unspecified back pain laterality, unspecified chronicity  M54.40       2. Lumbar spondylosis  M47.816       3. Diabetic polyneuropathy associated with other specified diabetes mellitus ([address])  E13.42       4. Cervical spondylosis  M47.812       5. Neck pain  M54.2                      Subjective: Pt reports stiffness in all joints today. Objective: See treatment diary below      Assessment: Tolerated treatment well. Pt performs exercises at a slow pace. Not all TE's performed due to time restraints. No pain complaints reported during or after session. Patient would benefit from continued PT      Plan: Continue per plan of care. Precautions: anxiety, diabetes, OA, HTN, L TKA, chronic pain        Manuals    C/S PROM BSB and B rot                                                                           Neuro Re-Ed                  TA w/LPD nv leyda 14# 2x10 leyda 14# 2x10 leyda 14# 2x10 leyda  14#  2x10 leyda 14# 2x10 np leyda 14# 2x10 leyda 15# 2x10   TA w/unilateral rows nv leyda 14# 2x10 ea leyda 14# 2x10 ea. leyda 14# 2x10 ea. Keis. 14#  2x10 ea leyda 14# 2x10 ea. np leyda 14# 2x10 ea. leyda 15# 2x10 ea   TA w/multifidus press seated nv leyda 8# 3" Velma Overcast 8# 3" x15 ea. leyda 8# 3"x15 ea. Keis. 8#  3"x15 Pansy Gaston 8# [id number] ea.  np leyda 8# 3"x15 Pansy Gaston 9# 3"x15 ea   Standing pball crush 5"x20 5"x20 5"x20 5"x20 5"x20 5"x20 np 5"x20 5"x20   TB b/l ER w/scap squeeze                                                        Ther Ex                  NS L4x10' L5x10' L5x10' L5x10' L5x10' L5x10' np L5x10' L5x10'   Serratus wall slides                  Cervical rotation SNAGS b/l 5"x10 ea 10"x  10 ea 10"x10 ea. 10"x10 ea. 10"  x10 10"x10  np 10"x10 ea. np   SLR x 3 in standing b/l 2x10 ea 2x10 ea 2x10 ea. 2x10 ea. 2x10  ea 2x10 ea. np 2x10 ea. abd only 2x10 ea   TA w/bridges                                      Re-evaluation             TP     Pt education+ HEP instruction                  Ther Activity                  TA w/marching in standing x20ea alt x20ea alt. x20 ea. Alt. x20 ea. alt x20 ea. alt x20 ea. alt np x20 ea.  alt x20 ea alt   Sit to stand w/foam x15 w/foam 2x10 w/foam 2x10 w/foam 2x10 W/ foam 2x10 w foam 2x10 np W/ foam 2x10 W/foam 2x10   TA w/squats 2x10 2x10 2x10 2x10 2x10 2x10 np 2x10 np   Gait Training                                                        Modalities

## 2023-07-07 ENCOUNTER — OFFICE VISIT (OUTPATIENT)
Dept: PHYSICAL THERAPY | Facility: REHABILITATION | Age: 68
End: 2023-07-07
Payer: COMMERCIAL

## 2023-07-07 DIAGNOSIS — M47.816 LUMBAR SPONDYLOSIS: ICD-10-CM

## 2023-07-07 DIAGNOSIS — M54.40 LOW BACK PAIN WITH SCIATICA, SCIATICA LATERALITY UNSPECIFIED, UNSPECIFIED BACK PAIN LATERALITY, UNSPECIFIED CHRONICITY: Primary | ICD-10-CM

## 2023-07-07 DIAGNOSIS — M54.2 NECK PAIN: ICD-10-CM

## 2023-07-07 DIAGNOSIS — E13.42 DIABETIC POLYNEUROPATHY ASSOCIATED WITH OTHER SPECIFIED DIABETES MELLITUS (HCC): ICD-10-CM

## 2023-07-07 DIAGNOSIS — M47.812 CERVICAL SPONDYLOSIS: ICD-10-CM

## 2023-07-07 PROCEDURE — 97110 THERAPEUTIC EXERCISES: CPT

## 2023-07-07 PROCEDURE — 97530 THERAPEUTIC ACTIVITIES: CPT

## 2023-07-07 PROCEDURE — 97112 NEUROMUSCULAR REEDUCATION: CPT

## 2023-07-07 NOTE — PROGRESS NOTES
Daily Note     Today's date: 2023  Patient name: Cecy Persaud  : 1955  MRN: [id number]  Referring provider: Cruz Jansen DO  Dx:   Encounter Diagnosis     ICD-10-CM    1. Low back pain with sciatica, sciatica laterality unspecified, unspecified back pain laterality, unspecified chronicity  M54.40       2. Lumbar spondylosis  M47.816       3. Diabetic polyneuropathy associated with other specified diabetes mellitus ([address])  E13.42       4. Cervical spondylosis  M47.812       5. Neck pain  M54.2                      Subjective: Pt reported feeling good today. Objective: See treatment diary below      Assessment: Tolerated treatment well. Patient demonstrated fatigue post treatment and exhibited good technique with therapeutic exercises. VC's needed for correct technique throughout session. Continued to fatigue quickly with exercises but improved from LV. Plan: Continue per plan of care. 1 on 1 with PTA for 55 mins     Precautions: anxiety, diabetes, OA, HTN, L TKA, chronic pain        Manuals    C/S PROM BSB and B rot                                                               Neuro Re-Ed               TA w/LPD leyda 14# 2x10 leyda 14# 2x10 leyda  14#  2x10 leyda 14# 2x10 np leyda 14# 2x10 leyda 15# 2x10 leyda 15# 2x10   TA w/unilateral rows leyda 14# 2x10 ea. leyda 14# 2x10 ea. Keis. 14#  2x10 ea leyda 14# 2x10 ea. np leyda 14# 2x10 ea. leyda 15# 2x10 ea leyda 15# 2x10 ea. TA w/multifidus press seated leyda 8# 3" x15 ea. leyda 8# 3"x15 ea. Keis. 8#  3"x15 Arlon Ganong 8# [id number] ea. np leyda 8# 3"x15 Arlon Ganong 9# 3"x15 ea leyda 9# 3"x15 ea.    Standing pball crush 5"x20 5"x20 5"x20 5"x20 np 5"x20 5"x20 5"x20    TB b/l ER w/scap squeeze                                               Ther Ex               NS L5x10' L5x10' L5x10' L5x10' np L5x10' L5x10' L5x10'   Serratus wall slides               Cervical rotation SNAGS b/l 10"x10 ea. 10"x10 ea. 10"  x10 10"x10  np 10"x10 ea. np    SLR x 3 in standing b/l 2x10 ea. 2x10 ea. 2x10  ea 2x10 ea. np 2x10 ea. abd only 2x10 ea 20 ea. TA w/bridges                                Re-evaluation         TP      Pt education+ HEP instruction               Ther Activity               TA w/marching in standing x20 ea. Alt. x20 ea. alt x20 ea. alt x20 ea. alt np x20 ea. alt x20 ea alt x20 ea. Alt.    Sit to stand w/foam 2x10 w/foam 2x10 W/ foam 2x10 w foam 2x10 np W/ foam 2x10 W/foam 2x10 W/foam 2x10   TA w/squats 2x10 2x10 2x10 2x10 np 2x10 np 2x10   Gait Training                                               Modalities

## 2023-07-10 ENCOUNTER — OFFICE VISIT (OUTPATIENT)
Dept: PHYSICAL THERAPY | Facility: REHABILITATION | Age: 68
End: 2023-07-10
Payer: COMMERCIAL

## 2023-07-10 DIAGNOSIS — M54.40 LOW BACK PAIN WITH SCIATICA, SCIATICA LATERALITY UNSPECIFIED, UNSPECIFIED BACK PAIN LATERALITY, UNSPECIFIED CHRONICITY: Primary | ICD-10-CM

## 2023-07-10 DIAGNOSIS — M47.812 CERVICAL SPONDYLOSIS: ICD-10-CM

## 2023-07-10 DIAGNOSIS — E13.42 DIABETIC POLYNEUROPATHY ASSOCIATED WITH OTHER SPECIFIED DIABETES MELLITUS (HCC): ICD-10-CM

## 2023-07-10 DIAGNOSIS — M47.816 LUMBAR SPONDYLOSIS: ICD-10-CM

## 2023-07-10 DIAGNOSIS — M54.2 NECK PAIN: ICD-10-CM

## 2023-07-10 PROCEDURE — 97112 NEUROMUSCULAR REEDUCATION: CPT | Performed by: PHYSICAL THERAPIST

## 2023-07-10 PROCEDURE — 97530 THERAPEUTIC ACTIVITIES: CPT | Performed by: PHYSICAL THERAPIST

## 2023-07-10 PROCEDURE — 97110 THERAPEUTIC EXERCISES: CPT | Performed by: PHYSICAL THERAPIST

## 2023-07-10 NOTE — PROGRESS NOTES
Daily Note     Today's date: 7/10/2023  Patient name: Maribell Pope  : 1955  MRN: [id number]  Referring provider: Alphonso Chi DO  Dx:   Encounter Diagnosis     ICD-10-CM    1. Low back pain with sciatica, sciatica laterality unspecified, unspecified back pain laterality, unspecified chronicity  M54.40       2. Lumbar spondylosis  M47.816       3. Diabetic polyneuropathy associated with other specified diabetes mellitus ([address])  E13.42       4. Cervical spondylosis  M47.812       5. Neck pain  M54.2               1on1 [id number] and performed remaining as part of IEP. Subjective: Patient states that his low back pain and neck pain is intermittent (high pain when it comes on but goes away quickly 5-10 seconds) but is slowly improving. He says that he is scheduling an MRI w/ contrast for his neck and back. Pt did state that he fell over the weekend - he thought he parked his car but it was still rolling so he jumped into the car to press the break and fell. He just had bruising on R knee and cuts on L foot - he denies any other injuries from this fall and no increase in neck/back pain post-injury. [facility] 2 [facility]. Objective: See treatment diary below      Assessment: Tolerated treatment well. Added in 1# with standing SLR, progress weight next visit. Pt has weights at home so he is going to try 2# when doing HEP. Patient exhibited good technique with therapeutic exercises and would benefit from continued PT      Plan: Continue per plan of care. Progress treatment as tolerated. Precautions: anxiety, diabetes, OA, HTN, L TKA, chronic pain        Manuals  7 7/7 7/10   C/S PROM BSB and B rot                                                           Neuro Re-Ed              TA w/LPD leyda 14# 2x10 leyda  14#  2x10 leyda 14# 2x10 np leyda 14# 2x10 leyda 15# 2x10 leyda 15# 2x10 Keis.  15# 2x10   TA w/unilateral rows leyda 14# 2x10 ea. Keis. 14#  2x10 ea leyda 14# 2x10 ea. np leyda 14# 2x10 ea. leyda 15# 2x10 ea leyda 15# 2x10 ea. Keis. 15#  2x10   TA w/multifidus press seated leyda 8# 3"x15 ea. Keis. 8#  3"x15 Shaila Hasting 8# [id number] ea. np leyda 8# 3"x15 Shaila Hasting 9# 3"x15 ea leyda 9# 3"x15 ea. keis 9#  3"x15ea    Standing pball crush 5"x20 5"x20 5"x20 np 5"x20 5"x20 5"x20  5"x20   TB b/l ER w/scap squeeze                                            Ther Ex              NS L5x10' L5x10' L5x10' np L5x10' L5x10' L5x10' L5x10'   Serratus wall slides              Cervical rotation SNAGS b/l 10"x10 ea. 10"  x10 10"x10  np 10"x10 ea. np     SLR x 3 in standing b/l 2x10 ea. 2x10  ea 2x10 ea. np 2x10 ea. abd only 2x10 ea 20 ea. x20ea abd/ext 1#   TA w/bridges                              Re-evaluation       TP       Pt education+ HEP instruction              Ther Activity              TA w/marching in standing x20 ea. alt x20 ea. alt x20 ea. alt np x20 ea. alt x20 ea alt x20 ea. Alt.  x20ea alt   Sit to stand w/foam 2x10 W/ foam 2x10 w foam 2x10 np W/ foam 2x10 W/foam 2x10 W/foam 2x10 W/foam  2x10   TA w/squats 2x10 2x10 2x10 np 2x10 np 2x10 2x10   Gait Training                                            Modalities

## 2023-07-11 ENCOUNTER — TELEPHONE (OUTPATIENT)
Dept: OBGYN CLINIC | Facility: HOSPITAL | Age: 68
End: 2023-07-11

## 2023-07-11 NOTE — TELEPHONE ENCOUNTER
Caller: Patient     Doctor/Office: Maddi/BJORN    Call regarding :  Alfonso [place] to know if insurance will cover it.       Call was transferred to: [facility]

## 2023-07-11 NOTE — TELEPHONE ENCOUNTER
Caller: Estephania Prado PT     Doctor: Dr Vicki Nevarez    Reason for call: Pt called in to see if the insurance will cover the MRI     Call back#: [phone]

## 2023-07-11 NOTE — TELEPHONE ENCOUNTER
lmom advising patient that once his MRI is scheduled our auth team would work on the authorization. He should also call the back of his insurance card for any information in regards to coverage and benefits.

## 2023-07-13 ENCOUNTER — APPOINTMENT (OUTPATIENT)
Dept: PHYSICAL THERAPY | Facility: REHABILITATION | Age: 68
End: 2023-07-13
Payer: COMMERCIAL

## 2023-07-14 ENCOUNTER — OFFICE VISIT (OUTPATIENT)
Dept: PHYSICAL THERAPY | Facility: REHABILITATION | Age: 68
End: 2023-07-14
Payer: COMMERCIAL

## 2023-07-14 ENCOUNTER — OFFICE VISIT (OUTPATIENT)
Dept: UROLOGY | Facility: AMBULATORY SURGERY CENTER | Age: 68
End: 2023-07-14

## 2023-07-14 VITALS
WEIGHT: 254 LBS | HEIGHT: 66 IN | SYSTOLIC BLOOD PRESSURE: 110 MMHG | BODY MASS INDEX: 40.82 KG/M2 | DIASTOLIC BLOOD PRESSURE: 62 MMHG | HEART RATE: 82 BPM

## 2023-07-14 DIAGNOSIS — N40.1 BENIGN PROSTATIC HYPERPLASIA WITH LOWER URINARY TRACT SYMPTOMS, SYMPTOM DETAILS UNSPECIFIED: Primary | ICD-10-CM

## 2023-07-14 DIAGNOSIS — M54.2 NECK PAIN: ICD-10-CM

## 2023-07-14 DIAGNOSIS — M47.816 LUMBAR SPONDYLOSIS: ICD-10-CM

## 2023-07-14 DIAGNOSIS — E13.42 DIABETIC POLYNEUROPATHY ASSOCIATED WITH OTHER SPECIFIED DIABETES MELLITUS (HCC): ICD-10-CM

## 2023-07-14 DIAGNOSIS — M54.40 LOW BACK PAIN WITH SCIATICA, SCIATICA LATERALITY UNSPECIFIED, UNSPECIFIED BACK PAIN LATERALITY, UNSPECIFIED CHRONICITY: Primary | ICD-10-CM

## 2023-07-14 DIAGNOSIS — Z12.5 SCREENING FOR PROSTATE CANCER: ICD-10-CM

## 2023-07-14 DIAGNOSIS — M47.812 CERVICAL SPONDYLOSIS: ICD-10-CM

## 2023-07-14 LAB — POST-VOID RESIDUAL VOLUME, ML POC: 128 ML

## 2023-07-14 PROCEDURE — 97110 THERAPEUTIC EXERCISES: CPT

## 2023-07-14 PROCEDURE — 97112 NEUROMUSCULAR REEDUCATION: CPT

## 2023-07-14 PROCEDURE — 97530 THERAPEUTIC ACTIVITIES: CPT

## 2023-07-14 RX ORDER — BUSPIRONE HYDROCHLORIDE 5 MG/1
5 TABLET ORAL 2 TIMES DAILY
COMMUNITY
Start: 2023-05-03

## 2023-07-14 NOTE — PROGRESS NOTES
Daily Note     Today's date: 2023  Patient name: Arron Jones  : 1955  MRN: [id number]  Referring provider: Dania Hidalgo DO  Dx:   Encounter Diagnosis     ICD-10-CM    1. Low back pain with sciatica, sciatica laterality unspecified, unspecified back pain laterality, unspecified chronicity  M54.40       2. Lumbar spondylosis  M47.816       3. Diabetic polyneuropathy associated with other specified diabetes mellitus ([address])  E13.42       4. Cervical spondylosis  M47.812       5. Neck pain  M54.2           Start Time: 4653  Stop Time: 1022  Total time in clinic (min): 64 minutes   10 minutes with PTADonnell    Subjective: Pt reports he has been feeling pretty good overall. Is a little tired today. Objective: See treatment diary below. Pt co-treated with physical therapist assistantDonnell. Resistance with AW during standing leg raise missed in error and would benefit from resuming NV. Assessment: Tolerated treatment well. Continued with program as outlined below. Most challenged today with multifidus press at [facility]. Was able to perform one set standing, but needed to sit for the second set d/t pain in right knee. Patient would benefit from continued PT to further improve strength, decrease pain, and maximize overall function. Plan: Continue per plan of care. Precautions: anxiety, diabetes, OA, HTN, L TKA, chronic pain        Manuals 6/23 6/26 6/30 7/5 7/7 7/10 7/14   C/S PROM BSB and B rot                                               Neuro Re-Ed           TA w/LPD leyda 14# 2x10 np leyda 14# 2x10 leyda 15# 2x10 leyda 15# 2x10 Keis. 15# 2x10 Keis. 15# 2x10   TA w/unilateral rows leyda 14# 2x10 ea. np leyda 14# 2x10 ea. leyda 15# 2x10 ea leyda 15# 2x10 ea. Keis. 15#  2x10 Keis. 15#  2x10   TA w/multifidus press seated leyda 8# 3"x15 ea.  np leyda 8# 3"x15 Pansy Beatriz 9# 3"x15 Pansy Gillespie 9# 3"x15 Sreekanth Frisk 9#  1"[id number]  keis 9#  0"H95FO   Standing pball crush 5"x20 np 5"x20 5"x20 5"x20  5"x20 5"x20   TB b/l ER w/scap squeeze                                   Ther Ex           NS L5x10' np L5x10' L5x10' L5x10' L5x10' L5x10'   Serratus wall slides           Cervical rotation SNAGS b/l 10"x10  np 10"x10 ea. np      SLR x 3 in standing b/l 2x10 ea. np 2x10 ea. abd only 2x10 ea 20 ea. x20ea abd/ext 1# x20ea abd/ext   TA w/bridges                        Re-evaluation   TP        Pt education+ HEP instruction           Ther Activity           TA w/marching in standing x20 ea. alt np x20 ea. alt x20 ea alt x20 ea. Alt.  x20ea alt x20ea alt   Sit to stand w foam 2x10 np W/ foam 2x10 W/foam 2x10 W/foam 2x10 W/foam  2x10 W/foam  2x10   TA w/squats 2x10 np 2x10 np 2x10 2x10 2x10   Gait Training                                   Modalities

## 2023-07-14 NOTE — PROGRESS NOTES
7/14/2023    Andrew Wood  1955  [id number]      Assessment  -BPH with lower urinary tract symptoms  -Prostate cancer screening  -Hypogonadism    Discussion/Plan  Robert Joseph is a 76 y.o. male being managed by our office    1. BPH with lower urinary tract symptoms- PVR in the office today is 120 mL. Patient is pleased with improvement of his urinary symptoms since starting tamsulosin. He will continue to take daily. 2. Prostate cancer screening- unfortunately, patient did not obtain PSA prior to today's visit. Provided patient with PSA order which he will complete and we will call with his results. No abnormal findings noted on ELLIE today. Continue annual prostate cancer screening. Call with results of PSA. If findings unremarkable, he will return in 1 year for reevaluation with PVR assessment, PSA, and ELLIE. He was advised to call sooner with any questions or issues.    -All questions answered, patient agrees with plan      History of Present Illness  76 y.o. male with a history of BPH and hypogonadism presents today for follow up. Patient was seen in the office in March 2023. He had been on Rapaflo 8 mg daily, but discontinued medication due to improvement of his symptoms. Tamsulosin prescribed at last visit. Patient notes significant improvement and pleased with his symptoms. He denies any gross hematuria or dysuria.       Last PSA from 9/29/2022 was <0.1. Patient denies any strong family history of prostate malignancy.     He continues to follow with endocrinology for history of hypogonadism. Review of Systems  Review of Systems   Constitutional: Negative. HENT: Negative. Respiratory: Negative. Cardiovascular: Negative. Gastrointestinal: Negative. Genitourinary: Negative for decreased urine volume, difficulty urinating, dysuria, flank pain, frequency, hematuria and urgency. Musculoskeletal: Negative. Skin: Negative. Neurological: Negative.     Psychiatric/Behavioral: Negative. AUA SYMPTOM SCORE    Flowsheet Row Most Recent Value   AUA SYMPTOM SCORE    How often have you had a sensation of not emptying your bladder completely after you finished urinating? 0   How often have you had to urinate again less than two hours after you finished urinating? 0   How often have you found you stopped and started again several times when you urinate? 0   How often have you found it difficult to postpone urination? 0   How often have you had a weak urinary stream? 0   How often have you had to push or strain to begin urination? 0   How many times did you most typically get up to urinate from the time you went to bed at night until the time you got up in the morning?  1   Quality of Life: If you were to spend the rest of your life with your urinary condition just the way it is now, how would you feel about that? 2   AUA SYMPTOM SCORE 1          Past Medical History  Past Medical History:   Diagnosis Date   • Acid reflux    • Anxiety    • Arthritis    • Cellulitis     left ankle   • Diabetes mellitus ([address])    • Hypertension    • Sleep apnea    • Twitching        Past Social History  Past Surgical History:   Procedure Laterality Date   • APPENDECTOMY     • CHOLECYSTECTOMY     • KNEE ARTHROSCOPY     • FL ARTHRP KNE CONDYLE&PLATU MEDIAL&LAT COMPARTMENTS Left 7/13/2016    Procedure: TOTAL  KNEE REPLACEMENT ;  Surgeon: Klaus Chairez MD;  Location: [facility];  Service: Orthopedics       Past Family History  Family History   Problem Relation Age of Onset   • Diabetes Mother    • Diabetes Father    • Cancer Father        Past Social history  Social History     Socioeconomic History   • Marital status: /Civil Union     Spouse name: Lilly Kaur   • Number of children: 3   • Years of education: BS   • Highest education level: Not on file   Occupational History   • Occupation: Attendance officer   • Occupation: Retired-    Tobacco Use   • Smoking status: Former     Types: Cigarettes Quit date: 0     Years since quittin.5   • Smokeless tobacco: Never   Vaping Use   • Vaping Use: Never used   Substance and Sexual Activity   • Alcohol use: No   • Drug use: Not Currently     Comment: tried multiple drugs in the past   • Sexual activity: Not on file   Other Topics Concern   • Not on file   Social History Narrative   • Not on file     Social Determinants of Health     Financial Resource Strain: Not on file   Food Insecurity: Not on file   Transportation Needs: Not on file   Physical Activity: Not on file   Stress: Not on file   Social Connections: Not on file   Intimate Partner Violence: Not on file   Housing Stability: Not on file       Current Medications  Current Outpatient Medications   Medication Sig Dispense Refill   • albuterol (PROVENTIL HFA,VENTOLIN HFA) 90 mcg/act inhaler Inhale 2 puffs every 6 (six) hours as needed for wheezing As needed     • aspirin (ECOTRIN LOW STRENGTH) 81 mg EC tablet Take 81 mg by mouth daily     • Blood Glucose Monitoring Suppl (ONE TOUCH ULTRA 2) w/Device KIT USE TO TEST BLOOD GLUCOSE     • budesonide (RINOCORT AQUA) 32 MCG/ACT nasal spray 1 spray into each nostril 2 (two) times a day As needed     • busPIRone (BUSPAR) 5 mg tablet Take 5 mg by mouth 2 (two) times a day     • clonazePAM (KlonoPIN) 1 mg tablet Take 0.5 tablets (0.5 mg total) by mouth 2 (two) times a day Do not start before 2023. 60 tablet 0   • cloNIDine (CATAPRES) 0.1 mg tablet Take 0.1 mg by mouth 3 (three) times a day. • docusate sodium (COLACE) 100 mg capsule Take 100 mg by mouth 2 (two) times a day     • fluticasone (FLOVENT HFA) 220 mcg/act inhaler Inhale 1 puff daily as needed As needed     • furosemide (LASIX) 40 mg tablet Take 40 mg by mouth as needed in the morning and 40 mg as needed in the evening.      • NARCAN 4 MG/0.1ML LIQD Has if needed     • OneTouch Ultra test strip USE TO TEST BLOOD GLUCOSE TWICE DAILY     • pantoprazole (PROTONIX) 40 mg tablet TAKE 1 TABLET BY MOUTH EVERY DAY 90 tablet 1   • pioglitazone (ACTOS) 15 mg tablet      • potassium chloride (K-DUR,KLOR-CON) 10 mEq tablet      • tamsulosin (FLOMAX) 0.4 mg TAKE 1 CAPSULE BY MOUTH EVERY DAY WITH DINNER 90 capsule 2   • testosterone (ANDROGEL) 1.62 % TD gel pump PLACE 1 ACTUATION ON THE SKIN EVERY MORNING. • Testosterone 12.5 MG/ACT (1%) GEL PLACE 1 ACTUATION ON THE SKIN EVERY MORNING. • Trulicity 4.5 RN/3.3ZP SOPN      • venlafaxine (EFFEXOR-XR) 37.5 mg 24 hr capsule TAKE 1 CAPSULE BY MOUTH DAILY WITH BREAKFAST. 90 capsule 2   • zinc gluconate 50 mg tablet Take 50 mg by mouth in the morning. • bisacodyl (DULCOLAX) 5 mg EC tablet Take 1 tablet (5 mg total) by mouth once for 1 dose 2 tablet 0   • mupirocin (BACTROBAN) 2 % ointment  (Patient not taking: Reported on 4/5/2023)       No current facility-administered medications for this visit. Allergies  No Known Allergies    Past Medical History, Social History, Family History, medications and allergies were reviewed. Vitals  Vitals:    07/14/23 1055   BP: 110/62   BP Location: Left arm   Patient Position: Sitting   Cuff Size: Adult   Pulse: 82   Weight: 115 kg (254 lb)   Height: 5' 6" (1.676 m)       Physical Exam  Physical Exam  Constitutional:       Appearance: Normal appearance. He is well-developed. HENT:      Head: Normocephalic. Eyes:      Pupils: Pupils are equal, round, and reactive to light. Pulmonary:      Effort: Pulmonary effort is normal.   Abdominal:      Palpations: Abdomen is soft. Genitourinary:     Prostate: Normal.      Rectum: Normal.      Comments: Prostate 45gm, smooth, nontender, no nodules  Musculoskeletal:         General: Normal range of motion. Cervical back: Normal range of motion. Comments: Ambulates with cane     Skin:     General: Skin is warm and dry. Neurological:      General: No focal deficit present. Mental Status: He is alert and oriented to person, place, and time.    Psychiatric: Mood and Affect: Mood normal.         Behavior: Behavior normal.         Thought Content: Thought content normal.         Judgment: Judgment normal.         Results    I have personally reviewed all pertinent lab results and reviewed with patient  Lab Results   Component Value Date    PSA <0.1 09/29/2022    PSA <0.1 03/03/2021    PSA 0.2 10/04/2014     Lab Results   Component Value Date    GLUCOSE 120 10/04/2014    CALCIUM 9.1 10/07/2022     10/04/2014    K 3.9 10/07/2022    CO2 32 10/07/2022    CL 99 10/07/2022    BUN 17 10/07/2022    CREATININE 1.20 10/07/2022     Lab Results   Component Value Date    WBC 8.22 03/06/2023    HGB 13.1 03/06/2023    HCT 43.0 03/06/2023    MCV 94 03/06/2023     03/06/2023     No results found for this or any previous visit (from the past 1 hour(s)).

## 2023-07-17 ENCOUNTER — OFFICE VISIT (OUTPATIENT)
Dept: PHYSICAL THERAPY | Facility: REHABILITATION | Age: 68
End: 2023-07-17
Payer: COMMERCIAL

## 2023-07-17 DIAGNOSIS — M54.40 LOW BACK PAIN WITH SCIATICA, SCIATICA LATERALITY UNSPECIFIED, UNSPECIFIED BACK PAIN LATERALITY, UNSPECIFIED CHRONICITY: Primary | ICD-10-CM

## 2023-07-17 DIAGNOSIS — E13.42 DIABETIC POLYNEUROPATHY ASSOCIATED WITH OTHER SPECIFIED DIABETES MELLITUS (HCC): ICD-10-CM

## 2023-07-17 DIAGNOSIS — M54.2 NECK PAIN: ICD-10-CM

## 2023-07-17 DIAGNOSIS — M47.812 CERVICAL SPONDYLOSIS: ICD-10-CM

## 2023-07-17 DIAGNOSIS — M47.816 LUMBAR SPONDYLOSIS: ICD-10-CM

## 2023-07-17 PROCEDURE — 97110 THERAPEUTIC EXERCISES: CPT | Performed by: PHYSICAL THERAPIST

## 2023-07-17 PROCEDURE — 97530 THERAPEUTIC ACTIVITIES: CPT | Performed by: PHYSICAL THERAPIST

## 2023-07-17 PROCEDURE — 97112 NEUROMUSCULAR REEDUCATION: CPT | Performed by: PHYSICAL THERAPIST

## 2023-07-17 NOTE — PROGRESS NOTES
Daily Note     Today's date: 2023  Patient name: Antonio Reddy  : 1955  MRN: [id number]  Referring provider: Nellie Batista DO  Dx:   Encounter Diagnosis     ICD-10-CM    1. Low back pain with sciatica, sciatica laterality unspecified, unspecified back pain laterality, unspecified chronicity  M54.40       2. Lumbar spondylosis  M47.816       3. Diabetic polyneuropathy associated with other specified diabetes mellitus ([address])  E13.42       4. Cervical spondylosis  M47.812       5. Neck pain  M54.2                       Subjective: Patient reports no significant changes since last visit. Has recently purchased new sneakers and feels good wearing them. Objective: See treatment diary below. Assessment: Patient tolerated tx well. Continued with 1 set of standing palloff press, second set performed sitting. Patient continues to be appropriately challenged by exercise program as charted. Patient would benefit from continued PT. Plan: Continue per plan of care. Precautions: anxiety, diabetes, OA, HTN, L TKA, chronic pain        Manuals 6/23 6/26 6/30 7/5 7/7 7/10 7/14 7/17   C/S PROM BSB and B rot                                                   Neuro Re-Ed            TA w/LPD leyda 14# 2x10 np leyda 14# 2x10 leyda 15# 2x10 leyda 15# 2x10 Keis. 15# 2x10 Keis. 15# 2x10 Keis. 15# 2x10   TA w/unilateral rows leyda 14# 2x10 ea. np leyda 14# 2x10 ea. leyda 15# 2x10 ea leyda 15# 2x10 ea. Keis. 15#  2x10 Keis. 15#  2x10 Keis. 15# 2x10   TA w/multifidus press seated leyda 8# 3"x15 ea. np leyda 8# 3"x15 Mady Fluke 9# 3"x15 ea leyda 9# 3"x15 Blaire Counts 9#  3"x15ea  keis 9#  3"x15ea keis 9#  3"x15ea   Standing pball crush 5"x20 np 5"x20 5"x20 5"x20  5"x20 5"x20 5"x20   TB b/l ER w/scap squeeze                                      Ther Ex            NS L5x10' np L5x10' L5x10' L5x10' L5x10' L5x10' L5x10'   Serratus wall slides            Cervical rotation SNAGS b/l 10"x10  np 10"x10 ea. np       SLR x 3 in standing b/l 2x10 ea. np 2x10 ea. abd only 2x10 ea 20 ea. x20ea abd/ext 1# x20ea abd/ext x20ea abd/ext 1#   TA w/bridges                          Re-evaluation   TP         Pt education+ HEP instruction            Ther Activity            TA w/marching in standing x20 ea. alt np x20 ea. alt x20 ea alt x20 ea. Alt.  x20ea alt x20ea alt x20ea alt   Sit to stand w foam 2x10 np W/ foam 2x10 W/foam 2x10 W/foam 2x10 W/foam  2x10 W/foam  2x10 W/foam  2x10   TA w/squats 2x10 np 2x10 np 2x10 2x10 2x10 2x10   Gait Training                                      Modalities

## 2023-07-19 NOTE — TELEPHONE ENCOUNTER
Caller: patient    Doctor: Lencho Valle    Reason for call: will be calling central scheduling to schedule the MRI     Call back#:

## 2023-07-21 ENCOUNTER — OFFICE VISIT (OUTPATIENT)
Dept: PHYSICAL THERAPY | Facility: REHABILITATION | Age: 68
End: 2023-07-21
Payer: COMMERCIAL

## 2023-07-21 DIAGNOSIS — M47.816 LUMBAR SPONDYLOSIS: ICD-10-CM

## 2023-07-21 DIAGNOSIS — M54.40 LOW BACK PAIN WITH SCIATICA, SCIATICA LATERALITY UNSPECIFIED, UNSPECIFIED BACK PAIN LATERALITY, UNSPECIFIED CHRONICITY: Primary | ICD-10-CM

## 2023-07-21 DIAGNOSIS — M47.812 CERVICAL SPONDYLOSIS: ICD-10-CM

## 2023-07-21 DIAGNOSIS — M54.2 NECK PAIN: ICD-10-CM

## 2023-07-21 PROCEDURE — 97530 THERAPEUTIC ACTIVITIES: CPT | Performed by: PHYSICAL THERAPIST

## 2023-07-21 PROCEDURE — 97110 THERAPEUTIC EXERCISES: CPT | Performed by: PHYSICAL THERAPIST

## 2023-07-21 PROCEDURE — 97112 NEUROMUSCULAR REEDUCATION: CPT | Performed by: PHYSICAL THERAPIST

## 2023-07-21 NOTE — PROGRESS NOTES
Daily Note     Today's date: 2023  Patient name: Maribell Pope  : 1955  MRN: [id number]  Referring provider: Alphonso Chi DO  Dx:   Encounter Diagnosis     ICD-10-CM    1. Low back pain with sciatica, sciatica laterality unspecified, unspecified back pain laterality, unspecified chronicity  M54.40       2. Lumbar spondylosis  M47.816       3. Cervical spondylosis  M47.812       4. Neck pain  M54.2                      Subjective: Pt states that back pain is minimal today and continues to get a sharp pain in neck occasionally. Objective: See treatment diary below      Assessment: Tolerated treatment well. Pt had no increase in symptoms throughout the treatment session. Patient would benefit from continued PT      Plan: Continue per plan of care. Progress treatment as tolerated. Precautions: anxiety, diabetes, OA, HTN, L TKA, chronic pain        Manuals 6/26 6/30 7/5 7/7 7/10 7/14 7/17 7/21   C/S PROM BSB and B rot                                               Neuro Re-Ed           TA w/LPD np leyda 14# 2x10 leyda 15# 2x10 leyda 15# 2x10 Keis. 15# 2x10 Keis. 15# 2x10 Keis. 15# 2x10 mtb  2x10   TA w/unilateral rows np leyda 14# 2x10 ea. leyda 15# 2x10 ea leyda 15# 2x10 ea. Keis. 15#  2x10 Keis. 15#  2x10 Keis. 15# 2x10 btb  2x10   TA w/multifidus press seated np leyda 8# 3"x15 Apoorva Tk 9# 3"x15 ea leyda 9# 3"x15 ea. keis 9#  3"x15ea  keis 9#  3"x15ea keis 9#  3"x15ea btb  x15   Standing pball crush np 5"x20 5"x20 5"x20  5"x20 5"x20 5"x20 5"x20   TB b/l ER w/scap squeeze                                   Ther Ex           NS np L5x10' L5x10' L5x10' L5x10' L5x10' L5x10' L6X10'   Serratus wall slides           Cervical rotation SNAGS b/l np 10"x10 ea. np        SLR x 3 in standing b/l np 2x10 ea. abd only 2x10 ea 20 ea.  x20ea abd/ext 1# x20ea abd/ext x20ea abd/ext 1# x15  Abd/ext 1#   TA w/bridges                        Re-evaluation TP          Pt education+ HEP instruction Ther Activity           TA w/marching in standing np x20 ea. alt x20 ea alt x20 ea. Alt.  x20ea alt x20ea alt x20ea alt x20 ea alt   Sit to stand np W/ foam 2x10 W/foam 2x10 W/foam 2x10 W/foam  2x10 W/foam  2x10 W/foam  2x10 W/ foam  2x10   TA w/squats np 2x10 np 2x10 2x10 2x10 2x10 2x10   Gait Training                                   Modalities

## 2023-07-24 ENCOUNTER — APPOINTMENT (OUTPATIENT)
Dept: PHYSICAL THERAPY | Facility: REHABILITATION | Age: 68
End: 2023-07-24
Payer: COMMERCIAL

## 2023-07-24 DIAGNOSIS — F41.9 ANXIETY: ICD-10-CM

## 2023-07-24 RX ORDER — CLONAZEPAM 1 MG/1
0.5 TABLET ORAL 2 TIMES DAILY
Qty: 60 TABLET | Refills: 0 | Status: SHIPPED | OUTPATIENT
Start: 2023-07-24

## 2023-07-28 ENCOUNTER — OFFICE VISIT (OUTPATIENT)
Dept: PHYSICAL THERAPY | Facility: REHABILITATION | Age: 68
End: 2023-07-28
Payer: COMMERCIAL

## 2023-07-28 ENCOUNTER — TELEPHONE (OUTPATIENT)
Dept: UROLOGY | Facility: AMBULATORY SURGERY CENTER | Age: 68
End: 2023-07-28

## 2023-07-28 DIAGNOSIS — M54.40 LOW BACK PAIN WITH SCIATICA, SCIATICA LATERALITY UNSPECIFIED, UNSPECIFIED BACK PAIN LATERALITY, UNSPECIFIED CHRONICITY: Primary | ICD-10-CM

## 2023-07-28 DIAGNOSIS — E13.42 DIABETIC POLYNEUROPATHY ASSOCIATED WITH OTHER SPECIFIED DIABETES MELLITUS (HCC): ICD-10-CM

## 2023-07-28 DIAGNOSIS — M47.816 LUMBAR SPONDYLOSIS: ICD-10-CM

## 2023-07-28 DIAGNOSIS — M54.2 NECK PAIN: ICD-10-CM

## 2023-07-28 DIAGNOSIS — M47.812 CERVICAL SPONDYLOSIS: ICD-10-CM

## 2023-07-28 PROCEDURE — 97112 NEUROMUSCULAR REEDUCATION: CPT

## 2023-07-28 PROCEDURE — 97530 THERAPEUTIC ACTIVITIES: CPT

## 2023-07-28 PROCEDURE — 97110 THERAPEUTIC EXERCISES: CPT

## 2023-07-28 NOTE — TELEPHONE ENCOUNTER
Results of recent PSA 0.04.  Plan to follow-up in 1 year. - Called lissy and notified him of PSA. 04 and can follow up in one year

## 2023-07-28 NOTE — PROGRESS NOTES
Daily Note     Today's date: 2023  Patient name: Mika Johnson  : 1955  MRN: [id number]  Referring provider: Carmelo Vega DO  Dx:   Encounter Diagnosis     ICD-10-CM    1. Low back pain with sciatica, sciatica laterality unspecified, unspecified back pain laterality, unspecified chronicity  M54.40       2. Lumbar spondylosis  M47.816       3. Cervical spondylosis  M47.812       4. Neck pain  M54.2       5. Diabetic polyneuropathy associated with other specified diabetes mellitus ([address])  E13.42                      Subjective: Pt reports he is doing ok, is ready to get moving with therapy. Objective: See treatment diary below      Assessment: Tolerated treatment well. Pt performed all exercises without issues, continue to progress to tolerance. Pt required occasional rest breaks between exercises. Patient demonstrated fatigue post treatment, exhibited good technique with therapeutic exercises and would benefit from continued PT      Plan: Continue per plan of care. Precautions: anxiety, diabetes, OA, HTN, L TKA, chronic pain        Manuals 6/26 6/30 7/5 7/7 7/10 7/14 7/17 7/21 7/28   C/S PROM BSB and B rot                                                   Neuro Re-Ed            TA w/LPD np leyda 14# 2x10 leyda 15# 2x10 leyda 15# 2x10 Keis. 15# 2x10 Keis. 15# 2x10 Keis. 15# 2x10 mtb  2x10 Keis. 15# 2x10   TA w/unilateral rows np leyda 14# 2x10 ea. leyda 15# 2x10 ea leyda 15# 2x10 ea. Keis. 15#  2x10 Keis. 15#  2x10 Keis. 15# 2x10 btb  2x10 Keis.  15#  2x10   TA w/multifidus press seated np leyda 8# 3"x15 Tye Borrow 9# 3"x15 ea leyda 9# 3"x15 ea. keis 9#  3"x15ea  keis 9#  3"x15ea keis 9#  3"x15ea btb  x15 keis 9# x15 ea   Standing pball crush np 5"x20 5"x20 5"x20  5"x20 5"x20 5"x20 5"x20 5"x20   TB b/l ER w/scap squeeze                                      Ther Ex            NS np L5x10' L5x10' L5x10' L5x10' L5x10' L5x10' L6X10' L6 10'   Serratus wall slides            Cervical rotation SNAGS b/l np 10"x10 ea. np         SLR x 3 in standing b/l np 2x10 ea. abd only 2x10 ea 20 ea. x20ea abd/ext 1# x20ea abd/ext x20ea abd/ext 1# x15  Abd/ext 1# x15 abd/ext 1#   TA w/bridges                          Re-evaluation TP           Pt education+ HEP instruction            Ther Activity            TA w/marching in standing np x20 ea. alt x20 ea alt x20 ea. Alt.  x20ea alt x20ea alt x20ea alt x20 ea alt x20 ea alt   Sit to stand np W/ foam 2x10 W/foam 2x10 W/foam 2x10 W/foam  2x10 W/foam  2x10 W/foam  2x10 W/ foam  2x10 W/ foam 2x10   TA w/squats np 2x10 np 2x10 2x10 2x10 2x10 2x10 2x10   Gait Training                                      Modalities

## 2023-07-31 ENCOUNTER — OFFICE VISIT (OUTPATIENT)
Dept: PHYSICAL THERAPY | Facility: REHABILITATION | Age: 68
End: 2023-07-31
Payer: COMMERCIAL

## 2023-07-31 DIAGNOSIS — M54.40 LOW BACK PAIN WITH SCIATICA, SCIATICA LATERALITY UNSPECIFIED, UNSPECIFIED BACK PAIN LATERALITY, UNSPECIFIED CHRONICITY: Primary | ICD-10-CM

## 2023-07-31 DIAGNOSIS — M47.812 CERVICAL SPONDYLOSIS: ICD-10-CM

## 2023-07-31 DIAGNOSIS — M47.816 LUMBAR SPONDYLOSIS: ICD-10-CM

## 2023-07-31 DIAGNOSIS — E13.42 DIABETIC POLYNEUROPATHY ASSOCIATED WITH OTHER SPECIFIED DIABETES MELLITUS (HCC): ICD-10-CM

## 2023-07-31 DIAGNOSIS — M54.2 NECK PAIN: ICD-10-CM

## 2023-07-31 PROCEDURE — 97530 THERAPEUTIC ACTIVITIES: CPT | Performed by: PHYSICAL THERAPIST

## 2023-07-31 PROCEDURE — 97110 THERAPEUTIC EXERCISES: CPT | Performed by: PHYSICAL THERAPIST

## 2023-07-31 PROCEDURE — 97112 NEUROMUSCULAR REEDUCATION: CPT | Performed by: PHYSICAL THERAPIST

## 2023-07-31 NOTE — PROGRESS NOTES
Daily Note     Today's date: 2023  Patient name: Ericka Butterfield  : 1955  MRN: [id number]  Referring provider: Abel Hunt DO  Dx:   Encounter Diagnosis     ICD-10-CM    1. Low back pain with sciatica, sciatica laterality unspecified, unspecified back pain laterality, unspecified chronicity  M54.40       2. Lumbar spondylosis  M47.816       3. Cervical spondylosis  M47.812       4. Neck pain  M54.2       5. Diabetic polyneuropathy associated with other specified diabetes mellitus ([address])  E13.42                      Subjective: Pt's states his pain has not been bad like it was. Objective: See treatment diary below      Assessment: Tolerated treatment well. Patient exhibited good technique with therapeutic exercises, but performs at slow pace. Plan: Pt is scheduled for MRI 8/3 of cervical spine. Pt instructed to schedule 1 more appointment for a RA. Precautions: anxiety, diabetes, OA, HTN, L TKA, chronic pain        Manuals 6/26 6/30 7/5 7/7 7/10 7/14 7/17 7/21 7/28 7/31   C/S PROM BSB and B rot                                                       Neuro Re-Ed             TA w/LPD np leyda 14# 2x10 leyda 15# 2x10 leyda 15# 2x10 Keis. 15# 2x10 Keis. 15# 2x10 Keis. 15# 2x10 mtb  2x10 Keis. 15# 2x10 keis 16# 2x10   TA w/unilateral rows np leyda 14# 2x10 ea. leyda 15# 2x10 ea leyda 15# 2x10 ea. Keis. 15#  2x10 Keis. 15#  2x10 Keis. 15# 2x10 btb  2x10 Keis. 15#  2x10 keis 16# 2x10   TA w/multifidus press seated np leyda 8# 3"x15 Elen Else 9# 3"x15 ea leyda 9# 3"x15 ea. keis 9#  1"X59TC  keis 9#  7"C66YS keis 9#  3"x15ea btb  x15 keis 9# x15 ea keis 10# 3" x15 ea   Standing pball crush np 5"x20 5"x20 5"x20  5"x20 5"x20 5"x20 5"x20 5"x20 5"x20   TB b/l ER w/scap squeeze                                         Ther Ex             NS np L5x10' L5x10' L5x10' L5x10' L5x10' L5x10' L6X10' L6 10' Bike x10'   Serratus wall slides             Cervical rotation SNAGS b/l np 10"x10 ea.  np SLR x 3 in standing b/l np 2x10 ea. abd only 2x10 ea 20 ea. x20ea abd/ext 1# x20ea abd/ext x20ea abd/ext 1# x15  Abd/ext 1# x15 abd/ext 1# 2x10 2# abd+ext    TA w/bridges                            Re-evaluation TP            Pt education+ HEP instruction             Ther Activity             TA w/marching in standing np x20 ea. alt x20 ea alt x20 ea. Alt.  x20ea alt x20ea alt x20ea alt x20 ea alt x20 ea alt 2# x20 ea alt   Sit to stand np W/ foam 2x10 W/foam 2x10 W/foam 2x10 W/foam  2x10 W/foam  2x10 W/foam  2x10 W/ foam  2x10 W/ foam 2x10 W/ foam 2x10   TA w/squats np 2x10 np 2x10 2x10 2x10 2x10 2x10 2x10 2x10   Gait Training                                         Modalities

## 2023-08-03 ENCOUNTER — HOSPITAL ENCOUNTER (OUTPATIENT)
Dept: RADIOLOGY | Age: 68
Discharge: HOME | End: 2023-08-03
Payer: COMMERCIAL

## 2023-08-03 DIAGNOSIS — M48.02 CERVICAL SPINAL STENOSIS: ICD-10-CM

## 2023-08-07 ENCOUNTER — TELEPHONE (OUTPATIENT)
Age: 68
End: 2023-08-07

## 2023-08-07 DIAGNOSIS — G95.20 CERVICAL SPINAL CORD COMPRESSION (HCC): Primary | ICD-10-CM

## 2023-08-07 DIAGNOSIS — M54.12 CERVICAL RADICULOPATHY: ICD-10-CM

## 2023-08-07 NOTE — TELEPHONE ENCOUNTER
Caller: [facility]    Doctor: Lorie Sales    Reason for call: please electronically sign mri script    Call back#: [phone]

## 2023-08-08 ENCOUNTER — HOSPITAL ENCOUNTER (OUTPATIENT)
Dept: RADIOLOGY | Facility: HOSPITAL | Age: 68
Discharge: HOME | End: 2023-08-08
Payer: COMMERCIAL

## 2023-08-08 DIAGNOSIS — M48.02 CERVICAL SPINAL STENOSIS: ICD-10-CM

## 2023-08-08 PROCEDURE — 72156 MRI NECK SPINE W/O & W/DYE: CPT

## 2023-08-08 PROCEDURE — A9585 GADOBUTROL INJECTION: HCPCS | Performed by: ANESTHESIOLOGY

## 2023-08-08 RX ORDER — GADOBUTROL 604.72 MG/ML
11 INJECTION INTRAVENOUS
Status: COMPLETED | OUTPATIENT
Start: 2023-08-08 | End: 2023-08-08

## 2023-08-08 RX ADMIN — GADOBUTROL 11 ML: 604.72 INJECTION INTRAVENOUS at 16:49

## 2023-08-10 DIAGNOSIS — N40.1 BENIGN PROSTATIC HYPERPLASIA WITH LOWER URINARY TRACT SYMPTOMS, SYMPTOM DETAILS UNSPECIFIED: ICD-10-CM

## 2023-08-10 DIAGNOSIS — K21.9 GASTROESOPHAGEAL REFLUX DISEASE, UNSPECIFIED WHETHER ESOPHAGITIS PRESENT: ICD-10-CM

## 2023-08-10 RX ORDER — TAMSULOSIN HYDROCHLORIDE 0.4 MG/1
CAPSULE ORAL
Qty: 90 CAPSULE | Refills: 2 | Status: SHIPPED | OUTPATIENT
Start: 2023-08-10

## 2023-08-10 RX ORDER — PANTOPRAZOLE SODIUM 40 MG/1
TABLET, DELAYED RELEASE ORAL
Qty: 90 TABLET | Refills: 1 | Status: SHIPPED | OUTPATIENT
Start: 2023-08-10

## 2023-08-11 ENCOUNTER — TELEPHONE (OUTPATIENT)
Dept: FAMILY MEDICINE CLINIC | Facility: CLINIC | Age: 68
End: 2023-08-11

## 2023-08-11 NOTE — TELEPHONE ENCOUNTER
Pt returned call and left another message. Called Pt again and left message to call back with additional info.

## 2023-08-11 NOTE — TELEPHONE ENCOUNTER
Pt left a voice message requesting refills but, did not specify which ones were needed.      Left message for Pt to call back with additional information about request.

## 2023-08-16 ENCOUNTER — OFFICE VISIT (OUTPATIENT)
Dept: FAMILY MEDICINE CLINIC | Facility: CLINIC | Age: 68
End: 2023-08-16

## 2023-08-16 VITALS
OXYGEN SATURATION: 96 % | WEIGHT: 262 LBS | HEART RATE: 74 BPM | HEIGHT: 68 IN | SYSTOLIC BLOOD PRESSURE: 130 MMHG | BODY MASS INDEX: 39.71 KG/M2 | DIASTOLIC BLOOD PRESSURE: 70 MMHG

## 2023-08-16 DIAGNOSIS — I10 PRIMARY HYPERTENSION: ICD-10-CM
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
## 2023-08-16 DIAGNOSIS — E13.42 DIABETIC POLYNEUROPATHY ASSOCIATED WITH OTHER SPECIFIED DIABETES MELLITUS (HCC): ICD-10-CM

## 2023-08-16 DIAGNOSIS — I50.32 CHRONIC DIASTOLIC CONGESTIVE HEART FAILURE (HCC): ICD-10-CM

## 2023-08-16 DIAGNOSIS — F41.9 ANXIETY: ICD-10-CM

## 2023-08-16 DIAGNOSIS — Z00.00 MEDICARE ANNUAL WELLNESS VISIT, SUBSEQUENT: Primary | ICD-10-CM

## 2023-08-16 DIAGNOSIS — E11.42 TYPE 2 DIABETES MELLITUS WITH DIABETIC POLYNEUROPATHY, WITHOUT LONG-TERM CURRENT USE OF INSULIN (HCC): ICD-10-CM

## 2023-08-16 DIAGNOSIS — R15.1 FECAL SMEARING: ICD-10-CM

## 2023-08-16 RX ORDER — BUSPIRONE HYDROCHLORIDE 10 MG/1
10 TABLET ORAL 2 TIMES DAILY
Qty: 60 TABLET | Refills: 1 | Status: SHIPPED | OUTPATIENT
Start: 2023-08-16
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
## 2023-08-16 NOTE — PATIENT INSTRUCTIONS
Medicare Preventive Visit Patient Instructions  Thank you for completing your Welcome to Medicare Visit or Medicare Annual Wellness Visit today. Your next wellness visit will be due in one year (8/16/2024). The screening/preventive services that you may require over the next 5-10 years are detailed below. Some tests may not apply to you based off risk factors and/or age. Screening tests ordered at today's visit but not completed yet may show as past due. Also, please note that scanned in results may not display below. Preventive Screenings:  Service Recommendations Previous Testing/Comments   Colorectal Cancer Screening  · Colonoscopy    · Fecal Occult Blood Test (FOBT)/Fecal Immunochemical Test (FIT)  · Fecal DNA/Cologuard Test  · Flexible Sigmoidoscopy Age: 43-73 years old   Colonoscopy: every 10 years (May be performed more frequently if at higher risk)  OR  FOBT/FIT: every 1 year  OR  Cologuard: every 3 years  OR  Sigmoidoscopy: every 5 years  Screening may be recommended earlier than age 39 if at higher risk for colorectal cancer. Also, an individualized decision between you and your healthcare provider will decide whether screening between the ages of 77-80 would be appropriate. Colonoscopy: 10/19/2022  FOBT/FIT: Not on file  Cologuard: Not on file  Sigmoidoscopy: Not on file          Prostate Cancer Screening Individualized decision between patient and health care provider in men between ages of 53-66   Medicare will cover every 12 months beginning on the day after your 50th birthday PSA: <0.1 ng/mL           Hepatitis C Screening Once for adults born between 1945 and 1965  More frequently in patients at high risk for Hepatitis C Hep C Antibody: 02/23/2022        Diabetes Screening 1-2 times per year if you're at risk for diabetes or have pre-diabetes Fasting glucose: 131 mg/dL (10/7/2022)  A1C: 6.5 (5/21/2023)      Cholesterol Screening Once every 5 years if you don't have a lipid disorder.  May order more often based on risk factors. Lipid panel: 01/26/2023         Other Preventive Screenings Covered by Medicare:  1. Abdominal Aortic Aneurysm (AAA) Screening: covered once if your at risk. You're considered to be at risk if you have a family history of AAA or a male between the age of 70-76 who smoking at least 100 cigarettes in your lifetime. 2. Lung Cancer Screening: covers low dose CT scan once per year if you meet all of the following conditions: (1) Age 48-67; (2) No signs or symptoms of lung cancer; (3) Current smoker or have quit smoking within the last 15 years; (4) You have a tobacco smoking history of at least 20 pack years (packs per day x number of years you smoked); (5) You get a written order from a healthcare provider. 3. Glaucoma Screening: covered annually if you're considered high risk: (1) You have diabetes OR (2) Family history of glaucoma OR (3)  aged 48 and older OR (3)  American aged 72 and older  3. Osteoporosis Screening: covered every 2 years if you meet one of the following conditions: (1) Have a vertebral abnormality; (2) On glucocorticoid therapy for more than 3 months; (3) Have primary hyperparathyroidism; (4) On osteoporosis medications and need to assess response to drug therapy. 5. HIV Screening: covered annually if you're between the age of 14-79. Also covered annually if you are younger than 13 and older than 72 with risk factors for HIV infection. For pregnant patients, it is covered up to 3 times per pregnancy.     Immunizations:  Immunization Recommendations   Influenza Vaccine Annual influenza vaccination during flu season is recommended for all persons aged >= 6 months who do not have contraindications   Pneumococcal Vaccine   * Pneumococcal conjugate vaccine = PCV13 (Prevnar 13), PCV15 (Vaxneuvance), PCV20 (Prevnar 20)  * Pneumococcal polysaccharide vaccine = PPSV23 (Pneumovax) Adults 20-63 years old: 1-3 doses may be recommended based on certain risk factors  Adults 72 years old: 1-2 doses may be recommended based off what pneumonia vaccine you previously received   Hepatitis B Vaccine 3 dose series if at intermediate or high risk (ex: diabetes, end stage renal disease, liver disease)   Tetanus (Td) Vaccine - COST NOT COVERED BY MEDICARE PART B Following completion of primary series, a booster dose should be given every 10 years to maintain immunity against tetanus. Td may also be given as tetanus wound prophylaxis. Tdap Vaccine - COST NOT COVERED BY MEDICARE PART B Recommended at least once for all adults. For pregnant patients, recommended with each pregnancy. Shingles Vaccine (Shingrix) - COST NOT COVERED BY MEDICARE PART B  2 shot series recommended in those aged 48 and above     Health Maintenance Due:      Topic Date Due   • Colorectal Cancer Screening  10/16/2032   • Hepatitis C Screening  Discontinued     Immunizations Due:      Topic Date Due   • Pneumococcal Vaccine: 65+ Years (1 - PCV) Never done   • Hepatitis A Vaccine (1 of 2 - Risk 2-dose series) Never done   • Hepatitis B Vaccine (1 of 3 - Risk 3-dose series) Never done   • COVID-19 Vaccine (4 - Pfizer series) 01/11/2022   • Influenza Vaccine (1) 09/01/2023     Advance Directives   What are advance directives? Advance directives are legal documents that state your wishes and plans for medical care. These plans are made ahead of time in case you lose your ability to make decisions for yourself. Advance directives can apply to any medical decision, such as the treatments you want, and if you want to donate organs. What are the types of advance directives? There are many types of advance directives, and each state has rules about how to use them. You may choose a combination of any of the following:  · Living will: This is a written record of the treatment you want. You can also choose which treatments you do not want, which to limit, and which to stop at a certain time.  This includes surgery, medicine, IV fluid, and tube feedings. · Durable power of  for healthcare [facility]): This is a written record that states who you want to make healthcare choices for you when you are unable to make them for yourself. This person, called a proxy, is usually a family member or a friend. You may choose more than 1 proxy. · Do not resuscitate (DNR) order:  A DNR order is used in case your heart stops beating or you stop breathing. It is a request not to have certain forms of treatment, such as CPR. A DNR order may be included in other types of advance directives. · Medical directive: This covers the care that you want if you are in a coma, near death, or unable to make decisions for yourself. You can list the treatments you want for each condition. Treatment may include pain medicine, surgery, blood transfusions, dialysis, IV or tube feedings, and a ventilator (breathing machine). · Values history: This document has questions about your views, beliefs, and how you feel and think about life. This information can help others choose the care that you would choose. Why are advance directives important? An advance directive helps you control your care. Although spoken wishes may be used, it is better to have your wishes written down. Spoken wishes can be misunderstood, or not followed. Treatments may be given even if you do not want them. An advance directive may make it easier for your family to make difficult choices about your care. Weight Management   Why it is important to manage your weight:  Being overweight increases your risk of health conditions such as heart disease, high blood pressure, type 2 diabetes, and certain types of cancer. It can also increase your risk for osteoarthritis, sleep apnea, and other respiratory problems. Aim for a slow, steady weight loss. Even a small amount of weight loss can lower your risk of health problems.   How to lose weight safely:  A safe and healthy way to lose weight is to eat fewer calories and get regular exercise. You can lose up about 1 pound a week by decreasing the number of calories you eat by 500 calories each day. Healthy meal plan for weight management:  A healthy meal plan includes a variety of foods, contains fewer calories, and helps you stay healthy. A healthy meal plan includes the following:  · Eat whole-grain foods more often. A healthy meal plan should contain fiber. Fiber is the part of grains, fruits, and vegetables that is not broken down by your body. Whole-grain foods are healthy and provide extra fiber in your diet. Some examples of whole-grain foods are whole-wheat breads and pastas, oatmeal, brown rice, and bulgur. · Eat a variety of vegetables every day. Include dark, leafy greens such as spinach, kale, nanette greens, and mustard greens. Eat yellow and orange vegetables such as carrots, sweet potatoes, and winter squash. · Eat a variety of fruits every day. Choose fresh or canned fruit (canned in its own juice or light syrup) instead of juice. Fruit juice has very little or no fiber. · Eat low-fat dairy foods. Drink fat-free (skim) milk or 1% milk. Eat fat-free yogurt and low-fat cottage cheese. Try low-fat cheeses such as mozzarella and other reduced-fat cheeses. · Choose meat and other protein foods that are low in fat. Choose beans or other legumes such as split peas or lentils. Choose fish, skinless poultry (chicken or turkey), or lean cuts of red meat (beef or pork). Before you cook meat or poultry, cut off any visible fat. · Use less fat and oil. Try baking foods instead of frying them. Add less fat, such as margarine, sour cream, regular salad dressing and mayonnaise to foods. Eat fewer high-fat foods. Some examples of high-fat foods include french fries, doughnuts, ice cream, and cakes. · Eat fewer sweets. Limit foods and drinks that are high in sugar.  This includes candy, cookies, regular soda, and sweetened drinks. Exercise:  Exercise at least 30 minutes per day on most days of the week. Some examples of exercise include walking, biking, dancing, and swimming. You can also fit in more physical activity by taking the stairs instead of the elevator or parking farther away from stores. Ask your healthcare provider about the best exercise plan for you. © Copyright [employer] 2018 Information is for End User's use only and may not be sold, redistributed or otherwise used for commercial purposes.  All illustrations and images included in CareNotes® are the copyrighted property of A.D.A.M., Inc. or  [address]

## 2023-08-17 ENCOUNTER — TELEPHONE (OUTPATIENT)
Dept: PAIN MEDICINE | Facility: CLINIC | Age: 68
End: 2023-08-17

## 2023-08-17 DIAGNOSIS — M54.12 CERVICAL RADICULOPATHY: ICD-10-CM

## 2023-08-17 DIAGNOSIS — M48.02 CERVICAL SPINAL STENOSIS: Primary | ICD-10-CM

## 2023-08-17 NOTE — TELEPHONE ENCOUNTER
Attempted to contact pt. [facility] requesting cb regarding MRI result. Provided with CB# and OH.

## 2023-08-17 NOTE — TELEPHONE ENCOUNTER
Notify the patient the MRI of his cervical spine again demonstrates abnormality at C6 consistent with myelomalacia or damage within the spinal cord. There is no mass or cyst associated as there is no enhancement with contrast of imaging. He does have multilevel stenosis most severe at C3-4 and C4-5 with cord compression.   I have referred the patient to Dr. Byron Hernandez of orthopedics and I would like him to schedule a soon as possible

## 2023-08-18 ENCOUNTER — TELEPHONE (OUTPATIENT)
Dept: FAMILY MEDICINE CLINIC | Facility: CLINIC | Age: 68
End: 2023-08-18

## 2023-08-18 NOTE — TELEPHONE ENCOUNTER
Pt had a question regarding the dapagliflozin 5 MG TABS  It is expensive and asking if there was a cheaper alternative    Please advise    [address], [address] [place] [address] [address] [place] [address]  Phone: [phone] Fax: [phone]

## 2023-08-20 NOTE — ASSESSMENT & PLAN NOTE
BP at goal on clonidine 0.1 mg TID  At some point, will discuss adding  ACE or ARB given his diabetes and CHF

## 2023-08-20 NOTE — ASSESSMENT & PLAN NOTE
Lab Results   Component Value Date    HGBA1C 6.5 05/21/2023     Last a1c 6.5. Too early to recheck   On actos and trulicity   Explained that TZDs can cause fluid retention and worsen CHF. Prefer we d/c actos and start a DPP4 since he is already on a GLP. Pt agreed.

## 2023-08-20 NOTE — ASSESSMENT & PLAN NOTE
Pt did not start effexor due to fear of developing s/e  Will increase Buspar to 10 mg BID   Continue klonopin 1 mg BID

## 2023-09-08 ENCOUNTER — TELEPHONE (OUTPATIENT)
Dept: FAMILY MEDICINE CLINIC | Facility: CLINIC | Age: 68
End: 2023-09-08

## 2023-09-08 NOTE — TELEPHONE ENCOUNTER
Medication:clonazePAM (KlonoPIN) 1 mg tablet      Dosage: 0.5 mg  How Often:Take 0.5 tablets (0.5 mg total) by mouth 2 (two) times a day  Quantity:  60  Last Office Visit: 8/16/23  Next Office Visit: 11/16/23  Last refilled: 7/24/23  How many pills left: 5   Pharmacy:     [facility] Santosh Negron [address] [place] [address]  Phone: [phone] Fax: [phone]

## 2023-09-11 DIAGNOSIS — F41.9 ANXIETY: ICD-10-CM

## 2023-09-11 RX ORDER — CLONAZEPAM 1 MG/1
0.5 TABLET ORAL 2 TIMES DAILY
Qty: 60 TABLET | Refills: 0 | Status: SHIPPED | OUTPATIENT
Start: 2023-09-11

## 2023-09-12 ENCOUNTER — TELEPHONE (OUTPATIENT)
Age: 68
End: 2023-09-12

## 2023-09-12 NOTE — TELEPHONE ENCOUNTER
Patient made aware that Hgb A1C labs have been placed, and he can go to a lab to have blood work drawn. Patient verbalized understanding.

## 2023-09-14 ENCOUNTER — APPOINTMENT (OUTPATIENT)
Dept: LAB | Facility: CLINIC | Age: 68
End: 2023-09-14
Payer: COMMERCIAL

## 2023-09-14 DIAGNOSIS — Z12.5 SCREENING FOR PROSTATE CANCER: ICD-10-CM

## 2023-09-14 DIAGNOSIS — D64.9 NORMOCYTIC ANEMIA: ICD-10-CM

## 2023-09-14 DIAGNOSIS — D50.9 IRON DEFICIENCY ANEMIA, UNSPECIFIED IRON DEFICIENCY ANEMIA TYPE: ICD-10-CM

## 2023-09-14 DIAGNOSIS — E11.42 TYPE 2 DIABETES MELLITUS WITH DIABETIC POLYNEUROPATHY, WITHOUT LONG-TERM CURRENT USE OF INSULIN (HCC): ICD-10-CM

## 2023-09-14 DIAGNOSIS — E46 PROTEIN-CALORIE MALNUTRITION, UNSPECIFIED SEVERITY (HCC): ICD-10-CM

## 2023-09-14 LAB
ALBUMIN SERPL BCP-MCNC: 4.1 G/DL (ref 3.5–5)
ALP SERPL-CCNC: 93 U/L (ref 34–104)
ALT SERPL W P-5'-P-CCNC: 18 U/L (ref 7–52)
ANION GAP SERPL CALCULATED.3IONS-SCNC: 10 MMOL/L
AST SERPL W P-5'-P-CCNC: 19 U/L (ref 13–39)
BASOPHILS # BLD AUTO: 0.04 THOUSANDS/ÂΜL (ref 0–0.1)
BASOPHILS NFR BLD AUTO: 1 % (ref 0–1)
BILIRUB SERPL-MCNC: 0.64 MG/DL (ref 0.2–1)
BUN SERPL-MCNC: 25 MG/DL (ref 5–25)
CALCIUM SERPL-MCNC: 9.1 MG/DL (ref 8.4–10.2)
CHLORIDE SERPL-SCNC: 97 MMOL/L (ref 96–108)
CO2 SERPL-SCNC: 30 MMOL/L (ref 21–32)
CREAT SERPL-MCNC: 1.3 MG/DL (ref 0.6–1.3)
EOSINOPHIL # BLD AUTO: 0.56 THOUSAND/ÂΜL (ref 0–0.61)
EOSINOPHIL NFR BLD AUTO: 7 % (ref 0–6)
ERYTHROCYTE [DISTWIDTH] IN BLOOD BY AUTOMATED COUNT: 13.8 % (ref 11.6–15.1)
EST. AVERAGE GLUCOSE BLD GHB EST-MCNC: 128 MG/DL
FERRITIN SERPL-MCNC: 84 NG/ML (ref 24–336)
FOLATE SERPL-MCNC: >22.3 NG/ML
GFR SERPL CREATININE-BSD FRML MDRD: 56 ML/MIN/1.73SQ M
GLUCOSE P FAST SERPL-MCNC: 101 MG/DL (ref 65–99)
HBA1C MFR BLD: 6.1 %
HCT VFR BLD AUTO: 39.8 % (ref 36.5–49.3)
HGB BLD-MCNC: 12.8 G/DL (ref 12–17)
IMM GRANULOCYTES # BLD AUTO: 0.01 THOUSAND/UL (ref 0–0.2)
IMM GRANULOCYTES NFR BLD AUTO: 0 % (ref 0–2)
IRON SATN MFR SERPL: 23 % (ref 15–50)
IRON SERPL-MCNC: 65 UG/DL (ref 50–212)
LYMPHOCYTES # BLD AUTO: 1.53 THOUSANDS/ÂΜL (ref 0.6–4.47)
LYMPHOCYTES NFR BLD AUTO: 20 % (ref 14–44)
MCH RBC QN AUTO: 29 PG (ref 26.8–34.3)
MCHC RBC AUTO-ENTMCNC: 32.2 G/DL (ref 31.4–37.4)
MCV RBC AUTO: 90 FL (ref 82–98)
MONOCYTES # BLD AUTO: 0.64 THOUSAND/ÂΜL (ref 0.17–1.22)
MONOCYTES NFR BLD AUTO: 8 % (ref 4–12)
NEUTROPHILS # BLD AUTO: 4.82 THOUSANDS/ÂΜL (ref 1.85–7.62)
NEUTS SEG NFR BLD AUTO: 64 % (ref 43–75)
NRBC BLD AUTO-RTO: 0 /100 WBCS
PLATELET # BLD AUTO: 201 THOUSANDS/UL (ref 149–390)
PMV BLD AUTO: 11.7 FL (ref 8.9–12.7)
POTASSIUM SERPL-SCNC: 4.1 MMOL/L (ref 3.5–5.3)
PROT SERPL-MCNC: 7.7 G/DL (ref 6.4–8.4)
PSA SERPL-MCNC: <0.01 NG/ML (ref 0–4)
RBC # BLD AUTO: 4.41 MILLION/UL (ref 3.88–5.62)
SODIUM SERPL-SCNC: 137 MMOL/L (ref 135–147)
TIBC SERPL-MCNC: 287 UG/DL (ref 250–450)
UIBC SERPL-MCNC: 222 UG/DL (ref 155–355)
VIT B12 SERPL-MCNC: 1235 PG/ML (ref 180–914)
WBC # BLD AUTO: 7.6 THOUSAND/UL (ref 4.31–10.16)

## 2023-09-14 PROCEDURE — 80053 COMPREHEN METABOLIC PANEL: CPT

## 2023-09-14 PROCEDURE — 83540 ASSAY OF IRON: CPT

## 2023-09-14 PROCEDURE — 83918 ORGANIC ACIDS TOTAL QUANT: CPT

## 2023-09-14 PROCEDURE — 83550 IRON BINDING TEST: CPT

## 2023-09-14 PROCEDURE — 85025 COMPLETE CBC W/AUTO DIFF WBC: CPT

## 2023-09-14 PROCEDURE — 36415 COLL VENOUS BLD VENIPUNCTURE: CPT

## 2023-09-14 PROCEDURE — 82728 ASSAY OF FERRITIN: CPT

## 2023-09-14 PROCEDURE — 82607 VITAMIN B-12: CPT

## 2023-09-14 PROCEDURE — G0103 PSA SCREENING: HCPCS

## 2023-09-14 PROCEDURE — 83036 HEMOGLOBIN GLYCOSYLATED A1C: CPT

## 2023-09-14 PROCEDURE — 82746 ASSAY OF FOLIC ACID SERUM: CPT

## 2023-09-18 LAB — METHYLMALONATE SERPL-SCNC: 275 NMOL/L (ref 0–378)

## 2023-09-26 ENCOUNTER — NURSE TRIAGE (OUTPATIENT)
Age: 68
End: 2023-09-26

## 2023-09-26 NOTE — TELEPHONE ENCOUNTER
Patient calling in, reports recent blood work showed he had a parasite infection and wondering why it was not seen during colonoscopy. He does not remember which provider or blood work he had done that showed a parasite infection. Patient having ongoing GI symptoms since last OV. I scheduled patient follow up visit with SARAI Russo to discuss symptoms in one week. No further questions.

## 2023-09-29 ENCOUNTER — RA CDI HCC (OUTPATIENT)
Dept: OTHER | Facility: HOSPITAL | Age: 68
End: 2023-09-29

## 2023-09-29 NOTE — PROGRESS NOTES
[address] [facility] coding opportunities     I13.0, E66.01, E11.22 and E11.51     Chart Reviewed number of suggestions sent to Provider: 4     Patients Insurance     Medicare Insurance: [employer] Complete

## 2023-10-04 ENCOUNTER — TELEPHONE (OUTPATIENT)
Dept: HEMATOLOGY ONCOLOGY | Facility: CLINIC | Age: 68
End: 2023-10-04

## 2023-10-04 NOTE — TELEPHONE ENCOUNTER
MEDHAT  DR vin VALENCIA   Who are you speaking with? Patient   If it is not the patient, are they listed on an active communication consent form? N/A   Is this a MEDHAT or DR vin VALENCIA MEDHAT   Which provider is patient currently scheduled or established with? DAYSI Miller   What is the original appointment date and time? 10/4/23 @ 130   At which location is the appointment scheduled to take place? [facility]   Which provider is the patient transitioning care to? Dr. Sarah Talbot   What is the new appointment date and time? 11/27/23 @ 2   At which location is the new appointment scheduled to take place? [facility]   What is the reason for this change?  Pt needed to cx and then no availability

## 2023-10-16 DIAGNOSIS — F41.9 ANXIETY: ICD-10-CM

## 2023-10-16 RX ORDER — BUSPIRONE HYDROCHLORIDE 10 MG/1
10 TABLET ORAL 2 TIMES DAILY
Qty: 180 TABLET | Refills: 1 | Status: SHIPPED | OUTPATIENT
Start: 2023-10-16

## 2023-10-16 NOTE — TELEPHONE ENCOUNTER
Reason for call:   [x] Refill   [] Prior Auth  [] Other:     Office:   [x] PCP/Provider -   [] Speciality/Provider -     Medication: buspar    Dose/Frequency: 10mg bid    Quantity:180    [facility]    Does the patient have enough for 3 days?    [x] Yes   [] No - Send as HP to POD

## 2023-11-03 ENCOUNTER — HOSPITAL ENCOUNTER (OUTPATIENT)
Dept: RADIOLOGY | Facility: HOSPITAL | Age: 68
Discharge: HOME | End: 2023-11-03
Attending: ORTHOPAEDIC SURGERY
Payer: COMMERCIAL

## 2023-11-03 ENCOUNTER — OFFICE VISIT (OUTPATIENT)
Dept: OBGYN CLINIC | Facility: HOSPITAL | Age: 68
End: 2023-11-03
Payer: COMMERCIAL

## 2023-11-03 VITALS
BODY MASS INDEX: 39.76 KG/M2 | HEIGHT: 68 IN | DIASTOLIC BLOOD PRESSURE: 77 MMHG | HEART RATE: 80 BPM | WEIGHT: 262.35 LBS | SYSTOLIC BLOOD PRESSURE: 149 MMHG

## 2023-11-03 DIAGNOSIS — R52 PAIN: ICD-10-CM

## 2023-11-03 DIAGNOSIS — R52 PAIN: Primary | ICD-10-CM

## 2023-11-03 DIAGNOSIS — G95.9 CERVICAL MYELOPATHY (HCC): ICD-10-CM

## 2023-11-03 DIAGNOSIS — M47.812 CERVICAL SPONDYLOSIS: ICD-10-CM

## 2023-11-03 PROCEDURE — 72050 X-RAY EXAM NECK SPINE 4/5VWS: CPT

## 2023-11-03 PROCEDURE — 99214 OFFICE O/P EST MOD 30 MIN: CPT | Performed by: ORTHOPAEDIC SURGERY

## 2023-11-03 NOTE — PROGRESS NOTES
Assessment & Plan/Medical Decision Makin y.o. male with Neck Pain, signs/symptoms of cervical myelopathy and imaging findings most notable for Cervical spondylosis and Stenosis         The clinical, physical and imaging findings were reviewed with the patient. Estephania Crisostomo  has a constellation of findings consistent with Cervical Myelopathy in the setting of cervical stenosis. We discussed the treatment options including physical therapy, at home exercises, activity modifications, chiropractic medicine, oral medications, and interventional spine procedures. Discussed the natural history of cervical myelopathy. Given patient's imaging findings, ongoing balance issues with recent need for cane during ambulation, and difficulty grasping items/dropping items, recommend consideration of surgical intervention. We did start to discuss the process for surgery. Estephania Crisostomo would like to discuss with his family. We provided his with [facility] patient handout literature of cervical spondylotic myelopathy. Patient instructed to return to office/ER sooner if symptoms are not improving, getting worse, or new worrisome/neurologic symptoms arise. Patient will follow up in approx. 2-4 weeks for re-evaluation. He was asked to bring his wife to his next appointment for possible surgical discussion. Subjective:      Chief Complaint: Back Pain    HPI:  Harman More is a 76 y.o. male presenting for initial visit with chief complaint of neck pain referred to us by Dr. Alf Fontenot. He has been having pain for 8-9 months without any known inciting injury. He says this all started around the time he retired from being a . His neck does not always bother him, and the pain seems to be random in nature. He also notes a grinding sensation in his neck like there is "sand in there."    He says he frequently drops things and has been doing so for the past few months. He says his balance is off.   He has been using a cane for about 8 months. He is a diabetic. His most recent A1c was in the 6's. Denies any nohemi trauma. Denies fever or chills, no night sweats. Denies any bladder or bowel changes. Conservative therapy includes the following:   Medications: Tylenol and ibuprofen at present; he has tried muscle relaxer's and narcotics in the past but he does not like them  Injections: No     Physical Therapy: Yes - did provide some relief (mostly for low back)  Chiropractic Medicine: He has tried this and it has been helpful  Accupunture/Massage Therapy: has not attempted   These therapeutic modalities were ineffective at providing sustained pain relief/functional improvement. Nicotine dependent: No  Occupation: retired   Living situation: Lives with his wife   ADLs: He does have trouble with fine motor skills.     Objective:     Family History   Problem Relation Age of Onset    Diabetes Mother     Diabetes Father     Cancer Father        Past Medical History:   Diagnosis Date    Acid reflux     Anxiety     Arthritis     Cellulitis     left ankle    Diabetes mellitus (HCC)     Hypertension     Sleep apnea     Twitching        Current Outpatient Medications   Medication Sig Dispense Refill    albuterol (PROVENTIL HFA,VENTOLIN HFA) 90 mcg/act inhaler Inhale 2 puffs every 6 (six) hours as needed for wheezing As needed      aspirin (ECOTRIN LOW STRENGTH) 81 mg EC tablet Take 81 mg by mouth daily      bisacodyl (DULCOLAX) 5 mg EC tablet Take 1 tablet (5 mg total) by mouth once for 1 dose 2 tablet 0    Blood Glucose Monitoring Suppl (ONE TOUCH ULTRA 2) w/Device KIT USE TO TEST BLOOD GLUCOSE      budesonide (RINOCORT AQUA) 32 MCG/ACT nasal spray 1 spray into each nostril 2 (two) times a day As needed      busPIRone (BUSPAR) 10 mg tablet Take 1 tablet (10 mg total) by mouth 2 (two) times a day 180 tablet 1    clonazePAM (KlonoPIN) 1 mg tablet Take 0.5 tablets (0.5 mg total) by mouth 2 (two) times a day 60 tablet 0    cloNIDine (CATAPRES) 0.1 mg tablet Take 0.1 mg by mouth 3 (three) times a day. dapagliflozin 5 MG TABS Take 1 tablet (5 mg total) by mouth daily 30 tablet 1    docusate sodium (COLACE) 100 mg capsule Take 100 mg by mouth 2 (two) times a day      fluticasone (FLOVENT HFA) 220 mcg/act inhaler Inhale 1 puff daily as needed As needed      furosemide (LASIX) 40 mg tablet Take 40 mg by mouth as needed in the morning and 40 mg as needed in the evening. mupirocin (BACTROBAN) 2 % ointment       NARCAN 4 MG/0.1ML LIQD Has if needed      OneTouch Ultra test strip USE TO TEST BLOOD GLUCOSE TWICE DAILY      pantoprazole (PROTONIX) 40 mg tablet TAKE 1 TABLET BY MOUTH EVERY DAY 90 tablet 1    pioglitazone (ACTOS) 15 mg tablet       potassium chloride (K-DUR,KLOR-CON) 10 mEq tablet       tamsulosin (FLOMAX) 0.4 mg TAKE 1 CAPSULE BY MOUTH EVERY DAY WITH DINNER 90 capsule 2    testosterone (ANDROGEL) 1.62 % TD gel pump PLACE 1 ACTUATION ON THE SKIN EVERY MORNING. Testosterone 12.5 MG/ACT (1%) GEL PLACE 1 ACTUATION ON THE SKIN EVERY MORNING. Trulicity 4.5 IV/5.4NP SOPN       zinc gluconate 50 mg tablet Take 50 mg by mouth in the morning. No current facility-administered medications for this visit.        Past Surgical History:   Procedure Laterality Date    APPENDECTOMY      CHOLECYSTECTOMY      KNEE ARTHROSCOPY      SD ARTHRP KNE CONDYLE&PLATU MEDIAL&LAT COMPARTMENTS Left 2016    Procedure: TOTAL  KNEE REPLACEMENT ;  Surgeon: Kayli Pagan MD;  Location: [facility];  Service: Orthopedics       Social History     Socioeconomic History    Marital status: /Civil Union     Spouse name: Tisha Wagoner    Number of children: 3    Years of education: BS    Highest education level: Not on file   Occupational History    Occupation: Attendance officer    Occupation: Retired-    Tobacco Use    Smoking status: Former     Types: Cigarettes     Quit date:      Years since quittin.8    Smokeless tobacco: Never   Vaping Use    Vaping Use: Never used   Substance and Sexual Activity    Alcohol use: No    Drug use: Not Currently     Comment: tried multiple drugs in the past    Sexual activity: Not on file   Other Topics Concern    Not on file   Social History Narrative    Not on file     Social Determinants of Health     Financial Resource Strain: Low Risk  (8/16/2023)    Overall Financial Resource Strain (CARDIA)     Difficulty of Paying Living Expenses: Not hard at all   Food Insecurity: Not on file   Transportation Needs: No Transportation Needs (8/16/2023)    PRAPARE - Transportation     Lack of Transportation (Medical): No     Lack of Transportation (Non-Medical): No   Physical Activity: Not on file   Stress: Not on file   Social Connections: Not on file   Intimate Partner Violence: Not on file   Housing Stability: Not on file       No Known Allergies    Review of Systems  General- denies fever/chills  HEENT- denies hearing loss or sore throat  Eyes- denies eye pain or visual disturbances, denies red eyes  Respiratory- denies cough or SOB  Cardio- denies chest pain or palpitations  GI- denies abdominal pain  Endocrine- denies urinary frequency  Urinary- denies pain with urination  Musculoskeletal- Negative except noted above  Skin- denies rashes or wounds  Neurological- denies dizziness or headache  Psychiatric- denies anxiety or difficulty concentrating    Physical Exam  /77   Pulse 80   Ht 5' 7.5" (1.715 m)   Wt 119 kg (262 lb 5.6 oz)   BMI 40.48 kg/m²     General/Constitutional: No apparent distress: well-nourished and well developed. Lymphatic: No appreciable lymphadenopathy  Respiratory: Non-labored breathing  Vascular: No edema, swelling or tenderness, except as noted in detailed exam.  Integumentary: No impressive skin lesions present, except as noted in detailed exam.  Psych: Normal mood and affect, oriented to person, place and time.   MSK: normal other than stated in HPI and exam  Gait & balance: He has wide based gait, ambulates with a Cane    Cervical  spine range of motion:  -Forward flexion chin to chest  -Extension to 60  -Lateral bend 30 right, 30 left  -Rotation 45 right, 45 left. There is no point tenderness with palpation along the posterior cervical, thoracic, lumbar spine. Neurologic:  Upper Extremity Motor Function    Right  Left    Deltoid  5/5  5/5    Bicep  5/5  5/5    Wrist extension  5/5  5/5    Tricep  5/5  5/5    Finger flexion/  5/5  5/5    Hand intrinsic  5/5  5/5      Lower Extremity Motor Function    Right  Left    Iliopsoas  5/5  5/5    Quadriceps 5/5 5/5   Tibialis anterior  5/5  5/5    EHL  5/5  5/5    Gastroc. muscle  5/5 5/5    Heel rise  5/5  5/5    Toe rise  5/5 5/5      Sensory: light touch is intact to bilateral upper and lower extremities     Reflexes:    Right Left   Biceps 2+ 2+   Triceps 2+ 2+   Brachioradialis 2+ 2+   Patellar 1+ 1+   Achilles 1+ 1+   Babinski neg neg     Other tests:  Brown's: Negative    Clonus: negative  Tandem gait: positive  Romberg: positive   Carpal Tinel/Phalen: negative bilateral   Cubital Tinel: negative bilateral   Shoulder: painless active & passive ROM bilateral     Diagnostic Tests   IMAGING: I have personally reviewed the images and these are my findings:  Cervical Spine X-rays from 11/3/2023: multi level cervical spondylosis with loss of disc height, anterior osteophyte formation and facet hypertrophy, no apparent spondylolisthesis, no appreciated lytic/blastic lesions, no obvious instability    Cervical Spine MRI from 8/8/2023: multi level cervical disc degeneration, loss of disc height, bulging discs most noted at C3-4, C4-5, C5-6, C6-7 with severe spinal stenosis, cord signal abnormality at C6, varying degrees of foraminal stenosis     Procedures, if performed today     None performed       Portions of the record may have been created with voice recognition software.   Occasional wrong word or "sound a like" substitutions may have occurred due to the inherent limitations of voice recognition software. Read the chart carefully and recognize, using context, where substitutions have occurred.

## 2023-11-16 ENCOUNTER — OFFICE VISIT (OUTPATIENT)
Dept: FAMILY MEDICINE CLINIC | Facility: CLINIC | Age: 68
End: 2023-11-16
Payer: COMMERCIAL

## 2023-11-16 VITALS
SYSTOLIC BLOOD PRESSURE: 120 MMHG | HEIGHT: 68 IN | OXYGEN SATURATION: 96 % | WEIGHT: 247.4 LBS | TEMPERATURE: 96.6 F | RESPIRATION RATE: 16 BRPM | HEART RATE: 62 BPM | BODY MASS INDEX: 37.5 KG/M2 | DIASTOLIC BLOOD PRESSURE: 70 MMHG

## 2023-11-16 DIAGNOSIS — I10 PRIMARY HYPERTENSION: ICD-10-CM

## 2023-11-16 DIAGNOSIS — F41.9 ANXIETY: ICD-10-CM

## 2023-11-16 DIAGNOSIS — K21.9 GASTROESOPHAGEAL REFLUX DISEASE, UNSPECIFIED WHETHER ESOPHAGITIS PRESENT: Primary | ICD-10-CM

## 2023-11-16 DIAGNOSIS — E13.42 DIABETIC POLYNEUROPATHY ASSOCIATED WITH OTHER SPECIFIED DIABETES MELLITUS (HCC): ICD-10-CM

## 2023-11-16 DIAGNOSIS — N18.31 STAGE 3A CHRONIC KIDNEY DISEASE (HCC): ICD-10-CM

## 2023-11-16 PROCEDURE — 99214 OFFICE O/P EST MOD 30 MIN: CPT | Performed by: FAMILY MEDICINE

## 2023-11-16 RX ORDER — CLONAZEPAM 1 MG/1
0.5 TABLET ORAL 2 TIMES DAILY
Qty: 60 TABLET | Refills: 0 | Status: SHIPPED | OUTPATIENT
Start: 2023-11-16

## 2023-11-16 RX ORDER — CHLORHEXIDINE GLUCONATE ORAL RINSE 1.2 MG/ML
SOLUTION DENTAL
COMMUNITY
Start: 2023-10-10

## 2023-11-16 RX ORDER — LOSARTAN POTASSIUM 25 MG/1
TABLET ORAL
COMMUNITY
Start: 2023-09-29

## 2023-11-16 NOTE — PROGRESS NOTES
Name: Cristy Jacobs      : 1955      MRN: [id number]  Encounter Provider: Rosalene Claude, MD  Encounter Date: 2023   Encounter department: [facility]     Here for follow-up. Inquiring about methadone. I explained that I am not licensed to prescribe methadone nor do I recommend methadone. Will discuss this with our clinical pharmacist to see if this would be a safer alternative for pain management. Regardless patient will need to see a specialist who prescribes methadone. Blood pressures are well controlled on losartan 25 mg daily. Patient has not started Jardiance and still taking Trulicity. Patient has lost 15 pounds intentionally in several weeks. Some of this is likely water weight. Follow-up in 3 months    1. Gastroesophageal reflux disease, unspecified whether esophagitis present    2. Primary hypertension    3. Diabetic polyneuropathy associated with other specified diabetes mellitus ([address])    4. Anxiety  -     clonazePAM (KlonoPIN) 1 mg tablet; Take 0.5 tablets (0.5 mg total) by mouth 2 (two) times a day    5. Stage 3a chronic kidney disease (HCC)       Subjective      Here for follow-up. Neuropathy unchanged. Inquiring about methadone. Was previously on oxycodone and weaned off with methadone. Saw ortho for cervical pain and radiculopathy. Surgery was discussed with patient is apprehensive. He completed physical therapy and is doing exercises at home. He experiences occasional numbness in the rectum whenever he's on the toilet moving his bowels. He continues to have occasional fecal smearing. Recently stopped PPI and has noticed increased bloating and flatulence. Gas-X helps. Patient also managed to lose weight with dieting. He was afraid to start Jardiance. He decided to continue Trulicity which is costing him $300 a month. Tolerating BuSpar 10 mg twice daily.   Is interested in seeing a therapist.  Maegan Matthews frustrated at times because he wants to do more physically but cannot due to his pain and gait issues. HPI  Review of Systems    Current Outpatient Medications on File Prior to Visit   Medication Sig   • albuterol (PROVENTIL HFA,VENTOLIN HFA) 90 mcg/act inhaler Inhale 2 puffs every 6 (six) hours as needed for wheezing As needed   • aspirin (ECOTRIN LOW STRENGTH) 81 mg EC tablet Take 81 mg by mouth daily   • bisacodyl (DULCOLAX) 5 mg EC tablet Take 1 tablet (5 mg total) by mouth once for 1 dose   • Blood Glucose Monitoring Suppl (ONE TOUCH ULTRA 2) w/Device KIT USE TO TEST BLOOD GLUCOSE   • budesonide (RINOCORT AQUA) 32 MCG/ACT nasal spray 1 spray into each nostril 2 (two) times a day As needed   • busPIRone (BUSPAR) 10 mg tablet Take 1 tablet (10 mg total) by mouth 2 (two) times a day   • chlorhexidine (PERIDEX) 0.12 % solution RINSE MOUTH WITH 15ML (1 CAPFUL) FOR 30 SECONDS IN MORNING AND EVENING AFTER BRUSHING, THEN SPIT   • cloNIDine (CATAPRES) 0.1 mg tablet Take 0.1 mg by mouth 3 (three) times a day. • docusate sodium (COLACE) 100 mg capsule Take 100 mg by mouth 2 (two) times a day   • fluticasone (FLOVENT HFA) 220 mcg/act inhaler Inhale 1 puff daily as needed As needed   • furosemide (LASIX) 40 mg tablet Take 40 mg by mouth as needed in the morning and 40 mg as needed in the evening. • Lancets 30G MISC 3 (three) times a day Use to test blood glucose   • losartan (COZAAR) 25 mg tablet    • mupirocin (BACTROBAN) 2 % ointment    • NARCAN 4 MG/0.1ML LIQD Has if needed   • OneTouch Ultra test strip USE TO TEST BLOOD GLUCOSE TWICE DAILY   • potassium chloride (K-DUR,KLOR-CON) 10 mEq tablet    • tamsulosin (FLOMAX) 0.4 mg TAKE 1 CAPSULE BY MOUTH EVERY DAY WITH DINNER   • Testosterone 12.5 MG/ACT (1%) GEL PLACE 1 ACTUATION ON THE SKIN EVERY MORNING. • Trulicity 4.5 PE/6.1TB SOPN    • zinc gluconate 50 mg tablet Take 50 mg by mouth in the morning.    • [DISCONTINUED] clonazePAM (KlonoPIN) 1 mg tablet Take 0.5 tablets (0.5 mg total) by mouth 2 (two) times a day   • [DISCONTINUED] dapagliflozin 5 MG TABS Take 1 tablet (5 mg total) by mouth daily   • [DISCONTINUED] pioglitazone (ACTOS) 15 mg tablet    • testosterone (ANDROGEL) 1.62 % TD gel pump PLACE 1 ACTUATION ON THE SKIN EVERY MORNING. (Patient not taking: Reported on 11/16/2023)   • [DISCONTINUED] pantoprazole (PROTONIX) 40 mg tablet TAKE 1 TABLET BY MOUTH EVERY DAY (Patient not taking: Reported on 11/16/2023)       Objective     /70 (BP Location: Left arm, Patient Position: Sitting, Cuff Size: Large)   Pulse 62   Temp (!) 96.6 °F (35.9 °C) (Tympanic)   Resp 16   Ht 5' 7.5" (1.715 m)   Wt 112 kg (247 lb 6.4 oz)   SpO2 96%   BMI 38.18 kg/m²     Physical Exam  Constitutional:       General: He is not in acute distress. Appearance: Normal appearance. He is not ill-appearing. HENT:      Head: Normocephalic and atraumatic. Cardiovascular:      Rate and Rhythm: Normal rate and regular rhythm. Heart sounds: No murmur heard. Pulmonary:      Effort: Pulmonary effort is normal. No respiratory distress. Breath sounds: Normal breath sounds. No wheezing or rales. Abdominal:      General: There is no distension. Palpations: Abdomen is soft. There is no mass. Tenderness: There is no abdominal tenderness. There is no guarding or rebound. Hernia: No hernia is present. Musculoskeletal:      Right lower leg: Edema (trace) present. Left lower leg: No edema (trace). Lymphadenopathy:      Cervical: No cervical adenopathy. Skin:     General: Skin is warm. Neurological:      Mental Status: He is alert and oriented to person, place, and time.    Psychiatric:         Mood and Affect: Mood normal.         Behavior: Behavior normal.     Ruchi Barajas MD

## 2023-11-27 ENCOUNTER — TELEPHONE (OUTPATIENT)
Dept: HEMATOLOGY ONCOLOGY | Facility: CLINIC | Age: 68
End: 2023-11-27

## 2023-11-27 NOTE — TELEPHONE ENCOUNTER
Appointment Change  Cancel, Reschedule, Change to Virtual      Who are you speaking with? Patient   If it is not the patient, is the caller listed on the communication consent form? N/A   Which provider is the appointment scheduled with? Cyrus Esquivel PA-C   When was the original appointment scheduled? Please list date and time 11/27/23 @ 2 w/ [facility] [place]   At which location is the appointment scheduled to take place? [facility]   Was the appointment rescheduled? Was the appointment changed from an in person visit to a virtual visit? If so, please list the details of the change. 1/10/24 @ 230   What is the reason for the appointment change? Unable to make it        Was STAR transport scheduled? No   Does STAR transport need to be scheduled for the new visit (if applicable) No   Does the patient need an infusion appointment rescheduled? No   Does the patient have an upcoming infusion appointment scheduled? If so, when? No   Is the patient undergoing chemotherapy? No   For appointments cancelled with less than 24 hours:  Was the no-show policy reviewed?  Yes

## 2023-12-01 ENCOUNTER — OFFICE VISIT (OUTPATIENT)
Dept: OBGYN CLINIC | Facility: HOSPITAL | Age: 68
End: 2023-12-01
Payer: COMMERCIAL

## 2023-12-01 VITALS — HEART RATE: 85 BPM | SYSTOLIC BLOOD PRESSURE: 129 MMHG | DIASTOLIC BLOOD PRESSURE: 74 MMHG

## 2023-12-01 DIAGNOSIS — G95.9 CERVICAL MYELOPATHY (HCC): Primary | ICD-10-CM

## 2023-12-01 PROCEDURE — 99213 OFFICE O/P EST LOW 20 MIN: CPT | Performed by: ORTHOPAEDIC SURGERY

## 2023-12-01 NOTE — PROGRESS NOTES
Assessment & Plan/Medical Decision Makin y.o. male with Neck Pain, signs/symptoms of cervical myelopathy and imaging findings most notable for Cervical spondylosis and Stenosis         The clinical, physical and imaging findings were reviewed with the patient. Padma Nguyen  has a constellation of findings consistent with Cervical Myelopathy in the setting of cervical stenosis. He reports his main issue associated with myelopathy is currently balance and feeling like he is walking on "balloons". He uses cane for ambulation due to balance, however denies any other signs/symptoms of myelopathy. Denies recent falls. Denies any neck or upper extremity symptoms. He reports his isolated right knee pain is his main source of pain and limitation at this time. He has been following with Dr. Sepideh Ocampo regarding his right knee. Per patient, he reports discussion of right TKA in the future. I think it is reasonable to patient to first address his right knee with close observation and follow-up for cervical myelopathy. We did discuss role of surgical intervention to address cervical spinal stenosis. However, he is hesitant to pursue surgery given only mild signs/symptoms of myelopathy at this time. Discussed in detail the progression of cervical myelopathy and associated signs/symptoms with patient and his wife. Patient will need to follow-up closely for monitor symptoms. Follow up in 3 months. If he develops any new or worsening symptoms, he will need to follow up sooner. Patient instructed to return to office/ER sooner if symptoms are not improving, getting worse, or new worrisome/neurologic symptoms arise. Subjective:      Chief Complaint: Neck Pain    HPI:  Yanet Gutiérrez is a 76 y.o. male presenting for initial visit with chief complaint of neck pain referred to us by Dr. Cary Avila. He has been having pain for 8-9 months without any known inciting injury.   He says this all started around the time he retired from being a . His neck does not always bother him, and the pain seems to be random in nature. He also notes a grinding sensation in his neck like there is "sand in there."  He says he frequently drops things and has been doing so for the past few months. He says his balance is off. He has been using a cane for about 8 months. He is a diabetic. His most recent A1c was in the 6's. Denies any nohemi trauma. Denies fever or chills, no night sweats. Denies any bladder or bowel changes. Conservative therapy includes the following:   Medications: Tylenol and ibuprofen at present; he has tried muscle relaxer's and narcotics in the past but he does not like them  Injections: No     Physical Therapy: Yes - did provide some relief (mostly for low back)  Chiropractic Medicine: He has tried this and it has been helpful  Accupunture/Massage Therapy: has not attempted   These therapeutic modalities were ineffective at providing sustained pain relief/functional improvement. Nicotine dependent: No  Occupation: retired   Living situation: Lives with his wife   ADLs: He does have trouble with fine motor skills. Update on 12/1/2023:  He reports neck pain has improved with at-home stretches and exercises that he learned at physical therapy. Also with ongoing bilateral foot numbness/tingling that has improved since February, noting burning sensation has decreased. Does note feeling like he is walking on "balloons". Continues with balance issues, uses cane for ambulation. Had multiple falls previously. Denies significant neck or upper extremity pain. Denies numbness/tingling into upper extremities. Reports dropping items frequently about 4 months ago that has since improved, reporting he doesn't drop items frequently anymore. Denies dropping items, changes in fine motor skills or dexterity. Denies nohemi upper extremity weakness.     Does report ongoing isolated right knee pain, noting this is his main source of pain and limitation at this time. PMH T2DM, most recent HbA1c 6.1 on 9/14/2023. Objective:     Family History   Problem Relation Age of Onset    Diabetes Mother     Diabetes Father     Cancer Father        Past Medical History:   Diagnosis Date    Acid reflux     Anxiety     Arthritis     Cellulitis     left ankle    Diabetes mellitus (HCC)     Hypertension     Sleep apnea     Twitching        Current Outpatient Medications   Medication Sig Dispense Refill    albuterol (PROVENTIL HFA,VENTOLIN HFA) 90 mcg/act inhaler Inhale 2 puffs every 6 (six) hours as needed for wheezing As needed      aspirin (ECOTRIN LOW STRENGTH) 81 mg EC tablet Take 81 mg by mouth daily      Blood Glucose Monitoring Suppl (ONE TOUCH ULTRA 2) w/Device KIT USE TO TEST BLOOD GLUCOSE      budesonide (RINOCORT AQUA) 32 MCG/ACT nasal spray 1 spray into each nostril 2 (two) times a day As needed      busPIRone (BUSPAR) 10 mg tablet Take 1 tablet (10 mg total) by mouth 2 (two) times a day 180 tablet 1    chlorhexidine (PERIDEX) 0.12 % solution RINSE MOUTH WITH 15ML (1 CAPFUL) FOR 30 SECONDS IN MORNING AND EVENING AFTER BRUSHING, THEN SPIT      clonazePAM (KlonoPIN) 1 mg tablet Take 0.5 tablets (0.5 mg total) by mouth 2 (two) times a day 60 tablet 0    cloNIDine (CATAPRES) 0.1 mg tablet Take 0.1 mg by mouth 3 (three) times a day. docusate sodium (COLACE) 100 mg capsule Take 100 mg by mouth 2 (two) times a day      fluticasone (FLOVENT HFA) 220 mcg/act inhaler Inhale 1 puff daily as needed As needed      furosemide (LASIX) 40 mg tablet Take 40 mg by mouth as needed in the morning and 40 mg as needed in the evening.       Lancets 30G MISC 3 (three) times a day Use to test blood glucose      losartan (COZAAR) 25 mg tablet       mupirocin (BACTROBAN) 2 % ointment       NARCAN 4 MG/0.1ML LIQD Has if needed      OneTouch Ultra test strip USE TO TEST BLOOD GLUCOSE TWICE DAILY      potassium chloride (K-DUR,KLOR-CON) 10 mEq tablet       tamsulosin (FLOMAX) 0.4 mg TAKE 1 CAPSULE BY MOUTH EVERY DAY WITH DINNER 90 capsule 2    Testosterone 12.5 MG/ACT (1%) GEL PLACE 1 ACTUATION ON THE SKIN EVERY MORNING. Trulicity 4.5 IM/4.2SC SOPN       zinc gluconate 50 mg tablet Take 50 mg by mouth in the morning. bisacodyl (DULCOLAX) 5 mg EC tablet Take 1 tablet (5 mg total) by mouth once for 1 dose 2 tablet 0    testosterone (ANDROGEL) 1.62 % TD gel pump PLACE 1 ACTUATION ON THE SKIN EVERY MORNING. (Patient not taking: Reported on 2023)       No current facility-administered medications for this visit.        Past Surgical History:   Procedure Laterality Date    APPENDECTOMY      CHOLECYSTECTOMY      KNEE ARTHROSCOPY      VT ARTHRP KNE CONDYLE&PLATU MEDIAL&LAT COMPARTMENTS Left 2016    Procedure: TOTAL  KNEE REPLACEMENT ;  Surgeon: Humberto Milton MD;  Location: [facility];  Service: Orthopedics       Social History     Socioeconomic History    Marital status: /Civil Union     Spouse name: Angeli Carvalho    Number of children: 3    Years of education: BS    Highest education level: Not on file   Occupational History    Occupation: Attendance officer    Occupation: Retired-    Tobacco Use    Smoking status: Former     Types: Cigarettes     Quit date:      Years since quittin.9    Smokeless tobacco: Never   Vaping Use    Vaping Use: Never used   Substance and Sexual Activity    Alcohol use: No    Drug use: Not Currently     Comment: tried multiple drugs in the past    Sexual activity: Not on file   Other Topics Concern    Not on file   Social History Narrative    Not on file     Social Determinants of Health     Financial Resource Strain: [address] [facility]  (2023)    Overall Financial Resource Strain (CARDIA)     Difficulty of Paying Living Expenses: Not hard at all   Food Insecurity: Not on file   Transportation Needs: No Transportation Needs (2023)    PRAPARE - Transportation     Lack of Transportation (Medical): No     Lack of Transportation (Non-Medical): No   Physical Activity: Not on file   Stress: Not on file   Social Connections: Not on file   Intimate Partner Violence: Not on file   Housing Stability: Not on file       No Known Allergies    Review of Systems  General- denies fever/chills  HEENT- denies hearing loss or sore throat  Eyes- denies eye pain or visual disturbances, denies red eyes  Respiratory- denies cough or SOB  Cardio- denies chest pain or palpitations  GI- denies abdominal pain  Endocrine- denies urinary frequency  Urinary- denies pain with urination  Musculoskeletal- Negative except noted above  Skin- denies rashes or wounds  Neurological- denies dizziness or headache  Psychiatric- denies anxiety or difficulty concentrating    Physical Exam  /74   Pulse 85     General/Constitutional: No apparent distress: well-nourished and well developed. Lymphatic: No appreciable lymphadenopathy  Respiratory: Non-labored breathing  Vascular: No edema, swelling or tenderness, except as noted in detailed exam.  Integumentary: No impressive skin lesions present, except as noted in detailed exam.  Psych: Normal mood and affect, oriented to person, place and time. MSK: normal other than stated in HPI and exam  Gait & balance: He has wide based gait, ambulates with a Cane    Cervical  spine range of motion:  -Forward flexion chin to chest  -Extension to 60  -Lateral bend 30 right, 30 left  -Rotation 45 right, 45 left. There is no point tenderness with palpation along the posterior cervical, thoracic, lumbar spine.      Neurologic:  Upper Extremity Motor Function    Right  Left    Deltoid  5/5  5/5    Bicep  5/5  5/5    Wrist extension  5/5  5/5    Tricep  5/5  5/5    Finger flexion/  5/5  5/5    Hand intrinsic  5/5  5/5      Lower Extremity Motor Function    Right  Left    Iliopsoas  5/5  5/5    Quadriceps 5/5 5/5   Tibialis anterior  5/5  5/5    EHL  5/5  5/5    Gastroc. muscle  5/5  5/5    Heel rise  5/5  5/5    Toe rise  5/5  5/5      Sensory: light touch is intact to bilateral upper and lower extremities     Reflexes:    Right Left   Biceps 2+ 2+   Triceps 2+ 2+   Brachioradialis 2+ 2+   Patellar 1+ 1+   Achilles 1+ 1+   Babinski neg neg     Other tests:  Brown's: Negative    Clonus: negative  Tandem gait: positive  Romberg: positive   Carpal Tinel/Phalen: negative bilateral   Cubital Tinel: negative bilateral   Shoulder: painless active & passive ROM bilateral     Diagnostic Tests   IMAGING: I have personally reviewed the images and these are my findings:  Cervical Spine X-rays from 11/3/2023: multi level cervical spondylosis with loss of disc height, anterior osteophyte formation and facet hypertrophy, no apparent spondylolisthesis, no appreciated lytic/blastic lesions, no obvious instability    Cervical Spine MRI from 8/8/2023: multi level cervical disc degeneration, loss of disc height, bulging discs most noted at C3-4, C4-5, C5-6, C6-7 with severe spinal stenosis, cord signal abnormality at C6, varying degrees of foraminal stenosis     Procedures, if performed today     None performed       Portions of the record may have been created with voice recognition software. Occasional wrong word or "sound a like" substitutions may have occurred due to the inherent limitations of voice recognition software. Read the chart carefully and recognize, using context, where substitutions have occurred.

## 2023-12-26 ENCOUNTER — HOSPITAL ENCOUNTER (OUTPATIENT)
Dept: RADIOLOGY | Facility: HOSPITAL | Age: 68
Discharge: HOME | End: 2023-12-26
Payer: COMMERCIAL

## 2023-12-26 DIAGNOSIS — J06.9 ACUTE UPPER RESPIRATORY INFECTION: ICD-10-CM

## 2023-12-26 PROCEDURE — 71046 X-RAY EXAM CHEST 2 VIEWS: CPT

## 2024-01-02 ENCOUNTER — OFFICE VISIT (OUTPATIENT)
Dept: OBGYN CLINIC | Facility: HOSPITAL | Age: 69
End: 2024-01-02
Payer: COMMERCIAL

## 2024-01-02 VITALS
BODY MASS INDEX: 39.07 KG/M2 | WEIGHT: 257.8 LBS | DIASTOLIC BLOOD PRESSURE: 77 MMHG | HEART RATE: 93 BPM | SYSTOLIC BLOOD PRESSURE: 134 MMHG | HEIGHT: 68 IN

## 2024-01-02 DIAGNOSIS — M17.11 PRIMARY OSTEOARTHRITIS OF RIGHT KNEE: Primary | ICD-10-CM

## 2024-01-02 PROCEDURE — 20610 DRAIN/INJ JOINT/BURSA W/O US: CPT | Performed by: ORTHOPAEDIC SURGERY

## 2024-01-02 PROCEDURE — 99213 OFFICE O/P EST LOW 20 MIN: CPT | Performed by: ORTHOPAEDIC SURGERY

## 2024-01-02 RX ORDER — BETAMETHASONE SODIUM PHOSPHATE AND BETAMETHASONE ACETATE 3; 3 MG/ML; MG/ML
12 INJECTION, SUSPENSION INTRA-ARTICULAR; INTRALESIONAL; INTRAMUSCULAR; SOFT TISSUE
Status: COMPLETED | OUTPATIENT
Start: 2024-01-02 | End: 2024-01-02

## 2024-01-02 RX ORDER — BUPIVACAINE HYDROCHLORIDE 2.5 MG/ML
2 INJECTION, SOLUTION INFILTRATION; PERINEURAL
Status: COMPLETED | OUTPATIENT
Start: 2024-01-02 | End: 2024-01-02

## 2024-01-02 RX ORDER — NAPROXEN 500 MG/1
TABLET ORAL
COMMUNITY
Start: 2023-10-31

## 2024-01-02 RX ORDER — LIDOCAINE HYDROCHLORIDE 10 MG/ML
2 INJECTION, SOLUTION EPIDURAL; INFILTRATION; INTRACAUDAL; PERINEURAL
Status: COMPLETED | OUTPATIENT
Start: 2024-01-02 | End: 2024-01-02

## 2024-01-02 RX ADMIN — BETAMETHASONE SODIUM PHOSPHATE AND BETAMETHASONE ACETATE 12 MG: 3; 3 INJECTION, SUSPENSION INTRA-ARTICULAR; INTRALESIONAL; INTRAMUSCULAR; SOFT TISSUE at 14:00

## 2024-01-02 RX ADMIN — LIDOCAINE HYDROCHLORIDE 2 ML: 10 INJECTION, SOLUTION EPIDURAL; INFILTRATION; INTRACAUDAL; PERINEURAL at 14:00

## 2024-01-02 RX ADMIN — BUPIVACAINE HYDROCHLORIDE 2 ML: 2.5 INJECTION, SOLUTION INFILTRATION; PERINEURAL at 14:00

## 2024-01-02 NOTE — PROGRESS NOTES
"Assessment/Plan:  1. Primary osteoarthritis of right knee  Large joint arthrocentesis: R knee        Scribe Attestation      I,:  Eugene Juliustatyana am acting as a scribe while in the presence of the attending physician.:       I,:  Zack Montenegro MD personally performed the services described in this documentation    as scribed in my presence.:             Edgar upon examination and review of his medical chart does demonstrate pain.  Associated with known underlying osteoarthritis of the right knee.  As he did fail to have symptomatic relief with the Durolane injections we will opt for steroid injections today.  He did verbalize understanding and consent for a right knee intra-articular steroid injection.  He tolerated the injection well without complication.  Postinjection instructions were provided.  I did discuss metrics for him to meet and maintain in order to be a candidate for total knee arthroplasty such as BMI below 40 as well as A1c below 7 in addition to GFR 60 and above.  He does meet metrics for both of the MRI as well as the A1c but does have a slightly low GFR as of September of 59.  Edgar again verbalized understanding and will follow-up in 3 months time for repeat clinical evaluation.      Large joint arthrocentesis: R knee  Universal Protocol:  Consent: Verbal consent obtained.  Risks and benefits: risks, benefits and alternatives were discussed  Consent given by: patient  Time out: Immediately prior to procedure a \"time out\" was called to verify the correct patient, procedure, equipment, support staff and site/side marked as required.  Timeout called at: 1/2/2024 1:57 PM.  Patient understanding: patient states understanding of the procedure being performed  Site marked: the operative site was marked  Patient identity confirmed: verbally with patient  Supporting Documentation  Indications: pain   Procedure Details  Location: knee - R knee  Needle size: 22 G  Ultrasound guidance: " no  Approach: anterolateral  Medications administered: 2 mL bupivacaine 0.25 %; 2 mL lidocaine (PF) 1 %; 12 mg betamethasone acetate-betamethasone sodium phosphate 6 (3-3) mg/mL    Patient tolerance: patient tolerated the procedure well with no immediate complications  Dressing:  Sterile dressing applied          Subjective:   Edgar Obando is a 68 y.o. male who presents for follow-up evaluation of his right knee pain.  He has a well-known history of tricompartmental osteoarthritis has been treated nonoperatively with steroid injections as well as viscosupplementation injections.  He did undergo a 1 shot Durolane injection on April 19, 2023.  He states that this unfortunately failed to provide him with symptomatic relief.  His pain is worsened with weightbearing activities and describes as moderate to severe at its worst with a pain rating of 9/10 when its most severe.  He denies any gross instability.  He does have a history of diabetes.  However it has been well-controlled and does utilize Trulicity and diet.  His most recent A1c was 6.1 as of September 2023.  He has had some health issues with a recent upper respiratory infection as well as a history of kidney disease in addition to a cardiac history.  He does wish to maximize his health as he is being worked up for a suspected left chest mass from his PCP.  If he does have medical clearance he would like to consider total knee arthroplasty within the next year.  Today he denies any distal paresthesias.      Review of Systems   Constitutional:  Negative for chills, fever and unexpected weight change.   HENT:  Negative for hearing loss, nosebleeds and sore throat.    Eyes:  Negative for pain, redness and visual disturbance.   Respiratory:  Negative for cough, shortness of breath and wheezing.    Cardiovascular:  Negative for chest pain, palpitations and leg swelling.   Gastrointestinal:  Negative for abdominal pain, nausea and vomiting.   Endocrine: Negative for  polyphagia and polyuria.   Genitourinary:  Negative for dysuria and hematuria.   Musculoskeletal:  Positive for arthralgias and myalgias.        See HPI   Skin:  Negative for rash and wound.   Neurological:  Negative for dizziness, numbness and headaches.   Psychiatric/Behavioral:  Negative for decreased concentration and suicidal ideas. The patient is not nervous/anxious.          Past Medical History:   Diagnosis Date    Acid reflux     Anxiety     Arthritis     Cellulitis     left ankle    Diabetes mellitus (HCC)     Hypertension     Sleep apnea     Twitching        Past Surgical History:   Procedure Laterality Date    APPENDECTOMY      CHOLECYSTECTOMY      KNEE ARTHROSCOPY      ME ARTHRP KNE CONDYLE&PLATU MEDIAL&LAT COMPARTMENTS Left 2016    Procedure: TOTAL  KNEE REPLACEMENT ;  Surgeon: Zack oMntenegro MD;  Location: [facility];  Service: Orthopedics       Family History   Problem Relation Age of Onset    Diabetes Mother     Diabetes Father     Cancer Father        Social History     Occupational History    Occupation: Attendance officer    Occupation: Retired-    Tobacco Use    Smoking status: Former     Current packs/day: 0.00     Types: Cigarettes     Quit date:      Years since quittin.0    Smokeless tobacco: Never   Vaping Use    Vaping status: Never Used   Substance and Sexual Activity    Alcohol use: No    Drug use: Not Currently     Comment: tried multiple drugs in the past    Sexual activity: Not on file         Current Outpatient Medications:     naproxen (NAPROSYN) 500 mg tablet, , Disp: , Rfl:     albuterol (PROVENTIL HFA,VENTOLIN HFA) 90 mcg/act inhaler, Inhale 2 puffs every 6 (six) hours as needed for wheezing As needed, Disp: , Rfl:     aspirin (ECOTRIN LOW STRENGTH) 81 mg EC tablet, Take 81 mg by mouth daily, Disp: , Rfl:     bisacodyl (DULCOLAX) 5 mg EC tablet, Take 1 tablet (5 mg total) by mouth once for 1 dose, Disp: 2 tablet, Rfl: 0    Blood Glucose Monitoring  Suppl (ONE TOUCH ULTRA 2) w/Device KIT, USE TO TEST BLOOD GLUCOSE, Disp: , Rfl:     budesonide (RINOCORT AQUA) 32 MCG/ACT nasal spray, 1 spray into each nostril 2 (two) times a day As needed, Disp: , Rfl:     busPIRone (BUSPAR) 10 mg tablet, Take 1 tablet (10 mg total) by mouth 2 (two) times a day, Disp: 180 tablet, Rfl: 1    chlorhexidine (PERIDEX) 0.12 % solution, RINSE MOUTH WITH 15ML (1 CAPFUL) FOR 30 SECONDS IN MORNING AND EVENING AFTER BRUSHING, THEN SPIT, Disp: , Rfl:     clonazePAM (KlonoPIN) 1 mg tablet, Take 0.5 tablets (0.5 mg total) by mouth 2 (two) times a day, Disp: 60 tablet, Rfl: 0    cloNIDine (CATAPRES) 0.1 mg tablet, Take 0.1 mg by mouth 3 (three) times a day., Disp: , Rfl:     docusate sodium (COLACE) 100 mg capsule, Take 100 mg by mouth 2 (two) times a day, Disp: , Rfl:     fluticasone (FLOVENT HFA) 220 mcg/act inhaler, Inhale 1 puff daily as needed As needed, Disp: , Rfl:     furosemide (LASIX) 40 mg tablet, Take 40 mg by mouth as needed in the morning and 40 mg as needed in the evening., Disp: , Rfl:     Lancets 30G MISC, 3 (three) times a day Use to test blood glucose, Disp: , Rfl:     losartan (COZAAR) 25 mg tablet, , Disp: , Rfl:     mupirocin (BACTROBAN) 2 % ointment, , Disp: , Rfl:     NARCAN 4 MG/0.1ML LIQD, Has if needed, Disp: , Rfl:     OneTouch Ultra test strip, USE TO TEST BLOOD GLUCOSE TWICE DAILY, Disp: , Rfl:     potassium chloride (K-DUR,KLOR-CON) 10 mEq tablet, , Disp: , Rfl:     tamsulosin (FLOMAX) 0.4 mg, TAKE 1 CAPSULE BY MOUTH EVERY DAY WITH DINNER, Disp: 90 capsule, Rfl: 2    testosterone (ANDROGEL) 1.62 % TD gel pump, PLACE 1 ACTUATION ON THE SKIN EVERY MORNING. (Patient not taking: Reported on 11/16/2023), Disp: , Rfl:     Testosterone 12.5 MG/ACT (1%) GEL, PLACE 1 ACTUATION ON THE SKIN EVERY MORNING., Disp: , Rfl:     Trulicity 4.5 MG/0.5ML SOPN, , Disp: , Rfl:     zinc gluconate 50 mg tablet, Take 50 mg by mouth in the morning., Disp: , Rfl:     No Known  Allergies    Objective:  Vitals:    01/02/24 1330   BP: 134/77   Pulse: 93       Right Knee Exam     Tenderness   The patient is experiencing tenderness in the lateral joint line.    Range of Motion   Extension:  0   Flexion:  120     Tests   Varus: negative Valgus: negative    Other   Erythema: absent  Scars: absent  Sensation: normal  Pulse: present  Effusion: no effusion present    Comments:  Varus deformity correctable with valgus stress          Observations     Right Knee   Negative for effusion.       Physical Exam  Vitals reviewed.   HENT:      Head: Normocephalic and atraumatic.   Eyes:      General:         Right eye: No discharge.         Left eye: No discharge.      Conjunctiva/sclera: Conjunctivae normal.      Pupils: Pupils are equal, round, and reactive to light.   Cardiovascular:      Rate and Rhythm: Normal rate.   Pulmonary:      Effort: Pulmonary effort is normal. No respiratory distress.   Musculoskeletal:      Cervical back: Normal range of motion and neck supple.      Right knee: No effusion.   Skin:     General: Skin is warm and dry.   Neurological:      Mental Status: He is alert and oriented to person, place, and time.         I have personally reviewed pertinent films in PACS and my interpretation is as follows:  No images reviewed today      This document was created using speech voice recognition software.   Grammatical errors, random word insertions, pronoun errors, and incomplete sentences are an occasional consequence of this system due to software limitations, ambient noise, and hardware issues.   Any formal questions or concerns about content, text, or information contained within the body of this dictation should be directly addressed to the provider for clarification.

## 2024-01-09 ENCOUNTER — TELEPHONE (OUTPATIENT)
Dept: HEMATOLOGY ONCOLOGY | Facility: CLINIC | Age: 69
End: 2024-01-09

## 2024-01-09 NOTE — TELEPHONE ENCOUNTER
Appointment Change  Cancel, Reschedule, Change to Virtual      Who are you speaking with? Patient   If it is not the patient, is the caller listed on the communication consent form? N/A   Which provider is the appointment scheduled with? Verito Wallace PA-C   When was the original appointment scheduled?    Please list date and time 1/10/24 @ 230   At which location is the appointment scheduled to take place? [place]   Was the appointment rescheduled?     Was the appointment changed from an in person visit to a virtual visit?    If so, please list the details of the change. no   What is the reason for the appointment change? Patient has Covid       Was STAR transport scheduled? No   Does STAR transport need to be scheduled for the new visit (if applicable) No   Does the patient need an infusion appointment rescheduled? No   Does the patient have an upcoming infusion appointment scheduled? If so, when? No   Is the patient undergoing chemotherapy? No   For appointments cancelled with less than 24 hours:  Was the no-show policy reviewed? N/A

## 2024-01-15 ENCOUNTER — TELEPHONE (OUTPATIENT)
Dept: FAMILY MEDICINE CLINIC | Facility: CLINIC | Age: 69
End: 2024-01-15

## 2024-01-16 NOTE — TELEPHONE ENCOUNTER
Agree with below, miralax sent to pharmacy Stop Amlodipine   Increase bicitra to 15mls twice a day  Continue Mycophenolate 2mls twice a day  --------------------------------------------------------------------------------------------------  Please contact our office with any questions or concerns.     Providers book out months in advance please schedule follow up appointments as soon as possible.     Scheduling and Questions: [phone]     services: [phone]    On-call Nephrologist for after hours, weekends and urgent concerns: [phone].    Nephrology Office Fax #: [phone]    Nephrology Nurses  Nurse Triage Line: [phone]

## 2024-01-22 ENCOUNTER — TELEPHONE (OUTPATIENT)
Age: 69
End: 2024-01-22

## 2024-01-22 NOTE — TELEPHONE ENCOUNTER
Pt wants to know if there are labs that need to be done before appt on 2/29/24. Please contact pt and advise. Thank you for your assistance.

## 2024-01-23 DIAGNOSIS — F41.9 ANXIETY: ICD-10-CM

## 2024-01-23 NOTE — TELEPHONE ENCOUNTER
Reason for call:   [x] Refill   [] Prior Auth  [] Other:     Office:   [x] PCP/Provider -   [] Specialty/Provider -     Medication: KLONOPIN    Dose/Frequency: 1 mg    Quantity: 60    Pharmacy: [facility]  [address]  [place], [place]    Does the patient have enough for 3 days?   [x] Yes   [] No - Send as HP to POD

## 2024-01-23 NOTE — PROGRESS NOTES
Pulmonary Consultation   Edgar Obando 68 y.o. male MRN: [id number]  1/23/2024      Reason for Consultation:  Left hemidiaphragm elevation     Assessment/Plan:  Abnormal chest x-ray  Mild elevation of left hemidiaphragm, overall poor inspiratory effort on CXR with bilateral parenchymal crowding. Patient asymptomatic at this time and denied major cardiac/spinal surgeries and recent trauma. Patient has repeat chest x-ray already order from PCP and is to obtain this today. I will follow up this chest x-ray. If persistent concern for left hemidiaphragm elevation, we will then obtain a sniff test for further evaluation.    Cervical spondylosis  - Following closely with orthopedics    SUSY (obstructive sleep apnea)  - Remains on CPAP therapy  - Follow up with sleep medicine scheduled for 2/28/2024      History of Present Illness   HPI:  Edgar Obando is a 68 y.o. male with history of GERD, HTN, T2DM, CKD 3a, HFpEF, cervical myelopathy 2/2 cervical spondylosis and stenosis, SUSY on CPAP, anxiety who presents for evaluation of abnormal chest x-ray.    Patient had chest x-ray obtained on 12/26/2023 for evaluation of acute respiratory infection. Imaging showed poor pulmonary expansion however with slight elevation of the left hemidiaphragm. Patient reports history of coughing for 2-2.5 weeks. He was treated with antibiotics and supportive therapy with guaifenesin. He denied current symptoms of shortness of breath, fevers, chills. Prior to this illness, he denied shortness of breath.  He reports that the majority of his symptoms are surrounding his difficulty with walking and his symptoms related to his legs.  He denied history of recent trauma, cardiac surgeries, cervical surgeries.  Patient does have a history of cervical myelopathy secondary to cervical spondylosis and stenosis.  He [facility]       Pulmonary history:   Patient denied ever being diagnosed with COPD or asthma.  He has been  diagnosed scratch that patient does have a history of SUSY and wears CPAP nightly.  He follows with Dr. Malik.  Patient is a former smoker.  Smoked from the age of 12 to 30 years old.  At most he smoked 2 packs/day.    Review of systems:  12 point review of systems was completed and was otherwise negative except as listed in HPI.    Historical Information   Past Medical History:   Diagnosis Date    Acid reflux     Anxiety     Arthritis     Cellulitis     left ankle    Diabetes mellitus (HCC)     Hypertension     Sleep apnea     Twitching      Past Surgical History:   Procedure Laterality Date    APPENDECTOMY      CHOLECYSTECTOMY      KNEE ARTHROSCOPY      MT ARTHRP KNE CONDYLE&PLATU MEDIAL&LAT COMPARTMENTS Left 2016    Procedure: TOTAL  KNEE REPLACEMENT ;  Surgeon: Zack Montenegro MD;  Location: [facility];  Service: Orthopedics     Family History   Problem Relation Age of Onset    Diabetes Mother     Diabetes Father     Cancer Father        Occupational History:  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, working with students   [employer] [id number]- stayed state side     Social History:   Social History     Tobacco Use   Smoking Status Former    Current packs/day: 0.00    Types: Cigarettes    Quit date:     Years since quittin.0   Smokeless Tobacco Never       Meds/Allergies     Current Outpatient Medications:     albuterol (PROVENTIL HFA,VENTOLIN HFA) 90 mcg/act inhaler, Inhale 2 puffs every 6 (six) hours as needed for wheezing As needed, Disp: , Rfl:     aspirin (ECOTRIN LOW STRENGTH) 81 mg EC tablet, Take 81 mg by mouth daily, Disp: , Rfl:     Blood Glucose Monitoring Suppl (ONE TOUCH ULTRA 2) w/Device KIT, USE TO TEST BLOOD GLUCOSE, Disp: , Rfl:     budesonide (RINOCORT AQUA) 32 MCG/ACT nasal spray, 1 spray into each nostril 2 (two) times a day As needed, Disp: , Rfl:     busPIRone (BUSPAR) 10 mg tablet, Take 1 tablet (10 mg total) by mouth 2 (two) times a day, Disp: 180 tablet, Rfl: 1    chlorhexidine  (PERIDEX) 0.12 % solution, RINSE MOUTH WITH 15ML (1 CAPFUL) FOR 30 SECONDS IN MORNING AND EVENING AFTER BRUSHING, THEN SPIT, Disp: , Rfl:     clonazePAM (KlonoPIN) 1 mg tablet, Take 0.5 tablets (0.5 mg total) by mouth 2 (two) times a day, Disp: 60 tablet, Rfl: 0    cloNIDine (CATAPRES) 0.1 mg tablet, Take 0.1 mg by mouth 3 (three) times a day., Disp: , Rfl:     docusate sodium (COLACE) 100 mg capsule, Take 100 mg by mouth 2 (two) times a day, Disp: , Rfl:     fluticasone (FLOVENT HFA) 220 mcg/act inhaler, Inhale 1 puff daily as needed As needed, Disp: , Rfl:     furosemide (LASIX) 40 mg tablet, Take 40 mg by mouth as needed in the morning and 40 mg as needed in the evening., Disp: , Rfl:     Lancets 30G MISC, 3 (three) times a day Use to test blood glucose, Disp: , Rfl:     losartan (COZAAR) 25 mg tablet, , Disp: , Rfl:     mupirocin (BACTROBAN) 2 % ointment, , Disp: , Rfl:     naproxen (NAPROSYN) 500 mg tablet, , Disp: , Rfl:     NARCAN 4 MG/0.1ML LIQD, Has if needed, Disp: , Rfl:     OneTouch Ultra test strip, USE TO TEST BLOOD GLUCOSE TWICE DAILY, Disp: , Rfl:     potassium chloride (K-DUR,KLOR-CON) 10 mEq tablet, , Disp: , Rfl:     tamsulosin (FLOMAX) 0.4 mg, TAKE 1 CAPSULE BY MOUTH EVERY DAY WITH DINNER, Disp: 90 capsule, Rfl: 2    Testosterone 12.5 MG/ACT (1%) GEL, PLACE 1 ACTUATION ON THE SKIN EVERY MORNING., Disp: , Rfl:     Trulicity 4.5 MG/0.5ML SOPN, , Disp: , Rfl:     zinc gluconate 50 mg tablet, Take 50 mg by mouth in the morning., Disp: , Rfl:     bisacodyl (DULCOLAX) 5 mg EC tablet, Take 1 tablet (5 mg total) by mouth once for 1 dose, Disp: 2 tablet, Rfl: 0    testosterone (ANDROGEL) 1.62 % TD gel pump, PLACE 1 ACTUATION ON THE SKIN EVERY MORNING. (Patient not taking: Reported on 11/16/2023), Disp: , Rfl:   No Known Allergies    Vitals: There were no vitals taken for this visit., There is no height or weight on file to calculate BMI.      Physical Exam  General: No acute distress  HENT: Normocephalic,  "atraumatic.  PERRL, EOMI, Moist mucous membranes.  Neck: Supple  Lungs: Clear to auscultation bilaterally, no wheezes, rales, rhonchi.  On room air  Heart: Regular rate and rhythm, S1-S2 present, no murmurs rubs or gallops  Abdomen: Protuberant, nontender  Extremities: Warm and well perfused. Bilateral LE edema, baseline per patient   Pulses: 2+ and symmetric  Skin: Warm, dry, no rashes or lesions  Neurologic: No focal neurologic deficits     Labs: I have personally reviewed pertinent lab results.  Lab Results   Component Value Date    WBC 7.60 09/14/2023    HGB 12.8 09/14/2023    HCT 39.8 09/14/2023    MCV 90 09/14/2023     09/14/2023     Lab Results   Component Value Date    GLUCOSE 120 10/04/2014    CALCIUM 9.1 09/14/2023     10/04/2014    K 4.1 09/14/2023    CO2 30 09/14/2023    CL 97 09/14/2023    BUN 25 09/14/2023    CREATININE 1.30 09/14/2023     No results found for: \"IGE\"  Lab Results   Component Value Date    ALT 18 09/14/2023    AST 19 09/14/2023    ALKPHOS 93 09/14/2023    BILITOT 0.76 10/04/2014         Imaging and other studies: I have personally reviewed pertinent films in PACS  XR chest pa & lateral 12/26/2023  Result Date: 12/26/2023  Impression: Poor pulmonary expansion with mild parenchymal crowding at the lung bases particularly on the left. Left hemidiaphragm may show eventration. No airspace infiltrates or pulmonary edema. No effusion Electronically signed: 12/26/2023 04:20 PM Perez Coughlin MD    My interpretation: Mild elevation of left hemidiaphragm, overall poor inspiratory effort on CXR with bilateral vascular congestion likely from parenchymal crowding     CT chest w/o CO 2/9/2022 reads: There are very mild atelectatic changes at the lung bases. There is   no alveolar or groundglass infiltrate.       Pulmonary function testing: no prior studies available      EKG, Pathology, and Other Studies: I have personally reviewed pertinent reports.        Disclaimer: Portions of the " "record may have been created with voice recognition software. Occasional wrong word or \"sound a like\" substitutions may have occurred due to the inherent limitations of voice recognition software. Careful consideration should be taken to recognize, using context, where substitutions have occurred.    Lester Woods DO   Pulmonary & Critical Care Medicine Fellow PGY-IV  [facility]  "

## 2024-01-24 ENCOUNTER — CONSULT (OUTPATIENT)
Dept: PULMONOLOGY | Facility: CLINIC | Age: 69
End: 2024-01-24
Payer: COMMERCIAL

## 2024-01-24 ENCOUNTER — HOSPITAL ENCOUNTER (OUTPATIENT)
Dept: RADIOLOGY | Facility: HOSPITAL | Age: 69
Discharge: HOME | End: 2024-01-24
Payer: COMMERCIAL

## 2024-01-24 VITALS
TEMPERATURE: 97.9 F | OXYGEN SATURATION: 96 % | HEART RATE: 80 BPM | DIASTOLIC BLOOD PRESSURE: 72 MMHG | SYSTOLIC BLOOD PRESSURE: 132 MMHG

## 2024-01-24 DIAGNOSIS — J06.9 ACUTE UPPER RESPIRATORY INFECTION: ICD-10-CM

## 2024-01-24 DIAGNOSIS — M47.812 CERVICAL SPONDYLOSIS: ICD-10-CM

## 2024-01-24 DIAGNOSIS — R93.89 ABNORMAL CHEST X-RAY: Primary | ICD-10-CM

## 2024-01-24 DIAGNOSIS — G47.33 OSA (OBSTRUCTIVE SLEEP APNEA): ICD-10-CM

## 2024-01-24 PROCEDURE — 71046 X-RAY EXAM CHEST 2 VIEWS: CPT

## 2024-01-24 PROCEDURE — 99204 OFFICE O/P NEW MOD 45 MIN: CPT | Performed by: INTERNAL MEDICINE

## 2024-01-24 RX ORDER — CLONAZEPAM 1 MG/1
0.5 TABLET ORAL 2 TIMES DAILY
Qty: 60 TABLET | Refills: 0 | Status: SHIPPED | OUTPATIENT
Start: 2024-01-24

## 2024-01-24 NOTE — ASSESSMENT & PLAN NOTE
Mild elevation of left hemidiaphragm, overall poor inspiratory effort on CXR with bilateral parenchymal crowding. Patient asymptomatic at this time and denied major cardiac/spinal surgeries and recent trauma. Patient has repeat chest x-ray already order from PCP and is to obtain this today. I will follow up this chest x-ray. If persistent concern for left hemidiaphragm elevation, we will then obtain a sniff test for further evaluation.

## 2024-01-24 NOTE — TELEPHONE ENCOUNTER
Medication:  PDMP   11/18/2023 11/16/2023 clonazePAM (Tablet) 60.0 60 1 MG NA HUMBERTO PAT     Active agreement on file -No

## 2024-01-26 DIAGNOSIS — J98.6 ELEVATED HEMIDIAPHRAGM: Primary | ICD-10-CM

## 2024-02-15 DIAGNOSIS — Z00.6 ENCOUNTER FOR EXAMINATION FOR NORMAL COMPARISON OR CONTROL IN CLINICAL RESEARCH PROGRAM: ICD-10-CM

## 2024-03-01 ENCOUNTER — APPOINTMENT (OUTPATIENT)
Dept: LAB | Facility: CLINIC | Age: 69
End: 2024-03-01

## 2024-03-01 DIAGNOSIS — Z00.6 ENCOUNTER FOR EXAMINATION FOR NORMAL COMPARISON OR CONTROL IN CLINICAL RESEARCH PROGRAM: ICD-10-CM

## 2024-03-01 PROCEDURE — 36415 COLL VENOUS BLD VENIPUNCTURE: CPT

## 2024-03-13 ENCOUNTER — OFFICE VISIT (OUTPATIENT)
Dept: OBGYN CLINIC | Facility: HOSPITAL | Age: 69
End: 2024-03-13
Payer: COMMERCIAL

## 2024-03-13 VITALS
HEIGHT: 68 IN | DIASTOLIC BLOOD PRESSURE: 75 MMHG | HEART RATE: 105 BPM | WEIGHT: 257 LBS | BODY MASS INDEX: 38.95 KG/M2 | SYSTOLIC BLOOD PRESSURE: 152 MMHG

## 2024-03-13 DIAGNOSIS — G95.9 CERVICAL MYELOPATHY (HCC): Primary | ICD-10-CM

## 2024-03-13 PROCEDURE — 99212 OFFICE O/P EST SF 10 MIN: CPT | Performed by: ORTHOPAEDIC SURGERY

## 2024-03-13 NOTE — PROGRESS NOTES
"Assessment & Plan/Medical Decision Makin y.o. male with Neck Pain, signs/symptoms of cervical myelopathy and imaging findings most notable for Cervical spondylosis and Stenosis         The clinical, physical and imaging findings were reviewed with the patient.  Edgar  has a constellation of findings consistent with Cervical Myelopathy in the setting of cervical stenosis.  He reports his main issue associated with myelopathy is currently balance and uses a cane for ambulation, however denies any other signs/symptoms of myelopathy. Given his mild signs/symptoms of myelopathy at this time that are stable in nature and have not progressed, agree that it is reasonable to continue observation given PT/HEP has improved symptomatology. Discussed in detail the progression of cervical myelopathy and associated signs/symptoms with patient. Patient will need to follow-up closely for monitor symptoms - he verbalized his understanding of signs to look for. Follow up in 6 months. If he develops any new or worsening symptoms, he will need to follow up sooner.  Patient instructed to return to office/ER sooner if symptoms are not improving, getting worse, or new worrisome/neurologic symptoms arise.      Subjective:      Chief Complaint: Neck Pain    HPI:  Edgar Obando is a 68 y.o. male presenting for initial visit with chief complaint of neck pain referred to us by Dr. Linda.  He has been having pain for 8-9 months without any known inciting injury.  He says this all started around the time he retired from being a .  His neck does not always bother him, and the pain seems to be random in nature.  He also notes a grinding sensation in his neck like there is \"sand in there.\"  He says he frequently drops things and has been doing so for the past few months.  He says his balance is off.  He has been using a cane for about 8 months.  He is a diabetic.  His most recent A1c was in the 6's.    Denies any nohemi trauma. " "Denies fever or chills, no night sweats. Denies any bladder or bowel changes.      Conservative therapy includes the following:   Medications: Tylenol and ibuprofen at present; he has tried muscle relaxer's and narcotics in the past but he does not like them  Injections: No     Physical Therapy: Yes - did provide some relief (mostly for low back)  Chiropractic Medicine: He has tried this and it has been helpful  Accupunture/Massage Therapy: has not attempted   These therapeutic modalities were ineffective at providing sustained pain relief/functional improvement.     Nicotine dependent: No  Occupation: retired   Living situation: Lives with his wife   ADLs: He does have trouble with fine motor skills.    Update on 12/1/2023:  He reports neck pain has improved with at-home stretches and exercises that he learned at physical therapy. Also with ongoing bilateral foot numbness/tingling that has improved since February, noting burning sensation has decreased. Does note feeling like he is walking on \"balloons\". Continues with balance issues, uses cane for ambulation. Had multiple falls previously. Denies significant neck or upper extremity pain. Denies numbness/tingling into upper extremities. Reports dropping items frequently about 4 months ago that has since improved, reporting he doesn't drop items frequently anymore. Denies dropping items, changes in fine motor skills or dexterity. Denies nohemi upper extremity weakness.    Does report ongoing isolated right knee pain, noting this is his main source of pain and limitation at this time. PMH T2DM, most recent HbA1c 6.1 on 9/14/2023.    Update 3/13/24:  Edgar presents in follow up for cervical myelopathy. He notes that he has intermittent neck pain 1x per month. He uses a cane for ambulation given he does have balance issues, however he states his balance has not gotten worse. He has had no falls. He denies any changes in his handwriting or new onset difficulty " with fine motor skills. He would like to start swimming [facility] the [facility] soon.    Objective:     Family History   Problem Relation Age of Onset    Diabetes Mother     Diabetes Father     Cancer Father        Past Medical History:   Diagnosis Date    Acid reflux     Anxiety     Arthritis     Cellulitis     left ankle    Diabetes mellitus (HCC)     Hypertension     Sleep apnea     Twitching        Current Outpatient Medications   Medication Sig Dispense Refill    albuterol (PROVENTIL HFA,VENTOLIN HFA) 90 mcg/act inhaler Inhale 2 puffs every 6 (six) hours as needed for wheezing As needed      aspirin (ECOTRIN LOW STRENGTH) 81 mg EC tablet Take 81 mg by mouth daily      bisacodyl (DULCOLAX) 5 mg EC tablet Take 1 tablet (5 mg total) by mouth once for 1 dose 2 tablet 0    Blood Glucose Monitoring Suppl (ONE TOUCH ULTRA 2) w/Device KIT USE TO TEST BLOOD GLUCOSE      budesonide (RINOCORT AQUA) 32 MCG/ACT nasal spray 1 spray into each nostril 2 (two) times a day As needed      busPIRone (BUSPAR) 10 mg tablet Take 1 tablet (10 mg total) by mouth 2 (two) times a day 180 tablet 1    chlorhexidine (PERIDEX) 0.12 % solution RINSE MOUTH WITH 15ML (1 CAPFUL) FOR 30 SECONDS IN MORNING AND EVENING AFTER BRUSHING, THEN SPIT      clonazePAM (KlonoPIN) 1 mg tablet Take 0.5 tablets (0.5 mg total) by mouth 2 (two) times a day 60 tablet 0    cloNIDine (CATAPRES) 0.1 mg tablet Take 0.1 mg by mouth 3 (three) times a day.      docusate sodium (COLACE) 100 mg capsule Take 100 mg by mouth 2 (two) times a day      fluticasone (FLOVENT HFA) 220 mcg/act inhaler Inhale 1 puff daily as needed As needed      furosemide (LASIX) 40 mg tablet Take 40 mg by mouth as needed in the morning and 40 mg as needed in the evening.      Lancets 30G MISC 3 (three) times a day Use to test blood glucose      losartan (COZAAR) 25 mg tablet       mupirocin (BACTROBAN) 2 % ointment       naproxen (NAPROSYN) 500 mg tablet       NARCAN 4 MG/0.1ML LIQD Has if needed       OneTouch Ultra test strip USE TO TEST BLOOD GLUCOSE TWICE DAILY      potassium chloride (K-DUR,KLOR-CON) 10 mEq tablet       tamsulosin (FLOMAX) 0.4 mg TAKE 1 CAPSULE BY MOUTH EVERY DAY WITH DINNER 90 capsule 2    testosterone (ANDROGEL) 1.62 % TD gel pump PLACE 1 ACTUATION ON THE SKIN EVERY MORNING. (Patient not taking: Reported on 2023)      Testosterone 12.5 MG/ACT (1%) GEL PLACE 1 ACTUATION ON THE SKIN EVERY MORNING.      Trulicity 4.5 MG/0.5ML SOPN       zinc gluconate 50 mg tablet Take 50 mg by mouth in the morning.       No current facility-administered medications for this visit.       Past Surgical History:   Procedure Laterality Date    APPENDECTOMY      CHOLECYSTECTOMY      KNEE ARTHROSCOPY      IN ARTHRP KNE CONDYLE&PLATU MEDIAL&LAT COMPARTMENTS Left 2016    Procedure: TOTAL  KNEE REPLACEMENT ;  Surgeon: Zack Montenegro MD;  Location: [facility];  Service: Orthopedics       Social History     Socioeconomic History    Marital status: /Civil Union     Spouse name: Nayeli    Number of children: 3    Years of education: BS    Highest education level: Not on file   Occupational History    Occupation: Attendance officer    Occupation: Retired-    Tobacco Use    Smoking status: Former     Current packs/day: 0.00     Types: Cigarettes     Quit date:      Years since quittin.2    Smokeless tobacco: Never   Vaping Use    Vaping status: Never Used   Substance and Sexual Activity    Alcohol use: No    Drug use: Not Currently     Comment: tried multiple drugs in the past    Sexual activity: Not on file   Other Topics Concern    Not on file   Social History Narrative    Not on file     Social Determinants of Health     Financial Resource Strain: Low Risk  (2023)    Overall Financial Resource Strain (CARDIA)     Difficulty of Paying Living Expenses: Not hard at all   Food Insecurity: Not on file   Transportation Needs: No Transportation Needs (2023)    PRAPARE -  "Transportation     Lack of Transportation (Medical): No     Lack of Transportation (Non-Medical): No   Physical Activity: Not on file   Stress: Not on file   Social Connections: Not on file   Intimate Partner Violence: Not on file   Housing Stability: Not on file       No Known Allergies    Review of Systems  General- denies fever/chills  HEENT- denies hearing loss or sore throat  Eyes- denies eye pain or visual disturbances, denies red eyes  Respiratory- denies cough or SOB  Cardio- denies chest pain or palpitations  GI- denies abdominal pain  Endocrine- denies urinary frequency  Urinary- denies pain with urination  Musculoskeletal- Negative except noted above  Skin- denies rashes or wounds  Neurological- denies dizziness or headache  Psychiatric- denies anxiety or difficulty concentrating    Physical Exam  /75   Pulse 105   Ht 5' 7.5\" (1.715 m)   Wt 117 kg (257 lb)   BMI 39.66 kg/m²     General/Constitutional: No apparent distress: well-nourished and well developed.  Lymphatic: No appreciable lymphadenopathy  Respiratory: Non-labored breathing  Vascular: No edema, swelling or tenderness, except as noted in detailed exam.  Integumentary: No impressive skin lesions present, except as noted in detailed exam.  Psych: Normal mood and affect, oriented to person, place and time.  MSK: normal other than stated in HPI and exam  Gait & balance: He has wide based gait, ambulates with a Cane    Cervical  spine range of motion:  -Forward flexion chin to chest  -Extension to 60  -Lateral bend 30 right, 30 left  -Rotation 45 right, 45 left.    There is no point tenderness with palpation along the posterior cervical, thoracic, lumbar spine.     Neurologic:  Upper Extremity Motor Function    Right  Left    Deltoid  5/5  5/5    Bicep  5/5  5/5    Wrist extension  5/5  5/5    Tricep  5/5  5/5    Finger flexion/  5/5  5/5    Hand intrinsic  5/5  5/5      Lower Extremity Motor Function    Right  Left    Iliopsoas  5/5  " "5/5    Quadriceps 5/5 5/5   Tibialis anterior  5/5 5/5    EHL  5/5 5/5    Gastroc. muscle  5/5 5/5    Heel rise  5/5 5/5    Toe rise  5/5 5/5      Sensory: light touch is intact to bilateral upper and lower extremities     Reflexes:    Right Left   Biceps 2+ 2+   Triceps 2+ 2+   Brachioradialis 2+ 2+   Patellar 1+ 1+   Achilles 1+ 1+   Babinski neg neg     Other tests:  Brown's: Negative    Clonus: negative  Tandem gait: positive  Romberg: positive   Carpal Tinel/Phalen: negative bilateral   Cubital Tinel: negative bilateral   Shoulder: painless active & passive ROM bilateral     Diagnostic Tests   IMAGING: I have personally reviewed the images and these are my findings:  Cervical Spine X-rays from 11/3/2023: multi level cervical spondylosis with loss of disc height, anterior osteophyte formation and facet hypertrophy, no apparent spondylolisthesis, no appreciated lytic/blastic lesions, no obvious instability    Cervical Spine MRI from 8/8/2023: multi level cervical disc degeneration, loss of disc height, bulging discs most noted at C3-4, C4-5, C5-6, C6-7 with severe spinal stenosis, cord signal abnormality at C6, varying degrees of foraminal stenosis     Procedures, if performed today     None performed       Portions of the record may have been created with voice recognition software.  Occasional wrong word or \"sound a like\" substitutions may have occurred due to the inherent limitations of voice recognition software.  Read the chart carefully and recognize, using context, where substitutions have occurred.  "

## 2024-03-14 DIAGNOSIS — M17.11 PRIMARY OSTEOARTHRITIS OF RIGHT KNEE: ICD-10-CM

## 2024-03-14 DIAGNOSIS — M25.561 CHRONIC PAIN OF RIGHT KNEE: Primary | ICD-10-CM

## 2024-03-14 DIAGNOSIS — G89.29 CHRONIC PAIN OF RIGHT KNEE: Primary | ICD-10-CM

## 2024-03-18 LAB
APOB+LDLR+PCSK9 GENE MUT ANL BLD/T: NOT DETECTED
BRCA1+BRCA2 DEL+DUP + FULL MUT ANL BLD/T: NOT DETECTED
MLH1+MSH2+MSH6+PMS2 GN DEL+DUP+FUL M: NOT DETECTED

## 2024-03-28 RX ORDER — PREDNISONE 20 MG/1
TABLET ORAL
COMMUNITY
Start: 2024-01-26

## 2024-04-02 ENCOUNTER — OFFICE VISIT (OUTPATIENT)
Dept: OBGYN CLINIC | Facility: HOSPITAL | Age: 69
End: 2024-04-02
Payer: COMMERCIAL

## 2024-04-02 VITALS
WEIGHT: 253 LBS | SYSTOLIC BLOOD PRESSURE: 108 MMHG | HEIGHT: 68 IN | DIASTOLIC BLOOD PRESSURE: 65 MMHG | BODY MASS INDEX: 38.34 KG/M2 | HEART RATE: 69 BPM

## 2024-04-02 DIAGNOSIS — G95.9 CERVICAL MYELOPATHY (HCC): Primary | ICD-10-CM

## 2024-04-02 DIAGNOSIS — G89.29 CHRONIC PAIN OF RIGHT KNEE: Primary | ICD-10-CM

## 2024-04-02 DIAGNOSIS — M17.11 PRIMARY OSTEOARTHRITIS OF RIGHT KNEE: ICD-10-CM

## 2024-04-02 DIAGNOSIS — M25.561 CHRONIC PAIN OF RIGHT KNEE: Primary | ICD-10-CM

## 2024-04-02 PROCEDURE — 99212 OFFICE O/P EST SF 10 MIN: CPT | Performed by: ORTHOPAEDIC SURGERY

## 2024-04-02 PROCEDURE — 99213 OFFICE O/P EST LOW 20 MIN: CPT | Performed by: ORTHOPAEDIC SURGERY

## 2024-04-02 PROCEDURE — 20610 DRAIN/INJ JOINT/BURSA W/O US: CPT | Performed by: ORTHOPAEDIC SURGERY

## 2024-04-02 RX ORDER — BUPIVACAINE HYDROCHLORIDE 2.5 MG/ML
2 INJECTION, SOLUTION INFILTRATION; PERINEURAL
Status: COMPLETED | OUTPATIENT
Start: 2024-04-02 | End: 2024-04-02

## 2024-04-02 RX ORDER — LIDOCAINE HYDROCHLORIDE 10 MG/ML
2 INJECTION, SOLUTION INFILTRATION; PERINEURAL
Status: COMPLETED | OUTPATIENT
Start: 2024-04-02 | End: 2024-04-02

## 2024-04-02 RX ORDER — BETAMETHASONE SODIUM PHOSPHATE AND BETAMETHASONE ACETATE 3; 3 MG/ML; MG/ML
12 INJECTION, SUSPENSION INTRA-ARTICULAR; INTRALESIONAL; INTRAMUSCULAR; SOFT TISSUE
Status: COMPLETED | OUTPATIENT
Start: 2024-04-02 | End: 2024-04-02

## 2024-04-02 RX ADMIN — BETAMETHASONE SODIUM PHOSPHATE AND BETAMETHASONE ACETATE 12 MG: 3; 3 INJECTION, SUSPENSION INTRA-ARTICULAR; INTRALESIONAL; INTRAMUSCULAR; SOFT TISSUE at 14:00

## 2024-04-02 RX ADMIN — LIDOCAINE HYDROCHLORIDE 2 ML: 10 INJECTION, SOLUTION INFILTRATION; PERINEURAL at 14:00

## 2024-04-02 RX ADMIN — BUPIVACAINE HYDROCHLORIDE 2 ML: 2.5 INJECTION, SOLUTION INFILTRATION; PERINEURAL at 14:00

## 2024-04-02 NOTE — PROGRESS NOTES
"Assessment & Plan/Medical Decision Makin y.o. male with Neck Pain, signs/symptoms of cervical myelopathy and imaging findings most notable for Cervical spondylosis and Stenosis         The clinical, physical and imaging findings were reviewed with the patient.  Edgar  has a constellation of findings consistent with Cervical Myelopathy in the setting of cervical stenosis.  Edgar reports recent worsening of his balance with new onset bilateral hand and fingertip numbness. Continues to use cane for ambulation. Also with new onset of posterior neck pain with intermittent headaches. Given his signs/symptoms of cervical myelopathy are worsening, surgical intervention is warranted at this time to address multilevel cervical spinal stenosis and prevent further neurologic decline. Discussed in detail the progression of cervical myelopathy and associated signs/symptoms with patient.   Patient will follow up in 1 week for further surgical discussion.   Patient instructed to return to office/ER sooner if symptoms are not improving, getting worse, or new worrisome/neurologic symptoms arise.      Subjective:      Chief Complaint: Neck Pain    HPI:  Edgar Obando is a 68 y.o. male presenting for initial visit with chief complaint of neck pain referred to us by Dr. Linda.  He has been having pain for 8-9 months without any known inciting injury.  He says this all started around the time he retired from being a .  His neck does not always bother him, and the pain seems to be random in nature.  He also notes a grinding sensation in his neck like there is \"sand in there.\"  He says he frequently drops things and has been doing so for the past few months.  He says his balance is off.  He has been using a cane for about 8 months.  He is a diabetic.  His most recent A1c was in the 6's.    Denies any nohemi trauma. Denies fever or chills, no night sweats. Denies any bladder or bowel changes.      Conservative therapy " "includes the following:   Medications: Tylenol and ibuprofen at present; he has tried muscle relaxer's and narcotics in the past but he does not like them  Injections: No     Physical Therapy: Yes - did provide some relief (mostly for low back)  Chiropractic Medicine: He has tried this and it has been helpful  Accupunture/Massage Therapy: has not attempted   These therapeutic modalities were ineffective at providing sustained pain relief/functional improvement.     Nicotine dependent: No  Occupation: retired   Living situation: Lives with his wife   ADLs: He does have trouble with fine motor skills.    Update on 12/1/2023:  He reports neck pain has improved with at-home stretches and exercises that he learned at physical therapy. Also with ongoing bilateral foot numbness/tingling that has improved since February, noting burning sensation has decreased. Does note feeling like he is walking on \"balloons\". Continues with balance issues, uses cane for ambulation. Had multiple falls previously. Denies significant neck or upper extremity pain. Denies numbness/tingling into upper extremities. Reports dropping items frequently about 4 months ago that has since improved, reporting he doesn't drop items frequently anymore. Denies dropping items, changes in fine motor skills or dexterity. Denies nohemi upper extremity weakness.    Does report ongoing isolated right knee pain, noting this is his main source of pain and limitation at this time. PMH T2DM, most recent HbA1c 6.1 on 9/14/2023.    Update 3/13/24:  Edgar presents in follow up for cervical myelopathy. He notes that he has intermittent neck pain 1x per month. He uses a cane for ambulation given he does have balance issues, however he states his balance has not gotten worse. He has had no falls. He denies any changes in his handwriting or new onset difficulty with fine motor skills. He would like to start swimming at the [facility] soon.    Update 4/2/2024:  Edgar " reports recent onset of worsening balance and bilateral hand/fingertip numbness that was not present at his prior office visit on 3/13/24. He continues to use cane for ambulation. Also with new onset of posterior neck pain and headaches.    Objective:     Family History   Problem Relation Age of Onset    Diabetes Mother     Diabetes Father     Cancer Father        Past Medical History:   Diagnosis Date    Acid reflux     Anxiety     Arthritis     Cellulitis     left ankle    Diabetes mellitus (HCC)     Hypertension     Sleep apnea     Twitching        Current Outpatient Medications   Medication Sig Dispense Refill    albuterol (PROVENTIL HFA,VENTOLIN HFA) 90 mcg/act inhaler Inhale 2 puffs every 6 (six) hours as needed for wheezing As needed      aspirin (ECOTRIN LOW STRENGTH) 81 mg EC tablet Take 81 mg by mouth daily      bisacodyl (DULCOLAX) 5 mg EC tablet Take 1 tablet (5 mg total) by mouth once for 1 dose 2 tablet 0    Blood Glucose Monitoring Suppl (ONE TOUCH ULTRA 2) w/Device KIT USE TO TEST BLOOD GLUCOSE      budesonide (RINOCORT AQUA) 32 MCG/ACT nasal spray 1 spray into each nostril 2 (two) times a day As needed      busPIRone (BUSPAR) 10 mg tablet Take 1 tablet (10 mg total) by mouth 2 (two) times a day 180 tablet 1    chlorhexidine (PERIDEX) 0.12 % solution RINSE MOUTH WITH 15ML (1 CAPFUL) FOR 30 SECONDS IN MORNING AND EVENING AFTER BRUSHING, THEN SPIT      clonazePAM (KlonoPIN) 1 mg tablet Take 0.5 tablets (0.5 mg total) by mouth 2 (two) times a day 60 tablet 0    cloNIDine (CATAPRES) 0.1 mg tablet Take 0.1 mg by mouth 3 (three) times a day.      docusate sodium (COLACE) 100 mg capsule Take 100 mg by mouth 2 (two) times a day      fluticasone (FLOVENT HFA) 220 mcg/act inhaler Inhale 1 puff daily as needed As needed      furosemide (LASIX) 40 mg tablet Take 40 mg by mouth as needed in the morning and 40 mg as needed in the evening.      Lancets 30G MISC 3 (three) times a day Use to test blood glucose       losartan (COZAAR) 25 mg tablet       mupirocin (BACTROBAN) 2 % ointment       naproxen (NAPROSYN) 500 mg tablet       NARCAN 4 MG/0.1ML LIQD Has if needed      OneTouch Ultra test strip USE TO TEST BLOOD GLUCOSE TWICE DAILY      potassium chloride (K-DUR,KLOR-CON) 10 mEq tablet       predniSONE 20 mg tablet       tamsulosin (FLOMAX) 0.4 mg TAKE 1 CAPSULE BY MOUTH EVERY DAY WITH DINNER 90 capsule 2    testosterone (ANDROGEL) 1.62 % TD gel pump PLACE 1 ACTUATION ON THE SKIN EVERY MORNING. (Patient not taking: Reported on 2023)      Testosterone 12.5 MG/ACT (1%) GEL PLACE 1 ACTUATION ON THE SKIN EVERY MORNING.      Trulicity 4.5 MG/0.5ML SOPN       zinc gluconate 50 mg tablet Take 50 mg by mouth in the morning.       No current facility-administered medications for this visit.       Past Surgical History:   Procedure Laterality Date    APPENDECTOMY      CHOLECYSTECTOMY      KNEE ARTHROSCOPY      MS ARTHRP KNE CONDYLE&PLATU MEDIAL&LAT COMPARTMENTS Left 2016    Procedure: TOTAL  KNEE REPLACEMENT ;  Surgeon: Zack Montenegro MD;  Location: [facility];  Service: Orthopedics       Social History     Socioeconomic History    Marital status: /Civil Union     Spouse name: Nayeli    Number of children: 3    Years of education: BS    Highest education level: Not on file   Occupational History    Occupation: Attendance officer    Occupation: Retired-    Tobacco Use    Smoking status: Former     Current packs/day: 0.00     Types: Cigarettes     Quit date:      Years since quittin.2    Smokeless tobacco: Never   Vaping Use    Vaping status: Never Used   Substance and Sexual Activity    Alcohol use: No    Drug use: Not Currently     Comment: tried multiple drugs in the past    Sexual activity: Not on file   Other Topics Concern    Not on file   Social History Narrative    Not on file     Social Determinants of Health     Financial Resource Strain: Low Risk  (2023)    Overall Financial  Resource Strain (CARDIA)     Difficulty of Paying Living Expenses: Not hard at all   Food Insecurity: Not on file   Transportation Needs: No Transportation Needs (8/16/2023)    PRAPARE - Transportation     Lack of Transportation (Medical): No     Lack of Transportation (Non-Medical): No   Physical Activity: Not on file   Stress: Not on file   Social Connections: Not on file   Intimate Partner Violence: Not on file   Housing Stability: Not on file       No Known Allergies    Review of Systems  General- denies fever/chills  HEENT- denies hearing loss or sore throat  Eyes- denies eye pain or visual disturbances, denies red eyes  Respiratory- denies cough or SOB  Cardio- denies chest pain or palpitations  GI- denies abdominal pain  Endocrine- denies urinary frequency  Urinary- denies pain with urination  Musculoskeletal- Negative except noted above  Skin- denies rashes or wounds  Neurological- denies dizziness or headache  Psychiatric- denies anxiety or difficulty concentrating    Physical Exam  There were no vitals taken for this visit.    General/Constitutional: No apparent distress: well-nourished and well developed.  Lymphatic: No appreciable lymphadenopathy  Respiratory: Non-labored breathing  Vascular: No edema, swelling or tenderness, except as noted in detailed exam.  Integumentary: No impressive skin lesions present, except as noted in detailed exam.  Psych: Normal mood and affect, oriented to person, place and time.  MSK: normal other than stated in HPI and exam  Gait & balance: He has wide based gait, ambulates with a Cane    Cervical  spine range of motion:  -Forward flexion chin to chest  -Extension to 60  -Lateral bend 30 right, 30 left  -Rotation 45 right, 45 left.    There is no point tenderness with palpation along the posterior cervical, thoracic, lumbar spine.     Neurologic:  Upper Extremity Motor Function    Right  Left    Deltoid  5/5  5/5    Bicep  5/5  5/5    Wrist extension  5/5  5/5    Tricep   "5/5 5/5    Finger flexion/  5/5 5/5    Hand intrinsic  5/5 5/5      Lower Extremity Motor Function    Right  Left    Iliopsoas  5/5 5/5    Quadriceps 5/5 5/5   Tibialis anterior  5/5 5/5    EHL  5/5 5/5    Gastroc. muscle  5/5 5/5    Heel rise  5/5 5/5    Toe rise  5/5 5/5      Sensory: light touch is intact to bilateral upper and lower extremities     Reflexes:    Right Left   Biceps 2+ 2+   Triceps 2+ 2+   Brachioradialis 2+ 2+   Patellar 1+ 1+   Achilles 1+ 1+   Babinski neg neg     Other tests:  Brown's: Negative    Clonus: negative  Tandem gait: positive  Romberg: positive   Carpal Tinel/Phalen: negative bilateral   Cubital Tinel: negative bilateral   Shoulder: painless active & passive ROM bilateral     Diagnostic Tests   IMAGING: I have personally reviewed the images and these are my findings:  Cervical Spine X-rays from 11/3/2023: multi level cervical spondylosis with loss of disc height, anterior osteophyte formation and facet hypertrophy, no apparent spondylolisthesis, no appreciated lytic/blastic lesions, no obvious instability    Cervical Spine MRI from 8/8/2023: multi level cervical disc degeneration, loss of disc height, bulging discs most noted at C3-4, C4-5, C5-6, C6-7 with severe spinal stenosis, cord signal abnormality at C6, varying degrees of foraminal stenosis     Procedures, if performed today     None performed       Portions of the record may have been created with voice recognition software.  Occasional wrong word or \"sound a like\" substitutions may have occurred due to the inherent limitations of voice recognition software.  Read the chart carefully and recognize, using context, where substitutions have occurred.  "

## 2024-04-03 ENCOUNTER — TELEPHONE (OUTPATIENT)
Dept: PSYCHIATRY | Facility: CLINIC | Age: 69
End: 2024-04-03

## 2024-04-09 ENCOUNTER — HOSPITAL ENCOUNTER (OUTPATIENT)
Dept: RADIOLOGY | Facility: HOSPITAL | Age: 69
Discharge: HOME | End: 2024-04-09
Attending: ORTHOPAEDIC SURGERY
Payer: COMMERCIAL

## 2024-04-09 ENCOUNTER — OFFICE VISIT (OUTPATIENT)
Dept: OBGYN CLINIC | Facility: HOSPITAL | Age: 69
End: 2024-04-09
Payer: COMMERCIAL

## 2024-04-09 VITALS
HEIGHT: 68 IN | DIASTOLIC BLOOD PRESSURE: 66 MMHG | HEART RATE: 67 BPM | WEIGHT: 253.53 LBS | BODY MASS INDEX: 38.42 KG/M2 | SYSTOLIC BLOOD PRESSURE: 107 MMHG

## 2024-04-09 DIAGNOSIS — Z01.818 PRE-OP EVALUATION: ICD-10-CM

## 2024-04-09 DIAGNOSIS — G95.9 CERVICAL MYELOPATHY (HCC): ICD-10-CM

## 2024-04-09 DIAGNOSIS — M48.02 CERVICAL SPINAL STENOSIS: ICD-10-CM

## 2024-04-09 DIAGNOSIS — Z01.818 PRE-OP EVALUATION: Primary | ICD-10-CM

## 2024-04-09 DIAGNOSIS — M25.59 PAIN IN OTHER SPECIFIED JOINT: ICD-10-CM

## 2024-04-09 PROCEDURE — 71046 X-RAY EXAM CHEST 2 VIEWS: CPT

## 2024-04-09 PROCEDURE — 72082 X-RAY EXAM ENTIRE SPI 2/3 VW: CPT

## 2024-04-09 PROCEDURE — 99215 OFFICE O/P EST HI 40 MIN: CPT | Performed by: ORTHOPAEDIC SURGERY

## 2024-04-09 RX ORDER — CHLORHEXIDINE GLUCONATE ORAL RINSE 1.2 MG/ML
15 SOLUTION DENTAL ONCE
OUTPATIENT
Start: 2024-04-09 | End: 2024-04-09

## 2024-04-09 RX ORDER — CEFAZOLIN SODIUM 2 G/50ML
2000 SOLUTION INTRAVENOUS ONCE
OUTPATIENT
Start: 2024-04-09 | End: 2024-04-09

## 2024-04-09 NOTE — PROGRESS NOTES
Assessment & Plan/Medical Decision Makin y.o. male with Neck Pain, signs/symptoms of cervical myelopathy and imaging findings most notable for Cervical spondylosis and Stenosis         The clinical, physical and imaging findings were reviewed with the patient.  Edgar has a constellation of findings consistent with cervical myeloradiculopathy in setting cervical degenerative disease, multi level cervical stenosis.  Edgar reports recent worsening of his balance with new onset bilateral hand and fingertip numbness. Continues to use cane for ambulation. Also with new onset of posterior neck pain with intermittent headaches.     We discussed the differential diagnosis including alternative CNS/neurologic pathology, extremity/musculoskeltal pathology, peripheral nerve compression, etc. Edgar's symptoms, exam findings and imaging are most consistent with cervical spondylotic myelopathy.  In my opinion, no further work-up (EMG, etc) is warranted at this time.  He has signs and symptoms consistent with cervical myelopathy.  We discussed the natural history of both myelopathy and radiculopathy.  Discussed treatment options.  Reviewed the role of non operative treatment such as physical therapy, activity modification, medication mgmt, interventional spine procedures/injections, cervical immobilization in patient with mild myelopathy, no functional impairment and medically unsuitable patients. Given his progressing myelopathic symptoms, recommend consideration of surgical intervention.  Edgar would like to proceed with cervical decompression and stabilization surgery.     We discussed surgical options.  In my opinion, would be anterior cervical discectomy and fusion C3-7 to address cervical degenerative disc disease with stenosis.  We reviewed the options - such as ACDF versus disc arthroplasty versus posterior surgery for decompression and ICBG versus cage versus allograft +/- biologic/graft extenders  supplementation for obtaining solid arthrodesis as well as the associated risk/benefit profiles and fusion efficacy as well as the FDA status of the instrumentation and products. After discussion with the patient we will plan on anterior cervical discectomy and fusion with cage with graft extender supplementation. Explained at length the rationale for surgical invention and postoperative expectations.  We discussed and Edgar expressed his understanding, that in general, spine surgery is more predictive in improving extremity/radicular discomfort rather than axial spine pain, and arresting the progression of spinal cord/nerve dysfunction rather than improving.       I reviewed with the patient possible risks of surgery which included the risk of nerve injury including C5 nerve palsy, autonomic nervous system dysfunction, persistent and/or worsening pain, chronic pain, need for further surgery, risk of nonunion and instrumentation failure (and increased risk given 4 level construct), instability, adjacent segment disease/ degeneration, bone graft complications, vocal cord dysfunction including voice changes and hoarseness, swallowing difficulty with possible need for tube feeding, spinal fluid leak and meningitis, hematoma, blood loss, infection, DVT, pulmonary embolism, blindness, paralysis and even death, as well as other risks on consent form.  Patient agrees to proceed with surgery and understands all risks discussed.  Discussed the use of neurophysiology monitoring during the procedure.  We also reviewed patient specific risks and mitigation, including his medical co-morbidities including heart disease, diabetes, kidney disease.     We reviewed infection prevention.  Reviewed medications; instructed patient medications to stop before surgery (i.e. blood thinners, NSAIDs, DMARDs and vitamins).       Also reviewed with patient our narcotic policy.  We will provide medications up to 60 days postop.  In the event  patient requires more medications, will need to consult their PCP and/or see a pain management physician.     I have queried the [place] drug [place] database for Edgar to better assist in clinical decision making regarding prescriptions for controlled medications.  After querying the database and considering the clinical picture I have determined that Edgar is a candidate for a prescription for control medication in the initial postoperative period.      All questions were answered. Edgar verbalized understanding and desire to proceed with surgical scheduling.  Informed consent was obtained. Consent form was signed at today's visit.  Edgar's wife Nayeli was also present and had no further questions.       Hhe received a pre-operative chest Xray at today's visit. Orders for preoperative labs (CBC, CMP, PTT, INR, type & screen, and ECG) were also placed at today's visit. Discussed pre-operative clearances, medical optimization and risk stratification.  Edgar needs pre-operative clearance from PCP and Cardiology and obtain PATs.     Pre operative patient reported outcome measures were obtained.     In addition, patient will obtain cervical CT to assess osseous architecture and pre-operative planning.     He notes that for a prior surgery, he was unable to have an IV placed and required a PICC line to be placed pre-operatively with rescheduling of the procedure following adequate IV access.     Continue with medications as previously prescribed if providing pain/symptom relief.    Patient instructed to return to office/ER sooner if symptoms are not improving, getting worse, or new worrisome/neurologic symptoms arise.    Subjective:      Chief Complaint: Neck Pain    HPI:  Edgar Obando is a 68 y.o. male presenting for initial visit with chief complaint of neck pain referred to us by Dr. Linda.  He has been having pain for 8-9 months without any known inciting injury.  He says this all started  "around the time he retired from being a .  His neck does not always bother him, and the pain seems to be random in nature.  He also notes a grinding sensation in his neck like there is \"sand in there.\"  He says he frequently drops things and has been doing so for the past few months.  He says his balance is off.  He has been using a cane for about 8 months.  He is a diabetic.  His most recent A1c was in the 6's.    Denies any nohemi trauma. Denies fever or chills, no night sweats. Denies any bladder or bowel changes.      Conservative therapy includes the following:   Medications: Tylenol and ibuprofen at present; he has tried muscle relaxer's and narcotics in the past but he does not like them  Injections: No     Physical Therapy: Yes - did provide some relief (mostly for low back)  Chiropractic Medicine: He has tried this and it has been helpful  Accupunture/Massage Therapy: has not attempted   These therapeutic modalities were ineffective at providing sustained pain relief/functional improvement.     Nicotine dependent: No  Occupation: retired   Living situation: Lives with his wife   ADLs: He does have trouble with fine motor skills.    Update on 12/1/2023:  He reports neck pain has improved with at-home stretches and exercises that he learned at physical therapy. Also with ongoing bilateral foot numbness/tingling that has improved since February, noting burning sensation has decreased. Does note feeling like he is walking on \"balloons\". Continues with balance issues, uses cane for ambulation. Had multiple falls previously. Denies significant neck or upper extremity pain. Denies numbness/tingling into upper extremities. Reports dropping items frequently about 4 months ago that has since improved, reporting he doesn't drop items frequently anymore. Denies dropping items, changes in fine motor skills or dexterity. Denies nohemi upper extremity weakness.    Does report ongoing isolated right knee " pain, noting this is his main source of pain and limitation at this time. PMH T2DM, most recent HbA1c 6.1 on 9/14/2023.    Update 3/13/24:  Edgar presents in follow up for cervical myelopathy. He notes that he has intermittent neck pain 1x per month. He uses a cane for ambulation given he does have balance issues, however he states his balance has not gotten worse. He has had no falls. He denies any changes in his handwriting or new onset difficulty with fine motor skills. He would like to start swimming at the [facility] soon.    Update 4/2/2024:  Edgar reports recent onset of worsening balance and bilateral hand/fingertip numbness that was not present at his prior office visit on 3/13/24. He continues to use cane for ambulation. Also with new onset of posterior neck pain and headaches.    Update 4/9/24:  He presents today with his wife for a surgical discussion regarding his cervical myelopathy. He would like to proceed with surgery. He has increasing neck and periscapular pain with worsening bilateral hand/finger numbness associated with decreased dexterity, as well as continued balance difficulties necessitating an assistive device. He did sustain a fall getting out of bed.  He also notes radicular pain into the his left shoulder and arm.     Objective:     Family History   Problem Relation Age of Onset    Diabetes Mother     Diabetes Father     Cancer Father        Past Medical History:   Diagnosis Date    Acid reflux     Anxiety     Arthritis     Cellulitis     left ankle    Diabetes mellitus (HCC)     Hypertension     Sleep apnea     Twitching        Current Outpatient Medications   Medication Sig Dispense Refill    albuterol (PROVENTIL HFA,VENTOLIN HFA) 90 mcg/act inhaler Inhale 2 puffs every 6 (six) hours as needed for wheezing As needed      aspirin (ECOTRIN LOW STRENGTH) 81 mg EC tablet Take 81 mg by mouth daily      bisacodyl (DULCOLAX) 5 mg EC tablet Take 1 tablet (5 mg total) by mouth once for 1 dose 2  tablet 0    Blood Glucose Monitoring Suppl (ONE TOUCH ULTRA 2) w/Device KIT USE TO TEST BLOOD GLUCOSE      budesonide (RINOCORT AQUA) 32 MCG/ACT nasal spray 1 spray into each nostril 2 (two) times a day As needed      busPIRone (BUSPAR) 10 mg tablet Take 1 tablet (10 mg total) by mouth 2 (two) times a day 180 tablet 1    chlorhexidine (PERIDEX) 0.12 % solution RINSE MOUTH WITH 15ML (1 CAPFUL) FOR 30 SECONDS IN MORNING AND EVENING AFTER BRUSHING, THEN SPIT      clonazePAM (KlonoPIN) 1 mg tablet Take 0.5 tablets (0.5 mg total) by mouth 2 (two) times a day 60 tablet 0    cloNIDine (CATAPRES) 0.1 mg tablet Take 0.1 mg by mouth 3 (three) times a day.      docusate sodium (COLACE) 100 mg capsule Take 100 mg by mouth 2 (two) times a day      fluticasone (FLOVENT HFA) 220 mcg/act inhaler Inhale 1 puff daily as needed As needed      furosemide (LASIX) 40 mg tablet Take 40 mg by mouth as needed in the morning and 40 mg as needed in the evening.      Lancets 30G MISC 3 (three) times a day Use to test blood glucose      losartan (COZAAR) 25 mg tablet       mupirocin (BACTROBAN) 2 % ointment       naproxen (NAPROSYN) 500 mg tablet       NARCAN 4 MG/0.1ML LIQD Has if needed      OneTouch Ultra test strip USE TO TEST BLOOD GLUCOSE TWICE DAILY      potassium chloride (K-DUR,KLOR-CON) 10 mEq tablet       predniSONE 20 mg tablet       tamsulosin (FLOMAX) 0.4 mg TAKE 1 CAPSULE BY MOUTH EVERY DAY WITH DINNER 90 capsule 2    testosterone (ANDROGEL) 1.62 % TD gel pump PLACE 1 ACTUATION ON THE SKIN EVERY MORNING. (Patient not taking: Reported on 11/16/2023)      Testosterone 12.5 MG/ACT (1%) GEL PLACE 1 ACTUATION ON THE SKIN EVERY MORNING.      Trulicity 4.5 MG/0.5ML SOPN       zinc gluconate 50 mg tablet Take 50 mg by mouth in the morning.       No current facility-administered medications for this visit.       Past Surgical History:   Procedure Laterality Date    APPENDECTOMY      CHOLECYSTECTOMY      KNEE ARTHROSCOPY      DE ARTHRP PEE  CONDYLE&PLATU MEDIAL&LAT COMPARTMENTS Left 2016    Procedure: TOTAL  KNEE REPLACEMENT ;  Surgeon: Zack Montenegro MD;  Location: [facility];  Service: Orthopedics       Social History     Socioeconomic History    Marital status: /Civil Union     Spouse name: Nayeli    Number of children: 3    Years of education: BS    Highest education level: Not on file   Occupational History    Occupation: Attendance officer    Occupation: Retired-    Tobacco Use    Smoking status: Former     Current packs/day: 0.00     Types: Cigarettes     Quit date:      Years since quittin.2    Smokeless tobacco: Never   Vaping Use    Vaping status: Never Used   Substance and Sexual Activity    Alcohol use: No    Drug use: Not Currently     Comment: tried multiple drugs in the past    Sexual activity: Not on file   Other Topics Concern    Not on file   Social History Narrative    Not on file     Social Determinants of Health     Financial Resource Strain: Low Risk  (2023)    Overall Financial Resource Strain (CARDIA)     Difficulty of Paying Living Expenses: Not hard at all   Food Insecurity: Not on file   Transportation Needs: No Transportation Needs (2023)    PRAPARE - Transportation     Lack of Transportation (Medical): No     Lack of Transportation (Non-Medical): No   Physical Activity: Not on file   Stress: Not on file   Social Connections: Not on file   Intimate Partner Violence: Not on file   Housing Stability: Not on file       No Known Allergies    Review of Systems  General- denies fever/chills  HEENT- denies hearing loss or sore throat  Eyes- denies eye pain or visual disturbances, denies red eyes  Respiratory- denies cough or SOB  Cardio- denies chest pain or palpitations  GI- denies abdominal pain  Endocrine- denies urinary frequency  Urinary- denies pain with urination  Musculoskeletal- Negative except noted above  Skin- denies rashes or wounds  Neurological- denies dizziness or  "headache  Psychiatric- denies anxiety or difficulty concentrating    Physical Exam  /66   Pulse 67   Ht 5' 7.5\" (1.715 m)   Wt 115 kg (253 lb 8.5 oz)   BMI 39.12 kg/m²     General/Constitutional: No apparent distress: well-nourished and well developed.  Lymphatic: No appreciable lymphadenopathy  Respiratory: Non-labored breathing  Vascular: No edema, swelling or tenderness, except as noted in detailed exam.  Integumentary: No impressive skin lesions present, except as noted in detailed exam.  Psych: Normal mood and affect, oriented to person, place and time.  MSK: normal other than stated in HPI and exam  Gait & balance: He has wide based gait, ambulates with a Cane    Cervical  spine range of motion:  -Forward flexion chin to chest  -Extension to 30  -Lateral bend 30 right, 30 left  -Rotation 45 right, 45 left.    There is no point tenderness with palpation along the posterior cervical, thoracic, lumbar spine.     Neurologic:  Upper Extremity Motor Function    Right  Left    Deltoid  5/5  5/5    Bicep  5/5  5/5    Wrist extension  5/5  5/5    Tricep  5/5  5/5    Finger flexion/  5/5  5/5    Hand intrinsic  5/5  5/5      Lower Extremity Motor Function    Right  Left    Iliopsoas  5/5  5/5    Quadriceps 5/5 5/5   Tibialis anterior  5/5  5/5    EHL  5/5  5/5    Gastroc. muscle  5/5  5/5    Heel rise  5/5  5/5    Toe rise  5/5  5/5      Sensory: light touch is intact to bilateral upper and lower extremities     Reflexes:    Right Left   Biceps 2+ 2+   Triceps 2+ 2+   Brachioradialis 2+ 2+   Patellar 3+ 3+   Achilles 1+ 1+   Babinski neg neg     Other tests:  Brown's: Negative    Clonus: negative  Tandem gait: positive  Romberg: positive   Carpal Tinel/Phalen: negative bilateral   Cubital Tinel: negative bilateral   Shoulder: painless active & passive ROM bilateral     Diagnostic Tests   IMAGING: I have personally reviewed the images and these are my findings:  [facility] X-rays from 4/9/24: " "cervicothoracic kyphosis present. Diffuse anterior bridging osteophytes throughout the spine consistent with possible DISH.    Cervical Spine X-rays from 11/3/2023: multi level cervical spondylosis with loss of disc height, anterior osteophyte formation and facet hypertrophy, no apparent spondylolisthesis, no appreciated lytic/blastic lesions, no obvious instability    Cervical Spine MRI from 8/8/2023: multi level cervical disc degeneration, loss of disc height, bulging discs most noted at C3-4, C4-5, C5-6, C6-7 with severe spinal stenosis, cord signal abnormality at C6, varying degrees of foraminal stenosis     Procedures, if performed today     None performed       Portions of the record may have been created with voice recognition software.  Occasional wrong word or \"sound a like\" substitutions may have occurred due to the inherent limitations of voice recognition software.  Read the chart carefully and recognize, using context, where substitutions have occurred.  "

## 2024-04-17 ENCOUNTER — TELEPHONE (OUTPATIENT)
Age: 69
End: 2024-04-17

## 2024-04-17 NOTE — TELEPHONE ENCOUNTER
Steven asked if ADELINE Woods could review chest xray pt had done on April 9th.    Pre op clearance for surg

## 2024-04-18 ENCOUNTER — ANESTHESIA EVENT (OUTPATIENT)
Dept: PERIOP | Facility: HOSPITAL | Age: 69
DRG: 472 | End: 2024-04-18
Payer: COMMERCIAL

## 2024-04-18 RX ORDER — LOVASTATIN 10 MG
10 TABLET ORAL
COMMUNITY

## 2024-04-18 RX ORDER — PANTOPRAZOLE SODIUM 40 MG/1
40 TABLET, DELAYED RELEASE ORAL DAILY
COMMUNITY

## 2024-04-18 RX ORDER — VITAMIN B COMPLEX
1 CAPSULE ORAL DAILY
COMMUNITY

## 2024-04-18 RX ORDER — MULTIVIT-MIN/IRON FUM/FOLIC AC 7.5 MG-4
1 TABLET ORAL DAILY
COMMUNITY

## 2024-04-18 NOTE — PRE-PROCEDURE INSTRUCTIONS
Pre-Surgery Instructions:   Medication Instructions    albuterol (PROVENTIL HFA,VENTOLIN HFA) 90 mcg/act inhaler Take Day of Surgery; unless usually taken at night     aspirin (ECOTRIN LOW STRENGTH) 81 mg EC tablet Avoid 1 week prior to surgery      b complex vitamins capsule Avoid 1 week prior to surgery      budesonide (RINOCORT AQUA) 32 MCG/ACT nasal spray Take Day of Surgery; unless usually taken at night     busPIRone (BUSPAR) 10 mg tablet Take Day of Surgery; unless usually taken at night     clonazePAM (KlonoPIN) 1 mg tablet Take Day of Surgery; unless usually taken at night     cloNIDine (CATAPRES) 0.1 mg tablet Take Day of Surgery; unless usually taken at night     docusate sodium (COLACE) 100 mg capsule Take Day of Surgery; unless usually taken at night     fluticasone (FLOVENT HFA) 220 mcg/act inhaler Take Day of Surgery; unless usually taken at night     furosemide (LASIX) 40 mg tablet Do not take day of surgery; continue as prescribed excluding DOS     losartan (COZAAR) 25 mg tablet Do not take day of surgery; continue as prescribed excluding DOS     lovastatin (MEVACOR) 10 MG tablet Take Day of Surgery; unless usually taken at night     Multiple Vitamins-Minerals (multivitamin with minerals) tablet Avoid 1 week prior to surgery      pantoprazole (PROTONIX) 40 mg tablet Take Day of Surgery; unless usually taken at night     potassium chloride (K-DUR,KLOR-CON) 10 mEq tablet Do not take day of surgery; continue as prescribed excluding DOS     tamsulosin (FLOMAX) 0.4 mg Do not take day of surgery; continue as prescribed excluding DOS     Testosterone 12.5 MG/ACT (1%) GEL Do not take day of surgery; continue as prescribed excluding DOS     Trulicity 4.5 MG/0.5ML SOPN Hold for 1 week    zinc gluconate 50 mg tablet Avoid 1 week prior to surgery      Medication instructions for day surgery reviewed. Please use only a sip of water to take your instructed medications. Avoid all over the counter vitamins,  supplements and NSAIDS for one week prior to surgery per anesthesia guidelines. Tylenol is ok to take as needed.     You will receive a call one business day prior to surgery with an arrival time and hospital directions. If your surgery is scheduled on a Monday, the hospital will be calling you on the Friday prior to your surgery. If you have not heard from anyone by 8pm, please call the hospital supervisor through the hospital  at [phone]. ([place] [phone] or [place] [phone]).    Do not eat or drink anything after midnight the night before your surgery, including candy, mints, lifesavers, or chewing gum. Do not drink alcohol 24hrs before your surgery. Try not to smoke at least 24hrs before your surgery.       Follow the pre surgery showering instructions as listed in the “My Surgical Experience Booklet” or otherwise provided by your surgeon's office. Do not use a blade to shave the surgical area 1 week before surgery. It is okay to use a clean electric clippers up to 24 hours before surgery. Do not apply any lotions, creams, including makeup, cologne, deodorant, or perfumes after showering on the day of your surgery. Do not use dry shampoo, hair spray, hair gel, or any type of hair products.     No contact lenses, eye make-up, or artificial eyelashes. Remove nail polish, including gel polish, and any artificial, gel, or acrylic nails if possible. Remove all jewelry including rings and body piercing jewelry.     Wear causal clothing that is easy to take on and off. Consider your type of surgery.    Keep any valuables, jewelry, piercings at home. Please bring any specially ordered equipment (sling, braces) if indicated.    Arrange for a responsible person to drive you to and from the hospital on the day of your surgery. Please confirm the visitor policy for the day of your procedure when you receive your phone call with an arrival time.     Call the surgeon's office with any new illnesses,  exposures, or additional questions prior to surgery.    Please reference your “My Surgical Experience Booklet” for additional information to prepare for your upcoming surgery.

## 2024-04-19 ENCOUNTER — APPOINTMENT (OUTPATIENT)
Dept: LAB | Facility: CLINIC | Age: 69
End: 2024-04-19
Payer: COMMERCIAL

## 2024-04-19 ENCOUNTER — LAB REQUISITION (OUTPATIENT)
Dept: LAB | Facility: HOSPITAL | Age: 69
End: 2024-04-19
Payer: COMMERCIAL

## 2024-04-19 DIAGNOSIS — M25.59 PAIN IN OTHER SPECIFIED JOINT: ICD-10-CM

## 2024-04-19 DIAGNOSIS — Z01.818 ENCOUNTER FOR OTHER PREPROCEDURAL EXAMINATION: ICD-10-CM

## 2024-04-19 DIAGNOSIS — M48.02 CERVICAL SPINAL STENOSIS: ICD-10-CM

## 2024-04-19 DIAGNOSIS — Z01.818 PRE-OP EVALUATION: ICD-10-CM

## 2024-04-19 DIAGNOSIS — G95.9 CERVICAL MYELOPATHY (HCC): ICD-10-CM

## 2024-04-19 LAB
ABO GROUP BLD: NORMAL
ALBUMIN SERPL BCP-MCNC: 3.8 G/DL (ref 3.5–5)
ALP SERPL-CCNC: 90 U/L (ref 34–104)
ALT SERPL W P-5'-P-CCNC: 11 U/L (ref 7–52)
ANION GAP SERPL CALCULATED.3IONS-SCNC: 4 MMOL/L (ref 4–13)
APTT PPP: 32 SECONDS (ref 23–37)
AST SERPL W P-5'-P-CCNC: 16 U/L (ref 13–39)
BASOPHILS # BLD AUTO: 0.03 THOUSANDS/ÂΜL (ref 0–0.1)
BASOPHILS NFR BLD AUTO: 0 % (ref 0–1)
BILIRUB SERPL-MCNC: 0.67 MG/DL (ref 0.2–1)
BLD GP AB SCN SERPL QL: NEGATIVE
BUN SERPL-MCNC: 25 MG/DL (ref 5–25)
CALCIUM SERPL-MCNC: 9 MG/DL (ref 8.4–10.2)
CHLORIDE SERPL-SCNC: 97 MMOL/L (ref 96–108)
CO2 SERPL-SCNC: 37 MMOL/L (ref 21–32)
CREAT SERPL-MCNC: 1.16 MG/DL (ref 0.6–1.3)
EOSINOPHIL # BLD AUTO: 0.45 THOUSAND/ÂΜL (ref 0–0.61)
EOSINOPHIL NFR BLD AUTO: 6 % (ref 0–6)
ERYTHROCYTE [DISTWIDTH] IN BLOOD BY AUTOMATED COUNT: 13.4 % (ref 11.6–15.1)
GFR SERPL CREATININE-BSD FRML MDRD: 64 ML/MIN/1.73SQ M
GLUCOSE SERPL-MCNC: 106 MG/DL (ref 65–140)
HCT VFR BLD AUTO: 41.4 % (ref 36.5–49.3)
HGB BLD-MCNC: 12.6 G/DL (ref 12–17)
IMM GRANULOCYTES # BLD AUTO: 0.01 THOUSAND/UL (ref 0–0.2)
IMM GRANULOCYTES NFR BLD AUTO: 0 % (ref 0–2)
INR PPP: 1.15 (ref 0.84–1.19)
LYMPHOCYTES # BLD AUTO: 1.73 THOUSANDS/ÂΜL (ref 0.6–4.47)
LYMPHOCYTES NFR BLD AUTO: 25 % (ref 14–44)
MCH RBC QN AUTO: 28.3 PG (ref 26.8–34.3)
MCHC RBC AUTO-ENTMCNC: 30.4 G/DL (ref 31.4–37.4)
MCV RBC AUTO: 93 FL (ref 82–98)
MONOCYTES # BLD AUTO: 0.55 THOUSAND/ÂΜL (ref 0.17–1.22)
MONOCYTES NFR BLD AUTO: 8 % (ref 4–12)
NEUTROPHILS # BLD AUTO: 4.27 THOUSANDS/ÂΜL (ref 1.85–7.62)
NEUTS SEG NFR BLD AUTO: 61 % (ref 43–75)
NRBC BLD AUTO-RTO: 0 /100 WBCS
PLATELET # BLD AUTO: 176 THOUSANDS/UL (ref 149–390)
PMV BLD AUTO: 11.7 FL (ref 8.9–12.7)
POTASSIUM SERPL-SCNC: 4.1 MMOL/L (ref 3.5–5.3)
PROT SERPL-MCNC: 7.6 G/DL (ref 6.4–8.4)
PROTHROMBIN TIME: 14.6 SECONDS (ref 11.6–14.5)
RBC # BLD AUTO: 4.45 MILLION/UL (ref 3.88–5.62)
RH BLD: POSITIVE
SODIUM SERPL-SCNC: 138 MMOL/L (ref 135–147)
SPECIMEN EXPIRATION DATE: NORMAL
WBC # BLD AUTO: 7.04 THOUSAND/UL (ref 4.31–10.16)

## 2024-04-19 PROCEDURE — 85025 COMPLETE CBC W/AUTO DIFF WBC: CPT

## 2024-04-19 PROCEDURE — 85730 THROMBOPLASTIN TIME PARTIAL: CPT

## 2024-04-19 PROCEDURE — 85610 PROTHROMBIN TIME: CPT

## 2024-04-19 PROCEDURE — 86900 BLOOD TYPING SEROLOGIC ABO: CPT

## 2024-04-19 PROCEDURE — 86901 BLOOD TYPING SEROLOGIC RH(D): CPT

## 2024-04-19 PROCEDURE — 36415 COLL VENOUS BLD VENIPUNCTURE: CPT

## 2024-04-19 PROCEDURE — 86850 RBC ANTIBODY SCREEN: CPT

## 2024-04-19 PROCEDURE — 80053 COMPREHEN METABOLIC PANEL: CPT

## 2024-04-19 NOTE — TELEPHONE ENCOUNTER
[facility] calling regarding Chest x-ray that needs review for Pre-Op.  In office until 5pm  today    (Phone) [phone]

## 2024-04-22 DIAGNOSIS — F41.9 ANXIETY: ICD-10-CM

## 2024-04-22 NOTE — TELEPHONE ENCOUNTER
[facility] calling in asking if we can fax a surgery clearance to them stating that the patient is cleared for surgery from pulmonary based on the CXR results. Please advise.    Fax-  [phone]

## 2024-04-23 ENCOUNTER — HOSPITAL ENCOUNTER (OUTPATIENT)
Dept: RADIOLOGY | Facility: HOSPITAL | Age: 69
Discharge: HOME | End: 2024-04-23
Payer: COMMERCIAL

## 2024-04-23 DIAGNOSIS — Z01.818 PRE-OP EVALUATION: ICD-10-CM

## 2024-04-23 PROCEDURE — 72125 CT NECK SPINE W/O DYE: CPT

## 2024-04-23 RX ORDER — BUSPIRONE HYDROCHLORIDE 10 MG/1
TABLET ORAL
Qty: 180 TABLET | Refills: 0 | Status: SHIPPED | OUTPATIENT
Start: 2024-04-23

## 2024-04-24 ENCOUNTER — TELEPHONE (OUTPATIENT)
Age: 69
End: 2024-04-24

## 2024-04-24 NOTE — TELEPHONE ENCOUNTER
Caller: patient    Doctor: adrienne    Reason for call: patient has some questions regarding upcoming surgery, has anesthesia questions and IV questions   Please advise     Call back#: [phone]

## 2024-04-28 ENCOUNTER — ANESTHESIA EVENT (OUTPATIENT)
Dept: ANESTHESIOLOGY | Facility: HOSPITAL | Age: 69
End: 2024-04-28

## 2024-04-28 ENCOUNTER — ANESTHESIA (OUTPATIENT)
Dept: ANESTHESIOLOGY | Facility: HOSPITAL | Age: 69
End: 2024-04-28

## 2024-04-28 DIAGNOSIS — Z78.9 DIFFICULT INTRAVENOUS ACCESS: Primary | ICD-10-CM

## 2024-05-01 NOTE — ANESTHESIA PREPROCEDURE EVALUATION
Procedure:  FUSION CERVICAL ANTERIOR W DISCECTOMY, C3-7 ACDF (Bilateral: Spine Cervical)    Relevant Problems   CARDIO   (+) Chronic diastolic congestive heart failure (HCC)   (+) HTN (hypertension)      ENDO   (+) Type 2 diabetes mellitus, without long-term current use of insulin (HCC)      GI/HEPATIC   (+) GERD (gastroesophageal reflux disease)   (+) Hepatitis C virus carrier state (HCC)      /RENAL   (+) Stage 3a chronic kidney disease (HCC)      HEMATOLOGY   (+) Acute blood loss anemia   (+) Iron deficiency anemia, unspecified      MUSCULOSKELETAL   (+) Cervical spondylosis   (+) Chronic low back pain with bilateral sciatica   (+) DDD (degenerative disc disease), lumbar   (+) Low back pain with sciatica   (+) Lumbar spondylosis   (+) Primary osteoarthritis of right knee      NEURO/PSYCH   (+) Anxiety   (+) Chronic low back pain with bilateral sciatica   (+) Chronic pain syndrome   (+) Diabetic neuropathy (HCC)      PULMONARY   (+) SUSY (obstructive sleep apnea)   (+) Obstructive sleep apnea syndrome      TTE 2/2022:    Technically difficult study.     Left Ventricle: Systolic function is normal with an ejection fraction   of 60-65%. There is at least grade I (mild) diastolic dysfunction.     Right Ventricle: Systolic function is normal.     No obvious valvular abnormalities.     Lab Results   Component Value Date    WBC 7.04 04/19/2024    HGB 12.6 04/19/2024    HCT 41.4 04/19/2024    MCV 93 04/19/2024     04/19/2024     Lab Results   Component Value Date    SODIUM 138 04/19/2024    K 4.1 04/19/2024    CL 97 04/19/2024    CO2 37 (H) 04/19/2024    BUN 25 04/19/2024    CREATININE 1.16 04/19/2024    GLUC 106 04/19/2024    CALCIUM 9.0 04/19/2024     Lab Results   Component Value Date    INR 1.15 04/19/2024    INR 1.2 10/10/2021    PROTIME 14.6 (H) 04/19/2024     Lab Results   Component Value Date    HGBA1C 6.1 (H) 09/14/2023                 Anesthesia Plan  ASA Score- 3     Anesthesia Type- general with ASA  Monitors.         Additional Monitors: arterial line.    Airway Plan: ETT.           Plan Factors-    Chart reviewed. EKG reviewed.  Existing labs reviewed. Patient summary reviewed.                  Induction- intravenous.    Postoperative Plan-     Informed Consent-

## 2024-05-02 ENCOUNTER — HOSPITAL ENCOUNTER (INPATIENT)
Facility: HOSPITAL | Age: 69
LOS: 4 days | Discharge: HOME HEALTH | DRG: 472 | End: 2024-05-06
Attending: ORTHOPAEDIC SURGERY | Admitting: ORTHOPAEDIC SURGERY
Payer: COMMERCIAL

## 2024-05-02 ENCOUNTER — APPOINTMENT (OUTPATIENT)
Dept: RADIOLOGY | Facility: HOSPITAL | Age: 69
DRG: 472 | End: 2024-05-02
Payer: COMMERCIAL

## 2024-05-02 ENCOUNTER — APPOINTMENT (OUTPATIENT)
Dept: RADIOLOGY | Facility: HOSPITAL | Age: 69
DRG: 472 | End: 2024-05-02
Attending: ORTHOPAEDIC SURGERY
Payer: COMMERCIAL

## 2024-05-02 ENCOUNTER — ANESTHESIA (OUTPATIENT)
Dept: PERIOP | Facility: HOSPITAL | Age: 69
DRG: 472 | End: 2024-05-02
Payer: COMMERCIAL

## 2024-05-02 DIAGNOSIS — F41.9 ANXIETY: ICD-10-CM

## 2024-05-02 DIAGNOSIS — I10 PRIMARY HYPERTENSION: ICD-10-CM

## 2024-05-02 DIAGNOSIS — I87.2 CHRONIC VENOUS INSUFFICIENCY: ICD-10-CM

## 2024-05-02 DIAGNOSIS — M48.061 SPINAL STENOSIS OF LUMBAR REGION WITHOUT NEUROGENIC CLAUDICATION: ICD-10-CM

## 2024-05-02 DIAGNOSIS — G47.33 OSA (OBSTRUCTIVE SLEEP APNEA): ICD-10-CM

## 2024-05-02 DIAGNOSIS — E11.42 TYPE 2 DIABETES MELLITUS WITH DIABETIC POLYNEUROPATHY, WITHOUT LONG-TERM CURRENT USE OF INSULIN (HCC): ICD-10-CM

## 2024-05-02 DIAGNOSIS — K21.9 GASTROESOPHAGEAL REFLUX DISEASE, UNSPECIFIED WHETHER ESOPHAGITIS PRESENT: ICD-10-CM

## 2024-05-02 DIAGNOSIS — M47.812 CERVICAL SPONDYLOSIS: ICD-10-CM

## 2024-05-02 DIAGNOSIS — Z98.1 S/P CERVICAL SPINAL FUSION: Primary | ICD-10-CM

## 2024-05-02 DIAGNOSIS — I50.32 CHRONIC DIASTOLIC CONGESTIVE HEART FAILURE (HCC): ICD-10-CM

## 2024-05-02 DIAGNOSIS — N18.31 STAGE 3A CHRONIC KIDNEY DISEASE (HCC): ICD-10-CM

## 2024-05-02 LAB
ANION GAP SERPL CALCULATED.3IONS-SCNC: 8 MMOL/L (ref 4–13)
ANISOCYTOSIS BLD QL SMEAR: PRESENT
BASE EXCESS BLDA CALC-SCNC: 4 MMOL/L (ref -2–3)
BASOPHILS # BLD MANUAL: 0 THOUSAND/UL (ref 0–0.1)
BASOPHILS NFR MAR MANUAL: 0 % (ref 0–1)
BUN SERPL-MCNC: 18 MG/DL (ref 5–25)
CA-I BLD-SCNC: 1.08 MMOL/L (ref 1.12–1.32)
CALCIUM SERPL-MCNC: 8.5 MG/DL (ref 8.4–10.2)
CHLORIDE SERPL-SCNC: 101 MMOL/L (ref 96–108)
CO2 SERPL-SCNC: 29 MMOL/L (ref 21–32)
CREAT SERPL-MCNC: 1.19 MG/DL (ref 0.6–1.3)
EOSINOPHIL # BLD MANUAL: 0 THOUSAND/UL (ref 0–0.4)
EOSINOPHIL NFR BLD MANUAL: 0 % (ref 0–6)
ERYTHROCYTE [DISTWIDTH] IN BLOOD BY AUTOMATED COUNT: 13.6 % (ref 11.6–15.1)
GFR SERPL CREATININE-BSD FRML MDRD: 62 ML/MIN/1.73SQ M
GLUCOSE SERPL-MCNC: 114 MG/DL (ref 65–140)
GLUCOSE SERPL-MCNC: 122 MG/DL (ref 65–140)
GLUCOSE SERPL-MCNC: 155 MG/DL (ref 65–140)
GLUCOSE SERPL-MCNC: 189 MG/DL (ref 65–140)
GLUCOSE SERPL-MCNC: 201 MG/DL (ref 65–140)
HCO3 BLDA-SCNC: 26.3 MMOL/L (ref 22–28)
HCT VFR BLD AUTO: 35.6 % (ref 36.5–49.3)
HCT VFR BLD CALC: 31 % (ref 36.5–49.3)
HGB BLD-MCNC: 11.2 G/DL (ref 12–17)
HGB BLDA-MCNC: 10.5 G/DL (ref 12–17)
LYMPHOCYTES # BLD AUTO: 0.34 THOUSAND/UL (ref 0.6–4.47)
LYMPHOCYTES # BLD AUTO: 3 % (ref 14–44)
MAGNESIUM SERPL-MCNC: 2 MG/DL (ref 1.9–2.7)
MCH RBC QN AUTO: 28.6 PG (ref 26.8–34.3)
MCHC RBC AUTO-ENTMCNC: 31.5 G/DL (ref 31.4–37.4)
MCV RBC AUTO: 91 FL (ref 82–98)
MONOCYTES # BLD AUTO: 0 THOUSAND/UL (ref 0–1.22)
MONOCYTES NFR BLD: 0 % (ref 4–12)
NEUTROPHILS # BLD MANUAL: 6.5 THOUSAND/UL (ref 1.85–7.62)
NEUTS SEG NFR BLD AUTO: 95 % (ref 43–75)
PCO2 BLD: 27 MMOL/L (ref 21–32)
PCO2 BLD: 31.6 MM HG (ref 36–44)
PH BLD: 7.53 [PH] (ref 7.35–7.45)
PHOSPHATE SERPL-MCNC: 3.7 MG/DL (ref 2.3–4.1)
PLATELET # BLD AUTO: 142 THOUSANDS/UL (ref 149–390)
PLATELET BLD QL SMEAR: ABNORMAL
PMV BLD AUTO: 10.4 FL (ref 8.9–12.7)
PO2 BLD: 89 MM HG (ref 75–129)
POTASSIUM BLD-SCNC: 3.6 MMOL/L (ref 3.5–5.3)
POTASSIUM SERPL-SCNC: 4.5 MMOL/L (ref 3.5–5.3)
RBC # BLD AUTO: 3.91 MILLION/UL (ref 3.88–5.62)
RBC MORPH BLD: PRESENT
SAO2 % BLD FROM PO2: 98 % (ref 60–85)
SODIUM BLD-SCNC: 139 MMOL/L (ref 136–145)
SODIUM SERPL-SCNC: 138 MMOL/L (ref 135–147)
SPECIMEN SOURCE: ABNORMAL
VARIANT LYMPHS # BLD AUTO: 2 %
WBC # BLD AUTO: 6.84 THOUSAND/UL (ref 4.31–10.16)

## 2024-05-02 PROCEDURE — 82948 REAGENT STRIP/BLOOD GLUCOSE: CPT

## 2024-05-02 PROCEDURE — 20930 SP BONE ALGRFT MORSEL ADD-ON: CPT | Performed by: ORTHOPAEDIC SURGERY

## 2024-05-02 PROCEDURE — 82947 ASSAY GLUCOSE BLOOD QUANT: CPT

## 2024-05-02 PROCEDURE — C1713 ANCHOR/SCREW BN/BN,TIS/BN: HCPCS | Performed by: ORTHOPAEDIC SURGERY

## 2024-05-02 PROCEDURE — 84295 ASSAY OF SERUM SODIUM: CPT

## 2024-05-02 PROCEDURE — 85027 COMPLETE CBC AUTOMATED: CPT

## 2024-05-02 PROCEDURE — C1781 MESH (IMPLANTABLE): HCPCS | Performed by: ORTHOPAEDIC SURGERY

## 2024-05-02 PROCEDURE — 84132 ASSAY OF SERUM POTASSIUM: CPT

## 2024-05-02 PROCEDURE — 20936 SP BONE AGRFT LOCAL ADD-ON: CPT | Performed by: ORTHOPAEDIC SURGERY

## 2024-05-02 PROCEDURE — 0RT30ZZ RESECTION OF CERVICAL VERTEBRAL DISC, OPEN APPROACH: ICD-10-PCS | Performed by: ORTHOPAEDIC SURGERY

## 2024-05-02 PROCEDURE — 0RG20A0 FUSION OF 2 OR MORE CERVICAL VERTEBRAL JOINTS WITH INTERBODY FUSION DEVICE, ANTERIOR APPROACH, ANTERIOR COLUMN, OPEN APPROACH: ICD-10-PCS | Performed by: ORTHOPAEDIC SURGERY

## 2024-05-02 PROCEDURE — 99024 POSTOP FOLLOW-UP VISIT: CPT | Performed by: ORTHOPAEDIC SURGERY

## 2024-05-02 PROCEDURE — 83735 ASSAY OF MAGNESIUM: CPT

## 2024-05-02 PROCEDURE — 84100 ASSAY OF PHOSPHORUS: CPT

## 2024-05-02 PROCEDURE — NC001 PR NO CHARGE: Performed by: ORTHOPAEDIC SURGERY

## 2024-05-02 PROCEDURE — 22853 INSJ BIOMECHANICAL DEVICE: CPT | Performed by: ORTHOPAEDIC SURGERY

## 2024-05-02 PROCEDURE — C1751 CATH, INF, PER/CENT/MIDLINE: HCPCS

## 2024-05-02 PROCEDURE — 36573 INSJ PICC RS&I 5 YR+: CPT

## 2024-05-02 PROCEDURE — 99223 1ST HOSP IP/OBS HIGH 75: CPT | Performed by: SURGERY

## 2024-05-02 PROCEDURE — 22551 ARTHRD ANT NTRBDY CERVICAL: CPT | Performed by: ORTHOPAEDIC SURGERY

## 2024-05-02 PROCEDURE — 76937 US GUIDE VASCULAR ACCESS: CPT

## 2024-05-02 PROCEDURE — 80048 BASIC METABOLIC PNL TOTAL CA: CPT

## 2024-05-02 PROCEDURE — 82803 BLOOD GASES ANY COMBINATION: CPT

## 2024-05-02 PROCEDURE — 02HV33Z INSERTION OF INFUSION DEVICE INTO SUPERIOR VENA CAVA, PERCUTANEOUS APPROACH: ICD-10-PCS | Performed by: ORTHOPAEDIC SURGERY

## 2024-05-02 PROCEDURE — 85014 HEMATOCRIT: CPT

## 2024-05-02 PROCEDURE — 82330 ASSAY OF CALCIUM: CPT

## 2024-05-02 PROCEDURE — 22846 INSERT SPINE FIXATION DEVICE: CPT | Performed by: ORTHOPAEDIC SURGERY

## 2024-05-02 PROCEDURE — 72040 X-RAY EXAM NECK SPINE 2-3 VW: CPT

## 2024-05-02 PROCEDURE — 77001 FLUOROGUIDE FOR VEIN DEVICE: CPT

## 2024-05-02 PROCEDURE — NC001 PR NO CHARGE: Performed by: RADIOLOGY

## 2024-05-02 PROCEDURE — 85007 BL SMEAR W/DIFF WBC COUNT: CPT

## 2024-05-02 PROCEDURE — 22552 ARTHRD ANT NTRBD CERVICAL EA: CPT | Performed by: ORTHOPAEDIC SURGERY

## 2024-05-02 DEVICE — DBM T45005 5CC PASTE GRAFTON PLUS
Type: IMPLANTABLE DEVICE | Site: SPINE CERVICAL | Status: FUNCTIONAL
Brand: GRAFTON®AND GRAFTON PLUS®DEMINERALIZED BONE MATRIX (DBM)

## 2024-05-02 DEVICE — SCREW 3120416 4.0 X 16 SELF DRILL FIX
Type: IMPLANTABLE DEVICE | Site: SPINE CERVICAL | Status: FUNCTIONAL
Brand: ATLANTIS® ANTERIOR CERVICAL PLATE SYSTEM

## 2024-05-02 DEVICE — I-FACTOR PUTTY, 2.5CC SYRINGE
Type: IMPLANTABLE DEVICE | Site: SPINE CERVICAL | Status: FUNCTIONAL
Brand: I-FACTOR PEPTIDE ENHANCED BONE GRAFT

## 2024-05-02 DEVICE — INTERBODY FUSION DEVICE  6 DEGREE MEDIUM 7MM
Type: IMPLANTABLE DEVICE | Site: SPINE CERVICAL | Status: FUNCTIONAL
Brand: ENDOSKELETON® TC NANOLOCK® SURFACE TECHNOLOGY

## 2024-05-02 RX ORDER — HYDROMORPHONE HCL/PF 1 MG/ML
0.5 SYRINGE (ML) INJECTION
Status: DISCONTINUED | OUTPATIENT
Start: 2024-05-02 | End: 2024-05-02 | Stop reason: HOSPADM

## 2024-05-02 RX ORDER — FENTANYL CITRATE 50 UG/ML
INJECTION, SOLUTION INTRAMUSCULAR; INTRAVENOUS AS NEEDED
Status: DISCONTINUED | OUTPATIENT
Start: 2024-05-02 | End: 2024-05-02

## 2024-05-02 RX ORDER — ONDANSETRON 2 MG/ML
4 INJECTION INTRAMUSCULAR; INTRAVENOUS ONCE AS NEEDED
Status: DISCONTINUED | OUTPATIENT
Start: 2024-05-02 | End: 2024-05-02 | Stop reason: HOSPADM

## 2024-05-02 RX ORDER — CALCIUM CARBONATE 500 MG/1
1000 TABLET, CHEWABLE ORAL DAILY PRN
Status: DISCONTINUED | OUTPATIENT
Start: 2024-05-02 | End: 2024-05-06 | Stop reason: HOSPADM

## 2024-05-02 RX ORDER — HYDROMORPHONE HCL/PF 1 MG/ML
SYRINGE (ML) INJECTION AS NEEDED
Status: DISCONTINUED | OUTPATIENT
Start: 2024-05-02 | End: 2024-05-02

## 2024-05-02 RX ORDER — MAGNESIUM HYDROXIDE/ALUMINUM HYDROXICE/SIMETHICONE 120; 1200; 1200 MG/30ML; MG/30ML; MG/30ML
30 SUSPENSION ORAL EVERY 6 HOURS PRN
Status: DISCONTINUED | OUTPATIENT
Start: 2024-05-02 | End: 2024-05-06 | Stop reason: HOSPADM

## 2024-05-02 RX ORDER — PRAVASTATIN SODIUM 10 MG
10 TABLET ORAL
Status: DISCONTINUED | OUTPATIENT
Start: 2024-05-02 | End: 2024-05-06 | Stop reason: HOSPADM

## 2024-05-02 RX ORDER — SENNOSIDES 8.6 MG
1 TABLET ORAL DAILY
Status: DISCONTINUED | OUTPATIENT
Start: 2024-05-03 | End: 2024-05-06 | Stop reason: HOSPADM

## 2024-05-02 RX ORDER — SUCCINYLCHOLINE/SOD CL,ISO/PF 100 MG/5ML
SYRINGE (ML) INTRAVENOUS AS NEEDED
Status: DISCONTINUED | OUTPATIENT
Start: 2024-05-02 | End: 2024-05-02

## 2024-05-02 RX ORDER — CEFAZOLIN SODIUM 2 G/50ML
2000 SOLUTION INTRAVENOUS ONCE
Status: COMPLETED | OUTPATIENT
Start: 2024-05-02 | End: 2024-05-02

## 2024-05-02 RX ORDER — GLYCOPYRROLATE 0.2 MG/ML
INJECTION INTRAMUSCULAR; INTRAVENOUS AS NEEDED
Status: DISCONTINUED | OUTPATIENT
Start: 2024-05-02 | End: 2024-05-02

## 2024-05-02 RX ORDER — LIDOCAINE HYDROCHLORIDE 10 MG/ML
INJECTION, SOLUTION EPIDURAL; INFILTRATION; INTRACAUDAL; PERINEURAL AS NEEDED
Status: COMPLETED | OUTPATIENT
Start: 2024-05-02 | End: 2024-05-02

## 2024-05-02 RX ORDER — SODIUM CHLORIDE, SODIUM LACTATE, POTASSIUM CHLORIDE, CALCIUM CHLORIDE 600; 310; 30; 20 MG/100ML; MG/100ML; MG/100ML; MG/100ML
125 INJECTION, SOLUTION INTRAVENOUS CONTINUOUS
Status: CANCELLED | OUTPATIENT
Start: 2024-05-02

## 2024-05-02 RX ORDER — NEOSTIGMINE METHYLSULFATE 1 MG/ML
INJECTION INTRAVENOUS AS NEEDED
Status: DISCONTINUED | OUTPATIENT
Start: 2024-05-02 | End: 2024-05-02

## 2024-05-02 RX ORDER — MIDAZOLAM HYDROCHLORIDE 2 MG/2ML
INJECTION, SOLUTION INTRAMUSCULAR; INTRAVENOUS AS NEEDED
Status: DISCONTINUED | OUTPATIENT
Start: 2024-05-02 | End: 2024-05-02

## 2024-05-02 RX ORDER — SODIUM CHLORIDE 9 MG/ML
INJECTION, SOLUTION INTRAVENOUS CONTINUOUS PRN
Status: DISCONTINUED | OUTPATIENT
Start: 2024-05-02 | End: 2024-05-02

## 2024-05-02 RX ORDER — LIDOCAINE HYDROCHLORIDE 10 MG/ML
INJECTION, SOLUTION EPIDURAL; INFILTRATION; INTRACAUDAL; PERINEURAL AS NEEDED
Status: DISCONTINUED | OUTPATIENT
Start: 2024-05-02 | End: 2024-05-02

## 2024-05-02 RX ORDER — PROPOFOL 10 MG/ML
INJECTION, EMULSION INTRAVENOUS AS NEEDED
Status: DISCONTINUED | OUTPATIENT
Start: 2024-05-02 | End: 2024-05-02

## 2024-05-02 RX ORDER — INSULIN LISPRO 100 [IU]/ML
2-12 INJECTION, SOLUTION INTRAVENOUS; SUBCUTANEOUS
Status: DISCONTINUED | OUTPATIENT
Start: 2024-05-02 | End: 2024-05-06 | Stop reason: HOSPADM

## 2024-05-02 RX ORDER — FENTANYL CITRATE/PF 50 MCG/ML
25 SYRINGE (ML) INJECTION
Status: DISCONTINUED | OUTPATIENT
Start: 2024-05-02 | End: 2024-05-02 | Stop reason: HOSPADM

## 2024-05-02 RX ORDER — ACETAMINOPHEN 325 MG/1
650 TABLET ORAL EVERY 6 HOURS SCHEDULED
Status: DISCONTINUED | OUTPATIENT
Start: 2024-05-02 | End: 2024-05-03

## 2024-05-02 RX ORDER — DOCUSATE SODIUM 100 MG/1
100 CAPSULE, LIQUID FILLED ORAL 2 TIMES DAILY
Status: DISCONTINUED | OUTPATIENT
Start: 2024-05-02 | End: 2024-05-06 | Stop reason: HOSPADM

## 2024-05-02 RX ORDER — DEXAMETHASONE SODIUM PHOSPHATE 4 MG/ML
INJECTION, SOLUTION INTRA-ARTICULAR; INTRALESIONAL; INTRAMUSCULAR; INTRAVENOUS; SOFT TISSUE AS NEEDED
Status: DISCONTINUED | OUTPATIENT
Start: 2024-05-02 | End: 2024-05-02

## 2024-05-02 RX ORDER — CLONIDINE HYDROCHLORIDE 0.1 MG/1
0.1 TABLET ORAL 2 TIMES DAILY
Status: DISCONTINUED | OUTPATIENT
Start: 2024-05-03 | End: 2024-05-03

## 2024-05-02 RX ORDER — OXYCODONE HYDROCHLORIDE 10 MG/1
10 TABLET ORAL EVERY 4 HOURS PRN
Status: DISCONTINUED | OUTPATIENT
Start: 2024-05-02 | End: 2024-05-06 | Stop reason: HOSPADM

## 2024-05-02 RX ORDER — PROPOFOL 10 MG/ML
INJECTION, EMULSION INTRAVENOUS CONTINUOUS PRN
Status: DISCONTINUED | OUTPATIENT
Start: 2024-05-02 | End: 2024-05-02

## 2024-05-02 RX ORDER — SODIUM CHLORIDE, SODIUM GLUCONATE, SODIUM ACETATE, POTASSIUM CHLORIDE, MAGNESIUM CHLORIDE, SODIUM PHOSPHATE, DIBASIC, AND POTASSIUM PHOSPHATE .53; .5; .37; .037; .03; .012; .00082 G/100ML; G/100ML; G/100ML; G/100ML; G/100ML; G/100ML; G/100ML
1000 INJECTION, SOLUTION INTRAVENOUS ONCE
Status: COMPLETED | OUTPATIENT
Start: 2024-05-02 | End: 2024-05-03

## 2024-05-02 RX ORDER — TAMSULOSIN HYDROCHLORIDE 0.4 MG/1
0.4 CAPSULE ORAL
Status: DISCONTINUED | OUTPATIENT
Start: 2024-05-02 | End: 2024-05-06 | Stop reason: HOSPADM

## 2024-05-02 RX ORDER — OXYCODONE HYDROCHLORIDE 5 MG/1
5 TABLET ORAL EVERY 4 HOURS PRN
Status: DISCONTINUED | OUTPATIENT
Start: 2024-05-02 | End: 2024-05-06 | Stop reason: HOSPADM

## 2024-05-02 RX ORDER — BUSPIRONE HYDROCHLORIDE 10 MG/1
10 TABLET ORAL 2 TIMES DAILY
Status: DISCONTINUED | OUTPATIENT
Start: 2024-05-02 | End: 2024-05-06 | Stop reason: HOSPADM

## 2024-05-02 RX ORDER — PANTOPRAZOLE SODIUM 40 MG/1
40 TABLET, DELAYED RELEASE ORAL
Status: DISCONTINUED | OUTPATIENT
Start: 2024-05-03 | End: 2024-05-06 | Stop reason: HOSPADM

## 2024-05-02 RX ORDER — CEFAZOLIN SODIUM 2 G/50ML
2000 SOLUTION INTRAVENOUS EVERY 8 HOURS
Qty: 100 ML | Refills: 0 | Status: COMPLETED | OUTPATIENT
Start: 2024-05-02 | End: 2024-05-03

## 2024-05-02 RX ORDER — METHOCARBAMOL 500 MG/1
500 TABLET, FILM COATED ORAL EVERY 6 HOURS SCHEDULED
Status: DISCONTINUED | OUTPATIENT
Start: 2024-05-02 | End: 2024-05-06 | Stop reason: HOSPADM

## 2024-05-02 RX ORDER — ONDANSETRON 2 MG/ML
4 INJECTION INTRAMUSCULAR; INTRAVENOUS EVERY 6 HOURS PRN
Status: DISCONTINUED | OUTPATIENT
Start: 2024-05-02 | End: 2024-05-06 | Stop reason: HOSPADM

## 2024-05-02 RX ORDER — ONDANSETRON 2 MG/ML
INJECTION INTRAMUSCULAR; INTRAVENOUS AS NEEDED
Status: DISCONTINUED | OUTPATIENT
Start: 2024-05-02 | End: 2024-05-02

## 2024-05-02 RX ORDER — PROMETHAZINE HYDROCHLORIDE 25 MG/ML
25 INJECTION, SOLUTION INTRAMUSCULAR; INTRAVENOUS ONCE AS NEEDED
Status: DISCONTINUED | OUTPATIENT
Start: 2024-05-02 | End: 2024-05-02 | Stop reason: HOSPADM

## 2024-05-02 RX ORDER — CHLORHEXIDINE GLUCONATE ORAL RINSE 1.2 MG/ML
15 SOLUTION DENTAL ONCE
Status: COMPLETED | OUTPATIENT
Start: 2024-05-02 | End: 2024-05-02

## 2024-05-02 RX ORDER — SODIUM CHLORIDE, SODIUM LACTATE, POTASSIUM CHLORIDE, CALCIUM CHLORIDE 600; 310; 30; 20 MG/100ML; MG/100ML; MG/100ML; MG/100ML
INJECTION, SOLUTION INTRAVENOUS CONTINUOUS PRN
Status: DISCONTINUED | OUTPATIENT
Start: 2024-05-02 | End: 2024-05-02

## 2024-05-02 RX ADMIN — SODIUM CHLORIDE, SODIUM GLUCONATE, SODIUM ACETATE, POTASSIUM CHLORIDE, MAGNESIUM CHLORIDE, SODIUM PHOSPHATE, DIBASIC, AND POTASSIUM PHOSPHATE 1000 ML: .53; .5; .37; .037; .03; .012; .00082 INJECTION, SOLUTION INTRAVENOUS at 22:41

## 2024-05-02 RX ADMIN — CEFAZOLIN SODIUM 2000 MG: 2 SOLUTION INTRAVENOUS at 23:50

## 2024-05-02 RX ADMIN — CEFAZOLIN SODIUM 2000 MG: 2 SOLUTION INTRAVENOUS at 15:08

## 2024-05-02 RX ADMIN — MIDAZOLAM HYDROCHLORIDE 2 MG: 2 INJECTION, SOLUTION INTRAMUSCULAR; INTRAVENOUS at 09:28

## 2024-05-02 RX ADMIN — Medication 0.5 MG: at 15:21

## 2024-05-02 RX ADMIN — PROPOFOL 120 MCG/KG/MIN: 10 INJECTION, EMULSION INTRAVENOUS at 10:39

## 2024-05-02 RX ADMIN — GLYCOPYRROLATE 0.2 MCG: 0.2 INJECTION INTRAMUSCULAR; INTRAVENOUS at 17:34

## 2024-05-02 RX ADMIN — SODIUM CHLORIDE: 9 INJECTION, SOLUTION INTRAVENOUS at 10:40

## 2024-05-02 RX ADMIN — LIDOCAINE HYDROCHLORIDE 50 MG: 10 INJECTION, SOLUTION EPIDURAL; INFILTRATION; INTRACAUDAL; PERINEURAL at 09:35

## 2024-05-02 RX ADMIN — METHOCARBAMOL 500 MG: 500 TABLET ORAL at 20:29

## 2024-05-02 RX ADMIN — BUSPIRONE HYDROCHLORIDE 10 MG: 10 TABLET ORAL at 20:28

## 2024-05-02 RX ADMIN — TAMSULOSIN HYDROCHLORIDE 0.4 MG: 0.4 CAPSULE ORAL at 20:28

## 2024-05-02 RX ADMIN — ACETAMINOPHEN 650 MG: 325 TABLET, FILM COATED ORAL at 20:29

## 2024-05-02 RX ADMIN — Medication 1 MG: at 11:54

## 2024-05-02 RX ADMIN — DEXAMETHASONE SODIUM PHOSPHATE 4 MG: 4 INJECTION, SOLUTION INTRA-ARTICULAR; INTRALESIONAL; INTRAMUSCULAR; INTRAVENOUS; SOFT TISSUE at 09:40

## 2024-05-02 RX ADMIN — CEFAZOLIN SODIUM 2000 MG: 2 SOLUTION INTRAVENOUS at 09:45

## 2024-05-02 RX ADMIN — PRAVASTATIN SODIUM 10 MG: 10 TABLET ORAL at 20:29

## 2024-05-02 RX ADMIN — OXYCODONE HYDROCHLORIDE 10 MG: 10 TABLET ORAL at 20:28

## 2024-05-02 RX ADMIN — CHLORHEXIDINE GLUCONATE 15 ML: 1.2 SOLUTION ORAL at 05:58

## 2024-05-02 RX ADMIN — DOCUSATE SODIUM 100 MG: 100 CAPSULE, LIQUID FILLED ORAL at 20:29

## 2024-05-02 RX ADMIN — FENTANYL CITRATE 100 MCG: 50 INJECTION, SOLUTION INTRAMUSCULAR; INTRAVENOUS at 09:35

## 2024-05-02 RX ADMIN — Medication 0.5 MG: at 15:27

## 2024-05-02 RX ADMIN — PHENYLEPHRINE HYDROCHLORIDE 25 MCG/MIN: 50 INJECTION INTRAVENOUS at 23:56

## 2024-05-02 RX ADMIN — INSULIN LISPRO 2 UNITS: 100 INJECTION, SOLUTION INTRAVENOUS; SUBCUTANEOUS at 22:04

## 2024-05-02 RX ADMIN — MIDAZOLAM HYDROCHLORIDE 2 MG: 2 INJECTION, SOLUTION INTRAMUSCULAR; INTRAVENOUS at 09:47

## 2024-05-02 RX ADMIN — PROPOFOL 200 MG: 10 INJECTION, EMULSION INTRAVENOUS at 09:35

## 2024-05-02 RX ADMIN — LIDOCAINE HYDROCHLORIDE 5 ML: 10 INJECTION, SOLUTION EPIDURAL; INFILTRATION; INTRACAUDAL; PERINEURAL at 08:35

## 2024-05-02 RX ADMIN — SODIUM CHLORIDE, SODIUM LACTATE, POTASSIUM CHLORIDE, CALCIUM CHLORIDE: 600; 310; 30; 20 INJECTION, SOLUTION INTRAVENOUS at 10:40

## 2024-05-02 RX ADMIN — CEFAZOLIN SODIUM 2000 MG: 2 SOLUTION INTRAVENOUS at 11:01

## 2024-05-02 RX ADMIN — ONDANSETRON 4 MG: 2 INJECTION INTRAMUSCULAR; INTRAVENOUS at 17:09

## 2024-05-02 RX ADMIN — SODIUM CHLORIDE, SODIUM LACTATE, POTASSIUM CHLORIDE, CALCIUM CHLORIDE: 600; 310; 30; 20 INJECTION, SOLUTION INTRAVENOUS at 16:51

## 2024-05-02 RX ADMIN — DEXAMETHASONE SODIUM PHOSPHATE 10 MG: 4 INJECTION, SOLUTION INTRA-ARTICULAR; INTRALESIONAL; INTRAMUSCULAR; INTRAVENOUS; SOFT TISSUE at 17:04

## 2024-05-02 RX ADMIN — Medication 200 MG: at 09:35

## 2024-05-02 RX ADMIN — NEOSTIGMINE METHYLSULFATE 1 MG: 1 INJECTION INTRAVENOUS at 17:34

## 2024-05-02 NOTE — ASSESSMENT & PLAN NOTE
Lab Results   Component Value Date    HGBA1C 6.1 (H) 09/14/2023     Uses Trulicity at home.  DM diet.  Continue accuchecks with SSI.

## 2024-05-02 NOTE — H&P
24 H&P Update  See detailed H&P office note below from 24.    H&P reviewed. After examining the patient I find no significant changes in the patients condition since the H&P had been written.  Continues with neck pain, hand numbness/weakness, balance issues.  Imaging, clinical findings and procedure reviewed with patient and wife at bedside.  Plan to proceed with decompression/fusion surgery.     General: appears well, no acute distress  Respiratory: No SOB, no abnormal effort   Abdomen: Soft. No tenderness.    Cardiac: RRR, warm & well perfused     Assessment & Plan/Medical Decision Makin y.o. male with Neck Pain, signs/symptoms of cervical myelopathy and imaging findings most notable for Cervical spondylosis and Stenosis         The clinical, physical and imaging findings were reviewed with the patient.  Edgar has a constellation of findings consistent with cervical myeloradiculopathy in setting cervical degenerative disease, multi level cervical stenosis.  Edgar reports recent worsening of his balance with new onset bilateral hand and fingertip numbness. Continues to use cane for ambulation. Also with new onset of posterior neck pain with intermittent headaches.     We discussed the differential diagnosis including alternative CNS/neurologic pathology, extremity/musculoskeltal pathology, peripheral nerve compression, etc. Edgar's symptoms, exam findings and imaging are most consistent with cervical spondylotic myelopathy.  In my opinion, no further work-up (EMG, etc) is warranted at this time.  He has signs and symptoms consistent with cervical myelopathy.  We discussed the natural history of both myelopathy and radiculopathy.  Discussed treatment options.  Reviewed the role of non operative treatment such as physical therapy, activity modification, medication mgmt, interventional spine procedures/injections, cervical immobilization in patient with mild myelopathy, no functional impairment  and medically unsuitable patients. Given his progressing myelopathic symptoms, recommend consideration of surgical intervention.  Edgar would like to proceed with cervical decompression and stabilization surgery.     We discussed surgical options.  In my opinion, would be anterior cervical discectomy and fusion C3-7 to address cervical degenerative disc disease with stenosis.  We reviewed the options - such as ACDF versus disc arthroplasty versus posterior surgery for decompression and ICBG versus cage versus allograft +/- biologic/graft extenders supplementation for obtaining solid arthrodesis as well as the associated risk/benefit profiles and fusion efficacy as well as the FDA status of the instrumentation and products. After discussion with the patient we will plan on anterior cervical discectomy and fusion with cage with graft extender supplementation. Explained at length the rationale for surgical invention and postoperative expectations.  We discussed and Edgar expressed his understanding, that in general, spine surgery is more predictive in improving extremity/radicular discomfort rather than axial spine pain, and arresting the progression of spinal cord/nerve dysfunction rather than improving.       I reviewed with the patient possible risks of surgery which included the risk of nerve injury including C5 nerve palsy, autonomic nervous system dysfunction, persistent and/or worsening pain, chronic pain, need for further surgery, risk of nonunion and instrumentation failure (and increased risk given 4 level construct), instability, adjacent segment disease/ degeneration, bone graft complications, vocal cord dysfunction including voice changes and hoarseness, swallowing difficulty with possible need for tube feeding, spinal fluid leak and meningitis, hematoma, blood loss, infection, DVT, pulmonary embolism, blindness, paralysis and even death, as well as other risks on consent form.  Patient agrees to  proceed with surgery and understands all risks discussed.  Discussed the use of neurophysiology monitoring during the procedure.  We also reviewed patient specific risks and mitigation, including his medical co-morbidities including heart disease, diabetes, kidney disease.     We reviewed infection prevention.  Reviewed medications; instructed patient medications to stop before surgery (i.e. blood thinners, NSAIDs, DMARDs and vitamins).       Also reviewed with patient our narcotic policy.  We will provide medications up to 60 days postop.  In the event patient requires more medications, will need to consult their PCP and/or see a pain management physician.     I have queried the [place] drug monitoring database for Edgar to better assist in clinical decision making regarding prescriptions for controlled medications.  After querying the database and considering the clinical picture I have determined that Edgar is a candidate for a prescription for control medication in the initial postoperative period.      All questions were answered. Edgar verbalized understanding and desire to proceed with surgical scheduling.  Informed consent was obtained. Consent form was signed at today's visit.  Edgar's wife Nayeli was also present and had no further questions.       Hhe received a pre-operative chest Xray at today's visit. Orders for preoperative labs (CBC, CMP, PTT, INR, type & screen, and ECG) were also placed at today's visit. Discussed pre-operative clearances, medical optimization and risk stratification.  Edgar needs pre-operative clearance from PCP and Cardiology and obtain PATs.     Pre operative patient reported outcome measures were obtained.     In addition, patient will obtain cervical CT to assess osseous architecture and pre-operative planning.     He notes that for a prior surgery, he was unable to have an IV placed and required a PICC line to be placed pre-operatively with rescheduling of  "the procedure following adequate IV access.     Continue with medications as previously prescribed if providing pain/symptom relief.    Patient instructed to return to office/ER sooner if symptoms are not improving, getting worse, or new worrisome/neurologic symptoms arise.    Subjective:      Chief Complaint: Neck Pain    HPI:  Edgar Obando is a 68 y.o. male presenting for initial visit with chief complaint of neck pain referred to us by Dr. Linda.  He has been having pain for 8-9 months without any known inciting injury.  He says this all started around the time he retired from being a .  His neck does not always bother him, and the pain seems to be random in nature.  He also notes a grinding sensation in his neck like there is \"sand in there.\"  He says he frequently drops things and has been doing so for the past few months.  He says his balance is off.  He has been using a cane for about 8 months.  He is a diabetic.  His most recent A1c was in the 6's.    Denies any nohemi trauma. Denies fever or chills, no night sweats. Denies any bladder or bowel changes.      Conservative therapy includes the following:   Medications: Tylenol and ibuprofen at present; he has tried muscle relaxer's and narcotics in the past but he does not like them  Injections: No     Physical Therapy: Yes - did provide some relief (mostly for low back)  Chiropractic Medicine: He has tried this and it has been helpful  Accupunture/Massage Therapy: has not attempted   These therapeutic modalities were ineffective at providing sustained pain relief/functional improvement.     Nicotine dependent: No  Occupation: retired   Living situation: Lives with his wife   ADLs: He does have trouble with fine motor skills.    Update on 12/1/2023:  He reports neck pain has improved with at-home stretches and exercises that he learned at physical therapy. Also with ongoing bilateral foot numbness/tingling that has improved since February, " "noting burning sensation has decreased. Does note feeling like he is walking on \"balloons\". Continues with balance issues, uses cane for ambulation. Had multiple falls previously. Denies significant neck or upper extremity pain. Denies numbness/tingling into upper extremities. Reports dropping items frequently about 4 months ago that has since improved, reporting he doesn't drop items frequently anymore. Denies dropping items, changes in fine motor skills or dexterity. Denies nohemi upper extremity weakness.    Does report ongoing isolated right knee pain, noting this is his main source of pain and limitation at this time. PMH T2DM, most recent HbA1c 6.1 on 9/14/2023.    Update 3/13/24:  Edgar presents in follow up for cervical myelopathy. He notes that he has intermittent neck pain 1x per month. He uses a cane for ambulation given he does have balance issues, however he states his balance has not gotten worse. He has had no falls. He denies any changes in his handwriting or new onset difficulty with fine motor skills. He would like to start swimming at the [employer] soon.    Update 4/2/2024:  Edgar reports recent onset of worsening balance and bilateral hand/fingertip numbness that was not present at his prior office visit on 3/13/24. He continues to use cane for ambulation. Also with new onset of posterior neck pain and headaches.    Update 4/9/24:  He presents today with his wife for a surgical discussion regarding his cervical myelopathy. He would like to proceed with surgery. He has increasing neck and periscapular pain with worsening bilateral hand/finger numbness associated with decreased dexterity, as well as continued balance difficulties necessitating an assistive device. He did sustain a fall getting out of bed.  He also notes radicular pain into the his left shoulder and arm.     Objective:     Family History   Problem Relation Age of Onset    Diabetes Mother     Diabetes Father     Cancer Father  "       Past Medical History:   Diagnosis Date    Acid reflux     Anxiety     Arthritis     Cellulitis     left ankle    CPAP (continuous positive airway pressure) dependence     as needed per patient    Diabetes mellitus (HCC)     Hypertension     Sleep apnea     Twitching        Current Facility-Administered Medications   Medication Dose Route Frequency Provider Last Rate Last Admin    ceFAZolin (ANCEF) IVPB (premix in dextrose) 2,000 mg 50 mL  2,000 mg Intravenous Once Morelia Sehti PA-C           Past Surgical History:   Procedure Laterality Date    APPENDECTOMY      CHOLECYSTECTOMY      COLONOSCOPY      KNEE ARTHROSCOPY      AR ARTHRP KNE CONDYLE&PLATU MEDIAL&LAT COMPARTMENTS Left 2016    Procedure: TOTAL  KNEE REPLACEMENT ;  Surgeon: Zack Montenegro MD;  Location: [facility];  Service: Orthopedics       Social History     Socioeconomic History    Marital status: /Civil Union     Spouse name: Nayeli    Number of children: 3    Years of education: BS    Highest education level: Not on file   Occupational History    Occupation: Attendance officer    Occupation: Retired-    Tobacco Use    Smoking status: Former     Current packs/day: 0.00     Types: Cigarettes     Quit date:      Years since quittin.3    Smokeless tobacco: Never   Vaping Use    Vaping status: Never Used   Substance and Sexual Activity    Alcohol use: No    Drug use: Not Currently     Comment: tried multiple drugs in the past    Sexual activity: Not on file   Other Topics Concern    Not on file   Social History Narrative    Not on file     Social Determinants of Health     Financial Resource Strain: Low Risk  (2023)    Overall Financial Resource Strain (CARDIA)     Difficulty of Paying Living Expenses: Not hard at all   Food Insecurity: Not on file   Transportation Needs: No Transportation Needs (2023)    PRAPARE - Transportation     Lack of Transportation (Medical): No     Lack of Transportation  "(Non-Medical): No   Physical Activity: Not on file   Stress: Not on file   Social Connections: Not on file   Intimate Partner Violence: Not on file   Housing Stability: Not on file       No Known Allergies    Review of Systems  General- denies fever/chills  HEENT- denies hearing loss or sore throat  Eyes- denies eye pain or visual disturbances, denies red eyes  Respiratory- denies cough or SOB  Cardio- denies chest pain or palpitations  GI- denies abdominal pain  Endocrine- denies urinary frequency  Urinary- denies pain with urination  Musculoskeletal- Negative except noted above  Skin- denies rashes or wounds  Neurological- denies dizziness or headache  Psychiatric- denies anxiety or difficulty concentrating    Physical Exam  /78   Pulse 70   Temp (!) 97.4 °F (36.3 °C) (Temporal)   Resp 18   Ht 5' 6\" (1.676 m)   Wt 113 kg (250 lb)   SpO2 95%   BMI 40.35 kg/m²     General/Constitutional: No apparent distress: well-nourished and well developed.  Lymphatic: No appreciable lymphadenopathy  Respiratory: Non-labored breathing  Vascular: No edema, swelling or tenderness, except as noted in detailed exam.  Integumentary: No impressive skin lesions present, except as noted in detailed exam.  Psych: Normal mood and affect, oriented to person, place and time.  MSK: normal other than stated in HPI and exam  Gait & balance: He has wide based gait, ambulates with a Cane    Cervical  spine range of motion:  -Forward flexion chin to chest  -Extension to 30  -Lateral bend 30 right, 30 left  -Rotation 45 right, 45 left.    There is no point tenderness with palpation along the posterior cervical, thoracic, lumbar spine.     Neurologic:  Upper Extremity Motor Function    Right  Left    Deltoid  5/5  5/5    Bicep  5/5  5/5    Wrist extension  5/5  5/5    Tricep  5/5  5/5    Finger flexion/  5/5  5/5    Hand intrinsic  5/5  5/5      Lower Extremity Motor Function    Right  Left    Iliopsoas  5/5  5/5    Quadriceps 5/5 " "5/5   Tibialis anterior  5/5  5/5    EHL  5/5 5/5    Gastroc. muscle  5/5 5/5    Heel rise  5/5 5/5    Toe rise  5/5 5/5      Sensory: light touch is intact to bilateral upper and lower extremities     Reflexes:    Right Left   Biceps 2+ 2+   Triceps 2+ 2+   Brachioradialis 2+ 2+   Patellar 3+ 3+   Achilles 1+ 1+   Babinski neg neg     Other tests:  Brown's: Negative    Clonus: negative  Tandem gait: positive  Romberg: positive   Carpal Tinel/Phalen: negative bilateral   Cubital Tinel: negative bilateral   Shoulder: painless active & passive ROM bilateral     Diagnostic Tests   IMAGING: I have personally reviewed the images and these are my findings:  S[facility] X-rays from 4/9/24: cervicothoracic kyphosis present. Diffuse anterior bridging osteophytes throughout the spine consistent with possible DISH.    Cervical Spine X-rays from 11/3/2023: multi level cervical spondylosis with loss of disc height, anterior osteophyte formation and facet hypertrophy, no apparent spondylolisthesis, no appreciated lytic/blastic lesions, no obvious instability    Cervical Spine MRI from 8/8/2023: multi level cervical disc degeneration, loss of disc height, bulging discs most noted at C3-4, C4-5, C5-6, C6-7 with severe spinal stenosis, cord signal abnormality at C6, varying degrees of foraminal stenosis     Procedures, if performed today     None performed       Portions of the record may have been created with voice recognition software.  Occasional wrong word or \"sound a like\" substitutions may have occurred due to the inherent limitations of voice recognition software.  Read the chart carefully and recognize, using context, where substitutions have occurred.  "

## 2024-05-02 NOTE — ANESTHESIA POSTPROCEDURE EVALUATION
Post-Op Assessment Note    CV Status:  Stable  Pain Score: 0    Pain management: adequate    Multimodal analgesia used between 6 hours prior to anesthesia start to PACU discharge    Mental Status:  Alert and awake   Hydration Status:  Euvolemic   PONV Controlled:  Controlled   Airway Patency:  Patent  Airway: intubated  Two or more mitigation strategies used for obstructive sleep apnea   Post Op Vitals Reviewed: Yes    No anethesia notable event occurred.    Staff: Anesthesiologist, CRNA               BP      Temp      Pulse 98 (05/02/24 1900)   Resp 17 (05/02/24 1900)    SpO2 96 % (05/02/24 1900)

## 2024-05-02 NOTE — PROGRESS NOTES
Progress Note - Orthopedics   Edgar Obando 68 y.o. male MRN: [id number]  Unit/Bed#: PACU 07      Subjective:    68 y.o.male POD 0 ACDF C3-C7. Patient seen in PACU, conversing with nursing. He reports neck pain. He denies numbness or paresthesias. He states his throat is sore. He reports a history of intermittent muscle jerking for several years.    Labs:  0   Lab Value Date/Time    HCT 31 (L) 05/02/2024 1641    HCT 41.4 04/19/2024 1300    HCT 39.8 09/14/2023 1411    HCT 35.6 (L) 04/26/2023 1000    HCT 43.0 03/06/2023 1153    HCT 36.2 (L) 12/20/2022 1153    HCT 48.7 01/11/2014 0918    HGB 10.5 (L) 05/02/2024 1641    HGB 12.6 04/19/2024 1300    HGB 12.8 09/14/2023 1411    HGB 11.9 (L) 04/26/2023 1000    HGB 13.1 03/06/2023 1153    HGB 11.8 (L) 12/20/2022 1153    HGB 16.0 01/11/2014 0918    PT 14.3 10/10/2021 1522    INR 1.15 04/19/2024 1300    INR 1.2 10/10/2021 1522    WBC 7.04 04/19/2024 1300    WBC 7.60 09/14/2023 1411    WBC 8.22 03/06/2023 1153    ESR 28 (H) 01/07/2018 1710    CRP 9.9 (H) 01/07/2018 1642       Meds:    Current Facility-Administered Medications:     acetaminophen (TYLENOL) tablet 650 mg, 650 mg, Oral, Q6H CASANDRA, Morelia Sethi PA-C    aluminum-magnesium hydroxide-simethicone (MAALOX) oral suspension 30 mL, 30 mL, Oral, Q6H PRN, Morelia Sethi PA-C    busPIRone (BUSPAR) tablet 10 mg, 10 mg, Oral, BID, Morelia Sethi PA-C    calcium carbonate (TUMS) chewable tablet 1,000 mg, 1,000 mg, Oral, Daily PRN, Morelia Sethi PA-C    ceFAZolin (ANCEF) IVPB (premix in dextrose) 2,000 mg 50 mL, 2,000 mg, Intravenous, Q8H, Morelia Sethi PA-C    [START ON 5/3/2024] cloNIDine (CATAPRES) tablet 0.1 mg, 0.1 mg, Oral, BID, Morelia Sethi PA-C    docusate sodium (COLACE) capsule 100 mg, 100 mg, Oral, BID, Morelia Sethi PA-C    methocarbamol (ROBAXIN) tablet 500 mg, 500 mg, Oral, Q6H CASANDRA, Morelia Sethi PA-C    ondansetron (ZOFRAN) injection 4 mg,  "4 mg, Intravenous, Q6H PRN, Morelia Sethi PA-C    oxyCODONE (ROXICODONE) IR tablet 10 mg, 10 mg, Oral, Q4H PRN, Morelia Sethi PA-C    oxyCODONE (ROXICODONE) IR tablet 5 mg, 5 mg, Oral, Q4H PRN, Morelia Sethi PA-C    [START ON 5/3/2024] pantoprazole (PROTONIX) EC tablet 40 mg, 40 mg, Oral, Daily, Morelia Sethi PA-C    pravastatin (PRAVACHOL) tablet 10 mg, 10 mg, Oral, Daily With Dinner, Morelia Sethi PA-C    [START ON 5/3/2024] senna (SENOKOT) tablet 8.6 mg, 1 tablet, Oral, Daily, Morelia Sethi PA-C    tamsulosin (FLOMAX) capsule 0.4 mg, 0.4 mg, Oral, Daily With Dinner, Morelia Sethi PA-C    Blood Culture:   No results found for: \"BLOODCX\"    Wound Culture:   No results found for: \"WOUNDCULT\"    Ins and Outs:  I/O last 24 hours:  In: 2400 [I.V.:2400]  Out: 800 [Urine:700; Blood:100]          Physical:  Vitals:    05/02/24 0548   BP: 134/78   Pulse: 70   Resp: 18   Temp: (!) 97.4 °F (36.3 °C)   SpO2: 95%     Oriented to person, place, month, situation.    Diffuse intermittent muscle jerks upper and lower extremities, gradually improving over one hour post-operatively     Musculoskeletal: bilateral Upper Extremity and lower extremities  Hard cervical collar in place  EULOGIO drain set to suction with serosanguinous drainage  2+ radial pulses  Digits warm and well perfused    Upper Extremity Motor Function     Right  Left    Deltoid  4/5  4/5    Bicep  3/5  3/5    Wrist extension  3/5  3/5    Tricep  4/5  4/5    Finger flexion/  3/5  3/5    Hand intrinsic  4/5  4/5       Lower Extremity Motor Function    Right  Left    Iliopsoas  4/5  4/5    Quadriceps 4/5 4/5   Tibialis anterior  4/5  4/5    EHL  5/5  5/5    Gastroc. muscle  4/5  4/5      Sensory: light touch is intact to bilateral upper and lower extremities       Assessment:    68 y.o.male POD 0 ACDF C3-C7. His neurologic status has been improving. Plan for q2 neuro checks in the surgical ICU " overnight, as well as airway monitoring. Will likely need a new PICC line placed tomorrow as PICC placed this AM not functioning, appreciate assistance from IR.     Plan:  He can be WBAT to bilateral upper extremities, once his right femoral central line has been removed he can ambulate and be WBAT to bilateral lower extremities  Hard cervical collar in place at all times  Head of bed at 35 degrees  Q2 neuro checks  Monitor EULOGIO output  Ancef x2  Will monitor for ABLA and administer IVF/prbc as indicated for Greater than 2 gram drop or Hgb < 7   PT/OT  Pain control  Cervical XR prior to discharge  DVT ppx - OK to start heparin tomorrow, SCDs  Dispo: pending clinical improvement, physical therapy, AM labs, pain control    Monica Desir MD

## 2024-05-02 NOTE — DISCHARGE INSTR - AVS FIRST PAGE
Dr. [facility]  Post-op Information and Instructions  Anterior Cervical Discectomy and Fusion    1. Follow-up  - You should return to the office for your follow-up appointment approximately 2 weeks post-op. This should have been scheduled prior to your surgery. If you do not have an appointment scheduled, please call [phone] to do so as soon as possible.  - At the first post op appointment we will check your incision and make sure that you are healing well.  - X-rays will be taken at this appointment to evaluate your hardware.  - If you need any refills on your pain medications, this will be addressed at your follow-up appointment.  - You can expect to have post-op appointments at 2 weeks, 6 weeks, 3 months, 6 months and 1 year after surgery. After that time, we will continue to follow you yearly to monitor your fusion.    2. Incision Care:  - You will have a surgical dressing over the area. Maintain this dressing until it starts to fall off. It is placed in a sterile environment and is important to allow the wound to begin healing in a sterile environment.  - You will have steri-strips over the incision - these look like little white pieces of tape.  Please do not remove these as they will fall off on their own or be removed at your first post-op appointment.  - Your sutures are buried beneath the skin and will dissolve with time. The incision may be raised initially, although this will improve as the sutures dissolve.  - Keep dressing intact and incision dry until 2 week post-op visit. You may shower on the day that your dressing is removed at 2 week post-op visit. After that, your incision may get wet in the shower, but DO NOT submerge your incision (bath, hot tub, pool, lake, etc.) until you have been cleared to do so. You may gently wash it with soap and water. Do not scrub the incision. Air-dry the incision or pat dry with clean, fresh towel before reapplying the dry dressing.  - If the  dressing falls off prior to 2 week post-op visit, please keep the incision covered with a non-adhesive dressing. Dressing material can be purchased over the counter at any drug store. Materials include: square gauze, ABD dressing, and skin tape. Place dry dressing over dry incision and tape the sides.  - Please do not apply any ointments or salves to the incision unless instructed to do so.  - If you notice any drainage, foul odor, increased redness, swelling or pain of the incision, please call our office immediately.  These may be signs of an infection and should be evaluated.    3. Medications  - You will be given a prescription for pain medication when you are discharged from the hospital.  - It is recommended that you take the medication as prescribed for the first 24-48 hours after surgery, even if your pain is minimal.  It is much easier to control your pain when you stay on top of the medications than if you wait to take them until your pain is severe.  - After the first few days, you should try to take the pain medication less often, and only when needed.  - You may take Tylenol in place of, not in addition to, your pain medication if you wish.  - If you have had a fusion, do not take NSAIDs (Advil, Aleve, Ibuprofen, Motrin, Naproxen, etc.) for 3 months following your surgery.  This may impede the healing of your fusion.  - DO NOT drive while you are taking narcotic pain medications.  - If there is an issue with any of your discharge medications, please call our office at [phone] to discuss.  - It is very common for surgery and narcotic pain medications to cause constipation.  It is very important that you eat a high fiber diet, drink lots of water, and also consider taking a stool softener while taking pain medications to help with this.  It is not uncommon for you to go several days without a bowel movement after surgery.  If you are constipated and it is accompanied by severe abdominal pain,  nausea and vomiting, inability to keep food or drink down, and/or a fever, please call our office as these may be signs of a more serious medical condition.    4. Activity:  - You will have a cervical collar on after surgery. This collar may be removed for hygiene (showering, etc.) purposes. Collar should be worn at all other times until initial follow up appointment. If you have had a posterior cervical fusion, you may need to wear the collar for a longer time.  - If you have had anterior cervical fusion, you may have a hoarse voice initially.  - You CANNOT lift greater than 5lbs, or a gallon of milk, until instructed. Do not lift greater than 5lbs overhead.  - You should not drive while you are wearing your cervical collar.  - You should try to avoid sitting for greater than 20 minutes at a time as this will cause your muscles to stiffen and spasm, making you more uncomfortable.  - You will start PT (physical therapy) around 3 months after surgery. We will discuss referral to physical therapy at your first or second post-op appointment  - You may resume your normal daily activities as tolerated.  - Walking is the best exercise and you should try to walk up to 1 mile throughout the day as you are recovering.  - You should NOT smoke following spinal fusion as this will impede your healing.    5. Diet:  - It is important to eat a well balanced diet to help your body heal.  - You should make sure that you are drinking plenty of fluids - water is best  - If you have had an ACDF (anterior cervical discectomy and fusion), you can expect that you will have a sore throat and possibly have difficulty swallowing after surgery.  This is normal and should improve.  If you feel that this is worsening instead of improving, please call the office.  If you feel that you are having any swelling, or difficulty breathing, please call 911 immediately.    6. Return to Work:  - You can expect to be out of work for at least 6 weeks.  We  will discuss your return to work status at your post-op appointments, but please do not hesitate to call if you have any questions or concerns.  - Please make sure that any short-term disability paperwork is dropped off at the .    Please call our office if you have any of the following after surgery:  - Persistent fever greater than 100.5 degrees  - Foul odor, drainage, redness, swelling or increased pain around your incision site  - A headache that is worse with standing or sitting, and is alleviated with lying flat on your back.  - New onset arm or leg weakness, numbness or tingling  - Significantly increased pain that is not alleviated with pain medications, rest and ice  - New-onset calf pain    If you experience any chest pain or shortness of breath after you are discharged, call 911 immediately.

## 2024-05-02 NOTE — NURSING NOTE
Janette attempted to contact wife at [phone] to notify her of delay with procedure start but was unable to reach her via phones x2

## 2024-05-02 NOTE — ANESTHESIA PROCEDURE NOTES
"Arterial Line Insertion    Performed by: Hortencia Escobar CRNA  Authorized by: Angeline Vásquez MD  Consent: Verbal consent obtained. Written consent obtained.  Risks and benefits: risks, benefits and alternatives were discussed  Consent given by: patient  Patient understanding: patient states understanding of the procedure being performed  Patient consent: the patient's understanding of the procedure matches consent given  Procedure consent: procedure consent matches procedure scheduled  Relevant documents: relevant documents present and verified  Test results: test results available and properly labeled  Site marked: the operative site was marked  Radiology Images: Radiology Images displayed and confirmed. If images not available, report reviewed  Required items: required blood products, implants, devices, and special equipment available  Patient identity confirmed: arm band and verbally with patient  Time out: Immediately prior to procedure a \"time out\" was called to verify the correct patient, procedure, equipment, support staff and site/side marked as required.  Indications: hemodynamic monitoring  Orientation:  Right  Location: radial artery  Sedation:  Patient sedated: yes (GA)  Vitals: Vital signs were monitored during sedation.    Procedure Details:  Demetri's test normal: yes  Needle gauge: 20  Number of attempts: 2    Post-procedure:  Post-procedure: dressing applied  Waveform: good waveform  Post-procedure CNS: unchanged  Patient tolerance: patient tolerated the procedure well with no immediate complications          "

## 2024-05-02 NOTE — PROCEDURES
Venous Access Line Insertion    Date/Time: 5/2/2024 9:04 AM    Performed by: Pilar Madison  Authorized by: Conrad Richardson MD    Patient location:  IR  Consent:     Consent obtained:  Verbal and written    Consent given by:  Patient    Risks discussed:  Arterial puncture, incorrect placement, nerve damage, pneumothorax, infection and bleeding    Alternatives discussed:  No treatment  Universal protocol:     Procedure explained and questions answered to patient or proxy's satisfaction: yes      Immediately prior to procedure, a time out was called: yes      Relevant documents present and verified: yes      Site/side marked: yes      Patient identity confirmed:  Verbally with patient and arm band  Pre-procedure details:     Hand hygiene: Hand hygiene performed prior to insertion      Sterile barrier technique: All elements of maximal sterile technique followed      Skin preparation:  ChloraPrep    Skin preparation agent: Skin preparation agent completely dried prior to procedure    Procedure details:     Complex Venous Access Line Type: PICC      Complex Venous Access Line Indications: no peripheral vascular access      Catheter tip vessel location: atriocaval junction      Orientation:  Right and upper    Location:  Brachial    Procedural supplies:  Double lumen    Catheter size:  5 Fr    Total catheter length (cm):  49    Catheter out on skin (cm):  0    Max flow rate:  999.99    Arm circumference:  34    Patient evaluated for contraindications to access (i.e. fistula, thrombosis, etc): Yes      Approach: percutaneous technique used      Patient position:  Flat    Ultrasound image availability:  Images available in PACS    Sterile ultrasound techniques: Sterile gel and sterile probe covers were used      Number of attempts:  2    Successful placement: yes      Landmarks identified: yes      Cath access vessel: IR fluoroscopy.  Anesthesia (see MAR for exact dosages):     Anesthesia method:  Local infiltration     Local anesthetic:  Lidocaine 1% w/o epi  Post-procedure details:     Post-procedure:  Dressing applied and securement device placed    Assessment:  Blood return through all ports    Post-procedure complications: none      Patient tolerance of procedure:  Tolerated well, no immediate complications

## 2024-05-02 NOTE — CONSULTS
[facility]  Consult: Critical Care  Name: Edgar Obando 68 y.o. male I MRN: [id number]  Unit/Bed#: OR POOL I Date of Admission: 5/2/2024   Date of Service: 5/2/2024 I Hospital Day: 0      Inpatient consult to Surgical Critical Care  Consult performed by: Pilar Florence MD  Consult ordered by: Morelia Sethi PA-C        Assessment/Plan   Neuro/Psych:  Diagnoses: cervical myelopathy and stenosis s/p C3-7 anterior cervical discectomy and fusion 5/2, acute post op pain, hx anxiety, hx intermittent myoclonic jerking  Plan:  Neuro checks Q2 hours  Ortho surgery primary: WBAT in hard cervical collar, ancef x2 doses, early ambulation, EULOGIO drain, MAP goal >80, HOB >35  Sedation: none  Home meds: buspar 10mg BID  Home held: clonazepam  Analgesia: Multimodal. Tylenol PO Q6 hr sched, oxy 5/10 mod/sev, robaxin    CV:  Diagnoses: hx HTN  Plan:  Home meds: clonidine, pravastatin  Home held: ASA 81, lasix, losartan  Continuous cardiopulmonary monitoring. Maintain MAP >80.  Will plan to had neosynephrine as needed to meet MAP goal    Pulm:  Diagnoses: hx SUSY on CPAP  Blood gases: 7.528/31.6/89/26.3  Supplemental O2: 4L. Wean as tolerated.  Plan:  Can plan to start CPAP PRN overnight  Continuous pulse oximetry. Maintain O2 sat >92%.     GI:  Diagnoses: hx GERD  Plan:  Bowel regimen: colace, senokot  GI prophylaxis: maalox, tums, protonix    :  Diagnoses: hx BPH  Plan:  Home meds: flomax  Jamison in place  Monitor I/Os.    F/E/N:  Plan:   F: Fluid resuscitation prn.  E: Monitor and replete electrolytes for Mg >2, Phos >3, K >4.  N: surg soft diet    Heme/Onc:  Diagnoses: RUE hematoma 2/2 PICC placement 5/2  Plan:  Q4 hour NV checks per IR  IR 5/3 for attempt of PICC replacement  VTE prophylaxis: held per ortho surgery, plan to start 5/3    Endo:  Diagnoses: T2DM (9/2023 A1c 6.1%)  Plan:   Home held: trulicity  Insulin: SSI. Monitor blood glucose.    ID:  Diagnoses: surgical  prophylaxis  Plan:  Abx: ancef x2 doses  Monitor fever curve and WBC.    MSK/Skin:  Diagnoses: see neuro for full plan for cervical spondylosis  Plan:  Cervical collar at all times  PT/OT when appropriate. Encourage OOB and ambulation when appropriate. Local wound care prn.    LDAs:  Lines - R fem CVC  Drains - EULOGIO x1  Airways - none      Disposition: Critical care     History of Present Illness     HPI: Edgar Obando is a 68 y.o. who presented for an elective C3-7 ACDF with ortho surgery on 5/2. There were no reported issues intra-operatively. He was extubated without issues and is in the ICU overnight for MAP goal >80. He is a retired  and ambulates with a cane at baseline.     History obtained from chart review and the patient.  Review of Systems   Constitutional: Negative.    HENT: Negative.     Respiratory: Negative.     Cardiovascular: Negative.    Gastrointestinal: Negative.    Genitourinary: Negative.    Musculoskeletal: Negative.    Skin: Negative.    Neurological: Negative.    Psychiatric/Behavioral: Negative.       Historical Information   Past Medical History:  No date: Acid reflux  No date: Anxiety  No date: Arthritis  No date: Cellulitis      Comment:  left ankle  No date: CPAP (continuous positive airway pressure) dependence      Comment:  as needed per patient  No date: Diabetes mellitus (HCC)  No date: Hypertension  No date: Sleep apnea  No date: Twitching Past Surgical History:  No date: APPENDECTOMY  No date: CHOLECYSTECTOMY  No date: COLONOSCOPY  No date: KNEE ARTHROSCOPY  07/13/2016: CO ARTHRP KNE CONDYLE&PLATU MEDIAL&LAT COMPARTMENTS; Left      Comment:  Procedure: TOTAL  KNEE REPLACEMENT ;  Surgeon: Zack Montenegro MD;  Location: [facility];  Service: Orthopedics   Current Outpatient Medications   Medication Instructions    albuterol (PROVENTIL HFA,VENTOLIN HFA) 90 mcg/act inhaler 2 puffs, Inhalation, Every 6 hours PRN, As needed    aspirin (ECOTRIN LOW STRENGTH) 81 mg,  Oral, Daily    b complex vitamins capsule 1 capsule, Oral, Daily    bisacodyl (DULCOLAX) 5 mg, Oral, Once    Blood Glucose Monitoring Suppl (ONE TOUCH ULTRA 2) w/Device KIT USE TO TEST BLOOD GLUCOSE    budesonide (RINOCORT AQUA) 32 MCG/ACT nasal spray 1 spray, Nasal, 2 times daily, As needed    busPIRone (BUSPAR) 10 mg tablet TAKE 1 TABLET (10 MG TOTAL) BY MOUTH TWO (TWO) TIMES A DAY    chlorhexidine (PERIDEX) 0.12 % solution RINSE MOUTH WITH 15ML (1 CAPFUL) FOR 30 SECONDS IN MORNING AND EVENING AFTER BRUSHING, THEN SPIT    clonazePAM (KLONOPIN) 0.5 mg, Oral, 2 times daily    cloNIDine (CATAPRES) 0.1 mg, Oral, 2 times daily    docusate sodium (COLACE) 100 mg, Oral, 2 times daily    fluticasone (FLOVENT HFA) 220 mcg/act inhaler 1 puff, Inhalation, Daily PRN, As needed    furosemide (LASIX) 40 mg, Oral, 2 times daily PRN    Lancets 30G MISC 3 times daily, Use to test blood glucose    losartan (COZAAR) 25 mg tablet     lovastatin (MEVACOR) 10 mg, Oral, Daily at bedtime    Multiple Vitamins-Minerals (multivitamin with minerals) tablet 1 tablet, Oral, Daily    mupirocin (BACTROBAN) 2 % ointment No dose, route, or frequency recorded.    naproxen (NAPROSYN) 500 mg tablet     NARCAN 4 MG/0.1ML LIQD Has if needed    OneTouch Ultra test strip USE TO TEST BLOOD GLUCOSE TWICE DAILY    pantoprazole (PROTONIX) 40 mg, Oral, Daily    potassium chloride (K-DUR,KLOR-CON) 10 mEq tablet No dose, route, or frequency recorded.    predniSONE 20 mg tablet     tamsulosin (FLOMAX) 0.4 mg TAKE 1 CAPSULE BY MOUTH EVERY DAY WITH DINNER    testosterone (ANDROGEL) 1.62 % TD gel pump PLACE 1 ACTUATION ON THE SKIN EVERY MORNING.    Trulicity 4.5 MG/0.5ML SOPN Every 7 days, On     zinc gluconate 50 mg, Oral, Daily    No Known Allergies   Social History     Tobacco Use    Smoking status: Former     Current packs/day: 0.00     Types: Cigarettes     Quit date:      Years since quittin.3    Smokeless tobacco: Never   Vaping Use    Vaping  status: Never Used   Substance Use Topics    Alcohol use: No    Drug use: Not Currently     Comment: tried multiple drugs in the past    Family History   Problem Relation Age of Onset    Diabetes Mother     Diabetes Father     Cancer Father           Objective                            Vitals I/O      Most Recent Min/Max in 24hrs   Temp (!) 97.4 °F (36.3 °C) Temp  Min: 97.4 °F (36.3 °C)  Max: 97.4 °F (36.3 °C)   Pulse 70 Pulse  Min: 70  Max: 70   Resp 18 Resp  Min: 18  Max: 18   /78 BP  Min: 134/78  Max: 134/78   O2 Sat 95 % SpO2  Min: 95 %  Max: 95 %      Intake/Output Summary (Last 24 hours) at 5/2/2024 1815  Last data filed at 5/2/2024 1730  Gross per 24 hour   Intake 1700 ml   Output 800 ml   Net 900 ml       Diet NPO; Sips with meds  Diet Surgical; Surgical Soft/Lite Meal    Invasive Monitoring           Physical Exam   Physical Exam  Vitals reviewed.   Eyes:      Extraocular Movements: Extraocular movements intact.      Pupils: Pupils are equal, round, and reactive to light.   Skin:     General: Skin is warm.   HENT:      Head: Normocephalic.      Right Ear: Hearing normal.      Left Ear: Hearing normal.      Nose: No nasal deformity.      Mouth/Throat:      Mouth: Mucous membranes are moist.   Neck:      Comments: Incision c/d/I under mepilex on right neck, EULOGIO drain to bulb suction with SS output  Cardiovascular:      Rate and Rhythm: Normal rate and regular rhythm.      Pulses: Normal pulses.   Musculoskeletal:         General: Normal range of motion.      Right lower leg: No edema.      Left lower leg: No edema.   Abdominal:      Palpations: Abdomen is soft.      Tenderness: There is no abdominal tenderness.   Constitutional:       General: He is not in acute distress.     Appearance: He is well-developed.      Interventions: Cervical collar in place.   Pulmonary:      Effort: Pulmonary effort is normal. No respiratory distress.   Neurological:      General: No focal deficit present.      Mental  Status: He is alert and oriented to person, place and time. Mental status is at baseline.      Motor: Strength full and intact in all extremities. No motor deficit.   Genitourinary/Anorectal:  Jamison present.          Diagnostic Studies      EKG: N/A  Imaging:  I have personally reviewed pertinent films in PACS     Medications:  Scheduled PRN   acetaminophen, 650 mg, Q6H CASANDRA  busPIRone, 10 mg, BID  cefazolin, 2,000 mg, Q8H  [START ON 5/3/2024] cloNIDine, 0.1 mg, BID  docusate sodium, 100 mg, BID  methocarbamol, 500 mg, Q6H CASANDRA  [START ON 5/3/2024] pantoprazole, 40 mg, Daily  pravastatin, 10 mg, Daily With Dinner  [START ON 5/3/2024] senna, 1 tablet, Daily  tamsulosin, 0.4 mg, Daily With Dinner      aluminum-magnesium hydroxide-simethicone, 30 mL, Q6H PRN  calcium carbonate, 1,000 mg, Daily PRN  ondansetron, 4 mg, Q6H PRN  oxyCODONE, 10 mg, Q4H PRN  oxyCODONE, 5 mg, Q4H PRN       Continuous          Labs:    CBC    Recent Labs     05/02/24  1641   HGB 10.5*   HCT 31*     BMP    Recent Labs     05/02/24  1641   CO2 27       Coags    No recent results     Additional Electrolytes  Recent Labs     05/02/24  1641   CAIONIZED 1.08*          Blood Gas    No recent results  No recent results LFTs  No recent results    Infectious  No recent results  Glucose  No recent results             Pilar Flroence MD

## 2024-05-02 NOTE — SEDATION DOCUMENTATION
Picc line successfully placed by GABRIELA Bunch IR tech. Tolerated well. Report called to [facility]. Transported via stretcher to [facility] by anesthesia and OR staff.

## 2024-05-02 NOTE — QUICK NOTE
Interventional Radiology    Edgar Obando  : 1955  MRN: [id number]    Notified patient with failed RUE PICC line after IR placement on 24.     -Unable to assess RUE at this time. Patient currently in OR for Cervical ACDF  -Discussed the following recommendations with PACU and  CRNA in OR  -Recommend frequent neurovascular checks of RUE (Q2H to Q4H overnight)  -Please reach out to IR Attending on call with urgent questions or concerns  -IR provider will see patient tomorrow in follow up.    DAYSI Hernandez  Interventional Radiology

## 2024-05-02 NOTE — INTERIM OP NOTE
FUSION CERVICAL ANTERIOR W DISCECTOMY, C3-7 ACDF  Postoperative Note  PATIENT NAME: Edgar Obando  : 1955  MRN: [id number]  [facility] OR ROOM 18    Surgery Date: 2024    Preop Diagnosis:  Cervical spinal stenosis [M48.02]  Cervical myelopathy (HCC) [G95.9]    Post-Op Diagnosis Codes:     * Cervical spinal stenosis [M48.02]     * Cervical myelopathy (HCC) [G95.9]    Procedure(s) (LRB):  FUSION CERVICAL ANTERIOR W DISCECTOMY, C3-7 ACDF (Bilateral)    Surgeons and Role:     * Conrad Richardson MD - Primary     * Monica Desir MD - Assisting     * Morelia Sethi PA-C - Assisting    Specimens:  * No specimens in log *    Estimated Blood Loss:   100 mL    Anesthesia Type:   General     Findings:   Extensive anterior osteophytes, uncovertebral hypertrophy, spinal stenosis    Complications:   None      SIGNATURE: Conrad Richardson MD   DATE: May 2, 2024   TIME: 6:45 PM

## 2024-05-02 NOTE — ASSESSMENT & PLAN NOTE
Failed outpatient conservative measures and opted for decompressive surgery  Pain controlled  No post operative issues noted  Continue encourage incentive spirometry; monitor fever curve  DVT prophylaxis in place and reviewed  Results from last 7 days   Lab Units 05/04/24  0510   WBC Thousand/uL 5.44   HEMOGLOBIN g/dL 10.6*   HEMATOCRIT % 33.8*   PLATELETS Thousands/uL 117*

## 2024-05-02 NOTE — ANESTHESIA PROCEDURE NOTES
Central Line Insertion    Performed by: Marcos Womack MD  Authorized by: Angeline Vásquez MD    Date/Time: 5/2/2024 10:02 AM  Catheter Type:  triple lumen  Consent: The procedure was performed in an emergent situation. Verbal consent not obtained. Written consent not obtained.  Risks and benefits discussed: Failed PICC and PIV attempts, need for access.  Required items: required blood products, implants, devices, and special equipment available  Patient identity confirmed: arm band and hospital-assigned identification number  Indications: vascular access  Catheter size: 7 Fr  Patient position: flat  Assessment: blood return through all ports and free fluid flow  Preparation: skin prepped with ChloraPrep  Skin prep agent dried: skin prep agent completely dried prior to procedure  Sterile barriers: all five maximum sterile barriers used - cap, mask, sterile gown, sterile gloves, and large sterile sheet  Hand hygiene: hand hygiene performed prior to central venous catheter insertion  sterile gel and probe cover used in ultrasound-guided central venous catheter insertionultrasound permanent image saved  Reason All Sterile Barriers Not Used:  All elements of maximal sterile barrier technique not followed for medical reasons  Pre-procedure: landmarks identified  Number of attempts: 1  Successful placement: yes  Post-procedure: chlorhexidine patch applied, dressing applied and line sutured  Patient tolerance: patient tolerated the procedure well with no immediate complications

## 2024-05-02 NOTE — ASSESSMENT & PLAN NOTE
Losartan and Lasix have been held in the post operative setting to avoid hypotension/LAKHWINDER  Continue clonidine  Pt takes Lasix as needed for LE edema. Will start 40mg daily today due to hypoxia and crackles at the bases.  Add hydralazine as needed for SBP >160  Monitor trend

## 2024-05-02 NOTE — ANESTHESIA PREPROCEDURE EVALUATION
"Procedure:  FUSION CERVICAL ANTERIOR W DISCECTOMY, C3-7 ACDF (Bilateral: Spine Cervical)    Relevant Problems   No relevant active problems        Physical Exam    Airway    Mallampati score: II  TM Distance: >3 FB  Neck ROM: limited     Dental    implants    Cardiovascular  Cardiovascular exam normal    Pulmonary  Pulmonary exam normal Decreased breath sounds    Other Findings        Anesthesia Plan  ASA Score- 3     Anesthesia Type- general with ASA Monitors.         Additional Monitors: arterial line.    Airway Plan: ETT.    Comment: Hx difficult IV access- awaiting PICC line placement preop  No prev issues with anesthesia although states he \"woke up\" during colonoscopy  Medical clearance in chart. Stable from cardiac/pulmonary standpoints. No current illness concerns. Lungs CTA with stable CXR  HADLEY for intubation to limit neck movement.       Plan Factors-Exercise tolerance (METS): >4 METS.    Chart reviewed. EKG reviewed. Imaging results reviewed. Existing labs reviewed. Patient summary reviewed.    Patient is not a current smoker.              Induction- intravenous.    Postoperative Plan- Plan for postoperative opioid use. Planned trial extubation    Informed Consent- Anesthetic plan and risks discussed with patient.  I personally reviewed this patient with the CRNA. Discussed and agreed on the Anesthesia Plan with the CRNA..                "

## 2024-05-03 ENCOUNTER — APPOINTMENT (INPATIENT)
Dept: RADIOLOGY | Facility: HOSPITAL | Age: 69
DRG: 472 | End: 2024-05-03
Payer: COMMERCIAL

## 2024-05-03 LAB
ANION GAP SERPL CALCULATED.3IONS-SCNC: 10 MMOL/L (ref 4–13)
BUN SERPL-MCNC: 16 MG/DL (ref 5–25)
CALCIUM SERPL-MCNC: 8.6 MG/DL (ref 8.4–10.2)
CHLORIDE SERPL-SCNC: 101 MMOL/L (ref 96–108)
CO2 SERPL-SCNC: 29 MMOL/L (ref 21–32)
CREAT SERPL-MCNC: 1.12 MG/DL (ref 0.6–1.3)
ERYTHROCYTE [DISTWIDTH] IN BLOOD BY AUTOMATED COUNT: 13.6 % (ref 11.6–15.1)
GFR SERPL CREATININE-BSD FRML MDRD: 67 ML/MIN/1.73SQ M
GLUCOSE SERPL-MCNC: 103 MG/DL (ref 65–140)
GLUCOSE SERPL-MCNC: 119 MG/DL (ref 65–140)
GLUCOSE SERPL-MCNC: 124 MG/DL (ref 65–140)
GLUCOSE SERPL-MCNC: 124 MG/DL (ref 65–140)
GLUCOSE SERPL-MCNC: 187 MG/DL (ref 65–140)
GLUCOSE SERPL-MCNC: 196 MG/DL (ref 65–140)
HCT VFR BLD AUTO: 38.3 % (ref 36.5–49.3)
HGB BLD-MCNC: 11.8 G/DL (ref 12–17)
MCH RBC QN AUTO: 28.4 PG (ref 26.8–34.3)
MCHC RBC AUTO-ENTMCNC: 30.8 G/DL (ref 31.4–37.4)
MCV RBC AUTO: 92 FL (ref 82–98)
PLATELET # BLD AUTO: 172 THOUSANDS/UL (ref 149–390)
PMV BLD AUTO: 10.6 FL (ref 8.9–12.7)
POTASSIUM SERPL-SCNC: 4.3 MMOL/L (ref 3.5–5.3)
RBC # BLD AUTO: 4.16 MILLION/UL (ref 3.88–5.62)
SODIUM SERPL-SCNC: 140 MMOL/L (ref 135–147)
WBC # BLD AUTO: 8.22 THOUSAND/UL (ref 4.31–10.16)

## 2024-05-03 PROCEDURE — 99291 CRITICAL CARE FIRST HOUR: CPT | Performed by: SURGERY

## 2024-05-03 PROCEDURE — 94664 DEMO&/EVAL PT USE INHALER: CPT

## 2024-05-03 PROCEDURE — 82948 REAGENT STRIP/BLOOD GLUCOSE: CPT

## 2024-05-03 PROCEDURE — 94760 N-INVAS EAR/PLS OXIMETRY 1: CPT

## 2024-05-03 PROCEDURE — 99233 SBSQ HOSP IP/OBS HIGH 50: CPT | Performed by: SURGERY

## 2024-05-03 PROCEDURE — 72040 X-RAY EXAM NECK SPINE 2-3 VW: CPT

## 2024-05-03 PROCEDURE — 80048 BASIC METABOLIC PNL TOTAL CA: CPT

## 2024-05-03 PROCEDURE — 99024 POSTOP FOLLOW-UP VISIT: CPT | Performed by: ORTHOPAEDIC SURGERY

## 2024-05-03 PROCEDURE — 97167 OT EVAL HIGH COMPLEX 60 MIN: CPT

## 2024-05-03 PROCEDURE — 99231 SBSQ HOSP IP/OBS SF/LOW 25: CPT | Performed by: NURSE PRACTITIONER

## 2024-05-03 PROCEDURE — 85027 COMPLETE CBC AUTOMATED: CPT

## 2024-05-03 PROCEDURE — 97163 PT EVAL HIGH COMPLEX 45 MIN: CPT

## 2024-05-03 RX ORDER — LIDOCAINE 50 MG/G
2 PATCH TOPICAL DAILY
Status: DISCONTINUED | OUTPATIENT
Start: 2024-05-03 | End: 2024-05-06 | Stop reason: HOSPADM

## 2024-05-03 RX ORDER — CLONIDINE HYDROCHLORIDE 0.1 MG/1
0.1 TABLET ORAL 2 TIMES DAILY
Status: DISCONTINUED | OUTPATIENT
Start: 2024-05-03 | End: 2024-05-06 | Stop reason: HOSPADM

## 2024-05-03 RX ORDER — METHOCARBAMOL 500 MG/1
500 TABLET, FILM COATED ORAL 3 TIMES DAILY PRN
Qty: 14 TABLET | Refills: 0 | Status: SHIPPED | OUTPATIENT
Start: 2024-05-03 | End: 2024-05-17 | Stop reason: SDUPTHER

## 2024-05-03 RX ORDER — OXYCODONE HYDROCHLORIDE 5 MG/1
5 TABLET ORAL EVERY 4 HOURS PRN
Qty: 30 TABLET | Refills: 0 | Status: SHIPPED | OUTPATIENT
Start: 2024-05-03 | End: 2024-05-13

## 2024-05-03 RX ORDER — HEPARIN SODIUM 5000 [USP'U]/ML
7500 INJECTION, SOLUTION INTRAVENOUS; SUBCUTANEOUS EVERY 8 HOURS SCHEDULED
Status: DISCONTINUED | OUTPATIENT
Start: 2024-05-03 | End: 2024-05-06 | Stop reason: HOSPADM

## 2024-05-03 RX ORDER — SENNOSIDES 8.6 MG
650 CAPSULE ORAL EVERY 8 HOURS PRN
Qty: 30 TABLET | Refills: 0 | Status: SHIPPED | OUTPATIENT
Start: 2024-05-03

## 2024-05-03 RX ORDER — HYDROMORPHONE HCL IN WATER/PF 6 MG/30 ML
0.2 PATIENT CONTROLLED ANALGESIA SYRINGE INTRAVENOUS
Status: DISCONTINUED | OUTPATIENT
Start: 2024-05-03 | End: 2024-05-06 | Stop reason: HOSPADM

## 2024-05-03 RX ORDER — ACETAMINOPHEN 325 MG/1
975 TABLET ORAL EVERY 8 HOURS SCHEDULED
Status: DISCONTINUED | OUTPATIENT
Start: 2024-05-03 | End: 2024-05-06 | Stop reason: HOSPADM

## 2024-05-03 RX ADMIN — OXYCODONE HYDROCHLORIDE 10 MG: 10 TABLET ORAL at 13:01

## 2024-05-03 RX ADMIN — METHOCARBAMOL 500 MG: 500 TABLET ORAL at 11:11

## 2024-05-03 RX ADMIN — ACETAMINOPHEN 650 MG: 325 TABLET, FILM COATED ORAL at 05:01

## 2024-05-03 RX ADMIN — HEPARIN SODIUM 7500 UNITS: 5000 INJECTION INTRAVENOUS; SUBCUTANEOUS at 15:42

## 2024-05-03 RX ADMIN — HEPARIN SODIUM 7500 UNITS: 5000 INJECTION INTRAVENOUS; SUBCUTANEOUS at 09:44

## 2024-05-03 RX ADMIN — ACETAMINOPHEN 650 MG: 325 TABLET, FILM COATED ORAL at 11:10

## 2024-05-03 RX ADMIN — BUSPIRONE HYDROCHLORIDE 10 MG: 10 TABLET ORAL at 08:32

## 2024-05-03 RX ADMIN — TAMSULOSIN HYDROCHLORIDE 0.4 MG: 0.4 CAPSULE ORAL at 15:42

## 2024-05-03 RX ADMIN — PRAVASTATIN SODIUM 10 MG: 10 TABLET ORAL at 15:42

## 2024-05-03 RX ADMIN — CEFAZOLIN SODIUM 2000 MG: 2 SOLUTION INTRAVENOUS at 07:55

## 2024-05-03 RX ADMIN — BUSPIRONE HYDROCHLORIDE 10 MG: 10 TABLET ORAL at 17:39

## 2024-05-03 RX ADMIN — HEPARIN SODIUM 7500 UNITS: 5000 INJECTION INTRAVENOUS; SUBCUTANEOUS at 22:17

## 2024-05-03 RX ADMIN — DOCUSATE SODIUM 100 MG: 100 CAPSULE, LIQUID FILLED ORAL at 08:33

## 2024-05-03 RX ADMIN — METHOCARBAMOL 500 MG: 500 TABLET ORAL at 17:40

## 2024-05-03 RX ADMIN — SENNOSIDES 8.6 MG: 8.6 TABLET, FILM COATED ORAL at 08:33

## 2024-05-03 RX ADMIN — METHOCARBAMOL 500 MG: 500 TABLET ORAL at 05:01

## 2024-05-03 RX ADMIN — PANTOPRAZOLE SODIUM 40 MG: 40 TABLET, DELAYED RELEASE ORAL at 05:01

## 2024-05-03 RX ADMIN — DOCUSATE SODIUM 100 MG: 100 CAPSULE, LIQUID FILLED ORAL at 17:39

## 2024-05-03 RX ADMIN — LIDOCAINE 2 PATCH: 50 PATCH CUTANEOUS at 15:42

## 2024-05-03 RX ADMIN — OXYCODONE HYDROCHLORIDE 10 MG: 10 TABLET ORAL at 20:00

## 2024-05-03 RX ADMIN — OXYCODONE HYDROCHLORIDE 10 MG: 10 TABLET ORAL at 08:31

## 2024-05-03 RX ADMIN — INSULIN LISPRO 2 UNITS: 100 INJECTION, SOLUTION INTRAVENOUS; SUBCUTANEOUS at 08:05

## 2024-05-03 NOTE — OP NOTE
OPERATIVE REPORT  PATIENT NAME: Edgar Obando    :  1955  MRN: [id number]  Pt Location: [facility] OR ROOM 18    SURGERY DATE: 2024    Surgeons and Role:     * Conrad Richardson MD - Primary     * Monica Desir MD - Assisting     * Morelia Sethi PA-C - Assisting    Preop Diagnosis:  Cervical spinal stenosis [M48.02]  Cervical myelopathy (HCC) [G95.9]    Post-Op Diagnosis Codes:     * Cervical spinal stenosis [M48.02]     * Cervical myelopathy (HCC) [G95.9]    Procedure(s):  Bilateral - FUSION CERVICAL ANTERIOR W DISCECTOMY. C3-7 ACDF    Specimen(s):  * No specimens in log *    Estimated Blood Loss:   100 mL    Drains:  Open Drain Posterior;Superior Back (Active)   Site Description Unable to view 24 0800   Dressing Status Old drainage 24 0800   Drainage Appearance Bloody 24 0800   Status Other (Comment) 24 0800   Output (mL) 5 mL 24 1058   Number of days: 1       [REMOVED] Urethral Catheter Latex 16 Fr. (Removed)   Reasons to continue Urinary Catheter  Accurate I&O assessment in critically ill patients (48 hr. max) 24 0800   Goal for Removal Remove POD#1 24 1831   Site Assessment Clean;Skin intact 24 0800   Jamison Care Done 24 0807   Collection Container Standard drainage bag 24 0800   Securement Method Securing device (Describe) 24 0800   Output (mL) 200 mL 24 1409   Number of days: 1       Anesthesia Type:   General    Operative Indications:  Cervical spinal stenosis [M48.02]  Cervical myelopathy (HCC) [G95.9]    68 y.o. male, with history of neck pain, hand numbness/weakness, and balance issues, signs/symptoms of cervical myelopathy and C3-7 stenosis. Patient educated on natural history of cervical myelopathy. The risks, benefits and alternatives to surgery were discussed and patient elected to proceed with surgical intervention.     Edgar Obando is a 68 y.o. male with cervical myeloradiculopathy.  Plan for anterior cervical  decompression and fusion.  Consent previously obtained in outpatient setting.  Prior to surgery I again explained in detail to the patient and the patients wife the possible risks of surgery which include the risk of nerve injury, persistent, and/or worsening pain, spinal fluid leak, infection, and/or meningitis, excessive bleeding, paralysis, death, blood vessel injury, need for further surgery, instability, autonomic nervous system dysfunction, disease transmission, as well as the potential for unforeseen medical and surgical complications.  We specifically counseled the patient on possible adverse consequences of or relating to anterior cervical surgery including swallowing difficulties with the possible need for tube feeding, recurrent and or superior laryngeal nerve injury, esophageal injury, and voice changes and hoarseness.  I reiterated an understanding, that in general, spine surgery is more predictive of improving extremity discomfort rather than axial spine pain and arresting the progression of spinal cord/nerve dysfunction rather than improving it.  Edgar had no further questions.    Operative Findings:  Extensive anterior osteophytes, uncovertebral hypertrophy, spinal stenosis     Complications:   None    Procedure and Technique:  Patient was identified in the preoperative holding area.  The operative site was appropriately marked.      Prior to surgery patient was brought to IR suite for right PICC line due to limited IV access.   Patient was taken to the operating room.  Patient was transferred supine onto the [place] table.  Sequential compression devices were applied to bilateral lower extremities.  The patient was placed under general endotracheal anesthesia.  Unfortunately the right PICC line was found to be not functioning adequately and a right femoral line was placed.  Antibiotics were administered. Neurophysiology monitoring leads were placed and baselines obtained.  During this time and the  entire operation, care was taken to maintain appropriate perfusion pressures.  All bony prominences were properly padded.  A bolster was placed under the shoulders and the shoulders were loosely secured at the sides to access to the anterior cervical spine.  There were no changes in neurophysiologic monitoring during positioning. Preprocedure fluoroscopic images were obtained in AP and lateral position.  The operative field was then prepped and draped in the normal sterile fashion.  A time-out was performed.  All parties were in agreement.  A headlamp and surgical loops were utilized during the approach.       A longitudinal incision was made to the right side of the anterior cervical spine.  Sharp dissection was taken down through the skin and subcutaneous tissue down to the level of the platysma, which was divided.  Blunt dissection was taken medial to the sternocleidomastoid muscle.  A finger was used to palpate the carotid pulse and defined the carotid artery.  Blunt dissection was then utilized to the pretracheal fascia down to the retropharyngeal space where the anterior cervical spine was palpated.  An intraoperative x-ray was taken to confirm appropriate level of exposure.  Soft tissues including longus coli muscles anterior to the spinal elements were elevated in a subperiosteal manner.  Retractors were placed and distracted.  The endotracheal cuff was then deflated and then reinflated to minimize injury to the recurrent laryngeal nerve.  We then brought in the operative microscope for discectomy portion of the case.      A knife was used to incise the annulus fibrosis and a pituitary rongeur was used to remove the outer annulus and nucleus pulposus of the C3-4 level.  Prior to this, extensive anterior osteophytes were removed with a rongeur to access the disc space.  Remaining annular and nuclear material was removed from ventral to dorsal across the disc space and out to the width of the uncovertebral  joints bilaterally.  Progressively smaller curettes were used to approach the posterior disc space.  At this point [place] distraction pins were placed with fluoro guidance.  Traction was applied to the disc space to allow access to the posterior aspect of the interbody space.  There was no change in the neurophysiologic monitoring.  With a 3-0 curette and a kenan the posterior annulus and the posterior longitudinal ligament was identified.  Uncovertebral resection was performed as necessary with a high-speed bur followed by a 2 mm Kerr[place] rongeur across the medial aspect of the uncovertebral joints bilaterally.  The posterior longitudinal ligament was removed by dissecting through its layers with a small curette and a nerve hook and then removing it with a 1 and 2 mm Kerrison rongeur.  The remaining posterior osteophytes were carefully removed with a curved curette and a 1 mm Kerrison.  The spinal cord and nerve roots were well decompressed and at the completion of the decompression.  A nerve hook was passed behind the vertebral bodies and out the neuroforamina to confirm complete decompression at the disc space and the entry zone of the foramina.  Hemostasis was obtained.  A bur and curettes were used to gently decorticate the endplates of the vertebral body above and below the interspace.  After squaring off the endplates, a rasp was used to further refine and smooth the endplates and to expose bleeding bone across both surfaces.   The appropriate size titanium cage was selected (Medtronic Atlantis, [id number]).  Local bone graft harvested as well as I-Factor bone graft and [place] DBM were placed within the cage.  The cage was then inserted with a tamp and countersunk a few millimeters.  Intraoperative imaging was obtained to confirmed positioning of the interbody spacers. There was no change in the neurophysiologic monitoring.     This discectomy procedure was repeated at the C4-5 level.  The appropriate size  titanium cage was selected (Medtronic Atlantis, [id number]).  Local bone graft harvested as well as I-Factor bone graft and [place] DBM were placed within the cage.  The cage was then inserted with a tamp and countersunk a few millimeters.  Intraoperative imaging was obtained to confirmed positioning of the interbody spacers. There was no change in the neurophysiologic monitoring.      This discectomy procedure was repeated at the C5-6 level.  The appropriate size titanium cage was selected (Medtronic Atlantis, [id number]).  Local bone graft harvested as well as I-Factor bone graft and [place] DBM were placed within the cage.  The cage was then inserted with a tamp and countersunk a few millimeters.  Intraoperative imaging was obtained to confirmed positioning of the interbody spacers. There was no change in the neurophysiologic monitoring.      Again, this discectomy procedure was repeated at the C6-7 level.  The appropriate size titanium cage was selected (Medtronic Atlantis, [id number]).  Local bone graft harvested as well as I-Factor bone graft and [place] DBM were placed within the cage.  The cage was then inserted with a tamp and countersunk a few millimeters.  Intraoperative imaging was obtained to confirmed positioning of the interbody spacers. There was no change in the neurophysiologic monitoring.       After performing the fusion procedure, the anterior aspect of the cervical spine was meticulously sculpted to allow placement of a flush fitting anterior cervical plate.  The proper plate length was selected to fit the spinal segment.  The plate was aligned with the upper and lower vertebral bodies and then attached to the C3, C4, C5, C6 and C7 levels using unicortical screw fixation (4.0x16mm).  Fixed angle screws were used at the C3 and C7 levels.  Adequate fixation strength was noted at each screw site.  The screws were confirmed to be locked to the plate.  The integrity of the plate construct was checked  and noted to be excellent.  An intraoperative AP and lateral radiograph was obtained which showed satisfactory position of the internal fixation device and graft.  A Medtronic [place] Vision Elite Plate 80mm was used.     Neurophysiologic monitoring was noted to be unchanged.  The wound was again copiously irrigated and hemostasis was obtained.  After confirmation of hemostasis a 10 [place] drain was placed deep to the fascia and brought out through the skin. The deep fascia including the platysma and superficial fascia were closed with 2-0 Vicryl suture.  The dermis was closed with a running 3-0 Monocryl suture.  All counts were correct.  Steri strips and a sterile dressing was applied. The patient was transferred supine onto a stretcher, extubated by anesthesia, and transported to the PACU in stable condition.      I was present for the entire procedure.     Patient Disposition:  PACU     SIGNATURE: Conrad Richardson MD  DATE: May 3, 2024  TIME: 5:01 PM

## 2024-05-03 NOTE — UTILIZATION REVIEW
Initial Clinical Review    Outpatient Surgery 5/2 and changed to Inpatient status on 5/2 @ 1757. Pt requiring continued stay for ICU admit for MAP goal>80 and airway monitoring. Hx SUSY on CPAP. Post op care, Neuro checks q2h, Neurovascular checks. Drain care.     Elective Outpatient surgical procedure  Age/Sex: 68 y.o. male  Surgery Date: 5/2  Procedure: S/p FUSION CERVICAL ANTERIOR W DISCECTOMY, C3-7 ACDF (Bilateral)   Anesthesia: General  Operative Findings: Extensive anterior osteophytes, uncovertebral hypertrophy, spinal stenosis     5/2  Per IR; Pt  with failed RUE PICC line after IR placement on 5/2/24.   Unable to assess RUE at this time. Patient currently in OR for Cervical ACDF  Recommend frequent neurovascular checks of RUE (Q2H to Q4H overnight)  Will f/u.      ICU Admit for MAP goal>80, airway monitoring. Hx SUSY on CPAP.  Critical Care cons; Cervical myelopathy and stenosis s/p C3-7 anterior cervical discectomy and fusion 5/2, acute post op pain, hx anxiety, hx intermittent myoclonic jerking.  IR 5/3 for attempt of PICC replacement   Supplemental O2 4L NC. Wean as tolerated. Can plan to start CPAP PRN overnight. Neuro checks q2h. Neurovascular checks q4h. WBAT in hard cervical collar, Iv antibiotics x2 doses, early ambulation, EULOGIO drain, MAP goal >80, HOB >35.   Continuous cardiopulmonary monitoring. Maintain O2 sat >92%. Folye in place.   Plan for neosynephrine as needed to meet MAP goal     POD#1 Progress Note:   EULOGIO drain set to suction with serosanguinous drainage. Hard cervical collar in place.  Neuro checks q2h. Multimodal pain control. Holding home ASA 81, lasix, and losartan.   Continuous cardiopulmonary monitoring. Maintain MAP > 60. Supplemental O2: 2L, Wean as tolerated.  Jamison in place. Neurovascular checks q4h. Plan for PICC replacement today.     Per IR; F/u for IR RUE PICC line placed. Notified regarding significant RUE edema. With concern for iatrogenic brachial artery injury s/p PICC line  "placement, PICC line was not use and a Central Venous Catheter was placed by Anesthesia.   RUE PICC line remains in place, with dressing intact and dry sanguinous fluid present at insertion site.   Q2H Neurovascular Checks were continued overnight. RUE reportedly warm, with non-pitting edema.  Right radial Rukhsana in place with good waveform. PICC line aspirates and flushes without difficulty. Continue to monitor neuro vascular checks.       Admission Orders: Date/Time/Statement:   Admission Orders (From admission, onward)       Ordered        05/02/24 1757  Inpatient Admission  Once                          Orders Placed This Encounter   Procedures    Inpatient Admission     Standing Status:   Standing     Number of Occurrences:   1     Order Specific Question:   Level of Care     Answer:   Critical Care [15]     Order Specific Question:   Estimated length of stay     Answer:   More than 2 Midnights     Order Specific Question:   Certification     Answer:   I certify that inpatient services are medically necessary for this patient for a duration of greater than two midnights. See H&P and MD Progress Notes for additional information about the patient's course of treatment.     Vital Signs: /50   Pulse 68   Temp 98.2 °F (36.8 °C)   Resp 22   Ht 5' 6\" (1.676 m)   Wt 118 kg (260 lb 2.3 oz)   SpO2 98%   BMI 41.99 kg/m²     Pertinent Labs/Diagnostic Test Results:   XR spine cervical 2 or 3 vw injury    (Results Pending)     IR PICC line placement double lumen    (Results Pending)     XR cervical spine 2 or 3 views    (Results Pending)         Results from last 7 days   Lab Units 05/03/24  0509 05/02/24 2043 05/02/24  1641   WBC Thousand/uL 8.22 6.84  --    HEMOGLOBIN g/dL 11.8* 11.2*  --    I STAT HEMOGLOBIN g/dl  --   --  10.5*   HEMATOCRIT % 38.3 35.6*  --    HEMATOCRIT, ISTAT %  --   --  31*   PLATELETS Thousands/uL 172 142*  --          Results from last 7 days   Lab Units 05/03/24  0509 05/02/24 2043 " 05/02/24  1641   SODIUM mmol/L 140 138  --    POTASSIUM mmol/L 4.3 4.5  --    CHLORIDE mmol/L 101 101  --    CO2 mmol/L 29 29  --    CO2, I-STAT mmol/L  --   --  27   ANION GAP mmol/L 10 8  --    BUN mg/dL 16 18  --    CREATININE mg/dL 1.12 1.19  --    EGFR ml/min/1.73sq m 67 62  --    CALCIUM mg/dL 8.6 8.5  --    CALCIUM, IONIZED, ISTAT mmol/L  --   --  1.08*   MAGNESIUM mg/dL  --  2.0  --    PHOSPHORUS mg/dL  --  3.7  --          Results from last 7 days   Lab Units 05/03/24  0707 05/02/24  2123 05/02/24  1825 05/02/24  0618   POC GLUCOSE mg/dl 196* 189* 155* 114     Results from last 7 days   Lab Units 05/03/24  0509 05/02/24  2043   GLUCOSE RANDOM mg/dL 187* 201*       Results from last 7 days   Lab Units 05/02/24  1641   I STAT BASE EXC mmol/L 4*   I STAT O2 SAT % 98*   ISTAT PH ART  7.528*   I STAT ART PCO2 mm HG 31.6*   I STAT ART PO2 mm HG 89.0   I STAT ART HCO3 mmol/L 26.3       Diet: Consistent Carb  Mobility: OOB with Brace  DVT Prophylaxis: SCD    Medications/Pain Control:   Scheduled Medications:  acetaminophen, 650 mg, Oral, Q6H CASANDRA  busPIRone, 10 mg, Oral, BID  cloNIDine, 0.1 mg, Oral, BID  docusate sodium, 100 mg, Oral, BID  heparin (porcine), 7,500 Units, Subcutaneous, Q8H CASANDRA  insulin lispro, 2-12 Units, Subcutaneous, 4x Daily (AC & HS)  methocarbamol, 500 mg, Oral, Q6H CASANDRA  pantoprazole, 40 mg, Oral, Early Morning  pravastatin, 10 mg, Oral, Daily With Dinner  senna, 1 tablet, Oral, Daily  tamsulosin, 0.4 mg, Oral, Daily With Dinner      ceFAZolin (ANCEF) IVPB (premix in dextrose) 2,000 mg 50 mL  Dose: 2,000 mg  Freq: Every 8 hours Route: IV  Last Dose: Stopped (05/03/24 0825)  Start: 05/02/24 2300 End: 05/03/24 0825     Continuous IV Infusions:   phenylephrine (FLOWER-SYNEPHRINE) 50 mg (STANDARD CONCENTRATION) in sodium chloride 0.9% 250 mL  Rate: 7.5-54 mL/hr Dose:  mcg/min  Freq: Titrated Route: IV  Last Dose: 40 mcg/min (05/03/24 0925)  Start: 05/02/24 2330 End: 05/03/24 0728     PRN  Meds:  aluminum-magnesium hydroxide-simethicone, 30 mL, Oral, Q6H PRN  calcium carbonate, 1,000 mg, Oral, Daily PRN  ondansetron, 4 mg, Intravenous, Q6H PRN  oxyCODONE, 10 mg, Oral, Q4H PRN 5/2 x1, 5/3 x1  oxyCODONE, 5 mg, Oral, Q4H PRN      Network Utilization Review Department  ATTENTION: Please call with any questions or concerns to [phone] and carefully listen to the prompts so that you are directed to the right person. All voicemails are confidential.   For Discharge needs, contact Care Management DC Support Team at [phone] opt. 2  Send all requests for admission clinical reviews, approved or denied determinations and any other requests to dedicated fax number below belonging to the campus where the patient is receiving treatment. List of dedicated fax numbers for the Facilities:  FACILITY NAME UR FAX NUMBER   ADMISSION DENIALS (Administrative/Medical Necessity) [phone]   DISCHARGE SUPPORT TEAM (NETWORK) [phone]   PARENT CHILD HEALTH (Maternity/NICU/Pediatrics) [phone]   [facility] [phone]   [facility] [phone]   [facility] [phone]   [facility] [phone]   [facility] [phone]   [facility] [phone]   [facility] [phone]   [facility] [phone]   [facility] [phone]   [facility] [phone]   [facility] [phone]   [facility] [phone]

## 2024-05-03 NOTE — PLAN OF CARE
Problem: Prexisting or High Potential for Compromised Skin Integrity  Goal: Skin integrity is maintained or improved  Description: INTERVENTIONS:  - Identify patients at risk for skin breakdown  - Assess and monitor skin integrity  - Assess and monitor nutrition and hydration status  - Monitor labs   - Assess for incontinence   - Turn and reposition patient  - Assist with mobility/ambulation  - Relieve pressure over bony prominences  - Avoid friction and shearing  - Provide appropriate hygiene as needed including keeping skin clean and dry  - Evaluate need for skin moisturizer/barrier cream  - Collaborate with interdisciplinary team   - Patient/family teaching  - Consider wound care consult   Outcome: Progressing     Problem: PAIN - ADULT  Goal: Verbalizes/displays adequate comfort level or baseline comfort level  Description: Interventions:  - Encourage patient to monitor pain and request assistance  - Assess pain using appropriate pain scale  - Administer analgesics based on type and severity of pain and evaluate response  - Implement non-pharmacological measures as appropriate and evaluate response  - Consider cultural and social influences on pain and pain management  - Notify physician/advanced practitioner if interventions unsuccessful or patient reports new pain  Outcome: Progressing     Problem: INFECTION - ADULT  Goal: Absence or prevention of progression during hospitalization  Description: INTERVENTIONS:  - Assess and monitor for signs and symptoms of infection  - Monitor lab/diagnostic results  - Monitor all insertion sites, i.e. indwelling lines, tubes, and drains  - Monitor endotracheal if appropriate and nasal secretions for changes in amount and color  - [place] appropriate cooling/warming therapies per order  - Administer medications as ordered  - Instruct and encourage patient and family to use good hand hygiene technique  - Identify and instruct in appropriate isolation precautions for  identified infection/condition  Outcome: Progressing  Goal: Absence of fever/infection during neutropenic period  Description: INTERVENTIONS:  - Monitor WBC    Outcome: Progressing     Problem: SAFETY ADULT  Goal: Patient will remain free of falls  Description: INTERVENTIONS:  - Educate patient/family on patient safety including physical limitations  - Instruct patient to call for assistance with activity   - Consult OT/PT to assist with strengthening/mobility   - Keep Call bell within reach  - Keep bed low and locked with side rails adjusted as appropriate  - Keep care items and personal belongings within reach  - Initiate and maintain comfort rounds  - Make Fall Risk Sign visible to staff  - Offer Toileting every 2 Hours, in advance of need  - Initiate/Maintain alarm  - Obtain necessary fall risk management equipment  - Apply yellow socks and bracelet for high fall risk patients  - Consider moving patient to room near nurses station  Outcome: Progressing  Goal: Maintain or return to baseline ADL function  Description: INTERVENTIONS:  -  Assess patient's ability to carry out ADLs; assess patient's baseline for ADL function and identify physical deficits which impact ability to perform ADLs (bathing, care of mouth/teeth, toileting, grooming, dressing, etc.)  - Assess/evaluate cause of self-care deficits   - Assess range of motion  - Assess patient's mobility; develop plan if impaired  - Assess patient's need for assistive devices and provide as appropriate  - Encourage maximum independence but intervene and supervise when necessary  - Involve family in performance of ADLs  - Assess for home care needs following discharge   - Consider OT consult to assist with ADL evaluation and planning for discharge  - Provide patient education as appropriate  Outcome: Progressing  Goal: Maintains/Returns to pre admission functional level  Description: INTERVENTIONS:  - Perform AM-PAC 6 Click Basic Mobility/ Daily Activity  assessment daily.  - Set and communicate daily mobility goal to care team and patient/family/caregiver.   - Collaborate with rehabilitation services on mobility goals if consulted  - Perform Range of Motion 3 times a day.  - Reposition patient every 2 hours.  - Dangle patient 3 times a day  - Stand patient 3 times a day  - Ambulate patient 3 times a day  - Out of bed to chair 3 times a day   - Out of bed for meals 3 times a day  - Out of bed for toileting  - Record patient progress and toleration of activity level   Outcome: Progressing     Problem: DISCHARGE PLANNING  Goal: Discharge to home or other facility with appropriate resources  Description: INTERVENTIONS:  - Identify barriers to discharge w/patient and caregiver  - Arrange for needed discharge resources and transportation as appropriate  - Identify discharge learning needs (meds, wound care, etc.)  - Arrange for interpretive services to assist at discharge as needed  - Refer to Case Management Department for coordinating discharge planning if the patient needs post-hospital services based on physician/advanced practitioner order or complex needs related to functional status, cognitive ability, or social support system  Outcome: Progressing     Problem: Knowledge Deficit  Goal: Patient/family/caregiver demonstrates understanding of disease process, treatment plan, medications, and discharge instructions  Description: Complete learning assessment and assess knowledge base.  Interventions:  - Provide teaching at level of understanding  - Provide teaching via preferred learning methods  Outcome: Progressing

## 2024-05-03 NOTE — PLAN OF CARE
Problem: PHYSICAL THERAPY ADULT  Goal: Performs mobility at highest level of function for planned discharge setting.  See evaluation for individualized goals.  Description: Treatment/Interventions: OT, Spoke to case management, Spoke to nursing, Gait training, Bed mobility, Patient/family training, Endurance training, LE strengthening/ROM, Functional transfer training  Equipment Recommended:  (TBD)       See flowsheet documentation for full assessment, interventions and recommendations.  Note: Prognosis: Fair  Problem List: Decreased strength, Decreased range of motion, Decreased endurance, Impaired balance, Decreased mobility, Decreased coordination, Decreased cognition, Impaired judgement, Decreased safety awareness, Pain  Assessment: Pt is 68 y.o. male seen for PT evaluation s/p admit to [facility] on 5/2/2024 w/ S/P cervical spinal fusion. PT consulted to assess pt's functional mobility and d/c needs. Order placed for PT eval and tx, w/ up w/ A order. Comorbidities affecting pt's physical performance at time of assessment include:  has a past medical history of Acid reflux, Anxiety, Arthritis, Cellulitis, CPAP (continuous positive airway pressure) dependence, Diabetes mellitus (HCC), Hypertension, Sleep apnea, and Twitching. PTA, pt was ambulates community distances and elevations, lives in multi-level home, and retired. Personal factors affecting pt at time of IE include: inability to navigate level surfaces w/o external assistance, unable to perform dynamic tasks in community, positive fall history, unable to perform physical activity, limited insight into impairments, inability to perform IADLs, and inability to perform ADLs. Please find objective findings from PT assessment regarding body systems outlined above with impairments and limitations including weakness, impaired balance, decreased endurance, impaired coordination, gait deviations, pain, decreased activity tolerance, decreased functional  mobility tolerance, decreased safety awareness, impaired judgement, and fall risk. Pt educated in spinal precautions. Single step instruction for bed mobility. Required increased A for transfers and gait with deficits in strength and balance.The following objective measures performed on IE also reveal limitations: The patient's AM-PAC Basic Mobility Inpatient Short Form Raw Score is 12, Standardized Score is 32.23. A standardized score less than 42.9 suggests the patient may benefit from discharge to post-acute rehabilitation services. Please also refer to the recommendation of the Physical Therapist for safe discharge planning. Pt's clinical presentation is currently unstable/unpredictable seen in pt's presentation of critical care monitoring. Pt to benefit from continued PT tx to address deficits as defined above and maximize level of functional independent mobility and consistency. From PT/mobility standpoint, recommendation at time of d/c would be level I pending progress in order to facilitate return to PLOF.  Barriers to Discharge: Inaccessible home environment, Decreased caregiver support     Rehab Resource Intensity Level, PT: I (Maximum Resource Intensity)    See flowsheet documentation for full assessment.

## 2024-05-03 NOTE — PLAN OF CARE
Problem: OCCUPATIONAL THERAPY ADULT  Goal: Performs self-care activities at highest level of function for planned discharge setting.  See evaluation for individualized goals.  Description: Treatment Interventions: ADL retraining, Functional transfer training, UE strengthening/ROM, Endurance training, Cognitive reorientation, Patient/family training, Equipment evaluation/education, Compensatory technique education, Continued evaluation, Energy conservation, Activityengagement          See flowsheet documentation for full assessment, interventions and recommendations.   Note: Limitation: Decreased ADL status, Decreased Safe judgement during ADL, Decreased endurance, Decreased self-care trans, Decreased high-level ADLs, Decreased cognition, Decreased UE strength, Decreased sensation  Prognosis: Fair  Assessment: Pt is a 67 y/o male seen for OT eval s/p adm to [facility] w/ neck pain. Pt is dx'd w/ cervical myelopathy and stenosis, s/p C3-7 anterior cervical discectomy and fusion 5/2. Pt is WBAT x4 in C/S collar w/ active spinal precautions. Pt  has a past medical history of Acid reflux, Anxiety, Arthritis, Cellulitis, CPAP (continuous positive airway pressure) dependence, Diabetes mellitus (HCC), Hypertension, Sleep apnea, and Twitching. Pt with active OT orders and activity as tolerated orders. Pt lives with his spouse in 2 , 1st floor setup available. Pt was I w/  ADLS and has assist w/ IADLS, drove, & required use of DME PTA including SPC. Pt is currently demonstrating the following occupational deficits: Min A UB ADLS, Mod A LB ADLS, Min A bed mobility, Mod A x1 w/ SBA 2nd for transfers and functional mobility w/ B/L HHA. These deficits that are impacting pt's baseline areas of occupation are a result of the following impairments: pain, endurance, activity tolerance, functional mobility, forward functional reach, balance, trunk control, functional standing tolerance, decreased I w/ ADLS/IADLS, strength, and decreased  safety awareness.The following Occupational Performance Areas to address include: grooming, bathing/shower, toilet hygiene, dressing, medication management, socialization, health maintenance, functional mobility, community mobility, clothing management, household maintenance, and job performance/volunteering. Recommend inpatient rehab  upon D/C. Pt to continue to benefit from acute immediate OT services to address the following goals 3-5x/week to  w/in 10-14 days:  Recommendation: Physiatry Consult  Rehab Resource Intensity Level, OT: I (Maximum Resource Intensity)     Henrietta Wolfe MS, OTR/L

## 2024-05-03 NOTE — RESPIRATORY THERAPY NOTE
05/03/24 0735   Respiratory Assessment   Assessment Type Assess only   General Appearance Alert;Awake   Respiratory Pattern Normal   Chest Assessment Chest expansion symmetrical   Bilateral Breath Sounds Clear   Resp Comments Bilateral BS clear. Pt weaned to 6 MFNC. Pt is willing to wear CPAP HS. Will set CPAP in room later. No distress noted.   O2 Device MFNC   Additional Assessments   Pulse 71   Respirations 16   SpO2 99 %   Position Semi-Matos's

## 2024-05-03 NOTE — OCCUPATIONAL THERAPY NOTE
Occupational Therapy Evaluation     Patient Name: Edgar Obando  Today's Date: 5/3/2024  Problem List  Principal Problem:    S/P cervical spinal fusion  Active Problems:    Type 2 diabetes mellitus, without long-term current use of insulin (HCC)    HTN (hypertension)    GERD (gastroesophageal reflux disease)    Anxiety    SUSY (obstructive sleep apnea)    Past Medical History  Past Medical History:   Diagnosis Date    Acid reflux     Anxiety     Arthritis     Cellulitis     left ankle    CPAP (continuous positive airway pressure) dependence     as needed per patient    Diabetes mellitus (HCC)     Hypertension     Sleep apnea     Twitching      Past Surgical History  Past Surgical History:   Procedure Laterality Date    APPENDECTOMY      CERVICAL FUSION Bilateral 5/2/2024    Procedure: FUSION CERVICAL ANTERIOR W DISCECTOMY, C3-7 ACDF;  Surgeon: Conrad Richardson MD;  Location: [facility];  Service: Orthopedics    CHOLECYSTECTOMY      COLONOSCOPY      IR PICC PLACEMENT DOUBLE LUMEN  5/2/2024    KNEE ARTHROSCOPY      NV ARTHRP KNE CONDYLE&PLATU MEDIAL&LAT COMPARTMENTS Left 07/13/2016    Procedure: TOTAL  KNEE REPLACEMENT ;  Surgeon: Zack Montenegro MD;  Location: [facility];  Service: Orthopedics      05/03/24 0940   OT Last Visit   OT Visit Date 05/03/24   Note Type   Note type Evaluation   Pain Assessment   Pain Assessment Tool 0-10   Pain Score 9   Pain Location/Orientation Location: Neck   Pain Onset/Description Onset: Ongoing;Descriptor: Aching;Descriptor: Discomfort   Patient's Stated Pain Goal No pain   Hospital Pain Intervention(s) Repositioned;Ambulation/increased activity;Emotional support   Restrictions/Precautions   Weight Bearing Precautions Per Order Yes   RUE Weight Bearing Per Order WBAT   LUE Weight Bearing Per Order WBAT   RLE Weight Bearing Per Order WBAT   LLE Weight Bearing Per Order WBAT   Braces or Orthoses (S)  C/S Collar   Other Precautions Chair Alarm;Bed Alarm;WBS;Multiple  lines;Telemetry;Fall Risk;O2;Pain;Spinal precautions  (5LO2; [place]; lobo; EULOGIO drain; HOB >35 degrees)   Home Living   Type of Home House   Home Layout Two level;Able to live on main level with bedroom/bathroom  (full bath 1st; bed 2nd floor)   Bathroom Shower/Tub Walk-in shower   Bathroom Toilet Raised   Bathroom Equipment Shower chair;Grab bars in shower   Home Equipment Cane;Grab bars   Additional Comments pt reports living in 2 SH, full bath on 1st (walk in shower) and 2nd floor (tub/shower).   Prior Function   Level of Jean Independent with ADLs;Independent with functional mobility;Needs assistance with IADLS   Lives With Spouse   Receives Help From Family   IADLs Independent with meal prep;Independent with driving;Independent with medication management   Falls in the last 6 months 1 to 4   Vocational Retired   Comments (+)    Lifestyle   Autonomy I w/ ADLS, shares IADLS w/ spouse, I w/ transfers and functional mobility PTA   Reciprocal Relationships Pt lives w/ his spouse   Service to Others Retired    Intrinsic Gratification Spending time w/ grandkids   ADL   Eating Assistance 5  Supervision/Setup   Grooming Assistance 4  Minimal Assistance   UB Bathing Assistance 4  Minimal Assistance   LB Bathing Assistance 3  Moderate Assistance   UB Dressing Assistance 4  Minimal Assistance   LB Dressing Assistance 3  Moderate Assistance   Toileting Assistance  3  Moderate Assistance   Functional Assistance 3  Moderate Assistance   Functional Deficit Steadying;Verbal cueing;Supervision/safety;Increased time to complete  (Ax2)   Bed Mobility   Supine to Sit 4  Minimal assistance   Additional items Assist x 1;Increased time required;Verbal cues;LE management;HOB elevated   Sit to Supine Unable to assess   Additional Comments pt sat EOB w/ CGA for sitting balance/trunk control   Transfers   Sit to Stand 3  Moderate assistance   Additional items Assist x 1;Increased time required;Verbal cues   Stand to  Sit 3  Moderate assistance   Additional items Assist x 1;Increased time required;Verbal cues   Additional Comments SBA 2nd for safety; HHA used   Functional Mobility   Functional Mobility 3  Moderate assistance   Additional Comments pt took few small steps from EOB to recliner w/ Mod A x1; HHA used; SBA 2nd for safety   Additional items Hand hold assistance   Balance   Static Sitting Fair   Dynamic Sitting Fair -   Static Standing Poor   Dynamic Standing Poor   Ambulatory Poor   Activity Tolerance   Activity Tolerance Patient limited by pain;Patient limited by fatigue   Medical Staff Made Aware PTVerito   Nurse Made Aware yes, Renee   RUANITA Assessment   RUE Assessment WFL   LUE Assessment   LUE Assessment WFL   Hand Function   Gross Motor Coordination Functional   Fine Motor Coordination Functional   Sensation   Light Touch   (neuropathy in feet)   Vision-Basic Assessment   Current Vision No visual deficits   Cognition   Overall Cognitive Status WFL   Arousal/Participation Responsive;Cooperative   Attention Within functional limits   Orientation Level Oriented X4   Memory Within functional limits   Following Commands Follows all commands and directions without difficulty   Comments pt is pleasant and cooperative   Assessment   Limitation Decreased ADL status;Decreased Safe judgement during ADL;Decreased endurance;Decreased self-care trans;Decreased high-level ADLs;Decreased cognition;Decreased UE strength;Decreased sensation   Prognosis Fair   Assessment Pt is a 67 y/o male seen for OT eval s/p adm to [facility] w/ neck pain. Pt is dx'd w/ cervical myelopathy and stenosis, s/p C3-7 anterior cervical discectomy and fusion 5/2. Pt is WBAT x4 in C/S collar w/ active spinal precautions. Pt  has a past medical history of Acid reflux, Anxiety, Arthritis, Cellulitis, CPAP (continuous positive airway pressure) dependence, Diabetes mellitus (HCC), Hypertension, Sleep apnea, and Twitching. Pt with active OT orders and activity as  tolerated orders. Pt lives with his spouse in 2 , 1st floor setup available. Pt was I w/  ADLS and has assist w/ IADLS, drove, & required use of DME PTA including SPC. Pt is currently demonstrating the following occupational deficits: Min A UB ADLS, Mod A LB ADLS, Min A bed mobility, Mod A x1 w/ SBA 2nd for transfers and functional mobility w/ B/L HHA. These deficits that are impacting pt's baseline areas of occupation are a result of the following impairments: pain, endurance, activity tolerance, functional mobility, forward functional reach, balance, trunk control, functional standing tolerance, decreased I w/ ADLS/IADLS, strength, and decreased safety awareness.The following Occupational Performance Areas to address include: grooming, bathing/shower, toilet hygiene, dressing, medication management, socialization, health maintenance, functional mobility, community mobility, clothing management, household maintenance, and job performance/volunteering. Recommend inpatient rehab  upon D/C. Pt to continue to benefit from acute immediate OT services to address the following goals 3-5x/week to  w/in 10-14 days:   Goals   Patient Goals to play with his WebNotes Time Frame 10-   Long Term Goal #1 see below listed goals   Plan   Treatment Interventions ADL retraining;Functional transfer training;UE strengthening/ROM;Endurance training;Cognitive reorientation;Patient/family training;Equipment evaluation/education;Compensatory technique education;Continued evaluation;Energy conservation;Activityengagement   Goal Expiration Date 24   OT Frequency 3-5x/wk   Discharge Recommendation   Recommendation Physiatry Consult   Rehab Resource Intensity Level, OT I (Maximum Resource Intensity)   Additional Comments  The patient's raw score on the AM-PAC Daily Activity Inpatient Short Form is 16. A raw score of less than 19 suggests the patient may benefit from discharge to post-acute rehabilitation services. Please  refer to the recommendation of the Occupational Therapist for safe discharge planning.   Additional Comments 2 Pt seen as a co-session due to the patient's co-morbidities, clinically unstable presentation, and present impairments which are a regression from the patient's baseline.   AM-PAC Daily Activity Inpatient   Lower Body Dressing 2   Bathing 2   Toileting 2   Upper Body Dressing 3   Grooming 3   Eating 4   Daily Activity Raw Score 16   Daily Activity Standardized Score (Calc for Raw Score >=11) 35.96   AM-PAC Applied Cognition Inpatient   Following a Speech/Presentation 4   Understanding Ordinary Conversation 4   Taking Medications 4   Remembering Where Things Are Placed or Put Away 4   Remembering List of 4-5 Errands 4   Taking Care of Complicated Tasks 3   Applied Cognition Raw Score 23   Applied Cognition Standardized Score 53.08   End of Consult   Education Provided Yes   Patient Position at End of Consult Bedside chair;All needs within reach;Bed/Chair alarm activated   Nurse Communication Nurse aware of consult      GOALS    1) Pt will complete rolling left/right in bed with Mod I assist, as prerequisite for further engagement in ADLS     2) Pt will complete supine to sit transfer with Mod I using B/L UEs to initiate bed mobility     3) Pt will tolerate sitting at EOB 20 minutes with Mod I assist and stable vital signs, as prerequisite for further engagement in ADLS     4) Pt will complete grooming task with Mod I assist and increased time to increase independence in functional tasks    5) Pt will increase B/L UE ROM 1/2 MMT to increase functional UB use during ADLS     6) Pt will complete UB ADLS with Mod I and use of AD/DME as needed to increase independence in functional tasks    7) Pt will complete LB ADLS with S and use of AD/DME as needed to increase independence in functional tasks    8) Pt will complete sit to stand transfer / sit pivot transfer / stand pivot transfer with S assist on/off all ADL  surfaces     9) Pt will follow 100% simple one step verbal commands and be A/Ox4 consistently t/o use of external environmental cues to increase awareness for functional tasks    10) Pt will demonstrate 100% carryover of spinal precautions and E.C. techniques t/o fx'l I/ADL/ leisure tasks w/o cues s/p skilled education    11)Pt will improve functional mobility to S w/ use of AD/DME as needed to increase engagement in meaningful tasks    Henrietta Wolfe MS, OTR/L

## 2024-05-03 NOTE — PLAN OF CARE
Problem: Prexisting or High Potential for Compromised Skin Integrity  Goal: Skin integrity is maintained or improved  Description: INTERVENTIONS:  - Identify patients at risk for skin breakdown  - Assess and monitor skin integrity  - Assess and monitor nutrition and hydration status  - Monitor labs   - Assess for incontinence   - Turn and reposition patient  - Assist with mobility/ambulation  - Relieve pressure over bony prominences  - Avoid friction and shearing  - Provide appropriate hygiene as needed including keeping skin clean and dry  - Evaluate need for skin moisturizer/barrier cream  - Collaborate with interdisciplinary team   - Patient/family teaching  - Consider wound care consult   Outcome: Progressing     Problem: PAIN - ADULT  Goal: Verbalizes/displays adequate comfort level or baseline comfort level  Description: Interventions:  - Encourage patient to monitor pain and request assistance  - Assess pain using appropriate pain scale  - Administer analgesics based on type and severity of pain and evaluate response  - Implement non-pharmacological measures as appropriate and evaluate response  - Consider cultural and social influences on pain and pain management  - Notify physician/advanced practitioner if interventions unsuccessful or patient reports new pain  Outcome: Progressing     Problem: INFECTION - ADULT  Goal: Absence or prevention of progression during hospitalization  Description: INTERVENTIONS:  - Assess and monitor for signs and symptoms of infection  - Monitor lab/diagnostic results  - Monitor all insertion sites, i.e. indwelling lines, tubes, and drains  - Monitor endotracheal if appropriate and nasal secretions for changes in amount and color  - [place] appropriate cooling/warming therapies per order  - Administer medications as ordered  - Instruct and encourage patient and family to use good hand hygiene technique  - Identify and instruct in appropriate isolation precautions for  identified infection/condition  Outcome: Progressing     Problem: SAFETY ADULT  Goal: Patient will remain free of falls  Description: INTERVENTIONS:  - Educate patient/family on patient safety including physical limitations  - Instruct patient to call for assistance with activity   - Consult OT/PT to assist with strengthening/mobility   - Keep Call bell within reach  - Keep bed low and locked with side rails adjusted as appropriate  - Keep care items and personal belongings within reach  - Initiate and maintain comfort rounds  - Make Fall Risk Sign visible to staff  - Offer Toileting every 2 Hours, in advance of need  - Apply yellow socks and bracelet for high fall risk patients  - Consider moving patient to room near nurses station  Outcome: Progressing  Goal: Maintain or return to baseline ADL function  Description: INTERVENTIONS:  -  Assess patient's ability to carry out ADLs; assess patient's baseline for ADL function and identify physical deficits which impact ability to perform ADLs (bathing, care of mouth/teeth, toileting, grooming, dressing, etc.)  - Assess/evaluate cause of self-care deficits   - Assess range of motion  - Assess patient's mobility; develop plan if impaired  - Assess patient's need for assistive devices and provide as appropriate  - Encourage maximum independence but intervene and supervise when necessary  - Involve family in performance of ADLs  - Assess for home care needs following discharge   - Consider OT consult to assist with ADL evaluation and planning for discharge  - Provide patient education as appropriate  Outcome: Progressing  Goal: Maintains/Returns to pre admission functional level  Description: INTERVENTIONS:  - Perform AM-PAC 6 Click Basic Mobility/ Daily Activity assessment daily.  - Set and communicate daily mobility goal to care team and patient/family/caregiver.   - Collaborate with rehabilitation services on mobility goals if consulted  - Reposition patient every 2  hours.  - Out of bed for toileting  - Record patient progress and toleration of activity level   Outcome: Progressing     Problem: DISCHARGE PLANNING  Goal: Discharge to home or other facility with appropriate resources  Description: INTERVENTIONS:  - Identify barriers to discharge w/patient and caregiver  - Arrange for needed discharge resources and transportation as appropriate  - Identify discharge learning needs (meds, wound care, etc.)  - Arrange for interpretive services to assist at discharge as needed  - Refer to Case Management Department for coordinating discharge planning if the patient needs post-hospital services based on physician/advanced practitioner order or complex needs related to functional status, cognitive ability, or social support system  Outcome: Progressing     Problem: Knowledge Deficit  Goal: Patient/family/caregiver demonstrates understanding of disease process, treatment plan, medications, and discharge instructions  Description: Complete learning assessment and assess knowledge base.  Interventions:  - Provide teaching at level of understanding  - Provide teaching via preferred learning methods  Outcome: Progressing

## 2024-05-03 NOTE — PROGRESS NOTES
Progress Note - Orthopedics   Edgar Obando 68 y.o. male MRN: [id number]  Unit/Bed#: ICU 01      Subjective:    68 y.o.male POD 1 ACDF C3-C7. He reports he slept well. He denies numbness or paresthesias. He reports he is having neck pain, it is the same as it was last night. He reports mild difficulty swallowing, but he is hungry. He denies difficulty breathing. He reports that has been feeling muscle jerks overnight, he states he has these at baseline as he falls asleep or wakes up that his wife notices. He denies chest pain or abdominal pain.    Labs:  0   Lab Value Date/Time    HCT 38.3 05/03/2024 0509    HCT 35.6 (L) 05/02/2024 2043    HCT 31 (L) 05/02/2024 1641    HCT 41.4 04/19/2024 1300    HCT 35.6 (L) 04/26/2023 1000    HCT 36.2 (L) 12/20/2022 1153    HCT 48.7 01/11/2014 0918    HGB 11.8 (L) 05/03/2024 0509    HGB 11.2 (L) 05/02/2024 2043    HGB 10.5 (L) 05/02/2024 1641    HGB 12.6 04/19/2024 1300    HGB 11.9 (L) 04/26/2023 1000    HGB 11.8 (L) 12/20/2022 1153    HGB 16.0 01/11/2014 0918    PT 14.3 10/10/2021 1522    INR 1.15 04/19/2024 1300    INR 1.2 10/10/2021 1522    WBC 8.22 05/03/2024 0509    WBC 6.84 05/02/2024 2043    WBC 7.04 04/19/2024 1300    ESR 28 (H) 01/07/2018 1710    CRP 9.9 (H) 01/07/2018 1642       Meds:    Current Facility-Administered Medications:     acetaminophen (TYLENOL) tablet 650 mg, 650 mg, Oral, Q6H Morelia GUAJARDO PA-C, 650 mg at 05/03/24 0501    aluminum-magnesium hydroxide-simethicone (MAALOX) oral suspension 30 mL, 30 mL, Oral, Q6H PRN, Morelia Sethi PA-C    busPIRone (BUSPAR) tablet 10 mg, 10 mg, Oral, BID, Morelia Sethi PA-C, 10 mg at 05/02/24 2028    calcium carbonate (TUMS) chewable tablet 1,000 mg, 1,000 mg, Oral, Daily PRN, Morelia Sethi PA-C    ceFAZolin (ANCEF) IVPB (premix in dextrose) 2,000 mg 50 mL, 2,000 mg, Intravenous, Q8H, Morelia Sethi PA-C, Stopped at 05/03/24 0135    cloNIDine (CATAPRES) tablet 0.1 mg, 0.1  "mg, Oral, BID, Morelia Sethi PA-C    docusate sodium (COLACE) capsule 100 mg, 100 mg, Oral, BID, Morelia Sethi PA-C, 100 mg at 05/02/24 2029    insulin lispro (HumALOG/ADMELOG) 100 units/mL subcutaneous injection 2-12 Units, 2-12 Units, Subcutaneous, 4x Daily (AC & HS), 2 Units at 05/02/24 2204 **AND** Fingerstick Glucose (POCT), , , 4x Daily AC and at bedtime, Pilar Florence MD    methocarbamol (ROBAXIN) tablet 500 mg, 500 mg, Oral, Q6H CASANDRA, Morelia Sethi PA-C, 500 mg at 05/03/24 0501    ondansetron (ZOFRAN) injection 4 mg, 4 mg, Intravenous, Q6H PRN, Morelia Sethi PA-C    oxyCODONE (ROXICODONE) immediate release tablet 10 mg, 10 mg, Oral, Q4H PRN, Morelia Sethi PA-C, 10 mg at 05/02/24 2028    oxyCODONE (ROXICODONE) IR tablet 5 mg, 5 mg, Oral, Q4H PRN, Morelia Sethi PA-C    pantoprazole (PROTONIX) EC tablet 40 mg, 40 mg, Oral, Early Morning, Morelia Sethi PA-C, 40 mg at 05/03/24 0501    phenylephrine (FLOWER-SYNEPHRINE) 50 mg (STANDARD CONCENTRATION) in sodium chloride 0.9% 250 mL,  mcg/min, Intravenous, Titrated, Pilar Florence MD, Last Rate: 31.5 mL/hr at 05/03/24 0249, 105 mcg/min at 05/03/24 0249    pravastatin (PRAVACHOL) tablet 10 mg, 10 mg, Oral, Daily With Dinner, Morelia Sethi PA-C, 10 mg at 05/02/24 2029    senna (SENOKOT) tablet 8.6 mg, 1 tablet, Oral, Daily, Morelia Kimberly Gottschall, PA-C    tamsulosin (FLOMAX) capsule 0.4 mg, 0.4 mg, Oral, Daily With Dinner, Morelia Sethi PA-C, 0.4 mg at 05/02/24 2028    Blood Culture:   No results found for: \"BLOODCX\"    Wound Culture:   No results found for: \"WOUNDCULT\"    Ins and Outs:  I/O last 24 hours:  In: 3561.7 [I.V.:3511.7; IV Piggyback:50]  Out: 1975 [Urine:1800; Drains:75; Blood:100]          Physical:  Vitals:    05/03/24 0500   BP:    Pulse: 74   Resp: (!) 38   Temp:    SpO2: 98%     He is on 10 L/min mid flow nasal cannula    Musculoskeletal: bilateral Upper " Extremity and lower extremities  Hard cervical collar in place  EULOGIO drain set to suction with serosanguinous drainage  2+ radial pulses  Digits warm and well perfused      Upper Extremity Motor Function     Right  Left    Deltoid  5/5  5/5    Bicep  5/5  5/5    Wrist extension  5/5  5/5    Tricep  5/5  5/5    Finger flexion/  5/5  5/5    Hand intrinsic  5/5  5/5       Lower Extremity Motor Function    Right  Left    Iliopsoas  5/5  5/5    Quadriceps 5/5 5/5   Tibialis anterior  5/5  5/5    EHL  5/5  5/5    Gastroc. muscle  5/5  5/5      Sensory: light touch is intact to bilateral upper and lower extremities       Assessment:    68 y.o.male POD 1 ACDF C3-C7. He is improved this morning. Will continue to monitor. Will likely need a new PICC line placed tomorrow as PICC placed this AM not functioning, appreciate assistance from IR.     Plan:  He can be WBAT to bilateral upper extremities, once his right femoral central line has been removed he can ambulate and be WBAT to bilateral lower extremities  Hard cervical collar in place at all times  Head of bed at 35 degrees  Q2 neuro checks  Monitor EULOGIO output - output 75 overnight  Ancef x2  Will monitor for ABLA and administer IVF/prbc as indicated for Greater than 2 gram drop or Hgb < 7   PT/OT  Pain control  Cervical XR prior to discharge  DVT ppx - OK to start heparin today, SCDs  Dispo: pending clinical improvement, physical therapy, AM labs, pain control    Monica Desir MD

## 2024-05-03 NOTE — PHYSICAL THERAPY NOTE
Physical Therapy Evaluation     Patient's Name: Edgar Obando    Admitting Diagnosis  Cervical spinal stenosis [M48.02]  Cervical myelopathy (HCC) [G95.9]    Problem List  Patient Active Problem List   Diagnosis    S/P total knee replacement    Type 2 diabetes mellitus, without long-term current use of insulin (HCC)    HTN (hypertension)    GERD (gastroesophageal reflux disease)    Anxiety    SUSY (obstructive sleep apnea)    Acute blood loss anemia    Low back pain with sciatica    DDD (degenerative disc disease), lumbar    Spinal stenosis of lumbar region    Myoclonus    Chronic venous insufficiency    Bilateral lower extremity edema    BMI 40.0-44.9, adult (HCC)    Primary osteoarthritis of right knee    Chronic pain of right knee    Diabetic neuropathy (HCC)    RBD (REM behavioral disorder)    Lumbar spondylosis    Neck pain    Right shoulder pain    Chronic low back pain with bilateral sciatica    Chronic pain syndrome    Iron deficiency anemia, unspecified    Obstructive sleep apnea syndrome    Chronic diastolic congestive heart failure (HCC)    Respiratory failure with hypoxia and hypercapnia, unspecified chronicity (HCC)    Cervical spondylosis    Hepatitis C virus carrier state (HCC)    Idiopathic chronic venous hypertension of right lower extremity with ulcer (HCC)    Stage 3a chronic kidney disease (HCC)    Abnormal chest x-ray    S/P cervical spinal fusion       Past Medical History  Past Medical History:   Diagnosis Date    Acid reflux     Anxiety     Arthritis     Cellulitis     left ankle    CPAP (continuous positive airway pressure) dependence     as needed per patient    Diabetes mellitus (HCC)     Hypertension     Sleep apnea     Twitching        Past Surgical History  Past Surgical History:   Procedure Laterality Date    APPENDECTOMY      CERVICAL FUSION Bilateral 5/2/2024    Procedure: FUSION CERVICAL ANTERIOR W DISCECTOMY, C3-7 ACDF;  Surgeon: Conrad Richardson MD;  Location: [facility];   Service: Orthopedics    CHOLECYSTECTOMY      COLONOSCOPY      IR PICC PLACEMENT DOUBLE LUMEN  5/2/2024    KNEE ARTHROSCOPY      [facility] ARTHRP KNE CONDYLE&PLATU MEDIAL&LAT COMPARTMENTS Left 07/13/2016    Procedure: TOTAL  KNEE REPLACEMENT ;  Surgeon: Zack Montenegro MD;  Location: [facility];  Service: Orthopedics        05/03/24 0900   PT Last Visit   PT Visit Date 05/03/24   Note Type   Note type Evaluation   Pain Assessment   Pain Assessment Tool 0-10   Pain Score 9   Pain Location/Orientation Location: [facility] Pain Intervention(s) Ambulation/increased activity;Repositioned   Restrictions/Precautions   Weight Bearing Precautions Per Order No   Braces or Orthoses C/S Collar   Other Precautions Fluid restriction;Fall Risk;Pain;Multiple lines;Telemetry;Bed Alarm;Chair Alarm;Impulsive   Home Living   Type of Home House   Home Layout Two level;Able to live on main level with bedroom/bathroom  (pt reports full bath with walk in shower on first floor)   Bathroom Shower/Tub Walk-in shower   Bathroom Toilet Raised   Home Equipment Cane;Grab bars   Prior Function   Level of [place] Independent with functional mobility   Lives With Spouse   Receives Help From Family   Vocational Retired   General   Family/Caregiver Present No   Cognition   Orientation Level Oriented X4   Subjective   Subjective Pt willing and agreeable to PT session   RLE Assessment   RLE Assessment WFL   LLE Assessment   LLE Assessment WFL   Coordination   Movements are Fluid and Coordinated 0   Coordination and Movement Description slow and guarded   Bed Mobility   Supine to Sit 4  Minimal assistance   Additional items Assist x 1   Sit to Supine Unable to assess   Additional Comments Pt left resting in chair, call bell in reach, chair alarm active   Transfers   Sit to Stand 3  Moderate assistance   Additional items Assist x 1;Increased time required   Stand to Sit 3  Moderate assistance   Additional items Assist x 1;Increased time required    Ambulation/Elevation   Gait pattern Shuffling;Decreased foot clearance;Short stride;Excessively slow;Forward Flexion   Gait Assistance 3  Moderate assist   Additional items Assist x 1   Assistive Device Other (Comment)  (HHA)   Distance 5'   Balance   Static Sitting Fair   Dynamic Sitting Fair -   Static Standing Poor +   Ambulatory Poor +   Endurance Deficit   Endurance Deficit Yes   Endurance Deficit Description limited by weakness, balance, fatigue, and pain compared to baseline mobility   Activity Tolerance   Activity Tolerance Patient limited by fatigue;Patient limited by pain   Medical Staff Made Aware spoke to OT for D/C planning   Nurse Made Aware yes, nsg gave clearance to work with pt'   Assessment   Prognosis Fair   Problem List Decreased strength;Decreased range of motion;Decreased endurance;Impaired balance;Decreased mobility;Decreased coordination;Decreased cognition;Impaired judgement;Decreased safety awareness;Pain   Assessment Pt is 68 y.o. male seen for PT evaluation s/p admit to [facility] on 5/2/2024 w/ S/P cervical spinal fusion. PT consulted to assess pt's functional mobility and d/c needs. Order placed for PT eval and tx, w/ up w/ A order. Comorbidities affecting pt's physical performance at time of assessment include:  has a past medical history of Acid reflux, Anxiety, Arthritis, Cellulitis, CPAP (continuous positive airway pressure) dependence, Diabetes mellitus (HCC), Hypertension, Sleep apnea, and Twitching. PTA, pt was ambulates community distances and elevations, lives in multi-level home, and retired. Personal factors affecting pt at time of IE include: inability to navigate level surfaces w/o external assistance, unable to perform dynamic tasks in community, positive fall history, unable to perform physical activity, limited insight into impairments, inability to perform IADLs, and inability to perform ADLs. Please find objective findings from PT assessment regarding body  systems outlined above with impairments and limitations including weakness, impaired balance, decreased endurance, impaired coordination, gait deviations, pain, decreased activity tolerance, decreased functional mobility tolerance, decreased safety awareness, impaired judgement, and fall risk. Pt educated in spinal precautions. Single step instruction for bed mobility. Required increased A for transfers and gait with deficits in strength and balance.The following objective measures performed on IE also reveal limitations: The patient's AM-PAC Basic Mobility Inpatient Short Form Raw Score is 12, Standardized Score is 32.23. A standardized score less than 42.9 suggests the patient may benefit from discharge to post-acute rehabilitation services. Please also refer to the recommendation of the Physical Therapist for safe discharge planning. Pt's clinical presentation is currently unstable/unpredictable seen in pt's presentation of critical care monitoring. Pt to benefit from continued PT tx to address deficits as defined above and maximize level of functional independent mobility and consistency. From PT/mobility standpoint, recommendation at time of d/c would be level I pending progress in order to facilitate return to PLOF.   Barriers to Discharge Inaccessible home environment;Decreased caregiver support   Goals   Patient Goals To play with his 3yo grandchild   STG Expiration Date 05/15/24   Short Term Goal #1 1. Complete bed mobility and transfers I to decrease need for caregiver in home. 2. Ambulate 300' I to complete household and community mobility without A. 3. Improve dynamic balance to good to decrease need for UE support during ambulation. 4. Be educated & demonstate 12 steps to be able to enter home without A.   Plan   Treatment/Interventions OT;Spoke to case management;Spoke to nursing;Gait training;Bed mobility;Patient/family training;Endurance training;LE strengthening/ROM;Functional transfer training   PT  Frequency 3-5x/wk   Discharge Recommendation   Rehab Resource Intensity Level, PT I (Maximum Resource Intensity)   Equipment Recommended   (TBD)   AM-PAC Basic Mobility Inpatient   Turning in Flat Bed Without Bedrails 3   Lying on Back to Sitting on Edge of Flat Bed Without Bedrails 2   Moving Bed to Chair 2   Standing Up From Chair Using Arms 2   Walk in Room 2   Climb 3-5 Stairs With Railing 1   Basic Mobility Inpatient Raw Score 12   Basic Mobility Standardized Score 32.23   [facility] Highest Level Of Mobility   -HLM Goal 4: Move to chair/commode   -HLM Achieved 4: Move to chair/commode       Verito Singh, PT

## 2024-05-03 NOTE — PROGRESS NOTES
Progress Note -Interventional Radiology  Edgar Obando 68 y.o. male MRN: [id number]  Unit/Bed#: ICU 01 Encounter: [id number]    Assessment/Plan:  67 yo male with HTN, sleep apnea on CPAP who was admitted on 5/2/24 s/p s/p C3-C7 ACDF with Orthopedic Surgery.     IR RUE PICC line placed prior to orthopedic procedure.     OR team notified IR regarding significant RUE edema. With concern for iatrogenic brachial artery injury s/p PICC line placement, PICC line was not use and a Central Venous Catheter was placed by Anesthesia.     In review of chart:  -Q2H Neurovascular Checks were continued overnight.   -RUE reportedly warm, with non-pitting edema, radial pulse 2+, cap refill < 2 seconds and patient reporting full sensation.     Patient seen in follow up today.  -RUE PICC line remains in place, with dressing intact and dry sanguinous fluid present at insertion site.   -RUE  strength appears full, hand is warm, with cap refill < 2 seconds. Patient reporting no new pain or loss of sensation at this time.   -Right radial Rukhsana in place with good waveform.  -notified by General Surgery AP Kisha You PICC line aspirates and flushes without difficulty.   -continue to monitor neuro vascular checks as per protocol  -Please reach out to IR with questions and concerns       Media Information      Document Information    Clinical Image - Mobile Device      05/03/2024 09:02   Attached To:   Hospital Encounter on 5/2/24   Source Information    DAYSI Shen  Be Icu         Medical Problems       Problem List       * (Principal) S/P cervical spinal fusion    S/P total knee replacement    Type 2 diabetes mellitus, without long-term current use of insulin (HCC)    Lab Results   Component Value Date    HGBA1C 6.1 (H) 09/14/2023       Recent Labs     05/02/24  1825 05/02/24  2123 05/03/24  0707 05/03/24  1036   POCGLU 155* 189* 196* 124       Blood Sugar Average: Last 72 hrs:  (P) 155.6        HTN (hypertension)     "GERD (gastroesophageal reflux disease)    Anxiety    SUSY (obstructive sleep apnea)    Acute blood loss anemia    Low back pain with sciatica    DDD (degenerative disc disease), lumbar    Spinal stenosis of lumbar region    Myoclonus    Chronic venous insufficiency    Bilateral lower extremity edema    BMI 40.0-44.9, adult (HCC)    Primary osteoarthritis of right knee    Chronic pain of right knee    Diabetic neuropathy ([facility])    Lab Results   Component Value Date    HGBA1C 6.1 (H) 09/14/2023       Recent Labs     05/02/24  1825 05/02/24  2123 05/03/24  0707 05/03/24  1036   POCGLU 155* 189* 196* 124       Blood Sugar Average: Last 72 hrs:  (P) 155.6        RBD (REM behavioral disorder)    Lumbar spondylosis    Neck pain    Right shoulder pain    Chronic low back pain with bilateral sciatica    Chronic pain syndrome    Iron deficiency anemia, unspecified    Obstructive sleep apnea syndrome    Chronic diastolic congestive heart failure (HCC)    Wt Readings from Last 3 Encounters:   05/02/24 118 kg (260 lb 2.3 oz)   04/09/24 115 kg (253 lb 8.5 oz)   04/02/24 115 kg (253 lb)                 Respiratory failure with hypoxia and hypercapnia, unspecified chronicity (HCC)    Cervical spondylosis    Hepatitis C virus carrier state (HCC)    Idiopathic chronic venous hypertension of right lower extremity with ulcer ([facility])    Stage 3a chronic kidney disease ([facility])    Lab Results   Component Value Date    EGFR 67 05/03/2024    EGFR 62 05/02/2024    EGFR 64 04/19/2024    CREATININE 1.12 05/03/2024    CREATININE 1.19 05/02/2024    CREATININE 1.16 04/19/2024         Abnormal chest x-ray             Subjective: Patient reports he feels \"ok\" today. He continues with neck and back pain which is chronic. He states his voice is hoarse which he expected, but otherwise has no new pain, numbness, weakness, at this time.        Objective:    Vitals:  /50   Pulse 64   Temp 98.3 °F (36.8 °C) (Oral)   Resp 17   Ht 5' 6\" (1.676 m)   Wt " 118 kg (260 lb 2.3 oz)   SpO2 99%   BMI 41.99 kg/m²   Body mass index is 41.99 kg/m².  Weight (last 2 days)       Date/Time Weight    05/02/24 1947 118 (260.14)    05/02/24 0548 113 (250)            I/Os:    Intake/Output Summary (Last 24 hours) at 5/3/2024 1143  Last data filed at 5/3/2024 1100  Gross per 24 hour   Intake 3786.58 ml   Output 2630 ml   Net 1156.58 ml       Invasive Devices       Peripherally Inserted Central Catheter Line  Duration             PICC Line 05/02/24 1 day              Central Venous Catheter Line  Duration             CVC Central Lines 05/02/24 1 day              Arterial Line  Duration             Arterial Line 05/02/24 Right Radial 1 day              Drain  Duration             Urethral Catheter Latex 16 Fr. 1 day    Open Drain Posterior;Superior Back <1 day                    Physical Exam  Constitutional:       General: He is not in acute distress.  HENT:      Mouth/Throat:      Mouth: Mucous membranes are moist.      Comments: Voice hoarse  Eyes:      Extraocular Movements: Extraocular movements intact.      Conjunctiva/sclera: Conjunctivae normal.   Neck:      Comments: Cervical collar in place.   Cardiovascular:      Rate and Rhythm: Normal rate and regular rhythm.      Heart sounds: Normal heart sounds.   Pulmonary:      Effort: Pulmonary effort is normal. No respiratory distress.      Breath sounds: Normal breath sounds.   Abdominal:      General: There is no distension.      Palpations: Abdomen is soft.   Musculoskeletal:      Comments: RUE, with mild edema, some ecchymosis on upper arm. PICC line in place. Hand warm, cap refill < 2 seconds.  Strength appears full. Patient reporting no new sensory deficit or pain. Right Radial Arterial Line in place with good wave form   Skin:     General: Skin is warm and dry.   Neurological:      Mental Status: He is alert. Mental status is at baseline.   Psychiatric:         Mood and Affect: Mood normal.         Behavior: Behavior  "normal.                 Lab Results and Cultures:   CBC with diff:   Lab Results   Component Value Date    WBC 8.22 05/03/2024    HGB 11.8 (L) 05/03/2024    HCT 38.3 05/03/2024    MCV 92 05/03/2024     05/03/2024    RBC 4.16 05/03/2024    MCH 28.4 05/03/2024    MCHC 30.8 (L) 05/03/2024    RDW 13.6 05/03/2024    MPV 10.6 05/03/2024    NRBC 0 04/19/2024      BMP/CMP:  Lab Results   Component Value Date     10/04/2014    K 4.3 05/03/2024    K 3.8 04/06/2023     05/03/2024    CL 95 (L) 04/06/2023    CO2 29 05/03/2024    CO2 27 05/02/2024    CO2 33 (H) 04/06/2023    ANIONGAP 8 10/04/2014    BUN 16 05/03/2024    BUN 21 04/06/2023    CREATININE 1.12 05/03/2024    CREATININE 1.15 04/06/2023    GLUCOSE 122 05/02/2024    GLUCOSE 120 10/04/2014    CALCIUM 8.6 05/03/2024    CALCIUM 8.9 10/12/2023    AST 16 04/19/2024    AST 21 04/06/2023    ALT 11 04/19/2024    ALT 18 04/06/2023    ALKPHOS 90 04/19/2024    ALKPHOS 97 04/06/2023    PROT 7.9 10/04/2014    BILITOT 0.76 10/04/2014    EGFR 67 05/03/2024    EGFR 70 04/06/2023   ,     Coags:   Lab Results   Component Value Date    PT 14.3 10/10/2021    PTT 32 04/19/2024    INR 1.15 04/19/2024    INR 1.2 10/10/2021   ,          Lipid Panel:   Lab Results   Component Value Date    CHOL 166 10/04/2014     Lab Results   Component Value Date    HDL 53 06/21/2019     Lab Results   Component Value Date    HDL 53 06/21/2019     Lab Results   Component Value Date    LDLCALC 94 06/21/2019     Lab Results   Component Value Date    TRIG 119 06/21/2019       HgbA1c:   Lab Results   Component Value Date    HGBA1C 6.1 (H) 09/14/2023    HGBA1C 6.5 05/21/2023    HGBA1C 5.9 (H) 01/26/2023       Blood Culture: No results found for: \"BLOODCX\",   Urinalysis:   Lab Results   Component Value Date    COLORU yellow 03/31/2023    COLORU Yellow 12/22/2020    CLARITYU clear 03/31/2023    CLARITYU Cloudy 12/22/2020    SPECGRAV 1.015 12/22/2020    PHUR 5.5 12/22/2020    PHUR 5.5 07/18/2016    " LEUKOCYTESUR - 03/31/2023    LEUKOCYTESUR Moderate (A) 12/22/2020    NITRITE - 03/31/2023    NITRITE Negative 12/22/2020    GLUCOSEU - 03/31/2023    GLUCOSEU Negative 12/22/2020    KETONESU - 03/31/2023    KETONESU Negative 12/22/2020    BILIRUBINUR - 03/31/2023    BILIRUBINUR Negative 12/22/2020    BLOODU - 03/31/2023    BLOODU Moderate (A) 12/22/2020   ,   Urine Culture:   Lab Results   Component Value Date    URINECX >100,000 cfu/ml Escherichia coli (A) 12/22/2020     VTE Pharmacologic Prophylaxis: Heparin      Thank you for allowing me to participate in the care of Edgar Obando. Please don't hesitate to call, text, email, or TigerText with any questions.     This text is generated with voice recognition software. There may be translation, syntax, or grammatical errors. If you have any questions, please contact the dictating provider.

## 2024-05-03 NOTE — PROGRESS NOTES
[facility]  Progress Note: Critical Care  Name: Edgar Obando 68 y.o. male I MRN: [id number]  Unit/Bed#: ICU 01 I Date of Admission: 5/2/2024   Date of Service: 5/3/2024 I Hospital Day: 1    Assessment/Plan   Neuro/Psych:  Diagnoses: Cervical myelopathy and stenosis s/p C3-7 anterior cervical discectomy and fusion (5/2), acute post op pain, history of anxiety  Plan:  Neuro checks every 2 hours  Ortho surgery following  WBAT in hard cervical collar  Early ambulation  EULOGIO drain  HOB > 35  Home meds: Buspar 10 mg BID, holding home clonazepam  Analgesia: Multimodal.   Tylenol q6 hours  Oxycodone 5/10 for mod/severe pain  Robaxin 500 q6    CV:  Diagnoses: History of hypertension and hyperlipidemia  Plan:  Home meds: clonidine, pravastatin  Holding home ASA 81, lasix, and losartan   Continuous cardiopulmonary monitoring. Maintain MAP > 60    Pulm:  Diagnoses: SUSY on CPAP  Supplemental O2: 2L, Wean as tolerated.  Plan:  CPAP overnight   Continuous pulse oximetry. Maintain O2 sat >92%.     GI:  Diagnoses: GERD  Plan:  Bowel regimen: Colace, senna  Maalox, Tums, protonix     :  Diagnoses: BPH  Creatinine trend: 1.12  Baseline creatinine: 1.1-1.4  Plan:  Flomax   Jamison in place  Monitor I/Os    F/E/N:  Plan:   F: Fluid resuscitation prn.  E: Monitor and replete electrolytes for Mg >2, Phos >3, K >4.  N: Surgical soft diet    Heme/Onc:  Diagnoses: RUE hematoma 2/2 PICC placement (5/2)  Plan:  Q4 hour NV checks per IR  Plan for IR PICC replacement today  VTE prophylaxis: restart today    Endo:  Diagnoses: Type 2 DM  Plan:   Home Trulicity on hold  Insulin: SSI. Monitor blood glucose.    ID:  Diagnoses: Surgical prophylaxis   Labs: WBC 8.22  Temperature: afebrile  Plan:  Abx: Ancef x 2 doses  Monitor fever curve and WBC.    MSK/Skin:  Diagnoses: see neuro for full plan for cervical spondylosis   Plan:  Cervical collar  PT/OT when appropriate. Encourage OOB and ambulation when appropriate.  Local wound care prn.    LDAs:  Lines - R fem CVC  Drains - EULOGIO x 1  Airways -  none    Disposition: Med Surg    ICU Core Measures     A: Assess, Prevent, and Manage Pain Has pain been assessed? Yes  Need for changes to pain regimen? No   B: Both SAT/SAT  N/A   C: Choice of Sedation RASS Goal: N/A patient not on sedation  Need for changes to sedation or analgesia regimen? No   D: Delirium CAM-ICU: Negative   E: Early Mobility  Plan for early mobility? Yes   F: Family Engagement Plan for family engagement today? Yes       Antibiotic Review: Post op requirements     Review of Invasive Devices:    Jamison Plan: Continue for accurate I/O monitoring for 48 hours  Central access plan: Will obtain peripheral access and discontinue prior to transfer  Rukhsana Plan: Keep arterial line for hemodynamic monitoring    Prophylaxis:  VTE Contraindicated secondary to: will restart today   Stress Ulcer  covered bypantoprazole (PROTONIX) EC tablet 40 mg [[id number]]        Significant 24hr Events     24hr events:        Subjective     Review of Systems   Constitutional:  Negative for fever.   HENT:  Negative for congestion.    Respiratory:  Negative for shortness of breath.    Cardiovascular:  Negative for chest pain.   Gastrointestinal:  Negative for abdominal pain, nausea and vomiting.   Musculoskeletal:  Positive for back pain (Chronic) and neck pain.   Skin:  Negative for color change and pallor.   Neurological:  Positive for headaches. Negative for dizziness and light-headedness.   All other systems reviewed and are negative.       Objective                            Vitals I/O      Most Recent Min/Max in 24hrs   Temp 97.6 °F (36.4 °C) Temp  Min: 97.5 °F (36.4 °C)  Max: 98.5 °F (36.9 °C)   Pulse 68 Pulse  Min: 64  Max: 102   Resp 14 Resp  Min: 11  Max: 38   /50 BP  Min: 85/45  Max: 157/78   O2 Sat 98 % SpO2  Min: 91 %  Max: 99 %      Intake/Output Summary (Last 24 hours) at 5/3/2024 2708  Last data filed at 5/3/2024 0600  Gross  per 24 hour   Intake 3624.7 ml   Output 2150 ml   Net 1474.7 ml       Diet NPO; Sips with meds    Invasive Monitoring   Arterial Line  [place] /54  Arterial Line BP  Min: 86/40  Max: 154/76   MAP 80 mmHg  Arterial Line MAP (mmHg)  Min: 56 mmHg  Max: 102 mmHg           Physical Exam   Physical Exam  Vitals and nursing note reviewed.   Eyes:      Extraocular Movements: Extraocular movements intact.      Conjunctiva/sclera: Conjunctivae normal.      Pupils: Pupils are equal, round, and reactive to light.   Skin:     General: Skin is warm and dry.      Capillary Refill: Capillary refill takes less than 2 seconds.      Comments: Hematoma to RUE around location of PICC   HENT:      Head: Normocephalic. Laceration (Small laceration to chin w/ bandage in place) present.      Nose: No congestion.      Mouth/Throat:      Mouth: Mucous membranes are moist.   Cardiovascular:      Rate and Rhythm: Normal rate and regular rhythm.      Pulses: Normal pulses.   Abdominal: General: There is no distension.      Palpations: Abdomen is soft.      Tenderness: There is no abdominal tenderness. There is no guarding.   Constitutional:       General: He is not in acute distress.     Interventions: Cervical collar in place.   Pulmonary:      Effort: Pulmonary effort is normal. No respiratory distress.      Breath sounds: Normal breath sounds.   Neurological:      General: No focal deficit present.      Mental Status: He is alert and oriented to person, place and time. Mental status is at baseline.      Cranial Nerves: No dysarthria or facial asymmetry.      Sensory: Sensation is intact.      Coordination: Coordination is intact.   Genitourinary/Anorectal:  Jamison present.          Diagnostic Studies      Imaging: I have personally reviewed pertinent films in PACS     Medications:  Scheduled PRN   acetaminophen, 650 mg, Q6H CASANDRA  busPIRone, 10 mg, BID  cefazolin, 2,000 mg, Q8H  cloNIDine, 0.1 mg, BID  docusate sodium, 100 mg, BID  insulin  lispro, 2-12 Units, 4x Daily (AC & HS)  methocarbamol, 500 mg, Q6H CASANDRA  pantoprazole, 40 mg, Early Morning  pravastatin, 10 mg, Daily With Dinner  senna, 1 tablet, Daily  tamsulosin, 0.4 mg, Daily With Dinner      aluminum-magnesium hydroxide-simethicone, 30 mL, Q6H PRN  calcium carbonate, 1,000 mg, Daily PRN  ondansetron, 4 mg, Q6H PRN  oxyCODONE, 10 mg, Q4H PRN  oxyCODONE, 5 mg, Q4H PRN       Continuous            Labs:    CBC    Recent Labs     05/02/24 2043 05/03/24  0509   WBC 6.84 8.22   HGB 11.2* 11.8*   HCT 35.6* 38.3   * 172     BMP    Recent Labs     05/02/24 2043 05/03/24  0509   SODIUM 138 140   K 4.5 4.3    101   CO2 29 29   AGAP 8 10   BUN 18 16   CREATININE 1.19 1.12   CALCIUM 8.5 8.6       Coags    No recent results     Additional Electrolytes  Recent Labs     05/02/24  1641 05/02/24 2043   MG  --  2.0   PHOS  --  3.7   CAIONIZED 1.08*  --           Blood Gas    No recent results  No recent results LFTs  No recent results    Infectious  No recent results  Glucose  Recent Labs     05/02/24 2043 05/03/24  0509   GLUC 201* 187*               Pilar Toussaint PA-C

## 2024-05-03 NOTE — MALNUTRITION/BMI
This medical record reflects one or more clinical indicators suggestive of malnutrition and/or morbid obesity.    Malnutrition Findings:                                 BMI Findings:  Adult BMI Classifications: Morbid Obesity 40-44.9        Body mass index is 41.99 kg/m².     See Nutrition note dated 5/3/24 for additional details.  Completed nutrition assessment is viewable in the nutrition documentation.

## 2024-05-04 LAB
ANION GAP SERPL CALCULATED.3IONS-SCNC: 7 MMOL/L (ref 4–13)
BUN SERPL-MCNC: 16 MG/DL (ref 5–25)
CALCIUM SERPL-MCNC: 8.6 MG/DL (ref 8.4–10.2)
CHLORIDE SERPL-SCNC: 100 MMOL/L (ref 96–108)
CO2 SERPL-SCNC: 34 MMOL/L (ref 21–32)
CREAT SERPL-MCNC: 0.95 MG/DL (ref 0.6–1.3)
ERYTHROCYTE [DISTWIDTH] IN BLOOD BY AUTOMATED COUNT: 13.6 % (ref 11.6–15.1)
GFR SERPL CREATININE-BSD FRML MDRD: 81 ML/MIN/1.73SQ M
GLUCOSE SERPL-MCNC: 107 MG/DL (ref 65–140)
GLUCOSE SERPL-MCNC: 108 MG/DL (ref 65–140)
GLUCOSE SERPL-MCNC: 138 MG/DL (ref 65–140)
GLUCOSE SERPL-MCNC: 152 MG/DL (ref 65–140)
GLUCOSE SERPL-MCNC: 171 MG/DL (ref 65–140)
HCT VFR BLD AUTO: 33.8 % (ref 36.5–49.3)
HGB BLD-MCNC: 10.6 G/DL (ref 12–17)
MCH RBC QN AUTO: 28.9 PG (ref 26.8–34.3)
MCHC RBC AUTO-ENTMCNC: 31.4 G/DL (ref 31.4–37.4)
MCV RBC AUTO: 92 FL (ref 82–98)
PLATELET # BLD AUTO: 117 THOUSANDS/UL (ref 149–390)
PMV BLD AUTO: 10.9 FL (ref 8.9–12.7)
POTASSIUM SERPL-SCNC: 3.8 MMOL/L (ref 3.5–5.3)
RBC # BLD AUTO: 3.67 MILLION/UL (ref 3.88–5.62)
SODIUM SERPL-SCNC: 141 MMOL/L (ref 135–147)
WBC # BLD AUTO: 5.44 THOUSAND/UL (ref 4.31–10.16)

## 2024-05-04 PROCEDURE — 99222 1ST HOSP IP/OBS MODERATE 55: CPT | Performed by: INTERNAL MEDICINE

## 2024-05-04 PROCEDURE — NC001 PR NO CHARGE: Performed by: ORTHOPAEDIC SURGERY

## 2024-05-04 PROCEDURE — 82948 REAGENT STRIP/BLOOD GLUCOSE: CPT

## 2024-05-04 PROCEDURE — 85027 COMPLETE CBC AUTOMATED: CPT

## 2024-05-04 PROCEDURE — 80048 BASIC METABOLIC PNL TOTAL CA: CPT

## 2024-05-04 RX ORDER — HYDRALAZINE HYDROCHLORIDE 25 MG/1
25 TABLET, FILM COATED ORAL EVERY 8 HOURS PRN
Status: DISCONTINUED | OUTPATIENT
Start: 2024-05-04 | End: 2024-05-06 | Stop reason: HOSPADM

## 2024-05-04 RX ORDER — FUROSEMIDE 40 MG
40 TABLET ORAL DAILY
Status: DISCONTINUED | OUTPATIENT
Start: 2024-05-04 | End: 2024-05-06 | Stop reason: HOSPADM

## 2024-05-04 RX ADMIN — OXYCODONE HYDROCHLORIDE 10 MG: 10 TABLET ORAL at 13:27

## 2024-05-04 RX ADMIN — METHOCARBAMOL 500 MG: 500 TABLET ORAL at 11:07

## 2024-05-04 RX ADMIN — OXYCODONE HYDROCHLORIDE 10 MG: 10 TABLET ORAL at 20:11

## 2024-05-04 RX ADMIN — FUROSEMIDE 40 MG: 40 TABLET ORAL at 11:07

## 2024-05-04 RX ADMIN — CLONIDINE HYDROCHLORIDE 0.1 MG: 0.1 TABLET ORAL at 09:05

## 2024-05-04 RX ADMIN — PANTOPRAZOLE SODIUM 40 MG: 40 TABLET, DELAYED RELEASE ORAL at 05:23

## 2024-05-04 RX ADMIN — CLONIDINE HYDROCHLORIDE 0.1 MG: 0.1 TABLET ORAL at 17:04

## 2024-05-04 RX ADMIN — ACETAMINOPHEN 975 MG: 325 TABLET, FILM COATED ORAL at 05:23

## 2024-05-04 RX ADMIN — DOCUSATE SODIUM 100 MG: 100 CAPSULE, LIQUID FILLED ORAL at 09:05

## 2024-05-04 RX ADMIN — DOCUSATE SODIUM 100 MG: 100 CAPSULE, LIQUID FILLED ORAL at 17:04

## 2024-05-04 RX ADMIN — PRAVASTATIN SODIUM 10 MG: 10 TABLET ORAL at 17:04

## 2024-05-04 RX ADMIN — TAMSULOSIN HYDROCHLORIDE 0.4 MG: 0.4 CAPSULE ORAL at 17:04

## 2024-05-04 RX ADMIN — OXYCODONE HYDROCHLORIDE 10 MG: 10 TABLET ORAL at 05:23

## 2024-05-04 RX ADMIN — INSULIN LISPRO 2 UNITS: 100 INJECTION, SOLUTION INTRAVENOUS; SUBCUTANEOUS at 22:04

## 2024-05-04 RX ADMIN — HEPARIN SODIUM 7500 UNITS: 5000 INJECTION INTRAVENOUS; SUBCUTANEOUS at 22:04

## 2024-05-04 RX ADMIN — METHOCARBAMOL 500 MG: 500 TABLET ORAL at 00:36

## 2024-05-04 RX ADMIN — ACETAMINOPHEN 975 MG: 325 TABLET, FILM COATED ORAL at 22:04

## 2024-05-04 RX ADMIN — METHOCARBAMOL 500 MG: 500 TABLET ORAL at 05:23

## 2024-05-04 RX ADMIN — OXYCODONE HYDROCHLORIDE 10 MG: 10 TABLET ORAL at 00:36

## 2024-05-04 RX ADMIN — METHOCARBAMOL 500 MG: 500 TABLET ORAL at 17:04

## 2024-05-04 RX ADMIN — SENNOSIDES 8.6 MG: 8.6 TABLET, FILM COATED ORAL at 09:06

## 2024-05-04 RX ADMIN — METHOCARBAMOL 500 MG: 500 TABLET ORAL at 22:05

## 2024-05-04 RX ADMIN — INSULIN LISPRO 2 UNITS: 100 INJECTION, SOLUTION INTRAVENOUS; SUBCUTANEOUS at 16:34

## 2024-05-04 RX ADMIN — BUSPIRONE HYDROCHLORIDE 10 MG: 10 TABLET ORAL at 09:05

## 2024-05-04 RX ADMIN — BUSPIRONE HYDROCHLORIDE 10 MG: 10 TABLET ORAL at 17:04

## 2024-05-04 RX ADMIN — ACETAMINOPHEN 975 MG: 325 TABLET, FILM COATED ORAL at 13:22

## 2024-05-04 RX ADMIN — HEPARIN SODIUM 7500 UNITS: 5000 INJECTION INTRAVENOUS; SUBCUTANEOUS at 05:23

## 2024-05-04 RX ADMIN — HEPARIN SODIUM 7500 UNITS: 5000 INJECTION INTRAVENOUS; SUBCUTANEOUS at 13:21

## 2024-05-04 NOTE — PROGRESS NOTES
Progress Note - Orthopedics   Egdar Obando 68 y.o. male MRN: [id number]    Subjective:  68 y.o. male POD 2 FUSION CERVICAL ANTERIOR W DISCECTOMY, C3-7 ACDF. No acute events, no acute distress. Pain well controlled. Patient states he has no difficulty swallowing and has been tolerating soft diet. No numbness/tingling, no weakness, no fever/chills, no nausea/vomiting/diarrhea. No difficulty breathing.    Labs:  0   Lab Value Date/Time    HCT 33.8 (L) 05/04/2024 0510    HCT 38.3 05/03/2024 0509    HCT 35.6 (L) 05/02/2024 2043    HCT 35.6 (L) 04/26/2023 1000    HCT 36.2 (L) 12/20/2022 1153    HCT 48.7 01/11/2014 0918    HGB 10.6 (L) 05/04/2024 0510    HGB 11.8 (L) 05/03/2024 0509    HGB 11.2 (L) 05/02/2024 2043    HGB 11.9 (L) 04/26/2023 1000    HGB 11.8 (L) 12/20/2022 1153    HGB 16.0 01/11/2014 0918    PT 14.3 10/10/2021 1522    INR 1.15 04/19/2024 1300    INR 1.2 10/10/2021 1522    WBC 5.44 05/04/2024 0510    WBC 8.22 05/03/2024 0509    WBC 6.84 05/02/2024 2043    ESR 28 (H) 01/07/2018 1710    CRP 9.9 (H) 01/07/2018 1642       Meds:    Current Facility-Administered Medications:     acetaminophen (TYLENOL) tablet 975 mg, 975 mg, Oral, Q8H [facility], Albert Corbett MD, 975 mg at 05/04/24 0523    aluminum-magnesium hydroxide-simethicone (MAALOX) oral suspension 30 mL, 30 mL, Oral, Q6H PRN, Pilar Toussaint PA-C    busPIRone (BUSPAR) tablet 10 mg, 10 mg, Oral, BID, Pilar Toussaint PA-C, 10 mg at 05/03/24 1739    calcium carbonate (TUMS) chewable tablet 1,000 mg, 1,000 mg, Oral, Daily PRN, Pilar Toussaint PA-C    cloNIDine (CATAPRES) tablet 0.1 mg, 0.1 mg, Oral, BID, Pilar Toussaint PA-C    docusate sodium (COLACE) capsule 100 mg, 100 mg, Oral, BID, Pialr Toussaint PA-C, 100 mg at 05/03/24 1739    heparin (porcine) subcutaneous injection 7,500 Units, 7,500 Units, Subcutaneous, Q8H [facility], Pilar Toussaint PA-C, 7,500 Units at 05/04/24 0523    HYDROmorphone HCl (DILAUDID) injection 0.2 mg, 0.2 mg,  "Intravenous, Q3H PRN, Albert Corbett MD    insulin lispro (HumALOG/ADMELOG) 100 units/mL subcutaneous injection 2-12 Units, 2-12 Units, Subcutaneous, 4x Daily (AC & HS), 2 Units at 05/03/24 0805 **AND** Fingerstick Glucose (POCT), , , 4x Daily AC and at bedtime, Pilar Toussaint PA-C    lidocaine (LIDODERM) 5 % patch 2 patch, 2 patch, Topical, Daily, Albert Corbett MD, 2 patch at 05/03/24 1542    methocarbamol (ROBAXIN) tablet 500 mg, 500 mg, Oral, Q6H CASANDRA, Pilar Toussaint PA-C, 500 mg at 05/04/24 0523    ondansetron (ZOFRAN) injection 4 mg, 4 mg, Intravenous, Q6H PRN, Pilar Toussaint PA-C    oxyCODONE (ROXICODONE) immediate release tablet 10 mg, 10 mg, Oral, Q4H PRN, SARAI Sutton-C, 10 mg at 05/04/24 0523    oxyCODONE (ROXICODONE) IR tablet 5 mg, 5 mg, Oral, Q4H PRN, Pilar Toussaint PA-C    pantoprazole (PROTONIX) EC tablet 40 mg, 40 mg, Oral, Early Morning, SARAI Sutton-C, 40 mg at 05/04/24 0523    pravastatin (PRAVACHOL) tablet 10 mg, 10 mg, Oral, Daily With Dinner, SARAI Sutton-C, 10 mg at 05/03/24 1542    senna (SENOKOT) tablet 8.6 mg, 1 tablet, Oral, Daily, Pilar Toussaint PA-C, 8.6 mg at 05/03/24 0833    tamsulosin (FLOMAX) capsule 0.4 mg, 0.4 mg, Oral, Daily With Dinner, Pilar Toussaint PA-C, 0.4 mg at 05/03/24 1542    Blood Culture:   No results found for: \"BLOODCX\"    Wound Culture:   No results found for: \"WOUNDCULT\"    Ins and Outs:  I/O last 24 hours:  In: 1866.6 [P.O.:480; I.V.:1286.6; IV Piggyback:100]  Out: 3385 [Urine:3285; Drains:100]          Physical:  Vitals:    05/04/24 0220   BP: 142/73   Pulse:    Resp: 19   Temp: 98.3 °F (36.8 °C)   SpO2:      Today on 6L midflow nasal cannula    Musculoskeletal: Bilateral Upper Extremities  Dressing c/d/i  TTP yogi-incisionally  Sensation intact to light touch C5-T1  5/5 deltoid, 5/5 biceps, 5/5 triceps, 5/5 wrist flexion, 5/5 wrist extension, 5/5 interossei  Fingers warm and well perfused  Cervical collar in " place   EULOGIO drain with ss output        Assessment:    68 y.o. male POD 2 FUSION CERVICAL ANTERIOR W DISCECTOMY, C3-7 ACDF doing well postoperatively.    Plan:  WBAT BUE  Maintain cervical collar  I/O -- follow up drain output  ADAT  PT/OT  Pain control  DVT ppx: SQH  Monitor for ABLA and administer or recommend IVF/prbc as indicated for Greater than 2 gram drop or Hgb < 7   Continue routine neurochecks  HOB >35 degrees  Dispo: discharge pending PT/OT/placement needs      Albert Corbett MD  PGY-1 Orthopedic Surgery    Please [place] Text the [facility] Orth[facility] Floor Role with any Questions or Concerns.

## 2024-05-04 NOTE — UTILIZATION REVIEW
SEE INITIAL REVIEW AT BOTTOM    Continued Stay Review    Date: 5/4  Day 3: Has surpassed a 2nd midnight with active treatments and services.    Current Patient Class: IP  Current Level of Care: MS    HPI:68 y.o. male initially admitted on 5/2 for elective surgical procedure - FUSION CERVICAL ANTERIOR W DISCECTOMY, C3-7 ACDF (Bilateral)     Assessment/Plan POD #2:  Pt is stable.  No acute events, pain well controlled, no difficulty swallowing, no numbness/tingling.  Dressing C/D/I.  TTP yogi-incision, sensation intact.  Wearing C collar, EULOGIO with SS output.  Continue analgesia, work with PT, neuro checks.  Pt is off MFNC and is on room air today with good sats.  Afebrile.  Recovering well.      Vital Signs:   Date/Time Temp Pulse Resp BP MAP (mmHg) Arterial Line BP MAP SpO2 Calculated FIO2 (%) - Nasal Cannula O2 Flow Rate (L/min) Nasal Cannula O2 Flow Rate (L/min) O2 Device O2 Interface Device Cardiac (WDL)   05/04/24 0732 -- -- -- -- -- -- -- -- -- -- -- None (Room air) -- --   05/04/24 07:28:02 98 °F (36.7 °C) 89 16 124/90 101 -- -- 96 % -- -- -- -- -- --   05/04/24 02:20:45 98.3 °F (36.8 °C) -- 19 142/73 96 -- -- -- -- -- -- -- -- --   05/03/24 22:01:57 98.4 °F (36.9 °C) 90 18 140/69 93 -- -- 91 % -- -- -- -- -- --   05/03/24 1740 -- -- -- -- -- -- -- -- -- -- -- None (Room air) -- --   05/03/24 15:18:58 97.8 °F (36.6 °C) 71 18 159/73 102 -- -- 100 % 44 -- 6 L/min Mid flow nasal cannula -- --   05/03/24 1355 -- 74 15 -- -- 152/68 96 mmHg 98 % -- 4 L/min -- Mid flow nasal cannula -- --   05/03/24 1330 -- 82 20 -- -- 148/62 88 mmHg 98 % -- 4 L/min -- Mid flow nasal cannula -- --   05/03/24 1200 -- 70 21 -- -- 144/62 90 mmHg 98 % -- 4 L/min -- Mid flow nasal cannula -- --   05/03/24 1110 98.3 °F (36.8 °C) 64 17 -- -- 150/64 92 mmHg 99 % -- 6 L/min -- Mid flow nasal cannula -- --   05/03/24 1100 -- 68 20 -- -- 158/70 100 mmHg 99 % -- 6 L/min -- Mid flow nasal cannula -- --   05/03/24 1000 -- 64 16 -- -- 148/64 92 mmHg  99 % -- 6 L/min -- Mid flow nasal cannula -- --   05/03/24 0920 -- 68 22 -- -- 140/62 88 mmHg 98 % -- 6 L/min -- Mid flow nasal cannula -- --   05/03/24 0835 -- 68 18 -- -- 156/70 100 mmHg 99 % -- 6 L/min -- Mid flow nasal cannula -- --   05/03/24 0831 -- 66 14 -- -- -- -- 99 % -- -- -- -- -- --   05/03/24 0830 -- 68 16 -- -- 154/68 100 mmHg 99 % -- 6 L/min -- Mid flow nasal cannula -- --   05/03/24 0800 98.2 °F (36.8 °C) 68 14 -- -- 150/60 94 mmHg 99 % -- 6 L/min -- Mid flow nasal cannula -- --   05/03/24 0735 -- 71 16 -- -- -- -- 99 % 44 6 L/min 6 L/min Mid flow nasal cannula MFNC prongs --   05/03/24 0600 -- 68 14 -- -- 128/54 80 mmHg 98 % -- -- -- -- -- --   05/03/24 0500 -- 74 38 Abnormal  -- -- 150/62 94 mmHg 98 % -- -- -- -- -- --   05/03/24 0415 97.6 °F (36.4 °C) 76 13 101/50 67 142/62 92 mmHg 99 % 60 -- 10 L/min Mid flow nasal cannula -- --   05/03/24 0300 -- 64 13 105/44 Abnormal  61 Abnormal  100/44 64 mmHg Abnormal  95 % -- -- -- -- -- --   05/03/24 0200 -- 66 12 97/42 Abnormal  65 98/44 62 mmHg Abnormal  96 % -- -- -- -- -- --   05/03/24 0100 -- 74 12 106/55 80 100/44 62 mmHg Abnormal  94 % 60 -- 10 L/min Mid flow nasal cannula -- --   05/03/24 0000 97.8 °F (36.6 °C) 84 25 Abnormal  114/68 84 134/56 80 mmHg 98 % 44 -- 6 L/min Nasal cannula -- --   05/02/24 2300 -- 76 11 Abnormal  85/45 Abnormal  58 Abnormal  86/40 56 mmHg Abnormal  91 % -- -- -- -- -- --   05/02/24 2200 -- 74 16 128/64 76 112/50 72 mmHg 91 % -- -- -- -- -- --   05/02/24 2100 -- 72 16 126/52 78 142/62 92 mmHg 97 % -- -- -- -- -- --   05/02/24 2000 -- 76 17 -- -- 144/60 90 mmHg 97 % -- -- -- -- -- --   05/02/24 1947 98.5 °F (36.9 °C) 82 14 -- -- 146/60 90 mmHg 97 % 36 -- 4 L/min Nasal cannula -- --   05/02/24 1917 -- 92 17 -- -- 150/72 98 mmHg 96 % -- -- -- -- -- --   05/02/24 1900 97.5 °F (36.4 °C) 98 17 -- -- 154/76 102 mmHg 96 % 28 -- 2 L/min Nasal cannula -- WDL   05/02/24 1845 -- 102 24 Abnormal  -- -- 146/72 96 mmHg 96 % -- -- -- --  "-- --   05/02/24 1831 97.8 °F (36.6 °C) 102 22 157/78 105 142/66 90 mmHg 93 % -- 4 L/min -- Simple mask -- WDL   05/02/24 0548 97.4 °F (36.3 °C) Abnormal  70 18 134/78 -- -- -- 95 % -- -- -- None (Room air) -- --       Pertinent Labs/Diagnostic Results:       Results from last 7 days   Lab Units 05/04/24  0510 05/03/24  0509 05/02/24 2043 05/02/24  1641   WBC Thousand/uL 5.44 8.22 6.84  --    HEMOGLOBIN g/dL 10.6* 11.8* 11.2*  --    I STAT HEMOGLOBIN g/dl  --   --   --  10.5*   HEMATOCRIT % 33.8* 38.3 35.6*  --    HEMATOCRIT, ISTAT %  --   --   --  31*   PLATELETS Thousands/uL 117* 172 142*  --          Results from last 7 days   Lab Units 05/04/24  0510 05/03/24  0509 05/02/24 2043 05/02/24  1641   SODIUM mmol/L 141 140 138  --    POTASSIUM mmol/L 3.8 4.3 4.5  --    CHLORIDE mmol/L 100 101 101  --    CO2 mmol/L 34* 29 29  --    CO2, I-STAT mmol/L  --   --   --  27   ANION GAP mmol/L 7 10 8  --    BUN mg/dL 16 16 18  --    CREATININE mg/dL 0.95 1.12 1.19  --    EGFR ml/min/1.73sq m 81 67 62  --    CALCIUM mg/dL 8.6 8.6 8.5  --    CALCIUM, IONIZED, ISTAT mmol/L  --   --   --  1.08*   MAGNESIUM mg/dL  --   --  2.0  --    PHOSPHORUS mg/dL  --   --  3.7  --          Results from last 7 days   Lab Units 05/04/24  1056 05/04/24  0542 05/03/24 2150 05/03/24 2056 05/03/24  1540 05/03/24  1036 05/03/24  0707 05/02/24 2123 05/02/24  1825 05/02/24  0618   POC GLUCOSE mg/dl 138 107 119 103 124 124 196* 189* 155* 114     Results from last 7 days   Lab Units 05/04/24  0510 05/03/24  0509 05/02/24  2043   GLUCOSE RANDOM mg/dL 108 187* 201*             No results found for: \"BETA-HYDROXYBUTYRATE\"           Results from last 7 days   Lab Units 05/02/24  1641   I STAT BASE EXC mmol/L 4*   I STAT O2 SAT % 98*   ISTAT PH ART  7.528*   I STAT ART PCO2 mm HG 31.6*   I STAT ART PO2 mm HG 89.0   I STAT ART HCO3 mmol/L 26.3     Medications:   Scheduled Medications:  acetaminophen, 975 mg, Oral, Q8H CASANDRA  busPIRone, 10 mg, Oral, " BID  cloNIDine, 0.1 mg, Oral, BID  docusate sodium, 100 mg, Oral, BID  furosemide, 40 mg, Oral, Daily  heparin (porcine), 7,500 Units, Subcutaneous, Q8H CASANDRA  insulin lispro, 2-12 Units, Subcutaneous, 4x Daily (AC & HS)  lidocaine, 2 patch, Topical, Daily  methocarbamol, 500 mg, Oral, Q6H CASANDRA  pantoprazole, 40 mg, Oral, Early Morning  pravastatin, 10 mg, Oral, Daily With Dinner  senna, 1 tablet, Oral, Daily  tamsulosin, 0.4 mg, Oral, Daily With Dinner      Continuous IV Infusions:  Neosynephrine drip d/c 5/3     PRN Meds:  aluminum-magnesium hydroxide-simethicone, 30 mL, Oral, Q6H PRN  calcium carbonate, 1,000 mg, Oral, Daily PRN  hydrALAZINE, 25 mg, Oral, Q8H PRN  HYDROmorphone, 0.2 mg, Intravenous, Q3H PRN  ondansetron, 4 mg, Intravenous, Q6H PRN  oxyCODONE, 10 mg, Oral, Q4H PRN - x 3 5/3,  x3 5/4  oxyCODONE, 5 mg, Oral, Q4H PRN    Discharge Plan: D    Network Utilization Review Department  ATTENTION: Please call with any questions or concerns to [phone] and carefully listen to the prompts so that you are directed to the right person. All voicemails are confidential.   For Discharge needs, contact Care Management DC Support Team at [phone] opt. [phone]  Send all requests for admission clinical reviews, approved or denied determinations and any other requests to dedicated fax number below belonging to the campus where the patient is receiving treatment. List of dedicated fax numbers for the Facilities:  FACILITY NAME UR FAX NUMBER   ADMISSION DENIALS (Administrative/Medical Necessity) [phone]   DISCHARGE SUPPORT TEAM (NETWORK) [phone]   PARENT CHILD HEALTH (Maternity/NICU/Pediatrics) [phone]   [facility] [phone]   [facility] [phone]   [facility] [phone]   [facility] [phone]   Critical access hospital [phone]   [facility]  [phone]   [facility] [phone]   [facility] [phone]   [facility] [phone]   [facility] [phone]   [facility] [phone]   [facility] [phone]

## 2024-05-04 NOTE — CONSULTS
[facility]  Consult  Name: Edgar Obando 68 y.o. male I MRN: [id number]  Unit/Bed#: -01 I Date of Admission: 5/2/2024   Date of Service: 5/4/2024 I Hospital Day: 2    Inpatient consult to Internal Medicine  Consult performed by: Dalila Cheatham PA-C  Consult ordered by: Morelia Sethi PA-C          Assessment/Plan   * S/P cervical spinal fusion  Assessment & Plan  Failed outpatient conservative measures and opted for decompressive surgery  Pain controlled  No post operative issues noted  Continue encourage incentive spirometry; monitor fever curve  DVT prophylaxis in place and reviewed  Results from last 7 days   Lab Units 05/04/24  0510   WBC Thousand/uL 5.44   HEMOGLOBIN g/dL 10.6*   HEMATOCRIT % 33.8*   PLATELETS Thousands/uL 117*         Anxiety  Assessment & Plan  Continue BuSpar and as needed clonazepam.    HTN (hypertension)  Assessment & Plan  Losartan and Lasix have been held in the post operative setting to avoid hypotension/LAKHWINDER  Continue clonidine  Pt takes Lasix as needed for LE edema. Will start 40mg daily today due to hypoxia and crackles at the bases.  Add hydralazine as needed for SBP >160  Monitor trend      Type 2 diabetes mellitus, without long-term current use of insulin (HCC)  Assessment & Plan    Lab Results   Component Value Date    HGBA1C 6.1 (H) 09/14/2023     Uses Trulicity at home.  DM diet.  Continue accuchecks with SSI.           PRE-OP HGB LEVEL: 12.6    Subjective/ HPI: Edgar Obando was seen and examined. Hx of Cervicalpain failed out patient conservative measures. Elected to undergo surgical intervention. We are asked to see patient for post op management of underlying medical co-morbidities as outlined above. Pt was admitted to the ICU after surgery and has now been admitted to med surg.    ROS:   A 10 point ROS was performed; negative except as noted above.     Past medical history    Past Medical History:   Diagnosis Date     Acid reflux     Anxiety     Arthritis     Cellulitis     left ankle    CPAP (continuous positive airway pressure) dependence     as needed per patient    Diabetes mellitus (HCC)     Hypertension     Sleep apnea     Twitching        Social History:    Substance Use History:   Social History     Substance and Sexual Activity   Alcohol Use Not Currently     Social History     Tobacco Use   Smoking Status Former    Current packs/day: 0.00    Types: Cigarettes    Quit date:     Years since quittin.3   Smokeless Tobacco Never     Social History     Substance and Sexual Activity   Drug Use Not Currently    Comment: tried multiple drugs in the past       Family History:    Family History   Problem Relation Age of Onset    Diabetes Mother     Diabetes Father     Cancer Father          Review of Scheduled Meds:  Current Facility-Administered Medications   Medication Dose Route Frequency Provider Last Rate    acetaminophen  975 mg Oral Q8H [facility] Albert Corbett MD      aluminum-magnesium hydroxide-simethicone  30 mL Oral Q6H PRN Pilar Toussaint PA-C      busPIRone  10 mg Oral BID Pilar Toussaint, PA-VIET      calcium carbonate  1,000 mg Oral Daily PRN Pilar Toussaint, PA-C      cloNIDine  0.1 mg Oral BID Pilar Toussaint, PA-VIET      docusate sodium  100 mg Oral BID Pilar Toussaint, PA-C      heparin (porcine)  7,500 Units Subcutaneous Q8H [facility] Pilar Toussaint, CAITLYN      hydrALAZINE  25 mg Oral Q8H PRN Dalila Cheatham PA-C      HYDROmorphone  0.2 mg Intravenous Q3H PRN Albert Corbett MD      insulin lispro  2-12 Units Subcutaneous 4x Daily (AC & HS) Pilar Toussaint PA-C      lidocaine  2 patch Topical Daily Albert Corbett MD      methocarbamol  500 mg Oral Q6H [facility] Pilar Toussaint, CAITLYN      ondansetron  4 mg Intravenous Q6H PRN Pilar Toussaint, CAITLYN      oxyCODONE  10 mg Oral Q4H PRN Pilar Toussaint PA-C      oxyCODONE  5 mg Oral Q4H PRN Pilar Toussaint, CAITLYN      pantoprazole  40 mg Oral  Early Morning Pilar Toussaint PA-C      pravastatin  10 mg Oral Daily With Dinner Pilar Toussaint PA-C      senna  1 tablet Oral Daily Pilar Toussaint PA-C      tamsulosin  0.4 mg Oral Daily With Dinner Pilar Toussaint PA-C         Labs:     Results from last 7 days   Lab Units 05/04/24  0510 05/03/24  0509   WBC Thousand/uL 5.44 8.22   HEMOGLOBIN g/dL 10.6* 11.8*   HEMATOCRIT % 33.8* 38.3   PLATELETS Thousands/uL 117* 172     Results from last 7 days   Lab Units 05/04/24  0510 05/03/24  0509 05/02/24  2043 05/02/24  1641   SODIUM mmol/L 141 140   < >  --    POTASSIUM mmol/L 3.8 4.3   < >  --    CHLORIDE mmol/L 100 101   < >  --    CO2 mmol/L 34* 29   < >  --    CO2, I-STAT mmol/L  --   --   --  27   BUN mg/dL 16 16   < >  --    CREATININE mg/dL 0.95 1.12   < >  --    GLUCOSE, ISTAT mg/dl  --   --   --  122   CALCIUM mg/dL 8.6 8.6   < >  --     < > = values in this interval not displayed.                  Results from last 7 days   Lab Units 05/04/24  0542 05/03/24 2150 05/03/24 2056   POC GLUCOSE mg/dl 107 119 103       Lab Results   Component Value Date    URINECX >100,000 cfu/ml Escherichia coli (A) 12/22/2020       Input and Output Summary (last 24 hours):       Intake/Output Summary (Last 24 hours) at 5/4/2024 1002  Last data filed at 5/4/2024 0448  Gross per 24 hour   Intake 528.88 ml   Output 1750 ml   Net -1221.12 ml       Imaging:     XR spine cervical 2 or 3 vw injury   Final Result by Talon Acosta MD (05/03 1112)      Fluoroscopy provided for procedure guidance.      Please refer to the separate procedure note for additional details.                  Workstation performed: [id number]         IR PICC line placement double lumen   Final Result by Dionna Marcum MD (05/03 1132)   Frontal fluoroscopic image showing a right arm PICC with tip overlying the cavoatrial junction.      Workstation performed: [id number]         XR cervical spine 2 or 3 views    (Results Pending)       *Labs  /Radiology studiesLabs reviewed  *Medications reviewed and reconciled as needed  *Please refer to order section for additional ordered labs studies  *Case discussed with primary attending during morning huddle case rounds    Vitals:   Temp (24hrs), Av.2 °F (36.8 °C), Min:97.8 °F (36.6 °C), Max:98.4 °F (36.9 °C)    Temp:  [97.8 °F (36.6 °C)-98.4 °F (36.9 °C)] 98 °F (36.7 °C)  HR:  [64-90] 89  Resp:  [15-21] 16  BP: (124-159)/(69-90) 124/90  SpO2:  [91 %-100 %] 96 %  Body mass index is 41.99 kg/m².     Physical Exam:   General Appearance: no distress, conversive, Cervical brace in place  HEENT: PERRLA, conjuctiva normal; oropharynx clear; mucous membranes moist;   Neck:  Supple, no lymphadenopathy or thyromegaly  Lungs: crackles at the bases, normal respiratory effort, no retractions, expiratory effort normal, on 2L via NC  CV: S1 S2 regular, no murmurs rubs or galops   ABD: soft non tender, +BS x4  EXT: DP pulses intact, no lymphadenopathy, no edema   Skin: normal turgor, normal texture, no rash  Psych: affect normal, mood normal  Neuro: AAOx3          Invasive Devices       Peripherally Inserted Central Catheter Line  Duration             PICC Line 24 2 days                       Code Status: Prior  Current Length of Stay: 2 day(s)    Total floor / unit time spent today 1 hour  Coordination of patient's care was performed in conjunction with primary service. Time invested included review of patient's labs, vitals, and management of their comorbidities with continued monitoring, examination of patient as well as answering patient questions, documenting her findings and creating progress note in electronic medical record,  ordering appropriate diagnostic testing. In addition, this patient was discussed with medical team including physician and advanced extenders.           ** Please Note: Fluency Direct voice to text software may have been used in the creation of this document. Audio transcription errors may  occur**

## 2024-05-04 NOTE — PLAN OF CARE
Problem: Prexisting or High Potential for Compromised Skin Integrity  Goal: Skin integrity is maintained or improved  Description: INTERVENTIONS:  - Identify patients at risk for skin breakdown  - Assess and monitor skin integrity  - Assess and monitor nutrition and hydration status  - Monitor labs   - Assess for incontinence   - Turn and reposition patient  - Assist with mobility/ambulation  - Relieve pressure over bony prominences  - Avoid friction and shearing  - Provide appropriate hygiene as needed including keeping skin clean and dry  - Evaluate need for skin moisturizer/barrier cream  - Collaborate with interdisciplinary team   - Patient/family teaching  - Consider wound care consult   Outcome: Progressing     Problem: PAIN - ADULT  Goal: Verbalizes/displays adequate comfort level or baseline comfort level  Description: Interventions:  - Encourage patient to monitor pain and request assistance  - Assess pain using appropriate pain scale  - Administer analgesics based on type and severity of pain and evaluate response  - Implement non-pharmacological measures as appropriate and evaluate response  - Consider cultural and social influences on pain and pain management  - Notify physician/advanced practitioner if interventions unsuccessful or patient reports new pain  Outcome: Progressing     Problem: INFECTION - ADULT  Goal: Absence or prevention of progression during hospitalization  Description: INTERVENTIONS:  - Assess and monitor for signs and symptoms of infection  - Monitor lab/diagnostic results  - Monitor all insertion sites, i.e. indwelling lines, tubes, and drains  - Monitor endotracheal if appropriate and nasal secretions for changes in amount and color  - [place] appropriate cooling/warming therapies per order  - Administer medications as ordered  - Instruct and encourage patient and family to use good hand hygiene technique  - Identify and instruct in appropriate isolation precautions for  identified infection/condition  Outcome: Progressing  Goal: Absence of fever/infection during neutropenic period  Description: INTERVENTIONS:  - Monitor WBC    Outcome: Progressing     Problem: SAFETY ADULT  Goal: Patient will remain free of falls  Description: INTERVENTIONS:  - Educate patient/family on patient safety including physical limitations  - Instruct patient to call for assistance with activity   - Consult OT/PT to assist with strengthening/mobility   - Keep Call bell within reach  - Keep bed low and locked with side rails adjusted as appropriate  - Keep care items and personal belongings within reach  - Initiate and maintain comfort rounds  - Make Fall Risk Sign visible to staff  - Offer Toileting every  Hours, in advance of need  - Initiate/Maintain alarm  - Obtain necessary fall risk management equipment:   - Apply yellow socks and bracelet for high fall risk patients  - Consider moving patient to room near nurses station  Outcome: Progressing  Goal: Maintain or return to baseline ADL function  Description: INTERVENTIONS:  -  Assess patient's ability to carry out ADLs; assess patient's baseline for ADL function and identify physical deficits which impact ability to perform ADLs (bathing, care of mouth/teeth, toileting, grooming, dressing, etc.)  - Assess/evaluate cause of self-care deficits   - Assess range of motion  - Assess patient's mobility; develop plan if impaired  - Assess patient's need for assistive devices and provide as appropriate  - Encourage maximum independence but intervene and supervise when necessary  - Involve family in performance of ADLs  - Assess for home care needs following discharge   - Consider OT consult to assist with ADL evaluation and planning for discharge  - Provide patient education as appropriate  Outcome: Progressing  Goal: Maintains/Returns to pre admission functional level  Description: INTERVENTIONS:  - Perform AM-PAC 6 Click Basic Mobility/ Daily Activity  assessment daily.  - Set and communicate daily mobility goal to care team and patient/family/caregiver.   - Collaborate with rehabilitation services on mobility goals if consulted  - Perform Range of Motion 3 times a day.  - Reposition patient every 2 hours.  - Dangle patient 3 times a day  - Stand patient 3 times a day  - Ambulate patient 3 times a day  - Out of bed to chair 3 times a day   - Out of bed for meals 3 times a day  - Out of bed for toileting  - Record patient progress and toleration of activity level   Outcome: Progressing     Problem: DISCHARGE PLANNING  Goal: Discharge to home or other facility with appropriate resources  Description: INTERVENTIONS:  - Identify barriers to discharge w/patient and caregiver  - Arrange for needed discharge resources and transportation as appropriate  - Identify discharge learning needs (meds, wound care, etc.)  - Arrange for interpretive services to assist at discharge as needed  - Refer to Case Management Department for coordinating discharge planning if the patient needs post-hospital services based on physician/advanced practitioner order or complex needs related to functional status, cognitive ability, or social support system  Outcome: Progressing     Problem: Knowledge Deficit  Goal: Patient/family/caregiver demonstrates understanding of disease process, treatment plan, medications, and discharge instructions  Description: Complete learning assessment and assess knowledge base.  Interventions:  - Provide teaching at level of understanding  - Provide teaching via preferred learning methods  Outcome: Progressing

## 2024-05-05 PROBLEM — D69.6 THROMBOCYTOPENIA (HCC): Status: ACTIVE | Noted: 2024-05-05

## 2024-05-05 LAB
ANION GAP SERPL CALCULATED.3IONS-SCNC: 6 MMOL/L (ref 4–13)
BUN SERPL-MCNC: 14 MG/DL (ref 5–25)
CALCIUM SERPL-MCNC: 8.5 MG/DL (ref 8.4–10.2)
CHLORIDE SERPL-SCNC: 100 MMOL/L (ref 96–108)
CO2 SERPL-SCNC: 36 MMOL/L (ref 21–32)
CREAT SERPL-MCNC: 0.96 MG/DL (ref 0.6–1.3)
ERYTHROCYTE [DISTWIDTH] IN BLOOD BY AUTOMATED COUNT: 13.6 % (ref 11.6–15.1)
GFR SERPL CREATININE-BSD FRML MDRD: 80 ML/MIN/1.73SQ M
GLUCOSE SERPL-MCNC: 112 MG/DL (ref 65–140)
GLUCOSE SERPL-MCNC: 119 MG/DL (ref 65–140)
GLUCOSE SERPL-MCNC: 121 MG/DL (ref 65–140)
GLUCOSE SERPL-MCNC: 121 MG/DL (ref 65–140)
GLUCOSE SERPL-MCNC: 144 MG/DL (ref 65–140)
GLUCOSE SERPL-MCNC: 165 MG/DL (ref 65–140)
HCT VFR BLD AUTO: 33.5 % (ref 36.5–49.3)
HGB BLD-MCNC: 10.5 G/DL (ref 12–17)
MCH RBC QN AUTO: 28.8 PG (ref 26.8–34.3)
MCHC RBC AUTO-ENTMCNC: 31.3 G/DL (ref 31.4–37.4)
MCV RBC AUTO: 92 FL (ref 82–98)
PLATELET # BLD AUTO: 124 THOUSANDS/UL (ref 149–390)
PMV BLD AUTO: 10.4 FL (ref 8.9–12.7)
POTASSIUM SERPL-SCNC: 3.7 MMOL/L (ref 3.5–5.3)
RBC # BLD AUTO: 3.65 MILLION/UL (ref 3.88–5.62)
SODIUM SERPL-SCNC: 142 MMOL/L (ref 135–147)
WBC # BLD AUTO: 5.63 THOUSAND/UL (ref 4.31–10.16)

## 2024-05-05 PROCEDURE — 85027 COMPLETE CBC AUTOMATED: CPT

## 2024-05-05 PROCEDURE — 99232 SBSQ HOSP IP/OBS MODERATE 35: CPT | Performed by: INTERNAL MEDICINE

## 2024-05-05 PROCEDURE — NC001 PR NO CHARGE: Performed by: ORTHOPAEDIC SURGERY

## 2024-05-05 PROCEDURE — 82948 REAGENT STRIP/BLOOD GLUCOSE: CPT

## 2024-05-05 PROCEDURE — 80048 BASIC METABOLIC PNL TOTAL CA: CPT

## 2024-05-05 RX ORDER — POLYETHYLENE GLYCOL 3350 17 G/17G
17 POWDER, FOR SOLUTION ORAL DAILY
Status: DISCONTINUED | OUTPATIENT
Start: 2024-05-05 | End: 2024-05-06 | Stop reason: HOSPADM

## 2024-05-05 RX ORDER — LOSARTAN POTASSIUM 25 MG/1
25 TABLET ORAL DAILY
Status: DISCONTINUED | OUTPATIENT
Start: 2024-05-05 | End: 2024-05-06 | Stop reason: HOSPADM

## 2024-05-05 RX ADMIN — OXYCODONE HYDROCHLORIDE 10 MG: 10 TABLET ORAL at 11:27

## 2024-05-05 RX ADMIN — INSULIN LISPRO 2 UNITS: 100 INJECTION, SOLUTION INTRAVENOUS; SUBCUTANEOUS at 21:16

## 2024-05-05 RX ADMIN — CLONIDINE HYDROCHLORIDE 0.1 MG: 0.1 TABLET ORAL at 09:08

## 2024-05-05 RX ADMIN — METHOCARBAMOL 500 MG: 500 TABLET ORAL at 17:12

## 2024-05-05 RX ADMIN — HEPARIN SODIUM 7500 UNITS: 5000 INJECTION INTRAVENOUS; SUBCUTANEOUS at 21:16

## 2024-05-05 RX ADMIN — TAMSULOSIN HYDROCHLORIDE 0.4 MG: 0.4 CAPSULE ORAL at 16:15

## 2024-05-05 RX ADMIN — SENNOSIDES 8.6 MG: 8.6 TABLET, FILM COATED ORAL at 09:08

## 2024-05-05 RX ADMIN — CLONIDINE HYDROCHLORIDE 0.1 MG: 0.1 TABLET ORAL at 17:12

## 2024-05-05 RX ADMIN — ACETAMINOPHEN 975 MG: 325 TABLET, FILM COATED ORAL at 13:33

## 2024-05-05 RX ADMIN — OXYCODONE HYDROCHLORIDE 10 MG: 10 TABLET ORAL at 05:21

## 2024-05-05 RX ADMIN — OXYCODONE HYDROCHLORIDE 10 MG: 10 TABLET ORAL at 01:09

## 2024-05-05 RX ADMIN — BUSPIRONE HYDROCHLORIDE 10 MG: 10 TABLET ORAL at 09:08

## 2024-05-05 RX ADMIN — METHOCARBAMOL 500 MG: 500 TABLET ORAL at 21:16

## 2024-05-05 RX ADMIN — ACETAMINOPHEN 975 MG: 325 TABLET, FILM COATED ORAL at 05:22

## 2024-05-05 RX ADMIN — HEPARIN SODIUM 7500 UNITS: 5000 INJECTION INTRAVENOUS; SUBCUTANEOUS at 13:33

## 2024-05-05 RX ADMIN — BUSPIRONE HYDROCHLORIDE 10 MG: 10 TABLET ORAL at 17:12

## 2024-05-05 RX ADMIN — FUROSEMIDE 40 MG: 40 TABLET ORAL at 09:08

## 2024-05-05 RX ADMIN — HYDROMORPHONE HYDROCHLORIDE 0.2 MG: 0.2 INJECTION, SOLUTION INTRAMUSCULAR; INTRAVENOUS; SUBCUTANEOUS at 03:00

## 2024-05-05 RX ADMIN — PRAVASTATIN SODIUM 10 MG: 10 TABLET ORAL at 16:15

## 2024-05-05 RX ADMIN — METHOCARBAMOL 500 MG: 500 TABLET ORAL at 05:21

## 2024-05-05 RX ADMIN — ACETAMINOPHEN 975 MG: 325 TABLET, FILM COATED ORAL at 21:15

## 2024-05-05 RX ADMIN — PANTOPRAZOLE SODIUM 40 MG: 40 TABLET, DELAYED RELEASE ORAL at 05:22

## 2024-05-05 RX ADMIN — OXYCODONE HYDROCHLORIDE 10 MG: 10 TABLET ORAL at 16:15

## 2024-05-05 RX ADMIN — HEPARIN SODIUM 7500 UNITS: 5000 INJECTION INTRAVENOUS; SUBCUTANEOUS at 05:21

## 2024-05-05 RX ADMIN — DOCUSATE SODIUM 100 MG: 100 CAPSULE, LIQUID FILLED ORAL at 17:12

## 2024-05-05 RX ADMIN — DOCUSATE SODIUM 100 MG: 100 CAPSULE, LIQUID FILLED ORAL at 09:08

## 2024-05-05 RX ADMIN — LOSARTAN POTASSIUM 25 MG: 25 TABLET, FILM COATED ORAL at 09:08

## 2024-05-05 RX ADMIN — OXYCODONE HYDROCHLORIDE 10 MG: 10 TABLET ORAL at 20:37

## 2024-05-05 RX ADMIN — POLYETHYLENE GLYCOL 3350 17 G: 17 POWDER, FOR SOLUTION ORAL at 09:08

## 2024-05-05 RX ADMIN — METHOCARBAMOL 500 MG: 500 TABLET ORAL at 11:27

## 2024-05-05 NOTE — ASSESSMENT & PLAN NOTE
Continue clonidine/Lasix  Resume losartan today.  Hydralazine as needed for SBP >160  Monitor trend

## 2024-05-05 NOTE — PROGRESS NOTES
Progress Note - Orthopedics   Edgar Obando 68 y.o. male MRN: [id number]    Subjective:  68 y.o. male POD 3 FUSION CERVICAL ANTERIOR W DISCECTOMY, C3-7 ACDF. No acute events, no acute distress. Pain well controlled. Patient states he has no difficulty swallowing and has been tolerating soft diet with occasional difficulty with pills. No numbness/tingling, no weakness, no fever/chills, no nausea/vomiting/diarrhea. No difficulty breathing.    Labs:  0   Lab Value Date/Time    HCT 33.5 (L) 05/05/2024 0511    HCT 33.8 (L) 05/04/2024 0510    HCT 38.3 05/03/2024 0509    HCT 35.6 (L) 04/26/2023 1000    HCT 36.2 (L) 12/20/2022 1153    HCT 48.7 01/11/2014 0918    HGB 10.5 (L) 05/05/2024 0511    HGB 10.6 (L) 05/04/2024 0510    HGB 11.8 (L) 05/03/2024 0509    HGB 11.9 (L) 04/26/2023 1000    HGB 11.8 (L) 12/20/2022 1153    HGB 16.0 01/11/2014 0918    PT 14.3 10/10/2021 1522    INR 1.15 04/19/2024 1300    INR 1.2 10/10/2021 1522    WBC 5.63 05/05/2024 0511    WBC 5.44 05/04/2024 0510    WBC 8.22 05/03/2024 0509    ESR 28 (H) 01/07/2018 1710    CRP 9.9 (H) 01/07/2018 1642       Meds:    Current Facility-Administered Medications:     acetaminophen (TYLENOL) tablet 975 mg, 975 mg, Oral, Q8H [facility], Albert Corbett MD, 975 mg at 05/05/24 0522    aluminum-magnesium hydroxide-simethicone (MAALOX) oral suspension 30 mL, 30 mL, Oral, Q6H PRN, Pilar Toussaint PA-C    busPIRone (BUSPAR) tablet 10 mg, 10 mg, Oral, BID, Pilar Toussaint PA-C, 10 mg at 05/04/24 1704    calcium carbonate (TUMS) chewable tablet 1,000 mg, 1,000 mg, Oral, Daily PRN, Pilar Toussaint PA-C    cloNIDine (CATAPRES) tablet 0.1 mg, 0.1 mg, Oral, BID, Pilar Toussaint PA-C, 0.1 mg at 05/04/24 1704    docusate sodium (COLACE) capsule 100 mg, 100 mg, Oral, BID, Pilar Toussaint PA-C, 100 mg at 05/04/24 1704    furosemide (LASIX) tablet 40 mg, 40 mg, Oral, Daily, Dalila Cheatham PA-C, 40 mg at 05/04/24 1107    heparin (porcine) subcutaneous injection  "7,500 Units, 7,500 Units, Subcutaneous, Q8H [facility], Pilar Toussaint PA-C, 7,500 Units at 05/05/24 0521    hydrALAZINE (APRESOLINE) tablet 25 mg, 25 mg, Oral, Q8H PRN, Dalila Cheatham PA-C    HYDROmorphone HCl (DILAUDID) injection 0.2 mg, 0.2 mg, Intravenous, Q3H PRN, Albert Corbett MD, 0.2 mg at 05/05/24 0300    insulin lispro (HumALOG/ADMELOG) 100 units/mL subcutaneous injection 2-12 Units, 2-12 Units, Subcutaneous, 4x Daily (AC & HS), 2 Units at 05/04/24 2204 **AND** Fingerstick Glucose (POCT), , , 4x Daily AC and at bedtime, Pilar Toussaint PA-C    lidocaine (LIDODERM) 5 % patch 2 patch, 2 patch, Topical, Daily, Albert Corbett MD, 2 patch at 05/03/24 1542    methocarbamol (ROBAXIN) tablet 500 mg, 500 mg, Oral, Q6H [facility], Pilar Toussaint PA-C, 500 mg at 05/05/24 0521    ondansetron (ZOFRAN) injection 4 mg, 4 mg, Intravenous, Q6H PRN, Pilar Toussaint PA-C    oxyCODONE (ROXICODONE) immediate release tablet 10 mg, 10 mg, Oral, Q4H PRN, Pilar Toussaint PA-C, 10 mg at 05/05/24 0521    oxyCODONE (ROXICODONE) IR tablet 5 mg, 5 mg, Oral, Q4H PRN, Pilar Toussaint PA-C    pantoprazole (PROTONIX) EC tablet 40 mg, 40 mg, Oral, Early Morning, Pialr Toussaint PA-C, 40 mg at 05/05/24 0522    pravastatin (PRAVACHOL) tablet 10 mg, 10 mg, Oral, Daily With Dinner, Pilar Toussaint PA-C, 10 mg at 05/04/24 1704    senna (SENOKOT) tablet 8.6 mg, 1 tablet, Oral, Daily, Pilar Toussaint PA-C, 8.6 mg at 05/04/24 0906    tamsulosin (FLOMAX) capsule 0.4 mg, 0.4 mg, Oral, Daily With Dinner, Pilar Toussaint PA-C, 0.4 mg at 05/04/24 1704    Blood Culture:   No results found for: \"BLOODCX\"    Wound Culture:   No results found for: \"WOUNDCULT\"    Ins and Outs:  I/O last 24 hours:  In: 360 [P.O.:360]  Out: 2425 [Urine:2425]          Physical:  Vitals:    05/05/24 0105   BP: 156/83   Pulse: 86   Resp: 16   Temp: 98.3 °F (36.8 °C)   SpO2: 96%     Today on 2L nasal cannula    Musculoskeletal: Bilateral Upper " Extremities  Dressing mepilex c/d/i  TTP yogi-incisionally  Sensation intact to light touch C5-T1  5/5 deltoid, 5/5 biceps, 5/5 triceps, 5/5 wrist flexion, 5/5 wrist extension, 5/5 interossei  2+ rad pulse          Assessment:    68 y.o. male POD 3 FUSION CERVICAL ANTERIOR W DISCECTOMY, C3-7 ACDF doing well postoperatively.    Plan:  WBAT BUE  Maintain cervical collar  ADAT  PT/OT  Pain control  DVT ppx: SQH  Monitor for ABLA and administer or recommend IVF/prbc as indicated for Greater than 2 gram drop or Hgb < 7   Continue routine neurochecks  HOB >35 degrees  Dispo: discharge pending PT/OT/placement needs      Albert Corbett MD  PGY-1 Orthopedic Surgery    Please [place] Text the [facility] Floor Role with any Questions or Concerns.

## 2024-05-05 NOTE — ASSESSMENT & PLAN NOTE
Failed outpatient conservative measures and opted for decompressive surgery  Pain controlled  No post operative issues noted  Continue encourage incentive spirometry; monitor fever curve  DVT prophylaxis in place and reviewed  Results from last 7 days   Lab Units 05/05/24  0511   WBC Thousand/uL 5.63   HEMOGLOBIN g/dL 10.5*   HEMATOCRIT % 33.5*   PLATELETS Thousands/uL 124*

## 2024-05-05 NOTE — RESTORATIVE TECHNICIAN NOTE
Restorative Technician Note      Patient Name: Edgar Obando     Restorative Tech Visit Date: 05/05/24  Note Type: Mobility  Patient Position Upon Consult: Bedside chair  Activity Performed: Ambulated  Assistive Device: Roller walker  Patient Position at End of Consult: Bedside chair; All needs within reach  Nurse Communication: Nurse aware of consult, application of brace    Venu Ordonez, Restorative Technician

## 2024-05-05 NOTE — PLAN OF CARE
Problem: Prexisting or High Potential for Compromised Skin Integrity  Goal: Skin integrity is maintained or improved  Description: INTERVENTIONS:  - Identify patients at risk for skin breakdown  - Assess and monitor skin integrity  - Assess and monitor nutrition and hydration status  - Monitor labs   - Assess for incontinence   - Turn and reposition patient  - Assist with mobility/ambulation  - Relieve pressure over bony prominences  - Avoid friction and shearing  - Provide appropriate hygiene as needed including keeping skin clean and dry  - Evaluate need for skin moisturizer/barrier cream  - Collaborate with interdisciplinary team   - Patient/family teaching  - Consider wound care consult   Outcome: Progressing     Problem: PAIN - ADULT  Goal: Verbalizes/displays adequate comfort level or baseline comfort level  Description: Interventions:  - Encourage patient to monitor pain and request assistance  - Assess pain using appropriate pain scale  - Administer analgesics based on type and severity of pain and evaluate response  - Implement non-pharmacological measures as appropriate and evaluate response  - Consider cultural and social influences on pain and pain management  - Notify physician/advanced practitioner if interventions unsuccessful or patient reports new pain  Outcome: Progressing     Problem: INFECTION - ADULT  Goal: Absence or prevention of progression during hospitalization  Description: INTERVENTIONS:  - Assess and monitor for signs and symptoms of infection  - Monitor lab/diagnostic results  - Monitor all insertion sites, i.e. indwelling lines, tubes, and drains  - Monitor endotracheal if appropriate and nasal secretions for changes in amount and color  - [place] appropriate cooling/warming therapies per order  - Administer medications as ordered  - Instruct and encourage patient and family to use good hand hygiene technique  - Identify and instruct in appropriate isolation precautions for  identified infection/condition  Outcome: Progressing  Goal: Absence of fever/infection during neutropenic period  Description: INTERVENTIONS:  - Monitor WBC    Outcome: Progressing     Problem: SAFETY ADULT  Goal: Patient will remain free of falls  Description: INTERVENTIONS:  - Educate patient/family on patient safety including physical limitations  - Instruct patient to call for assistance with activity   - Consult OT/PT to assist with strengthening/mobility   - Keep Call bell within reach  - Keep bed low and locked with side rails adjusted as appropriate  - Keep care items and personal belongings within reach  - Initiate and maintain comfort rounds  - Make Fall Risk Sign visible to staff  - Offer Toileting every  Hours, in advance of need  - Initiate/Maintain alarm  - Obtain necessary fall risk management equipment:   - Apply yellow socks and bracelet for high fall risk patients  - Consider moving patient to room near nurses station  Outcome: Progressing  Goal: Maintain or return to baseline ADL function  Description: INTERVENTIONS:  -  Assess patient's ability to carry out ADLs; assess patient's baseline for ADL function and identify physical deficits which impact ability to perform ADLs (bathing, care of mouth/teeth, toileting, grooming, dressing, etc.)  - Assess/evaluate cause of self-care deficits   - Assess range of motion  - Assess patient's mobility; develop plan if impaired  - Assess patient's need for assistive devices and provide as appropriate  - Encourage maximum independence but intervene and supervise when necessary  - Involve family in performance of ADLs  - Assess for home care needs following discharge   - Consider OT consult to assist with ADL evaluation and planning for discharge  - Provide patient education as appropriate  Outcome: Progressing  Goal: Maintains/Returns to pre admission functional level  Description: INTERVENTIONS:  - Perform AM-PAC 6 Click Basic Mobility/ Daily Activity  assessment daily.  - Set and communicate daily mobility goal to care team and patient/family/caregiver.   - Collaborate with rehabilitation services on mobility goals if consulted  - Perform Range of Motion 3 times a day.  - Reposition patient every 2 hours.  - Dangle patient 3 times a day  - Stand patient 3 times a day  - Ambulate patient 3 times a day  - Out of bed to chair 3 times a day   - Out of bed for meals 3 times a day  - Out of bed for toileting  - Record patient progress and toleration of activity level   Outcome: Progressing     Problem: DISCHARGE PLANNING  Goal: Discharge to home or other facility with appropriate resources  Description: INTERVENTIONS:  - Identify barriers to discharge w/patient and caregiver  - Arrange for needed discharge resources and transportation as appropriate  - Identify discharge learning needs (meds, wound care, etc.)  - Arrange for interpretive services to assist at discharge as needed  - Refer to Case Management Department for coordinating discharge planning if the patient needs post-hospital services based on physician/advanced practitioner order or complex needs related to functional status, cognitive ability, or social support system  Outcome: Progressing     Problem: Knowledge Deficit  Goal: Patient/family/caregiver demonstrates understanding of disease process, treatment plan, medications, and discharge instructions  Description: Complete learning assessment and assess knowledge base.  Interventions:  - Provide teaching at level of understanding  - Provide teaching via preferred learning methods  Outcome: Progressing

## 2024-05-05 NOTE — PROGRESS NOTES
[facility]  Progress Note  Name: Edgar Obando I  MRN: [id number]  Unit/Bed#: -01 I Date of Admission: 5/2/2024   Date of Service: 5/5/2024  Hospital Day: 3    Assessment/Plan   * S/P cervical spinal fusion  Assessment & Plan  Failed outpatient conservative measures and opted for decompressive surgery  Pain controlled  No post operative issues noted  Continue encourage incentive spirometry; monitor fever curve  DVT prophylaxis in place and reviewed  Results from last 7 days   Lab Units 05/05/24  0511   WBC Thousand/uL 5.63   HEMOGLOBIN g/dL 10.5*   HEMATOCRIT % 33.5*   PLATELETS Thousands/uL 124*         Thrombocytopenia (HCC)  Assessment & Plan  Mild and improving.  Likely due to surgical losses.    Anxiety  Assessment & Plan  Continue BuSpar.    HTN (hypertension)  Assessment & Plan  Continue clonidine/Lasix  Resume losartan today.  Hydralazine as needed for SBP >160  Monitor trend      Type 2 diabetes mellitus, without long-term current use of insulin (HCC)  Assessment & Plan    Lab Results   Component Value Date    HGBA1C 6.1 (H) 09/14/2023     Uses Trulicity at home.  DM diet.  Continue accuchecks with SSI.         The above assessment and plan was reviewed and updated as determined by my evaluation of the patient on 5/5/2024.    Labs:   Results from last 7 days   Lab Units 05/05/24  0511 05/04/24  0510   WBC Thousand/uL 5.63 5.44   HEMOGLOBIN g/dL 10.5* 10.6*   HEMATOCRIT % 33.5* 33.8*   PLATELETS Thousands/uL 124* 117*     Results from last 7 days   Lab Units 05/05/24  0511 05/04/24  0510 05/02/24 2043 05/02/24  1641   SODIUM mmol/L 142 141   < >  --    POTASSIUM mmol/L 3.7 3.8   < >  --    CHLORIDE mmol/L 100 100   < >  --    CO2 mmol/L 36* 34*   < >  --    CO2, I-STAT mmol/L  --   --   --  27   BUN mg/dL 14 16   < >  --    CREATININE mg/dL 0.96 0.95   < >  --    GLUCOSE, ISTAT mg/dl  --   --   --  122   CALCIUM mg/dL 8.5 8.6   < >  --     < > = values in this  interval not displayed.             Results from last 7 days   Lab Units 05/05/24  0515 05/05/24  0107 05/04/24  2040   POC GLUCOSE mg/dl 119 121 152*       Review of Scheduled Meds:  Current Facility-Administered Medications   Medication Dose Route Frequency Provider Last Rate    acetaminophen  975 mg Oral Q8H [facility] Albert Corbett MD      aluminum-magnesium hydroxide-simethicone  30 mL Oral Q6H PRN Pilar Toussaint PA-C      busPIRone  10 mg Oral BID Pilar HAL Toussaint PA-C      calcium carbonate  1,000 mg Oral Daily PRN Pilar S Breana, CAITLYN      cloNIDine  0.1 mg Oral BID Pilar S Breana, CAITLYN      docusate sodium  100 mg Oral BID Pilar S Breana, CAITLYN      furosemide  40 mg Oral Daily Dalila Cheatham PA-C      heparin (porcine)  7,500 Units Subcutaneous Q8H [facility] Pilar Toussaint PA-C      hydrALAZINE  25 mg Oral Q8H PRN Dalila Cheatham PA-C      HYDROmorphone  0.2 mg Intravenous Q3H PRN Albert Corbett MD      insulin lispro  2-12 Units Subcutaneous 4x Daily (AC & HS) Pilar Toussaint PA-C      lidocaine  2 patch Topical Daily Albert Corbett MD      losartan  25 mg Oral Daily Dalila Cheatham PA-C      methocarbamol  500 mg Oral Q6H [facility] Pilar Toussaint PA-C      ondansetron  4 mg Intravenous Q6H PRN Pilar Toussaint PA-C      oxyCODONE  10 mg Oral Q4H PRN Pilar Toussaint PA-C      oxyCODONE  5 mg Oral Q4H PRN Pilar S CAITLYN Toussaint      pantoprazole  40 mg Oral Early Morning Pilar Toussaint PA-C      pravastatin  10 mg Oral Daily With Dinner Pilar Toussaint PA-C      senna  1 tablet Oral Daily Pilar Toussaint PA-C      tamsulosin  0.4 mg Oral Daily With Dinner Pilar Toussaint PA-C         Subjective/ HPI: Patient seen and examined. Patients overnight issues or events were reviewed with nursing or staff during rounds or morning huddle session. New or overnight issues include the following:     Pt without any overnight events or reported nursing issues      ROS:    A 10 point ROS was performed; negative except as noted above.       Imaging:     XR spine cervical 2 or 3 vw injury   Final Result by Talon Acosta MD (05/03 1112)      Fluoroscopy provided for procedure guidance.      Please refer to the separate procedure note for additional details.                  Workstation performed: [id number]         IR PICC line placement double lumen   Final Result by Dionna Marcum MD (05/03 1132)   Frontal fluoroscopic image showing a right arm PICC with tip overlying the cavoatrial junction.      Workstation performed: [id number]         XR cervical spine 2 or 3 views    (Results Pending)       *Labs /Radiology studies Reviewed  *Medications  reviewed and reconciled as needed  *Please refer to order section for additional ordered labs studies  *Case discussed with primary attending during morning huddle case rounds    Physical Examination:  Vitals:   Vitals:    05/04/24 2133 05/05/24 0105 05/05/24 0500 05/05/24 0745   BP: 154/84 156/83  144/76   Pulse: 83 86  94   Resp:  16  16   Temp: 98.5 °F (36.9 °C) 98.3 °F (36.8 °C)  98.3 °F (36.8 °C)   TempSrc:       SpO2: 97% 96%  93%   Weight:   115 kg (253 lb 8.5 oz)    Height:           General Appearance: no distress, conversive  HEENT: PERRLA, conjuctiva normal; oropharynx clear; mucous membranes moist  Neck:  Aspen collar intact  Lungs: clear bilaterally, normal respiratory effort, no retractions, expiratory effort normal  CV: regular rate and rhythm , PMI normal   ABD: soft non tender, +BS x4  EXT: DP pulses intact, no lymphadenopathy, no edema; cervical surgical dressing in place  Skin: normal turgor, normal texture, no rash  Psych: affect normal, mood normal  Neuro: AAOx3        The above physical exam was reviewed and updated as determined by my evaluation of the patient on 5/5/2024.    Invasive Devices       Peripherally Inserted Central Catheter Line  Duration             PICC Line 05/02/24 2 days                       VTE  Pharmacologic Prophylaxis: Heparin  Code Status: Prior  Current Length of Stay: 3 day(s)      Total floor / unit time spent today 30 minutes  Coordination of patient's care was performed in conjunction with primary service. Time invested included review of patient's labs, vitals, and management of their comorbidities with continued monitoring, examination of patient as well as answering patient questions, documenting her findings and creating progress note in electronic medical record,  ordering appropriate diagnostic testing. Medical decision making for the day was made by supervising physician unless otherwise noted in their attestation statement.    ** Please Note:  voice to text software may have been used in the creation of this document. Although proof errors in transcription or interpretation are a potential of such software**

## 2024-05-06 ENCOUNTER — HOME HEALTH ADMISSION (OUTPATIENT)
Dept: HOME HEALTH SERVICES | Facility: HOME HEALTHCARE | Age: 69
End: 2024-05-06

## 2024-05-06 VITALS
WEIGHT: 247.8 LBS | OXYGEN SATURATION: 96 % | HEIGHT: 66 IN | HEART RATE: 91 BPM | BODY MASS INDEX: 39.82 KG/M2 | RESPIRATION RATE: 16 BRPM | TEMPERATURE: 97.7 F | DIASTOLIC BLOOD PRESSURE: 71 MMHG | SYSTOLIC BLOOD PRESSURE: 144 MMHG

## 2024-05-06 LAB
ANION GAP SERPL CALCULATED.3IONS-SCNC: 5 MMOL/L (ref 4–13)
BASOPHILS # BLD AUTO: 0.02 THOUSANDS/ΜL (ref 0–0.1)
BASOPHILS NFR BLD AUTO: 0 % (ref 0–1)
BUN SERPL-MCNC: 15 MG/DL (ref 5–25)
CALCIUM SERPL-MCNC: 9 MG/DL (ref 8.4–10.2)
CHLORIDE SERPL-SCNC: 100 MMOL/L (ref 96–108)
CO2 SERPL-SCNC: 34 MMOL/L (ref 21–32)
CREAT SERPL-MCNC: 1.09 MG/DL (ref 0.6–1.3)
EOSINOPHIL # BLD AUTO: 0.31 THOUSAND/ΜL (ref 0–0.61)
EOSINOPHIL NFR BLD AUTO: 5 % (ref 0–6)
ERYTHROCYTE [DISTWIDTH] IN BLOOD BY AUTOMATED COUNT: 13.7 % (ref 11.6–15.1)
GFR SERPL CREATININE-BSD FRML MDRD: 69 ML/MIN/1.73SQ M
GLUCOSE SERPL-MCNC: 133 MG/DL (ref 65–140)
GLUCOSE SERPL-MCNC: 138 MG/DL (ref 65–140)
GLUCOSE SERPL-MCNC: 216 MG/DL (ref 65–140)
HCT VFR BLD AUTO: 37.6 % (ref 36.5–49.3)
HGB BLD-MCNC: 11.4 G/DL (ref 12–17)
IMM GRANULOCYTES # BLD AUTO: 0.01 THOUSAND/UL (ref 0–0.2)
IMM GRANULOCYTES NFR BLD AUTO: 0 % (ref 0–2)
LYMPHOCYTES # BLD AUTO: 1.25 THOUSANDS/ΜL (ref 0.6–4.47)
LYMPHOCYTES NFR BLD AUTO: 18 % (ref 14–44)
MCH RBC QN AUTO: 28.3 PG (ref 26.8–34.3)
MCHC RBC AUTO-ENTMCNC: 30.3 G/DL (ref 31.4–37.4)
MCV RBC AUTO: 93 FL (ref 82–98)
MONOCYTES # BLD AUTO: 0.42 THOUSAND/ΜL (ref 0.17–1.22)
MONOCYTES NFR BLD AUTO: 6 % (ref 4–12)
NEUTROPHILS # BLD AUTO: 4.92 THOUSANDS/ΜL (ref 1.85–7.62)
NEUTS SEG NFR BLD AUTO: 71 % (ref 43–75)
NRBC BLD AUTO-RTO: 0 /100 WBCS
PLATELET # BLD AUTO: 169 THOUSANDS/UL (ref 149–390)
PMV BLD AUTO: 10.4 FL (ref 8.9–12.7)
POTASSIUM SERPL-SCNC: 3.6 MMOL/L (ref 3.5–5.3)
RBC # BLD AUTO: 4.03 MILLION/UL (ref 3.88–5.62)
SODIUM SERPL-SCNC: 139 MMOL/L (ref 135–147)
WBC # BLD AUTO: 6.93 THOUSAND/UL (ref 4.31–10.16)

## 2024-05-06 PROCEDURE — 99232 SBSQ HOSP IP/OBS MODERATE 35: CPT | Performed by: INTERNAL MEDICINE

## 2024-05-06 PROCEDURE — 82948 REAGENT STRIP/BLOOD GLUCOSE: CPT

## 2024-05-06 PROCEDURE — NC001 PR NO CHARGE: Performed by: ORTHOPAEDIC SURGERY

## 2024-05-06 PROCEDURE — 97530 THERAPEUTIC ACTIVITIES: CPT

## 2024-05-06 PROCEDURE — 99024 POSTOP FOLLOW-UP VISIT: CPT | Performed by: ORTHOPAEDIC SURGERY

## 2024-05-06 PROCEDURE — 97112 NEUROMUSCULAR REEDUCATION: CPT

## 2024-05-06 PROCEDURE — 85025 COMPLETE CBC W/AUTO DIFF WBC: CPT | Performed by: PHYSICIAN ASSISTANT

## 2024-05-06 PROCEDURE — 97116 GAIT TRAINING THERAPY: CPT

## 2024-05-06 PROCEDURE — 80048 BASIC METABOLIC PNL TOTAL CA: CPT | Performed by: PHYSICIAN ASSISTANT

## 2024-05-06 RX ADMIN — BUSPIRONE HYDROCHLORIDE 10 MG: 10 TABLET ORAL at 08:42

## 2024-05-06 RX ADMIN — LOSARTAN POTASSIUM 25 MG: 25 TABLET, FILM COATED ORAL at 08:42

## 2024-05-06 RX ADMIN — OXYCODONE HYDROCHLORIDE 10 MG: 10 TABLET ORAL at 13:38

## 2024-05-06 RX ADMIN — DOCUSATE SODIUM 100 MG: 100 CAPSULE, LIQUID FILLED ORAL at 08:42

## 2024-05-06 RX ADMIN — ACETAMINOPHEN 975 MG: 325 TABLET, FILM COATED ORAL at 13:38

## 2024-05-06 RX ADMIN — OXYCODONE HYDROCHLORIDE 10 MG: 10 TABLET ORAL at 09:15

## 2024-05-06 RX ADMIN — SENNOSIDES 8.6 MG: 8.6 TABLET, FILM COATED ORAL at 08:42

## 2024-05-06 RX ADMIN — OXYCODONE HYDROCHLORIDE 10 MG: 10 TABLET ORAL at 05:09

## 2024-05-06 RX ADMIN — FUROSEMIDE 40 MG: 40 TABLET ORAL at 08:42

## 2024-05-06 RX ADMIN — ACETAMINOPHEN 975 MG: 325 TABLET, FILM COATED ORAL at 05:09

## 2024-05-06 RX ADMIN — PANTOPRAZOLE SODIUM 40 MG: 40 TABLET, DELAYED RELEASE ORAL at 05:09

## 2024-05-06 RX ADMIN — HEPARIN SODIUM 7500 UNITS: 5000 INJECTION INTRAVENOUS; SUBCUTANEOUS at 05:09

## 2024-05-06 RX ADMIN — METHOCARBAMOL 500 MG: 500 TABLET ORAL at 12:09

## 2024-05-06 RX ADMIN — OXYCODONE HYDROCHLORIDE 10 MG: 10 TABLET ORAL at 00:54

## 2024-05-06 RX ADMIN — METHOCARBAMOL 500 MG: 500 TABLET ORAL at 05:09

## 2024-05-06 RX ADMIN — CLONIDINE HYDROCHLORIDE 0.1 MG: 0.1 TABLET ORAL at 08:42

## 2024-05-06 NOTE — PROGRESS NOTES
Progress Note - Orthopedics   Edgar Obando 68 y.o. male MRN: [id number]    Subjective:  68 y.o. male POD 4 ACDF C3-7. No acute events overnight. Pain well controlled. He denies numbness or paresthesias. He has not had difficulty swallowing, has been tolerating soft diet. Denies difficulty breathing, chest pain, abdominal pain.     Labs:  0   Lab Value Date/Time    HCT 33.5 (L) 05/05/2024 0511    HCT 33.8 (L) 05/04/2024 0510    HCT 38.3 05/03/2024 0509    HCT 35.6 (L) 04/26/2023 1000    HCT 36.2 (L) 12/20/2022 1153    HCT 48.7 01/11/2014 0918    HGB 10.5 (L) 05/05/2024 0511    HGB 10.6 (L) 05/04/2024 0510    HGB 11.8 (L) 05/03/2024 0509    HGB 11.9 (L) 04/26/2023 1000    HGB 11.8 (L) 12/20/2022 1153    HGB 16.0 01/11/2014 0918    PT 14.3 10/10/2021 1522    INR 1.15 04/19/2024 1300    INR 1.2 10/10/2021 1522    WBC 5.63 05/05/2024 0511    WBC 5.44 05/04/2024 0510    WBC 8.22 05/03/2024 0509    ESR 28 (H) 01/07/2018 1710    CRP 9.9 (H) 01/07/2018 1642       Meds:    Current Facility-Administered Medications:     acetaminophen (TYLENOL) tablet 975 mg, 975 mg, Oral, Q8H [facility], Albert Corbett MD, 975 mg at 05/06/24 0509    aluminum-magnesium hydroxide-simethicone (MAALOX) oral suspension 30 mL, 30 mL, Oral, Q6H PRN, Pilar Toussaint PA-C    busPIRone (BUSPAR) tablet 10 mg, 10 mg, Oral, BID, Pilar Toussaint PA-C, 10 mg at 05/05/24 1712    calcium carbonate (TUMS) chewable tablet 1,000 mg, 1,000 mg, Oral, Daily PRN, Pilar Toussaint PA-C    cloNIDine (CATAPRES) tablet 0.1 mg, 0.1 mg, Oral, BID, Pilar Toussaint PA-C, 0.1 mg at 05/05/24 1712    docusate sodium (COLACE) capsule 100 mg, 100 mg, Oral, BID, Pilar Toussaint PA-C, 100 mg at 05/05/24 1712    furosemide (LASIX) tablet 40 mg, 40 mg, Oral, Daily, Dalila Cheatham PA-C, 40 mg at 05/05/24 0908    heparin (porcine) subcutaneous injection 7,500 Units, 7,500 Units, Subcutaneous, Q8H [facility], Pilar Toussaint PA-C, 7,500 Units at 05/06/24 9489     "hydrALAZINE (APRESOLINE) tablet 25 mg, 25 mg, Oral, Q8H PRN, Dalila Cheatham PA-C    HYDROmorphone HCl (DILAUDID) injection 0.2 mg, 0.2 mg, Intravenous, Q3H PRN, Albert Corbett MD, 0.2 mg at 05/05/24 0300    insulin lispro (HumALOG/ADMELOG) 100 units/mL subcutaneous injection 2-12 Units, 2-12 Units, Subcutaneous, 4x Daily (AC & HS), 2 Units at 05/05/24 2116 **AND** Fingerstick Glucose (POCT), , , 4x Daily AC and at bedtime, Pilar Toussaint PA-C    lidocaine (LIDODERM) 5 % patch 2 patch, 2 patch, Topical, Daily, Albert Corbett MD, 2 patch at 05/03/24 1542    losartan (COZAAR) tablet 25 mg, 25 mg, Oral, Daily, Dalila Cheatham PA-C, 25 mg at 05/05/24 0908    methocarbamol (ROBAXIN) tablet 500 mg, 500 mg, Oral, Q6H [facility], Pilar Toussaint PA-C, 500 mg at 05/06/24 0509    ondansetron (ZOFRAN) injection 4 mg, 4 mg, Intravenous, Q6H PRN, Pilar Toussaint PA-C    oxyCODONE (ROXICODONE) immediate release tablet 10 mg, 10 mg, Oral, Q4H PRN, Pilar Toussaint PA-C, 10 mg at 05/06/24 0509    oxyCODONE (ROXICODONE) IR tablet 5 mg, 5 mg, Oral, Q4H PRN, Pilar Toussaint PA-C    pantoprazole (PROTONIX) EC tablet 40 mg, 40 mg, Oral, Early Morning, Pilar Toussaint PA-C, 40 mg at 05/06/24 0509    polyethylene glycol (MIRALAX) packet 17 g, 17 g, Oral, Daily, Dalila Cheatham PA-C, 17 g at 05/05/24 0908    pravastatin (PRAVACHOL) tablet 10 mg, 10 mg, Oral, Daily With Dinner, Pilar S Toussaint, PA-C, 10 mg at 05/05/24 1615    senna (SENOKOT) tablet 8.6 mg, 1 tablet, Oral, Daily, Pilar S Toussaint, PA-C, 8.6 mg at 05/05/24 0908    tamsulosin (FLOMAX) capsule 0.4 mg, 0.4 mg, Oral, Daily With Dinner, Pilar S Toussaint, PA-C, 0.4 mg at 05/05/24 1615    Blood Culture:   No results found for: \"BLOODCX\"    Wound Culture:   No results found for: \"WOUNDCULT\"    Ins and Outs:  I/O last 24 hours:  In: 840 [P.O.:840]  Out: 1125 [Urine:1125]          Physical:  Vitals:    05/05/24 2131   BP: 150/77   Pulse: 84   Resp: 16 "   Temp: 97.7 °F (36.5 °C)   SpO2: 93%     Today on 2L nasal cannula    Musculoskeletal: Bilateral Upper Extremities  Mepilex dressing c/d/I  TTP yogi incisionally  Digits warm and well perfused  2+ radial pulses      Upper Extremity Motor Function     Right  Left    Deltoid  5/5  5/5    Bicep  5/5  5/5    Wrist extension  5/5  5/5    Tricep  5/5  5/5    Finger flexion/  5/5  5/5    Hand intrinsic  5/5  5/5       Lower Extremity Motor Function    Right  Left    Iliopsoas  5/5  5/5    Quadriceps 5/5 5/5   Tibialis anterior  5/5  5/5    EHL  5/5  5/5    Gastroc. muscle  5/5  5/5      Sensory: light touch is intact to bilateral upper and lower extremities        Assessment:    68 y.o. male POD 3 ACDF C3-7. Doing well.     Plan:  WBAT BUE  Maintain cervical collar  PT/OT  Pain control  DVT ppx: SQH  Monitor for ABLA and administer or recommend IVF/prbc as indicated for Greater than 2 gram drop or Hgb < 7   Continue routine neurochecks  HOB >35 degrees  BMI 40.92 - recommend diet modification, exercise  Dispo: discharge pending progress with PT/OT      Monica Desir MD

## 2024-05-06 NOTE — PLAN OF CARE
Problem: Prexisting or High Potential for Compromised Skin Integrity  Goal: Skin integrity is maintained or improved  Description: INTERVENTIONS:  - Identify patients at risk for skin breakdown  - Assess and monitor skin integrity  - Assess and monitor nutrition and hydration status  - Monitor labs   - Assess for incontinence   - Turn and reposition patient  - Assist with mobility/ambulation  - Relieve pressure over bony prominences  - Avoid friction and shearing  - Provide appropriate hygiene as needed including keeping skin clean and dry  - Evaluate need for skin moisturizer/barrier cream  - Collaborate with interdisciplinary team   - Patient/family teaching  - Consider wound care consult   Outcome: Adequate for Discharge     Problem: PAIN - ADULT  Goal: Verbalizes/displays adequate comfort level or baseline comfort level  Description: Interventions:  - Encourage patient to monitor pain and request assistance  - Assess pain using appropriate pain scale  - Administer analgesics based on type and severity of pain and evaluate response  - Implement non-pharmacological measures as appropriate and evaluate response  - Consider cultural and social influences on pain and pain management  - Notify physician/advanced practitioner if interventions unsuccessful or patient reports new pain  Outcome: Adequate for Discharge     Problem: INFECTION - ADULT  Goal: Absence or prevention of progression during hospitalization  Description: INTERVENTIONS:  - Assess and monitor for signs and symptoms of infection  - Monitor lab/diagnostic results  - Monitor all insertion sites, i.e. indwelling lines, tubes, and drains  - Monitor endotracheal if appropriate and nasal secretions for changes in amount and color  - [place] appropriate cooling/warming therapies per order  - Administer medications as ordered  - Instruct and encourage patient and family to use good hand hygiene technique  - Identify and instruct in appropriate isolation  precautions for identified infection/condition  Outcome: Adequate for Discharge  Goal: Absence of fever/infection during neutropenic period  Description: INTERVENTIONS:  - Monitor WBC    Outcome: Adequate for Discharge     Problem: SAFETY ADULT  Goal: Patient will remain free of falls  Description: INTERVENTIONS:  - Educate patient/family on patient safety including physical limitations  - Instruct patient to call for assistance with activity   - Consult OT/PT to assist with strengthening/mobility   - Keep Call bell within reach  - Keep bed low and locked with side rails adjusted as appropriate  - Keep care items and personal belongings within reach  - Initiate and maintain comfort rounds  - Make Fall Risk Sign visible to staff  - Offer Toileting every  Hours, in advance of need  - Initiate/Maintain alarm  - Obtain necessary fall risk management equipment:   - Apply yellow socks and bracelet for high fall risk patients  - Consider moving patient to room near nurses station  Outcome: Adequate for Discharge  Goal: Maintain or return to baseline ADL function  Description: INTERVENTIONS:  -  Assess patient's ability to carry out ADLs; assess patient's baseline for ADL function and identify physical deficits which impact ability to perform ADLs (bathing, care of mouth/teeth, toileting, grooming, dressing, etc.)  - Assess/evaluate cause of self-care deficits   - Assess range of motion  - Assess patient's mobility; develop plan if impaired  - Assess patient's need for assistive devices and provide as appropriate  - Encourage maximum independence but intervene and supervise when necessary  - Involve family in performance of ADLs  - Assess for home care needs following discharge   - Consider OT consult to assist with ADL evaluation and planning for discharge  - Provide patient education as appropriate  Outcome: Adequate for Discharge  Goal: Maintains/Returns to pre admission functional level  Description: INTERVENTIONS:  -  Perform AM-PAC 6 Click Basic Mobility/ Daily Activity assessment daily.  - Set and communicate daily mobility goal to care team and patient/family/caregiver.   - Collaborate with rehabilitation services on mobility goals if consulted  - Perform Range of Motion  times a day.  - Reposition patient every  hours.  - Dangle patient  times a day  - Stand patient  times a day  - Ambulate patient  times a day  - Out of bed to chair  times a day   - Out of bed for meals 3 times a day  - Out of bed for toileting  - Record patient progress and toleration of activity level   Outcome: Adequate for Discharge     Problem: DISCHARGE PLANNING  Goal: Discharge to home or other facility with appropriate resources  Description: INTERVENTIONS:  - Identify barriers to discharge w/patient and caregiver  - Arrange for needed discharge resources and transportation as appropriate  - Identify discharge learning needs (meds, wound care, etc.)  - Arrange for interpretive services to assist at discharge as needed  - Refer to Case Management Department for coordinating discharge planning if the patient needs post-hospital services based on physician/advanced practitioner order or complex needs related to functional status, cognitive ability, or social support system  Outcome: Adequate for Discharge     Problem: Knowledge Deficit  Goal: Patient/family/caregiver demonstrates understanding of disease process, treatment plan, medications, and discharge instructions  Description: Complete learning assessment and assess knowledge base.  Interventions:  - Provide teaching at level of understanding  - Provide teaching via preferred learning methods  Outcome: Adequate for Discharge

## 2024-05-06 NOTE — PROGRESS NOTES
[facility]  Progress Note  Name: Edgar Obando I  MRN: [id number]  Unit/Bed#: -01 I Date of Admission: 5/2/2024   Date of Service: 5/6/2024  Hospital Day: 4    Assessment/Plan   * S/P cervical spinal fusion  Assessment & Plan  Failed outpatient conservative measures and opted for decompressive surgery  Pain controlled  No post operative issues noted  Continue encourage incentive spirometry; monitor fever curve  DVT prophylaxis in place and reviewed  Results from last 7 days   Lab Units 05/05/24  0511   WBC Thousand/uL 5.63   HEMOGLOBIN g/dL 10.5*   HEMATOCRIT % 33.5*   PLATELETS Thousands/uL 124*         Thrombocytopenia (HCC)  Assessment & Plan  Mild and improving.  Likely due to surgical losses.    Anxiety  Assessment & Plan  Continue BuSpar.    HTN (hypertension)  Assessment & Plan  Continue clonidine/Lasix/losartan  Resume losartan today.  Hydralazine as needed for SBP >160  Monitor trend      Type 2 diabetes mellitus, without long-term current use of insulin (HCC)  Assessment & Plan    Lab Results   Component Value Date    HGBA1C 6.1 (H) 09/14/2023     Uses Trulicity at home.  DM diet.  Continue accuchecks with SSI.         The above assessment and plan was reviewed and updated as determined by my evaluation of the patient on 5/6/2024.    Labs:   Results from last 7 days   Lab Units 05/05/24  0511 05/04/24  0510   WBC Thousand/uL 5.63 5.44   HEMOGLOBIN g/dL 10.5* 10.6*   HEMATOCRIT % 33.5* 33.8*   PLATELETS Thousands/uL 124* 117*     Results from last 7 days   Lab Units 05/05/24  0511 05/04/24  0510 05/02/24 2043 05/02/24  1641   SODIUM mmol/L 142 141   < >  --    POTASSIUM mmol/L 3.7 3.8   < >  --    CHLORIDE mmol/L 100 100   < >  --    CO2 mmol/L 36* 34*   < >  --    CO2, I-STAT mmol/L  --   --   --  27   BUN mg/dL 14 16   < >  --    CREATININE mg/dL 0.96 0.95   < >  --    GLUCOSE, ISTAT mg/dl  --   --   --  122   CALCIUM mg/dL 8.5 8.6   < >  --     < > = values  in this interval not displayed.             Results from last 7 days   Lab Units 05/06/24  0516 05/05/24  2057 05/05/24  1547   POC GLUCOSE mg/dl 138 165* 112       Review of Scheduled Meds:  Current Facility-Administered Medications   Medication Dose Route Frequency Provider Last Rate    acetaminophen  975 mg Oral Q8H [facility] Albert Corbett MD      aluminum-magnesium hydroxide-simethicone  30 mL Oral Q6H PRN Pilar Toussaint PA-C      busPIRone  10 mg Oral BID [facility] Breana, CAITLYN      calcium carbonate  1,000 mg Oral Daily PRN [facility] Breana, CAITLYN      cloNIDine  0.1 mg Oral BID [facility] CAITLYN Toussaint      docusate sodium  100 mg Oral BID Pilar S Breana, CAITLYN      furosemide  40 mg Oral Daily Dalila Cheatham PA-C      heparin (porcine)  7,500 Units Subcutaneous Q8H [facility] Pilar S CAITLYN Toussaint      hydrALAZINE  25 mg Oral Q8H PRN Dalila Cheatham PA-C      HYDROmorphone  0.2 mg Intravenous Q3H PRN Albert Corbett MD      insulin lispro  2-12 Units Subcutaneous 4x Daily (AC & HS) Pilar Toussaint PA-C      lidocaine  2 patch Topical Daily Albert Corbett MD      losartan  25 mg Oral Daily Dalila Cheatham PA-C      methocarbamol  500 mg Oral Q6H [facility] Pilar S CAITLYN Toussaint      ondansetron  4 mg Intravenous Q6H PRN Pilar S CAITLYN Toussaint      oxyCODONE  10 mg Oral Q4H PRN Pilar S CAITLYN Toussaint      oxyCODONE  5 mg Oral Q4H PRN Pliar S CAITLYN Toussaint      pantoprazole  40 mg Oral Early Morning Pilar S CAITLYN Toussaint      polyethylene glycol  17 g Oral Daily Dalila Cheatham PA-C      pravastatin  10 mg Oral Daily With Dinner Pilar S CAITLYN Toussaint      senna  1 tablet Oral Daily Pilar S CAITLYN Toussaint      tamsulosin  0.4 mg Oral Daily With Dinner Pilar Toussaint PA-C         Subjective/ HPI: Patient seen and examined. Patients overnight issues or events were reviewed with nursing or staff during rounds or morning huddle session. New or overnight issues include the  following:     Pt reports a sore throat and is requesting Chloraseptic. He denies any other complaints.      ROS:   A 10 point ROS was performed; negative except as noted above.       Imaging:     XR cervical spine 2 or 3 views   Final Result by Tam Song MD (05/05 0933)   ACDF in place with no loosening.   No acute osseous abnormality.         Workstation performed: [id number]         XR spine cervical 2 or 3 vw injury   Final Result by Talon Acosta MD (05/03 1112)      Fluoroscopy provided for procedure guidance.      Please refer to the separate procedure note for additional details.                  Workstation performed: [id number]         IR PICC line placement double lumen   Final Result by Dionna Marcum MD (05/03 1132)   Frontal fluoroscopic image showing a right arm PICC with tip overlying the cavoatrial junction.      Workstation performed: [id number]             *Labs /Radiology studies Reviewed  *Medications  reviewed and reconciled as needed  *Please refer to order section for additional ordered labs studies  *Case discussed with primary attending during morning huddle case rounds    Physical Examination:  Vitals:   Vitals:    05/05/24 1454 05/05/24 2131 05/06/24 0559 05/06/24 0731   BP: 148/76 150/77  144/71   Pulse: 88 84  91   Resp: 16 16     Temp: 97.8 °F (36.6 °C) 97.7 °F (36.5 °C)  97.7 °F (36.5 °C)   TempSrc:       SpO2: 94% 93%  96%   Weight:   112 kg (247 lb 12.8 oz)    Height:           General Appearance: no distress, conversive  HEENT: PERRLA, conjuctiva normal; oropharynx clear; mucous membranes moist  Neck: Aspen collar intact  Lungs: clear bilaterally, normal respiratory effort, no retractions, expiratory effort normal  CV: regular rate and rhythm , PMI normal   ABD: soft non tender, +BS x4  EXT: DP pulses intact, no lymphadenopathy, no edema; cervical surgical dressing in place  Skin: normal turgor, normal texture, no rash  Psych: affect normal, mood normal  Neuro:  AAOx3        The above physical exam was reviewed and updated as determined by my evaluation of the patient on 5/6/2024.    Invasive Devices       Peripherally Inserted Central Catheter Line  Duration             PICC Line 05/02/24 3 days                       VTE Pharmacologic Prophylaxis: Heparin  Code Status: Prior  Current Length of Stay: 4 day(s)      Total floor / unit time spent today 30 minutes  Coordination of patient's care was performed in conjunction with primary service. Time invested included review of patient's labs, vitals, and management of their comorbidities with continued monitoring, examination of patient as well as answering patient questions, documenting her findings and creating progress note in electronic medical record,  ordering appropriate diagnostic testing. Medical decision making for the day was made by supervising physician unless otherwise noted in their attestation statement.    ** Please Note:  voice to text software may have been used in the creation of this document. Although proof errors in transcription or interpretation are a potential of such software**

## 2024-05-06 NOTE — PROGRESS NOTES
Patient:    MRN:  [id number]    [place] Request ID:  [id number]    Level of care reserved:  Home Health Agency    Partner Reserved:  [facility], [place] [address] [phone]    Clinical needs requested:    Geography searched:  [id number]    Start of Service:    Request sent:  12:13pm EDT on 5/6/2024 by Lizz Hairston    Partner reserved:  12:21pm EDT on 5/6/2024 by Lizz Hairston    Choice list shared:  12:16pm EDT on 5/6/2024 by Lizz Hairston

## 2024-05-06 NOTE — DISCHARGE SUMMARY
ORTHOPEDICS DISCHARGE SUMMARY  Edgar Obando 68 y.o. male MRN: [id number]  Unit/Bed#: -01    Attending Physician: Debra    Admitting diagnosis: Cervical spinal stenosis [M48.02]  Cervical myelopathy (HCC) [G95.9]    Discharge diagnosis: Cervical spinal stenosis [M48.02]  Cervical myelopathy (HCC) [G95.9]    Date of admission: 5/2/2024    Date of discharge: 05/06/24    Procedure: C3-7 ACDF    HPI:  This is a 68 y.o. year old male that presented to the office with signs and symptoms of Cervical myeloradiculopathy. They tried and failed conservative treatment measures and wished to proceed with surgical intervention. The risks, benefits, and complications of the procedure were discussed with the patient and informed consent was obtained.    Hospital Course:  The patient was admitted to the hospital on 5/2/2024 and underwent an uncomplicated C3-7 ACDF. They were transferred to the floor after a brief stay in the post-anesthesia care unit. Their pain was well managed with IV and oral pain medications. They began therapy on post operative day #1.  Daily discussion was had with the patient, nursing staff, orthopaedic team, and family members if present.  All questions were answered to the patients satisfaction.      0   Lab Value Date/Time    HGB 11.4 (L) 05/06/2024 0833    HGB 10.5 (L) 05/05/2024 0511    HGB 10.6 (L) 05/04/2024 0510    HGB 11.8 (L) 05/03/2024 0509    HGB 11.2 (L) 05/02/2024 2043    HGB 10.5 (L) 05/02/2024 1641    HGB 12.6 04/19/2024 1300    HGB 12.8 09/14/2023 1411    HGB 11.9 (L) 04/26/2023 1000    HGB 13.1 03/06/2023 1153    HGB 11.8 (L) 12/20/2022 1153    HGB 12.1 10/07/2022 1025    HGB 10.9 (L) 08/10/2022 1045    HGB 12.7 03/14/2020 0813    HGB 12.8 02/01/2020 0949    HGB 13.3 09/17/2019 1246    HGB 11.4 (L) 07/18/2016 2047    HGB 11.2 (L) 07/16/2016 0448    HGB 11.7 (L) 07/15/2016 0955    HGB 10.9 (L) 07/14/2016 0453    HGB 10.7 (L) 07/13/2016 1840    HGB 14.8 06/16/2016 1232    HGB 16.0  01/11/2014 0918       Greater than 2 gram drop which qualifies for diagnosis of acute blood loss anemia.  Vital signs remained stable and pt was resuscitated with IVF as needed   Body mass index is 40 kg/m². morbidly obese. Recommend behavior modifications, nutrition, and physical activity.    Discharge Instructions:  The patient was discharged weight bearing as tolerated to all extremities. Take pain medications as instructed. Patient must keep Hard cervical collar on at all times.    Discharge Medications:  For the complete list of discharge medications, please refer to the patient's medication reconciliation.

## 2024-05-06 NOTE — CASE MANAGEMENT
Case Management Discharge Planning Note    Patient name Edgar Obando  Location /-01 MRN [id number]  : 1955 Date 2024       Current Admission Date: 2024  Current Admission Diagnosis:S/P cervical spinal fusion   Patient Active Problem List    Diagnosis Date Noted    Thrombocytopenia (HCC) 2024    S/P cervical spinal fusion 2024    Abnormal chest x-ray 2024    Stage 3a chronic kidney disease (HCC) 2023    Idiopathic chronic venous hypertension of right lower extremity with ulcer ([facility]) 2023    Hepatitis C virus carrier state ([facility]) 2023    Cervical spondylosis 2023    Chronic diastolic congestive heart failure ([facility]) 2023    Respiratory failure with hypoxia and hypercapnia, unspecified chronicity ([facility]) 2023    Obstructive sleep apnea syndrome 2023    Iron deficiency anemia, unspecified 2022    Chronic pain syndrome 2022    Lumbar spondylosis 2022    Neck pain 2022    Right shoulder pain 2022    Chronic low back pain with bilateral sciatica 2022    Diabetic neuropathy ([facility]) 2020    RBD (REM behavioral disorder) 2020    Primary osteoarthritis of right knee 2019    Chronic pain of right knee 2019    Chronic venous insufficiency 2019    Bilateral lower extremity edema 2019    BMI 40.0-44.9, adult ([facility]) 2019    Myoclonus     DDD (degenerative disc disease), lumbar 2018    Spinal stenosis of lumbar region 2018    Low back pain with sciatica 2018    S/P total knee replacement 07/15/2016    Type 2 diabetes mellitus, without long-term current use of insulin ([facility]) 07/15/2016    HTN (hypertension) 07/15/2016    GERD (gastroesophageal reflux disease) 07/15/2016    Anxiety 07/15/2016    SUSY (obstructive sleep apnea) 07/15/2016    Acute blood loss anemia 07/15/2016      LOS (days): 4  Geometric Mean LOS (GMLOS) (days): 2.6  Days to GMLOS:-1.2      OBJECTIVE:  Risk of Unplanned Readmission Score: 9.51         Current admission status: Inpatient   Preferred Pharmacy:   [facility] - [place], [place] - [address]  [address]  [address]  [place] [address]  Phone: [phone] Fax: [phone]    [id number] - [place], [place] - [address]  [address]  [place] [address]  Phone: [phone] Fax: [phone]    [facility] - [place] - [address]  [address]  [place] [address]  Phone: [phone] Fax: [phone]    Primary Care Provider: Ana Rosa Win MD    Primary Insurance: Private Company  Secondary Insurance:     DISCHARGE DETAILS:    Discharge planning discussed with:: Patient  Freedom of Choice: Yes  Comments - Freedom of Choice: Discussed FOC     Were Treatment Team discharge recommendations reviewed with patient/caregiver?: Yes  Did patient/caregiver verbalize understanding of patient care needs?: Yes  Were patient/caregiver advised of the risks associated with not following Treatment Team discharge recommendations?: Yes         Requested Home Health Care         Is the patient interested in HHC at discharge?: Yes  Home Health Discipline requested:: Occupational Therapy  Home Health Agency Name:: [facility]  Home Health Follow-Up Provider:: PCP  Home Health Services Needed:: Evaluate Functional Status and Safety, Gait/ADL Training, Strengthening/Theraputic Exercises to Improve Function         Other Referral/Resources/Interventions Provided:  Referral Comments: Notified by provider pt clear for dc home today w/HH.  Met w/pt at bedside notified of same.  Pt agreeable to referral to  [facility] - referral sent in [facility] -  [facility] able to accept pt.  Notified pt of same - in agreement w/dcp & accepting [facility].           IMM Given (Date):: 05/06/24  IMM Given to:: Patient        IMM reviewed with Patient who express understanding and agreement with discharge plan.

## 2024-05-06 NOTE — PLAN OF CARE
Problem: PHYSICAL THERAPY ADULT  Goal: Performs mobility at highest level of function for planned discharge setting.  See evaluation for individualized goals.  Description: Treatment/Interventions: OT, Spoke to case management, Spoke to nursing, Gait training, Bed mobility, Patient/family training, Endurance training, LE strengthening/ROM, Functional transfer training  Equipment Recommended:  (TBD)       See flowsheet documentation for full assessment, interventions and recommendations.  Outcome: Progressing  Note: Prognosis: Good  Problem List: Decreased strength, Decreased range of motion, Decreased endurance, Impaired balance, Decreased mobility, Decreased coordination, Decreased cognition, Impaired judgement, Decreased safety awareness, Pain  Assessment: Pt demonstrated improved functional endurance, mobiilty, balance & indepenence in all aspects of mobility this session.  performd transfers, then ambulated up to household distances with use of RW without LOB, taking extended time in stance at bathroom sink to brush teeth & clean while maintaining balance without UE support on RW for extended periods of time.  He then ambulated into hallway & negotaited steps as noted above ,doing so without LOB despite some difficulty descending forwards that necessitated trial sideways to imrpove UE support & stability .  pt does not necessarily feel as comfortable doing so in this manner, but was reassured that he appeared safer doing so with more effective UE support.  Anticipate he will continue to demonstrate progress in either method with ongoing practice during remainder of hospital stay.  Upon return to room, pt educated in cervical precautions & participated in changing to shower collar so Aspen collar pad could be exchanged  cleaned prior to d/c.  Pt requests to practice the same again with staff when spouse arrives, which RN was already aware prior to conclusion of session.  Pt's progress discussed with evaluating  PT, Roney Singh DPT post session, and current recommendations will be adjusted to reflect significant improvements demonstrated today.  Will recommend Level III PT resources as long as family can provide requiste assist for ADLs prn upon d/c.  Ortho service informed of the same post session.  Barriers to Discharge: Inaccessible home environment, Decreased caregiver support     Rehab Resource Intensity Level, PT: III (Minimum Resource Intensity)    See flowsheet documentation for full assessment.

## 2024-05-06 NOTE — ASSESSMENT & PLAN NOTE
Continue clonidine/Lasix/losartan  Resume losartan today.  Hydralazine as needed for SBP >160  Monitor trend

## 2024-05-06 NOTE — PHYSICAL THERAPY NOTE
Physical Therapy Progress Note     05/06/24 1100   PT Last Visit   PT Visit Date 05/06/24   Note Type   Note Type Treatment   Pain Assessment   Pain Assessment Tool FLACC   Pain Rating: FLACC (Rest) - Face 1   Pain Rating: FLACC (Rest) - Legs 0   Pain Rating: FLACC (Rest) - Activity 0   Pain Rating: FLACC (Rest) - Cry 1   Pain Rating: FLACC (Rest) - Consolability 0   Score: FLACC (Rest) 2   Pain Rating: FLACC (Activity) - Face 1   Pain Rating: FLACC (Activity) - Legs 1   Pain Rating: FLACC (Activity) - Activity 1   Pain Rating: FLACC (Activity) - Cry 1   Pain Rating: FLACC (Activity) - Consolability 0   Score: FLACC (Activity) 4   Restrictions/Precautions   Braces or Orthoses C/S Collar   Other Precautions Pain;Fall Risk;Spinal precautions   Subjective   Subjective Pt encountered seated in recliner, pleasant and agreeable to treatment.  Reports feeling better overall with improved pain control.  Stated he walked entire lenght of unit over the weekend with staff & reports no significant changes in complaints during today's session.  Does still report throat soreness, hoarseness & some difficulty swallowing pills.   Transfers   Sit to Stand 5  Supervision   Additional items Assist x 1;Armrests;Increased time required   Stand to Sit 5  Supervision   Additional items Assist x 1;Armrests;Increased time required   Ambulation/Elevation   Gait pattern Short stride;Inconsistent diana;Decreased foot clearance;Improper Weight shift   Gait Assistance 5  Supervision   Additional items Assist x 1   Assistive Device Rolling walker   Distance 15', 50', 60'   Stair Management Assistance 4  Minimal assist   Additional items Assist x 1   Stair Management Technique One rail L;Step to pattern;Foreward;Sideways   Number of Stairs 7  (x5 steps LHR & SPC, then x2 steps BHR on L HR)   Balance   Static Sitting Fair +   Static Standing Fair   Ambulatory Fair -   Activity Tolerance   Activity Tolerance Patient tolerated treatment well    Nurse Made Aware AMANDEEP Russo   Assessment   Prognosis Good   Problem List Decreased strength;Decreased range of motion;Decreased endurance;Impaired balance;Decreased mobility;Decreased coordination;Decreased cognition;Impaired judgement;Decreased safety awareness;Pain   Assessment Pt demonstrated improved functional endurance, mobiilty, balance & indepenence in all aspects of mobility this session.  performd transfers, then ambulated up to household distances with use of RW without LOB, taking extended time in stance at bathroom sink to brush teeth & clean while maintaining balance without UE support on RW for extended periods of time.  He then ambulated into hallway & negotaited steps as noted above ,doing so without LOB despite some difficulty descending forwards that necessitated trial sideways to imrpove UE support & stability .  pt does not necessarily feel as comfortable doing so in this manner, but was reassured that he appeared safer doing so with more effective UE support.  Anticipate he will continue to demonstrate progress in either method with ongoing practice during remainder of hospital stay.  Upon return to room, pt educated in cervical precautions & participated in changing to shower collar so Aspen collar pad could be exchanged  cleaned prior to d/c.  Pt requests to practice the same again with staff when spouse arrives, which RN was already aware prior to conclusion of session.  Pt's progress discussed with evaluating PT, Roney Singh DPT post session, and current recommendations will be adjusted to reflect significant improvements demonstrated today.  Will recommend Level III PT resources as long as family can provide requiste assist for ADLs prn upon d/c.  Ortho service informed of the same post session.   Goals   Patient Goals to go home, get stronger & be able to get his knee replaced   [facility] Expiration Date 05/15/24   PT Treatment Day 1   Plan   Treatment/Interventions Functional transfer  training;LE strengthening/ROM;Elevations;Therapeutic exercise;Endurance training;Equipment eval/education;Patient/family training;Bed mobility;Gait training   Progress Progressing toward goals   PT Frequency 3-5x/wk   Discharge Recommendation   Rehab Resource Intensity Level, PT III (Minimum Resource Intensity)   Equipment Recommended   (pt owns DME)   AM-PAC Basic Mobility Inpatient   Turning in Flat Bed Without Bedrails 3   Lying on Back to Sitting on Edge of Flat Bed Without Bedrails 3   Moving Bed to Chair 4   Standing Up From Chair Using Arms 4   Walk in Room 4   Climb 3-5 Stairs With Railing 3   Basic Mobility Inpatient Raw Score 21   Basic Mobility Standardized Score 45.55   [facility] Highest Level Of Mobility   -HLM Goal 6: Walk 10 steps or more   JH-HLM Achieved 7: Walk 25 feet or more       Cecilio Carr PTA    [facility] Basic Mobility Raw Score less than 17 suggests pt would benefit from post acute rehab.  Please also refer to the recommendation of the Physical Therapist for safe discharge planning.

## 2024-05-07 ENCOUNTER — TELEPHONE (OUTPATIENT)
Dept: FAMILY MEDICINE CLINIC | Facility: CLINIC | Age: 69
End: 2024-05-07

## 2024-05-07 ENCOUNTER — HOME CARE VISIT (OUTPATIENT)
Dept: HOME HEALTH SERVICES | Facility: HOME HEALTHCARE | Age: 69
End: 2024-05-07

## 2024-05-07 ENCOUNTER — TRANSITIONAL CARE MANAGEMENT (OUTPATIENT)
Dept: FAMILY MEDICINE CLINIC | Facility: CLINIC | Age: 69
End: 2024-05-07

## 2024-05-07 NOTE — CASE COMMUNICATION
"Notification of Assess not Admit    [facility] has assessed your patient for Home Health services and has determined the patient is not eligible for service due to the following    Pt answers front door (I)ly with use of SPC. Pt CCollar intact with cervical incision clear, dry and intact.  Pts wife is present and is willing and available to A pt daily as needed.  Pt is ambulating (I)ly with use of SPC and RW for household distances.  pt is able to exit and enter the home needing mod (I) level of A. Pt is able to go up and down 2nd floor steps and steps to enter mod (I) level of A. Pt is able to perform transfers and bed mobility (I)ly.  Pt reports getting dressed with assistance from wife and will continue to perform sponge bathing for now. No skilled home OT needed at this time, per pt request.  Pt has all current medications in the home. Pt reports difficulty wit h swallowing Robaxin medication due to not being coated and a small pill. Recommend pt crush robaxin medication as needed, pt agreed. Pt reports \"having a bad night\" due to pain. pt is currently taking all medication as prescribed. Education and instruction pt on being normal for discomfort and pain to exhist at this time. pt and pts wife agreed,    Currently no skilled home PT and no skilled home OT needed at this time, DC pt from [facility] led home PT and skilled home OT. Recommend outpt therapy once cleared by MD, pt agreed. Discussed with pt to contact Dr Richardson if any other concerns and/or questions arise. pt and pts wife agreed    Thank you,  Lashonda Cerrato  DPT, COS-C      "

## 2024-05-07 NOTE — UTILIZATION REVIEW
NOTIFICATION OF ADMISSION DISCHARGE   This is a Notification of Discharge from [facility]. Please be advised that this patient has been discharge from our facility. Below you will find the admission and discharge date and time including the patient’s disposition.   UTILIZATION REVIEW CONTACT:  Rhea Hoyos  Utilization   Network Utilization Review Department  Phone: [phone] x carefully listen to the prompts. All voicemails are confidential.  Email: [email or web address]     ADMISSION INFORMATION  PRESENTATION DATE: 5/2/2024  5:29 AM  OBERVATION ADMISSION DATE:   INPATIENT ADMISSION DATE: 5/2/24  5:57 PM   DISCHARGE DATE: 5/6/2024  4:21 PM   DISPOSITION:Home with Home Health Care    Network Utilization Review Department  ATTENTION: Please call with any questions or concerns to [phone] and carefully listen to the prompts so that you are directed to the right person. All voicemails are confidential.   For Discharge needs, contact Care Management DC Support Team at [phone] opt. 2  Send all requests for admission clinical reviews, approved or denied determinations and any other requests to dedicated fax number below belonging to the campus where the patient is receiving treatment. List of dedicated fax numbers for the Facilities:  FACILITY NAME UR FAX NUMBER   ADMISSION DENIALS (Administrative/Medical Necessity) [phone]   DISCHARGE SUPPORT TEAM ([facility]) [phone]   PARENT CHILD HEALTH (Maternity/NICU/Pediatrics) [phone]   [facility] [phone]   [facility] [phone]   [facility] [phone]   [facility] [phone]   [facility] [phone]   [facility] [phone]   [facility] [phone]   [facility]  [phone]   [facility] [phone]   [facility] [phone]   General acute hospital [phone]   [facility] [phone]

## 2024-05-14 ENCOUNTER — TELEPHONE (OUTPATIENT)
Age: 69
End: 2024-05-14

## 2024-05-14 NOTE — TELEPHONE ENCOUNTER
Caller: patient    Doctor: Debra    Reason for call: patient called, he is still having pain in his shoulder and arm. His PCP office told the patient to reach out to our office to let the doctor know what's going on. Sx 5/2/24 cervical fusion, next appt 5/17/24    Call back#: [phone]

## 2024-05-14 NOTE — TELEPHONE ENCOUNTER
Reviewed chart; pt had on 5/2/24  FUSION CERVICAL ANTERIOR W DISCECTOMY, C3-7 ACDF (Bilateral: Spine Cervical .  CLM on VM for pt to return call to get more info. Awaiting CB.

## 2024-05-15 NOTE — TELEPHONE ENCOUNTER
Caller: Patient     Doctor: Debra    Reason for call:     Patient is still having extreme pain of 10, he is asking for a call back from the nurse.  He is not taking any medication.   Please return his call and advise     Call back#: [phone]

## 2024-05-15 NOTE — TELEPHONE ENCOUNTER
"Called and spoke w/pt and he states he is having severe pain in right shoulder and cannot lift arm that even goes to neck.  He has not had pain like this before. He went to see his family doctor who referred him back to Dr Richardson and mentioned something about a \"blood clot\". Pt had been taking tylenol and methocarbamol as prescribed but not helping.  Appt given today at 1:15 for re-evaluation.  "

## 2024-05-16 ENCOUNTER — HOSPITAL ENCOUNTER (OUTPATIENT)
Dept: NON INVASIVE DIAGNOSTICS | Facility: HOSPITAL | Age: 69
Discharge: HOME | End: 2024-05-16
Payer: COMMERCIAL

## 2024-05-16 DIAGNOSIS — M25.511 PAIN IN RIGHT SHOULDER: ICD-10-CM

## 2024-05-16 PROCEDURE — 93971 EXTREMITY STUDY: CPT

## 2024-05-17 ENCOUNTER — OFFICE VISIT (OUTPATIENT)
Dept: OBGYN CLINIC | Facility: HOSPITAL | Age: 69
End: 2024-05-17

## 2024-05-17 ENCOUNTER — HOSPITAL ENCOUNTER (OUTPATIENT)
Dept: RADIOLOGY | Facility: HOSPITAL | Age: 69
Discharge: HOME | End: 2024-05-17
Attending: ORTHOPAEDIC SURGERY
Payer: COMMERCIAL

## 2024-05-17 VITALS
DIASTOLIC BLOOD PRESSURE: 72 MMHG | HEART RATE: 83 BPM | BODY MASS INDEX: 39.68 KG/M2 | WEIGHT: 246.91 LBS | HEIGHT: 66 IN | SYSTOLIC BLOOD PRESSURE: 127 MMHG

## 2024-05-17 DIAGNOSIS — Z98.1 S/P CERVICAL SPINAL FUSION: ICD-10-CM

## 2024-05-17 DIAGNOSIS — M47.812 CERVICAL SPONDYLOSIS: ICD-10-CM

## 2024-05-17 DIAGNOSIS — Z98.1 S/P CERVICAL SPINAL FUSION: Primary | ICD-10-CM

## 2024-05-17 PROCEDURE — 93931 UPPER EXTREMITY STUDY: CPT | Performed by: SURGERY

## 2024-05-17 PROCEDURE — 99024 POSTOP FOLLOW-UP VISIT: CPT | Performed by: ORTHOPAEDIC SURGERY

## 2024-05-17 PROCEDURE — 72040 X-RAY EXAM NECK SPINE 2-3 VW: CPT

## 2024-05-17 RX ORDER — NALOXONE HYDROCHLORIDE 4 MG/.1ML
SPRAY NASAL
Qty: 1 EACH | Refills: 1 | Status: SHIPPED | OUTPATIENT
Start: 2024-05-17 | End: 2025-05-17

## 2024-05-17 RX ORDER — METHOCARBAMOL 500 MG/1
500 TABLET, FILM COATED ORAL 3 TIMES DAILY PRN
Qty: 30 TABLET | Refills: 0 | Status: SHIPPED | OUTPATIENT
Start: 2024-05-17

## 2024-05-17 RX ORDER — OXYCODONE HYDROCHLORIDE 5 MG/1
5 TABLET ORAL EVERY 4 HOURS PRN
Qty: 30 TABLET | Refills: 0 | Status: SHIPPED | OUTPATIENT
Start: 2024-05-17 | End: 2024-05-23 | Stop reason: SDUPTHER

## 2024-05-17 NOTE — PROGRESS NOTES
"POST OP NOTE       Edgar Topher  here today s/p C3-7 ACDF      Surgery date: 5/2/2024    Subjective: Preoperative symptoms were neck pain, decreased dexterity and balance issues.Today, he reports he is doing well, improved since pre-operatively. Reports improvement in balance and hand numbness/tingling. Also reports decreased frequency of dropping items. Expected post-operative neck pain, otherwise denies significant pain. Has been wearing hard cervical collar. He does report significant pain around right upper extremity PICC line site a few days ago. He underwent ultrasound without evidence of thrombus. Pain has since improved, does have some continued right arm soreness. Patient admits to compliance with activity restrictions.  Denies any fevers, chills, and night sweats.  No cough, no shortness of breath.  No chest pain, no palpitations.  Does report raspy voice and some initial difficulty swallowing/ eating with coughing. He reports his PCP prescribed him a medication for coughing with improvement. He reports his swallowing has continued to gradually improve. He has been able to eat and drink fluids without much issue. No dysphagia.    Post-Op Medications:  500mg robaxin tid prn - taking as prescribed  5 mg oxycodone q4hr prn - taking as prescribed  Tylenol - taking as prescribed    Objective:  /72   Pulse 83   Ht 5' 6\" (1.676 m)   Wt 112 kg (246 lb 14.6 oz)   BMI 39.85 kg/m²     Strength:  Upper Extremity Motor Function     Right  Left    Deltoid  5/5  5/5    Bicep  5/5  5/5    Wrist extension  5/5  5/5    Tricep  5/5  5/5    Finger flexion/  5/5  5/5    Hand intrinsic  5/5  5/5      Sensation: intact  DTR: intact  Gait: using cane for ambulation    Incision healing extremely well. Sutures intact. No signs of erythema, discharge, or purulence.  There is no appreciable effusion.    Calf: soft, non tender, no swelling or erythema     Imaging:  I have reviewed and independently visualized the " imaging studies (05/17/24)  myself and my interpretation is the following: Instrumentation in place and in appropriate alignment.    Assessment: s/p C3-7 ACDF on 5/2/2024. Overall doing well.    Plan:  Steri strips were removed.   Patient was instructed to do the following   -Able to walk as much as tolerated. Continue to limit turning, twisting, bending. No lifting >5 lbs. Continue to wear hard cervical collar.  -Patient may shower and allow water/soap to run over incision. Do not submerge or scrub incision.  -Able to drive as long as no longer taking narcotics.   -Take pain medication as prescribed if needed. Refill of oxycodone and robaxin provided. [place] Prescription Drug Monitoring Program report was queried, reviewed and deemed appropriate.  -Follow up in 4 weeks for 6 week post op visit  Edgar Obando was instructed to call the office with any concerns or questions.   0

## 2024-05-22 ENCOUNTER — TELEPHONE (OUTPATIENT)
Age: 69
End: 2024-05-22

## 2024-05-22 NOTE — TELEPHONE ENCOUNTER
Caller: [facility]/Anai Santos    Doctor: Debra    Reason for call: Patient has dental appt 6/4 for an extraction. Please fax clearance letter to the office. Please fax to [phone]    Call back#: [phone]

## 2024-05-23 ENCOUNTER — TELEPHONE (OUTPATIENT)
Age: 69
End: 2024-05-23

## 2024-05-23 DIAGNOSIS — M47.812 CERVICAL SPONDYLOSIS: ICD-10-CM

## 2024-05-23 DIAGNOSIS — Z98.1 S/P CERVICAL SPINAL FUSION: ICD-10-CM

## 2024-05-23 RX ORDER — OXYCODONE HYDROCHLORIDE 5 MG/1
5 TABLET ORAL EVERY 4 HOURS PRN
Qty: 30 TABLET | Refills: 0 | Status: SHIPPED | OUTPATIENT
Start: 2024-05-23 | End: 2024-06-02

## 2024-05-23 NOTE — TELEPHONE ENCOUNTER
Reason for call:   [x] Refill   [] Prior Auth  [] Other:     Office:   [] PCP/Provider -   [x] Specialty/Provider - ortho/Morelia Stehi PA-C     Medication: oxyCODONE (Roxicodone) 5 immediate release tablet     Dose/Frequency: Take 1 tablet (5 mg total) by mouth every 4 (four) hours as needed for moderate pain for up to 10 days Max Daily Amount: 30 mg     Quantity: #30    Pharmacy: [facility] - [place], [place] - [address] [phone]     Does the patient have enough for 3 days?   [] Yes   [x] No - Send as HP to POD

## 2024-05-23 NOTE — TELEPHONE ENCOUNTER
Refill must be reviewed and completed by the office or provider. The refill is unable to be approved or denied by the medication management team.    Cannot be delegated

## 2024-05-23 NOTE — TELEPHONE ENCOUNTER
Patient called the RX Refill Line. Message is being forwarded to the office.     Patient is requesting a call back from the NP.  he has some questions regarding his surgery.     Please contact patient at [phone]

## 2024-05-24 NOTE — TELEPHONE ENCOUNTER
Pt said his tooth is not infection. Pt should push this out to after 3 months post op. If it becomes infected he needs to get it out and we will prescribe antibiotics for before.

## 2024-05-24 NOTE — TELEPHONE ENCOUNTER
Pain in right arm where picc was and pain into shoulder. Comes and goes. Can not lift right arm. Can pt shave face? Medication has not helped.

## 2024-05-31 DIAGNOSIS — F41.9 ANXIETY: ICD-10-CM

## 2024-06-03 RX ORDER — CLONAZEPAM 1 MG/1
0.5 TABLET ORAL 2 TIMES DAILY
Qty: 60 TABLET | Refills: 0 | Status: SHIPPED | OUTPATIENT
Start: 2024-06-03

## 2024-06-03 NOTE — TELEPHONE ENCOUNTER
Medication:  PDMP   01/24/2024 01/24/2024 clonazePAM (Tablet) 60.0 60 1 MG NA RASHIIA GERMINAL     Active agreement on file -Yes

## 2024-06-10 DIAGNOSIS — Z98.1 S/P CERVICAL SPINAL FUSION: Primary | ICD-10-CM

## 2024-06-10 RX ORDER — OXYCODONE HYDROCHLORIDE 5 MG/1
5 TABLET ORAL EVERY 6 HOURS PRN
Qty: 30 TABLET | Refills: 0 | Status: SHIPPED | OUTPATIENT
Start: 2024-06-10 | End: 2024-06-19 | Stop reason: SDUPTHER

## 2024-06-14 ENCOUNTER — HOSPITAL ENCOUNTER (OUTPATIENT)
Dept: RADIOLOGY | Facility: HOSPITAL | Age: 69
Discharge: HOME | End: 2024-06-14
Attending: ORTHOPAEDIC SURGERY
Payer: COMMERCIAL

## 2024-06-14 ENCOUNTER — OFFICE VISIT (OUTPATIENT)
Dept: OBGYN CLINIC | Facility: HOSPITAL | Age: 69
End: 2024-06-14

## 2024-06-14 VITALS
BODY MASS INDEX: 39.68 KG/M2 | DIASTOLIC BLOOD PRESSURE: 85 MMHG | HEIGHT: 66 IN | HEART RATE: 80 BPM | WEIGHT: 246.91 LBS | SYSTOLIC BLOOD PRESSURE: 135 MMHG

## 2024-06-14 DIAGNOSIS — M47.812 CERVICAL SPONDYLOSIS: ICD-10-CM

## 2024-06-14 DIAGNOSIS — Z98.1 S/P CERVICAL SPINAL FUSION: ICD-10-CM

## 2024-06-14 DIAGNOSIS — M25.511 RIGHT SHOULDER PAIN, UNSPECIFIED CHRONICITY: ICD-10-CM

## 2024-06-14 DIAGNOSIS — Z98.1 S/P CERVICAL SPINAL FUSION: Primary | ICD-10-CM

## 2024-06-14 PROCEDURE — 72040 X-RAY EXAM NECK SPINE 2-3 VW: CPT

## 2024-06-14 PROCEDURE — 99024 POSTOP FOLLOW-UP VISIT: CPT | Performed by: ORTHOPAEDIC SURGERY

## 2024-06-14 RX ORDER — METHOCARBAMOL 500 MG/1
500 TABLET, FILM COATED ORAL 3 TIMES DAILY PRN
Qty: 30 TABLET | Refills: 0 | Status: SHIPPED | OUTPATIENT
Start: 2024-06-14 | End: 2024-06-19

## 2024-06-14 NOTE — PROGRESS NOTES
"POST OP NOTE       Edgar Obando  here today s/p C3-7 ACDF      Surgery date: 5/2/2024    Subjective: Preoperative symptoms were neck pain, decreased dexterity and balance issues.Today, he reports he is doing well, improved since pre-operatively. Reports improvement in balance and hand numbness/tingling. Also reports decreased frequency of dropping items. Expected post-operative neck pain, otherwise denies significant pain. Has been wearing hard cervical collar.  Patient admits to compliance with activity restrictions.  Denies any fevers, chills, and night sweats.  No cough, no shortness of breath.  No chest pain, no palpitations.  Has had resolution of swallowing issues.  He has some right shoulder pain.  Edgar is taking tylenol prn.  He occasionally uses Robaxin.      Objective:  /85   Pulse 80   Ht 5' 6\" (1.676 m)   Wt 112 kg (246 lb 14.6 oz)   BMI 39.85 kg/m²     Strength:  Upper Extremity Motor Function     Right  Left    Deltoid  5/5  5/5    Bicep  5/5  5/5    Wrist extension  5/5  5/5    Tricep  5/5  5/5    Finger flexion/  5/5  5/5    Hand intrinsic  5/5  5/5      Sensation: intact  DTR: intact  Gait: using cane for ambulation    Incision healing extremely well.  No signs of erythema, discharge, or purulence.  There is no appreciable effusion.    Calf: soft, non tender, no swelling or erythema     Imaging:  I have reviewed and independently visualized the imaging studies (06/14/24)  myself and my interpretation is the following: Instrumentation in place and in appropriate alignment.    Assessment: s/p C3-7 ACDF on 5/2/2024. Overall doing well.    Plan:  Patient was instructed to do the following   -Able to walk as much as tolerated. Continue to limit turning, twisting, bending. No lifting >5 lbs. Continue to wear hard cervical collar.  -Given soft cervical collar that patient may wear at bedtime  -Will plan to start formal physical therapy to work on right shoulder ROM and stretching " exercises  -Able to drive as long as no longer taking narcotics. Notes he is only taking oxycodone as needed  -Take pain medication as prescribed if needed. Refill of robaxin provided.   -Follow up in 6 weeks for 3 month post op visit  Edgar Obando was instructed to call the office with any concerns or questions.

## 2024-06-19 DIAGNOSIS — Z98.1 S/P CERVICAL SPINAL FUSION: Primary | ICD-10-CM

## 2024-06-19 DIAGNOSIS — Z98.1 S/P CERVICAL SPINAL FUSION: ICD-10-CM

## 2024-06-19 DIAGNOSIS — M47.812 CERVICAL SPONDYLOSIS: ICD-10-CM

## 2024-06-19 RX ORDER — TIZANIDINE 2 MG/1
2 TABLET ORAL EVERY 8 HOURS PRN
Qty: 30 TABLET | Refills: 0 | Status: SHIPPED | OUTPATIENT
Start: 2024-06-19

## 2024-06-19 RX ORDER — OXYCODONE HYDROCHLORIDE 5 MG/1
5 TABLET ORAL EVERY 6 HOURS PRN
Qty: 30 TABLET | Refills: 0 | Status: CANCELLED | OUTPATIENT
Start: 2024-06-19

## 2024-06-19 RX ORDER — METHOCARBAMOL 500 MG/1
500 TABLET, FILM COATED ORAL 3 TIMES DAILY PRN
Qty: 30 TABLET | Refills: 0 | OUTPATIENT
Start: 2024-06-19

## 2024-06-19 RX ORDER — OXYCODONE HYDROCHLORIDE 5 MG/1
5 TABLET ORAL EVERY 6 HOURS PRN
Qty: 30 TABLET | Refills: 0 | Status: SHIPPED | OUTPATIENT
Start: 2024-06-19

## 2024-06-19 NOTE — TELEPHONE ENCOUNTER
Robaxin previously prescribed and received by pharmacy. Needs prior auth for robaxin to be filled or new prescription of flexeril or zanaflex per insurance. If patient is agreeable, I can place order for zanaflex or flexeril.

## 2024-06-19 NOTE — TELEPHONE ENCOUNTER
Reason for call:   [x] Refill   [] Prior Auth  [] Other:     Office:   [] PCP/Provider -   [x] Specialty/Provider - ortho/Conrad Richardson MD     Medication:     Does the patient have enough for 3 days?   [] Yes   [x] No - Send as HP to POD

## 2024-06-22 ENCOUNTER — TELEPHONE (OUTPATIENT)
Dept: OBGYN CLINIC | Facility: HOSPITAL | Age: 69
End: 2024-06-22

## 2024-06-22 NOTE — TELEPHONE ENCOUNTER
Caller: Patient    Doctor: Debra    Reason for call: Patient called stated he has a new symptom he thinks may be related to his sx 5/2/24. He stated his throat feels different scratchy. He would like a call back.    Call back#: [phone]

## 2024-06-25 ENCOUNTER — TELEPHONE (OUTPATIENT)
Age: 69
End: 2024-06-25

## 2024-06-25 ENCOUNTER — HOSPITAL ENCOUNTER (EMERGENCY)
Facility: HOSPITAL | Age: 69
Discharge: HOME | End: 2024-06-25
Attending: EMERGENCY MEDICINE
Payer: COMMERCIAL

## 2024-06-25 ENCOUNTER — APPOINTMENT (EMERGENCY)
Dept: RADIOLOGY | Facility: HOSPITAL | Age: 69
End: 2024-06-25
Payer: COMMERCIAL

## 2024-06-25 VITALS
HEART RATE: 78 BPM | DIASTOLIC BLOOD PRESSURE: 79 MMHG | SYSTOLIC BLOOD PRESSURE: 138 MMHG | OXYGEN SATURATION: 93 % | RESPIRATION RATE: 18 BRPM | TEMPERATURE: 98.4 F

## 2024-06-25 DIAGNOSIS — J18.9 PNEUMONIA: Primary | ICD-10-CM

## 2024-06-25 PROCEDURE — 71046 X-RAY EXAM CHEST 2 VIEWS: CPT

## 2024-06-25 PROCEDURE — 99284 EMERGENCY DEPT VISIT MOD MDM: CPT | Performed by: EMERGENCY MEDICINE

## 2024-06-25 RX ORDER — AMOXICILLIN 250 MG/1
1000 CAPSULE ORAL ONCE
Status: COMPLETED | OUTPATIENT
Start: 2024-06-25 | End: 2024-06-25

## 2024-06-25 RX ORDER — AMOXICILLIN 500 MG/1
1000 CAPSULE ORAL 3 TIMES DAILY
Qty: 42 CAPSULE | Refills: 0 | Status: SHIPPED | OUTPATIENT
Start: 2024-06-25 | End: 2024-07-02

## 2024-06-25 RX ADMIN — AMOXICILLIN 1000 MG: 250 CAPSULE ORAL at 16:01

## 2024-06-25 NOTE — TELEPHONE ENCOUNTER
Called and spoke w/pt and he says he has a very bad sore throat and feels like something is there and is getting worse, has wheezing. Has tried ice, sprays and nothing is helping.  Has a productive cough and expectorating yellowish,pus-like expectorant.  He is getting hot/cold. T97.5. Not sleeping. Called PCP and was referred to neurology and is awaiting a CB from PCP for a problem visit. Wanted to keep Dr Richardson updated as he did surgery on 5/2/24.  Still has pain in right should 50% better, left shoulder pain unchanged. Denies CP.  Pt has productive cough noted on phone and voice is not clear, sound congested.  Could have pneumonia and there are viruses going around so he needs to be checked out. Advised pt to go to ED or at least UC today.  Pt states he is concerned.  Pt states he will not wait much longer and will probably go to UC.  Pt's wife is there with him to take him.

## 2024-06-25 NOTE — TELEPHONE ENCOUNTER
CLM on  for pt to return call to get more info at pt had on 5/2/24  FUSION CERVICAL ANTERIOR W DISCECTOMY, C3-7 ACDF (Bilateral: Spine Cervical) . Pt should also call PCP.

## 2024-06-25 NOTE — ED ATTENDING ATTESTATION
Final Diagnoses:     1. Pneumonia           I, Cesar Hinojosa MD, saw and evaluated the patient. All available labs and X-rays were ordered by me or the resident / non-physician and have been reviewed by myself. I discussed the patient with the resident / non-physician and agree with the resident's / non-physician practitioner's findings and plan as documented in the resident's / non-physician practicitioner's note, except where noted.   At this point, I agree with the current assessment done in the ED.   I was present during key portions of all procedures performed unless otherwise stated.     HPI:  NURSING TRIAGE:    This is a 69 y.o. male presenting for evaluation of cough.  The patient in early May had a fusion surgery done.  He was placed in [place] collar at that time.  About 2 weeks ago he started to have a cough that is been persistent since then, wet, producing thick mucus.  No fevers, subjective chills.  No nausea or vomiting.  Decreased oral intake slightly.  No chest pain.  Feels slightly short of breath.  Denies dizziness, denies lightheadedness.  No urinary tract infection symptoms including burning from pain blood frequency.  No sick contacts.  No recent travel. Chief Complaint   Patient presents with    Cough     Coughing that started over the weekend that is productive      PHYSICAL: ASSESSMENT + PLAN:   Pertinent: Frequent coughing during the interview, sounds wet appears comfortable otherwise saturating 92 to 95% on room air, no conversational dyspnea  In an [place] collar    General: VS reviewed  Appears in NAD  awake, alert.   Well-nourished, well-developed. Appears stated age.   Speaking normally in full sentences.   Head: Normocephalic, atraumatic  Eyes: EOM-I. No diplopia.   No hyphema.   No subconjunctival hemorrhages.  Symmetrical lids.   ENT: Atraumatic external nose and ears.    MMM  No malocclusion. No stridor. Normal phonation. No drooling. Normal swallowing.   Neck: No JVD.  CV: No  pallor noted  Lungs:   No tachypnea  No respiratory distress  Abd: soft nt nd no rebound/guarding  MSK:   FROM spontaneously  Skin: Dry, intact.   Neuro: Awake, alert, GCS15, CN II-XII grossly intact.   Motor grossly intact.  Psychiatric/Behavioral: interacting normally; appropriate mood/affect.    Exam: deferred    Vitals:    24 1419 24 1605   BP: 155/80 138/79   BP Location: Left arm Left arm   Pulse: 87 78   Resp: 17 18   Temp: 98.4 °F (36.9 °C)    TempSrc: Temporal    SpO2: 93% 93%    Given the story I think it be reasonable to get a chest x-ray to rule out pneumonia, likely viral bronchitis.'s been 2 weeks of symptoms, they have not really progressed, they have been stable the entire time.  I think lab work will be very low yield as I do not think it is cardiac.  Likely discharge.     There are no obvious limitations to social determinants of care.   Nursing note reviewed.   Vitals reviewed.   Orders placed by myself and/or advanced practitioner / resident.    Previous chart was reviewed  No language barrier.   History obtained from patient.    There are no limitations to the history obtained:     Past Medical: Past Surgical:    has a past medical history of Acid reflux, Anxiety, Arthritis, Cellulitis, CPAP (continuous positive airway pressure) dependence, Diabetes mellitus (HCC), Hypertension, Sleep apnea, and Twitching.  has a past surgical history that includes Appendectomy; Cholecystectomy; Knee arthroscopy; pr arthrp kne condyle&platu medial&lat compartments (Left, 2016); Colonoscopy; Cervical fusion (Bilateral, 2024); and IR PICC placement double lumen (2024).   Social: Cardiac (Echo/Cath)   Social History     Substance and Sexual Activity   Alcohol Use Not Currently     Social History     Tobacco Use   Smoking Status Former    Current packs/day: 0.00    Types: Cigarettes    Quit date:     Years since quittin.5   Smokeless Tobacco Never     Social History     Substance  and Sexual Activity   Drug Use Not Currently    Comment: tried multiple drugs in the past    No results found for this or any previous visit.    No results found for this or any previous visit.    No results found for this or any previous visit.     Labs: Imaging:   Labs Reviewed - No data to display XR chest 2 views   Final Result      No acute cardiopulmonary disease on this examination, which is somewhat limited secondary to low lung volumes.            Workstation performed: [id number]            Medications: Code Status:   Medications   amoxicillin (AMOXIL) capsule 1,000 mg (1,000 mg Oral Given 6/25/24 1601)    Code Status: Prior  Advance Directive and Living Will:      Power of :    POLST:       Orders Placed This Encounter   Procedures    XR chest 2 views     Time reflects when diagnosis was documented in both MDM as applicable and the Disposition within this note       Time User Action Codes Description Comment    6/25/2024  3:48 PM Edgar Rosenberg [J18.9] Pneumonia           ED Disposition       ED Disposition   Discharge    Condition   Stable    Date/Time   Tue Jun 25, 2024  3:48 PM    Comment   Edgar Obando discharge to home/self care.                   Follow-up Information       Follow up With Specialties Details Why Contact Info Additional Information    Ana Rosa Win MD Family Medicine   [address] [place] [id number] [phone]       [facility] Emergency Department Emergency Medicine Go to  If symptoms worsen [address] [place] [id number]  [phone] [facility] Emergency Department, [address], [place], [address]   [phone]          Discharge Medication List as of 6/25/2024  4:08 PM        START taking these medications    Details   amoxicillin (AMOXIL) 500 mg capsule Take 2 capsules (1,000 mg total) by mouth 3 (three) times a day for 7 days, Starting Tue 6/25/2024, Until Tue  7/2/2024, Normal           CONTINUE these medications which have NOT CHANGED    Details   acetaminophen (TYLENOL) 650 mg CR tablet Take 1 tablet (650 mg total) by mouth every 8 (eight) hours as needed for mild pain, Starting Fri 5/3/2024, Normal      albuterol (PROVENTIL HFA,VENTOLIN HFA) 90 mcg/act inhaler Inhale 2 puffs every 6 (six) hours as needed for wheezing As needed, Historical Med      aspirin (ECOTRIN LOW STRENGTH) 81 mg EC tablet Take 81 mg by mouth daily, Historical Med      b complex vitamins capsule Take 1 capsule by mouth daily, Historical Med      bisacodyl (DULCOLAX) 5 mg EC tablet Take 1 tablet (5 mg total) by mouth once for 1 dose, Starting Fri 8/26/2022, Normal      Blood Glucose Monitoring Suppl (ONE TOUCH ULTRA 2) w/Device KIT USE TO TEST BLOOD GLUCOSE, Historical Med      budesonide (RINOCORT AQUA) 32 MCG/ACT nasal spray 1 spray into each nostril 2 (two) times a day As needed, Historical Med      busPIRone (BUSPAR) 10 mg tablet TAKE 1 TABLET (10 MG TOTAL) BY MOUTH TWO (TWO) TIMES A DAY, Normal      chlorhexidine (PERIDEX) 0.12 % solution RINSE MOUTH WITH 15ML (1 CAPFUL) FOR 30 SECONDS IN MORNING AND EVENING AFTER BRUSHING, THEN SPIT, Historical Med      clonazePAM (KlonoPIN) 1 mg tablet Take 0.5 tablets (0.5 mg total) by mouth 2 (two) times a day, Starting Mon 6/3/2024, Normal      cloNIDine (CATAPRES) 0.1 mg tablet Take 0.1 mg by mouth 2 (two) times a day, Historical Med      docusate sodium (COLACE) 100 mg capsule Take 100 mg by mouth 2 (two) times a day, Starting Mon 7/4/2022, Historical Med      fluticasone (FLOVENT HFA) 220 mcg/act inhaler Inhale 1 puff daily as needed As needed, Historical Med      furosemide (LASIX) 40 mg tablet Take 40 mg by mouth as needed in the morning and 40 mg as needed in the evening., Historical Med      Lancets 30G MISC 3 (three) times a day Use to test blood glucose, Starting Tue 9/5/2023, Historical Med      losartan (COZAAR) 25 mg tablet Historical Med       lovastatin (MEVACOR) 10 MG tablet Take 10 mg by mouth daily at bedtime, Historical Med      Multiple Vitamins-Minerals (multivitamin with minerals) tablet Take 1 tablet by mouth daily, Historical Med      mupirocin (BACTROBAN) 2 % ointment Starting Tue 1/11/2022, Historical Med      !! naloxone (NARCAN) 4 mg/0.1 mL nasal spray Administer 1 spray into a nostril. If no response after 2-3 minutes, give another dose in the other nostril using a new spray., Normal      naproxen (NAPROSYN) 500 mg tablet Historical Med      !! NARCAN 4 MG/0.1ML LIQD Has if needed, Starting Thu 6/13/2019, Historical Med      OneTouch Ultra test strip USE TO TEST BLOOD GLUCOSE TWICE DAILY, Historical Med      oxyCODONE (Roxicodone) 5 immediate release tablet Take 1 tablet (5 mg total) by mouth every 6 (six) hours as needed for moderate pain Max Daily Amount: 20 mg, Starting Wed 6/19/2024, Normal      pantoprazole (PROTONIX) 40 mg tablet Take 40 mg by mouth daily, Historical Med      potassium chloride (K-DUR,KLOR-CON) 10 mEq tablet Starting Thu 6/13/2019, Historical Med      predniSONE 20 mg tablet Historical Med      tamsulosin (FLOMAX) 0.4 mg TAKE 1 CAPSULE BY MOUTH EVERY DAY WITH DINNER, Normal      testosterone (ANDROGEL) 1.62 % TD gel pump PLACE 1 ACTUATION ON THE SKIN EVERY MORNING., Historical Med      tiZANidine (ZANAFLEX) 2 mg tablet Take 1 tablet (2 mg total) by mouth every 8 (eight) hours as needed for muscle spasms May make you drowsy. Do not drive until you know how it affects you. Do not take any other muscle relaxers while taking this medication, Starting Wed 6/19/2024, Normal      Trulicity 4.5 MG/0.5ML SOPN every 7 days On Mondays, Starting Mon 8/22/2022, Historical Med      zinc gluconate 50 mg tablet Take 50 mg by mouth in the morning., Historical Med       !! - Potential duplicate medications found. Please discuss with provider.        No discharge procedures on file.  Prior to Admission Medications   Prescriptions Last  Dose Informant Patient Reported? Taking?   Blood Glucose Monitoring Suppl (ONE TOUCH ULTRA 2) w/Device KIT  Self Yes No   Sig: USE TO TEST BLOOD GLUCOSE   Lancets 30G MISC  Self Yes No   Sig: 3 (three) times a day Use to test blood glucose   Multiple Vitamins-Minerals (multivitamin with minerals) tablet   Yes No   Sig: Take 1 tablet by mouth daily   NARCAN 4 MG/0.1ML LIQD  Self Yes No   Sig: Has if needed   OneTouch Ultra test strip  Self Yes No   Sig: USE TO TEST BLOOD GLUCOSE TWICE DAILY   Trulicity 4.5 MG/0.5ML SOPN  Self Yes No   Sig: every 7 days On    acetaminophen (TYLENOL) 650 mg CR tablet   No No   Sig: Take 1 tablet (650 mg total) by mouth every 8 (eight) hours as needed for mild pain   albuterol (PROVENTIL HFA,VENTOLIN HFA) 90 mcg/act inhaler  Self Yes No   Sig: Inhale 2 puffs every 6 (six) hours as needed for wheezing As needed   aspirin (ECOTRIN LOW STRENGTH) 81 mg EC tablet  Self Yes No   Sig: Take 81 mg by mouth daily   b complex vitamins capsule   Yes No   Sig: Take 1 capsule by mouth daily   bisacodyl (DULCOLAX) 5 mg EC tablet   No No   Sig: Take 1 tablet (5 mg total) by mouth once for 1 dose   budesonide (RINOCORT AQUA) 32 MCG/ACT nasal spray  Self Yes No   Si spray into each nostril 2 (two) times a day As needed   busPIRone (BUSPAR) 10 mg tablet   No No   Sig: TAKE 1 TABLET (10 MG TOTAL) BY MOUTH TWO (TWO) TIMES A DAY   chlorhexidine (PERIDEX) 0.12 % solution  Self Yes No   Sig: RINSE MOUTH WITH 15ML (1 CAPFUL) FOR 30 SECONDS IN MORNING AND EVENING AFTER BRUSHING, THEN SPIT   cloNIDine (CATAPRES) 0.1 mg tablet  Self Yes No   Sig: Take 0.1 mg by mouth 2 (two) times a day   clonazePAM (KlonoPIN) 1 mg tablet   No No   Sig: Take 0.5 tablets (0.5 mg total) by mouth 2 (two) times a day   docusate sodium (COLACE) 100 mg capsule  Self Yes No   Sig: Take 100 mg by mouth 2 (two) times a day   fluticasone (FLOVENT HFA) 220 mcg/act inhaler  Self Yes No   Sig: Inhale 1 puff daily as needed As needed  "  furosemide (LASIX) 40 mg tablet  Self Yes No   Sig: Take 40 mg by mouth as needed in the morning and 40 mg as needed in the evening.   losartan (COZAAR) 25 mg tablet  Self Yes No   lovastatin (MEVACOR) 10 MG tablet   Yes No   Sig: Take 10 mg by mouth daily at bedtime   mupirocin (BACTROBAN) 2 % ointment  Self Yes No   naloxone (NARCAN) 4 mg/0.1 mL nasal spray   No No   Sig: Administer 1 spray into a nostril. If no response after 2-3 minutes, give another dose in the other nostril using a new spray.   naproxen (NAPROSYN) 500 mg tablet  Self Yes No   oxyCODONE (Roxicodone) 5 immediate release tablet   No No   Sig: Take 1 tablet (5 mg total) by mouth every 6 (six) hours as needed for moderate pain Max Daily Amount: 20 mg   pantoprazole (PROTONIX) 40 mg tablet   Yes No   Sig: Take 40 mg by mouth daily   potassium chloride (K-DUR,KLOR-CON) 10 mEq tablet  Self Yes No   predniSONE 20 mg tablet   Yes No   tamsulosin (FLOMAX) 0.4 mg  Self No No   Sig: TAKE 1 CAPSULE BY MOUTH EVERY DAY WITH DINNER   testosterone (ANDROGEL) 1.62 % TD gel pump  Self Yes No   Sig: PLACE 1 ACTUATION ON THE SKIN EVERY MORNING.   Patient not taking: Reported on 11/16/2023   tiZANidine (ZANAFLEX) 2 mg tablet   No No   Sig: Take 1 tablet (2 mg total) by mouth every 8 (eight) hours as needed for muscle spasms May make you drowsy. Do not drive until you know how it affects you. Do not take any other muscle relaxers while taking this medication   zinc gluconate 50 mg tablet  Self Yes No   Sig: Take 50 mg by mouth in the morning.      Facility-Administered Medications: None                        Portions of the record may have been created with voice recognition software. Occasional wrong word or \"sound a like\" substitutions may have occurred due to the inherent limitations of voice recognition software. Read the chart carefully and recognize, using context, where substitutions have occurred.    Electronically signed by:  Cesar Hinojosa  "

## 2024-06-25 NOTE — TELEPHONE ENCOUNTER
Caller: Patient     Doctor: Debra     Reason for call: Patient states he has a fever and a irritated throat. Patient believes this is from his sx      Call back#: [phone]

## 2024-06-25 NOTE — ED PROVIDER NOTES
History  Chief Complaint   Patient presents with    Cough     Coughing that started over the weekend that is productive     69-year-old male presents emergency department complaining of cough and occasional fever for the last week.  He does have increased sputum production.  He is wearing cervical collar for recent surgical procedure.  All this information provided from the patient.  Significant other is present at this time.  Denies any nausea or vomiting.  No abdominal pain.  No shortness of breath or chest pain.        Prior to Admission Medications   Prescriptions Last Dose Informant Patient Reported? Taking?   Blood Glucose Monitoring Suppl (ONE TOUCH ULTRA 2) w/Device KIT  Self Yes No   Sig: USE TO TEST BLOOD GLUCOSE   Lancets 30G MISC  Self Yes No   Sig: 3 (three) times a day Use to test blood glucose   Multiple Vitamins-Minerals (multivitamin with minerals) tablet   Yes No   Sig: Take 1 tablet by mouth daily   NARCAN 4 MG/0.1ML LIQD  Self Yes No   Sig: Has if needed   OneTouch Ultra test strip  Self Yes No   Sig: USE TO TEST BLOOD GLUCOSE TWICE DAILY   Trulicity 4.5 MG/0.5ML SOPN  Self Yes No   Sig: every 7 days On    acetaminophen (TYLENOL) 650 mg CR tablet   No No   Sig: Take 1 tablet (650 mg total) by mouth every 8 (eight) hours as needed for mild pain   albuterol (PROVENTIL HFA,VENTOLIN HFA) 90 mcg/act inhaler  Self Yes No   Sig: Inhale 2 puffs every 6 (six) hours as needed for wheezing As needed   aspirin (ECOTRIN LOW STRENGTH) 81 mg EC tablet  Self Yes No   Sig: Take 81 mg by mouth daily   b complex vitamins capsule   Yes No   Sig: Take 1 capsule by mouth daily   bisacodyl (DULCOLAX) 5 mg EC tablet   No No   Sig: Take 1 tablet (5 mg total) by mouth once for 1 dose   budesonide (RINOCORT AQUA) 32 MCG/ACT nasal spray  Self Yes No   Si spray into each nostril 2 (two) times a day As needed   busPIRone (BUSPAR) 10 mg tablet   No No   Sig: TAKE 1 TABLET (10 MG TOTAL) BY MOUTH TWO (TWO) TIMES A DAY    chlorhexidine (PERIDEX) 0.12 % solution  Self Yes No   Sig: RINSE MOUTH WITH 15ML (1 CAPFUL) FOR 30 SECONDS IN MORNING AND EVENING AFTER BRUSHING, THEN SPIT   cloNIDine (CATAPRES) 0.1 mg tablet  Self Yes No   Sig: Take 0.1 mg by mouth 2 (two) times a day   clonazePAM (KlonoPIN) 1 mg tablet   No No   Sig: Take 0.5 tablets (0.5 mg total) by mouth 2 (two) times a day   docusate sodium (COLACE) 100 mg capsule  Self Yes No   Sig: Take 100 mg by mouth 2 (two) times a day   fluticasone (FLOVENT HFA) 220 mcg/act inhaler  Self Yes No   Sig: Inhale 1 puff daily as needed As needed   furosemide (LASIX) 40 mg tablet  Self Yes No   Sig: Take 40 mg by mouth as needed in the morning and 40 mg as needed in the evening.   losartan (COZAAR) 25 mg tablet  Self Yes No   lovastatin (MEVACOR) 10 MG tablet   Yes No   Sig: Take 10 mg by mouth daily at bedtime   mupirocin (BACTROBAN) 2 % ointment  Self Yes No   naloxone (NARCAN) 4 mg/0.1 mL nasal spray   No No   Sig: Administer 1 spray into a nostril. If no response after 2-3 minutes, give another dose in the other nostril using a new spray.   naproxen (NAPROSYN) 500 mg tablet  Self Yes No   oxyCODONE (Roxicodone) 5 immediate release tablet   No No   Sig: Take 1 tablet (5 mg total) by mouth every 6 (six) hours as needed for moderate pain Max Daily Amount: 20 mg   pantoprazole (PROTONIX) 40 mg tablet   Yes No   Sig: Take 40 mg by mouth daily   potassium chloride (K-DUR,KLOR-CON) 10 mEq tablet  Self Yes No   predniSONE 20 mg tablet   Yes No   tamsulosin (FLOMAX) 0.4 mg  Self No No   Sig: TAKE 1 CAPSULE BY MOUTH EVERY DAY WITH DINNER   testosterone (ANDROGEL) 1.62 % TD gel pump  Self Yes No   Sig: PLACE 1 ACTUATION ON THE SKIN EVERY MORNING.   Patient not taking: Reported on 11/16/2023   tiZANidine (ZANAFLEX) 2 mg tablet   No No   Sig: Take 1 tablet (2 mg total) by mouth every 8 (eight) hours as needed for muscle spasms May make you drowsy. Do not drive until you know how it affects you. Do  not take any other muscle relaxers while taking this medication   zinc gluconate 50 mg tablet  Self Yes No   Sig: Take 50 mg by mouth in the morning.      Facility-Administered Medications: None       Past Medical History:   Diagnosis Date    Acid reflux     Anxiety     Arthritis     Cellulitis     left ankle    CPAP (continuous positive airway pressure) dependence     as needed per patient    Diabetes mellitus (HCC)     Hypertension     Sleep apnea     Twitching        Past Surgical History:   Procedure Laterality Date    APPENDECTOMY      CERVICAL FUSION Bilateral 2024    Procedure: FUSION CERVICAL ANTERIOR W DISCECTOMY, C3-7 ACDF;  Surgeon: Conrad Richardson MD;  Location: [facility];  Service: Orthopedics    CHOLECYSTECTOMY      COLONOSCOPY      IR PICC PLACEMENT DOUBLE LUMEN  2024    KNEE ARTHROSCOPY      DE ARTHRP KNE CONDYLE&PLATU MEDIAL&LAT COMPARTMENTS Left 2016    Procedure: TOTAL  KNEE REPLACEMENT ;  Surgeon: Zack Montenegro MD;  Location: [facility];  Service: Orthopedics       Family History   Problem Relation Age of Onset    Diabetes Mother     Diabetes Father     Cancer Father      I have reviewed and agree with the history as documented.    E-Cigarette/Vaping    E-Cigarette Use Never User      E-Cigarette/Vaping Substances    Nicotine No     THC No     CBD No     Flavoring No     Other No     Unknown No      Social History     Tobacco Use    Smoking status: Former     Current packs/day: 0.00     Types: Cigarettes     Quit date:      Years since quittin.4    Smokeless tobacco: Never   Vaping Use    Vaping status: Never Used   Substance Use Topics    Alcohol use: Not Currently    Drug use: Not Currently     Comment: tried multiple drugs in the past        Review of Systems   Constitutional:  Positive for fatigue and fever.   Respiratory:  Positive for cough.    All other systems reviewed and are negative.      Physical Exam  ED Triage Vitals [24 1419]   Temperature Pulse  Respirations Blood Pressure SpO2   98.4 °F (36.9 °C) 87 17 155/80 93 %      Temp Source Heart Rate Source Patient Position - Orthostatic VS BP Location FiO2 (%)   Temporal Monitor Sitting Left arm --      Pain Score       --             Orthostatic Vital Signs  Vitals:    06/25/24 1419 06/25/24 1605   BP: 155/80 138/79   Pulse: 87 78   Patient Position - Orthostatic VS: Sitting Sitting       Physical Exam  Vitals and nursing note reviewed.   Constitutional:       General: He is not in acute distress.     Appearance: He is well-developed.   HENT:      Head: Normocephalic and atraumatic.   Eyes:      Conjunctiva/sclera: Conjunctivae normal.   Cardiovascular:      Rate and Rhythm: Normal rate and regular rhythm.      Heart sounds: No murmur heard.  Pulmonary:      Effort: Pulmonary effort is normal. No respiratory distress.      Breath sounds: Normal breath sounds.   Abdominal:      Palpations: Abdomen is soft.      Tenderness: There is no abdominal tenderness.   Musculoskeletal:         General: No swelling.      Cervical back: Neck supple.   Skin:     General: Skin is warm and dry.      Capillary Refill: Capillary refill takes less than 2 seconds.   Neurological:      Mental Status: He is alert.   Psychiatric:         Mood and Affect: Mood normal.         ED Medications  Medications   amoxicillin (AMOXIL) capsule 1,000 mg (1,000 mg Oral Given 6/25/24 1601)       Diagnostic Studies  Results Reviewed       None                   XR chest 2 views   Final Result by Shon Lim MD (06/25 1645)      No acute cardiopulmonary disease on this examination, which is somewhat limited secondary to low lung volumes.            Workstation performed: [id number]               Procedures  Procedures      ED Course                                       Medical Decision Making  Unremarkable physical exam.  Will pursue a chest x-ray to evaluate for pneumonia.  Chest x-ray is concerning for left lobar pneumonia.  Will treat with  antibiotics, amoxicillin and discharged home.  Strict return precautions applied.  Patient verbalized emergency department return precautions and instructed to follow-up with primary care provider.     Amount and/or Complexity of Data Reviewed  Radiology: ordered.    Risk  Prescription drug management.          Disposition  Final diagnoses:   Pneumonia     Time reflects when diagnosis was documented in both MDM as applicable and the Disposition within this note       Time User Action Codes Description Comment    6/25/2024  3:48 PM Edgra Rosenberg [J18.9] Pneumonia           ED Disposition       ED Disposition   Discharge    Condition   Stable    Date/Time   Tue Jun 25, 2024  3:48 PM    Comment   Edgar Obando discharge to home/self care.                   Follow-up Information       Follow up With Specialties Details Why Contact Info Additional Information    Ana Rosa Win MD Family Medicine   [address] [place] [id number] [phone]       [facility] Emergency Department Emergency Medicine Go to  If symptoms worsen [address] [place] [id number]  [phone] [facility] Emergency Department, [address] [place] [address], [id number]   [phone]            Discharge Medication List as of 6/25/2024  4:08 PM        START taking these medications    Details   amoxicillin (AMOXIL) 500 mg capsule Take 2 capsules (1,000 mg total) by mouth 3 (three) times a day for 7 days, Starting Tue 6/25/2024, Until Tue 7/2/2024, Normal           CONTINUE these medications which have NOT CHANGED    Details   acetaminophen (TYLENOL) 650 mg CR tablet Take 1 tablet (650 mg total) by mouth every 8 (eight) hours as needed for mild pain, Starting Fri 5/3/2024, Normal      albuterol (PROVENTIL HFA,VENTOLIN HFA) 90 mcg/act inhaler Inhale 2 puffs every 6 (six) hours as needed for wheezing As needed, Historical Med      aspirin (ECOTRIN LOW  STRENGTH) 81 mg EC tablet Take 81 mg by mouth daily, Historical Med      b complex vitamins capsule Take 1 capsule by mouth daily, Historical Med      bisacodyl (DULCOLAX) 5 mg EC tablet Take 1 tablet (5 mg total) by mouth once for 1 dose, Starting Fri 8/26/2022, Normal      Blood Glucose Monitoring Suppl (ONE TOUCH ULTRA 2) w/Device KIT USE TO TEST BLOOD GLUCOSE, Historical Med      budesonide (RINOCORT AQUA) 32 MCG/ACT nasal spray 1 spray into each nostril 2 (two) times a day As needed, Historical Med      busPIRone (BUSPAR) 10 mg tablet TAKE 1 TABLET (10 MG TOTAL) BY MOUTH TWO (TWO) TIMES A DAY, Normal      chlorhexidine (PERIDEX) 0.12 % solution RINSE MOUTH WITH 15ML (1 CAPFUL) FOR 30 SECONDS IN MORNING AND EVENING AFTER BRUSHING, THEN SPIT, Historical Med      clonazePAM (KlonoPIN) 1 mg tablet Take 0.5 tablets (0.5 mg total) by mouth 2 (two) times a day, Starting Mon 6/3/2024, Normal      cloNIDine (CATAPRES) 0.1 mg tablet Take 0.1 mg by mouth 2 (two) times a day, Historical Med      docusate sodium (COLACE) 100 mg capsule Take 100 mg by mouth 2 (two) times a day, Starting Mon 7/4/2022, Historical Med      fluticasone (FLOVENT HFA) 220 mcg/act inhaler Inhale 1 puff daily as needed As needed, Historical Med      furosemide (LASIX) 40 mg tablet Take 40 mg by mouth as needed in the morning and 40 mg as needed in the evening., Historical Med      Lancets 30G MISC 3 (three) times a day Use to test blood glucose, Starting Tue 9/5/2023, Historical Med      losartan (COZAAR) 25 mg tablet Historical Med      lovastatin (MEVACOR) 10 MG tablet Take 10 mg by mouth daily at bedtime, Historical Med      Multiple Vitamins-Minerals (multivitamin with minerals) tablet Take 1 tablet by mouth daily, Historical Med      mupirocin (BACTROBAN) 2 % ointment Starting Tue 1/11/2022, Historical Med      !! naloxone (NARCAN) 4 mg/0.1 mL nasal spray Administer 1 spray into a nostril. If no response after 2-3 minutes, give another dose in  the other nostril using a new spray., Normal      naproxen (NAPROSYN) 500 mg tablet Historical Med      !! NARCAN 4 MG/0.1ML LIQD Has if needed, Starting Thu 6/13/2019, Historical Med      OneTouch Ultra test strip USE TO TEST BLOOD GLUCOSE TWICE DAILY, Historical Med      oxyCODONE (Roxicodone) 5 immediate release tablet Take 1 tablet (5 mg total) by mouth every 6 (six) hours as needed for moderate pain Max Daily Amount: 20 mg, Starting Wed 6/19/2024, Normal      pantoprazole (PROTONIX) 40 mg tablet Take 40 mg by mouth daily, Historical Med      potassium chloride (K-DUR,KLOR-CON) 10 mEq tablet Starting Thu 6/13/2019, Historical Med      predniSONE 20 mg tablet Historical Med      tamsulosin (FLOMAX) 0.4 mg TAKE 1 CAPSULE BY MOUTH EVERY DAY WITH DINNER, Normal      testosterone (ANDROGEL) 1.62 % TD gel pump PLACE 1 ACTUATION ON THE SKIN EVERY MORNING., Historical Med      tiZANidine (ZANAFLEX) 2 mg tablet Take 1 tablet (2 mg total) by mouth every 8 (eight) hours as needed for muscle spasms May make you drowsy. Do not drive until you know how it affects you. Do not take any other muscle relaxers while taking this medication, Starting Wed 6/19/2024, Normal      Trulicity 4.5 MG/0.5ML SOPN every 7 days On Mondays, Starting Mon 8/22/2022, Historical Med      zinc gluconate 50 mg tablet Take 50 mg by mouth in the morning., Historical Med       !! - Potential duplicate medications found. Please discuss with provider.        No discharge procedures on file.    PDMP Review         Value Time User    PDMP Reviewed  Yes 6/3/2024  8:35 AM Ana Rosa Win MD             ED Provider  Attending physically available and evaluated Edgar Obando. I managed the patient along with the ED Attending.    Electronically Signed by           Edgar Rosenberg DO  06/25/24 2003

## 2024-06-25 NOTE — TELEPHONE ENCOUNTER
06/25/24    Pt called office Per his PCP Miss. Fox Advice.   Pt last PCP Appt 06/17/24 (OUT OF NETWORK)     Pt had a little difficulty speaking so I proceeded to ask him permission (with his consent) to speak with his Wife (she was answering for the Pt in the background).    Pt gave the OK / CONSENT for Miss. Tyler (PT WIFE) to speak on his behalf.       Spoke to Miss. Tyler, and she stated that Pt PCP sent him to see Neurology, but she was a little skeptical the reason why due to Pt symptoms.    I ask Nayeli Spence what were Pt Symptoms and Pt stated that he was “COUGHING, THROAT HURTS & BOTTOM PART OF BODY FEELS WARM”    I ask Miss. Tyler and Pt if they were sure, it was [facility] that Pt was referral too.   Miss. Tyler was also wondering if it was, and she stated “Maybe we should call the Doctors Office again.    I agreed Miss. Tyler to contact Pt Doctors Office for Clarification to WHOM / SPECIALTY Pt was referral too.     Provided Office Fax # [phone] incase he was Indeed Referral to [facility] and have referral fax.       PT PCP OFFICE:[facility]  ADD: [id number], [place] [address]  CONTACT # [phone]      Any questions, please contact Pt.    Thank You.       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

## 2024-06-25 NOTE — DISCHARGE INSTRUCTIONS
Take amoxicillin, 3 times a day, for 7 days.  Follow-up with primary care provider.  Return to emergency department if symptoms worsen.

## 2024-06-25 NOTE — TELEPHONE ENCOUNTER
Caller: Patient- Edgar Obando    Doctor: Dr Richardson    Reason for call: Patient is calling back in from a missed call wanting to speak with triage nurse as he is not feeling well and not sure what else to do. Please reach out to patient.     Call back#: [phone]

## 2024-06-26 ENCOUNTER — VBI (OUTPATIENT)
Dept: FAMILY MEDICINE CLINIC | Facility: CLINIC | Age: 69
End: 2024-06-26

## 2024-06-26 NOTE — TELEPHONE ENCOUNTER
06/26/24 8:42 AM    Patient contacted post ED visit, [facility] department spoke with patient/caregiver and outreach was successful.    Thank you.  Bhavya Nguyen  [facility]

## 2024-06-28 ENCOUNTER — TELEPHONE (OUTPATIENT)
Age: 69
End: 2024-06-28

## 2024-06-28 DIAGNOSIS — Z98.1 S/P CERVICAL SPINAL FUSION: ICD-10-CM

## 2024-06-28 NOTE — TELEPHONE ENCOUNTER
Reason for call:   [x] Refill   [] Prior Auth  [] Other:     Office:   [] PCP/Provider -   [x] Specialty/Provider - SHARON ward Gottschall    Medication: oxycodone 5 mg, 1 q6 prn, 30      Pharm[facility]    Does the patient have enough for 3 days?   [] Yes   [x] No - Send as HP to POD

## 2024-06-28 NOTE — TELEPHONE ENCOUNTER
Patient called the RX Refill Line. Message is being forwarded to the office.     Patient is requesting a call back from clinical staff,    Please contact patient at [phone]

## 2024-07-02 ENCOUNTER — TELEPHONE (OUTPATIENT)
Dept: OTHER | Facility: HOSPITAL | Age: 69
End: 2024-07-02

## 2024-07-02 RX ORDER — OXYCODONE HYDROCHLORIDE 5 MG/1
5 TABLET ORAL EVERY 6 HOURS PRN
Qty: 30 TABLET | Refills: 0 | Status: SHIPPED | OUTPATIENT
Start: 2024-07-02

## 2024-07-02 NOTE — TELEPHONE ENCOUNTER
Pt called to see if his pain meds were called into the pharmacy. I checked chart and explained the medication was called into pharmacy this am.

## 2024-07-09 ENCOUNTER — OFFICE VISIT (OUTPATIENT)
Dept: OBGYN CLINIC | Facility: HOSPITAL | Age: 69
End: 2024-07-09
Payer: COMMERCIAL

## 2024-07-09 VITALS
HEART RATE: 79 BPM | BODY MASS INDEX: 40.18 KG/M2 | DIASTOLIC BLOOD PRESSURE: 77 MMHG | HEIGHT: 66 IN | SYSTOLIC BLOOD PRESSURE: 143 MMHG | WEIGHT: 250 LBS

## 2024-07-09 DIAGNOSIS — G89.29 CHRONIC PAIN OF RIGHT KNEE: ICD-10-CM

## 2024-07-09 DIAGNOSIS — M17.11 PRIMARY OSTEOARTHRITIS OF RIGHT KNEE: Primary | ICD-10-CM

## 2024-07-09 DIAGNOSIS — M25.561 CHRONIC PAIN OF RIGHT KNEE: ICD-10-CM

## 2024-07-09 PROCEDURE — 99213 OFFICE O/P EST LOW 20 MIN: CPT | Performed by: ORTHOPAEDIC SURGERY

## 2024-07-09 PROCEDURE — 20610 DRAIN/INJ JOINT/BURSA W/O US: CPT | Performed by: ORTHOPAEDIC SURGERY

## 2024-07-09 RX ORDER — TRIAMCINOLONE ACETONIDE 40 MG/ML
80 INJECTION, SUSPENSION INTRA-ARTICULAR; INTRAMUSCULAR
Status: COMPLETED | OUTPATIENT
Start: 2024-07-09 | End: 2024-07-09

## 2024-07-09 RX ORDER — ROPIVACAINE HYDROCHLORIDE 2 MG/ML
4 INJECTION, SOLUTION EPIDURAL; INFILTRATION; PERINEURAL
Status: COMPLETED | OUTPATIENT
Start: 2024-07-09 | End: 2024-07-09

## 2024-07-09 RX ADMIN — TRIAMCINOLONE ACETONIDE 80 MG: 40 INJECTION, SUSPENSION INTRA-ARTICULAR; INTRAMUSCULAR at 13:15

## 2024-07-09 RX ADMIN — ROPIVACAINE HYDROCHLORIDE 4 ML: 2 INJECTION, SOLUTION EPIDURAL; INFILTRATION; PERINEURAL at 13:15

## 2024-07-09 NOTE — PROGRESS NOTES
Assessment:   Diagnosis ICD-10-CM Associated Orders   1. Primary osteoarthritis of right knee  M17.11 Large joint arthrocentesis: R knee      2. Chronic pain of right knee  M25.561 Large joint arthrocentesis: R knee    G89.29           Plan:  Diagnosis, treatment options and associated risks were discussed with the patient including no treatment, nonsurgical treatment and potential for surgical intervention.  The patient was given the opportunity to ask questions regarding each.   Future recommendation to pursue elective right total knee arthroplasty once he fully recovers from his ACDF.  In the interim he was offered, accepted, performed injection of cortisone to his right knee today for symptomatic relief.  He tolerated the procedure well.  Ice and postinjection protocol advised.  Weightbearing activities as tolerated.  He was counseled on general wellness and weight loss measures.    To do next visit:  Return in about 3 months (around 10/9/2024) for re-check.    The above stated was discussed in layman's terms and the patient expressed understanding.  All questions were answered to the patient's satisfaction.       Scribe Attestation      I,:  Porsper Spencer am acting as a scribe while in the presence of the attending physician.:       I,:  Zack Montenegro MD personally performed the services described in this documentation    as scribed in my presence.:               Subjective:   Edgar Obando is a 69 y.o. male who presents today for repeat evaluation of his right knee, known osteoarthritis.  He returns today with pain that increases with weightbearing activities.  His stiffness getting up with her prolonged sedentary positions.  He responds favorably to injections of cortisone every 3 months or so.  He did have viscosupplementation and a one-shot preparation of Durolane without any appreciable relief.  He is currently recovering from an ACDF due to cervical myelopathy.  Pain scale 7/10      Review of  systems negative unless otherwise specified in HPI  Review of Systems    Past Medical History:   Diagnosis Date    Acid reflux     Anxiety     Arthritis     Cellulitis     left ankle    CPAP (continuous positive airway pressure) dependence     as needed per patient    Diabetes mellitus (HCC)     Hypertension     Sleep apnea     Twitching        Past Surgical History:   Procedure Laterality Date    APPENDECTOMY      CERVICAL FUSION Bilateral 2024    Procedure: FUSION CERVICAL ANTERIOR W DISCECTOMY, C3-7 ACDF;  Surgeon: Conrad Richardson MD;  Location: [facility];  Service: Orthopedics    CHOLECYSTECTOMY      COLONOSCOPY      IR PICC PLACEMENT DOUBLE LUMEN  2024    KNEE ARTHROSCOPY      MI ARTHRP KNE CONDYLE&PLATU MEDIAL&LAT COMPARTMENTS Left 2016    Procedure: TOTAL  KNEE REPLACEMENT ;  Surgeon: Zack Montenegro MD;  Location: [facility];  Service: Orthopedics       Family History   Problem Relation Age of Onset    Diabetes Mother     Diabetes Father     Cancer Father        Social History     Occupational History    Occupation: Attendance officer    Occupation: Retired-    Tobacco Use    Smoking status: Former     Current packs/day: 0.00     Types: Cigarettes     Quit date:      Years since quittin.5    Smokeless tobacco: Never   Vaping Use    Vaping status: Never Used   Substance and Sexual Activity    Alcohol use: Not Currently    Drug use: Not Currently     Comment: tried multiple drugs in the past    Sexual activity: Not on file         Current Outpatient Medications:     acetaminophen (TYLENOL) 650 mg CR tablet, Take 1 tablet (650 mg total) by mouth every 8 (eight) hours as needed for mild pain, Disp: 30 tablet, Rfl: 0    albuterol (PROVENTIL HFA,VENTOLIN HFA) 90 mcg/act inhaler, Inhale 2 puffs every 6 (six) hours as needed for wheezing As needed, Disp: , Rfl:     aspirin (ECOTRIN LOW STRENGTH) 81 mg EC tablet, Take 81 mg by mouth daily, Disp: , Rfl:     b complex vitamins  capsule, Take 1 capsule by mouth daily, Disp: , Rfl:     bisacodyl (DULCOLAX) 5 mg EC tablet, Take 1 tablet (5 mg total) by mouth once for 1 dose, Disp: 2 tablet, Rfl: 0    Blood Glucose Monitoring Suppl (ONE TOUCH ULTRA 2) w/Device KIT, USE TO TEST BLOOD GLUCOSE, Disp: , Rfl:     budesonide (RINOCORT AQUA) 32 MCG/ACT nasal spray, 1 spray into each nostril 2 (two) times a day As needed, Disp: , Rfl:     busPIRone (BUSPAR) 10 mg tablet, TAKE 1 TABLET (10 MG TOTAL) BY MOUTH TWO (TWO) TIMES A DAY, Disp: 180 tablet, Rfl: 0    chlorhexidine (PERIDEX) 0.12 % solution, RINSE MOUTH WITH 15ML (1 CAPFUL) FOR 30 SECONDS IN MORNING AND EVENING AFTER BRUSHING, THEN SPIT, Disp: , Rfl:     clonazePAM (KlonoPIN) 1 mg tablet, Take 0.5 tablets (0.5 mg total) by mouth 2 (two) times a day, Disp: 60 tablet, Rfl: 0    cloNIDine (CATAPRES) 0.1 mg tablet, Take 0.1 mg by mouth 2 (two) times a day, Disp: , Rfl:     docusate sodium (COLACE) 100 mg capsule, Take 100 mg by mouth 2 (two) times a day, Disp: , Rfl:     fluticasone (FLOVENT HFA) 220 mcg/act inhaler, Inhale 1 puff daily as needed As needed, Disp: , Rfl:     furosemide (LASIX) 40 mg tablet, Take 40 mg by mouth as needed in the morning and 40 mg as needed in the evening., Disp: , Rfl:     Lancets 30G MISC, 3 (three) times a day Use to test blood glucose, Disp: , Rfl:     losartan (COZAAR) 25 mg tablet, , Disp: , Rfl:     lovastatin (MEVACOR) 10 MG tablet, Take 10 mg by mouth daily at bedtime, Disp: , Rfl:     Multiple Vitamins-Minerals (multivitamin with minerals) tablet, Take 1 tablet by mouth daily, Disp: , Rfl:     mupirocin (BACTROBAN) 2 % ointment, , Disp: , Rfl:     naloxone (NARCAN) 4 mg/0.1 mL nasal spray, Administer 1 spray into a nostril. If no response after 2-3 minutes, give another dose in the other nostril using a new spray., Disp: 1 each, Rfl: 1    naproxen (NAPROSYN) 500 mg tablet, , Disp: , Rfl:     NARCAN 4 MG/0.1ML LIQD, Has if needed, Disp: , Rfl:     OneTouch  Ultra test strip, USE TO TEST BLOOD GLUCOSE TWICE DAILY, Disp: , Rfl:     oxyCODONE (Roxicodone) 5 immediate release tablet, Take 1 tablet (5 mg total) by mouth every 6 (six) hours as needed for moderate pain Max Daily Amount: 20 mg, Disp: 30 tablet, Rfl: 0    pantoprazole (PROTONIX) 40 mg tablet, Take 40 mg by mouth daily, Disp: , Rfl:     potassium chloride (K-DUR,KLOR-CON) 10 mEq tablet, , Disp: , Rfl:     predniSONE 20 mg tablet, , Disp: , Rfl:     tamsulosin (FLOMAX) 0.4 mg, TAKE 1 CAPSULE BY MOUTH EVERY DAY WITH DINNER, Disp: 90 capsule, Rfl: 2    testosterone (ANDROGEL) 1.62 % TD gel pump, PLACE 1 ACTUATION ON THE SKIN EVERY MORNING. (Patient not taking: Reported on 11/16/2023), Disp: , Rfl:     tiZANidine (ZANAFLEX) 2 mg tablet, Take 1 tablet (2 mg total) by mouth every 8 (eight) hours as needed for muscle spasms May make you drowsy. Do not drive until you know how it affects you. Do not take any other muscle relaxers while taking this medication, Disp: 30 tablet, Rfl: 0    Trulicity 4.5 MG/0.5ML SOPN, every 7 days On Mondays, Disp: , Rfl:     zinc gluconate 50 mg tablet, Take 50 mg by mouth in the morning., Disp: , Rfl:     No Known Allergies         Vitals:    07/09/24 1312   BP: 143/77   Pulse: 79       Body mass index is 40.35 kg/m².  Wt Readings from Last 3 Encounters:   07/09/24 113 kg (250 lb)   06/14/24 112 kg (246 lb 14.6 oz)   05/17/24 112 kg (246 lb 14.6 oz)       Objective:                    Right Knee Exam     Tenderness   The patient is experiencing tenderness in the medial joint line.    Range of Motion   Extension:  5   Flexion:  110 (with crepitation and stiffness)     Other   Erythema: absent  Sensation: normal  Swelling: mild  Effusion: no effusion present    Comments:    Varus alignment with bony enlargement medially            Diagnostics, reviewed and taken today if performed as documented:    None performed        Procedures, if performed today:    Large joint arthrocentesis: R  "knee  Universal Protocol:  Consent: Verbal consent obtained.  Risks and benefits: risks, benefits and alternatives were discussed  Consent given by: patient  Time out: Immediately prior to procedure a \"time out\" was called to verify the correct patient, procedure, equipment, support staff and site/side marked as required.  Timeout called at: 7/9/2024 1:23 PM.  Patient understanding: patient states understanding of the procedure being performed  Site marked: the operative site was marked  Patient identity confirmed: verbally with patient  Supporting Documentation  Indications: pain and diagnostic evaluation   Procedure Details  Location: knee - R knee  Preparation: Patient was prepped and draped in the usual sterile fashion  Needle size: 22 G  Ultrasound guidance: no  Approach: anteromedial  Medications administered: 4 mL ropivacaine 0.2 %; 80 mg triamcinolone acetonide 40 mg/mL    Patient tolerance: patient tolerated the procedure well with no immediate complications  Dressing:  Sterile dressing applied            Portions of the record may have been created with voice recognition software.  Occasional wrong word or \"sound a like\" substitutions may have occurred due to the inherent limitations of voice recognition software.  Read the chart carefully and recognize, using context, where substitutions have occurred.  "

## 2024-07-25 DIAGNOSIS — F41.9 ANXIETY: ICD-10-CM

## 2024-07-25 RX ORDER — BUSPIRONE HYDROCHLORIDE 10 MG/1
TABLET ORAL
Qty: 180 TABLET | Refills: 1 | Status: SHIPPED | OUTPATIENT
Start: 2024-07-25

## 2024-07-26 ENCOUNTER — OFFICE VISIT (OUTPATIENT)
Dept: OBGYN CLINIC | Facility: HOSPITAL | Age: 69
End: 2024-07-26

## 2024-07-26 ENCOUNTER — HOSPITAL ENCOUNTER (OUTPATIENT)
Dept: RADIOLOGY | Facility: HOSPITAL | Age: 69
Discharge: HOME | End: 2024-07-26
Attending: ORTHOPAEDIC SURGERY
Payer: COMMERCIAL

## 2024-07-26 VITALS
WEIGHT: 249.12 LBS | BODY MASS INDEX: 40.04 KG/M2 | SYSTOLIC BLOOD PRESSURE: 144 MMHG | DIASTOLIC BLOOD PRESSURE: 85 MMHG | HEART RATE: 71 BPM | HEIGHT: 66 IN

## 2024-07-26 DIAGNOSIS — Z98.1 S/P CERVICAL SPINAL FUSION: Primary | ICD-10-CM

## 2024-07-26 DIAGNOSIS — Z98.1 S/P CERVICAL SPINAL FUSION: ICD-10-CM

## 2024-07-26 PROCEDURE — 99024 POSTOP FOLLOW-UP VISIT: CPT | Performed by: ORTHOPAEDIC SURGERY

## 2024-07-26 PROCEDURE — 72040 X-RAY EXAM NECK SPINE 2-3 VW: CPT

## 2024-07-26 NOTE — PROGRESS NOTES
"POST OP NOTE       Edgar Topher  here today s/p C3-7 ACDF      Surgery date: 5/2/2024    Subjective: Preoperative symptoms were neck pain, decreased dexterity and balance issues.Today, he reports he is doing well, improved since pre-operatively. Reports continued improvement in balance and hand numbness/tingling. Also reports decreased frequency of dropping items.  No significant neck pain.  Only taking tylenol occasionally. Has been wearing hard cervical collar.  Patient admits to compliance with activity restrictions.  Denies any fevers, chills, and night sweats.  No cough, no shortness of breath.  No chest pain, no palpitations.      Objective:  /85   Pulse 71   Ht 5' 6\" (1.676 m)   Wt 113 kg (249 lb 1.9 oz)   BMI 40.21 kg/m²     Strength:  Upper Extremity Motor Function     Right  Left    Deltoid  5/5  5/5    Bicep  5/5  5/5    Wrist extension  5/5  5/5    Tricep  5/5  5/5    Finger flexion/  5/5  5/5    Hand intrinsic  5/5  5/5      Sensation: intact  DTR: intact  Gait: using cane for ambulation but notes he does not need it, ambulated independently in clinic     Incision healing extremely well.  No signs of erythema, discharge, or purulence.  There is no appreciable effusion.    Calf: soft, non tender, no swelling or erythema     Imaging:  I have reviewed and independently visualized the imaging studies (07/26/24)  myself and my interpretation is the following: Instrumentation in place and in appropriate alignment.    Assessment: s/p C3-7 ACDF on 5/2/2024. Overall doing well, continues with improvement.    Plan:  Patient was instructed to do the following   -Discontinue hard cervical collar, okay to wear soft collar PRN for comfort  -Physical therapy script provided for cervical ROM, upper extremity strengthening, posture and hand dexterity, okay to advance lifting to 15-20lbs  -Take pain medication as prescribed if needed.   -Follow up for 1 year post op evaluation   Edgar Obando was " instructed to call the office with any concerns or questions.

## 2024-07-30 ENCOUNTER — EVALUATION (OUTPATIENT)
Dept: PHYSICAL THERAPY | Facility: REHABILITATION | Age: 69
End: 2024-07-30
Payer: COMMERCIAL

## 2024-07-30 DIAGNOSIS — G89.29 CHRONIC NECK PAIN: ICD-10-CM

## 2024-07-30 DIAGNOSIS — M54.2 CHRONIC NECK PAIN: ICD-10-CM

## 2024-07-30 DIAGNOSIS — R26.89 POOR BALANCE: ICD-10-CM

## 2024-07-30 DIAGNOSIS — Z98.1 S/P CERVICAL SPINAL FUSION: Primary | ICD-10-CM

## 2024-07-30 PROCEDURE — 97162 PT EVAL MOD COMPLEX 30 MIN: CPT | Performed by: PHYSICAL THERAPIST

## 2024-07-30 PROCEDURE — 97112 NEUROMUSCULAR REEDUCATION: CPT | Performed by: PHYSICAL THERAPIST

## 2024-07-30 NOTE — PROGRESS NOTES
PT Evaluation     Today's date: 2024  Patient name: Edgar Obando  : 1955  MRN: [id number]  Referring provider: Morelia Sethi*  Dx:   Encounter Diagnosis     ICD-10-CM    1. S/P cervical spinal fusion  Z98.1 Ambulatory referral to Physical Therapy      2. Chronic neck pain  M54.2     G89.29       3. Poor balance  R26.89                      Assessment  Impairments: abnormal coordination, abnormal gait, abnormal muscle firing, abnormal muscle tone, abnormal or restricted ROM, abnormal movement, activity intolerance, impaired balance, impaired physical strength, lacks appropriate home exercise program, pain with function, weight-bearing intolerance, poor posture , participation limitations and endurance  Symptom irritability: moderate    Assessment details: Problem List:  1) poor cervical spine AROM  2) poor static and dynamic balance     Edgar Obando is a pleasant 69 y.o. male who presents to physical therapy s/p C3-7 ACDF on 2024. Currently,  he has a primary movement impairment list consisting of, decreased cervical spine range of motion, poor static and dynamic balance, decreased cervicothoracic joint proprioception, and poor upper body strength and endurance resulting in pathoanatomical symptoms consistent with referring diagnosis.  No further referral appears necessary at this time based upon examination results.  I expect he will improve with physical therapy over the next 6-8 weeks focusing on addressing his current impairments.      Understanding of Dx/Px/POC: good     Prognosis: good    Goals  Impairment Based Goals:  1. Decreased pain by 50% in 4 weeks.  2. Improve ROM to WFL by discharge.   3. Improve strength by 1/2 measure in 6 weeks.   4. Improve FOTO greater than predicted outcome score by discharge.      Functional Based Goals: To be met upon discharge  1. Patient will be able to ambulate through his community independently.   2. Patient will be fully independent with HEP by  discharge  3. Patient will be able to manage symptoms independently.         Plan  Patient would benefit from: skilled physical therapy  Referral necessary: No    Planned therapy interventions: abdominal trunk stabilization, activity modification, balance, balance/weight bearing training, behavior modification, body mechanics training, breathing training, coordination, compression, flexibility, functional ROM exercises, gait training, graded activity, graded exercise, graded motor, home exercise program, therapeutic activities, therapeutic exercise, stretching, strengthening, postural training, patient/caregiver education, neuromuscular re-education, nerve gliding, motor coordination training, massage, joint mobilization, IASTM and manual therapy    Frequency: 2x week  Plan of Care beginning date: 2024  Plan of Care expiration date: 10/8/2024  Treatment plan discussed with: patient        Subjective Evaluation    History of Present Illness  Date of surgery: 2024  Mechanism of injury: surgery  Mechanism of injury: I did have a fusion recently in my neck. I do feel that I need to gain range of motion in my neck. I also feel that my right arm I can't lift it as high as the left arm. Right after the surgery I was in a lot of pain, but it has become more manageable. My legs have also given me problems for a long time. I get around ok, but not as good as I would like. I try to do as much as I can with home, but I have been limited.   Patient Goals  Patient goals for therapy: decreased pain, improved balance, increased motion, return to sport/leisure activities, independence with ADLs/IADLs and increased strength    Pain  Current pain ratin  At best pain ratin  At worst pain ratin  Quality: dull ache, discomfort and tight  Aggravating factors: walking, standing, stair climbing and lifting (reaching, grabbing, looking up, looking to the side, quick movements)    Treatments  Previous treatment: physical  therapy and medication  Current treatment: physical therapy        Objective     Concurrent Complaints  Positive for disturbed sleep. Negative for dizziness, headaches, nausea/motion sickness, difficulty breathing, respiratory pain, visual change and history of cancer    General Comments:      Cervical/Thoracic Comments  Postural Observation   Protruded Head: Yes  Lateral Deviation: No    Myotomes  Strength WNL bilaterally    Dermatomes  Sensation Intact Bilaterally    TU seconds with SPC  5x sit to stand: 30 seconds    Cervical Active Range of Motion  Movement Loss Isaías Mod Min Nil Symptoms  Protrusion x    All movements produced pain  Flexion  x      Retraction x       Extension x       R Lat Flex x       L Lat Flex x       R Rotation x       L Rotation x        Sharp Purcer: Negative  Alar Ligament: Negative  Modified VBI: Negative   Neuro Exam:     Headaches   Patient reports headaches: No.              POC expires Auth Expiration date PT/OT + Visit Limit?   10/8/2024  2024 99        Visit/Unit Tracking  AUTH Status:  Date         [employer] - BOMN Used 1                 Precautions:  s/p C3-7 ACDF on 2024       Manuals             Cervical PROM                                                    Neuro Re-Ed             Patient Ed 15'            Digi              Row             LPD             BSER             Shoulder Flexion - Dumbbells             Nancy Negotiation             Half Tandem Balance             LOS Biodex             Romberg Balance             Ther Ex             UBE             Neck Circles             Thoracic Extension Chair             Wall Slide             Cervical AROM Each Way             Walking on Foam Tandem/Sidesteps                                       Ther Activity                                       Gait Training                                       Modalities

## 2024-08-01 ENCOUNTER — OFFICE VISIT (OUTPATIENT)
Dept: PHYSICAL THERAPY | Facility: REHABILITATION | Age: 69
End: 2024-08-01
Payer: COMMERCIAL

## 2024-08-01 DIAGNOSIS — R26.89 POOR BALANCE: ICD-10-CM

## 2024-08-01 DIAGNOSIS — Z98.1 S/P CERVICAL SPINAL FUSION: Primary | ICD-10-CM

## 2024-08-01 DIAGNOSIS — G89.29 CHRONIC NECK PAIN: ICD-10-CM

## 2024-08-01 DIAGNOSIS — M54.2 CHRONIC NECK PAIN: ICD-10-CM

## 2024-08-01 PROCEDURE — 97110 THERAPEUTIC EXERCISES: CPT | Performed by: PHYSICAL THERAPIST

## 2024-08-01 PROCEDURE — 97112 NEUROMUSCULAR REEDUCATION: CPT | Performed by: PHYSICAL THERAPIST

## 2024-08-01 NOTE — PROGRESS NOTES
Daily Note     Today's date: 2024  Patient name: Edgar Obando  : 1955  MRN: [id number]  Referring provider: Morelia Sethi*  Dx:   Encounter Diagnosis     ICD-10-CM    1. S/P cervical spinal fusion  Z98.1       2. Chronic neck pain  M54.2     G89.29       3. Poor balance  R26.89                      Subjective: Patient reports no change in status upon arrival.       Objective: See treatment diary below      Assessment: Tolerated treatment well. Responded well to initial interventions today. Most challenged with maintaining balance on foam pad in romberg stance. Reported moderate fatgiue post tx. Patient would benefit from continued PT. 1:1 with PT for entirety.       Plan: Continue per plan of care.      POC expires Auth Expiration date PT/OT + Visit Limit?   10/8/2024  2024 99        Visit/Unit Tracking  AUTH Status:  Date         [employer] - B[employer] Used 1                 Precautions:  s/p C3-7 ACDF on 2024       Manuals  8/1           Cervical PROM                                                    Neuro Re-Ed             Patient Ed 15'            Digi              Row             LPD Seated  2x12 8#           BSER             Shoulder Flexion - Dumbbells             Nancy Negotiation  3 laps F/L           Half Tandem Balance             LOS Biodex             Romberg Balance  Foam 1'x3           Ther Ex             UBE  4/4 l1           Neck Circles             Shoulder Flexion with Dowel in Chair  2x10 with PT OP at end range           Thoracic Extension Chair             Wall Slide             Cervical AROM Each Way             Walking on Foam Tandem/Sidesteps             Seated Row   2x12 10#           Sit to Stand  2x10 with riser           Ther Activity                                       Gait Training                                       Modalities

## 2024-08-05 ENCOUNTER — OFFICE VISIT (OUTPATIENT)
Dept: PHYSICAL THERAPY | Facility: REHABILITATION | Age: 69
End: 2024-08-05
Payer: COMMERCIAL

## 2024-08-05 DIAGNOSIS — Z98.1 S/P CERVICAL SPINAL FUSION: Primary | ICD-10-CM

## 2024-08-05 DIAGNOSIS — R26.89 POOR BALANCE: ICD-10-CM

## 2024-08-05 DIAGNOSIS — G89.29 CHRONIC NECK PAIN: ICD-10-CM

## 2024-08-05 DIAGNOSIS — M54.2 CHRONIC NECK PAIN: ICD-10-CM

## 2024-08-05 PROCEDURE — 97110 THERAPEUTIC EXERCISES: CPT

## 2024-08-05 PROCEDURE — 97112 NEUROMUSCULAR REEDUCATION: CPT

## 2024-08-05 NOTE — PROGRESS NOTES
Daily Note     Today's date: 2024  Patient name: Edgar Obando  : 1955  MRN: [id number]  Referring provider: Morelia Sethi*  Dx:   Encounter Diagnosis     ICD-10-CM    1. S/P cervical spinal fusion  Z98.1       2. Chronic neck pain  M54.2     G89.29       3. Poor balance  R26.89                      Subjective: Pt. reports no change in status upon arrival.      Objective: See treatment diary below      Assessment: Tolerated treatment well. Patient would benefit from continued PT.  Pt. able to complete all exercises with no increase in pain during or after session.  Pt able to navigate obstacles independently with no HHA both fwd and lat.  Pt. 1:1 with PTA for entirety.      Plan: Continue per plan of care.      POC expires Auth Expiration date PT/OT + Visit Limit?   10/8/2024  2024 99        Visit/Unit Tracking  AUTH Status:  Date        [employer] - BOMN Used 1 2                Precautions:  s/p C3-7 ACDF on 2024       Manuals           Cervical PROM                                                    Neuro Re-Ed             Patient Ed 15'            Digi              Row             LPD Seated  2x12 8# 2x12 10#          BSER             Shoulder Flexion - Dumbbells             Nancy Negotiation  3 laps F/L 3 laps ea F/L          Half Tandem Balance             LOS Biodex             Romberg Balance  Foam 1'x3 np          Ther Ex             UBE  4/4 l1 4/4           Neck Circles             Shoulder Flexion with Dowel in Chair  2x10 with PT OP at end range 2x10 w/OP at end range          Thoracic Extension Chair             Wall Slide             Cervical AROM Each Way             Walking on Foam Tandem/Sidesteps             Seated Row   2x12 10# 2x12 10#          Sit to Stand  2x10 with riser 2x10 with airex          Ther Activity                                       Gait Training                                       Modalities

## 2024-08-08 ENCOUNTER — TELEPHONE (OUTPATIENT)
Age: 69
End: 2024-08-08

## 2024-08-08 ENCOUNTER — OFFICE VISIT (OUTPATIENT)
Dept: PHYSICAL THERAPY | Facility: REHABILITATION | Age: 69
End: 2024-08-08
Payer: COMMERCIAL

## 2024-08-08 DIAGNOSIS — M54.2 CHRONIC NECK PAIN: ICD-10-CM

## 2024-08-08 DIAGNOSIS — G89.29 CHRONIC NECK PAIN: ICD-10-CM

## 2024-08-08 DIAGNOSIS — R26.89 POOR BALANCE: ICD-10-CM

## 2024-08-08 DIAGNOSIS — Z98.1 S/P CERVICAL SPINAL FUSION: Primary | ICD-10-CM

## 2024-08-08 PROCEDURE — 97110 THERAPEUTIC EXERCISES: CPT | Performed by: PHYSICAL THERAPIST

## 2024-08-08 PROCEDURE — 97112 NEUROMUSCULAR REEDUCATION: CPT | Performed by: PHYSICAL THERAPIST

## 2024-08-08 NOTE — PROGRESS NOTES
Daily Note     Today's date: 2024  Patient name: Edgar Obando  : 1955  MRN: [id number]  Referring provider: Morelia Stehi*  Dx:   Encounter Diagnosis     ICD-10-CM    1. S/P cervical spinal fusion  Z98.1       2. Chronic neck pain  M54.2     G89.29       3. Poor balance  R26.89                      Subjective: Patient reports no change in status upon arrival.       Objective: See treatment diary below      Assessment: Tolerated treatment well. Patient would benefit from continued PT. 1:1 with PT for 30 minutes.       Plan: Continue per plan of care.      POC expires Auth Expiration date PT/OT + Visit Limit?   10/8/2024  2024 99        Visit/Unit Tracking  AUTH Status:  Date       [employer] - B[employer] Used 1 2 3               Precautions:  s/p C3-7 ACDF on 2024       Manuals          Cervical PROM                                                    Neuro Re-Ed             Patient Ed 15'            Digi              Row             LPD Seated  2x12 8# 2x12 10# 2x12 10#         BSER             Shoulder Flexion - Dumbbells             Nancy Negotiation  3 laps F/L 3 laps ea F/L 3 laps ea F/L         Half Tandem Balance             LOS Biodex             Romberg Balance  Foam 1'x3 np          Ther Ex             UBE  4/4 l1 4/4  4/4         Neck Circles             Shoulder Flexion with Dowel in Chair  2x10 with PT OP at end range 2x10 w/OP at end range 2x10 w/ OP at end range         Thoracic Extension Chair             Wall Slide             Cervical AROM Each Way             Walking on Foam Tandem/Sidesteps             Seated Row   2x12 10# 2x12 10# 2x12 10#         Sit to Stand  2x10 with riser 2x10 with airex 2x10 no pad overhead press 5#          Ther Activity                                       Gait Training                                       Modalities

## 2024-08-08 NOTE — TELEPHONE ENCOUNTER
Caller: Patient     Doctor: Debra    Reason for call: Patient would like to know if he requires antibiotics prior to dental procedures.     Call back#: [phone]

## 2024-08-09 DIAGNOSIS — F41.9 ANXIETY: ICD-10-CM

## 2024-08-09 RX ORDER — CLONAZEPAM 1 MG/1
0.5 TABLET ORAL 2 TIMES DAILY
Qty: 15 TABLET | Refills: 0 | Status: SHIPPED | OUTPATIENT
Start: 2024-08-12

## 2024-08-09 NOTE — TELEPHONE ENCOUNTER
Reason for call:   [x] Refill   [] Prior Auth  [] Other:     Office:   [x] PCP/Provider -   [] Specialty/Provider -     Medication: Klonopin     Dose/Frequency: 1 mg tablet. Take 0.5 tablets by mouth 2x daily     Quantity: 60    Pharm[facility]: [facility] - [place], [place] - [address] [facility] [phone]     Does the patient have enough for 3 days?   [] Yes   [x] No - Send as HP to POD

## 2024-08-09 NOTE — TELEPHONE ENCOUNTER
Medication:  PDMP   07/29/2024 07/29/2024 clonazePAM (Tablet) 15.0 15 0.5 MG VIRGILIO MATHIS     Active agreement on file -No

## 2024-08-12 ENCOUNTER — OFFICE VISIT (OUTPATIENT)
Dept: PHYSICAL THERAPY | Facility: REHABILITATION | Age: 69
End: 2024-08-12
Payer: COMMERCIAL

## 2024-08-12 DIAGNOSIS — G89.29 CHRONIC NECK PAIN: ICD-10-CM

## 2024-08-12 DIAGNOSIS — R26.89 POOR BALANCE: ICD-10-CM

## 2024-08-12 DIAGNOSIS — Z98.1 S/P CERVICAL SPINAL FUSION: Primary | ICD-10-CM

## 2024-08-12 DIAGNOSIS — M54.2 CHRONIC NECK PAIN: ICD-10-CM

## 2024-08-12 PROCEDURE — 97112 NEUROMUSCULAR REEDUCATION: CPT | Performed by: PHYSICAL THERAPIST

## 2024-08-12 PROCEDURE — 97110 THERAPEUTIC EXERCISES: CPT | Performed by: PHYSICAL THERAPIST

## 2024-08-12 NOTE — PROGRESS NOTES
Daily Note     Today's date: 2024  Patient name: Edgar Obando  : 1955  MRN: [id number]  Referring provider: oMrelia Sethi*  Dx:   Encounter Diagnosis     ICD-10-CM    1. S/P cervical spinal fusion  Z98.1       2. Chronic neck pain  M54.2     G89.29       3. Poor balance  R26.89           Start Time: 1000  Stop Time: 1040  Total time in clinic (min): 40 minutes    Subjective: Today not feeling so strong. Having some fatiguing in my legs and just a bit tired.       Objective: See treatment diary below      Assessment: Tolerated treatment well. Patient continues to be appropriately challenged at current therapeutic volume and intensity. Patient would benefit from continued PT. 1:1 with PT for 30 minutes.        Plan: Continue per plan of care.      POC expires Auth Expiration date PT/OT + Visit Limit?   10/8/2024  2024 99        Visit/Unit Tracking  AUTH Status:  Date      [employer] - BOMN Used 1 2 3 4              Precautions:  s/p C3-7 ACDF on 2024       Manuals         Cervical PROM                                                    Neuro Re-Ed             Patient Ed 15'            Digi              Row             LPD Seated  2x12 8# 2x12 10# 2x12 10# 2x12 10#        BSER             Shoulder Flexion - Dumbbells             Nancy Negotiation  3 laps F/L 3 laps ea F/L 3 laps ea F/L 4 laps        Half Tandem Balance             LOS Biodex             Romberg Balance  Foam 1'x3 np          Ther Ex             UBE  4/4 l1 4/4  4/4 4/4 L2        Neck Circles             Shoulder Flexion with Dowel in Chair  2x10 with PT OP at end range 2x10 w/OP at end range 2x10 w/ OP at end range 3x8        Thoracic Extension Chair             Wall Slide             Cervical AROM Each Way             Walking on Foam Tandem/Sidesteps             Seated Row   2x12 10# 2x12 10# 2x12 10# 3x10 11#        Sit to Stand  2x10 with riser 2x10 with airex 2x10 no pad  overhead press 5#  3x8 5#        Ther Activity                                       Gait Training                                       Modalities

## 2024-08-13 NOTE — TELEPHONE ENCOUNTER
Pt was only given 16 pills when he refilled yesterday, can he have the rest of the prescription or is there a reason he only was given so many please advise

## 2024-08-14 DIAGNOSIS — Z98.1 S/P CERVICAL SPINAL FUSION: Primary | ICD-10-CM

## 2024-08-14 DIAGNOSIS — Z79.2 NEED FOR ANTIBIOTIC PROPHYLAXIS FOR DENTAL PROCEDURE: ICD-10-CM

## 2024-08-14 RX ORDER — AMOXICILLIN 500 MG/1
CAPSULE ORAL
Qty: 4 CAPSULE | Refills: 0 | Status: SHIPPED | OUTPATIENT
Start: 2024-08-14 | End: 2024-08-14

## 2024-08-14 NOTE — TELEPHONE ENCOUNTER
Caller: Patient     Doctor: Debra     Reason for call: Patient has a dentist appointment on 8/20 and asked for antibiotics     Call back#: [phone]

## 2024-08-15 ENCOUNTER — OFFICE VISIT (OUTPATIENT)
Dept: PHYSICAL THERAPY | Facility: REHABILITATION | Age: 69
End: 2024-08-15
Payer: COMMERCIAL

## 2024-08-15 DIAGNOSIS — G89.29 CHRONIC NECK PAIN: ICD-10-CM

## 2024-08-15 DIAGNOSIS — R26.89 POOR BALANCE: ICD-10-CM

## 2024-08-15 DIAGNOSIS — M54.2 CHRONIC NECK PAIN: ICD-10-CM

## 2024-08-15 DIAGNOSIS — Z98.1 S/P CERVICAL SPINAL FUSION: Primary | ICD-10-CM

## 2024-08-15 PROCEDURE — 97110 THERAPEUTIC EXERCISES: CPT | Performed by: PHYSICAL THERAPIST

## 2024-08-15 NOTE — PROGRESS NOTES
Daily Note     Today's date: 8/15/2024  Patient name: Edgar Obando  : 1955  MRN: [id number]  Referring provider: Morelia Sethi*  Dx:   Encounter Diagnosis     ICD-10-CM    1. S/P cervical spinal fusion  Z98.1       2. Chronic neck pain  M54.2     G89.29       3. Poor balance  R26.89           Start Time: 1000  Stop Time: 1040  Total time in clinic (min): 40 minutes    Subjective: Pt states he feels better regarding surgical s/s.     Objective: See treatment diary below    Assessment: Pt entered clinic lethargic state, decreased gait speed, speech speed. Reports taking normal medications prior to tx, but did not have breakfast. Oriented x4 though slow to respond to orientation questions. Vitals: /62, HR 80bpm, O2 sta 87-94%. Pt refuses to go ER. Agreed to see PCP, therapist called PCP and spoke with provider. Pt agreed to see PCP early this afternoon and will follow up with PT. Patients wife came to office to  and transport patient to PCP. Spouse instructed to take patient to ER if she felt necessary or s/s worsen. Therapist, spouse, PCP and patient all agreeable to this.     Plan: Continue per plan of care.      POC expires Auth Expiration date PT/OT + Visit Limit?   10/8/2024  2024 99        Visit/Unit Tracking  AUTH Status:  Date 7/30 8/5 8/8 8/12 8/15    [employer] - BOMN Used 1 2 3 4 5             Precautions:  s/p C3-7 ACDF on 2024       Manuals 7/30 8/1 8/5 8/8 8/12 8/15   Cervical PROM                                    Neuro Re-Ed         Patient Ed 15'        Digi          Row         LPD Seated  2x12 8# 2x12 10# 2x12 10# 2x12 10#    BSER         Shoulder Flexion - Dumbbells         Nancy Negotiation  3 laps F/L 3 laps ea F/L 3 laps ea F/L 4 laps    Half Tandem Balance         LOS Biodex         Romberg Balance  Foam 1'x3 np      Ther Ex         UBE  4/4 l1 4/4  4/4 4/4 L2    Vital monitoring      40'   Neck Circles         Shoulder Flexion with Dowel in Chair   2x10 with PT OP at end range 2x10 w/OP at end range 2x10 w/ OP at end range 3x8    Thoracic Extension Chair         Wall Slide         Cervical AROM Each Way         Walking on Foam Tandem/Sidesteps         Seated Row   2x12 10# 2x12 10# 2x12 10# 3x10 11#    Sit to Stand  2x10 with riser 2x10 with airex 2x10 no pad overhead press 5#  3x8 5#    Ther Activity                           Gait Training                           Modalities

## 2024-08-15 NOTE — TELEPHONE ENCOUNTER
Called and spoke with pt and advised him antibiotics were sent to pharmacy. He will call back when he receives medication just so we know he was able to get them.

## 2024-08-21 ENCOUNTER — OFFICE VISIT (OUTPATIENT)
Dept: PHYSICAL THERAPY | Facility: REHABILITATION | Age: 69
End: 2024-08-21
Payer: COMMERCIAL

## 2024-08-21 DIAGNOSIS — G89.29 CHRONIC NECK PAIN: ICD-10-CM

## 2024-08-21 DIAGNOSIS — R26.89 POOR BALANCE: ICD-10-CM

## 2024-08-21 DIAGNOSIS — Z98.1 S/P CERVICAL SPINAL FUSION: Primary | ICD-10-CM

## 2024-08-21 DIAGNOSIS — M54.2 CHRONIC NECK PAIN: ICD-10-CM

## 2024-08-21 PROCEDURE — 97110 THERAPEUTIC EXERCISES: CPT

## 2024-08-21 NOTE — PROGRESS NOTES
Daily Note     Today's date: 2024  Patient name: Edgar Obando  : 1955  MRN: [id number]  Referring provider: Morelia Sethi*  Dx:   Encounter Diagnosis     ICD-10-CM    1. S/P cervical spinal fusion  Z98.1       2. Chronic neck pain  M54.2     G89.29       3. Poor balance  R26.89                      Subjective:  Edgar reports that he is good today.  He doesn't understand why he felt like he did at his last visit.  Patient reports that his neck is feeling good.  He feels soreness still when he swallows but the Dr said that will improve.       Objective: See treatment diary below  Vitals pre treatment:  /67  Pulse 65  O2 sat 92-96 %  Patient requested an updated written HEP which was issued this visit.     Assessment:  Patient tolerated treatment well.  Patient performed ex as noted with direct supervision.  Patient performed ex without adverse response.  Patient demonstrated a good understanding of the exercises performed.  No patient distress noted during the session.   Patient would benefit from continued PT intervention to address deficits and attain set goals.       Plan: Continue per plan of care.      POC expires Auth Expiration date PT/OT + Visit Limit?   10/8/2024  2024 99        Visit/Unit Tracking  AUTH Status:  Date 7/30 8/5 8/8 8/12 8/15 8/21   [employer] - BOMN Used 1 2 3 4 5 6            Precautions:  s/p C3-7 ACDF on 2024       Manuals 8/21  8/5 8/8 8/12 8/15   Cervical PROM                                    Neuro Re-Ed         Patient Ed         Digi          Row         LPD Seated 10#  2x12 10# 2x12 10# 2x12 10# 2x12 10#    BSER         Shoulder Flexion - Dumbbells         Nancy Negotiation 4 laps ea  fwd/lat  3 laps ea F/L 3 laps ea F/L 4 laps    Half Tandem Balance         LOS Biodex         Romberg Balance   np      Ther Ex         UBE 4'/4' L2 L2 4/4  4/4 4/4 L2    Vital monitoring      40'   Neck Circles         Shoulder Flexion with Dowel in Chair 3x8   2x10 w/OP at end range 2x10 w/ OP at end range 3x8    Thoracic Extension Chair         Wall Slide         Cervical AROM Each Way         Walking on Foam Tandem/Sidesteps         Seated Row  11#  3x10 11# 2x12 10# 2x12 10# 3x10 11#    Sit to Stand 5#  3x8  No pad 5# 2x10 with airex 2x10 no pad overhead press 5#  3x8 5#    Ther Activity                           Gait Training                           Modalities                                   Access Code: [email or web address]  URL: [email or web address]  Date: 08/21/2024  Prepared by: Mary Puga    Exercises  - Seated Shoulder Row with Anchored Resistance  - 1 x daily - 7 x weekly - 2 sets - 10 reps - 5 hold  - Seated Shoulder Extension and Scapular Retraction with Resistance  - 1 x daily - 7 x weekly - 2 sets - 12 reps - 5 hold  - Side Stepping with Counter Support  - 1 x daily - 7 x weekly - 1 sets - 10 reps  - Sit to Stand  - 1 x daily - 7 x weekly - 3 sets - 8 reps  - Seated Cat Cow  - 1 x daily - 7 x weekly - 2 sets - 10 reps  - Standing Hip Abduction with Counter Support  - 1 x daily - 7 x weekly - 2 sets - 10 reps

## 2024-08-27 ENCOUNTER — OFFICE VISIT (OUTPATIENT)
Dept: PHYSICAL THERAPY | Facility: REHABILITATION | Age: 69
End: 2024-08-27
Payer: COMMERCIAL

## 2024-08-27 DIAGNOSIS — Z98.1 S/P CERVICAL SPINAL FUSION: Primary | ICD-10-CM

## 2024-08-27 DIAGNOSIS — R26.89 POOR BALANCE: ICD-10-CM

## 2024-08-27 DIAGNOSIS — G89.29 CHRONIC NECK PAIN: ICD-10-CM

## 2024-08-27 DIAGNOSIS — M54.2 CHRONIC NECK PAIN: ICD-10-CM

## 2024-08-27 PROCEDURE — 97110 THERAPEUTIC EXERCISES: CPT | Performed by: PHYSICAL THERAPIST

## 2024-08-27 PROCEDURE — 97112 NEUROMUSCULAR REEDUCATION: CPT | Performed by: PHYSICAL THERAPIST

## 2024-08-27 NOTE — PROGRESS NOTES
Daily Note     Today's date: 2024  Patient name: Edgar Obando  : 1955  MRN: [id number]  Referring provider: Morelia Sethi*  Dx: No diagnosis found.               Subjective: Overall doing pretty good today.       Objective: See treatment diary below      Assessment: Tolerated treatment well. Patient continues to be appropriately challenged at current therapeutic volume and intensity. Patient would benefit from continued PT. 1:1 with PT for 23 minutes, IEP for remainder.       Plan: Continue per plan of care.      POC expires Auth Expiration date PT/OT + Visit Limit?   10/8/2024  2024 99        Visit/Unit Tracking  AUTH Status:  Date 7/30 8/5 8/8 8/12 8/15 8/21   [employer] - BOMN Used 1 2 3 4 5 6            Precautions:  s/p C3-7 ACDF on 2024       Manuals         Cervical PROM                                    Neuro Re-Ed         Patient Ed         Digi          Row         LPD Seated 10#  2x12        BSER         Standing hip abduction 2x10 B        Nancy Negotiation 4 laps ea  fwd/lat        Half Tandem Balance         LOS Biodex         Romberg Balance         Ther Ex         UBE 4'/4' L2        Vital monitoring         Neck Circles         Shoulder Flexion with Dowel in Chair 3x8        Thoracic Extension Chair 2x10        Wall Slide         Cervical AROM Each Way         Walking on Foam Tandem/Sidesteps 4 laps each        Seated Row  11#  3x10        Sit to Stand 5#  3x8  No pad        Ther Activity                           Gait Training                           Modalities                                   Access Code: [email or web address]  URL: [email or web address]  Date: 2024  Prepared by: Mary Puga    Exercises  - Seated Shoulder Row with Anchored Resistance  - 1 x daily - 7 x weekly - 2 sets - 10 reps - 5 hold  - Seated Shoulder Extension and Scapular Retraction with Resistance  - 1 x daily - 7 x weekly - 2 sets - 12 reps - 5 hold  - Side Stepping with  Counter Support  - 1 x daily - 7 x weekly - 1 sets - 10 reps  - Sit to Stand  - 1 x daily - 7 x weekly - 3 sets - 8 reps  - Seated Cat Cow  - 1 x daily - 7 x weekly - 2 sets - 10 reps  - Standing Hip Abduction with Counter Support  - 1 x daily - 7 x weekly - 2 sets - 10 reps

## 2024-08-28 ENCOUNTER — OFFICE VISIT (OUTPATIENT)
Dept: FAMILY MEDICINE CLINIC | Facility: CLINIC | Age: 69
End: 2024-08-28
Payer: COMMERCIAL

## 2024-08-28 VITALS
HEIGHT: 66 IN | DIASTOLIC BLOOD PRESSURE: 70 MMHG | SYSTOLIC BLOOD PRESSURE: 120 MMHG | HEART RATE: 75 BPM | WEIGHT: 252.4 LBS | BODY MASS INDEX: 40.56 KG/M2 | TEMPERATURE: 97.7 F | OXYGEN SATURATION: 94 % | RESPIRATION RATE: 16 BRPM

## 2024-08-28 DIAGNOSIS — M25.561 CHRONIC PAIN OF RIGHT KNEE: ICD-10-CM

## 2024-08-28 DIAGNOSIS — I10 PRIMARY HYPERTENSION: ICD-10-CM

## 2024-08-28 DIAGNOSIS — B18.2 HEPATITIS C VIRUS CARRIER STATE (HCC): ICD-10-CM

## 2024-08-28 DIAGNOSIS — Z98.1 S/P CERVICAL SPINAL FUSION: ICD-10-CM

## 2024-08-28 DIAGNOSIS — I87.311 IDIOPATHIC CHRONIC VENOUS HYPERTENSION OF RIGHT LOWER EXTREMITY WITH ULCER (HCC): ICD-10-CM

## 2024-08-28 DIAGNOSIS — E46 PROTEIN-CALORIE MALNUTRITION, UNSPECIFIED SEVERITY (HCC): ICD-10-CM

## 2024-08-28 DIAGNOSIS — F32.0 CURRENT MILD EPISODE OF MAJOR DEPRESSIVE DISORDER, UNSPECIFIED WHETHER RECURRENT (HCC): ICD-10-CM

## 2024-08-28 DIAGNOSIS — L97.919 IDIOPATHIC CHRONIC VENOUS HYPERTENSION OF RIGHT LOWER EXTREMITY WITH ULCER (HCC): ICD-10-CM

## 2024-08-28 DIAGNOSIS — M47.812 CERVICAL SPONDYLOSIS: ICD-10-CM

## 2024-08-28 DIAGNOSIS — G89.29 CHRONIC PAIN OF RIGHT KNEE: ICD-10-CM

## 2024-08-28 DIAGNOSIS — N18.31 STAGE 3A CHRONIC KIDNEY DISEASE (HCC): ICD-10-CM

## 2024-08-28 DIAGNOSIS — I87.2 CHRONIC VENOUS INSUFFICIENCY: ICD-10-CM

## 2024-08-28 DIAGNOSIS — E11.42 TYPE 2 DIABETES MELLITUS WITH DIABETIC POLYNEUROPATHY, WITHOUT LONG-TERM CURRENT USE OF INSULIN (HCC): Primary | ICD-10-CM

## 2024-08-28 LAB — SL AMB POCT HEMOGLOBIN AIC: 6.1 (ref ?–6.5)

## 2024-08-28 PROCEDURE — 83036 HEMOGLOBIN GLYCOSYLATED A1C: CPT | Performed by: FAMILY MEDICINE

## 2024-08-28 PROCEDURE — 99214 OFFICE O/P EST MOD 30 MIN: CPT | Performed by: FAMILY MEDICINE

## 2024-08-28 NOTE — PROGRESS NOTES
Ambulatory Visit  Name: Edgar Obando      : 1955      MRN: [id number]  Encounter Provider: Ana Rosa Win MD  Encounter Date: 2024   Encounter department: [facility]    Assessment & Plan   1. Type 2 diabetes mellitus with diabetic polyneuropathy, without long-term current use of insulin ([facility])  Comments:  Controlled at 6.1. Attempted to do eye exam but was unsuccessful. WIll need to see opthalmologist. Continue trulicity 4.5. Will perform foot exam at next appt  Orders:  -     POCT hemoglobin A1c  -     Lipid panel; Future  2. Protein-calorie malnutrition, unspecified severity ([facility])  Comments:  No concerns. Appetite is good  3. Current mild episode of major depressive disorder, unspecified whether recurrent ([facility])  Comments:  Stable on  4. Idiopathic chronic venous hypertension of right lower extremity with ulcer ([facility])  5. BMI 40.0-44.9, adult ([facility])  Comments:  Actively working to lose weight. Currently on trulicty  6. Hepatitis C virus carrier state ([facility])  7. Stage 3a chronic kidney disease ([facility])  -     PTH, intact; Future  8. Primary hypertension  9. Chronic venous insufficiency  Assessment & Plan:  Consistently wears compression stockings  Did note increased swelling last evening but has improved  Continue using compression stocking and elevate legs when at rest   10. Cervical spondylosis  Assessment & Plan:  S.p cervical spinal fusion in May   Doing PT with modest improvement in pain   Does still experience pain in the anterior neck, at the surgical site and posterior neck radiating up the scalp   Care per neurosurgery team   11. Chronic pain of right knee  Assessment & Plan:  Seeing ortho and managing conservatively for now   Pt actively trying to lose weight to get right knee replaced   12. S/P cervical spinal fusion       History of Present Illness     Seen today after not being seen over a yea r  Had spinal surgery In May by Dr. Richardson  Did PT   In , he was seen in the  "ED and diagnosed with PNA. Resolved with anbx  Plans of getting the right knee replaced  Currently seeing Dr. Montenegro and receiving steroid injection        Review of Systems    Objective     /70 (BP Location: Left arm, Patient Position: Sitting, Cuff Size: Large)   Pulse 75   Temp 97.7 °F (36.5 °C) (Tympanic)   Resp 16   Ht 5' 6\" (1.676 m)   Wt 114 kg (252 lb 6.4 oz)   SpO2 94%   BMI 40.74 kg/m²     Physical Exam  Vitals reviewed.   Constitutional:       General: He is not in acute distress.     Appearance: Normal appearance. He is not ill-appearing or toxic-appearing.   Eyes:      Extraocular Movements: Extraocular movements intact.   Cardiovascular:      Rate and Rhythm: Normal rate and regular rhythm.      Heart sounds: No murmur heard.  Pulmonary:      Effort: Pulmonary effort is normal. No respiratory distress.      Breath sounds: Normal breath sounds. No stridor. No wheezing, rhonchi or rales.   Musculoskeletal:      Cervical back: Tenderness (anterior neck) present.      Right lower leg: Edema (1+ with stockings present) present.      Left lower leg: Edema (1+ with stockings present) present.   Skin:     General: Skin is warm.      Coloration: Skin is not jaundiced.   Neurological:      Mental Status: He is alert and oriented to person, place, and time.      Gait: Gait abnormal (uses a cane).       Administrative Statements           "

## 2024-08-29 ENCOUNTER — OFFICE VISIT (OUTPATIENT)
Dept: PHYSICAL THERAPY | Facility: REHABILITATION | Age: 69
End: 2024-08-29
Payer: COMMERCIAL

## 2024-08-29 DIAGNOSIS — M54.2 CHRONIC NECK PAIN: Primary | ICD-10-CM

## 2024-08-29 DIAGNOSIS — G89.29 CHRONIC NECK PAIN: Primary | ICD-10-CM

## 2024-08-29 DIAGNOSIS — R26.89 POOR BALANCE: ICD-10-CM

## 2024-08-29 DIAGNOSIS — Z98.1 S/P CERVICAL SPINAL FUSION: ICD-10-CM

## 2024-08-29 PROCEDURE — 97112 NEUROMUSCULAR REEDUCATION: CPT | Performed by: PHYSICAL THERAPIST

## 2024-08-29 PROCEDURE — 97110 THERAPEUTIC EXERCISES: CPT | Performed by: PHYSICAL THERAPIST

## 2024-08-29 NOTE — ASSESSMENT & PLAN NOTE
Seeing ortho and managing conservatively for now   Pt actively trying to lose weight to get right knee replaced

## 2024-08-29 NOTE — PROGRESS NOTES
Daily Note     Today's date: 2024  Patient name: Edgar Obando  : 1955  MRN: [id number]  Referring provider: Morelia Sethi*  Dx:   Encounter Diagnosis     ICD-10-CM    1. Chronic neck pain  M54.2     G89.29       2. Poor balance  R26.89       3. S/P cervical spinal fusion  Z98.1                      Subjective: Doing pretty good today. Legs are feeling a little tired.       Objective: See treatment diary below      Assessment: Tolerated treatment well. Patient continues to be appropriately challenged at current therapeutic volume and intensity. Patient would benefit from continued PT. 1:1 with PT for entirety.       Plan: Continue per plan of care.      POC expires Auth Expiration date PT/OT + Visit Limit?   10/8/2024  2024 99        Visit/Unit Tracking  AUTH Status:  Date 7/30 8/5 8/8 8/12 8/15 8/21   [employer] - BOMN Used 1 2 3 4 5 6            Precautions:  s/p C3-7 ACDF on 2024       Manuals        Cervical PROM                                    Neuro Re-Ed         Patient Ed         Digi          Row         LPD Seated 10#  2x12 10# 3x10       BSER         Standing hip abduction 2x10 B 3x8 B       Nancy Negotiation 4 laps ea  fwd/lat 5 laps       Half Tandem Balance  Foam 1'x2        LOS Biodex         Romberg Balance         Ther Ex         UBE 4'/4' L2 4/4 L2.5       Vital monitoring         Neck Circles         Shoulder Flexion with Dowel in Chair 3x8 3x8       Thoracic Extension Chair 2x10 2x10       Wall Slide         Cervical AROM Each Way         Walking on Foam Tandem/Sidesteps 4 laps each 4 laps each       Seated Row  11#  3x10 11# 3x10       Sit to Stand 5#  3x8  No pad 5# 3x8       Ther Activity                           Gait Training                           Modalities                                   Access Code: [email or web address]  URL: [email or web address]  Date: 2024  Prepared by: Mary Puga    Exercises  - Seated Shoulder Row with  Anchored Resistance  - 1 x daily - 7 x weekly - 2 sets - 10 reps - 5 hold  - Seated Shoulder Extension and Scapular Retraction with Resistance  - 1 x daily - 7 x weekly - 2 sets - 12 reps - 5 hold  - Side Stepping with Counter Support  - 1 x daily - 7 x weekly - 1 sets - 10 reps  - Sit to Stand  - 1 x daily - 7 x weekly - 3 sets - 8 reps  - Seated Cat Cow  - 1 x daily - 7 x weekly - 2 sets - 10 reps  - Standing Hip Abduction with Counter Support  - 1 x daily - 7 x weekly - 2 sets - 10 reps

## 2024-08-29 NOTE — ASSESSMENT & PLAN NOTE
Consistently wears compression stockings  Did note increased swelling last evening but has improved  Continue using compression stocking and elevate legs when at rest

## 2024-08-29 NOTE — ASSESSMENT & PLAN NOTE
S.p cervical spinal fusion in May   Doing PT with modest improvement in pain   Does still experience pain in the anterior neck, at the surgical site and posterior neck radiating up the scalp   Care per neurosurgery team

## 2024-09-03 ENCOUNTER — OFFICE VISIT (OUTPATIENT)
Dept: PHYSICAL THERAPY | Facility: REHABILITATION | Age: 69
End: 2024-09-03
Payer: COMMERCIAL

## 2024-09-03 DIAGNOSIS — Z98.1 S/P CERVICAL SPINAL FUSION: ICD-10-CM

## 2024-09-03 DIAGNOSIS — G89.29 CHRONIC NECK PAIN: Primary | ICD-10-CM

## 2024-09-03 DIAGNOSIS — R26.89 POOR BALANCE: ICD-10-CM

## 2024-09-03 DIAGNOSIS — M54.2 CHRONIC NECK PAIN: Primary | ICD-10-CM

## 2024-09-03 PROCEDURE — 97112 NEUROMUSCULAR REEDUCATION: CPT | Performed by: PHYSICAL THERAPIST

## 2024-09-03 NOTE — PROGRESS NOTES
Daily Note     Today's date: 9/3/2024  Patient name: Edgar Obando  : 1955  MRN: [id number]  Referring provider: Morelia Sethi*  Dx:   Encounter Diagnosis     ICD-10-CM    1. Chronic neck pain  M54.2     G89.29       2. Poor balance  R26.89       3. S/P cervical spinal fusion  Z98.1                      Subjective: I am feeling decent today, just some fatigue in my legs.       Objective: See treatment diary below      Assessment: Tolerated treatment well. Patient continues to be appropriately challenged at current therapeutic volume and intensity. Patient would benefit from continued PT. 1:1 with PT for 15 minutes, IEP for remainder.       Plan: Continue per plan of care.      POC expires Auth Expiration date PT/OT + Visit Limit?   10/8/2024  2024 99        Visit/Unit Tracking  AUTH Status:  Date 7/30 8/5 8/8 8/12 8/15 8/21   [employer] - BOMN Used 1 2 3 4 5 6            Precautions:  s/p C3-7 ACDF on 2024       Manuals 8/26 8/29 9/3      Cervical PROM                                    Neuro Re-Ed         Patient Ed         Digi          Row         LPD Seated 10#  2x12 10# 3x10 12# 3x12      BSER         Standing hip abduction 2x10 B 3x8 B 3x8 2#      Nancy Negotiation 4 laps ea  fwd/lat 5 laps 5 laps      Half Tandem Balance  Foam 1'x2  Foam 1'x2       LOS Biodex         Romberg Balance         Ther Ex         UBE 4'/4' L2 4/4 L2.5 4/4 L3      Vital monitoring         Neck Circles         Shoulder Flexion with Dowel in Chair 3x8 3x8 3x8      Thoracic Extension Chair 2x10 2x10 2x10      Wall Slide         Cervical AROM Each Way         Walking on Foam Tandem/Sidesteps 4 laps each 4 laps each 4 laps each      Seated Row  11#  3x10 11# 3x10 11# 3x10      Sit to Stand 5#  3x8  No pad 5# 3x8 5# 3x8       Ther Activity                           Gait Training                           Modalities                                   Access Code: [email or web address]  URL:  [email or web address]  Date: 08/21/2024  Prepared by: Mary Puga    Exercises  - Seated Shoulder Row with Anchored Resistance  - 1 x daily - 7 x weekly - 2 sets - 10 reps - 5 hold  - Seated Shoulder Extension and Scapular Retraction with Resistance  - 1 x daily - 7 x weekly - 2 sets - 12 reps - 5 hold  - Side Stepping with Counter Support  - 1 x daily - 7 x weekly - 1 sets - 10 reps  - Sit to Stand  - 1 x daily - 7 x weekly - 3 sets - 8 reps  - Seated Cat Cow  - 1 x daily - 7 x weekly - 2 sets - 10 reps  - Standing Hip Abduction with Counter Support  - 1 x daily - 7 x weekly - 2 sets - 10 reps

## 2024-09-05 ENCOUNTER — APPOINTMENT (OUTPATIENT)
Dept: PHYSICAL THERAPY | Facility: REHABILITATION | Age: 69
End: 2024-09-05
Payer: COMMERCIAL

## 2024-09-06 ENCOUNTER — OFFICE VISIT (OUTPATIENT)
Dept: PHYSICAL THERAPY | Facility: REHABILITATION | Age: 69
End: 2024-09-06
Payer: COMMERCIAL

## 2024-09-06 ENCOUNTER — TELEPHONE (OUTPATIENT)
Age: 69
End: 2024-09-06

## 2024-09-06 DIAGNOSIS — M54.2 CHRONIC NECK PAIN: Primary | ICD-10-CM

## 2024-09-06 DIAGNOSIS — G89.29 CHRONIC NECK PAIN: Primary | ICD-10-CM

## 2024-09-06 DIAGNOSIS — R26.89 POOR BALANCE: ICD-10-CM

## 2024-09-06 DIAGNOSIS — Z98.1 S/P CERVICAL SPINAL FUSION: ICD-10-CM

## 2024-09-06 PROCEDURE — 97110 THERAPEUTIC EXERCISES: CPT | Performed by: PHYSICAL THERAPIST

## 2024-09-06 PROCEDURE — 97112 NEUROMUSCULAR REEDUCATION: CPT | Performed by: PHYSICAL THERAPIST

## 2024-09-06 NOTE — PROGRESS NOTES
Daily Note     Today's date: 2024  Patient name: Edgar Obando  : 1955  MRN: [id number]  Referring provider: Morelia Sethi*  Dx:   Encounter Diagnosis     ICD-10-CM    1. Chronic neck pain  M54.2     G89.29       2. Poor balance  R26.89       3. S/P cervical spinal fusion  Z98.1                      Subjective: I am feeling good today. Yesterday was a tougher day for me.       Objective: See treatment diary below      Assessment: Tolerated treatment well. Patient continues to be appropriately challenged at current therapeutic volume and intensity. Patient would benefit from continued PT. 1:1 with PT for entirety.       Plan: Continue per plan of care.      POC expires Auth Expiration date PT/OT + Visit Limit?   10/8/2024  2024 99        Visit/Unit Tracking  AUTH Status:  Date 7/30 8/5 8/8 8/12 8/15 8/21   [employer] - BOMN Used 1 2 3 4 5 6            Precautions:  s/p C3-7 ACDF on 2024       Manuals 8/26 8/29 9/3 9/6     Cervical PROM                                    Neuro Re-Ed         Patient Ed         Digi          Row         LPD Seated 10#  2x12 10# 3x10 12# 3x12 12# 3x12     BSER         Standing hip abduction 2x10 B 3x8 B 3x8 2# 3x8 2#     Nancy Negotiation 4 laps ea  fwd/lat 5 laps 5 laps 5 laps each way     Half Tandem Balance  Foam 1'x2  Foam 1'x2  Foam 1'x2     LOS Biodex         Romberg Balance         Ther Ex         UBE 4'/4' L2 4/4 L2.5 4/4 L3 4/4 L3     Vital monitoring         Neck Circles         Shoulder Flexion with Dowel in Chair 3x8 3x8 3x8 3x8 3#     Thoracic Extension Chair 2x10 2x10 2x10 2x10     Wall Slide         Cervical AROM Each Way         Walking on Foam Tandem/Sidesteps 4 laps each 4 laps each 4 laps each 4 laps each     Seated Row  11#  3x10 11# 3x10 11# 3x10 12# 3x10     Sit to Stand 5#  3x8  No pad 5# 3x8 5# 3x8  8# 3x8     Ther Activity                           Gait Training                           Modalities                                    Access Code: [email or web address]  URL: [email or web address]  Date: 08/21/2024  Prepared by: Mary Puga    Exercises  - Seated Shoulder Row with Anchored Resistance  - 1 x daily - 7 x weekly - 2 sets - 10 reps - 5 hold  - Seated Shoulder Extension and Scapular Retraction with Resistance  - 1 x daily - 7 x weekly - 2 sets - 12 reps - 5 hold  - Side Stepping with Counter Support  - 1 x daily - 7 x weekly - 1 sets - 10 reps  - Sit to Stand  - 1 x daily - 7 x weekly - 3 sets - 8 reps  - Seated Cat Cow  - 1 x daily - 7 x weekly - 2 sets - 10 reps  - Standing Hip Abduction with Counter Support  - 1 x daily - 7 x weekly - 2 sets - 10 reps

## 2024-09-09 ENCOUNTER — APPOINTMENT (OUTPATIENT)
Dept: PHYSICAL THERAPY | Facility: REHABILITATION | Age: 69
End: 2024-09-09
Payer: COMMERCIAL

## 2024-09-09 NOTE — PROGRESS NOTES
Daily Note     Today's date: 2024  Patient name: Edgar Obando  : 1955  MRN: [id number]  Referring provider: Morelia Sethi*  Dx: No diagnosis found.               Subjective:       Objective: See treatment diary below      Assessment: Tolerated treatment well. Patient would benefit from continued PT      Plan: Continue per plan of care.      POC expires Auth Expiration date PT/OT + Visit Limit?   10/8/2024  2024 99        Visit/Unit Tracking  AUTH Status:  Date 7/30 8/5 8/8 8/12 8/15 8/21   [employer] - BOMN Used 1 2 3 4 5 6            Precautions:  s/p C3-7 ACDF on 2024       Manuals 8/26 8/29 9/3 9/6 9/9    Cervical PROM                                    Neuro Re-Ed         Patient Ed         Digi          Row         LPD Seated 10#  2x12 10# 3x10 12# 3x12 12# 3x12 12# 3x12    BSER         Standing hip abduction 2x10 B 3x8 B 3x8 2# 3x8 2# 3x8 2#    Nancy Negotiation 4 laps ea  fwd/lat 5 laps 5 laps 5 laps each way 5 laps each way    Half Tandem Balance  Foam 1'x2  Foam 1'x2  Foam 1'x2 Foam 1'x2    LOS Biodex         Romberg Balance         Ther Ex         UBE 4'/4' L2 4/4 L2.5 4/4 L3 4/4 L3 4/4 L3    Vital monitoring         Neck Circles         Shoulder Flexion with Dowel in Chair 3x8 3x8 3x8 3x8 3# 3x8 3#    Thoracic Extension Chair 2x10 2x10 2x10 2x10 2x10    Wall Slide         Cervical AROM Each Way         Walking on Foam Tandem/Sidesteps 4 laps each 4 laps each 4 laps each 4 laps each 4 laps each    Seated Row  11#  3x10 11# 3x10 11# 3x10 12# 3x10 12# 3x10 3x10     Sit to Stand 5#  3x8  No pad 5# 3x8 5# 3x8  8# 3x8 8# 3x10    Ther Activity                           Gait Training                           Modalities                                   Access Code: [email or web address]  URL: [email or web address]  Date: 2024  Prepared by: Mary Puga    Exercises  - Seated Shoulder Row with Anchored Resistance  - 1 x daily - 7 x weekly - 2 sets - 10 reps - 5 hold  -  Seated Shoulder Extension and Scapular Retraction with Resistance  - 1 x daily - 7 x weekly - 2 sets - 12 reps - 5 hold  - Side Stepping with Counter Support  - 1 x daily - 7 x weekly - 1 sets - 10 reps  - Sit to Stand  - 1 x daily - 7 x weekly - 3 sets - 8 reps  - Seated Cat Cow  - 1 x daily - 7 x weekly - 2 sets - 10 reps  - Standing Hip Abduction with Counter Support  - 1 x daily - 7 x weekly - 2 sets - 10 reps

## 2024-09-10 ENCOUNTER — OFFICE VISIT (OUTPATIENT)
Dept: PHYSICAL THERAPY | Facility: REHABILITATION | Age: 69
End: 2024-09-10
Payer: COMMERCIAL

## 2024-09-10 ENCOUNTER — TELEPHONE (OUTPATIENT)
Age: 69
End: 2024-09-10

## 2024-09-10 DIAGNOSIS — R26.89 POOR BALANCE: ICD-10-CM

## 2024-09-10 DIAGNOSIS — F41.9 ANXIETY: ICD-10-CM

## 2024-09-10 DIAGNOSIS — G89.29 CHRONIC NECK PAIN: Primary | ICD-10-CM

## 2024-09-10 DIAGNOSIS — Z98.1 S/P CERVICAL SPINAL FUSION: ICD-10-CM

## 2024-09-10 DIAGNOSIS — Z79.2 NEED FOR ANTIBIOTIC PROPHYLAXIS FOR DENTAL PROCEDURE: Primary | ICD-10-CM

## 2024-09-10 DIAGNOSIS — M54.2 CHRONIC NECK PAIN: Primary | ICD-10-CM

## 2024-09-10 PROCEDURE — 97110 THERAPEUTIC EXERCISES: CPT

## 2024-09-10 PROCEDURE — 97112 NEUROMUSCULAR REEDUCATION: CPT

## 2024-09-10 RX ORDER — AMOXICILLIN 500 MG/1
CAPSULE ORAL
Qty: 4 CAPSULE | Refills: 0 | Status: SHIPPED | OUTPATIENT
Start: 2024-09-10 | End: 2024-09-17

## 2024-09-10 NOTE — TELEPHONE ENCOUNTER
Reason for call: Patient requesting qty 60  [x] Refill   [] Prior Auth  [] Other:     Office:   [x] PCP/Provider - Dr Win  [] Specialty/Provider -     Medication: clonazepam    Dose/Frequency: 1mg/ 0.5 tablet BID    Quantity: 60    Ph[facility]: [facility]     Does the patient have enough for 3 days?   [] Yes   [x] No - Send as HP to POD

## 2024-09-10 NOTE — TELEPHONE ENCOUNTER
Caller: Edgar    Doctor: Debra    Reason for call: Patient is having a dental procedure. [facility] is requesting a clearance letter and an antibiotic sent to the pharmacy.  Please advise   Thank you    Call back#: [phone]

## 2024-09-10 NOTE — TELEPHONE ENCOUNTER
Medication:  PDMP     08/12/2024 08/09/2024 clonazePAM (Tablet) 15.0 15 1 MG NA HUMBERTO PAT     Active agreement on file -Yes

## 2024-09-10 NOTE — PROGRESS NOTES
Daily Note     Today's date: 9/10/2024  Patient name: Edgar Obando  : 1955  MRN: [id number]  Referring provider: Morelia Sethi*  Dx:   Encounter Diagnosis     ICD-10-CM    1. Chronic neck pain  M54.2     G89.29       2. Poor balance  R26.89       3. S/P cervical spinal fusion  Z98.1           Start Time: 1131  Stop Time: 1226  Total time in clinic (min): 55 minutes    Subjective: Pt reports pain in neck yesterday, feels better today.       Objective: See treatment diary below      Assessment: Tolerated treatment well. Pt progressing well, no significant pain increase throughout or post-tx. Pt adequately fatigued by current POC, continue to progress as tolerated with future treatment sessions. Patient would benefit from continued PT. 1:1 with Conrad Zimmerman DPT from [id number], IEP for remainder.       Plan: Continue per plan of care.      POC expires Auth Expiration date PT/OT + Visit Limit?   10/8/2024  2024 99        Visit/Unit Tracking  AUTH Status:  Date 7/30 8/5 8/8 8/12 8/15 8/21 9/10   [employer] - BOMN Used 1 2 3 4 5 6 7            Precautions:  s/p C3-7 ACDF on 2024       Manuals 8/26 8/29 9/3 9/6 9/10    Cervical PROM                                    Neuro Re-Ed         Patient Ed         Digi          Row         LPD Seated 10#  2x12 10# 3x10 12# 3x12 12# 3x12 13# 3x12    BSER         Standing hip abduction 2x10 B 3x8 B 3x8 2# 3x8 2# 3x8 2#    Nancy Negotiation 4 laps ea  fwd/lat 5 laps 5 laps 5 laps each way 5 laps each way    Half Tandem Balance  Foam 1'x2  Foam 1'x2  Foam 1'x2     LOS Biodex         Romberg Balance         Ther Ex         UBE 4'/4' L2 4/4 L2.5 4/4 L3 4/4 L3 4/4    Vital monitoring         Neck Circles         Shoulder Flexion with Dowel in Chair 3x8 3x8 3x8 3x8 3# 3x8 3#    Thoracic Extension Chair 2x10 2x10 2x10 2x10 2x10    Wall Slide         Cervical AROM Each Way         Walking on Foam Tandem/Sidesteps 4 laps each 4 laps each 4 laps each 4 laps  each 4 laps each    Seated Row  11#  3x10 11# 3x10 11# 3x10 12# 3x10 12# 3x10    Sit to Stand 5#  3x8  No pad 5# 3x8 5# 3x8  8# 3x8 8# 3x8    Ther Activity                           Gait Training                           Modalities                                   Access Code: [email or web address]  URL: [email or web address]  Date: 08/21/2024  Prepared by: Mary Puga    Exercises  - Seated Shoulder Row with Anchored Resistance  - 1 x daily - 7 x weekly - 2 sets - 10 reps - 5 hold  - Seated Shoulder Extension and Scapular Retraction with Resistance  - 1 x daily - 7 x weekly - 2 sets - 12 reps - 5 hold  - Side Stepping with Counter Support  - 1 x daily - 7 x weekly - 1 sets - 10 reps  - Sit to Stand  - 1 x daily - 7 x weekly - 3 sets - 8 reps  - Seated Cat Cow  - 1 x daily - 7 x weekly - 2 sets - 10 reps  - Standing Hip Abduction with Counter Support  - 1 x daily - 7 x weekly - 2 sets - 10 reps

## 2024-09-11 RX ORDER — CLONAZEPAM 1 MG/1
0.5 TABLET ORAL 2 TIMES DAILY
Qty: 30 TABLET | Refills: 0 | Status: SHIPPED | OUTPATIENT
Start: 2024-09-11

## 2024-09-12 ENCOUNTER — OFFICE VISIT (OUTPATIENT)
Dept: PHYSICAL THERAPY | Facility: REHABILITATION | Age: 69
End: 2024-09-12
Payer: COMMERCIAL

## 2024-09-12 DIAGNOSIS — R26.89 POOR BALANCE: ICD-10-CM

## 2024-09-12 DIAGNOSIS — M54.2 CHRONIC NECK PAIN: Primary | ICD-10-CM

## 2024-09-12 DIAGNOSIS — G89.29 CHRONIC NECK PAIN: Primary | ICD-10-CM

## 2024-09-12 DIAGNOSIS — Z98.1 S/P CERVICAL SPINAL FUSION: ICD-10-CM

## 2024-09-12 PROCEDURE — 97110 THERAPEUTIC EXERCISES: CPT

## 2024-09-12 PROCEDURE — 97112 NEUROMUSCULAR REEDUCATION: CPT

## 2024-09-12 NOTE — PROGRESS NOTES
Daily Note     Today's date: 2024  Patient name: Edgar Obando  : 1955  MRN: [id number]  Referring provider: Morelia Sethi*  Dx:   Encounter Diagnosis     ICD-10-CM    1. Chronic neck pain  M54.2     G89.29       2. Poor balance  R26.89       3. S/P cervical spinal fusion  Z98.1                      Subjective: Pt. reports no change in status upon arrival.  Pt states he has a little more pain in neck today, but states some days have more, some have less.      Objective: See treatment diary below      Assessment: Tolerated treatment well. Patient would benefit from continued PT.  Pt. able to complete all exercises with no increase in pain during or after session.  Pt able to progress some exercises this session without complaint.  Pt 1:1 with PTA [id number], IEP for remainder.      Plan: Continue per plan of care.      POC expires Auth Expiration date PT/OT + Visit Limit?   10/8/2024  2024 99        Visit/Unit Tracking  AUTH Status:  Date 7/30 8/5 8/8 8/12 8/15 8/21 9/10 9/12   [employer] - BOMN Used 1 2 3 4 5 6 7 8            Precautions:  s/p C3-7 ACDF on 2024       Manuals 8/26 8/29 9/3 9/6 9/10 9/12   Cervical PROM                                    Neuro Re-Ed         Patient Ed         Digi          Row         LPD Seated 10#  2x12 10# 3x10 12# 3x12 12# 3x12 13# 3x12 3x10 13#   BSER         Standing hip abduction 2x10 B 3x8 B 3x8 2# 3x8 2# 3x8 2# 3x8 2#   Nancy Negotiation 4 laps ea  fwd/lat 5 laps 5 laps 5 laps each way 5 laps each way 4 laps 4 hurdles   Half Tandem Balance  Foam 1'x2  Foam 1'x2  Foam 1'x2     LOS Biodex         Romberg Balance         Ther Ex         UBE 4'/4' L2 4/4 L2.5 4/4 L3 4/4 L3 4/4 4/4   Vital monitoring         Neck Circles         Shoulder Flexion with Dowel in Chair 3x8 3x8 3x8 3x8 3# 3x8 3# np   Thoracic Extension Chair 2x10 2x10 2x10 2x10 2x10 2x10   Wall Slide         Cervical AROM Each Way         Walking on Foam Tandem/Sidesteps 4 laps each 4  laps each 4 laps each 4 laps each 4 laps each 4 laps ea   Seated Row  11#  3x10 11# 3x10 11# 3x10 12# 3x10 12# 3x10 3x10 13#   Sit to Stand 5#  3x8  No pad 5# 3x8 5# 3x8  8# 3x8 8# 3x8 3x8 8#   Ther Activity                           Gait Training                           Modalities                                   Access Code: [email or web address]  URL: [email or web address]  Date: 08/21/2024  Prepared by: Mary Puga    Exercises  - Seated Shoulder Row with Anchored Resistance  - 1 x daily - 7 x weekly - 2 sets - 10 reps - 5 hold  - Seated Shoulder Extension and Scapular Retraction with Resistance  - 1 x daily - 7 x weekly - 2 sets - 12 reps - 5 hold  - Side Stepping with Counter Support  - 1 x daily - 7 x weekly - 1 sets - 10 reps  - Sit to Stand  - 1 x daily - 7 x weekly - 3 sets - 8 reps  - Seated Cat Cow  - 1 x daily - 7 x weekly - 2 sets - 10 reps  - Standing Hip Abduction with Counter Support  - 1 x daily - 7 x weekly - 2 sets - 10 reps

## 2024-09-16 ENCOUNTER — OFFICE VISIT (OUTPATIENT)
Dept: PHYSICAL THERAPY | Facility: REHABILITATION | Age: 69
End: 2024-09-16
Payer: COMMERCIAL

## 2024-09-16 DIAGNOSIS — G89.29 CHRONIC NECK PAIN: Primary | ICD-10-CM

## 2024-09-16 DIAGNOSIS — R26.89 POOR BALANCE: ICD-10-CM

## 2024-09-16 DIAGNOSIS — M54.2 CHRONIC NECK PAIN: Primary | ICD-10-CM

## 2024-09-16 DIAGNOSIS — Z98.1 S/P CERVICAL SPINAL FUSION: ICD-10-CM

## 2024-09-16 PROCEDURE — 97110 THERAPEUTIC EXERCISES: CPT | Performed by: PHYSICAL THERAPIST

## 2024-09-16 PROCEDURE — 97112 NEUROMUSCULAR REEDUCATION: CPT | Performed by: PHYSICAL THERAPIST

## 2024-09-16 NOTE — PROGRESS NOTES
Daily Note     Today's date: 2024  Patient name: Edgar Obando  : 1955  MRN: [id number]  Referring provider: Morelia Sethi*  Dx:   Encounter Diagnosis     ICD-10-CM    1. Chronic neck pain  M54.2     G89.29       2. Poor balance  R26.89       3. S/P cervical spinal fusion  Z98.1                      Subjective: My stomach was feeling a bit upset this morning, but feeling better now.       Objective: See treatment diary below      Assessment: Tolerated treatment well. Patient would benefit from continued PT. 1:1 with PT for entirety.       Plan: Continue per plan of care.      POC expires Auth Expiration date PT/OT + Visit Limit?   10/8/2024  2024 99        Visit/Unit Tracking  AUTH Status:  Date 7/30 8/5 8/8 8/12 8/15 8/21 9/10 9/12 9/16   [employer] - BOMN Used 1 2 3 4 5 6 7 8 9            Precautions:  s/p C3-7 ACDF on 2024       Manuals    Cervical PROM                Neuro Re-Ed    Patient Ed    Digi     Row    LPD Seated 3x10 13#   BSER    Standing hip abduction 3x8 2#   Nancy Negotiation 4 laps 4 hurdles   Half Tandem Balance    LOS Biodex    Romberg Balance    Ther Ex    UBE 4/4   Vital monitoring    Neck Circles    Shoulder Flexion with Dowel in Chair np   Thoracic Extension Chair 2x10   Wall Slide    Cervical AROM Each Way    Walking on Foam Tandem/Sidesteps 4 laps ea   Seated Row  3x10 13#   Sit to Stand 3x8 8#   Ther Activity            Gait Training            Modalities                    Access Code: [email or web address]  URL: [email or web address]  Date: 2024  Prepared by: Mary Puga    Exercises  - Seated Shoulder Row with Anchored Resistance  - 1 x daily - 7 x weekly - 2 sets - 10 reps - 5 hold  - Seated Shoulder Extension and Scapular Retraction with Resistance  - 1 x daily - 7 x weekly - 2 sets - 12 reps - 5 hold  - Side Stepping with Counter Support  - 1 x daily - 7 x weekly - 1 sets - 10 reps  - Sit to Stand  - 1 x daily - 7 x weekly - 3 sets - 8  reps  - Seated Cat Cow  - 1 x daily - 7 x weekly - 2 sets - 10 reps  - Standing Hip Abduction with Counter Support  - 1 x daily - 7 x weekly - 2 sets - 10 reps

## 2024-09-19 ENCOUNTER — OFFICE VISIT (OUTPATIENT)
Dept: PHYSICAL THERAPY | Facility: REHABILITATION | Age: 69
End: 2024-09-19
Payer: COMMERCIAL

## 2024-09-19 DIAGNOSIS — M54.2 CHRONIC NECK PAIN: Primary | ICD-10-CM

## 2024-09-19 DIAGNOSIS — Z98.1 S/P CERVICAL SPINAL FUSION: ICD-10-CM

## 2024-09-19 DIAGNOSIS — R26.89 POOR BALANCE: ICD-10-CM

## 2024-09-19 DIAGNOSIS — G89.29 CHRONIC NECK PAIN: Primary | ICD-10-CM

## 2024-09-19 PROCEDURE — 97112 NEUROMUSCULAR REEDUCATION: CPT | Performed by: PHYSICAL THERAPIST

## 2024-09-19 PROCEDURE — 97110 THERAPEUTIC EXERCISES: CPT | Performed by: PHYSICAL THERAPIST

## 2024-09-19 NOTE — PROGRESS NOTES
"Daily Note     Today's date: 2024  Patient name: Edgar Obando  : 1955  MRN: [id number]  Referring provider: Morelia Sethi  Dx:   Encounter Diagnosis     ICD-10-CM    1. Chronic neck pain  M54.2     G89.29       2. Poor balance  R26.89       3. S/P cervical spinal fusion  Z98.1                      Subjective: Patient reports no change in status upon arrival.       Objective: See treatment diary below      Assessment: Tolerated treatment well. Patient would benefit from continued PT. 1:1 with PT for 45 minutes.       Plan: Continue per plan of care.      POC expires Auth Expiration date PT/OT + Visit Limit?   10/8/2024  2024 99        Visit/Unit Tracking  AUTH Status:  Date    [employer] - JURGEN Used 9 10            Precautions:  s/p C3-7 ACDF on 2024       Manuals    Cervical PROM                    Neuro Re-Ed     Patient Ed     Digi      Row     LPD Seated 3x10 13# 3x10 13#   BSER     Standing hip abduction 3x8 2# 3x8 2#   Nancy Negotiation 4 laps 4 hurdles 4 laps    Half Tandem Balance  30\"x3 foam   LOS Biodex     Resisted Sidestep  2x8 5# at leyda   Ther Ex     UBE 4/4 4/4 L2   Vital monitoring     Neck Circles     Shoulder Flexion with Dowel in Chair np    Thoracic Extension Chair 2x10 2x10   Wall Slide     Cervical AROM Each Way     Walking on Foam Tandem/Sidesteps 4 laps ea 4 laps ea   Seated Row  3x10 13# 3x10 standing 13#   Sit to Stand 3x8 8# 3x8 8#   Ther Activity               Gait Training               Modalities                       Access Code: [email or web address]  URL: [email or web address]  Date: 2024  Prepared by: Mary Puga    Exercises  - Seated Shoulder Row with Anchored Resistance  - 1 x daily - 7 x weekly - 2 sets - 10 reps - 5 hold  - Seated Shoulder Extension and Scapular Retraction with Resistance  - 1 x daily - 7 x weekly - 2 sets - 12 reps - 5 hold  - Side Stepping with Counter Support  - 1 x daily - 7 x weekly - 1 sets - " 10 reps  - Sit to Stand  - 1 x daily - 7 x weekly - 3 sets - 8 reps  - Seated Cat Cow  - 1 x daily - 7 x weekly - 2 sets - 10 reps  - Standing Hip Abduction with Counter Support  - 1 x daily - 7 x weekly - 2 sets - 10 reps

## 2024-09-23 ENCOUNTER — APPOINTMENT (OUTPATIENT)
Dept: PHYSICAL THERAPY | Facility: REHABILITATION | Age: 69
End: 2024-09-23
Payer: COMMERCIAL

## 2024-09-24 ENCOUNTER — OFFICE VISIT (OUTPATIENT)
Dept: PHYSICAL THERAPY | Facility: REHABILITATION | Age: 69
End: 2024-09-24
Payer: COMMERCIAL

## 2024-09-24 DIAGNOSIS — M54.2 CHRONIC NECK PAIN: Primary | ICD-10-CM

## 2024-09-24 DIAGNOSIS — Z98.1 S/P CERVICAL SPINAL FUSION: ICD-10-CM

## 2024-09-24 DIAGNOSIS — R26.89 POOR BALANCE: ICD-10-CM

## 2024-09-24 DIAGNOSIS — G89.29 CHRONIC NECK PAIN: Primary | ICD-10-CM

## 2024-09-24 PROCEDURE — 97112 NEUROMUSCULAR REEDUCATION: CPT

## 2024-09-24 PROCEDURE — 97110 THERAPEUTIC EXERCISES: CPT

## 2024-09-24 NOTE — PROGRESS NOTES
"Daily Note     Today's date: 2024  Patient name: Edgar Obando  : 1955  MRN: [id number]  Referring provider: Morelia Sethi*  Dx:   Encounter Diagnosis     ICD-10-CM    1. Chronic neck pain  M54.2     G89.29       2. Poor balance  R26.89       3. S/P cervical spinal fusion  Z98.1                      Subjective: Deric is recovering well from a head cold.       Objective: See treatment diary below      Assessment: Tolerated treatment well. Increased reps for STS, with moderate fatigue. Static balance in tandem stance is challenging, continued with modified tandem. Heavy reliance on visual feedback with dynamic balance tasks.  Patient demonstrated fatigue post session and would benefit from continued PT.          Plan: Continue per plan of care.       POC expires Auth Expiration date PT/OT + Visit Limit?   10/8/2024  2024 99        Visit/Unit Tracking  AUTH Status:  Date    [employer] MC - BOMN Used 9 10            Precautions:  s/p C3-7 ACDF on 2024       Manuals    Cervical PROM                        Neuro Re-Ed      Patient Ed      Digi       Row      LPD Seated 3x10 13# 3x10 13# 3x10 13#   BSER      Standing hip abduction 3x8 2# 3x8 2# 10x 2x8 2#   Nancy Negotiation 4 laps 4 hurdles 4 laps  4 laps   Half Tandem Balance  30\"x3 foam 3x30\" foam    LOS Biodex      Resisted Sidestep  2x8 5# at leyda x8 5# at leyda   Ther Ex      UBE 4/4 4/4 L2 4/4 L2   Vital monitoring      Neck Circles      Shoulder Flexion with Dowel in Chair np     Thoracic Extension Chair 2x10 2x10 2x10   Wall Slide      Cervical AROM Each Way      Walking on Foam Tandem/Sidesteps 4 laps ea 4 laps ea 4 laps ea   Seated Row  3x10 13# 3x10 standing 13# nv   Sit to Stand 3x8 8# 3x8 8# 3x10 8#   Ther Activity                  Gait Training                  Modalities                          Access Code: [email or web address]  URL: [email or web address]  Date: 2024  Prepared by: Mary" Vivien    Exercises  - Seated Shoulder Row with Anchored Resistance  - 1 x daily - 7 x weekly - 2 sets - 10 reps - 5 hold  - Seated Shoulder Extension and Scapular Retraction with Resistance  - 1 x daily - 7 x weekly - 2 sets - 12 reps - 5 hold  - Side Stepping with Counter Support  - 1 x daily - 7 x weekly - 1 sets - 10 reps  - Sit to Stand  - 1 x daily - 7 x weekly - 3 sets - 8 reps  - Seated Cat Cow  - 1 x daily - 7 x weekly - 2 sets - 10 reps  - Standing Hip Abduction with Counter Support  - 1 x daily - 7 x weekly - 2 sets - 10 reps

## 2024-09-26 ENCOUNTER — OFFICE VISIT (OUTPATIENT)
Dept: PHYSICAL THERAPY | Facility: REHABILITATION | Age: 69
End: 2024-09-26
Payer: COMMERCIAL

## 2024-09-26 DIAGNOSIS — R26.89 POOR BALANCE: ICD-10-CM

## 2024-09-26 DIAGNOSIS — M54.2 CHRONIC NECK PAIN: Primary | ICD-10-CM

## 2024-09-26 DIAGNOSIS — G89.29 CHRONIC NECK PAIN: Primary | ICD-10-CM

## 2024-09-26 DIAGNOSIS — Z98.1 S/P CERVICAL SPINAL FUSION: ICD-10-CM

## 2024-09-26 PROCEDURE — 97112 NEUROMUSCULAR REEDUCATION: CPT

## 2024-09-26 PROCEDURE — 97110 THERAPEUTIC EXERCISES: CPT

## 2024-09-26 NOTE — PROGRESS NOTES
"Daily Note     Today's date: 2024  Patient name: Edgar Obando  : 1955  MRN: [id number]  Referring provider: Morelia Sethi*  Dx:   Encounter Diagnosis     ICD-10-CM    1. Chronic neck pain  M54.2     G89.29       2. Poor balance  R26.89       3. S/P cervical spinal fusion  Z98.1                      Subjective: Pt reports overall improvement upon arrival, states he's trying to monitor blood sugar levels more closely      Objective: See treatment diary below      Assessment: Tolerated treatment well. Patient would benefit from continued PT.  Pt. able to complete all exercises with no increase in pain during or after session.  Pt arrived late due to previous appt with PCP, some exercises not performed.  Pt. 1:1 with PTA for entirety.      Plan: Continue per plan of care.      POC expires Auth Expiration date PT/OT + Visit Limit?   10/8/2024  2024 99        Visit/Unit Tracking  AUTH Status:  Date    [employer] MC - BOMN Used 9 10 11            Precautions:  s/p C3-7 ACDF on 2024       Manuals    Cervical PROM                            Neuro Re-Ed       Patient Ed       Digi        Row       LPD Seated 3x10 13# 3x10 13# 3x10 13# 2x10 13#   BSER       Standing hip abduction 3x8 2# 3x8 2# 10x 2x8 2# 2x10 2# b/l   Nancy Negotiation 4 laps 4 hurdles 4 laps  4 laps 4 laps 4 hurldes   Half Tandem Balance  30\"x3 foam 3x30\" foam  np   LOS Biodex       Resisted Sidestep  2x8 5# at leyda x8 5# at leyda np   Ther Ex       UBE 4/4 4/4 L2 4/4 L2 3/3 L2   Vital monitoring       Neck Circles       Shoulder Flexion with Dowel in Chair np      Thoracic Extension Chair 2x10 2x10 2x10 np   Wall Slide       Cervical AROM Each Way       Walking on Foam Tandem/Sidesteps 4 laps ea 4 laps ea 4 laps ea 4 laps ea   Seated Row  3x10 13# 3x10 standing 13# nv 2x10 13# seated   Sit to Stand 3x8 8# 3x8 8# 3x10 8# 3x10 8#   Ther Activity                     Gait Training               "       Modalities                             Access Code: [email or web address]  URL: [email or web address]  Date: 08/21/2024  Prepared by: Mary Puga    Exercises  - Seated Shoulder Row with Anchored Resistance  - 1 x daily - 7 x weekly - 2 sets - 10 reps - 5 hold  - Seated Shoulder Extension and Scapular Retraction with Resistance  - 1 x daily - 7 x weekly - 2 sets - 12 reps - 5 hold  - Side Stepping with Counter Support  - 1 x daily - 7 x weekly - 1 sets - 10 reps  - Sit to Stand  - 1 x daily - 7 x weekly - 3 sets - 8 reps  - Seated Cat Cow  - 1 x daily - 7 x weekly - 2 sets - 10 reps  - Standing Hip Abduction with Counter Support  - 1 x daily - 7 x weekly - 2 sets - 10 reps

## 2024-09-30 ENCOUNTER — OFFICE VISIT (OUTPATIENT)
Dept: PHYSICAL THERAPY | Facility: REHABILITATION | Age: 69
End: 2024-09-30
Payer: COMMERCIAL

## 2024-09-30 DIAGNOSIS — Z98.1 S/P CERVICAL SPINAL FUSION: ICD-10-CM

## 2024-09-30 DIAGNOSIS — M54.2 CHRONIC NECK PAIN: Primary | ICD-10-CM

## 2024-09-30 DIAGNOSIS — R26.89 POOR BALANCE: ICD-10-CM

## 2024-09-30 DIAGNOSIS — G89.29 CHRONIC NECK PAIN: Primary | ICD-10-CM

## 2024-09-30 PROCEDURE — 97112 NEUROMUSCULAR REEDUCATION: CPT

## 2024-09-30 PROCEDURE — 97110 THERAPEUTIC EXERCISES: CPT

## 2024-09-30 NOTE — PROGRESS NOTES
"Daily Note     Today's date: 2024  Patient name: Edgar Obando  : 1955  MRN: [id number]  Referring provider: Morelia Sethi*  Dx:   Encounter Diagnosis     ICD-10-CM    1. Chronic neck pain  M54.2     G89.29       2. Poor balance  R26.89       3. S/P cervical spinal fusion  Z98.1             Start Time: 1015  Stop Time: 1105  Total time in clinic (min): 50 minutes    Subjective: Pt reports his legs are bothering him today. He reports \"its one of those days\". He denies any falls.       Objective: See treatment diary below      Assessment: Tolerated treatment well. Patient would benefit from continued PT in order to maximize function. Patient completes exercises without pain or symptom increase. Mild fatigue noted with exercises today. Ue support utilized for hurdles and walking on foam.         Plan: Continue per plan of care.      POC expires Auth Expiration date PT/OT + Visit Limit?   10/8/2024  2024 99        Visit/Unit Tracking  AUTH Status:  Date    [employer] MC - BOMN Used 9 10 11            Precautions:  s/p C3-7 ACDF on 2024       Manuals     Cervical PROM                                Neuro Re-Ed        Patient Ed        Digi         Row        LPD Seated 3x10 13# 3x10 13# 3x10 13# 2x10 13#    BSER        Standing hip abduction 3x8 2# 3x8 2# 10x 2x8 2# 2x10 2# b/l 2x10 2# b/l   Nancy Negotiation 4 laps 4 hurdles 4 laps  4 laps 4 laps 4 hurldes 4 laps 4 hurldes   Half Tandem Balance  30\"x3 foam 3x30\" foam  np 3x30\" foam    LOS Biodex        Resisted Sidestep  2x8 5# at leyda x8 5# at leyda np    Ther Ex        UBE 4/4 4/4 L2 4/4 L2 3/3 L2 3/3 L2   Vital monitoring        Neck Circles        Shoulder Flexion with Dowel in Chair np       Thoracic Extension Chair 2x10 2x10 2x10 np 2x10    Wall Slide        Cervical AROM Each Way        Walking on Foam Tandem/Sidesteps 4 laps ea 4 laps ea 4 laps ea 4 laps ea 4 laps ea   Seated Row  3x10 13# " 3x10 standing 13# nv 2x10 13# seated    Sit to Stand 3x8 8# 3x8 8# 3x10 8# 3x10 8# 3x10 8#   Ther Activity                        Gait Training                        Modalities                                Access Code: [email or web address]  URL: [email or web address]  Date: 08/21/2024  Prepared by: Mary Puga    Exercises  - Seated Shoulder Row with Anchored Resistance  - 1 x daily - 7 x weekly - 2 sets - 10 reps - 5 hold  - Seated Shoulder Extension and Scapular Retraction with Resistance  - 1 x daily - 7 x weekly - 2 sets - 12 reps - 5 hold  - Side Stepping with Counter Support  - 1 x daily - 7 x weekly - 1 sets - 10 reps  - Sit to Stand  - 1 x daily - 7 x weekly - 3 sets - 8 reps  - Seated Cat Cow  - 1 x daily - 7 x weekly - 2 sets - 10 reps  - Standing Hip Abduction with Counter Support  - 1 x daily - 7 x weekly - 2 sets - 10 reps

## 2024-10-03 ENCOUNTER — APPOINTMENT (OUTPATIENT)
Dept: PHYSICAL THERAPY | Facility: REHABILITATION | Age: 69
End: 2024-10-03
Payer: COMMERCIAL

## 2024-10-04 ENCOUNTER — OFFICE VISIT (OUTPATIENT)
Dept: PHYSICAL THERAPY | Facility: REHABILITATION | Age: 69
End: 2024-10-04
Payer: COMMERCIAL

## 2024-10-04 DIAGNOSIS — G89.29 CHRONIC NECK PAIN: Primary | ICD-10-CM

## 2024-10-04 DIAGNOSIS — R26.89 POOR BALANCE: ICD-10-CM

## 2024-10-04 DIAGNOSIS — M54.2 CHRONIC NECK PAIN: Primary | ICD-10-CM

## 2024-10-04 PROCEDURE — 97112 NEUROMUSCULAR REEDUCATION: CPT

## 2024-10-04 PROCEDURE — 97110 THERAPEUTIC EXERCISES: CPT

## 2024-10-04 NOTE — PROGRESS NOTES
"Daily Note     Today's date: 10/4/2024  Patient name: Edgar Obando  : 1955  MRN: [id number]  Referring provider: Morelia Sethi*  Dx:   Encounter Diagnosis     ICD-10-CM    1. Chronic neck pain  M54.2     G89.29       2. Poor balance  R26.89               Start Time: 1115  Stop Time: 1153  Total time in clinic (min): 38 minutes    Subjective: Pt reports his feet are bothering him today and feels like hes walking on balloons.       Objective: See treatment diary below      Assessment: Tolerated treatment well. Patient would benefit from continued PT in order to maximize function. Patient completes exercises without pain or symptom increase. Mild fatigue noted with exercises today but post session he noted it felt easy overall. Ue support utilized for hurdles and walking on foam. 1:1 with Nimco Cox DPT for entirety of session.           Plan: Continue per plan of care.      POC expires Auth Expiration date PT/OT + Visit Limit?   10/8/2024  2024 99        Visit/Unit Tracking  AUTH Status:  Date 9/16 9/19 9/26 9/30 10/4   [employer] - BOMN Used  10 11 12 13            Precautions:  s/p C3-7 ACDF on 2024       Manuals 9/16 9/19 9/24 9/26 9/30  10/4   Cervical PROM                                    Neuro Re-Ed         Patient Ed         Digi          Row         LPD Seated 3x10 13# 3x10 13# 3x10 13# 2x10 13#     BSER         Standing hip abduction 3x8 2# 3x8 2# 10x 2x8 2# 2x10 2# b/l 2x10 2# b/l 2x10 2# B/L    Nancy Negotiation 4 laps 4 hurdles 4 laps  4 laps 4 laps 4 hurldes 4 laps 4 hurldes    Half Tandem Balance  30\"x3 foam 3x30\" foam  np 3x30\" foam  3x30\"   LOS Biodex         Resisted Sidestep  2x8 5# at leyda x8 5# at leyda np     Ther Ex         UBE 4/4 4/4 L2 4/4 L2 3/3 L2 3/3 L2 4/4   Vital monitoring         Neck Circles         Shoulder Flexion with Dowel in Chair np        Thoracic Extension Chair 2x10 2x10 2x10 np 2x10  2x10    Wall Slide         Cervical AROM Each Way     "     Walking on Foam Tandem/Sidesteps 4 laps ea 4 laps ea 4 laps ea 4 laps ea 4 laps ea 4 laps ea HHA   Seated Row  3x10 13# 3x10 standing 13# nv 2x10 13# seated     Sit to Stand 3x8 8# 3x8 8# 3x10 8# 3x10 8# 3x10 8# 3x10 8#   Ther Activity                           Gait Training                           Modalities                                   Access Code: [email or web address]  URL: [email or web address]  Date: 08/21/2024  Prepared by: Mary Puga    Exercises  - Seated Shoulder Row with Anchored Resistance  - 1 x daily - 7 x weekly - 2 sets - 10 reps - 5 hold  - Seated Shoulder Extension and Scapular Retraction with Resistance  - 1 x daily - 7 x weekly - 2 sets - 12 reps - 5 hold  - Side Stepping with Counter Support  - 1 x daily - 7 x weekly - 1 sets - 10 reps  - Sit to Stand  - 1 x daily - 7 x weekly - 3 sets - 8 reps  - Seated Cat Cow  - 1 x daily - 7 x weekly - 2 sets - 10 reps  - Standing Hip Abduction with Counter Support  - 1 x daily - 7 x weekly - 2 sets - 10 reps

## 2024-10-07 ENCOUNTER — APPOINTMENT (OUTPATIENT)
Dept: PHYSICAL THERAPY | Facility: REHABILITATION | Age: 69
End: 2024-10-07
Payer: COMMERCIAL

## 2024-10-08 ENCOUNTER — OFFICE VISIT (OUTPATIENT)
Dept: PHYSICAL THERAPY | Facility: REHABILITATION | Age: 69
End: 2024-10-08
Payer: COMMERCIAL

## 2024-10-08 DIAGNOSIS — G89.29 CHRONIC NECK PAIN: Primary | ICD-10-CM

## 2024-10-08 DIAGNOSIS — Z98.1 S/P CERVICAL SPINAL FUSION: ICD-10-CM

## 2024-10-08 DIAGNOSIS — M54.2 CHRONIC NECK PAIN: Primary | ICD-10-CM

## 2024-10-08 DIAGNOSIS — R26.89 POOR BALANCE: ICD-10-CM

## 2024-10-08 PROCEDURE — 97112 NEUROMUSCULAR REEDUCATION: CPT | Performed by: PHYSICAL THERAPIST

## 2024-10-08 NOTE — PROGRESS NOTES
"Daily Note     Today's date: 10/8/2024  Patient name: Edgar Obando  : 1955  MRN: [id number]  Referring provider: Morelia Sethi*  Dx:   Encounter Diagnosis     ICD-10-CM    1. Chronic neck pain  M54.2     G89.29       2. Poor balance  R26.89       3. S/P cervical spinal fusion  Z98.1                      Subjective: I was feeling sick towards the end of last week but I am feeling better now.       Objective: See treatment diary below      Assessment: Tolerated treatment well. Patient continues to be appropriately challenged at current therapeutic volume and intensity. Patient would benefit from continued PT. 1:1 with PT for 15 minutes, IEP for remainder.       Plan: Continue per plan of care.      POC expires Auth Expiration date PT/OT + Visit Limit?   10/8/2024  2024 99        Visit/Unit Tracking  AUTH Status:  Date 9/16 9/19 9/26 9/30 10/4 10/8   [employer] - BOMN Used 9 10 11 12 13 14            Precautions:  s/p C3-7 ACDF on 2024       Manuals 10/8   Cervical PROM                Neuro Re-Ed    Patient Ed    Digi     Row    LPD Seated    BSER    Standing hip abduction 2x10 2# B/L    Nancy Negotiation    Half Tandem Balance 3x30\"   LOS Biodex    Resisted Sidestep    Ther Ex    UBE    Vital monitoring    Neck Circles    Shoulder Flexion with Dowel in Chair    Thoracic Extension Chair 2x10    Wall Slide    Cervical AROM Each Way    Walking on Foam Tandem/Sidesteps 4 laps ea HHA   Seated Row     Sit to Stand 3x10 8#   Ther Activity            Gait Training            Modalities                    Access Code: [email or web address]  URL: [email or web address]  Date: 2024  Prepared by: Mary Puga    Exercises  - Seated Shoulder Row with Anchored Resistance  - 1 x daily - 7 x weekly - 2 sets - 10 reps - 5 hold  - Seated Shoulder Extension and Scapular Retraction with Resistance  - 1 x daily - 7 x weekly - 2 sets - 12 reps - 5 hold  - Side Stepping with Counter Support  - 1 x " daily - 7 x weekly - 1 sets - 10 reps  - Sit to Stand  - 1 x daily - 7 x weekly - 3 sets - 8 reps  - Seated Cat Cow  - 1 x daily - 7 x weekly - 2 sets - 10 reps  - Standing Hip Abduction with Counter Support  - 1 x daily - 7 x weekly - 2 sets - 10 reps

## 2024-10-09 ENCOUNTER — OFFICE VISIT (OUTPATIENT)
Dept: OBGYN CLINIC | Facility: HOSPITAL | Age: 69
End: 2024-10-09
Payer: COMMERCIAL

## 2024-10-09 VITALS
BODY MASS INDEX: 39.7 KG/M2 | WEIGHT: 247 LBS | DIASTOLIC BLOOD PRESSURE: 66 MMHG | HEART RATE: 83 BPM | HEIGHT: 66 IN | SYSTOLIC BLOOD PRESSURE: 120 MMHG

## 2024-10-09 DIAGNOSIS — M17.11 PRIMARY OSTEOARTHRITIS OF RIGHT KNEE: ICD-10-CM

## 2024-10-09 DIAGNOSIS — G89.29 CHRONIC PAIN OF RIGHT KNEE: Primary | ICD-10-CM

## 2024-10-09 DIAGNOSIS — M25.561 CHRONIC PAIN OF RIGHT KNEE: Primary | ICD-10-CM

## 2024-10-09 PROCEDURE — 99213 OFFICE O/P EST LOW 20 MIN: CPT | Performed by: ORTHOPAEDIC SURGERY

## 2024-10-09 PROCEDURE — 20610 DRAIN/INJ JOINT/BURSA W/O US: CPT | Performed by: ORTHOPAEDIC SURGERY

## 2024-10-09 RX ORDER — LIDOCAINE HYDROCHLORIDE 10 MG/ML
2 INJECTION, SOLUTION INFILTRATION; PERINEURAL
Status: COMPLETED | OUTPATIENT
Start: 2024-10-09 | End: 2024-10-09

## 2024-10-09 RX ORDER — BUSPIRONE HYDROCHLORIDE 7.5 MG/1
TABLET ORAL
COMMUNITY
Start: 2024-08-22

## 2024-10-09 RX ORDER — BETAMETHASONE SODIUM PHOSPHATE AND BETAMETHASONE ACETATE 3; 3 MG/ML; MG/ML
12 INJECTION, SUSPENSION INTRA-ARTICULAR; INTRALESIONAL; INTRAMUSCULAR; SOFT TISSUE
Status: COMPLETED | OUTPATIENT
Start: 2024-10-09 | End: 2024-10-09

## 2024-10-09 RX ORDER — TRAZODONE HYDROCHLORIDE 100 MG/1
TABLET ORAL
COMMUNITY
Start: 2024-10-03

## 2024-10-09 RX ORDER — FAMOTIDINE 20 MG/1
TABLET, FILM COATED ORAL
COMMUNITY
Start: 2024-07-25

## 2024-10-09 RX ORDER — CLONAZEPAM 0.5 MG/1
TABLET ORAL
COMMUNITY
Start: 2024-07-29

## 2024-10-09 RX ORDER — BUPIVACAINE HYDROCHLORIDE 2.5 MG/ML
2 INJECTION, SOLUTION INFILTRATION; PERINEURAL
Status: COMPLETED | OUTPATIENT
Start: 2024-10-09 | End: 2024-10-09

## 2024-10-09 RX ADMIN — LIDOCAINE HYDROCHLORIDE 2 ML: 10 INJECTION, SOLUTION INFILTRATION; PERINEURAL at 14:00

## 2024-10-09 RX ADMIN — BETAMETHASONE SODIUM PHOSPHATE AND BETAMETHASONE ACETATE 12 MG: 3; 3 INJECTION, SUSPENSION INTRA-ARTICULAR; INTRALESIONAL; INTRAMUSCULAR; SOFT TISSUE at 14:00

## 2024-10-09 RX ADMIN — BUPIVACAINE HYDROCHLORIDE 2 ML: 2.5 INJECTION, SOLUTION INFILTRATION; PERINEURAL at 14:00

## 2024-10-09 NOTE — PROGRESS NOTES
Assessment:  1. Chronic pain of right knee        2. Primary osteoarthritis of right knee            Plan:  Right knee osteoarthritis  Patient is currently recovering from cervical fusion with .  He has made several suggestions he is still recovering from this surgery.  Current symptoms of on-going right knee pain  The patient would like to pursue total knee arthroplasty yet a decision to wait until neck is better recovered was made  The patient was provided with right knee steroid injection.  The patient tolerated the procedure well.    The patient should follow up in 3 months.  Right knee x-ray at next visit    To do next visit:  Return in about 3 months (around 1/9/2025) for re-check with x-rays.    The above stated was discussed in layman's terms and the patient expressed understanding.  All questions were answered to the patient's satisfaction.       Scribe Attestation      I,:  Augustus Spain MA am acting as a scribe while in the presence of the attending physician.:       I,:  Zack Montenegro MD personally performed the services described in this documentation    as scribed in my presence.:               Subjective:   Edgar Obando is a 69 y.o. male who presents for follow up of right knee.  He is currently recovering from cervical fusion.  Today he complains of right generalized knee pain.  His pain effects his daily activity and sleep.  Most activity aggravates while rest alleviates.  He has had steroid injections in past.  He denies past right knee surgery yet has history of left TKA in 2016.      Review of systems negative unless otherwise specified in HPI    Past Medical History:   Diagnosis Date    Acid reflux     Acute blood loss anemia 07/15/2016    Anxiety     Arthritis     Cellulitis     left ankle    CPAP (continuous positive airway pressure) dependence     as needed per patient    Diabetes mellitus (HCC)     Hypertension     Idiopathic chronic venous hypertension of right lower  extremity with ulcer (HCC) 2023    Sleep apnea     Twitching        Past Surgical History:   Procedure Laterality Date    APPENDECTOMY      CERVICAL FUSION Bilateral 2024    Procedure: FUSION CERVICAL ANTERIOR W DISCECTOMY, C3-7 ACDF;  Surgeon: Conrad Richardson MD;  Location: [facility];  Service: Orthopedics    CHOLECYSTECTOMY      COLONOSCOPY      IR PICC PLACEMENT DOUBLE LUMEN  2024    KNEE ARTHROSCOPY      DE ARTHRP KNE CONDYLE&PLATU MEDIAL&LAT COMPARTMENTS Left 2016    Procedure: TOTAL  KNEE REPLACEMENT ;  Surgeon: Zack Montenegro MD;  Location: [facility];  Service: Orthopedics       Family History   Problem Relation Age of Onset    Diabetes Mother     Diabetes Father     Cancer Father        Social History     Occupational History    Occupation: Attendance officer    Occupation: Retired-    Tobacco Use    Smoking status: Former     Current packs/day: 0.00     Types: Cigarettes     Quit date:      Years since quittin.7    Smokeless tobacco: Never   Vaping Use    Vaping status: Never Used   Substance and Sexual Activity    Alcohol use: Not Currently    Drug use: Not Currently     Comment: tried multiple drugs in the past    Sexual activity: Not Currently         Current Outpatient Medications:     busPIRone (BUSPAR) 7.5 mg tablet, , Disp: , Rfl:     clonazePAM (KlonoPIN) 0.5 mg tablet, , Disp: , Rfl:     famotidine (PEPCID) 20 mg tablet, , Disp: , Rfl:     traZODone (DESYREL) 100 mg tablet, , Disp: , Rfl:     acetaminophen (TYLENOL) 650 mg CR tablet, Take 1 tablet (650 mg total) by mouth every 8 (eight) hours as needed for mild pain, Disp: 30 tablet, Rfl: 0    albuterol (PROVENTIL HFA,VENTOLIN HFA) 90 mcg/act inhaler, Inhale 2 puffs every 6 (six) hours as needed for wheezing As needed, Disp: , Rfl:     aspirin (ECOTRIN LOW STRENGTH) 81 mg EC tablet, Take 81 mg by mouth daily, Disp: , Rfl:     b complex vitamins capsule, Take 1 capsule by mouth daily, Disp: , Rfl:      bisacodyl (DULCOLAX) 5 mg EC tablet, Take 1 tablet (5 mg total) by mouth once for 1 dose, Disp: 2 tablet, Rfl: 0    Blood Glucose Monitoring Suppl (ONE TOUCH ULTRA 2) w/Device KIT, USE TO TEST BLOOD GLUCOSE, Disp: , Rfl:     budesonide (RINOCORT AQUA) 32 MCG/ACT nasal spray, 1 spray into each nostril 2 (two) times a day As needed, Disp: , Rfl:     busPIRone (BUSPAR) 10 mg tablet, TAKE 1 TABLET (10 MG TOTAL) BY MOUTH TWO (TWO) TIMES A DAY, Disp: 180 tablet, Rfl: 1    chlorhexidine (PERIDEX) 0.12 % solution, RINSE MOUTH WITH 15ML (1 CAPFUL) FOR 30 SECONDS IN MORNING AND EVENING AFTER BRUSHING, THEN SPIT, Disp: , Rfl:     clonazePAM (KlonoPIN) 1 mg tablet, Take 0.5 tablets (0.5 mg total) by mouth 2 (two) times a day, Disp: 30 tablet, Rfl: 0    cloNIDine (CATAPRES) 0.1 mg tablet, Take 0.1 mg by mouth 2 (two) times a day, Disp: , Rfl:     docusate sodium (COLACE) 100 mg capsule, Take 100 mg by mouth 2 (two) times a day, Disp: , Rfl:     fluticasone (FLOVENT HFA) 220 mcg/act inhaler, Inhale 1 puff daily as needed As needed, Disp: , Rfl:     furosemide (LASIX) 40 mg tablet, Take 40 mg by mouth as needed in the morning and 40 mg as needed in the evening., Disp: , Rfl:     Lancets 30G MISC, 3 (three) times a day Use to test blood glucose, Disp: , Rfl:     losartan (COZAAR) 25 mg tablet, , Disp: , Rfl:     lovastatin (MEVACOR) 10 MG tablet, Take 10 mg by mouth daily at bedtime, Disp: , Rfl:     Multiple Vitamins-Minerals (multivitamin with minerals) tablet, Take 1 tablet by mouth daily, Disp: , Rfl:     mupirocin (BACTROBAN) 2 % ointment, , Disp: , Rfl:     naloxone (NARCAN) 4 mg/0.1 mL nasal spray, Administer 1 spray into a nostril. If no response after 2-3 minutes, give another dose in the other nostril using a new spray., Disp: 1 each, Rfl: 1    naproxen (NAPROSYN) 500 mg tablet, , Disp: , Rfl:     NARCAN 4 MG/0.1ML LIQD, Has if needed, Disp: , Rfl:     OneTouch Ultra test strip, USE TO TEST BLOOD GLUCOSE TWICE DAILY,  "Disp: , Rfl:     oxyCODONE (Roxicodone) 5 immediate release tablet, Take 1 tablet (5 mg total) by mouth every 6 (six) hours as needed for moderate pain Max Daily Amount: 20 mg, Disp: 30 tablet, Rfl: 0    pantoprazole (PROTONIX) 40 mg tablet, Take 40 mg by mouth daily, Disp: , Rfl:     potassium chloride (K-DUR,KLOR-CON) 10 mEq tablet, , Disp: , Rfl:     predniSONE 20 mg tablet, , Disp: , Rfl:     tamsulosin (FLOMAX) 0.4 mg, TAKE 1 CAPSULE BY MOUTH EVERY DAY WITH DINNER, Disp: 90 capsule, Rfl: 2    testosterone (ANDROGEL) 1.62 % TD gel pump, , Disp: , Rfl:     tiZANidine (ZANAFLEX) 2 mg tablet, Take 1 tablet (2 mg total) by mouth every 8 (eight) hours as needed for muscle spasms May make you drowsy. Do not drive until you know how it affects you. Do not take any other muscle relaxers while taking this medication, Disp: 30 tablet, Rfl: 0    Trulicity 4.5 MG/0.5ML SOPN, every 7 days On Mondays, Disp: , Rfl:     zinc gluconate 50 mg tablet, Take 50 mg by mouth in the morning., Disp: , Rfl:     No Known Allergies         Vitals:    10/09/24 1414   BP: 120/66   Pulse: 83       Objective:  Physical exam  General: Awake, Alert, Oriented  Eyes: Pupils equal, round and reactive to light  Heart: regular rate and rhythm  Lungs: No audible wheezing  Abdomen: soft                    Ortho Exam  Right knee:  Varus alignment  No erythema or ecchymosis  No effusion or swelling  Normal strength  Good ROM with crepitus   Calf compartments soft and supple  Sensation intact  Toes are warm sensate and mobile      Diagnostics, reviewed and taken today if performed as documented:    None performed     Procedures, if performed today:    Large joint arthrocentesis: R knee  Universal Protocol:  Consent: Verbal consent obtained.  Risks and benefits: risks, benefits and alternatives were discussed  Consent given by: patient  Time out: Immediately prior to procedure a \"time out\" was called to verify the correct patient, procedure, equipment, " "support staff and site/side marked as required.  Timeout called at: 10/9/2024 2:52 PM.  Patient understanding: patient states understanding of the procedure being performed  Site marked: the operative site was marked  Patient identity confirmed: verbally with patient  Supporting Documentation  Indications: pain   Procedure Details  Location: knee - R knee  Preparation: Patient was prepped and draped in the usual sterile fashion  Needle size: 22 G  Ultrasound guidance: no  Approach: anterolateral  Medications administered: 12 mg betamethasone acetate-betamethasone sodium phosphate 6 (3-3) mg/mL; 2 mL bupivacaine 0.25 %; 2 mL lidocaine 1 %    Patient tolerance: patient tolerated the procedure well with no immediate complications  Dressing:  Sterile dressing applied               Portions of the record may have been created with voice recognition software.  Occasional wrong word or \"sound a like\" substitutions may have occurred due to the inherent limitations of voice recognition software.  Read the chart carefully and recognize, using context, where substitutions have occurred.    "

## 2024-10-10 ENCOUNTER — OFFICE VISIT (OUTPATIENT)
Dept: PHYSICAL THERAPY | Facility: REHABILITATION | Age: 69
End: 2024-10-10
Payer: COMMERCIAL

## 2024-10-10 DIAGNOSIS — G89.29 CHRONIC NECK PAIN: Primary | ICD-10-CM

## 2024-10-10 DIAGNOSIS — Z98.1 S/P CERVICAL SPINAL FUSION: ICD-10-CM

## 2024-10-10 DIAGNOSIS — M54.2 CHRONIC NECK PAIN: Primary | ICD-10-CM

## 2024-10-10 DIAGNOSIS — R26.89 POOR BALANCE: ICD-10-CM

## 2024-10-10 PROCEDURE — 97112 NEUROMUSCULAR REEDUCATION: CPT

## 2024-10-10 PROCEDURE — 97110 THERAPEUTIC EXERCISES: CPT

## 2024-10-10 NOTE — PROGRESS NOTES
"Daily Note     Today's date: 10/10/2024  Patient name: Edgar Obando  : 1955  MRN: [id number]  Referring provider: Morelia Sethi*  Dx:   Encounter Diagnosis     ICD-10-CM    1. Chronic neck pain  M54.2     G89.29       2. Poor balance  R26.89       3. S/P cervical spinal fusion  Z98.1                      Subjective: Pt reports some improvement with balance upon arrival      Objective: See treatment diary below      Assessment: Tolerated treatment well. Patient would benefit from continued PT.  Pt. able to complete all exercises with no increase in pain during or after session.  Pt did well with balance, no assistance required during tandem balance this session.  Pt 1:1 with PTA [id number], IEP for remainder.      Plan: Continue per plan of care.      POC expires Auth Expiration date PT/OT + Visit Limit?   10/8/2024  2024 99        Visit/Unit Tracking  AUTH Status:  Date 9/16 9/19 9/26 9/30 10/4 10/8 10/10   [employer] - BOMN Used  10 11 12 13 14 15            Precautions:  s/p C3-7 ACDF on 2024       Manuals 10/8 10/10   Cervical PROM                    Neuro Re-Ed     Patient Ed     Digi      Row     LPD Seated     BSER     Standing hip abduction 2x10 2# B/L  2x10 2# b/l   Nancy Negotiation     Half Tandem Balance 3x30\" 3x30\"   LOS Biodex     Resisted Sidestep     Ther Ex     UBE    Vital monitoring     Neck Circles     Shoulder Flexion with Dowel in Chair     Thoracic Extension Chair 2x10  2x10 5\"   Wall Slide     Cervical AROM Each Way     Walking on Foam Tandem/Sidesteps 4 laps ea HHA 4 laps ea HHA   Seated Row      Sit to Stand 3x10 8# 3x10 8#   Ther Activity               Gait Training               Modalities                       Access Code: [email or web address]  URL: [email or web address]  Date: 2024  Prepared by: Mary Puga    Exercises  - Seated Shoulder Row with Anchored Resistance  - 1 x daily - 7 x weekly - 2 sets - 10 reps - 5 hold  - Seated Shoulder " Extension and Scapular Retraction with Resistance  - 1 x daily - 7 x weekly - 2 sets - 12 reps - 5 hold  - Side Stepping with Counter Support  - 1 x daily - 7 x weekly - 1 sets - 10 reps  - Sit to Stand  - 1 x daily - 7 x weekly - 3 sets - 8 reps  - Seated Cat Cow  - 1 x daily - 7 x weekly - 2 sets - 10 reps  - Standing Hip Abduction with Counter Support  - 1 x daily - 7 x weekly - 2 sets - 10 reps

## 2024-10-14 ENCOUNTER — TELEPHONE (OUTPATIENT)
Age: 69
End: 2024-10-14

## 2024-10-14 ENCOUNTER — OFFICE VISIT (OUTPATIENT)
Dept: PHYSICAL THERAPY | Facility: REHABILITATION | Age: 69
End: 2024-10-14
Payer: COMMERCIAL

## 2024-10-14 DIAGNOSIS — Z98.1 S/P CERVICAL SPINAL FUSION: ICD-10-CM

## 2024-10-14 DIAGNOSIS — R26.89 POOR BALANCE: ICD-10-CM

## 2024-10-14 DIAGNOSIS — M54.2 CHRONIC NECK PAIN: Primary | ICD-10-CM

## 2024-10-14 DIAGNOSIS — G89.29 CHRONIC NECK PAIN: Primary | ICD-10-CM

## 2024-10-14 PROCEDURE — 97112 NEUROMUSCULAR REEDUCATION: CPT

## 2024-10-14 PROCEDURE — 97110 THERAPEUTIC EXERCISES: CPT

## 2024-10-14 NOTE — PROGRESS NOTES
"Daily Note     Today's date: 10/14/2024  Patient name: Edgar Obando  : 1955  MRN: [id number]  Referring provider: Morelia Sethi*  Dx:   Encounter Diagnosis     ICD-10-CM    1. Chronic neck pain  M54.2     G89.29       2. Poor balance  R26.89       3. S/P cervical spinal fusion  Z98.1                      Subjective: Pt states some pain present today, states he feels \"a little wobbly\" today.  Pt states his sugars were a little high today, around 131 this morning.        Objective: See treatment diary below      Assessment: Tolerated treatment well. Patient would benefit from continued PT.  Pt. able to complete all exercises with no increase in pain during or after session.  Pt required frequent rests throughout session, but exhibited no confusion or cognitive impairment during session.  Pt. 1:1 with PTA for entirety.      Plan: Continue per plan of care.      POC expires Auth Expiration date PT/OT + Visit Limit?   10/8/2024  2024 99        Visit/Unit Tracking  AUTH Status:  Date 9/16 9/19 9/26 9/30 10/4 10/8 10/10 10/14   [employer] - BOMN Used 9 10 11 12 13 14 15 16            Precautions:  s/p C3-7 ACDF on 2024       Manuals 10/8 10/10 10/14   Cervical PROM                        Neuro Re-Ed      Patient Ed      Digi       Row      LPD Seated      BSER      Standing hip abduction 2x10 2# B/L  2x10 2# b/l 2x10 2# b/l   Nancy Negotiation      Half Tandem Balance 3x30\" 3x30\" held   LOS Biodex      Resisted Sidestep      Ther Ex      UBE    Vital monitoring      Neck Circles      Shoulder Flexion with Dowel in Chair      Thoracic Extension Chair 2x10  2x10 5\" 2x10 5\"   Wall Slide      Cervical AROM Each Way      Walking on Foam Tandem/Sidesteps 4 laps ea HHA 4 laps ea HHA 4 laps ea HHA   Seated Row       Sit to Stand 3x10 8# 3x10 8# 2x10 8#   Ther Activity                  Gait Training                  Modalities                          Access Code: [email or web address]  URL: " [email or web address]  Date: 08/21/2024  Prepared by: Mary Puga    Exercises  - Seated Shoulder Row with Anchored Resistance  - 1 x daily - 7 x weekly - 2 sets - 10 reps - 5 hold  - Seated Shoulder Extension and Scapular Retraction with Resistance  - 1 x daily - 7 x weekly - 2 sets - 12 reps - 5 hold  - Side Stepping with Counter Support  - 1 x daily - 7 x weekly - 1 sets - 10 reps  - Sit to Stand  - 1 x daily - 7 x weekly - 3 sets - 8 reps  - Seated Cat Cow  - 1 x daily - 7 x weekly - 2 sets - 10 reps  - Standing Hip Abduction with Counter Support  - 1 x daily - 7 x weekly - 2 sets - 10 reps

## 2024-10-14 NOTE — TELEPHONE ENCOUNTER
Caller: Nayeli Obando    Doctor: Dr. Martines    Reason for call: appt/checked chart for date last seen/2023/scheduled    Call back#: NA

## 2024-10-15 ENCOUNTER — TELEPHONE (OUTPATIENT)
Age: 69
End: 2024-10-15

## 2024-10-15 NOTE — TELEPHONE ENCOUNTER
Caller: [facility]    Doctor: Debra    Reason for call: Checking on status of dental clearance form sent on 9/18. Advised form was completed and faxed back. Refaxed to number provided [phone]    Call back#: [phone]

## 2024-10-16 ENCOUNTER — HOSPITAL ENCOUNTER (OUTPATIENT)
Dept: RADIOLOGY | Facility: HOSPITAL | Age: 69
Discharge: HOME | End: 2024-10-16
Payer: COMMERCIAL

## 2024-10-16 DIAGNOSIS — J06.9 ACUTE RESPIRATORY DISEASE: ICD-10-CM

## 2024-10-16 PROCEDURE — 71046 X-RAY EXAM CHEST 2 VIEWS: CPT

## 2024-10-17 ENCOUNTER — OFFICE VISIT (OUTPATIENT)
Dept: PHYSICAL THERAPY | Facility: REHABILITATION | Age: 69
End: 2024-10-17
Payer: COMMERCIAL

## 2024-10-17 DIAGNOSIS — Z98.1 S/P CERVICAL SPINAL FUSION: ICD-10-CM

## 2024-10-17 DIAGNOSIS — G89.29 CHRONIC NECK PAIN: Primary | ICD-10-CM

## 2024-10-17 DIAGNOSIS — R26.89 POOR BALANCE: ICD-10-CM

## 2024-10-17 DIAGNOSIS — M54.2 CHRONIC NECK PAIN: Primary | ICD-10-CM

## 2024-10-17 PROCEDURE — 97112 NEUROMUSCULAR REEDUCATION: CPT

## 2024-10-17 PROCEDURE — 97110 THERAPEUTIC EXERCISES: CPT

## 2024-10-17 NOTE — PROGRESS NOTES
"Daily Note     Today's date: 10/17/2024  Patient name: Edgar Obando  : 1955  MRN: [id number]  Referring provider: Morelia Sethi*  Dx:   Encounter Diagnosis     ICD-10-CM    1. Chronic neck pain  M54.2     G89.29       2. Poor balance  R26.89       3. S/P cervical spinal fusion  Z98.1                      Subjective: Pt states he's feeling good today, states he feels he has more energy than normal      Objective: See treatment diary below      Assessment: Tolerated treatment well. Patient would benefit from continued PT.  Pt. able to complete all exercises with no increase in pain during or after session.  Pt demonstrated minor overall improvement with balance this session.  Pt requires additional time to complete exercises.  Pt. 1:1 with PTA for entirety.        Plan: Continue per plan of care.      POC expires Auth Expiration date PT/OT + Visit Limit?   10/8/2024  2024 99        Visit/Unit Tracking  AUTH Status:  Date 9/16 9/19 9/26 9/30 10/4 10/8 10/10 10/14 10/17   [employer] - BOMN Used 9 10 11 12 13 14 15 16 17            Precautions:  s/p C3-7 ACDF on 2024       Manuals 10/8 10/10 10/14 10/17   Cervical PROM                            Neuro Re-Ed       Patient Ed       Digi        Row       LPD Seated       BSER       Standing hip abduction 2x10 2# B/L  2x10 2# b/l 2x10 2# b/l 2x10 2# b/l   Nancy Negotiation       Half Tandem Balance 3x30\" 3x30\" held 2x30\" b/l   LOS Biodex       Resisted Sidestep       Ther Ex       UBE    Vital monitoring       Neck Circles       Shoulder Flexion with Dowel in Chair       Thoracic Extension Chair 2x10  2x10 5\" 2x10 5\" 2x10 5\"   Wall Slide       Cervical AROM Each Way       Walking on Foam Tandem/Sidesteps 4 laps ea HHA 4 laps ea HHA 4 laps ea HHA 4 laps ea HHA   Seated Row        Sit to Stand 3x10 8# 3x10 8# 2x10 8# 2x10 8#   Ther Activity                     Gait Training                     Modalities                   "           Access Code: [email or web address]  URL: [email or web address]  Date: 08/21/2024  Prepared by: Mary Puga    Exercises  - Seated Shoulder Row with Anchored Resistance  - 1 x daily - 7 x weekly - 2 sets - 10 reps - 5 hold  - Seated Shoulder Extension and Scapular Retraction with Resistance  - 1 x daily - 7 x weekly - 2 sets - 12 reps - 5 hold  - Side Stepping with Counter Support  - 1 x daily - 7 x weekly - 1 sets - 10 reps  - Sit to Stand  - 1 x daily - 7 x weekly - 3 sets - 8 reps  - Seated Cat Cow  - 1 x daily - 7 x weekly - 2 sets - 10 reps  - Standing Hip Abduction with Counter Support  - 1 x daily - 7 x weekly - 2 sets - 10 reps

## 2024-10-21 ENCOUNTER — OFFICE VISIT (OUTPATIENT)
Dept: PHYSICAL THERAPY | Facility: REHABILITATION | Age: 69
End: 2024-10-21
Payer: COMMERCIAL

## 2024-10-21 DIAGNOSIS — F41.9 ANXIETY: ICD-10-CM

## 2024-10-21 DIAGNOSIS — R26.89 POOR BALANCE: ICD-10-CM

## 2024-10-21 DIAGNOSIS — M54.2 CHRONIC NECK PAIN: Primary | ICD-10-CM

## 2024-10-21 DIAGNOSIS — Z98.1 S/P CERVICAL SPINAL FUSION: ICD-10-CM

## 2024-10-21 DIAGNOSIS — G89.29 CHRONIC NECK PAIN: Primary | ICD-10-CM

## 2024-10-21 PROCEDURE — 97110 THERAPEUTIC EXERCISES: CPT | Performed by: PHYSICAL THERAPIST

## 2024-10-21 PROCEDURE — 97112 NEUROMUSCULAR REEDUCATION: CPT | Performed by: PHYSICAL THERAPIST

## 2024-10-21 NOTE — PROGRESS NOTES
"Daily Note     Today's date: 10/21/2024  Patient name: Edgar Obando  : 1955  MRN: [id number]  Referring provider: Morelia Sethi*  Dx:   Encounter Diagnosis     ICD-10-CM    1. Chronic neck pain  M54.2     G89.29       2. Poor balance  R26.89       3. S/P cervical spinal fusion  Z98.1                      Subjective: I have been feeling so so for the past week. I get a bit frustrated sometimes with the pain I continue to have from the surgery. I do want to continue physical therapy. My balance has definitely gotten better.       Objective: See treatment diary below      Assessment: Tolerated treatment well. Patient would benefit from continued PT      Plan: Continue per plan of care.      POC expires Auth Expiration date PT/OT + Visit Limit?   10/8/2024  2024 99        Visit/Unit Tracking  AUTH Status:  Date 10/17   [employer] - BOMN Used 17            Precautions:  s/p C3-7 ACDF on 2024       Manuals 10/21   Cervical PROM                Neuro Re-Ed    Patient Ed    Digi     Row 2x10 12#   LPD Seated    BSER    Standing hip abduction 2x10 2# b/l   Nancy Negotiation    Half Tandem Balance 2x30\" b/l   LOS Biodex    Resisted Sidestep    Ther Ex    UBE 4/4   Vital monitoring    Neck Circles    Shoulder Flexion with Dowel in Chair    Thoracic Extension Chair 2x10 5\"   Wall Slide    Cervical AROM Each Way    Walking on Foam Tandem/Sidesteps 4 laps ea HHA   Seated Row     Sit to Stand 2x10 8#   Ther Activity            Gait Training            Modalities                    Access Code: [email or web address]  URL: [email or web address]  Date: 2024  Prepared by: Mary Puga    Exercises  - Seated Shoulder Row with Anchored Resistance  - 1 x daily - 7 x weekly - 2 sets - 10 reps - 5 hold  - Seated Shoulder Extension and Scapular Retraction with Resistance  - 1 x daily - 7 x weekly - 2 sets - 12 reps - 5 hold  - Side Stepping with Counter Support  - 1 x daily - 7 x weekly - 1 sets - 10 " reps  - Sit to Stand  - 1 x daily - 7 x weekly - 3 sets - 8 reps  - Seated Cat Cow  - 1 x daily - 7 x weekly - 2 sets - 10 reps  - Standing Hip Abduction with Counter Support  - 1 x daily - 7 x weekly - 2 sets - 10 reps

## 2024-10-21 NOTE — TELEPHONE ENCOUNTER
Reason for call:   [x] Refill   [] Prior Auth  [] Other:     Office:   [x] PCP/Provider - LEXI LONGO / Ana Rosa Win MD   [] Specialty/Provider -     Medication: clonazePAM (KlonoPIN) 1 mg tablet     Dose/Frequency: Take 0.5 tablets (0.5 mg total) by mouth 2 (two) times a day     Quantity: 30 tablet     Pharmacy: [facility] - [place], [place] - [address] [facility] [address] [address] [facility] [address]     Does the patient have enough for 3 days?   [] Yes   [x] No - Send as HP to POD

## 2024-10-22 RX ORDER — CLONAZEPAM 1 MG/1
0.5 TABLET ORAL 2 TIMES DAILY
Qty: 30 TABLET | Refills: 0 | Status: SHIPPED | OUTPATIENT
Start: 2024-10-22

## 2024-10-22 NOTE — TELEPHONE ENCOUNTER
Patient called to request a refill for their clonazePAM (KlonoPIN) 1 mg tablet advised a refill was requested on 10/21/2024 and is pending approval. Patient verbalized understanding and is in agreement.  \      Patient states he would like a call back when the medication has been approved and sent to the Pharmacy. As he has been feeling anxious without his medication.

## 2024-10-22 NOTE — TELEPHONE ENCOUNTER
Medication:  PDMP   09/11/2024 09/11/2024 clonazePAM (Tablet) 30.0 15 1 MG NA HUMBERTO PAT     Active agreement on file -Yes

## 2024-10-24 ENCOUNTER — OFFICE VISIT (OUTPATIENT)
Dept: PHYSICAL THERAPY | Facility: REHABILITATION | Age: 69
End: 2024-10-24
Payer: COMMERCIAL

## 2024-10-24 DIAGNOSIS — G89.29 CHRONIC NECK PAIN: Primary | ICD-10-CM

## 2024-10-24 DIAGNOSIS — M54.2 CHRONIC NECK PAIN: Primary | ICD-10-CM

## 2024-10-24 PROCEDURE — 97110 THERAPEUTIC EXERCISES: CPT | Performed by: PHYSICAL THERAPIST

## 2024-10-24 PROCEDURE — 97112 NEUROMUSCULAR REEDUCATION: CPT | Performed by: PHYSICAL THERAPIST

## 2024-10-24 NOTE — PROGRESS NOTES
"Daily Note     Today's date: 10/24/2024  Patient name: Edgar Obando  : 1955  MRN: [id number]  Referring provider: Morelia Sethi*  Dx: No diagnosis found.               Subjective: I am feeling much better than I was last visit. I did follow up with my doctor and had a good visit.       Objective: See treatment diary below      Assessment: Tolerated treatment well. Improved demeanor and response to treatment today compared to prior visit. Reported moderate fatigue post tx. Patient would benefit from continued PT. 1:1 with PT for 30 minutes, IEP for remainder.       Plan: Continue per plan of care.      POC expires Auth Expiration date PT/OT + Visit Limit?   10/8/2024  2024 99        Visit/Unit Tracking  AUTH Status:  Date 10/17   [employer]ir[employer] RICKS Used 17            Precautions:  s/p C3-7 ACDF on 2024       Manuals 10/21 10/24   Cervical PROM                    Neuro Re-Ed     Patient Ed     Digi      Row 2x10 12# 2x10 12#   LPD Seated     BSER     Standing hip abduction 2x10 2# b/l 2x10 2# b/l   Nancy Negotiation     Half Tandem Balance 2x30\" b/l 2x30\" b/l   LOS Biodex     Resisted Sidestep     Ther Ex     UBE    Vital monitoring     Neck Circles     Shoulder Flexion with Dowel in Chair     Thoracic Extension Chair 2x10 5\" 2x10 5\"    Wall Slide     Cervical AROM Each Way     Walking on Foam Tandem/Sidesteps 4 laps ea HHA 4 laps ea HHA   Seated Row      Sit to Stand 2x10 8# 2x10 8#    Ther Activity               Gait Training               Modalities                       Access Code: [email or web address]  URL: [email or web address]  Date: 2024  Prepared by: Mary Puga    Exercises  - Seated Shoulder Row with Anchored Resistance  - 1 x daily - 7 x weekly - 2 sets - 10 reps - 5 hold  - Seated Shoulder Extension and Scapular Retraction with Resistance  - 1 x daily - 7 x weekly - 2 sets - 12 reps - 5 hold  - Side Stepping with Counter Support  - 1 x daily - 7 x weekly - 1 " sets - 10 reps  - Sit to Stand  - 1 x daily - 7 x weekly - 3 sets - 8 reps  - Seated Cat Cow  - 1 x daily - 7 x weekly - 2 sets - 10 reps  - Standing Hip Abduction with Counter Support  - 1 x daily - 7 x weekly - 2 sets - 10 reps

## 2024-10-28 ENCOUNTER — APPOINTMENT (OUTPATIENT)
Dept: PHYSICAL THERAPY | Facility: REHABILITATION | Age: 69
End: 2024-10-28
Payer: COMMERCIAL

## 2024-10-29 ENCOUNTER — OFFICE VISIT (OUTPATIENT)
Dept: PHYSICAL THERAPY | Facility: REHABILITATION | Age: 69
End: 2024-10-29
Payer: COMMERCIAL

## 2024-10-29 DIAGNOSIS — R26.89 POOR BALANCE: ICD-10-CM

## 2024-10-29 DIAGNOSIS — G89.29 CHRONIC NECK PAIN: Primary | ICD-10-CM

## 2024-10-29 DIAGNOSIS — Z98.1 S/P CERVICAL SPINAL FUSION: ICD-10-CM

## 2024-10-29 DIAGNOSIS — M54.2 CHRONIC NECK PAIN: Primary | ICD-10-CM

## 2024-10-29 PROCEDURE — 97110 THERAPEUTIC EXERCISES: CPT

## 2024-10-29 PROCEDURE — 97112 NEUROMUSCULAR REEDUCATION: CPT

## 2024-10-29 NOTE — PROGRESS NOTES
"Daily Note     Today's date: 10/29/2024  Patient name: Edgar Obando  : 1955  MRN: [id number]  Referring provider: Morelia Sethi*  Dx:   Encounter Diagnosis     ICD-10-CM    1. Chronic neck pain  M54.2     G89.29       2. Poor balance  R26.89       3. S/P cervical spinal fusion  Z98.1                      Subjective: Pt reported feeling a little shaky this morning.       Objective: See treatment diary below      Assessment: Tolerated treatment well. Patient demonstrated fatigue post treatment and exhibited good technique with therapeutic exercises.  VC's needed for correct technique throughout session. Pt was slow and steady with exercises today. Monitor NV.      Plan: Continue per plan of care. 1 on 1 with PTA for 57 mins     POC expires Auth Expiration date PT/OT + Visit Limit?   10/8/2024  2024 99        Visit/Unit Tracking  AUTH Status:  Date 10/17 10/21 10/24 10/29   [employer] - BOMN Used 17 18 19 20            Precautions:  s/p C3-7 ACDF on 2024       Manuals 10/21 10/24 10/29   Cervical PROM                        Neuro Re-Ed      Patient Ed      Digi       Row 2x10 12# 2x10 12# 2x10 15#   LPD Seated      BSER      Standing hip abduction 2x10 2# b/l 2x10 2# b/l 2x10 2# b/l   Nancy Negotiation      Half Tandem Balance 2x30\" b/l 2x30\" b/l 2x30\" b/l    LOS Biodex      Resisted Sidestep      Ther Ex      UBE / 4/ 4'/4'   Vital monitoring      Neck Circles      Shoulder Flexion with Dowel in Chair      Thoracic Extension Chair 2x10 5\" 2x10 5\"  2x10 5\"   Wall Slide      Cervical AROM Each Way      Walking on Foam Tandem/Sidesteps 4 laps ea HHA 4 laps ea HHA 4 laps ea HHA   Seated Row       Sit to Stand 2x10 8# 2x10 8#  2x10 8#   Ther Activity                  Gait Training                  Modalities                          Access Code: [email or web address]  URL: [email or web address]  Date: 2024  Prepared by: Mary Puga    Exercises  - Seated Shoulder Row with Anchored " Resistance  - 1 x daily - 7 x weekly - 2 sets - 10 reps - 5 hold  - Seated Shoulder Extension and Scapular Retraction with Resistance  - 1 x daily - 7 x weekly - 2 sets - 12 reps - 5 hold  - Side Stepping with Counter Support  - 1 x daily - 7 x weekly - 1 sets - 10 reps  - Sit to Stand  - 1 x daily - 7 x weekly - 3 sets - 8 reps  - Seated Cat Cow  - 1 x daily - 7 x weekly - 2 sets - 10 reps  - Standing Hip Abduction with Counter Support  - 1 x daily - 7 x weekly - 2 sets - 10 reps

## 2024-10-30 ENCOUNTER — HOSPITAL ENCOUNTER (OUTPATIENT)
Dept: ULTRASOUND IMAGING | Facility: HOSPITAL | Age: 69
Discharge: HOME | End: 2024-10-30
Payer: COMMERCIAL

## 2024-10-30 ENCOUNTER — HOSPITAL ENCOUNTER (OUTPATIENT)
Dept: NON INVASIVE DIAGNOSTICS | Facility: HOSPITAL | Age: 69
Discharge: HOME | End: 2024-10-30
Payer: COMMERCIAL

## 2024-10-30 VITALS
BODY MASS INDEX: 39.7 KG/M2 | HEIGHT: 66 IN | DIASTOLIC BLOOD PRESSURE: 66 MMHG | HEART RATE: 83 BPM | SYSTOLIC BLOOD PRESSURE: 120 MMHG | WEIGHT: 247 LBS

## 2024-10-30 DIAGNOSIS — R14.0 ABDOMINAL DISTENSION (GASEOUS): ICD-10-CM

## 2024-10-30 DIAGNOSIS — I13.10 HYPERTENSIVE HEART AND CHRONIC KIDNEY DISEASE WITHOUT HEART FAILURE, WITH STAGE 1 THROUGH STAGE 4 CHRONIC KIDNEY DISEASE, OR UNSPECIFIED CHRONIC KIDNEY DISEASE: ICD-10-CM

## 2024-10-30 LAB
AORTIC ROOT: 3.1 CM
AORTIC VALVE MEAN VELOCITY: 12.5 M/S
APICAL FOUR CHAMBER EJECTION FRACTION: 64 %
ASCENDING AORTA: 3.5 CM
AV LVOT MEAN GRADIENT: 3 MMHG
AV LVOT PEAK GRADIENT: 5 MMHG
AV MEAN GRADIENT: 7 MMHG
AV PEAK GRADIENT: 13 MMHG
AV VELOCITY RATIO: 0.62
BSA FOR ECHO PROCEDURE: 2.19 M2
DOP CALC AO PEAK VEL: 1.81 M/S
DOP CALC AO VTI: 37.56 CM
DOP CALC LVOT PEAK VEL VTI: 26.7 CM
DOP CALC LVOT PEAK VEL: 1.13 M/S
E WAVE DECELERATION TIME: 216 MS
E/A RATIO: 1.03
FRACTIONAL SHORTENING: 45 (ref 28–44)
INTERVENTRICULAR SEPTUM IN DIASTOLE (PARASTERNAL SHORT AXIS VIEW): 1.2 CM
INTERVENTRICULAR SEPTUM: 1.2 CM (ref 0.6–1.1)
LAAS-AP2: 22.7 CM2
LAAS-AP4: 20.8 CM2
LEFT ATRIUM SIZE: 4.6 CM
LEFT ATRIUM VOLUME (MOD BIPLANE): 77 ML
LEFT ATRIUM VOLUME INDEX (MOD BIPLANE): 35.2 ML/M2
LEFT INTERNAL DIMENSION IN SYSTOLE: 2.3 CM (ref 2.1–4)
LEFT VENTRICULAR INTERNAL DIMENSION IN DIASTOLE: 4.2 CM (ref 3.5–6)
LEFT VENTRICULAR POSTERIOR WALL IN END DIASTOLE: 1.2 CM
LEFT VENTRICULAR STROKE VOLUME: 60 ML
LVSV (TEICH): 60 ML
MV E'TISSUE VEL-SEP: 11 CM/S
MV PEAK A VEL: 0.87 M/S
MV PEAK E VEL: 90 CM/S
MV STENOSIS PRESSURE HALF TIME: 63 MS
MV VALVE AREA P 1/2 METHOD: 3.49
RIGHT ATRIUM AREA SYSTOLE A4C: 12.9 CM2
RIGHT VENTRICLE ID DIMENSION: 3.6 CM
SL CV LEFT ATRIUM LENGTH A2C: 6 CM
SL CV LV EF: 60
SL CV PED ECHO LEFT VENTRICLE DIASTOLIC VOLUME (MOD BIPLANE) 2D: 78 ML
SL CV PED ECHO LEFT VENTRICLE SYSTOLIC VOLUME (MOD BIPLANE) 2D: 18 ML
TR MAX PG: 17 MMHG
TR PEAK VELOCITY: 2.1 M/S
TRICUSPID ANNULAR PLANE SYSTOLIC EXCURSION: 2 CM
TRICUSPID VALVE PEAK REGURGITATION VELOCITY: 2.08 M/S

## 2024-10-30 PROCEDURE — 93306 TTE W/DOPPLER COMPLETE: CPT

## 2024-10-30 PROCEDURE — 76700 US EXAM ABDOM COMPLETE: CPT

## 2024-10-30 PROCEDURE — 93306 TTE W/DOPPLER COMPLETE: CPT | Performed by: INTERNAL MEDICINE

## 2024-10-31 ENCOUNTER — OFFICE VISIT (OUTPATIENT)
Dept: PHYSICAL THERAPY | Facility: REHABILITATION | Age: 69
End: 2024-10-31
Payer: COMMERCIAL

## 2024-10-31 DIAGNOSIS — M54.2 CHRONIC NECK PAIN: Primary | ICD-10-CM

## 2024-10-31 DIAGNOSIS — Z98.1 S/P CERVICAL SPINAL FUSION: ICD-10-CM

## 2024-10-31 DIAGNOSIS — G89.29 CHRONIC NECK PAIN: Primary | ICD-10-CM

## 2024-10-31 DIAGNOSIS — R26.89 POOR BALANCE: ICD-10-CM

## 2024-10-31 PROCEDURE — 97110 THERAPEUTIC EXERCISES: CPT

## 2024-10-31 PROCEDURE — 97112 NEUROMUSCULAR REEDUCATION: CPT

## 2024-10-31 NOTE — PROGRESS NOTES
"Daily Note     Today's date: 10/31/2024  Patient name: Edgar Obando  : 1955  MRN: [id number]  Referring provider: Morelia Sethi*  Dx:   Encounter Diagnosis     ICD-10-CM    1. Chronic neck pain  M54.2     G89.29       2. Poor balance  R26.89       3. S/P cervical spinal fusion  Z98.1                      Subjective: Pt reports some pain with knee present today, doing well otherwise.      Objective: See treatment diary below      Assessment: Tolerated treatment well. Patient would benefit from continued PT.  Pt. able to complete all exercises with no increase in pain during or after session.  Pt. 1:1 with PTA for entirety.      Plan: Continue per plan of care.      POC expires Auth Expiration date PT/OT + Visit Limit?   10/8/2024  2024 99        Visit/Unit Tracking  AUTH Status:  Date 10/17 10/21 10/24 10/29   [employer] - BOMN Used 17 18 19 20            Precautions:  s/p C3-7 ACDF on 2024       Manuals 10/21 10/24 10/29 10/31   Cervical PROM                            Neuro Re-Ed       Patient Ed       Digi        Row 2x10 12# 2x10 12# 2x10 15# 2x10 15#   LPD Seated       BSER       Standing hip abduction 2x10 2# b/l 2x10 2# b/l 2x10 2# b/l 2x10 2# b/l   Nancy Negotiation       Half Tandem Balance 2x30\" b/l 2x30\" b/l 2x30\" b/l  2x30\" b/l   LOS Biodex       Resisted Sidestep       Ther Ex       UBE  4'/'    Vital monitoring       Neck Circles       Shoulder Flexion with Dowel in Chair       Thoracic Extension Chair 2x10 5\" 2x10 5\"  2x10 5\" 2x10 5\"   Wall Slide       Cervical AROM Each Way       Walking on Foam Tandem/Sidesteps 4 laps ea HHA 4 laps ea HHA 4 laps ea HHA 4 laps ea hha   Seated Row        Sit to Stand 2x10 8# 2x10 8#  2x10 8# 2x10 15# kb   Ther Activity                     Gait Training                     Modalities                             Access Code: [email or web address]  URL: [email or web address]  Date: 2024  Prepared by: Mary" Vivien    Exercises  - Seated Shoulder Row with Anchored Resistance  - 1 x daily - 7 x weekly - 2 sets - 10 reps - 5 hold  - Seated Shoulder Extension and Scapular Retraction with Resistance  - 1 x daily - 7 x weekly - 2 sets - 12 reps - 5 hold  - Side Stepping with Counter Support  - 1 x daily - 7 x weekly - 1 sets - 10 reps  - Sit to Stand  - 1 x daily - 7 x weekly - 3 sets - 8 reps  - Seated Cat Cow  - 1 x daily - 7 x weekly - 2 sets - 10 reps  - Standing Hip Abduction with Counter Support  - 1 x daily - 7 x weekly - 2 sets - 10 reps

## 2024-11-01 ENCOUNTER — HOSPITAL ENCOUNTER (OUTPATIENT)
Dept: RADIOLOGY | Facility: HOSPITAL | Age: 69
Discharge: HOME | End: 2024-11-01
Payer: COMMERCIAL

## 2024-11-01 DIAGNOSIS — R47.81 SLURRED SPEECH: ICD-10-CM

## 2024-11-01 PROCEDURE — 70450 CT HEAD/BRAIN W/O DYE: CPT

## 2024-11-05 ENCOUNTER — OFFICE VISIT (OUTPATIENT)
Dept: PHYSICAL THERAPY | Facility: REHABILITATION | Age: 69
End: 2024-11-05
Payer: COMMERCIAL

## 2024-11-05 DIAGNOSIS — G89.29 CHRONIC NECK PAIN: Primary | ICD-10-CM

## 2024-11-05 DIAGNOSIS — Z98.1 S/P CERVICAL SPINAL FUSION: ICD-10-CM

## 2024-11-05 DIAGNOSIS — M54.2 CHRONIC NECK PAIN: Primary | ICD-10-CM

## 2024-11-05 DIAGNOSIS — R26.89 POOR BALANCE: ICD-10-CM

## 2024-11-05 PROCEDURE — 97112 NEUROMUSCULAR REEDUCATION: CPT | Performed by: PHYSICAL THERAPIST

## 2024-11-05 PROCEDURE — 97110 THERAPEUTIC EXERCISES: CPT | Performed by: PHYSICAL THERAPIST

## 2024-11-05 NOTE — PROGRESS NOTES
"Daily Note     Today's date: 2024  Patient name: Edgar Obando  : 1955  MRN: [id number]  Referring provider: Morelia Sethi*  Dx:   Encounter Diagnosis     ICD-10-CM    1. Chronic neck pain  M54.2     G89.29       2. Poor balance  R26.89       3. S/P cervical spinal fusion  Z98.1                      Subjective: Having a bit of an off day today. Just a bit sluggish and fatigued.       Objective: See treatment diary below      Assessment: Tolerated treatment well. Patient would benefit from continued PT. 1:1 with PT for entirety.       Plan: Continue per plan of care.      POC expires Auth Expiration date PT/OT + Visit Limit?   10/8/2024  2024 99        Visit/Unit Tracking  AUTH Status:  Date 10/17 10/21 10/24 10/29   [employer] - BOMN Used 17 18 19 20            Precautions:  s/p C3-7 ACDF on 2024       Manuals 11/4   Cervical PROM                Neuro Re-Ed    Patient Ed    Digi     Row 2x10 15#   LPD Seated    BSER    Standing hip abduction 2x10 2# b/l   Nancy Negotiation    Half Tandem Balance 2x30\" b/l   LOS Biodex    Resisted Sidestep    Ther Ex    UBE 4/4   Vital monitoring    Neck Circles    Shoulder Flexion with Dowel in Chair    Thoracic Extension Chair 2x10 5\"   Wall Slide    Cervical AROM Each Way    Walking on Foam Tandem/Sidesteps 4 laps ea hha   Seated Row     Sit to Stand 2x10 15# kb   Ther Activity            Gait Training            Modalities                    Access Code: [email or web address]  URL: [email or web address]  Date: 2024  Prepared by: Mary Puga    Exercises  - Seated Shoulder Row with Anchored Resistance  - 1 x daily - 7 x weekly - 2 sets - 10 reps - 5 hold  - Seated Shoulder Extension and Scapular Retraction with Resistance  - 1 x daily - 7 x weekly - 2 sets - 12 reps - 5 hold  - Side Stepping with Counter Support  - 1 x daily - 7 x weekly - 1 sets - 10 reps  - Sit to Stand  - 1 x daily - 7 x weekly - 3 sets - 8 reps  - Seated Cat Cow  - 1 x " daily - 7 x weekly - 2 sets - 10 reps  - Standing Hip Abduction with Counter Support  - 1 x daily - 7 x weekly - 2 sets - 10 reps

## 2024-11-06 ENCOUNTER — HOSPITAL ENCOUNTER (OUTPATIENT)
Dept: RADIOLOGY | Facility: HOSPITAL | Age: 69
Discharge: HOME | End: 2024-11-06
Attending: ORTHOPAEDIC SURGERY
Payer: COMMERCIAL

## 2024-11-06 ENCOUNTER — OFFICE VISIT (OUTPATIENT)
Dept: OBGYN CLINIC | Facility: HOSPITAL | Age: 69
End: 2024-11-06
Payer: COMMERCIAL

## 2024-11-06 VITALS
BODY MASS INDEX: 39.68 KG/M2 | HEIGHT: 66 IN | WEIGHT: 246.91 LBS | SYSTOLIC BLOOD PRESSURE: 158 MMHG | DIASTOLIC BLOOD PRESSURE: 71 MMHG | HEART RATE: 76 BPM

## 2024-11-06 DIAGNOSIS — Z98.1 S/P CERVICAL SPINAL FUSION: Primary | ICD-10-CM

## 2024-11-06 DIAGNOSIS — Z98.1 S/P CERVICAL SPINAL FUSION: ICD-10-CM

## 2024-11-06 PROCEDURE — 99214 OFFICE O/P EST MOD 30 MIN: CPT | Performed by: ORTHOPAEDIC SURGERY

## 2024-11-06 PROCEDURE — 72050 X-RAY EXAM NECK SPINE 4/5VWS: CPT

## 2024-11-06 RX ORDER — METHYLPREDNISOLONE 4 MG/1
TABLET ORAL
Qty: 21 TABLET | Refills: 0 | Status: SHIPPED | OUTPATIENT
Start: 2024-11-06

## 2024-11-07 ENCOUNTER — OFFICE VISIT (OUTPATIENT)
Dept: PHYSICAL THERAPY | Facility: REHABILITATION | Age: 69
End: 2024-11-07
Payer: COMMERCIAL

## 2024-11-07 DIAGNOSIS — G89.29 CHRONIC NECK PAIN: Primary | ICD-10-CM

## 2024-11-07 DIAGNOSIS — M54.2 CHRONIC NECK PAIN: Primary | ICD-10-CM

## 2024-11-07 DIAGNOSIS — Z98.1 S/P CERVICAL SPINAL FUSION: ICD-10-CM

## 2024-11-07 DIAGNOSIS — R26.89 POOR BALANCE: ICD-10-CM

## 2024-11-07 PROCEDURE — 97110 THERAPEUTIC EXERCISES: CPT | Performed by: PHYSICAL THERAPIST

## 2024-11-07 PROCEDURE — 97112 NEUROMUSCULAR REEDUCATION: CPT | Performed by: PHYSICAL THERAPIST

## 2024-11-11 ENCOUNTER — OFFICE VISIT (OUTPATIENT)
Dept: PHYSICAL THERAPY | Facility: REHABILITATION | Age: 69
End: 2024-11-11
Payer: COMMERCIAL

## 2024-11-11 DIAGNOSIS — Z98.1 S/P CERVICAL SPINAL FUSION: ICD-10-CM

## 2024-11-11 DIAGNOSIS — M54.2 CHRONIC NECK PAIN: Primary | ICD-10-CM

## 2024-11-11 DIAGNOSIS — R26.89 POOR BALANCE: ICD-10-CM

## 2024-11-11 DIAGNOSIS — G89.29 CHRONIC NECK PAIN: Primary | ICD-10-CM

## 2024-11-11 PROCEDURE — 97110 THERAPEUTIC EXERCISES: CPT

## 2024-11-11 PROCEDURE — 97112 NEUROMUSCULAR REEDUCATION: CPT

## 2024-11-11 NOTE — PROGRESS NOTES
"Daily Note     Today's date: 2024  Patient name: Edgar Obando  : 1955  MRN: [id number]  Referring provider: Morelia Sethi*  Dx:   Encounter Diagnosis     ICD-10-CM    1. Chronic neck pain  M54.2     G89.29       2. Poor balance  R26.89       3. S/P cervical spinal fusion  Z98.1                      Subjective: Pt reports feeling improvement overall today      Objective: See treatment diary below      Assessment: Tolerated treatment well. Patient would benefit from continued PT.  Pt. able to complete all exercises with no increase in pain during or after session.  Pt demonstrates improvement with standing exercise tolerance and stability this session.  Pt would benefit from continued PT to address current deficits an improve QOL.  Pt. 1:1 with PTA for entirety.      Plan: Continue per plan of care.      POC expires Auth Expiration date PT/OT + Visit Limit?   10/8/2024  2024 99        Visit/Unit Tracking  AUTH Status:  Date 10/17 10/21 10/24 10/29 10/31 11/11   [employer] MC - BOMN Used 17 18 19 20 21 22            Precautions:  s/p C3-7 ACDF on 2024       Manuals    Cervical PROM                        Neuro Re-Ed      Patient Ed      Digi       Row 2x10 15# 2x10 15# 2x10 15#   LPD Seated      BSER      Standing hip abduction 2x10 2# b/l 2x10 2# b/l 2x10 2# b/l   Nancy Negotiation      Half Tandem Balance 2x30\" b/l 2x30\" b/l 2x30\" b/l   LOS Biodex      Resisted Sidestep      Ther Ex      UBE    Vital monitoring      Neck Circles      Shoulder Flexion with Dowel in Chair      Thoracic Extension Chair 2x10 5\" 2x10 5\"  2x10 5\"   Wall Slide      Cervical AROM Each Way      Walking on Foam Tandem/Sidesteps 4 laps ea hha 4 laps ea hha 4 laps ea hha   Seated Row       Sit to Stand 2x10 15# kb 2x10 15# kb 2x10 15# kb   Ther Activity                  Gait Training                  Modalities                          Access Code: [email or web address]  URL: " [email or web address]  Date: 08/21/2024  Prepared by: Mary Puga    Exercises  - Seated Shoulder Row with Anchored Resistance  - 1 x daily - 7 x weekly - 2 sets - 10 reps - 5 hold  - Seated Shoulder Extension and Scapular Retraction with Resistance  - 1 x daily - 7 x weekly - 2 sets - 12 reps - 5 hold  - Side Stepping with Counter Support  - 1 x daily - 7 x weekly - 1 sets - 10 reps  - Sit to Stand  - 1 x daily - 7 x weekly - 3 sets - 8 reps  - Seated Cat Cow  - 1 x daily - 7 x weekly - 2 sets - 10 reps  - Standing Hip Abduction with Counter Support  - 1 x daily - 7 x weekly - 2 sets - 10 reps

## 2024-11-14 ENCOUNTER — OFFICE VISIT (OUTPATIENT)
Dept: PODIATRY | Facility: CLINIC | Age: 69
End: 2024-11-14
Payer: COMMERCIAL

## 2024-11-14 ENCOUNTER — OFFICE VISIT (OUTPATIENT)
Dept: PHYSICAL THERAPY | Facility: REHABILITATION | Age: 69
End: 2024-11-14
Payer: COMMERCIAL

## 2024-11-14 VITALS
HEIGHT: 66 IN | DIASTOLIC BLOOD PRESSURE: 63 MMHG | WEIGHT: 247.6 LBS | SYSTOLIC BLOOD PRESSURE: 114 MMHG | BODY MASS INDEX: 39.79 KG/M2 | HEART RATE: 78 BPM

## 2024-11-14 DIAGNOSIS — M54.2 CHRONIC NECK PAIN: Primary | ICD-10-CM

## 2024-11-14 DIAGNOSIS — G89.29 CHRONIC NECK PAIN: Primary | ICD-10-CM

## 2024-11-14 DIAGNOSIS — E11.9 CONTROLLED TYPE 2 DIABETES MELLITUS WITHOUT COMPLICATION, WITHOUT LONG-TERM CURRENT USE OF INSULIN (HCC): ICD-10-CM

## 2024-11-14 DIAGNOSIS — I73.9 PERIPHERAL VASCULAR DISEASE, UNSPECIFIED (HCC): Primary | ICD-10-CM

## 2024-11-14 DIAGNOSIS — B35.3 TINEA PEDIS OF BOTH FEET: ICD-10-CM

## 2024-11-14 DIAGNOSIS — R26.89 POOR BALANCE: ICD-10-CM

## 2024-11-14 DIAGNOSIS — B35.1 ONYCHOMYCOSIS: ICD-10-CM

## 2024-11-14 DIAGNOSIS — Z98.1 S/P CERVICAL SPINAL FUSION: ICD-10-CM

## 2024-11-14 PROCEDURE — 97112 NEUROMUSCULAR REEDUCATION: CPT

## 2024-11-14 PROCEDURE — 99214 OFFICE O/P EST MOD 30 MIN: CPT | Performed by: PODIATRIST

## 2024-11-14 PROCEDURE — 97110 THERAPEUTIC EXERCISES: CPT

## 2024-11-14 RX ORDER — PROPRANOLOL HYDROCHLORIDE 10 MG/1
TABLET ORAL
COMMUNITY
Start: 2024-10-23

## 2024-11-14 RX ORDER — GABAPENTIN 100 MG/1
CAPSULE ORAL
COMMUNITY
Start: 2024-10-15

## 2024-11-14 RX ORDER — CLOTRIMAZOLE 1 %
CREAM (GRAM) TOPICAL
COMMUNITY
Start: 2024-10-11

## 2024-11-14 RX ORDER — TERBINAFINE HYDROCHLORIDE 250 MG/1
250 TABLET ORAL DAILY
Qty: 14 TABLET | Refills: 0 | Status: SHIPPED | OUTPATIENT
Start: 2024-11-14 | End: 2024-11-28

## 2024-11-14 NOTE — PROGRESS NOTES
"Daily Note     Today's date: 2024  Patient name: Edgar Obando  : 1955  MRN: [id number]  Referring provider: Morelia Sethi*  Dx:   Encounter Diagnosis     ICD-10-CM    1. Chronic neck pain  M54.2     G89.29       2. Poor balance  R26.89       3. S/P cervical spinal fusion  Z98.1                      Subjective: Pt reports continued progress overall, states that he's feeling stronger each day.      Objective: See treatment diary below      Assessment: Tolerated treatment well. Patient would benefit from continued PT. Pt. able to complete all exercises with no increase in pain during or after session.  Pt continues to have some pain in knee that affects ability to perform rows standing, but is otherwise demonstrating improvement with balance activity.  Pt. 1:1 with PTA for entirety.      Plan: Continue per plan of care.        POC expires Auth Expiration date PT/OT + Visit Limit?   10/8/2024  2024 99        Visit/Unit Tracking  AUTH Status:  Date 10/17 10/21 10/24 10/29 10/31 11/11 11/14   [employer] MC - BOMN Used 17 18 19 20 21 22 23            Precautions:  s/p C3-7 ACDF on 2024       Manuals    Cervical PROM                            Neuro Re-Ed       Patient Ed       Digi        Row 2x10 15# 2x10 15# 2x10 15# 2x10 15#   LPD Seated       BSER       Standing hip abduction 2x10 2# b/l 2x10 2# b/l 2x10 2# b/l 2x10 2# b/l   Nancy Negotiation       Half Tandem Balance 2x30\" b/l 2x30\" b/l 2x30\" b/l 2x30\" b/l   LOS Biodex       Resisted Sidestep       Ther Ex       UBE    Vital monitoring       Neck Circles       Shoulder Flexion with Dowel in Chair       Thoracic Extension Chair 2x10 5\" 2x10 5\"  2x10 5\" 2x10 5\"   Wall Slide       Cervical AROM Each Way       Walking on Foam Tandem/Sidesteps 4 laps ea hha 4 laps ea hha 4 laps ea hha 4 laps ea hha   Seated Row        Sit to Stand 2x10 15# kb 2x10 15# kb 2x10 15# kb 2x10 15# db   Ther Activity            "          Gait Training                     Modalities                             Access Code: [email or web address]  URL: [email or web address]  Date: 08/21/2024  Prepared by: Mary Puga    Exercises  - Seated Shoulder Row with Anchored Resistance  - 1 x daily - 7 x weekly - 2 sets - 10 reps - 5 hold  - Seated Shoulder Extension and Scapular Retraction with Resistance  - 1 x daily - 7 x weekly - 2 sets - 12 reps - 5 hold  - Side Stepping with Counter Support  - 1 x daily - 7 x weekly - 1 sets - 10 reps  - Sit to Stand  - 1 x daily - 7 x weekly - 3 sets - 8 reps  - Seated Cat Cow  - 1 x daily - 7 x weekly - 2 sets - 10 reps  - Standing Hip Abduction with Counter Support  - 1 x daily - 7 x weekly - 2 sets - 10 reps

## 2024-11-14 NOTE — PROGRESS NOTES
Name: Edgar Obando      : 1955      MRN: [id number]  Encounter Provider: Marcos Martines DPM  Encounter Date: 2024   Encounter department: [facility]    Discussed principles of diabetic footcare.  Sensorium is intact but there are no palpable pedal pulses.  Urged patient to refrain from walking barefoot.    Patient was placed on Lamisil tablets 250mg for 14 days.  He will be reassessed in 2 months.  He may continue with the topical Lotrimin.  :  Assessment & Plan  Peripheral vascular disease, unspecified (HCC)         Tinea pedis of both feet    Orders:    terbinafine (LamISIL) 250 mg tablet; Take 1 tablet (250 mg total) by mouth daily for 14 days    Onychomycosis    Orders:    terbinafine (LamISIL) 250 mg tablet; Take 1 tablet (250 mg total) by mouth daily for 14 days    Controlled type 2 diabetes mellitus without complication, without long-term current use of insulin (HCC)    Lab Results   Component Value Date    HGBA1C 6.1 2024                History of Present Illness     HPI  Edgar Obando is a 69 y.o. male who presents for his yearly diabetic foot exam.  The patient denies numbness or tingling in his feet.  His chief problem involves scaling on the soles of both feet.  He also has onychomycosis of each toenail.  Patient has been using Lotrimin on the soles of his feet with questionable results.  His blood sugar has been under excellent control.    I personally reviewed a comprehensive metabolic panel dated 2024.  Liver enzymes were within normal limits and blood glucose was 106.  I personally viewed an A1c dated 2024.  It was 6.1.    I personally reviewed an A1c dated 2023.  It was 6.1.        Review of Systems   Cardiovascular:  Positive for leg swelling.        Chronic venous insufficiency   Gastrointestinal:         GERD   Skin:  Positive for rash.   Neurological:  Negative for weakness and numbness.              Objective   /63 (BP Location:  "Right arm, Patient Position: Sitting, Cuff Size: Large)   Pulse 78   Ht 5' 6\" (1.676 m) Comment: verbal  Wt 112 kg (247 lb 9.6 oz)   BMI 39.96 kg/m²      Physical Exam  Cardiovascular:      Pulses: Pulses are weak.           Dorsalis pedis pulses are 0 on the right side and 0 on the left side.        Posterior tibial pulses are 0 on the right side and 0 on the left side.   Feet:      Right foot:      Skin integrity: Dry skin present. No ulcer, skin breakdown, erythema, warmth or callus.      Left foot:      Skin integrity: Dry skin present. No ulcer, skin breakdown, erythema, warmth or callus.         Diabetic Foot Exam    Patient's shoes and socks removed.    Right Foot/Ankle   Right Foot Inspection  Skin Exam: skin normal, skin intact and dry skin. No warmth, no callus, no erythema, no maceration, no abnormal color, no pre-ulcer, no ulcer and no callus.     Toe Exam: ROM and strength within normal limits.     Sensory   Vibration: intact  Proprioception: intact  Monofilament testing: intact    Vascular  Capillary refills: < 3 seconds  The right DP pulse is 0. The right PT pulse is 0.     Right Toe  - Comprehensive Exam  Ecchymosis: none  Arch: normal  Hammertoes: absent  Claw Toes: absent  Swelling: none   Tenderness: none       Left Foot/Ankle  Left Foot Inspection  Skin Exam: skin normal, skin intact and dry skin. No warmth, no erythema, no maceration, normal color, no pre-ulcer, no ulcer and no callus.     Toe Exam: ROM and strength within normal limits.     Sensory   Vibration: intact  Proprioception: intact  Monofilament testing: intact    Vascular  Capillary refills: < 3 seconds  The left DP pulse is 0. The left PT pulse is 0.     Left Toe  - Comprehensive Exam  Ecchymosis: none  Arch: normal  Hammertoes: absent  Claw toes: absent  Swelling: none   Tenderness: none       Assign Risk Category  No deformity present  No loss of protective sensation  Weak pulses  Risk: 0        "

## 2024-11-18 ENCOUNTER — OFFICE VISIT (OUTPATIENT)
Dept: PHYSICAL THERAPY | Facility: REHABILITATION | Age: 69
End: 2024-11-18
Payer: COMMERCIAL

## 2024-11-18 DIAGNOSIS — Z98.1 S/P CERVICAL SPINAL FUSION: ICD-10-CM

## 2024-11-18 DIAGNOSIS — M54.2 CHRONIC NECK PAIN: Primary | ICD-10-CM

## 2024-11-18 DIAGNOSIS — G89.29 CHRONIC NECK PAIN: Primary | ICD-10-CM

## 2024-11-18 DIAGNOSIS — R26.89 POOR BALANCE: ICD-10-CM

## 2024-11-18 PROCEDURE — 97110 THERAPEUTIC EXERCISES: CPT

## 2024-11-18 PROCEDURE — 97112 NEUROMUSCULAR REEDUCATION: CPT

## 2024-11-18 NOTE — PROGRESS NOTES
"Daily Note     Today's date: 2024  Patient name: Edgar Obando  : 1955  MRN: [id number]  Referring provider: Morelia Sethi*  Dx:   Encounter Diagnosis     ICD-10-CM    1. Chronic neck pain  M54.2     G89.29       2. Poor balance  R26.89       3. S/P cervical spinal fusion  Z98.1                      Subjective: Pt. reports no change in status upon arrival.  Pt states legs feel a little weaker today than last visit, but no notable changes overall.      Objective: See treatment diary below      Assessment: Tolerated treatment well. Patient would benefit from continued PT.  Pt. able to complete all exercises with no increase in pain during or after session.  Pt. 1:1 with PTA for entirety.      Plan: Continue per plan of care.        POC expires Auth Expiration date PT/OT + Visit Limit?   10/8/2024  2024 99        Visit/Unit Tracking  AUTH Status:  Date 10/17 10/21 10/24 10/29 10/31 11/11 11/14 11/18   [employer] MC - BOMN Used 17 18 19 20 21 22 23 24            Precautions:  s/p C3-7 ACDF on 2024       Manuals    Cervical PROM                                Neuro Re-Ed        Patient Ed        Digi         Row 2x10 15# 2x10 15# 2x10 15# 2x10 15# 2x10 15# seated   LPD Seated        BSER        Standing hip abduction 2x10 2# b/l 2x10 2# b/l 2x10 2# b/l 2x10 2# b/l 2x10 2# b/l   Nancy Negotiation        Half Tandem Balance 2x30\" b/l 2x30\" b/l 2x30\" b/l 2x30\" b/l 2x30\" b/l   LOS Biodex        Resisted Sidestep        Ther Ex        UBE    Vital monitoring        Neck Circles        Shoulder Flexion with Dowel in Chair        Thoracic Extension Chair 2x10 5\" 2x10 5\"  2x10 5\" 2x10 5\" 2x10 5\"   Wall Slide        Cervical AROM Each Way        Walking on Foam Tandem/Sidesteps 4 laps ea hha 4 laps ea hha 4 laps ea hha 4 laps ea hha 4 laps ea hha   Seated Row         Sit to Stand 2x10 15# kb 2x10 15# kb 2x10 15# kb 2x10 15# db 2x10 15# db   Ther " Activity                        Gait Training                        Modalities                                Access Code: [email or web address]  URL: [email or web address]  Date: 08/21/2024  Prepared by: Mary Puga    Exercises  - Seated Shoulder Row with Anchored Resistance  - 1 x daily - 7 x weekly - 2 sets - 10 reps - 5 hold  - Seated Shoulder Extension and Scapular Retraction with Resistance  - 1 x daily - 7 x weekly - 2 sets - 12 reps - 5 hold  - Side Stepping with Counter Support  - 1 x daily - 7 x weekly - 1 sets - 10 reps  - Sit to Stand  - 1 x daily - 7 x weekly - 3 sets - 8 reps  - Seated Cat Cow  - 1 x daily - 7 x weekly - 2 sets - 10 reps  - Standing Hip Abduction with Counter Support  - 1 x daily - 7 x weekly - 2 sets - 10 reps

## 2024-11-21 ENCOUNTER — OFFICE VISIT (OUTPATIENT)
Dept: PHYSICAL THERAPY | Facility: REHABILITATION | Age: 69
End: 2024-11-21
Payer: COMMERCIAL

## 2024-11-21 DIAGNOSIS — G89.29 CHRONIC NECK PAIN: Primary | ICD-10-CM

## 2024-11-21 DIAGNOSIS — M54.2 CHRONIC NECK PAIN: Primary | ICD-10-CM

## 2024-11-21 DIAGNOSIS — Z98.1 S/P CERVICAL SPINAL FUSION: ICD-10-CM

## 2024-11-21 DIAGNOSIS — R26.89 POOR BALANCE: ICD-10-CM

## 2024-11-21 PROCEDURE — 97112 NEUROMUSCULAR REEDUCATION: CPT

## 2024-11-21 PROCEDURE — 97110 THERAPEUTIC EXERCISES: CPT

## 2024-11-21 NOTE — PROGRESS NOTES
"Daily Note     Today's date: 2024  Patient name: Edgar Obando  : 1955  MRN: [id number]  Referring provider: Morelia Sethi*  Dx:   Encounter Diagnosis     ICD-10-CM    1. Chronic neck pain  M54.2     G89.29       2. Poor balance  R26.89       3. S/P cervical spinal fusion  Z98.1                      Subjective: Pt. reports no change in status upon arrival.        Objective: See treatment diary below      Assessment: Tolerated treatment well. Patient would benefit from continued PT      Plan: Continue per plan of care.        POC expires Auth Expiration date PT/OT + Visit Limit?   10/8/2024  2024 99        Visit/Unit Tracking  AUTH Status:  Date 10/17 10/21 10/24 10/29 10/31 11/11 11/14 11/18 11/21   [employer] - BOMN Used 17 18 19 20 21 22 23 24 25            Precautions:  s/p C3-7 ACDF on 2024       Manuals    Cervical PROM                                    Neuro Re-Ed         Patient Ed         Digi          Row 2x10 15# 2x10 15# 2x10 15# 2x10 15# 2x10 15# seated 2x10 15# seated   LPD Seated         BSER         Standing hip abduction 2x10 2# b/l 2x10 2# b/l 2x10 2# b/l 2x10 2# b/l 2x10 2# b/l 2x10 2# b/l   Nancy Negotiation         Half Tandem Balance 2x30\" b/l 2x30\" b/l 2x30\" b/l 2x30\" b/l 2x30\" b/l 2x30\" b/l   LOS Biodex         Resisted Sidestep         Ther Ex         UBE    Vital monitoring         Neck Circles         Shoulder Flexion with Dowel in Chair         Thoracic Extension Chair 2x10 5\" 2x10 5\"  2x10 5\" 2x10 5\" 2x10 5\" 2x10 5\"   Wall Slide         Cervical AROM Each Way         Walking on Foam Tandem/Sidesteps 4 laps ea hha 4 laps ea hha 4 laps ea hha 4 laps ea hha 4 laps ea hha 4 laps ea hha   Seated Row          Sit to Stand 2x10 15# kb 2x10 15# kb 2x10 15# kb 2x10 15# db 2x10 15# db 2x10 15# kb   Ther Activity                           Gait Training                           Modalities                     "               Access Code: [email or web address]  URL: [email or web address]  Date: 08/21/2024  Prepared by: Mary Puga    Exercises  - Seated Shoulder Row with Anchored Resistance  - 1 x daily - 7 x weekly - 2 sets - 10 reps - 5 hold  - Seated Shoulder Extension and Scapular Retraction with Resistance  - 1 x daily - 7 x weekly - 2 sets - 12 reps - 5 hold  - Side Stepping with Counter Support  - 1 x daily - 7 x weekly - 1 sets - 10 reps  - Sit to Stand  - 1 x daily - 7 x weekly - 3 sets - 8 reps  - Seated Cat Cow  - 1 x daily - 7 x weekly - 2 sets - 10 reps  - Standing Hip Abduction with Counter Support  - 1 x daily - 7 x weekly - 2 sets - 10 reps

## 2024-11-22 DIAGNOSIS — F41.9 ANXIETY: ICD-10-CM

## 2024-11-22 RX ORDER — CLONAZEPAM 1 MG/1
0.5 TABLET ORAL 2 TIMES DAILY
Qty: 30 TABLET | Refills: 1 | Status: SHIPPED | OUTPATIENT
Start: 2024-11-22

## 2024-11-22 NOTE — TELEPHONE ENCOUNTER
Medication:  PDMP   10/23/2024 10/22/2024 clonazePAM (Tablet) 30.0 30 1 MG NA HUMBERTO PAT     Active agreement on file -Yes

## 2024-11-22 NOTE — TELEPHONE ENCOUNTER
Reason for call:   [x] Refill   [] Prior Auth  [] Other:     Office:   [x] PCP/Provider -   [] Specialty/Provider -     Medication: KLONOPIN    Dose/Frequency: 1 MG    Quantity: 30    Pharmacy:   [facility] - [place], [place] - [address] [address]  [address] [place] [address]  Phone: [phone]  Fax: [phone]     Does the patient have enough for 3 days?   [] Yes   [x] No - Send as HP to POD

## 2024-11-25 ENCOUNTER — OFFICE VISIT (OUTPATIENT)
Dept: PHYSICAL THERAPY | Facility: REHABILITATION | Age: 69
End: 2024-11-25
Payer: COMMERCIAL

## 2024-11-25 DIAGNOSIS — R26.89 POOR BALANCE: ICD-10-CM

## 2024-11-25 DIAGNOSIS — M54.2 CHRONIC NECK PAIN: Primary | ICD-10-CM

## 2024-11-25 DIAGNOSIS — G89.29 CHRONIC NECK PAIN: Primary | ICD-10-CM

## 2024-11-25 DIAGNOSIS — Z98.1 S/P CERVICAL SPINAL FUSION: ICD-10-CM

## 2024-11-25 PROCEDURE — 97110 THERAPEUTIC EXERCISES: CPT

## 2024-11-25 PROCEDURE — 97112 NEUROMUSCULAR REEDUCATION: CPT

## 2024-11-25 NOTE — PROGRESS NOTES
"Daily Note     Today's date: 2024  Patient name: Edgar Obando  : 1955  MRN: [id number]  Referring provider: Morelia Sethi*  Dx:   Encounter Diagnosis     ICD-10-CM    1. Chronic neck pain  M54.2     G89.29       2. Poor balance  R26.89       3. S/P cervical spinal fusion  Z98.1                      Subjective: Pt. reports no change in status upon arrival.  Pt states his legs feel weak today      Objective: See treatment diary below      Assessment: Tolerated treatment well. Patient would benefit from continued PT.  Pt. able to complete all exercises with no increase in pain during or after session.  Pt. 1:1 with PTA for entirety.      Plan: Continue per plan of care.        POC expires Auth Expiration date PT/OT + Visit Limit?   10/8/2024  2024 99        Visit/Unit Tracking  AUTH Status:  Date 10/17 10/21 10/24 10/29 10/31 11/11 11/14 11/18 11/21 11/25   [employer] - BOMN Used 17 18 19 20 21 22 23 24 25 26            Precautions:  s/p C3-7 ACDF on 2024       Manuals    Cervical PROM                                        Neuro Re-Ed          Patient Ed          Digi           Row 2x10 15# 2x10 15# 2x10 15# 2x10 15# 2x10 15# seated 2x10 15# seated np   LPD Seated          BSER          Standing hip abduction 2x10 2# b/l 2x10 2# b/l 2x10 2# b/l 2x10 2# b/l 2x10 2# b/l 2x10 2# b/l 2x10 2# b/l   Nancy Negotiation          Half Tandem Balance 2x30\" b/l 2x30\" b/l 2x30\" b/l 2x30\" b/l 2x30\" b/l 2x30\" b/l 2x30\" b/l   LOS Biodex          Resisted Sidestep          Ther Ex          UBE    Vital monitoring          Neck Circles          Shoulder Flexion with Dowel in Chair          Thoracic Extension Chair 2x10 5\" 2x10 5\"  2x10 5\" 2x10 5\" 2x10 5\" 2x10 5\" 2x10 5\"   Wall Slide          Cervical AROM Each Way          Walking on Foam Tandem/Sidesteps 4 laps ea hha 4 laps ea hha 4 laps ea hha 4 laps ea hha 4 laps ea hha 4 laps ea " hha 4 laps ea hha   Seated Row           Sit to Stand 2x10 15# kb 2x10 15# kb 2x10 15# kb 2x10 15# db 2x10 15# db 2x10 15# kb 2x10 15# db   Ther Activity                              Gait Training                              Modalities                                      Access Code: [email or web address]  URL: [email or web address]  Date: 08/21/2024  Prepared by: Mary Puga    Exercises  - Seated Shoulder Row with Anchored Resistance  - 1 x daily - 7 x weekly - 2 sets - 10 reps - 5 hold  - Seated Shoulder Extension and Scapular Retraction with Resistance  - 1 x daily - 7 x weekly - 2 sets - 12 reps - 5 hold  - Side Stepping with Counter Support  - 1 x daily - 7 x weekly - 1 sets - 10 reps  - Sit to Stand  - 1 x daily - 7 x weekly - 3 sets - 8 reps  - Seated Cat Cow  - 1 x daily - 7 x weekly - 2 sets - 10 reps  - Standing Hip Abduction with Counter Support  - 1 x daily - 7 x weekly - 2 sets - 10 reps

## 2024-11-29 ENCOUNTER — APPOINTMENT (OUTPATIENT)
Dept: PHYSICAL THERAPY | Facility: REHABILITATION | Age: 69
End: 2024-11-29
Payer: COMMERCIAL

## 2024-11-29 NOTE — PROGRESS NOTES
"Daily Note     Today's date: 2024  Patient name: Edgar Obando  : 1955  MRN: [id number]  Referring provider: Morelia Sethi*  Dx:   No diagnosis found.                 Subjective: Pt. reports no change in status upon arrival.  Pt states his legs feel weak today      Objective: See treatment diary below      Assessment: Tolerated treatment well. Patient would benefit from continued PT.  Pt. able to complete all exercises with no increase in pain during or after session. 1:1 with Nimco Cox DPT for entirety of session.         Plan: Continue per plan of care.        POC expires Auth Expiration date PT/OT + Visit Limit?   10/8/2024  2024 99        Visit/Unit Tracking  AUTH Status:  Date 10/17 10/21 10/24 10/29 10/31 11/11 11/14 11/18 11/21 11/25 11/29   [employer] - BOMN Used 17 18 19 20 21 22 23 24 25 26 27            Precautions:  s/p C3-7 ACDF on 2024       Manuals    Cervical PROM                                            Neuro Re-Ed           Patient Ed           Digi            Row 2x10 15# 2x10 15# 2x10 15# 2x10 15# 2x10 15# seated 2x10 15# seated np    LPD Seated           BSER           Standing hip abduction 2x10 2# b/l 2x10 2# b/l 2x10 2# b/l 2x10 2# b/l 2x10 2# b/l 2x10 2# b/l 2x10 2# b/l *   Nancy Negotiation           Half Tandem Balance 2x30\" b/l 2x30\" b/l 2x30\" b/l 2x30\" b/l 2x30\" b/l 2x30\" b/l 2x30\" b/l *   LOS Biodex           Resisted Sidestep           Ther Ex           UBE  *   Vital monitoring           Neck Circles           Shoulder Flexion with Dowel in Chair           Thoracic Extension Chair 2x10 5\" 2x10 5\"  2x10 5\" 2x10 5\" 2x10 5\" 2x10 5\" 2x10 5\" *   Wall Slide           Cervical AROM Each Way           Walking on Foam Tandem/Sidesteps 4 laps ea hha 4 laps ea hha 4 laps ea hha 4 laps ea hha 4 laps ea hha 4 laps ea hha 4 laps ea hha *   Seated Row            Sit to Stand 2x10 15# kb " 2x10 15# kb 2x10 15# kb 2x10 15# db 2x10 15# db 2x10 15# kb 2x10 15# db *   Ther Activity                                 Gait Training                                 Modalities                                         Access Code: [email or web address]  URL: [email or web address]  Date: 08/21/2024  Prepared by: Mary Puga    Exercises  - Seated Shoulder Row with Anchored Resistance  - 1 x daily - 7 x weekly - 2 sets - 10 reps - 5 hold  - Seated Shoulder Extension and Scapular Retraction with Resistance  - 1 x daily - 7 x weekly - 2 sets - 12 reps - 5 hold  - Side Stepping with Counter Support  - 1 x daily - 7 x weekly - 1 sets - 10 reps  - Sit to Stand  - 1 x daily - 7 x weekly - 3 sets - 8 reps  - Seated Cat Cow  - 1 x daily - 7 x weekly - 2 sets - 10 reps  - Standing Hip Abduction with Counter Support  - 1 x daily - 7 x weekly - 2 sets - 10 reps

## 2024-12-02 ENCOUNTER — APPOINTMENT (EMERGENCY)
Dept: RADIOLOGY | Facility: HOSPITAL | Age: 69
DRG: 871 | End: 2024-12-02
Payer: COMMERCIAL

## 2024-12-02 ENCOUNTER — HOSPITAL ENCOUNTER (INPATIENT)
Facility: HOSPITAL | Age: 69
LOS: 2 days | Discharge: HOME | DRG: 871 | End: 2024-12-05
Attending: EMERGENCY MEDICINE | Admitting: INTERNAL MEDICINE
Payer: COMMERCIAL

## 2024-12-02 DIAGNOSIS — J96.92 HYPERCAPNIC RESPIRATORY FAILURE (HCC): ICD-10-CM

## 2024-12-02 DIAGNOSIS — I50.32 CHRONIC DIASTOLIC CONGESTIVE HEART FAILURE (HCC): ICD-10-CM

## 2024-12-02 DIAGNOSIS — N17.9 AKI (ACUTE KIDNEY INJURY) (HCC): ICD-10-CM

## 2024-12-02 DIAGNOSIS — J18.9 PNEUMONIA: Primary | ICD-10-CM

## 2024-12-02 DIAGNOSIS — A41.9 SEPSIS (HCC): ICD-10-CM

## 2024-12-02 DIAGNOSIS — J96.01 ACUTE HYPOXEMIC RESPIRATORY FAILURE (HCC): ICD-10-CM

## 2024-12-02 LAB
ALBUMIN SERPL BCG-MCNC: 3.2 G/DL (ref 3.5–5)
ALP SERPL-CCNC: 75 U/L (ref 34–104)
ALT SERPL W P-5'-P-CCNC: 12 U/L (ref 7–52)
ANION GAP SERPL CALCULATED.3IONS-SCNC: 5 MMOL/L (ref 4–13)
APTT PPP: 44 SECONDS (ref 23–34)
AST SERPL W P-5'-P-CCNC: 12 U/L (ref 13–39)
ATRIAL RATE: 73 BPM
BACTERIA UR QL AUTO: ABNORMAL /HPF
BASOPHILS # BLD AUTO: 0.01 THOUSANDS/ÂΜL (ref 0–0.1)
BASOPHILS NFR BLD AUTO: 0 % (ref 0–1)
BILIRUB SERPL-MCNC: 0.86 MG/DL (ref 0.2–1)
BILIRUB UR QL STRIP: NEGATIVE
BUN SERPL-MCNC: 37 MG/DL (ref 5–25)
CALCIUM ALBUM COR SERPL-MCNC: 9.1 MG/DL (ref 8.3–10.1)
CALCIUM SERPL-MCNC: 8.5 MG/DL (ref 8.4–10.2)
CHLORIDE SERPL-SCNC: 97 MMOL/L (ref 96–108)
CLARITY UR: CLEAR
CO2 SERPL-SCNC: 36 MMOL/L (ref 21–32)
COLOR UR: ABNORMAL
CREAT SERPL-MCNC: 1.67 MG/DL (ref 0.6–1.3)
EOSINOPHIL # BLD AUTO: 0.09 THOUSAND/ÂΜL (ref 0–0.61)
EOSINOPHIL NFR BLD AUTO: 1 % (ref 0–6)
ERYTHROCYTE [DISTWIDTH] IN BLOOD BY AUTOMATED COUNT: 14.6 % (ref 11.6–15.1)
FLUAV AG UPPER RESP QL IA.RAPID: NEGATIVE
FLUBV AG UPPER RESP QL IA.RAPID: NEGATIVE
GFR SERPL CREATININE-BSD FRML MDRD: 41 ML/MIN/1.73SQ M
GLUCOSE SERPL-MCNC: 118 MG/DL (ref 65–140)
GLUCOSE UR STRIP-MCNC: NEGATIVE MG/DL
HCT VFR BLD AUTO: 33.6 % (ref 36.5–49.3)
HGB BLD-MCNC: 10.6 G/DL (ref 12–17)
HGB UR QL STRIP.AUTO: NEGATIVE
IMM GRANULOCYTES # BLD AUTO: 0.28 THOUSAND/UL (ref 0–0.2)
IMM GRANULOCYTES NFR BLD AUTO: 2 % (ref 0–2)
INR PPP: 1.36 (ref 0.85–1.19)
KETONES UR STRIP-MCNC: NEGATIVE MG/DL
LACTATE SERPL-SCNC: 0.8 MMOL/L (ref 0.5–2)
LEUKOCYTE ESTERASE UR QL STRIP: NEGATIVE
LYMPHOCYTES # BLD AUTO: 0.76 THOUSANDS/ÂΜL (ref 0.6–4.47)
LYMPHOCYTES NFR BLD AUTO: 5 % (ref 14–44)
MCH RBC QN AUTO: 29.9 PG (ref 26.8–34.3)
MCHC RBC AUTO-ENTMCNC: 31.5 G/DL (ref 31.4–37.4)
MCV RBC AUTO: 95 FL (ref 82–98)
MONOCYTES # BLD AUTO: 0.82 THOUSAND/ÂΜL (ref 0.17–1.22)
MONOCYTES NFR BLD AUTO: 5 % (ref 4–12)
MUCOUS THREADS UR QL AUTO: ABNORMAL
NEUTROPHILS # BLD AUTO: 13.77 THOUSANDS/ÂΜL (ref 1.85–7.62)
NEUTS SEG NFR BLD AUTO: 87 % (ref 43–75)
NITRITE UR QL STRIP: NEGATIVE
NON-SQ EPI CELLS URNS QL MICRO: ABNORMAL /HPF
NRBC BLD AUTO-RTO: 0 /100 WBCS
P AXIS: 62 DEGREES
PH UR STRIP.AUTO: 5.5 [PH]
PLATELET # BLD AUTO: 192 THOUSANDS/UL (ref 149–390)
PMV BLD AUTO: 10.7 FL (ref 8.9–12.7)
POTASSIUM SERPL-SCNC: 4 MMOL/L (ref 3.5–5.3)
PR INTERVAL: 128 MS
PROCALCITONIN SERPL-MCNC: 13.02 NG/ML
PROT SERPL-MCNC: 6.6 G/DL (ref 6.4–8.4)
PROT UR STRIP-MCNC: ABNORMAL MG/DL
PROTHROMBIN TIME: 17 SECONDS (ref 12.3–15)
QRS AXIS: 44 DEGREES
QRSD INTERVAL: 90 MS
QT INTERVAL: 386 MS
QTC INTERVAL: 425 MS
RBC # BLD AUTO: 3.54 MILLION/UL (ref 3.88–5.62)
RBC #/AREA URNS AUTO: ABNORMAL /HPF
SARS-COV+SARS-COV-2 AG RESP QL IA.RAPID: NEGATIVE
SODIUM SERPL-SCNC: 138 MMOL/L (ref 135–147)
SP GR UR STRIP.AUTO: 1.01 (ref 1–1.03)
T WAVE AXIS: 60 DEGREES
UROBILINOGEN UR STRIP-ACNC: 2 MG/DL
VENTRICULAR RATE: 73 BPM
WBC # BLD AUTO: 15.73 THOUSAND/UL (ref 4.31–10.16)
WBC #/AREA URNS AUTO: ABNORMAL /HPF

## 2024-12-02 PROCEDURE — 96368 THER/DIAG CONCURRENT INF: CPT

## 2024-12-02 PROCEDURE — 93308 TTE F-UP OR LMTD: CPT | Performed by: INTERNAL MEDICINE

## 2024-12-02 PROCEDURE — 87040 BLOOD CULTURE FOR BACTERIA: CPT

## 2024-12-02 PROCEDURE — 99291 CRITICAL CARE FIRST HOUR: CPT | Performed by: EMERGENCY MEDICINE

## 2024-12-02 PROCEDURE — 93005 ELECTROCARDIOGRAM TRACING: CPT

## 2024-12-02 PROCEDURE — 96366 THER/PROPH/DIAG IV INF ADDON: CPT

## 2024-12-02 PROCEDURE — 93010 ELECTROCARDIOGRAM REPORT: CPT | Performed by: INTERNAL MEDICINE

## 2024-12-02 PROCEDURE — 85025 COMPLETE CBC W/AUTO DIFF WBC: CPT

## 2024-12-02 PROCEDURE — 85730 THROMBOPLASTIN TIME PARTIAL: CPT

## 2024-12-02 PROCEDURE — 84145 PROCALCITONIN (PCT): CPT

## 2024-12-02 PROCEDURE — 96365 THER/PROPH/DIAG IV INF INIT: CPT

## 2024-12-02 PROCEDURE — 81001 URINALYSIS AUTO W/SCOPE: CPT

## 2024-12-02 PROCEDURE — 85610 PROTHROMBIN TIME: CPT

## 2024-12-02 PROCEDURE — 87811 SARS-COV-2 COVID19 W/OPTIC: CPT

## 2024-12-02 PROCEDURE — 80053 COMPREHEN METABOLIC PANEL: CPT

## 2024-12-02 PROCEDURE — 83605 ASSAY OF LACTIC ACID: CPT

## 2024-12-02 PROCEDURE — 36415 COLL VENOUS BLD VENIPUNCTURE: CPT

## 2024-12-02 PROCEDURE — 96367 TX/PROPH/DG ADDL SEQ IV INF: CPT

## 2024-12-02 PROCEDURE — 71046 X-RAY EXAM CHEST 2 VIEWS: CPT

## 2024-12-02 PROCEDURE — 99285 EMERGENCY DEPT VISIT HI MDM: CPT

## 2024-12-02 PROCEDURE — 87804 INFLUENZA ASSAY W/OPTIC: CPT

## 2024-12-02 RX ORDER — SODIUM CHLORIDE, SODIUM GLUCONATE, SODIUM ACETATE, POTASSIUM CHLORIDE, MAGNESIUM CHLORIDE, SODIUM PHOSPHATE, DIBASIC, AND POTASSIUM PHOSPHATE .53; .5; .37; .037; .03; .012; .00082 G/100ML; G/100ML; G/100ML; G/100ML; G/100ML; G/100ML; G/100ML
1000 INJECTION, SOLUTION INTRAVENOUS ONCE
Status: COMPLETED | OUTPATIENT
Start: 2024-12-02 | End: 2024-12-02

## 2024-12-02 RX ORDER — ACETAMINOPHEN 325 MG/1
975 TABLET ORAL ONCE
Status: DISCONTINUED | OUTPATIENT
Start: 2024-12-02 | End: 2024-12-03

## 2024-12-02 RX ORDER — SODIUM CHLORIDE, SODIUM GLUCONATE, SODIUM ACETATE, POTASSIUM CHLORIDE, MAGNESIUM CHLORIDE, SODIUM PHOSPHATE, DIBASIC, AND POTASSIUM PHOSPHATE .53; .5; .37; .037; .03; .012; .00082 G/100ML; G/100ML; G/100ML; G/100ML; G/100ML; G/100ML; G/100ML
1000 INJECTION, SOLUTION INTRAVENOUS ONCE
Status: COMPLETED | OUTPATIENT
Start: 2024-12-02 | End: 2024-12-03

## 2024-12-02 RX ORDER — ALBUMIN HUMAN 50 G/1000ML
25 SOLUTION INTRAVENOUS ONCE
Status: COMPLETED | OUTPATIENT
Start: 2024-12-02 | End: 2024-12-02

## 2024-12-02 RX ADMIN — AZITHROMYCIN 500 MG: 500 INJECTION, POWDER, LYOPHILIZED, FOR SOLUTION INTRAVENOUS at 20:37

## 2024-12-02 RX ADMIN — SODIUM CHLORIDE, SODIUM GLUCONATE, SODIUM ACETATE, POTASSIUM CHLORIDE, MAGNESIUM CHLORIDE, SODIUM PHOSPHATE, DIBASIC, AND POTASSIUM PHOSPHATE 1000 ML: .53; .5; .37; .037; .03; .012; .00082 INJECTION, SOLUTION INTRAVENOUS at 23:48

## 2024-12-02 RX ADMIN — SODIUM CHLORIDE, SODIUM GLUCONATE, SODIUM ACETATE, POTASSIUM CHLORIDE, MAGNESIUM CHLORIDE, SODIUM PHOSPHATE, DIBASIC, AND POTASSIUM PHOSPHATE 1000 ML: .53; .5; .37; .037; .03; .012; .00082 INJECTION, SOLUTION INTRAVENOUS at 18:21

## 2024-12-02 RX ADMIN — CEFTRIAXONE SODIUM 2000 MG: 10 INJECTION, POWDER, FOR SOLUTION INTRAVENOUS at 19:40

## 2024-12-02 RX ADMIN — SODIUM CHLORIDE, SODIUM GLUCONATE, SODIUM ACETATE, POTASSIUM CHLORIDE, MAGNESIUM CHLORIDE, SODIUM PHOSPHATE, DIBASIC, AND POTASSIUM PHOSPHATE 1000 ML: .53; .5; .37; .037; .03; .012; .00082 INJECTION, SOLUTION INTRAVENOUS at 19:40

## 2024-12-02 RX ADMIN — ALBUMIN (HUMAN) 25 G: 12.5 INJECTION, SOLUTION INTRAVENOUS at 22:28

## 2024-12-02 NOTE — LETTER
Dear Dr iWn    Thank you for allowing us to participate in the care of your patient, Edgar Obando, who was hospitalized for altered mental status.  He was not compliant with CPAP.  pCO2 was very high.  Was placed on BiPAP overnight.  Pulm was managing.  For discharge today and advised to follow-up with pulmonology as outpatient.  Patient also had pneumonia and advised to complete a 7-day course of antibiotics.    If you have any additional questions or would like to discuss further, please feel free to contact me.    Cecil Ragland MD  [facility] Internal Medicine, Hospitalist  [phone]

## 2024-12-02 NOTE — ED ATTENDING ATTESTATION
12/2/2024  I, Helder Ng MD, saw and evaluated the patient. I have discussed the patient with the resident/non-physician practitioner and agree with the resident's/non-physician practitioner's findings, Plan of Care, and MDM as documented in the resident's/non-physician practitioner's note, except where noted. All available labs and Radiology studies were reviewed.  I was present for key portions of any procedure(s) performed by the resident/non-physician practitioner and I was immediately available to provide assistance.       At this point I agree with the current assessment done in the Emergency Department.  I have conducted an independent evaluation of this patient a history and physical is as follows:    ED Course  ED Course as of 12/04/24 0912   Mon Dec 02, 2024   1723 Per resident h&p 68 YO M presents for congestion fevers cough over the last 3 days; not feeling well; taking apap and ibuprofen for body aches; hypotensive in PCP's office and hypoxic as well; denies dyspnea; cough productive of yellow green mucus; +sick contacts over the holidays no gi symptoms O: resting comfortably abd soft NT non distended crackles RLL RRR no VTE RF I/P hypotensive at PCP's low POx; septic w/u including CXR; viral URI screening   1839 WBC(!): 15.73  Elevated   1854 Procalcitonin(!): 13.02  Elevated   Emergency Department Note- Edgar Obando 69 y.o. male MRN: [id number]    Unit/Bed#: ED 05 Encounter: [id number]    Edgar Obando is a 69 y.o. male who presents with   Chief Complaint   Patient presents with    Flu Symptoms     Pt has been experiencing cough, congestion, constipation, fevers, and weakness for the past few days.          History of Present Illness   HPI:  Edgar Obando is a 69 y.o. male who presents for evaluation of:  Congestion, fevers, and cough over the last 3 days.  He has not been feeling well.  He has been taking acetaminophen to treat his body aches.  Went to his primary care provider's office today  who noted that the patient was hypotensive and hypoxic in his office and sent him to the ED for evaluation.  He denies history of cardiac and pulmonary disease.  He has chronic hypertension and diabetes and states compliance with his medical regimen.  He has had a cough that has been productive of yellow-green mucus.  He had contact with sick contacts over the holidays.  He denies associated nausea and vomiting.    Review of Systems   Constitutional:  Positive for chills, fatigue and fever.   HENT:  Positive for congestion. Negative for sore throat.    Respiratory:  Positive for cough. Negative for shortness of breath.    Cardiovascular:  Negative for chest pain and palpitations.   Gastrointestinal:  Negative for abdominal pain and nausea.   Genitourinary:  Negative for flank pain and frequency.   Neurological:  Negative for light-headedness and headaches.   Psychiatric/Behavioral:  Negative for dysphoric mood and hallucinations.    All other systems reviewed and are negative.      Historical Information   Past Medical History:   Diagnosis Date    Acid reflux     Acute blood loss anemia 07/15/2016    Anxiety     Arthritis     Cellulitis     left ankle    CPAP (continuous positive airway pressure) dependence     as needed per patient    Diabetes mellitus (HCC)     Hypertension     Idiopathic chronic venous hypertension of right lower extremity with ulcer (HCC) 06/12/2023    Sleep apnea     Twitching      Past Surgical History:   Procedure Laterality Date    APPENDECTOMY      CERVICAL FUSION Bilateral 5/2/2024    Procedure: FUSION CERVICAL ANTERIOR W DISCECTOMY, C3-7 ACDF;  Surgeon: Conrad Richardson MD;  Location: [facility];  Service: Orthopedics    CHOLECYSTECTOMY      COLONOSCOPY      IR PICC PLACEMENT DOUBLE LUMEN  5/2/2024    KNEE ARTHROSCOPY      NC ARTHRP KNE CONDYLE&PLATU MEDIAL&LAT COMPARTMENTS Left 07/13/2016    Procedure: TOTAL  KNEE REPLACEMENT ;  Surgeon: Zack Montenegro MD;  Location: [facility];   Service: Orthopedics     Social History   Social History     Substance and Sexual Activity   Alcohol Use Not Currently     Social History     Substance and Sexual Activity   Drug Use Not Currently    Comment: tried multiple drugs in the past     Social History     Tobacco Use   Smoking Status Former    Current packs/day: 0.00    Types: Cigarettes    Quit date:     Years since quittin.9   Smokeless Tobacco Never     Family History:   Family History   Problem Relation Age of Onset    Diabetes Mother     Diabetes Father     Cancer Father        Meds/Allergies   PTA meds:   Prior to Admission Medications   Prescriptions Last Dose Informant Patient Reported? Taking?   Blood Glucose Monitoring Suppl (ONE TOUCH ULTRA 2) w/Device KIT  Self Yes No   Sig: USE TO TEST BLOOD GLUCOSE   Lancets 30G MISC  Self Yes No   Sig: 3 (three) times a day Use to test blood glucose   Multiple Vitamins-Minerals (multivitamin with minerals) tablet   Yes No   Sig: Take 1 tablet by mouth daily   NARCAN 4 MG/0.1ML LIQD  Self Yes No   Sig: Has if needed   OneTouch Ultra test strip  Self Yes No   Sig: USE TO TEST BLOOD GLUCOSE TWICE DAILY   Trulicity 4.5 MG/0.5ML SOPN  Self Yes No   Sig: every 7 days On    acetaminophen (TYLENOL) 650 mg CR tablet   No No   Sig: Take 1 tablet (650 mg total) by mouth every 8 (eight) hours as needed for mild pain   albuterol (PROVENTIL HFA,VENTOLIN HFA) 90 mcg/act inhaler  Self Yes No   Sig: Inhale 2 puffs every 6 (six) hours as needed for wheezing As needed   aspirin (ECOTRIN LOW STRENGTH) 81 mg EC tablet  Self Yes No   Sig: Take 81 mg by mouth daily   b complex vitamins capsule   Yes No   Sig: Take 1 capsule by mouth daily   bisacodyl (DULCOLAX) 5 mg EC tablet   No No   Sig: Take 1 tablet (5 mg total) by mouth once for 1 dose   budesonide (RINOCORT AQUA) 32 MCG/ACT nasal spray  Self Yes No   Si spray into each nostril 2 (two) times a day As needed   busPIRone (BUSPAR) 10 mg tablet   No No   Sig:  TAKE 1 TABLET (10 MG TOTAL) BY MOUTH TWO (TWO) TIMES A DAY   busPIRone (BUSPAR) 7.5 mg tablet   Yes No   chlorhexidine (PERIDEX) 0.12 % solution  Self Yes No   Sig: RINSE MOUTH WITH 15ML (1 CAPFUL) FOR 30 SECONDS IN MORNING AND EVENING AFTER BRUSHING, THEN SPIT   cloNIDine (CATAPRES) 0.1 mg tablet  Self Yes No   Sig: Take 0.1 mg by mouth 2 (two) times a day   clonazePAM (KlonoPIN) 0.5 mg tablet   Yes No   clonazePAM (KlonoPIN) 1 mg tablet   No No   Sig: Take 0.5 tablets (0.5 mg total) by mouth 2 (two) times a day   clotrimazole (LOTRIMIN) 1 % cream   Yes No   docusate sodium (COLACE) 100 mg capsule  Self Yes No   Sig: Take 100 mg by mouth 2 (two) times a day   famotidine (PEPCID) 20 mg tablet   Yes No   fluticasone (FLOVENT HFA) 220 mcg/act inhaler  Self Yes No   Sig: Inhale 1 puff daily as needed As needed   furosemide (LASIX) 40 mg tablet  Self Yes No   Sig: Take 40 mg by mouth as needed in the morning and 40 mg as needed in the evening.   gabapentin (NEURONTIN) 100 mg capsule   Yes No   losartan (COZAAR) 25 mg tablet  Self Yes No   lovastatin (MEVACOR) 10 MG tablet   Yes No   Sig: Take 10 mg by mouth daily at bedtime   methylPREDNISolone 4 MG tablet therapy pack   No No   Sig: Use as directed on package. Do not take other anti-inflammatory (NSAID) medications while on Medrol dose chandu. You should not suddenly stop using these medications. Follow the instructions listed on the prescription about tapering your dose.   mupirocin (BACTROBAN) 2 % ointment  Self Yes No   naloxone (NARCAN) 4 mg/0.1 mL nasal spray   No No   Sig: Administer 1 spray into a nostril. If no response after 2-3 minutes, give another dose in the other nostril using a new spray.   naproxen (NAPROSYN) 500 mg tablet  Self Yes No   Patient not taking: Reported on 11/14/2024   oxyCODONE (Roxicodone) 5 immediate release tablet   No No   Sig: Take 1 tablet (5 mg total) by mouth every 6 (six) hours as needed for moderate pain Max Daily Amount: 20 mg    Patient not taking: Reported on 11/14/2024   pantoprazole (PROTONIX) 40 mg tablet   Yes No   Sig: Take 40 mg by mouth daily   potassium chloride (K-DUR,KLOR-CON) 10 mEq tablet  Self Yes No   predniSONE 20 mg tablet   Yes No   Patient not taking: Reported on 11/14/2024   propranolol (INDERAL) 10 mg tablet   Yes No   tamsulosin (FLOMAX) 0.4 mg  Self No No   Sig: TAKE 1 CAPSULE BY MOUTH EVERY DAY WITH DINNER   testosterone (ANDROGEL) 1.62 % TD gel pump  Self Yes No   tiZANidine (ZANAFLEX) 2 mg tablet   No No   Sig: Take 1 tablet (2 mg total) by mouth every 8 (eight) hours as needed for muscle spasms May make you drowsy. Do not drive until you know how it affects you. Do not take any other muscle relaxers while taking this medication   traZODone (DESYREL) 100 mg tablet   Yes No   zinc gluconate 50 mg tablet  Self Yes No   Sig: Take 50 mg by mouth in the morning.      Facility-Administered Medications: None     No Known Allergies    Objective   First Vitals:   Blood Pressure: 124/58 (12/02/24 1653)  Pulse: 78 (12/02/24 1653)  Temperature: 97.5 °F (36.4 °C) (12/02/24 1653)  Temp Source: Temporal (12/02/24 1653)  Respirations: 20 (12/02/24 1653)  SpO2: 92 % (12/02/24 1653)    Current Vitals:   Blood Pressure: 102/54 (12/02/24 1732)  Pulse: 78 (12/02/24 1653)  Temperature: 97.5 °F (36.4 °C) (12/02/24 1653)  Temp Source: Temporal (12/02/24 1653)  Respirations: 20 (12/02/24 1653)  SpO2: (!) 80 % (12/02/24 1732)    No intake or output data in the 24 hours ending 12/02/24 1758    Invasive Devices       None                   Physical Exam  Vitals and nursing note reviewed.   Constitutional:       General: He is not in acute distress.     Appearance: Normal appearance. He is well-developed.   HENT:      Head: Normocephalic and atraumatic.      Right Ear: External ear normal.      Left Ear: External ear normal.      Nose: Nose normal.      Mouth/Throat:      Pharynx: No oropharyngeal exudate.   Eyes:      Conjunctiva/sclera:  Conjunctivae normal.      Pupils: Pupils are equal, round, and reactive to light.   Cardiovascular:      Rate and Rhythm: Normal rate and regular rhythm.   Pulmonary:      Effort: Pulmonary effort is normal. No respiratory distress.      Breath sounds: Rhonchi (x right lung) and rales (x right lung) present.   Abdominal:      General: Abdomen is flat. There is no distension.      Palpations: Abdomen is soft.   Musculoskeletal:         General: No deformity. Normal range of motion.      Cervical back: Normal range of motion and neck supple.   Skin:     General: Skin is warm and dry.      Capillary Refill: Capillary refill takes less than 2 seconds.   Neurological:      General: No focal deficit present.      Mental Status: He is alert and oriented to person, place, and time. Mental status is at baseline.      Coordination: Coordination normal.   Psychiatric:         Mood and Affect: Mood normal.         Behavior: Behavior normal.         Thought Content: Thought content normal.         Judgment: Judgment normal.           Medical Decision Makin.  Fevers, cough, chills: Patient has rales in his right lung and is hypoxic on pulse oximetry; plan infectious workup to evaluate for pneumonia including CBC to rule out leukocytosis and anemia; complete metabolic profile to rule out electrolyte disturbance, uremia, and disorders of glucose homeostasis; procalcitonin to rule out bacteremia; lactate to rule out bacteremia; blood cultures to rule out bacteremia; viral URI screening to rule out influenza and COVID; urinalysis to rule out UTI; chest x-ray to rule out pneumonia.    Recent Results (from the past 36 hours)   ECG 12 lead    Collection Time: 24  5:39 PM   Result Value Ref Range    Ventricular Rate 73 BPM    Atrial Rate 73 BPM    NE Interval 128 ms    QRSD Interval 90 ms    QT Interval 386 ms    QTC Interval 425 ms    P Axis 62 degrees    QRS Axis 44 degrees    T Wave Axis 60 degrees     XR chest pa and  "lateral    (Results Pending)         Portions of the record may have been created with voice recognition software. Occasional wrong word or \"sound a like\" substitutions may have occurred due to the inherent limitations of voice recognition software.  Read the chart carefully and recognize, using context, where substitutions have occurred.          Critical Care Time  CriticalCare Time    Date/Time: 12/2/2024 6:04 PM    Performed by: Helder Ng MD  Authorized by: Helder Ng MD    Critical care provider statement:     Critical care time (minutes):  32    Critical care time was exclusive of:  Separately billable procedures and treating other patients and teaching time    Critical care was necessary to treat or prevent imminent or life-threatening deterioration of the following conditions:  Respiratory failure (Hypoxic respiratory failure)    Critical care was time spent personally by me on the following activities:  Obtaining history from patient or surrogate, development of treatment plan with patient or surrogate, discussions with consultants, evaluation of patient's response to treatment, examination of patient, ordering and performing treatments and interventions, ordering and review of laboratory studies, ordering and review of radiographic studies, re-evaluation of patient's condition and review of old charts    I assumed direction of critical care for this patient from another provider in my specialty: no    Comments:      69-year-old male presents for evaluation of cough, congestion, fevers, feeling poorly, body aches, hypoxia and his primary care provider's office and toxic respiratory failure in the ED; room air pulse ox was 80%; infectious workup includes blood cultures, procalcitonin, and lactate; ceftriaxone IV administered; care discussed with admitting [facility] internal medicine team and [facility] critical care team at the patient bedside.        "

## 2024-12-02 NOTE — ED PROVIDER NOTES
Time reflects when diagnosis was documented in both MDM as applicable and the Disposition within this note       Time User Action Codes Description Comment    12/2/2024 10:46 PM Van Zant Jett A Add [J18.9] Pneumonia     12/2/2024 10:46 PM Van Zant Jett A Add [A41.9,  R65.21] Septic shock (HCC)     12/2/2024 10:47 PM Van Zant Jett A Remove [A41.9,  R65.21] Septic shock (HCC)     12/2/2024 10:47 PM Van Zant Jett A Add [A41.9] Sepsis (HCC)     12/2/2024 10:47 PM Van Zant Jett A Add [N17.9] LAKHWINDER (acute kidney injury) (HCC)     12/2/2024 11:20 PM Jovi Yañez Add [I50.32] Chronic diastolic congestive heart failure (HCC)     12/3/2024  6:09 AM Eddie Campbell Add [J96.01] Acute hypoxemic respiratory failure (HCC)     12/3/2024  6:09 AM Eddie Campbell Add [J96.92] Hypercapnic respiratory failure (HCC)           ED Disposition       ED Disposition   Admit    Condition   Stable    Date/Time   Tue Dec 3, 2024 12:07 AM    Comment   Case was discussed with PUMA and the patient's admission status was agreed to be Admission Status: inpatient status to the service of Dr. Campbell.               Assessment & Plan       Medical Decision Making  Amount and/or Complexity of Data Reviewed  Labs: ordered. Decision-making details documented in ED Course.  Radiology: ordered and independent interpretation performed. Decision-making details documented in ED Course.    Risk  Prescription drug management.  Decision regarding hospitalization.        ED Course as of 12/03/24 1015   Mon Dec 02, 2024   1715 Patient seen and evaluated by me  Ddx: Concern for sepsis 2/2 pneumonia. Otherwise source includes but is not limited to uti, bacteremia. No PE risk factors, signs, or symptoms. Concern for dehydration, electrolyte abnormalities.  Workup and plan: CBC, CMP, blood cultures, lactic acid, urinalysis, procalcitonin, chest x-ray, EKG, viral swab   1739 EKG done at 1739 interpreted by me   rate 73  rhythm normal sinus  axis normal  QRS complexes  are normal  Intervals are normal  ST segments showing no ischemic changes     1825 WBC(!): 15.73   1828 Hemoglobin(!): 10.6  Baseline anemia   1850 Procalcitonin(!): 13.02   1858 Comprehensive metabolic panel(!)  LAKHWINDER   1858 XR chest pa and lateral  Right lobar pneumonia   1858 LACTIC ACID: 0.8   1908 Patient signed out to Dr. German pending fluid completion and reevaluation of blood pressure.  Patient should be admitted to  IM pending he does not require vasopressor support       Medications   acetaminophen (TYLENOL) tablet 650 mg (has no administration in time range)   albuterol (PROVENTIL HFA,VENTOLIN HFA) inhaler 2 puff (has no administration in time range)   aspirin (ECOTRIN LOW STRENGTH) EC tablet 81 mg (has no administration in time range)   pantoprazole (PROTONIX) EC tablet 40 mg (has no administration in time range)   ceftriaxone (ROCEPHIN) 1 g/50 mL in dextrose IVPB (has no administration in time range)   multi-electrolyte (ISOLYTE-S PH 7.4) bolus 1,000 mL (0 mL Intravenous Stopped 12/2/24 2202)   ceftriaxone (ROCEPHIN) 2 g/50 mL in dextrose IVPB (0 mg Intravenous Stopped 12/2/24 2015)   azithromycin (ZITHROMAX) 500 mg in sodium chloride 0.9% 250mL IVPB 500 mg (0 mg Intravenous Stopped 12/2/24 2140)   multi-electrolyte (ISOLYTE-S PH 7.4) bolus 1,000 mL (0 mL Intravenous Stopped 12/2/24 2209)   albumin human (FLEXBUMIN) 5 % injection 25 g (0 g Intravenous Stopped 12/2/24 2248)   multi-electrolyte (ISOLYTE-S PH 7.4) bolus 1,000 mL (0 mL Intravenous Stopped 12/3/24 0020)       ED Risk Strat Scores                                               History of Present Illness       Chief Complaint   Patient presents with    Flu Symptoms     Pt has been experiencing cough, congestion, constipation, fevers, and weakness for the past few days.        Past Medical History:   Diagnosis Date    Acid reflux     Acute blood loss anemia 07/15/2016    Anxiety     Arthritis     Cellulitis     left ankle    CPAP (continuous  positive airway pressure) dependence     as needed per patient    Diabetes mellitus (HCC)     Hypertension     Idiopathic chronic venous hypertension of right lower extremity with ulcer (HCC) 2023    Sleep apnea     Twitching       Past Surgical History:   Procedure Laterality Date    APPENDECTOMY      CERVICAL FUSION Bilateral 2024    Procedure: FUSION CERVICAL ANTERIOR W DISCECTOMY, C3-7 ACDF;  Surgeon: Conrad Richardson MD;  Location: [facility];  Service: Orthopedics    CHOLECYSTECTOMY      COLONOSCOPY      IR PICC PLACEMENT DOUBLE LUMEN  2024    KNEE ARTHROSCOPY      OR ARTHRP KNE CONDYLE&PLATU MEDIAL&LAT COMPARTMENTS Left 2016    Procedure: TOTAL  KNEE REPLACEMENT ;  Surgeon: Zack Montenegro MD;  Location: [facility];  Service: Orthopedics      Family History   Problem Relation Age of Onset    Diabetes Mother     Diabetes Father     Cancer Father       Social History     Tobacco Use    Smoking status: Former     Current packs/day: 0.00     Types: Cigarettes     Quit date:      Years since quittin.9    Smokeless tobacco: Never   Vaping Use    Vaping status: Never Used   Substance Use Topics    Alcohol use: Not Currently    Drug use: Not Currently     Comment: tried multiple drugs in the past      E-Cigarette/Vaping    E-Cigarette Use Never User       E-Cigarette/Vaping Substances    Nicotine No     THC No     CBD No     Flavoring No     Other No     Unknown No       I have reviewed and agree with the history as documented.     Patient is a 69-year-old male, past medical history significant for hypertension and diabetes presenting today for evaluation of cold-like symptoms.  Patient states that over the past 3 days, he has been experiencing cough, congestion, fevers, poor appetite, and generalized weakness.  Patient states that he was likely around some sick contacts for the holidays, but nobody directly living with the patient has been sick.  He reports a fever to 101.0 on Saturday,  which resolved with Tylenol/Motrin.  Patient states that he has not had a fever since then.  However he has been taking Tylenol/Motrin as needed for muscle pains, which he most recently took today.  Patient presented to his PCP for evaluation today, was found to be hypotensive at 87/50, additionally noted to be 87% on room air so sent into the emergency department for further evaluation.  Patient has no history of lung disease, smoking, or chronic oxygen need.  No GI losses, no bloody stools, no focal symptoms. Specifically denies difficulty breathing or chest pain.          Review of Systems   Respiratory:  Negative for shortness of breath.            Objective       ED Triage Vitals   Temperature Pulse Blood Pressure Respirations SpO2 Patient Position - Orthostatic VS   12/02/24 1653 12/02/24 1653 12/02/24 1653 12/02/24 1653 12/02/24 1653 12/02/24 1900   97.5 °F (36.4 °C) 78 124/58 20 92 % Lying      Temp Source Heart Rate Source BP Location FiO2 (%) Pain Score    12/02/24 1653 12/02/24 1653 12/02/24 1653 -- 12/02/24 1653    Temporal Monitor Left arm  2      Vitals      Date and Time Temp Pulse SpO2 Resp BP Pain Score FACES Pain Rating User   12/03/24 0730 -- 67 98 % -- 112/59 -- -- CM   12/03/24 0240 -- -- -- 22 -- -- -- CS   12/03/24 0100 98.1 °F (36.7 °C) -- -- -- -- 3 -- MB   12/03/24 0050 98.1 °F (36.7 °C) 80 95 % 23 118/58 -- -- MB   12/03/24 0015 -- 77 94 % 22 102/57 -- -- CM   12/03/24 0000 -- 71 98 % 18 100/55 -- -- CM   12/02/24 2330 -- 70 95 % 22 90/52 -- -- CM   12/02/24 2315 -- 72 96 % 22 92/44 -- -- CM   12/02/24 2300 -- 70 93 % 22 90/48 -- -- CM   12/02/24 2245 -- 72 95 % 21 109/56 -- -- CM   12/02/24 2218 -- -- -- -- 92/55 -- -- CM   12/02/24 2215 -- 75 95 % 20 97/54 -- -- CM   12/02/24 2143 -- -- -- -- 102/46 -- -- TAV   12/02/24 2130 -- 78 93 % 22 89/45 -- -- CM   12/02/24 2116 -- 76 93 % 18 89/51 -- -- CM   12/02/24 2100 -- 80 93 % 20 76/36 Provider at bedside -- -- CM   12/02/24 2045 -- 79 94 %  20 103/43 -- -- CM   12/02/24 2030 -- 79 93 % 20 86/47 -- -- CM   12/02/24 2015 -- 77 92 % 22 96/53 -- -- CM   12/02/24 2000 -- 78 95 % 22 88/51 -- -- CM   12/02/24 1945 -- 73 96 % 20 90/49 -- -- CM   12/02/24 1915 -- 74 96 % 22 92/51 -- -- CM   12/02/24 1900 -- 68 96 % 22 93/54 -- -- CM   12/02/24 1823 99.5 °F (37.5 °C) -- -- -- -- -- -- AS   12/02/24 1819 -- 73 96 % -- 92/55 -- -- AS   12/02/24 1732 -- -- 80 % -- 102/54 -- -- AS   12/02/24 1728 -- -- 97 % -- -- -- -- RAQUEL   12/02/24 1724 -- -- 80 % -- -- -- -- RAQUEL   12/02/24 1653 97.5 °F (36.4 °C) 78 92 % 20 124/58 2 -- JS            Physical Exam  Vitals and nursing note reviewed.   Constitutional:       General: He is not in acute distress.     Appearance: Normal appearance. He is not ill-appearing or toxic-appearing.   HENT:      Head: Normocephalic and atraumatic.   Eyes:      General: No scleral icterus.     Extraocular Movements: Extraocular movements intact.   Cardiovascular:      Rate and Rhythm: Normal rate and regular rhythm.      Pulses: Normal pulses.      Heart sounds: Normal heart sounds. No murmur heard.  Pulmonary:      Effort: Pulmonary effort is normal. No respiratory distress.      Breath sounds: Examination of the right-middle field reveals rhonchi. Examination of the right-lower field reveals rhonchi. Rhonchi present. No wheezing.   Abdominal:      General: Abdomen is flat. There is no distension.      Palpations: Abdomen is soft.      Tenderness: There is no abdominal tenderness.   Musculoskeletal:         General: Normal range of motion.      Cervical back: Normal range of motion.      Right lower leg: No edema.      Left lower leg: No edema.   Skin:     General: Skin is warm.      Capillary Refill: Capillary refill takes less than 2 seconds.   Neurological:      General: No focal deficit present.      Mental Status: He is alert.      Cranial Nerves: No cranial nerve deficit.      Sensory: No sensory deficit.      Motor: No weakness.      Gait:  Gait normal.   Psychiatric:         Mood and Affect: Mood normal.         Behavior: Behavior normal.         Results Reviewed       Procedure Component Value Units Date/Time    Protime-INR [[id number]]  (Abnormal) Collected: 12/03/24 0814    Lab Status: Final result Specimen: Blood from Arm, Right Updated: 12/03/24 0924     Protime 16.3 seconds      INR 1.28    Narrative:      INR Therapeutic Range    Indication                                             INR Range      Atrial Fibrillation                                               2.0-3.0  Hypercoagulable State                                    2.0.2.3  Left Ventricular Asist Device                            2.0-3.0  Mechanical Heart Valve                                  -    Aortic(with afib, MI, embolism, HF, LA enlargement,    and/or coagulopathy)                                     2.0-3.0 (2.5-3.5)     Mitral                                                             2.5-3.5  Prosthetic/Bioprosthetic Heart Valve               2.0-3.0  Venous thromboembolism (VTE: VT, PE        2.0-3.0    APTT [[id number]]  (Abnormal) Collected: 12/03/24 0814    Lab Status: Final result Specimen: Blood from Arm, Right Updated: 12/03/24 0924     PTT 40 seconds     Comprehensive metabolic panel [[id number]]  (Abnormal) Collected: 12/03/24 0505    Lab Status: Final result Specimen: Blood from Arm, Right Updated: 12/03/24 0553     Sodium 140 mmol/L      Potassium 4.0 mmol/L      Chloride 98 mmol/L      CO2 37 mmol/L      ANION GAP 5 mmol/L      BUN 30 mg/dL      Creatinine 1.45 mg/dL      Glucose 115 mg/dL      Calcium 8.2 mg/dL      Corrected Calcium 8.8 mg/dL      AST 12 U/L      ALT 8 U/L      Alkaline Phosphatase 67 U/L      Total Protein 6.6 g/dL      Albumin 3.3 g/dL      Total Bilirubin 0.65 mg/dL      eGFR 48 ml/min/1.73sq m     Narrative:      National Kidney Disease Foundation guidelines for Chronic Kidney Disease (CKD):     Stage 1 with normal or high GFR (GFR > 90  mL/min/1.73 square meters)    Stage 2 Mild CKD (GFR = 60-89 mL/min/1.73 square meters)    Stage 3A Moderate CKD (GFR = 45-59 mL/min/1.73 square meters)    Stage 3B Moderate CKD (GFR = 30-44 mL/min/1.73 square meters)    Stage 4 Severe CKD (GFR = 15-29 mL/min/1.73 square meters)    Stage 5 End Stage CKD (GFR <15 mL/min/1.73 square meters)  Note: GFR calculation is accurate only with a steady state creatinine    Magnesium [[id number]]  (Normal) Collected: 12/03/24 0505    Lab Status: Final result Specimen: Blood from Arm, Right Updated: 12/03/24 0553     Magnesium 2.2 mg/dL     CBC and differential [[id number]]  (Abnormal) Collected: 12/03/24 0505    Lab Status: Final result Specimen: Blood from Arm, Right Updated: 12/03/24 0548     WBC 10.17 Thousand/uL      RBC 3.26 Million/uL      Hemoglobin 9.6 g/dL      Hematocrit 32.1 %      MCV 99 fL      MCH 29.4 pg      MCHC 29.9 g/dL      RDW 14.7 %      MPV 11.2 fL      Platelets 144 Thousands/uL      nRBC 0 /100 WBCs      Segmented % 84 %      Immature Grans % 1 %      Lymphocytes % 7 %      Monocytes % 6 %      Eosinophils Relative 2 %      Basophils Relative 0 %      Absolute Neutrophils 8.54 Thousands/µL      Absolute Immature Grans 0.09 Thousand/uL      Absolute Lymphocytes 0.74 Thousands/µL      Absolute Monocytes 0.58 Thousand/µL      Eosinophils Absolute 0.21 Thousand/µL      Basophils Absolute 0.01 Thousands/µL     Blood culture #1 [[id number]] Collected: 12/02/24 1822    Lab Status: Preliminary result Specimen: Blood from Arm, Right Updated: 12/02/24 2101     Blood Culture Received in Microbiology Lab. Culture in Progress.    Blood culture #2 [[id number]] Collected: 12/02/24 1807    Lab Status: Preliminary result Specimen: Blood from Arm, Right Updated: 12/02/24 2101     Blood Culture Received in Microbiology Lab. Culture in Progress.    Urine Microscopic [[id number]]  (Abnormal) Collected: 12/02/24 1937    Lab Status: Final result Specimen: Urine, Clean Catch Updated:  12/02/24 2000     RBC, UA None Seen /hpf      WBC, UA None Seen /hpf      Epithelial Cells Occasional /hpf      Bacteria, UA Occasional /hpf      MUCUS THREADS Occasional    UA w Reflex to Microscopic w Reflex to Culture [[id number]]  (Abnormal) Collected: 12/02/24 1937    Lab Status: Final result Specimen: Urine, Clean Catch Updated: 12/02/24 1944     Color, UA Light Yellow     Clarity, UA Clear     Specific Gravity, UA 1.014     pH, UA 5.5     Leukocytes, UA Negative     Nitrite, UA Negative     Protein, UA Trace mg/dl      Glucose, UA Negative mg/dl      Ketones, UA Negative mg/dl      Urobilinogen, UA 2.0 mg/dl      Bilirubin, UA Negative     Occult Blood, UA Negative    FLU/COVID Rapid Antigen (30 min. TAT) - Preferred screening test in ED [[id number]]  (Normal) Collected: 12/02/24 1747    Lab Status: Final result Specimen: Nares from Nose Updated: 12/02/24 1908     SARS COV Rapid Antigen Negative     Influenza A Rapid Antigen Negative     Influenza B Rapid Antigen Negative    Narrative:      This test has been performed using the [employer] [facility] 2 FLU+SARS Antigen test under the Emergency Use Authorization (EUA). This test has been validated by the  and verified by the performing laboratory. The [facility] uses lateral flow immunofluorescent sandwich assay to detect SARS-COV, Influenza A and Influenza B Antigen.     The [employer] [facility] 2 SARS Antigen test does not differentiate between SARS-CoV and SARS-CoV-2.     Negative results are presumptive and may be confirmed with a molecular assay, if necessary, for patient management. Negative results do not rule out SARS-CoV-2 or influenza infection and should not be used as the sole basis for treatment or patient management decisions. A negative test result may occur if the level of antigen in a sample is below the limit of detection of this test.     Positive results are indicative of the presence of viral antigens, but do not rule out bacterial infection or  co-infection with other viruses.     All test results should be used as an adjunct to clinical observations and other information available to the provider.    FOR PEDIATRIC PATIENTS - copy/paste COVID Guidelines URL to browser: [email or web address]    Procalcitonin [[id number]]  (Abnormal) Collected: 12/02/24 1807    Lab Status: Final result Specimen: Blood from Arm, Right Updated: 12/02/24 1849     Procalcitonin 13.02 ng/ml     Protime-INR [[id number]]  (Abnormal) Collected: 12/02/24 1807    Lab Status: Final result Specimen: Blood from Arm, Right Updated: 12/02/24 1843     Protime 17.0 seconds      INR 1.36    Narrative:      INR Therapeutic Range    Indication                                             INR Range      Atrial Fibrillation                                               2.0-3.0  Hypercoagulable State                                    2.0.2.3  Left Ventricular Asist Device                            2.0-3.0  Mechanical Heart Valve                                  -    Aortic(with afib, MI, embolism, HF, LA enlargement,    and/or coagulopathy)                                     2.0-3.0 (2.5-3.5)     Mitral                                                             2.5-3.5  Prosthetic/Bioprosthetic Heart Valve               2.0-3.0  Venous thromboembolism (VTE: VT, PE        2.0-3.0    APTT [[id number]]  (Abnormal) Collected: 12/02/24 1807    Lab Status: Final result Specimen: Blood from Arm, Right Updated: 12/02/24 1843     PTT 44 seconds     Comprehensive metabolic panel [[id number]]  (Abnormal) Collected: 12/02/24 1807    Lab Status: Final result Specimen: Blood from Arm, Right Updated: 12/02/24 1842     Sodium 138 mmol/L      Potassium 4.0 mmol/L      Chloride 97 mmol/L      CO2 36 mmol/L      ANION GAP 5 mmol/L      BUN 37 mg/dL      Creatinine 1.67 mg/dL      Glucose 118 mg/dL      Calcium 8.5 mg/dL      Corrected Calcium 9.1 mg/dL      AST 12  U/L      ALT 12 U/L      Alkaline Phosphatase 75 U/L      Total Protein 6.6 g/dL      Albumin 3.2 g/dL      Total Bilirubin 0.86 mg/dL      eGFR 41 ml/min/1.73sq m     Narrative:      National Kidney Disease Foundation guidelines for Chronic Kidney Disease (CKD):     Stage 1 with normal or high GFR (GFR > 90 mL/min/1.73 square meters)    Stage 2 Mild CKD (GFR = 60-89 mL/min/1.73 square meters)    Stage 3A Moderate CKD (GFR = 45-59 mL/min/1.73 square meters)    Stage 3B Moderate CKD (GFR = 30-44 mL/min/1.73 square meters)    Stage 4 Severe CKD (GFR = 15-29 mL/min/1.73 square meters)    Stage 5 End Stage CKD (GFR <15 mL/min/1.73 square meters)  Note: GFR calculation is accurate only with a steady state creatinine    Lactic acid [[id number]]  (Normal) Collected: 12/02/24 1807    Lab Status: Final result Specimen: Blood from Arm, Right Updated: 12/02/24 1840     LACTIC ACID 0.8 mmol/L     Narrative:      Result may be elevated if tourniquet was used during collection.    CBC and differential [[id number]]  (Abnormal) Collected: 12/02/24 1807    Lab Status: Final result Specimen: Blood from Arm, Right Updated: 12/02/24 1824     WBC 15.73 Thousand/uL      RBC 3.54 Million/uL      Hemoglobin 10.6 g/dL      Hematocrit 33.6 %      MCV 95 fL      MCH 29.9 pg      MCHC 31.5 g/dL      RDW 14.6 %      MPV 10.7 fL      Platelets 192 Thousands/uL      nRBC 0 /100 WBCs      Segmented % 87 %      Immature Grans % 2 %      Lymphocytes % 5 %      Monocytes % 5 %      Eosinophils Relative 1 %      Basophils Relative 0 %      Absolute Neutrophils 13.77 Thousands/µL      Absolute Immature Grans 0.28 Thousand/uL      Absolute Lymphocytes 0.76 Thousands/µL      Absolute Monocytes 0.82 Thousand/µL      Eosinophils Absolute 0.09 Thousand/µL      Basophils Absolute 0.01 Thousands/µL             XR chest pa and lateral   ED Interpretation by Helder Ng MD (12/02 1856)   Right-sided lobar pneumonia      Final Interpretation by Alfonso Whitlock,  MD (12/03 0101)      Hypoinflation limiting evaluation.   Small right pleural effusion and mild bibasilar atelectasis. Scattered hazy pulmonary infiltrates in the right upper lobe and right lower lobe are seen.         Workstation performed: [id number]             Complex Venous Access Line    Date/Time: 12/2/2024 6:00 PM    Performed by: Danica Cagle MD  Authorized by: Danica Cagle MD    Patient location:  ED  Other Assisting Provider: No    Consent:     Consent obtained:  Verbal    Consent given by:  Patient    Risks discussed:  Arterial puncture, incorrect placement, bleeding and infection    Alternatives discussed:  No treatment  Pre-procedure details:     Hand hygiene: Hand hygiene performed prior to insertion      Skin preparation:  Alcohol    Skin preparation agent: Skin preparation agent completely dried prior to procedure    Procedure details:     Complex Venous Access Line Type: US Guided Peripheral IV      Peripheral IV Indications comment:  Difficulty obtaining IV access    Orientation:  Right    Location:  Forearm    Catheter size:  18 gauge    Patient evaluated for contraindications to access (i.e. fistula, thrombosis, etc): Yes      Approach: percutaneous technique used      Ultrasound image availability:  Images available in PACS and video obtained    Sterile ultrasound techniques: Sterile gel and sterile probe covers were used      Number of attempts:  1    Successful placement: yes    Anesthesia (see MAR for exact dosages):     Anesthesia method:  None  Post-procedure details:     Post-procedure:  Dressing applied    Assessment:  Blood return through all ports and free fluid flow    Post-procedure complications: none      Patient tolerance of procedure:  Tolerated well, no immediate complications  Complex Venous Access Line    Date/Time: 12/2/2024 6:20 PM    Performed by: Danica Cagle MD  Authorized by: Danica Cagle MD    Patient location:  ED  Consent:     Consent obtained:  Verbal     Consent given by:  Patient  Pre-procedure details:     Hand hygiene: Hand hygiene performed prior to insertion      Skin preparation:  Alcohol    Skin preparation agent: Skin preparation agent completely dried prior to procedure    Procedure details:     Complex Venous Access Line Type: US Guided Peripheral IV      Peripheral IV Indications comment:  Difficulty obtaining IV access    Orientation:  Right    Location:  Antecubital    Catheter size:  18 gauge    Patient evaluated for contraindications to access (i.e. fistula, thrombosis, etc): Yes      Approach: percutaneous technique used      Ultrasound image availability:  Images available in PACS    Sterile ultrasound techniques: Sterile gel and sterile probe covers were used      Number of attempts:  1    Successful placement: yes    Anesthesia (see MAR for exact dosages):     Anesthesia method:  None  Post-procedure details:     Post-procedure:  Dressing applied    Assessment:  Blood return through all ports and free fluid flow    Post-procedure complications: none      Patient tolerance of procedure:  Tolerated well, no immediate complications      ED Medication and Procedure Management   Prior to Admission Medications   Prescriptions Last Dose Informant Patient Reported? Taking?   Blood Glucose Monitoring Suppl (ONE TOUCH ULTRA 2) w/Device KIT  Self Yes No   Sig: USE TO TEST BLOOD GLUCOSE   Lancets 30G MISC  Self Yes No   Sig: 3 (three) times a day Use to test blood glucose   Multiple Vitamins-Minerals (multivitamin with minerals) tablet   Yes No   Sig: Take 1 tablet by mouth daily   NARCAN 4 MG/0.1ML LIQD  Self Yes No   Sig: Has if needed   OneTouch Ultra test strip  Self Yes No   Sig: USE TO TEST BLOOD GLUCOSE TWICE DAILY   Trulicity 4.5 MG/0.5ML SOPN  Self Yes No   Sig: every 7 days On Mondays   acetaminophen (TYLENOL) 650 mg CR tablet   No No   Sig: Take 1 tablet (650 mg total) by mouth every 8 (eight) hours as needed for mild pain   albuterol (PROVENTIL  HFA,VENTOLIN HFA) 90 mcg/act inhaler  Self Yes No   Sig: Inhale 2 puffs every 6 (six) hours as needed for wheezing As needed   aspirin (ECOTRIN LOW STRENGTH) 81 mg EC tablet  Self Yes No   Sig: Take 81 mg by mouth daily   b complex vitamins capsule   Yes No   Sig: Take 1 capsule by mouth daily   bisacodyl (DULCOLAX) 5 mg EC tablet   No No   Sig: Take 1 tablet (5 mg total) by mouth once for 1 dose   budesonide (RINOCORT AQUA) 32 MCG/ACT nasal spray  Self Yes No   Si spray into each nostril 2 (two) times a day As needed   busPIRone (BUSPAR) 10 mg tablet   No No   Sig: TAKE 1 TABLET (10 MG TOTAL) BY MOUTH TWO (TWO) TIMES A DAY   busPIRone (BUSPAR) 7.5 mg tablet   Yes No   chlorhexidine (PERIDEX) 0.12 % solution  Self Yes No   Sig: RINSE MOUTH WITH 15ML (1 CAPFUL) FOR 30 SECONDS IN MORNING AND EVENING AFTER BRUSHING, THEN SPIT   cloNIDine (CATAPRES) 0.1 mg tablet  Self Yes No   Sig: Take 0.1 mg by mouth 2 (two) times a day   clonazePAM (KlonoPIN) 0.5 mg tablet   Yes No   clonazePAM (KlonoPIN) 1 mg tablet   No No   Sig: Take 0.5 tablets (0.5 mg total) by mouth 2 (two) times a day   clotrimazole (LOTRIMIN) 1 % cream   Yes No   docusate sodium (COLACE) 100 mg capsule  Self Yes No   Sig: Take 100 mg by mouth 2 (two) times a day   famotidine (PEPCID) 20 mg tablet   Yes No   fluticasone (FLOVENT HFA) 220 mcg/act inhaler  Self Yes No   Sig: Inhale 1 puff daily as needed As needed   furosemide (LASIX) 40 mg tablet  Self Yes No   Sig: Take 40 mg by mouth as needed in the morning and 40 mg as needed in the evening.   gabapentin (NEURONTIN) 100 mg capsule   Yes No   losartan (COZAAR) 25 mg tablet  Self Yes No   lovastatin (MEVACOR) 10 MG tablet   Yes No   Sig: Take 10 mg by mouth daily at bedtime   methylPREDNISolone 4 MG tablet therapy pack   No No   Sig: Use as directed on package. Do not take other anti-inflammatory (NSAID) medications while on Medrol dose chandu. You should not suddenly stop using these medications. Follow  the instructions listed on the prescription about tapering your dose.   mupirocin (BACTROBAN) 2 % ointment  Self Yes No   naloxone (NARCAN) 4 mg/0.1 mL nasal spray   No No   Sig: Administer 1 spray into a nostril. If no response after 2-3 minutes, give another dose in the other nostril using a new spray.   naproxen (NAPROSYN) 500 mg tablet  Self Yes No   Patient not taking: Reported on 11/14/2024   oxyCODONE (Roxicodone) 5 immediate release tablet   No No   Sig: Take 1 tablet (5 mg total) by mouth every 6 (six) hours as needed for moderate pain Max Daily Amount: 20 mg   Patient not taking: Reported on 11/14/2024   pantoprazole (PROTONIX) 40 mg tablet   Yes No   Sig: Take 40 mg by mouth daily   potassium chloride (K-DUR,KLOR-CON) 10 mEq tablet  Self Yes No   predniSONE 20 mg tablet   Yes No   Patient not taking: Reported on 11/14/2024   propranolol (INDERAL) 10 mg tablet   Yes No   tamsulosin (FLOMAX) 0.4 mg  Self No No   Sig: TAKE 1 CAPSULE BY MOUTH EVERY DAY WITH DINNER   testosterone (ANDROGEL) 1.62 % TD gel pump  Self Yes No   tiZANidine (ZANAFLEX) 2 mg tablet   No No   Sig: Take 1 tablet (2 mg total) by mouth every 8 (eight) hours as needed for muscle spasms May make you drowsy. Do not drive until you know how it affects you. Do not take any other muscle relaxers while taking this medication   traZODone (DESYREL) 100 mg tablet   Yes No   zinc gluconate 50 mg tablet  Self Yes No   Sig: Take 50 mg by mouth in the morning.      Facility-Administered Medications: None     Current Discharge Medication List        CONTINUE these medications which have NOT CHANGED    Details   acetaminophen (TYLENOL) 650 mg CR tablet Take 1 tablet (650 mg total) by mouth every 8 (eight) hours as needed for mild pain  Qty: 30 tablet, Refills: 0    Associated Diagnoses: S/P cervical spinal fusion; Cervical spondylosis      albuterol (PROVENTIL HFA,VENTOLIN HFA) 90 mcg/act inhaler Inhale 2 puffs every 6 (six) hours as needed for wheezing  As needed      aspirin (ECOTRIN LOW STRENGTH) 81 mg EC tablet Take 81 mg by mouth daily      b complex vitamins capsule Take 1 capsule by mouth daily      bisacodyl (DULCOLAX) 5 mg EC tablet Take 1 tablet (5 mg total) by mouth once for 1 dose  Qty: 2 tablet, Refills: 0    Associated Diagnoses: Other iron deficiency anemia      Blood Glucose Monitoring Suppl (ONE TOUCH ULTRA 2) w/Device KIT USE TO TEST BLOOD GLUCOSE      budesonide (RINOCORT AQUA) 32 MCG/ACT nasal spray 1 spray into each nostril 2 (two) times a day As needed      !! busPIRone (BUSPAR) 10 mg tablet TAKE 1 TABLET (10 MG TOTAL) BY MOUTH TWO (TWO) TIMES A DAY  Qty: 180 tablet, Refills: 1    Associated Diagnoses: Anxiety      !! busPIRone (BUSPAR) 7.5 mg tablet       chlorhexidine (PERIDEX) 0.12 % solution RINSE MOUTH WITH 15ML (1 CAPFUL) FOR 30 SECONDS IN MORNING AND EVENING AFTER BRUSHING, THEN SPIT      !! clonazePAM (KlonoPIN) 0.5 mg tablet       !! clonazePAM (KlonoPIN) 1 mg tablet Take 0.5 tablets (0.5 mg total) by mouth 2 (two) times a day  Qty: 30 tablet, Refills: 1    Associated Diagnoses: Anxiety      cloNIDine (CATAPRES) 0.1 mg tablet Take 0.1 mg by mouth 2 (two) times a day      clotrimazole (LOTRIMIN) 1 % cream       docusate sodium (COLACE) 100 mg capsule Take 100 mg by mouth 2 (two) times a day      famotidine (PEPCID) 20 mg tablet       fluticasone (FLOVENT HFA) 220 mcg/act inhaler Inhale 1 puff daily as needed As needed      furosemide (LASIX) 40 mg tablet Take 40 mg by mouth as needed in the morning and 40 mg as needed in the evening.      gabapentin (NEURONTIN) 100 mg capsule       Lancets 30G MISC 3 (three) times a day Use to test blood glucose      losartan (COZAAR) 25 mg tablet       lovastatin (MEVACOR) 10 MG tablet Take 10 mg by mouth daily at bedtime      methylPREDNISolone 4 MG tablet therapy pack Use as directed on package. Do not take other anti-inflammatory (NSAID) medications while on Medrol dose chandu. You should not suddenly  stop using these medications. Follow the instructions listed on the prescription about tapering your dose.  Qty: 21 tablet, Refills: 0    Associated Diagnoses: S/P cervical spinal fusion      Multiple Vitamins-Minerals (multivitamin with minerals) tablet Take 1 tablet by mouth daily      mupirocin (BACTROBAN) 2 % ointment       !! naloxone (NARCAN) 4 mg/0.1 mL nasal spray Administer 1 spray into a nostril. If no response after 2-3 minutes, give another dose in the other nostril using a new spray.  Qty: 1 each, Refills: 1    Associated Diagnoses: S/P cervical spinal fusion; Cervical spondylosis      naproxen (NAPROSYN) 500 mg tablet       !! NARCAN 4 MG/0.1ML LIQD Has if needed      OneTouch Ultra test strip USE TO TEST BLOOD GLUCOSE TWICE DAILY      oxyCODONE (Roxicodone) 5 immediate release tablet Take 1 tablet (5 mg total) by mouth every 6 (six) hours as needed for moderate pain Max Daily Amount: 20 mg  Qty: 30 tablet, Refills: 0    Associated Diagnoses: S/P cervical spinal fusion      pantoprazole (PROTONIX) 40 mg tablet Take 40 mg by mouth daily      potassium chloride (K-DUR,KLOR-CON) 10 mEq tablet       predniSONE 20 mg tablet       propranolol (INDERAL) 10 mg tablet       tamsulosin (FLOMAX) 0.4 mg TAKE 1 CAPSULE BY MOUTH EVERY DAY WITH DINNER  Qty: 90 capsule, Refills: 2    Associated Diagnoses: Benign prostatic hyperplasia with lower urinary tract symptoms, symptom details unspecified      testosterone (ANDROGEL) 1.62 % TD gel pump       tiZANidine (ZANAFLEX) 2 mg tablet Take 1 tablet (2 mg total) by mouth every 8 (eight) hours as needed for muscle spasms May make you drowsy. Do not drive until you know how it affects you. Do not take any other muscle relaxers while taking this medication  Qty: 30 tablet, Refills: 0    Associated Diagnoses: S/P cervical spinal fusion      traZODone (DESYREL) 100 mg tablet       Trulicity 4.5 MG/0.5ML SOPN every 7 days On Mondays      zinc gluconate 50 mg tablet Take 50 mg  by mouth in the morning.       !! - Potential duplicate medications found. Please discuss with provider.        No discharge procedures on file.  ED SEPSIS DOCUMENTATION   Time reflects when diagnosis was documented in both MDM as applicable and the Disposition within this note       Time User Action Codes Description Comment    12/2/2024 10:46 PM Jett German Add [J18.9] Pneumonia     12/2/2024 10:46 PM Jett German Add [A41.9,  R65.21] Septic shock (HCC)     12/2/2024 10:47 PM Jett German Remove [A41.9,  R65.21] Septic shock (HCC)     12/2/2024 10:47 PM Jett German Add [A41.9] Sepsis (HCC)     12/2/2024 10:47 PM Jett German Add [N17.9] LAKHWINDER (acute kidney injury) ([facility])     12/2/2024 11:20 PM Jovi Yañez Add [I50.32] Chronic diastolic congestive heart failure (HCC)     12/3/2024  6:09 AM Eddie Campbell Add [J96.01] Acute hypoxemic respiratory failure ([facility])     12/3/2024  6:09 AM Eddie Campbell Add [J96.92] Hypercapnic respiratory failure ([facility])                  Danica Cagle MD  12/03/24 1016

## 2024-12-03 PROBLEM — J96.01 ACUTE HYPOXEMIC RESPIRATORY FAILURE (HCC): Status: ACTIVE | Noted: 2024-12-03

## 2024-12-03 PROBLEM — J18.9 PNEUMONIA INVOLVING RIGHT LUNG: Status: ACTIVE | Noted: 2024-12-03

## 2024-12-03 LAB
ALBUMIN SERPL BCG-MCNC: 3.3 G/DL (ref 3.5–5)
ALP SERPL-CCNC: 67 U/L (ref 34–104)
ALT SERPL W P-5'-P-CCNC: 8 U/L (ref 7–52)
ANION GAP SERPL CALCULATED.3IONS-SCNC: 5 MMOL/L (ref 4–13)
APTT PPP: 40 SECONDS (ref 23–34)
ARTERIAL PATENCY WRIST A: YES
ARTERIAL PATENCY WRIST A: YES
AST SERPL W P-5'-P-CCNC: 12 U/L (ref 13–39)
BASE EX.OXY STD BLDV CALC-SCNC: 64.5 % (ref 60–80)
BASE EX.OXY STD BLDV CALC-SCNC: 81.5 % (ref 60–80)
BASE EXCESS BLDA CALC-SCNC: 6.4 MMOL/L
BASE EXCESS BLDV CALC-SCNC: 5.7 MMOL/L
BASE EXCESS BLDV CALC-SCNC: 8 MMOL/L
BASOPHILS # BLD AUTO: 0.01 THOUSANDS/ÂΜL (ref 0–0.1)
BASOPHILS NFR BLD AUTO: 0 % (ref 0–1)
BILIRUB SERPL-MCNC: 0.65 MG/DL (ref 0.2–1)
BNP SERPL-MCNC: 113 PG/ML (ref 0–100)
BODY TEMPERATURE: 97.9 DEGREES FEHRENHEIT
BUN SERPL-MCNC: 30 MG/DL (ref 5–25)
CALCIUM ALBUM COR SERPL-MCNC: 8.8 MG/DL (ref 8.3–10.1)
CALCIUM SERPL-MCNC: 8.2 MG/DL (ref 8.4–10.2)
CHLORIDE SERPL-SCNC: 98 MMOL/L (ref 96–108)
CO2 SERPL-SCNC: 37 MMOL/L (ref 21–32)
CREAT SERPL-MCNC: 1.45 MG/DL (ref 0.6–1.3)
EOSINOPHIL # BLD AUTO: 0.21 THOUSAND/ÂΜL (ref 0–0.61)
EOSINOPHIL NFR BLD AUTO: 2 % (ref 0–6)
ERYTHROCYTE [DISTWIDTH] IN BLOOD BY AUTOMATED COUNT: 14.7 % (ref 11.6–15.1)
EST. AVERAGE GLUCOSE BLD GHB EST-MCNC: 128 MG/DL
GFR SERPL CREATININE-BSD FRML MDRD: 48 ML/MIN/1.73SQ M
GLUCOSE SERPL-MCNC: 101 MG/DL (ref 65–140)
GLUCOSE SERPL-MCNC: 115 MG/DL (ref 65–140)
GLUCOSE SERPL-MCNC: 124 MG/DL (ref 65–140)
GLUCOSE SERPL-MCNC: 128 MG/DL (ref 65–140)
GLUCOSE SERPL-MCNC: 147 MG/DL (ref 65–140)
GLUCOSE SERPL-MCNC: 162 MG/DL (ref 65–140)
GLUCOSE SERPL-MCNC: 85 MG/DL (ref 65–140)
HBA1C MFR BLD: 6.1 %
HCO3 BLDA-SCNC: 34.4 MMOL/L (ref 22–28)
HCO3 BLDV-SCNC: 34.7 MMOL/L (ref 24–30)
HCO3 BLDV-SCNC: 37.3 MMOL/L (ref 24–30)
HCT VFR BLD AUTO: 32.1 % (ref 36.5–49.3)
HGB BLD-MCNC: 9.6 G/DL (ref 12–17)
IMM GRANULOCYTES # BLD AUTO: 0.09 THOUSAND/UL (ref 0–0.2)
IMM GRANULOCYTES NFR BLD AUTO: 1 % (ref 0–2)
INR PPP: 1.28 (ref 0.85–1.19)
IPAP: 14
IPAP: 14
L PNEUMO1 AG UR QL IA.RAPID: NEGATIVE
LYMPHOCYTES # BLD AUTO: 0.74 THOUSANDS/ÂΜL (ref 0.6–4.47)
LYMPHOCYTES NFR BLD AUTO: 7 % (ref 14–44)
MAGNESIUM SERPL-MCNC: 2.2 MG/DL (ref 1.9–2.7)
MCH RBC QN AUTO: 29.4 PG (ref 26.8–34.3)
MCHC RBC AUTO-ENTMCNC: 29.9 G/DL (ref 31.4–37.4)
MCV RBC AUTO: 99 FL (ref 82–98)
MONOCYTES # BLD AUTO: 0.58 THOUSAND/ÂΜL (ref 0.17–1.22)
MONOCYTES NFR BLD AUTO: 6 % (ref 4–12)
NEUTROPHILS # BLD AUTO: 8.54 THOUSANDS/ÂΜL (ref 1.85–7.62)
NEUTS SEG NFR BLD AUTO: 84 % (ref 43–75)
NON VENT- BIPAP: ABNORMAL
NON VENT- BIPAP: ABNORMAL
NRBC BLD AUTO-RTO: 0 /100 WBCS
O2 CT BLDA-SCNC: 15.3 ML/DL (ref 16–23)
O2 CT BLDV-SCNC: 12.4 ML/DL
O2 CT BLDV-SCNC: 9.4 ML/DL
OXYHGB MFR BLDA: 96.9 % (ref 94–97)
PCO2 BLD: 81.9 MM HG (ref 42–50)
PCO2 BLDA: 69.1 MM HG (ref 36–44)
PCO2 BLDV: 79.7 MM HG (ref 42–50)
PCO2 BLDV: 83.3 MM HG (ref 42–50)
PEEP MAX SETTING VENT: 6 CM[H2O]
PEEP MAX SETTING VENT: 6 CM[H2O]
PH BLD: 7.28 [PH] (ref 7.3–7.4)
PH BLDA: 7.32 [PH] (ref 7.35–7.45)
PH BLDV: 7.26 [PH] (ref 7.3–7.4)
PH BLDV: 7.27 [PH] (ref 7.3–7.4)
PLATELET # BLD AUTO: 144 THOUSANDS/UL (ref 149–390)
PMV BLD AUTO: 11.2 FL (ref 8.9–12.7)
PO2 BLDA: 108.4 MM HG (ref 75–129)
PO2 BLDV: 36.5 MM HG (ref 35–45)
PO2 BLDV: 50.9 MM HG (ref 35–45)
PO2 VENOUS TEMP CORRECTED: 49.5 MM HG (ref 35–45)
POTASSIUM SERPL-SCNC: 4 MMOL/L (ref 3.5–5.3)
PROCALCITONIN SERPL-MCNC: 8.34 NG/ML
PROT SERPL-MCNC: 6.6 G/DL (ref 6.4–8.4)
PROTHROMBIN TIME: 16.3 SECONDS (ref 12.3–15)
RBC # BLD AUTO: 3.26 MILLION/UL (ref 3.88–5.62)
S PNEUM AG UR QL: NEGATIVE
SODIUM SERPL-SCNC: 140 MMOL/L (ref 135–147)
SPECIMEN SOURCE: ABNORMAL
VENT BIPAP FIO2: ABNORMAL %
WBC # BLD AUTO: 10.17 THOUSAND/UL (ref 4.31–10.16)

## 2024-12-03 PROCEDURE — 83036 HEMOGLOBIN GLYCOSYLATED A1C: CPT | Performed by: INTERNAL MEDICINE

## 2024-12-03 PROCEDURE — 80053 COMPREHEN METABOLIC PANEL: CPT | Performed by: INTERNAL MEDICINE

## 2024-12-03 PROCEDURE — NC001 PR NO CHARGE: Performed by: INTERNAL MEDICINE

## 2024-12-03 PROCEDURE — 82948 REAGENT STRIP/BLOOD GLUCOSE: CPT

## 2024-12-03 PROCEDURE — 94660 CPAP INITIATION&MGMT: CPT

## 2024-12-03 PROCEDURE — 83735 ASSAY OF MAGNESIUM: CPT | Performed by: INTERNAL MEDICINE

## 2024-12-03 PROCEDURE — 36600 WITHDRAWAL OF ARTERIAL BLOOD: CPT

## 2024-12-03 PROCEDURE — 97167 OT EVAL HIGH COMPLEX 60 MIN: CPT

## 2024-12-03 PROCEDURE — 87449 NOS EACH ORGANISM AG IA: CPT | Performed by: STUDENT IN AN ORGANIZED HEALTH CARE EDUCATION/TRAINING PROGRAM

## 2024-12-03 PROCEDURE — 85610 PROTHROMBIN TIME: CPT | Performed by: INTERNAL MEDICINE

## 2024-12-03 PROCEDURE — 99223 1ST HOSP IP/OBS HIGH 75: CPT | Performed by: INTERNAL MEDICINE

## 2024-12-03 PROCEDURE — 97163 PT EVAL HIGH COMPLEX 45 MIN: CPT

## 2024-12-03 PROCEDURE — 84145 PROCALCITONIN (PCT): CPT | Performed by: STUDENT IN AN ORGANIZED HEALTH CARE EDUCATION/TRAINING PROGRAM

## 2024-12-03 PROCEDURE — 94760 N-INVAS EAR/PLS OXIMETRY 1: CPT

## 2024-12-03 PROCEDURE — 85730 THROMBOPLASTIN TIME PARTIAL: CPT | Performed by: INTERNAL MEDICINE

## 2024-12-03 PROCEDURE — 87081 CULTURE SCREEN ONLY: CPT | Performed by: STUDENT IN AN ORGANIZED HEALTH CARE EDUCATION/TRAINING PROGRAM

## 2024-12-03 PROCEDURE — 87070 CULTURE OTHR SPECIMN AEROBIC: CPT | Performed by: STUDENT IN AN ORGANIZED HEALTH CARE EDUCATION/TRAINING PROGRAM

## 2024-12-03 PROCEDURE — 82805 BLOOD GASES W/O2 SATURATION: CPT | Performed by: INTERNAL MEDICINE

## 2024-12-03 PROCEDURE — 87205 SMEAR GRAM STAIN: CPT | Performed by: STUDENT IN AN ORGANIZED HEALTH CARE EDUCATION/TRAINING PROGRAM

## 2024-12-03 PROCEDURE — 82805 BLOOD GASES W/O2 SATURATION: CPT

## 2024-12-03 PROCEDURE — 83880 ASSAY OF NATRIURETIC PEPTIDE: CPT | Performed by: STUDENT IN AN ORGANIZED HEALTH CARE EDUCATION/TRAINING PROGRAM

## 2024-12-03 PROCEDURE — 85025 COMPLETE CBC W/AUTO DIFF WBC: CPT | Performed by: INTERNAL MEDICINE

## 2024-12-03 RX ORDER — POLYETHYLENE GLYCOL 3350 17 G/17G
17 POWDER, FOR SOLUTION ORAL DAILY
Status: DISCONTINUED | OUTPATIENT
Start: 2024-12-03 | End: 2024-12-05 | Stop reason: HOSPADM

## 2024-12-03 RX ORDER — ASPIRIN 81 MG/1
81 TABLET ORAL DAILY
Status: DISCONTINUED | OUTPATIENT
Start: 2024-12-03 | End: 2024-12-05 | Stop reason: HOSPADM

## 2024-12-03 RX ORDER — FLUTICASONE PROPIONATE 50 MCG
1 SPRAY, SUSPENSION (ML) NASAL 2 TIMES DAILY
Status: DISCONTINUED | OUTPATIENT
Start: 2024-12-03 | End: 2024-12-05 | Stop reason: HOSPADM

## 2024-12-03 RX ORDER — CLONAZEPAM 0.5 MG/1
0.5 TABLET ORAL DAILY PRN
Status: DISCONTINUED | OUTPATIENT
Start: 2024-12-03 | End: 2024-12-03

## 2024-12-03 RX ORDER — INSULIN LISPRO 100 [IU]/ML
1-5 INJECTION, SOLUTION INTRAVENOUS; SUBCUTANEOUS
Status: DISCONTINUED | OUTPATIENT
Start: 2024-12-03 | End: 2024-12-05 | Stop reason: HOSPADM

## 2024-12-03 RX ORDER — AZITHROMYCIN 250 MG/1
500 TABLET, FILM COATED ORAL EVERY 24 HOURS
Status: DISCONTINUED | OUTPATIENT
Start: 2024-12-03 | End: 2024-12-05 | Stop reason: HOSPADM

## 2024-12-03 RX ORDER — ALBUTEROL SULFATE 90 UG/1
2 INHALANT RESPIRATORY (INHALATION) EVERY 6 HOURS PRN
Status: DISCONTINUED | OUTPATIENT
Start: 2024-12-03 | End: 2024-12-05 | Stop reason: HOSPADM

## 2024-12-03 RX ORDER — PRAVASTATIN SODIUM 10 MG
10 TABLET ORAL
Status: DISCONTINUED | OUTPATIENT
Start: 2024-12-03 | End: 2024-12-05 | Stop reason: HOSPADM

## 2024-12-03 RX ORDER — GABAPENTIN 100 MG/1
100 CAPSULE ORAL 3 TIMES DAILY
Status: DISCONTINUED | OUTPATIENT
Start: 2024-12-03 | End: 2024-12-05 | Stop reason: HOSPADM

## 2024-12-03 RX ORDER — PANTOPRAZOLE SODIUM 40 MG/1
40 TABLET, DELAYED RELEASE ORAL
Status: DISCONTINUED | OUTPATIENT
Start: 2024-12-03 | End: 2024-12-05 | Stop reason: HOSPADM

## 2024-12-03 RX ORDER — ACETAMINOPHEN 325 MG/1
650 TABLET ORAL EVERY 6 HOURS PRN
Status: DISCONTINUED | OUTPATIENT
Start: 2024-12-03 | End: 2024-12-05 | Stop reason: HOSPADM

## 2024-12-03 RX ORDER — BUSPIRONE HYDROCHLORIDE 10 MG/1
10 TABLET ORAL 2 TIMES DAILY
Status: DISCONTINUED | OUTPATIENT
Start: 2024-12-03 | End: 2024-12-03

## 2024-12-03 RX ADMIN — FLUTICASONE PROPIONATE 1 SPRAY: 50 SPRAY, METERED NASAL at 11:00

## 2024-12-03 RX ADMIN — GABAPENTIN 100 MG: 100 CAPSULE ORAL at 21:15

## 2024-12-03 RX ADMIN — FLUTICASONE PROPIONATE 1 SPRAY: 50 SPRAY, METERED NASAL at 17:01

## 2024-12-03 RX ADMIN — INSULIN LISPRO 1 UNITS: 100 INJECTION, SOLUTION INTRAVENOUS; SUBCUTANEOUS at 17:11

## 2024-12-03 RX ADMIN — ASPIRIN 81 MG: 81 TABLET, COATED ORAL at 09:05

## 2024-12-03 RX ADMIN — CEFTRIAXONE SODIUM 1000 MG: 10 INJECTION, POWDER, FOR SOLUTION INTRAVENOUS at 17:00

## 2024-12-03 RX ADMIN — GABAPENTIN 100 MG: 100 CAPSULE ORAL at 09:05

## 2024-12-03 RX ADMIN — PRAVASTATIN SODIUM 10 MG: 10 TABLET ORAL at 16:56

## 2024-12-03 RX ADMIN — PANTOPRAZOLE SODIUM 40 MG: 40 TABLET, DELAYED RELEASE ORAL at 05:04

## 2024-12-03 RX ADMIN — GABAPENTIN 100 MG: 100 CAPSULE ORAL at 16:56

## 2024-12-03 RX ADMIN — POLYETHYLENE GLYCOL 3350 17 G: 17 POWDER, FOR SOLUTION ORAL at 11:00

## 2024-12-03 RX ADMIN — AZITHROMYCIN DIHYDRATE 500 MG: 250 TABLET ORAL at 21:15

## 2024-12-03 NOTE — PLAN OF CARE
Problem: PHYSICAL THERAPY ADULT  Goal: Performs mobility at highest level of function for planned discharge setting.  See evaluation for individualized goals.  Description: Treatment/Interventions: Functional transfer training, ADL retraining, LE strengthening/ROM, Elevations, Therapeutic exercise, Endurance training, Bed mobility, Gait training, Spoke to nursing, OT          See flowsheet documentation for full assessment, interventions and recommendations.  Note: Prognosis: Good  Problem List: Decreased strength, Decreased range of motion, Decreased endurance, Impaired balance, Decreased mobility  Assessment: PT orders received and acknowledged. Patient was seen today for high complexity PT evaluation. High complexity evaluation due to Ongoing medical management for primary dx, Decreased activity tolerance compared to baseline, Fall risk, Ongoing telemetry monitoring, Continuous pulse oximetry monitoring  Patient is a 69 y.o. male  who was admitted to [facility] on 12/2/2024  with Acute hypoxemic respiratory failure (HCC) . Pt presents to [facility] with generalized malaise . At baseline, pt resides with spouse in house and was independent prior to hospital admission. Currently, upon initial examination, pt  is requiring supervision   for bed mobility skills;  supervision   for functional transfers and  min assist x1 for ambulation with SPC.  Patient currently presents below baseline with limitations in gait, balance, and transfers. Patient will benefit from continued PT services while in hospital in order to address remaining limitations. The patients AM-PAC Basic Mobility Inpatient Short From Raw Score is 21 . Based on AM-PAC scoring and patient presentation, PT currently recommending Level III (Minimum Resource Intensity). Please also refer to the recommendation of the Physical Therapist for safe discharge planning.  Barriers to Discharge: None     Rehab Resource Intensity Level, PT: III (Minimum Resource  Intensity)    See flowsheet documentation for full assessment.

## 2024-12-03 NOTE — PLAN OF CARE
Problem: Prexisting or High Potential for Compromised Skin Integrity  Goal: Skin integrity is maintained or improved  Description: INTERVENTIONS:  - Identify patients at risk for skin breakdown  - Assess and monitor skin integrity  - Assess and monitor nutrition and hydration status  - Monitor labs   - Assess for incontinence   - Turn and reposition patient  - Assist with mobility/ambulation  - Relieve pressure over bony prominences  - Avoid friction and shearing  - Provide appropriate hygiene as needed including keeping skin clean and dry  - Evaluate need for skin moisturizer/barrier cream  - Collaborate with interdisciplinary team   - Patient/family teaching  - Consider wound care consult   Outcome: Progressing     Problem: PAIN - ADULT  Goal: Verbalizes/displays adequate comfort level or baseline comfort level  Description: Interventions:  - Encourage patient to monitor pain and request assistance  - Assess pain using appropriate pain scale  - Administer analgesics based on type and severity of pain and evaluate response  - Implement non-pharmacological measures as appropriate and evaluate response  - Consider cultural and social influences on pain and pain management  - Notify physician/advanced practitioner if interventions unsuccessful or patient reports new pain  Outcome: Progressing     Problem: INFECTION - ADULT  Goal: Absence or prevention of progression during hospitalization  Description: INTERVENTIONS:  - Assess and monitor for signs and symptoms of infection  - Monitor lab/diagnostic results  - Monitor all insertion sites, i.e. indwelling lines, tubes, and drains  - Monitor endotracheal if appropriate and nasal secretions for changes in amount and color  - [place] appropriate cooling/warming therapies per order  - Administer medications as ordered  - Instruct and encourage patient and family to use good hand hygiene technique  - Identify and instruct in appropriate isolation precautions for  identified infection/condition  Outcome: Progressing  Goal: Absence of fever/infection during neutropenic period  Description: INTERVENTIONS:  - Monitor WBC    Outcome: Progressing     Problem: SAFETY ADULT  Goal: Patient will remain free of falls  Description: INTERVENTIONS:  - Educate patient/family on patient safety including physical limitations  - Instruct patient to call for assistance with activity   - Consult OT/PT to assist with strengthening/mobility   - Keep Call bell within reach  - Keep bed low and locked with side rails adjusted as appropriate  - Keep care items and personal belongings within reach  - Initiate and maintain comfort rounds  - Make Fall Risk Sign visible to staff  - Apply yellow socks and bracelet for high fall risk patients  - Consider moving patient to room near nurses station  Outcome: Progressing  Goal: Maintain or return to baseline ADL function  Description: INTERVENTIONS:  -  Assess patient's ability to carry out ADLs; assess patient's baseline for ADL function and identify physical deficits which impact ability to perform ADLs (bathing, care of mouth/teeth, toileting, grooming, dressing, etc.)  - Assess/evaluate cause of self-care deficits   - Assess range of motion  - Assess patient's mobility; develop plan if impaired  - Assess patient's need for assistive devices and provide as appropriate  - Encourage maximum independence but intervene and supervise when necessary  - Involve family in performance of ADLs  - Assess for home care needs following discharge   - Consider OT consult to assist with ADL evaluation and planning for discharge  - Provide patient education as appropriate  Outcome: Progressing  Goal: Maintains/Returns to pre admission functional level  Description: INTERVENTIONS:  - Perform AM-PAC 6 Click Basic Mobility/ Daily Activity assessment daily.  - Set and communicate daily mobility goal to care team and patient/family/caregiver.   - Collaborate with rehabilitation  services on mobility goals if consulted  - Out of bed for toileting  - Record patient progress and toleration of activity level   Outcome: Progressing     Problem: DISCHARGE PLANNING  Goal: Discharge to home or other facility with appropriate resources  Description: INTERVENTIONS:  - Identify barriers to discharge w/patient and caregiver  - Arrange for needed discharge resources and transportation as appropriate  - Identify discharge learning needs (meds, wound care, etc.)  - Arrange for interpretive services to assist at discharge as needed  - Refer to Case Management Department for coordinating discharge planning if the patient needs post-hospital services based on physician/advanced practitioner order or complex needs related to functional status, cognitive ability, or social support system  Outcome: Progressing

## 2024-12-03 NOTE — CASE MANAGEMENT
Case Management Assessment & Discharge Planning Note    Patient name Edgar Obando  Location [facility] MRN [id number]  : 1955 Date 12/3/2024       Current Admission Date: 2024  Current Admission Diagnosis:Acute hypoxemic respiratory failure (HCC)   Patient Active Problem List    Diagnosis Date Noted Date Diagnosed    Acute hypoxemic respiratory failure (HCC) 2024     Protein-calorie malnutrition, unspecified severity ([facility]) 2024     Current mild episode of major depressive disorder, unspecified whether recurrent (HCC) 2024     Thrombocytopenia ([facility]) 2024     S/P cervical spinal fusion 2024     Abnormal chest x-ray 2024     Stage 3a chronic kidney disease (HCC) 2023     Hepatitis C virus carrier state ([facility]) 2023     Cervical spondylosis 2023     Chronic diastolic congestive heart failure ([facility]) 2023     Respiratory failure with hypoxia and hypercapnia, unspecified chronicity ([facility]) 2023     Obstructive sleep apnea syndrome 2023     Iron deficiency anemia, unspecified 2022     Chronic pain syndrome 2022     Lumbar spondylosis 2022     Neck pain 2022     Right shoulder pain 2022     Chronic low back pain with bilateral sciatica 2022     Diabetic neuropathy ([facility]) 2020     RBD (REM behavioral disorder) 2020     Primary osteoarthritis of right knee 2019     Chronic pain of right knee 2019     Chronic venous insufficiency 2019     Bilateral lower extremity edema 2019     BMI 40.0-44.9, adult ([facility]) 2019     Myoclonus      DDD (degenerative disc disease), lumbar 2018     Spinal stenosis of lumbar region 2018     Low back pain with sciatica 2018     S/P total knee replacement 07/15/2016     Type 2 diabetes mellitus, without long-term current use of insulin (HCC) 07/15/2016     HTN (hypertension) 07/15/2016     GERD (gastroesophageal  reflux disease) 07/15/2016     Anxiety 07/15/2016     SUSY (obstructive sleep apnea) 07/15/2016       LOS (days): 0  Geometric Mean LOS (GMLOS) (days): 4  Days to GMLOS:3.4     OBJECTIVE:    Risk of Unplanned Readmission Score: 16.92         Current admission status: Inpatient       Preferred Pharmacy:   [facility] - [place], [place] - [address]  [address]  [address]  [place] [address]  Phone: [phone] Fax: [phone]    [id number] - [place], [place] - [address]  [address]  [place] [address]  Phone: [phone] Fax: [phone]    [facility] - [place] - [address]  [address]  [place] [address]  Phone: [phone] Fax: [phone]    Primary Care Provider: Ana Rosa Win MD    Primary Insurance: [email or web address]  Secondary Insurance:     ASSESSMENT:  Active Health Care Proxies       Nayeli Obando Health Care Representative - Spouse   Primary Phone: [phone] (Mobile)                 Advance Directives  Does patient have a Health Care POA?: No  Was patient offered paperwork?: Yes (declined)  Does patient currently have a Health Care decision maker?: Yes, please see Health Care Proxy section  Does patient have Advance Directives?: Yes  Advance Directives: Living will  Primary Contact: wife Nayeli Obando         Readmission Root Cause  30 Day Readmission: No    Patient Information  Admitted from:: Home  Mental Status: Alert  During Assessment patient was accompanied by: Not accompanied during assessment  Assessment information provided by:: Patient  Primary Caregiver: Self  Support Systems: Spouse/significant other, Family members  County of Residence: [place]  What city do you live in?: [place]  Home entry access options. Select all that apply.: Stairs  Number of steps to enter home.: 6  Do the steps have railings?: Yes  Type of Current Residence: [address] [place] [address] home  Upon entering residence, is there a bedroom on the  main floor (no further steps)?: No  A bedroom is located on the following floor levels of residence (select all that apply):: 2nd Floor  Upon entering residence, is there a bathroom on the main floor (no further steps)?: Yes  Number of steps to 2nd floor from main floor: One Flight  Living Arrangements: Lives w/ Spouse/significant other  Is patient a ?: Yes  Is [facility] with [facility] ( [employer] [facility])?: No    Activities of Daily Living Prior to Admission  Functional Status: Independent  Completes ADLs independently?: Yes  Ambulates independently?: Yes  Does patient use assisted devices?: Yes  Assisted Devices (DME) used: Straight Cane, CPAP  Does patient currently own DME?: Yes  What DME does the patient currently own?: CPAP, Straight Cane  Does patient have a history of Outpatient Therapy (PT/OT)?: No  Does the patient have a history of Short-Term Rehab?: No  Does patient have a history of HHC?: No  Does patient currently have HHC?: No         Patient Information Continued  Income Source: Pension/skilled nursing  Does patient have prescription coverage?: Yes  Does patient receive dialysis treatments?: No  Does patient have a history of substance abuse?: No  Does patient have a history of Mental Health Diagnosis?: Yes (anxiety)  Is patient receiving treatment for mental health?: No. Patient declined treatment information.  Has patient received inpatient treatment related to mental health in the last 2 years?: No         Means of Transportation  Means of Transport to Appts:: Drives Self          DISCHARGE DETAILS:    Discharge planning discussed with:: patient--no HHC needs at this time, CM following  Freedom of Choice: Yes     CM contacted family/caregiver?: No- see comments (alert and oriented)             Contacts  Patient Contacts: wife Nayeli Obando  Relationship to Patient:: Family  Contact Method: Phone  Phone Number: [phone]  Reason/Outcome: Continuity of Care, Emergency Contact, Discharge Planning               Other Referral/Resources/Interventions Provided:  Referral Comments: No HHC needs at this time, CM following  Programs:: CHF                                                      Additional Comments: 70yo male admitted to [facility] with right sided pneumonia. Recent fever and cough x3 days.  Hx PNU, LAKHWINDER, CHF, sepsis. Currently on O2@5L nc. Wears CPAP for SUSY.  On Ceftriaxone IV and Zithromax po. Pt. with good understanding. CM following for any discharge needs. Lives with wife in [place].

## 2024-12-03 NOTE — QUICK NOTE
RN staff on AM check reported concerns for patient hallucinating. VBG Ordered. Went to evaluate patient and he was alert and oriented to person, place and time on my exam. Reported he was here for pneumonia and able to give me history for why he presented which were correct. As such, patient currently on my exam still with capacity to refuse cpap/bipap with known risks which were explained to him clearly again. Will need to continue to closely monitor patient.      Update shortly after VBG resulted 7.25/79/36/34 as I went back to patient's room and explained to him the risks now with objective evidence of hypercapnia of not starting bipap. Patient now agreeable to wearing bipap. Will put on continuous and then repeat vbg in 2 hours after starting. Pulm consulted.

## 2024-12-03 NOTE — ASSESSMENT & PLAN NOTE
Lab Results   Component Value Date    HGBA1C 6.1 08/28/2024       Recent Labs     12/03/24  0232   POCGLU 85       Blood Sugar Average: Last 72 hrs:  (P) 85    Sliding scale insulin with meals   Continue PTA gabapentin

## 2024-12-03 NOTE — ASSESSMENT & PLAN NOTE
Placed order for CPAP nightly but when I went to speak with patient he reported that he currently did not want to wear this as he reports it makes him feel it and makes it harder to breathe.  I recommended that this be placed as it was thought to help in the setting of his breathing status but patient reported that he did not want to do this on my exam despite medical recommendation

## 2024-12-03 NOTE — PHYSICAL THERAPY NOTE
Physical Therapy Evaluation     Patient's Name: Edgar Obando    Admitting Diagnosis  Pneumonia [J18.9]  LAKHWINDER (acute kidney injury) (HCC) [N17.9]  Chronic diastolic congestive heart failure (HCC) [I50.32]  Flu-like symptoms [R68.89]  Sepsis (HCC) [A41.9]    Problem List  Patient Active Problem List   Diagnosis    S/P total knee replacement    Type 2 diabetes mellitus, without long-term current use of insulin (HCC)    HTN (hypertension)    GERD (gastroesophageal reflux disease)    Anxiety    SUSY (obstructive sleep apnea)    Low back pain with sciatica    DDD (degenerative disc disease), lumbar    Spinal stenosis of lumbar region    Myoclonus    Chronic venous insufficiency    Bilateral lower extremity edema    BMI 40.0-44.9, adult (HCC)    Primary osteoarthritis of right knee    Chronic pain of right knee    Diabetic neuropathy (HCC)    RBD (REM behavioral disorder)    Lumbar spondylosis    Neck pain    Right shoulder pain    Chronic low back pain with bilateral sciatica    Chronic pain syndrome    Iron deficiency anemia, unspecified    Obstructive sleep apnea syndrome    Chronic diastolic congestive heart failure (HCC)    Respiratory failure with hypoxia and hypercapnia, unspecified chronicity (HCC)    Cervical spondylosis    Hepatitis C virus carrier state (HCC)    Stage 3a chronic kidney disease (HCC)    Abnormal chest x-ray    S/P cervical spinal fusion    Thrombocytopenia (HCC)    Protein-calorie malnutrition, unspecified severity (HCC)    Current mild episode of major depressive disorder, unspecified whether recurrent (HCC)    Acute hypoxemic respiratory failure (HCC)       Past Medical History  Past Medical History:   Diagnosis Date    Acid reflux     Acute blood loss anemia 07/15/2016    Anxiety     Arthritis     Cellulitis     left ankle    CPAP (continuous positive airway pressure) dependence     as needed per patient    Diabetes mellitus (HCC)     Hypertension     Idiopathic chronic venous hypertension  of right lower extremity with ulcer (HCC) 06/12/2023    Sleep apnea     Twitching        Past Surgical History  Past Surgical History:   Procedure Laterality Date    APPENDECTOMY      CERVICAL FUSION Bilateral 5/2/2024    Procedure: FUSION CERVICAL ANTERIOR W DISCECTOMY, C3-7 ACDF;  Surgeon: Conrad Richardson MD;  [facility];  Service: Orthopedics    CHOLECYSTECTOMY      COLONOSCOPY      IR PICC PLACEMENT DOUBLE LUMEN  5/2/2024    KNEE ARTHROSCOPY      RI ARTHRP KNE CONDYLE&PLATU MEDIAL&LAT COMPARTMENTS Left 07/13/2016    Procedure: TOTAL  KNEE REPLACEMENT ;  Surgeon: Zack Montenegro MD;  [facility];  Service: Orthopedics          12/03/24 1340   PT Last Visit   PT Visit Date 12/03/24   Note Type   Note type Re-Evaluation   Pain Assessment   Pain Assessment Tool 0-10   Pain Score No Pain   Restrictions/Precautions   Weight Bearing Precautions Per Order No   Other Precautions Chair Alarm;Bed Alarm;Multiple lines;O2;Telemetry;Fall Risk   Home Living   Type of Home House   Home Layout Two level;Stairs to enter with rails  (6STE)   Bathroom Shower/Tub Walk-in shower   Bathroom Toilet Standard   Bathroom Equipment Shower chair   Bathroom Accessibility Accessible   Home Equipment Cane   Prior Function   Level of [place] Independent with ADLs;Independent with functional mobility;Needs assistance with IADLS   Lives With Spouse   Receives Help From Family   IADLs Independent with driving;Independent with meal prep;Independent with medication management   Falls in the last 6 months 0   Vocational Retired   General   Family/Caregiver Present No   Cognition   Overall Cognitive Status WFL   Arousal/Participation Alert   Orientation Level Oriented X4   Memory Within functional limits   Following Commands Follows all commands and directions without difficulty   Subjective   Subjective pt pleasant and cooperative throughout therapy session. pt received supine in bed   RLE Assessment   RLE Assessment  X  (4-/5 grossly)   LLE Assessment   LLE Assessment X  (4-/5 grossly)   Bed Mobility   Supine to Sit 5  Supervision   Additional items Increased time required;Verbal cues   Sit to Supine Unable to assess   Transfers   Sit to Stand 5  Supervision   Additional items Increased time required;Verbal cues   Stand to Sit 5  Supervision   Additional items Increased time required;Verbal cues   Additional Comments transfers w SPC   Ambulation/Elevation   Gait pattern Narrow JOSE LUSI;Forward Flexion;Decreased foot clearance;Shuffling;Short stride   Gait Assistance 4  Minimal assist   Additional items Assist x 1;Verbal cues;Tactile cues   Assistive Device Straight cane   Distance 3'   Stair Management Assistance Not tested   Balance   Static Sitting Fair +   Dynamic Sitting Fair +   Static Standing Fair   Dynamic Standing Fair -   Ambulatory Poor +   Endurance Deficit   Endurance Deficit Yes   Endurance Deficit Description generalized weakness, decreased exercise tolerance   Activity Tolerance   Activity Tolerance Patient limited by fatigue   Medical Staff Made Aware nuzhat Reynaga due to medical complexity and multiple comorbidities   Nurse Made Aware RN cleared and updated   Assessment   Prognosis Good   Problem List Decreased strength;Decreased range of motion;Decreased endurance;Impaired balance;Decreased mobility   Assessment PT orders received and acknowledged. Patient was seen today for high complexity PT evaluation. High complexity evaluation due to Ongoing medical management for primary dx, Decreased activity tolerance compared to baseline, Fall risk, Ongoing telemetry monitoring, Continuous pulse oximetry monitoring  Patient is a 69 y.o. male  who was admitted to [facility] on 12/2/2024  with Acute hypoxemic respiratory failure (HCC) . Pt presents to [facility] with generalized malaise . At baseline, pt resides with spouse in house and was independent prior to hospital admission. Currently, upon initial  examination, pt  is requiring supervision   for bed mobility skills;  supervision   for functional transfers and  min assist x1 for ambulation with SPC.  Patient currently presents below baseline with limitations in gait, balance, and transfers. Patient will benefit from continued PT services while in hospital in order to address remaining limitations. The patients AM-PAC Basic Mobility Inpatient Short From Raw Score is 21 . Based on AM-PAC scoring and patient presentation, PT currently recommending Level III (Minimum Resource Intensity). Please also refer to the recommendation of the Physical Therapist for safe discharge planning.   Barriers to Discharge None   Goals   Patient Goals to go home   STG Expiration Date 12/17/24   Short Term Goal #1 Patient will be able to perform bed mobility tasks with  modified independent   in order to improve overall functional mobility and assist in safe d/c. STG 2 Patient will sit EOB for at least 25 minutes at  modified independent   level in order to strengthen abdominal musculature and assist in future transfers and ambulation. STG 3 Patient will be able to perform functional transfer with  modified independent   in order to improve overall functional mobility and assist in safe discharge. STG 4 Patient will be able to ambulate at least 300 feet with least restrictive device with  modified independent   assist in order to improve overall functional mobility and assist in safe discharge. STG 5 Patient will improve sitting/standing static/dynamic balance 1/2 grade in order to improve functional mobility and assist in safe discharge. STG 6 Patient will improve LE strength by 1/2 grade in order to improve functional mobility and assist in safe discharge. STG 7 Patient will be able to negotiate at least 6 stairs with least restrictive device with  modified independent   A in order to improve overall functional mobility and assist in safe discharge   PT Treatment Day 0   Plan    Treatment/Interventions Functional transfer training;ADL retraining;LE strengthening/ROM;Elevations;Therapeutic exercise;Endurance training;Bed mobility;Gait training;Spoke to nursing;OT   PT Frequency 2-3x/wk   Discharge Recommendation   Rehab Resource Intensity Level, PT III (Minimum Resource Intensity)   AM-PAC Basic Mobility Inpatient   Turning in Flat Bed Without Bedrails 4   Lying on Back to Sitting on Edge of Flat Bed Without Bedrails 4   Moving Bed to Chair 4   Standing Up From Chair Using Arms 3   Walk in Room 3   Climb 3-5 Stairs With Railing 3   Basic Mobility Inpatient Raw Score 21   Basic Mobility Standardized Score 45.55   [facility] Highest Level Of Mobility   -HLM Goal 6: Walk 10 steps or more   -HLM Achieved 5: Stand (1 or more minutes)   End of Consult   Patient Position at End of Consult Bedside chair;Bed/Chair alarm activated;All needs within reach         Zack Weldon, PT

## 2024-12-03 NOTE — H&P
H&P - Hospitalist   Name: Edgar Obando 69 y.o. male I MRN: [id number]  Unit/Bed#: [facility] 418-01 I Date of Admission: 12/2/2024   Date of Service: 12/3/2024 I Hospital Day: 0     Assessment & Plan  Acute hypoxemic respiratory failure (HCC)  Presented the ER with fatigue, cough x 3 days  Chest x-ray concerning for right-sided pneumonia on my read awaiting official read  Requiring 6 L nasal cannula in the ER  Patient additionally with episodes of hypotension in the ER for which she was given 3 L IV fluid as well as albumin  Case discussed with critical care given patient's systolic blood pressure in the mid 90s after 3 L as well as albumin in the setting of requiring 6 L.  Recommendations for stepdown 2 but if patient's MAP should drop, then would need ICU  Admit to medicine.  Treat with ceftriaxone as well as azithromycin.  Respiratory protocol.  Judicious use of additional IV fluids given patient's current 6 L O2 requirement.   Type 2 diabetes mellitus, without long-term current use of insulin ([facility])  Lab Results   Component Value Date    HGBA1C 6.1 08/28/2024       Recent Labs     12/03/24  0232   POCGLU 85       Blood Sugar Average: Last 72 hrs:  (P) 85    Sliding scale insulin with meals   Continue PTA gabapentin  HTN (hypertension)  Holding PTA Lasix 60 mg daily as well as losartan given episodes of hypotension in the ER for which patient required IV fluids in the ER  Anxiety  Continue PTA as needed Klonopin as well as BuSpar  SUSY (obstructive sleep apnea)  Placed order for CPAP nightly but when I went to speak with patient he reported that he currently did not want to wear this as he reports it makes him feel it and makes it harder to breathe.  I recommended that this be placed as it was thought to help in the setting of his breathing status but patient reported that he did not want to do this on my exam despite medical recommendation          Code Status: Level 1 - Full Code       Anticipated Length of Stay:  Patient will be admitted on an inpatient basis with an anticipated length of stay of greater than 2 midnights secondary to PNA, Respiratory Failure.    History of Present Illness   Chief Complaint: Shortness of breath, cough    Edgar Obando is a 69 y.o. male with a PMH of hypertension, SUSY, anxiety who presented with  malaise, productive cough for the past 3 days.  He notes that he may have had sick contact on Thanksgiving when he spent time with family/grandchildren. Patient otherwise complains of fevers, chills, myalgias at this time. Patient was seen at his PMD when he was referred to come to the emergency department.  While in the emergency department, he received 2 L [place] for hypertension, was placed on 6 L nasal cannula for hypoxia and was started on ceftriaxone and azithromycin.  Labs significant for leukocytosis, LAKHWINDER, procalcitonin of 13, but no bandemia, lactic acid 0.8. Denies any chest pain.     Review of Systems   Constitutional:  Positive for chills and fatigue.   HENT:  Negative for facial swelling.    Respiratory:  Positive for cough and shortness of breath.    Cardiovascular:  Negative for chest pain.   Gastrointestinal:  Negative for diarrhea.   Genitourinary:  Negative for dysuria.   Musculoskeletal:  Negative for back pain.   Neurological:  Negative for headaches.       Historical Information   Past Medical History:   Diagnosis Date    Acid reflux     Acute blood loss anemia 07/15/2016    Anxiety     Arthritis     Cellulitis     left ankle    CPAP (continuous positive airway pressure) dependence     as needed per patient    Diabetes mellitus (HCC)     Hypertension     Idiopathic chronic venous hypertension of right lower extremity with ulcer (HCC) 06/12/2023    Sleep apnea     Twitching      Past Surgical History:   Procedure Laterality Date    APPENDECTOMY      CERVICAL FUSION Bilateral 5/2/2024    Procedure: FUSION CERVICAL ANTERIOR W DISCECTOMY, C3-7 ACDF;  Surgeon: Conrad Richardson MD;   [facility];  Service: Orthopedics    CHOLECYSTECTOMY      COLONOSCOPY      IR PICC PLACEMENT DOUBLE LUMEN  2024    KNEE ARTHROSCOPY      CT ARTHRP KNE CONDYLE&PLATU MEDIAL&LAT COMPARTMENTS Left 2016    Procedure: TOTAL  KNEE REPLACEMENT ;  Surgeon: Zack Montenegro MD;  [facility];  Service: Orthopedics     Social History     Tobacco Use    Smoking status: Former     Current packs/day: 0.00     Types: Cigarettes     Quit date:      Years since quittin.9    Smokeless tobacco: Never   Vaping Use    Vaping status: Never Used   Substance and Sexual Activity    Alcohol use: Not Currently    Drug use: Not Currently     Comment: tried multiple drugs in the past    Sexual activity: Not Currently     E-Cigarette/Vaping    E-Cigarette Use Never User      E-Cigarette/Vaping Substances    Nicotine No     THC No     CBD No     Flavoring No     Other No     Unknown No      Family history non-contributory  Social History:  Marital Status: /Civil Union       Meds/Allergies   I have reviewed home medications with patient personally.  Prior to Admission medications    Medication Sig Start Date End Date Taking? Authorizing Provider   acetaminophen (TYLENOL) 650 mg CR tablet Take 1 tablet (650 mg total) by mouth every 8 (eight) hours as needed for mild pain 5/3/24   Morelia Sethi PA-C   albuterol (PROVENTIL HFA,VENTOLIN HFA) 90 mcg/act inhaler Inhale 2 puffs every 6 (six) hours as needed for wheezing As needed    Historical Provider, MD   aspirin (ECOTRIN LOW STRENGTH) 81 mg EC tablet Take 81 mg by mouth daily    Historical Provider, MD   b complex vitamins capsule Take 1 capsule by mouth daily    Historical Provider, MD   bisacodyl (DULCOLAX) 5 mg EC tablet Take 1 tablet (5 mg total) by mouth once for 1 dose 22  Aziza Nelson PA-C   Blood Glucose Monitoring Suppl (ONE TOUCH ULTRA 2) w/Device KIT USE TO TEST BLOOD GLUCOSE 22   Historical Provider, MD    budesonide (RINOCORT AQUA) 32 MCG/ACT nasal spray 1 spray into each nostril 2 (two) times a day As needed    Historical Provider, MD   busPIRone (BUSPAR) 10 mg tablet TAKE 1 TABLET (10 MG TOTAL) BY MOUTH TWO (TWO) TIMES A DAY 7/25/24   Vineet Orr,    busPIRone (BUSPAR) 7.5 mg tablet  8/22/24   Historical Provider, MD   chlorhexidine (PERIDEX) 0.12 % solution RINSE MOUTH WITH 15ML (1 CAPFUL) FOR 30 SECONDS IN MORNING AND EVENING AFTER BRUSHING, THEN SPIT 10/10/23   Historical Provider, MD   clonazePAM (KlonoPIN) 0.5 mg tablet  7/29/24   Historical Provider, MD   clonazePAM (KlonoPIN) 1 mg tablet Take 0.5 tablets (0.5 mg total) by mouth 2 (two) times a day 11/22/24   Ana Rosa Win MD   cloNIDine (CATAPRES) 0.1 mg tablet Take 0.1 mg by mouth 2 (two) times a day    Historical Provider, MD   clotrimazole (LOTRIMIN) 1 % cream  10/11/24   Historical Provider, MD   docusate sodium (COLACE) 100 mg capsule Take 100 mg by mouth 2 (two) times a day 7/4/22   Historical Provider, MD   famotidine (PEPCID) 20 mg tablet  7/25/24   Historical Provider, MD   fluticasone (FLOVENT HFA) 220 mcg/act inhaler Inhale 1 puff daily as needed As needed    Historical Provider, MD   furosemide (LASIX) 40 mg tablet Take 40 mg by mouth as needed in the morning and 40 mg as needed in the evening.    Historical Provider, MD   gabapentin (NEURONTIN) 100 mg capsule  10/15/24   Historical Provider, MD   Lancets 30G MISC 3 (three) times a day Use to test blood glucose 9/5/23   Historical Provider, MD   losartan (COZAAR) 25 mg tablet  9/29/23   Historical Provider, MD   lovastatin (MEVACOR) 10 MG tablet Take 10 mg by mouth daily at bedtime    Historical Provider, MD   methylPREDNISolone 4 MG tablet therapy pack Use as directed on package. Do not take other anti-inflammatory (NSAID) medications while on Medrol dose chandu. You should not suddenly stop using these medications. Follow the instructions listed on the prescription about tapering your  dose. 11/6/24   Conrad Richardson MD   Multiple Vitamins-Minerals (multivitamin with minerals) tablet Take 1 tablet by mouth daily    Historical Provider, MD   mupirocin (BACTROBAN) 2 % ointment  1/11/22   Historical Provider, MD   naloxone (NARCAN) 4 mg/0.1 mL nasal spray Administer 1 spray into a nostril. If no response after 2-3 minutes, give another dose in the other nostril using a new spray. 5/17/24 5/17/25  Morelia Sethi PA-C   naproxen (NAPROSYN) 500 mg tablet  10/31/23   Historical Provider, MD   NARCAN 4 MG/0.1ML LIQD Has if needed 6/13/19   Historical Provider, MD   OneTouch Ultra test strip USE TO TEST BLOOD GLUCOSE TWICE DAILY 8/4/22   Historical Provider, MD   oxyCODONE (Roxicodone) 5 immediate release tablet Take 1 tablet (5 mg total) by mouth every 6 (six) hours as needed for moderate pain Max Daily Amount: 20 mg  Patient not taking: Reported on 11/14/2024 7/2/24   Morelia Sethi PA-C   pantoprazole (PROTONIX) 40 mg tablet Take 40 mg by mouth daily    Historical Provider, MD   potassium chloride (K-DUR,KLOR-CON) 10 mEq tablet  6/13/19   Historical Provider, MD   predniSONE 20 mg tablet  1/26/24   Historical Provider, MD   propranolol (INDERAL) 10 mg tablet  10/23/24   Historical Provider, MD   tamsulosin (FLOMAX) 0.4 mg TAKE 1 CAPSULE BY MOUTH EVERY DAY WITH DINNER 8/10/23   DAYSI Garland   testosterone (ANDROGEL) 1.62 % TD gel pump  11/1/22   Historical Provider, MD   tiZANidine (ZANAFLEX) 2 mg tablet Take 1 tablet (2 mg total) by mouth every 8 (eight) hours as needed for muscle spasms May make you drowsy. Do not drive until you know how it affects you. Do not take any other muscle relaxers while taking this medication 6/19/24   Morelia Sethi PA-C   traZODone (DESYREL) 100 mg tablet  10/3/24   Historical Provider, MD Jaimeity 4.5 MG/0.5ML SOPN every 7 days On Mondays 8/22/22   Historical Provider, MD   zinc gluconate 50 mg tablet Take 50 mg by mouth in  the morning.    Historical Provider, MD     No Known Allergies    Objective :  Temp:  [97.5 °F (36.4 °C)-99.5 °F (37.5 °C)] 98.1 °F (36.7 °C)  HR:  [68-80] 80  BP: ()/(36-58) 118/58  Resp:  [18-23] 22  SpO2:  [80 %-98 %] 95 %  O2 Device: Nasal cannula  Nasal Cannula O2 Flow Rate (L/min):  [6 L/min] 6 L/min    Physical Exam  Vitals reviewed.   Constitutional:       Appearance: He is ill-appearing.   HENT:      Left Ear: External ear normal.      Nose: Congestion present.      Mouth/Throat:      Pharynx: No oropharyngeal exudate.   Eyes:      Extraocular Movements: Extraocular movements intact.      Pupils: Pupils are equal, round, and reactive to light.   Cardiovascular:      Rate and Rhythm: Normal rate.      Heart sounds: No murmur heard.  Pulmonary:      Breath sounds: Rhonchi present.      Comments: Increased work of breathing  Abdominal:      Tenderness: There is no abdominal tenderness.   Musculoskeletal:         General: No deformity.   Skin:     General: Skin is warm and dry.      Capillary Refill: Capillary refill takes less than 2 seconds.   Neurological:      General: No focal deficit present.      Mental Status: He is alert. Mental status is at baseline.                  Lab Results: I have reviewed the following results:  Results from last 7 days   Lab Units 12/02/24  1807   WBC Thousand/uL 15.73*   HEMOGLOBIN g/dL 10.6*   HEMATOCRIT % 33.6*   PLATELETS Thousands/uL 192   SEGS PCT % 87*   LYMPHO PCT % 5*   MONO PCT % 5   EOS PCT % 1     Results from last 7 days   Lab Units 12/02/24  1807   SODIUM mmol/L 138   POTASSIUM mmol/L 4.0   CHLORIDE mmol/L 97   CO2 mmol/L 36*   BUN mg/dL 37*   CREATININE mg/dL 1.67*   ANION GAP mmol/L 5   CALCIUM mg/dL 8.5   ALBUMIN g/dL 3.2*   TOTAL BILIRUBIN mg/dL 0.86   ALK PHOS U/L 75   ALT U/L 12   AST U/L 12*   GLUCOSE RANDOM mg/dL 118     Results from last 7 days   Lab Units 12/02/24  1807   INR  1.36*     Results from last 7 days   Lab Units 12/03/24  0232   POC  GLUCOSE mg/dl 85     Lab Results   Component Value Date    HGBA1C 6.1 08/28/2024    HGBA1C 6.1 (H) 09/14/2023    HGBA1C 6.5 05/21/2023     Results from last 7 days   Lab Units 12/02/24  1807   LACTIC ACID mmol/L 0.8   PROCALCITONIN ng/ml 13.02*       Imaging Results Review: I reviewed radiology reports from this admission including: chest xray.  Other Study Results Review: EKG was reviewed.     Administrative Statements   I have spent a total time of 75 minutes in caring for this patient on the day of the visit/encounter including Diagnostic results, Prognosis, and Risks and benefits of tx options.    ** Please Note: This note has been constructed using a voice recognition system. **

## 2024-12-03 NOTE — RESPIRATORY THERAPY NOTE
RT Protocol Note  Edgar Obando 69 y.o. male MRN: [id number]  Unit/Bed#: [facility] 418-01 Encounter: [id number]    Assessment    Active Problems:  There are no active Hospital Problems.      Home Pulmonary Medications:  Albuterol MDI       Past Medical History:   Diagnosis Date    Acid reflux     Acute blood loss anemia 07/15/2016    Anxiety     Arthritis     Cellulitis     left ankle    CPAP (continuous positive airway pressure) dependence     as needed per patient    Diabetes mellitus (HCC)     Hypertension     Idiopathic chronic venous hypertension of right lower extremity with ulcer (HCC) 2023    Sleep apnea     Twitching      Social History     Socioeconomic History    Marital status: /Civil Union     Spouse name: Nayeli    Number of children: 3    Years of education: BS    Highest education level: None   Occupational History    Occupation: Attendance officer    Occupation: Retired-    Tobacco Use    Smoking status: Former     Current packs/day: 0.00     Types: Cigarettes     Quit date:      Years since quittin.9    Smokeless tobacco: Never   Vaping Use    Vaping status: Never Used   Substance and Sexual Activity    Alcohol use: Not Currently    Drug use: Not Currently     Comment: tried multiple drugs in the past    Sexual activity: Not Currently   Other Topics Concern    None   Social History Narrative    None     Social Drivers of Health     Financial Resource Strain: Low Risk  (2023)    Overall Financial Resource Strain (CARDIA)     Difficulty of Paying Living Expenses: Not hard at all   Food Insecurity: No Food Insecurity (12/3/2024)    Nursing - Inadequate Food Risk Classification     Worried About Running Out of Food in the Last Year: Never true     Ran Out of Food in the Last Year: Never true     Ran Out of Food in the Last Year: 1   Transportation Needs: No Transportation Needs (12/3/2024)    Nursing - Transportation Risk Classification     Lack of Transportation: Not  "on file     Lack of Transportation: 2   Physical Activity: Not on file   Stress: Not on file   Social Connections: Not on file   Intimate Partner Violence: Unknown (12/3/2024)    Nursing IPS     Feels Physically and Emotionally Safe: Not on file     Physically Hurt by Someone: Not on file     Humiliated or Emotionally Abused by Someone: Not on file     Physically Hurt by Someone: 2     Hurt or Threatened by Someone: 2   Housing Stability: Unknown (12/3/2024)    Nursing: Inadequate Housing Risk Classification     Has Housing: Not on file     Worried About Losing Housing: Not on file     Unable to Get Utilities: Not on file     Unable to Pay for Housing in the Last Year: 2     Has Housin       Subjective         Objective    Physical Exam:   Assessment Type: Assess only  General Appearance: Sleeping  Respiratory Pattern: Normal  Chest Assessment: Chest expansion symmetrical  Bilateral Breath Sounds: Diminished, Crackles    Vitals:  Blood pressure 118/58, pulse 80, temperature 98.1 °F (36.7 °C), temperature source Oral, resp. rate (P) 22, height 5' 6\" (1.676 m), weight 114 kg (251 lb 5.2 oz), SpO2 95%.          Imaging and other studies: Results Review Statement: No pertinent imaging studies reviewed.          Plan    Respiratory Plan: No distress/Pulmonary history, Discontinue Protocol, Home Bronchodilator Patient pathway        Resp Comments: (P) pt assessed resp protocol. Pt came in with flu like symptoms. Pt 6l nc sp02 96% with crackles to diminished bbs. Pt has no resp history but takes albuterol MDI prn. Pt is complaint with home cpap for SUSY, but does not wish to wear one while here. Will d/c resp protocol at this time.   "

## 2024-12-03 NOTE — ASSESSMENT & PLAN NOTE
Holding PTA Lasix 60 mg daily as well as losartan given episodes of hypotension in the ER for which patient required IV fluids in the ER

## 2024-12-03 NOTE — QUICK NOTE
Edgar Obando is a 69-year-old male with a past medical history of hypertension, diabetes, severe SUSY (wears CPAP 14 at night), chronic venous stasis, gout who presented to the ED for malaise, productive cough for the past 3 days.  He notes that he may have had sick contact on Thanksgiving when he spent time with family/grandchildren. Patient otherwise complains of fevers, chills, myalgias at this time. Patient was seen at his PMD when he was referred to come to the emergency department.  While in the emergency department, he received 2 L Isolyte for hypertension, was placed on 6 L nasal cannula for hypoxia and was started on ceftriaxone and azithromycin.  Labs significant for leukocytosis, LAKHWINDER, procalcitonin of 13, but no bandemia, lactic acid 0.8.  Chest x-ray showing low lung volumes, possible right middle lobe and right lower lobe infiltrates.  Lateral shows loss of spine sign consistent with a likely right lower lobe pneumonia versus atelectasis.  Critical care was consulted for severe sepsis secondary to community-acquired pneumonia.    Vital signs: BP: 107/60, heart rate 73, RR 18, SpO2 97% on 4 L nasal cannula.  Neuro: Alert and oriented x 4  HEENT: Dry mucous membranes, tongue furrowing  CV: Regular rate and rhythm, no JVD, no lower extremity edema  Pulmonary: Rhonchi bilateral lower lobes  GI: No TTP  Skin: Dry skin turgor    Assessment:  Acute hypoxic respiratory failure 2/2 community-acquired pneumonia  Sepsis  LAKHWINDER on CKD  History of severe SUSY on CPAP 14  History of hypertension  Diabetes mellitus  Cervical spondylosis  Morbid obesity    Plan:  Stable for medical floors, discussed with ED attending  Wean nasal cannula to maintain SpO2 > 90%  CPAP at night  If needed, may administer another 1L isolyte for 30cc/kg  Continue with ceftriaxone and azithromycin  Incentive spirometry

## 2024-12-03 NOTE — ASSESSMENT & PLAN NOTE
Patient with shortness of breath since Friday with productive cough. Patient was hypotensive requiring fluid resuscitation and hypoxic on initial evaluation. Patient was also found to be in respiratory acidosis with elevated CO2 requiring placement on Bipap.  Likely secondary to bacterial community acquired pneumonia plus untreated sleep apnea with CPAP noncompliance. Respiratory acidosis improved with bipap and patient has imporved clinically with IVF and antibiotics.  - CXR with hazy RUL,RLL infiltrate, procal 13  - Calculated Drip score 2, continue abx with ceftriaxone and azithromycin  - sputum cx, legionella, strep, MRSA, BNP ordered  - nightly bipap while hospitalized, can resume CPAP at home, will schedule outpatient pulm follow up to discuss permanently swtiching to bipap in the setting of chronic CO2 retention

## 2024-12-03 NOTE — ED CARE HANDOFF
Emergency Department Sign Out Note        Sign out and transfer of care from Dr. Cagle. See Separate Emergency Department note.     The patient, Edgar Obando, was evaluated by the previous provider for cough and congestion.  The patient was found to be hypoxic and mildly hypertensive.    Workup Completed:  Chest x-ray showing right-sided infiltrate consistent with pneumonia.  Elevated procalcitonin at 13.  Leukocytosis with WBC count of 15.73 LAKHWINDER with BUN of 37 and creatinine of 1.67 up from baseline of 1.    ED Course / Workup Pending (followup):  Patient was given 2 L fluid bolus which is finishing up.  Patient discussed with medicine who wants to complete fluid bolus and reassess blood pressure.    The patient remains mildly hypotensive after fluid bolus.  Patient is discussed with critical care who recommends giving albumin bolus and reassessing.    The patient's blood pressure transiently improved.  The critical care team reevaluate the patient and states that they do not feel he needs to be admitted to the ICU.  The patient is admitted to [facility]                                  ED Course as of 12/03/24 0007   Mon Dec 02, 2024   1908 SO: Admit to [facility] once fluids are in     Procedures  Medical Decision Making          Disposition  Final diagnoses:   Pneumonia   Sepsis (HCC)   LAKHWINDER (acute kidney injury) (HCC)     Time reflects when diagnosis was documented in both MDM as applicable and the Disposition within this note       Time User Action Codes Description Comment    12/2/2024 10:46 PM Jett German Add [J18.9] Pneumonia     12/2/2024 10:46 PM Jett German Add [A41.9,  R65.21] Septic shock (HCC)     12/2/2024 10:47 PM Jett German Remove [A41.9,  R65.21] Septic shock (HCC)     12/2/2024 10:47 PM Jett German Add [A41.9] Sepsis (HCC)     12/2/2024 10:47 PM Jett German Add [N17.9] LAKHWINDER (acute kidney injury) (HCC)     12/2/2024 11:20 PM Jovi Yañez Add [I50.32] Chronic diastolic  congestive heart failure (HCC)           ED Disposition       ED Disposition   Admit    Condition   Stable    Date/Time   Tue Dec 3, 2024 12:07 AM    Comment   Case was discussed with [facility] and the patient's admission status was agreed to be Admission Status: inpatient status to the service of Dr. Campbell.               Follow-up Information    None       Patient's Medications   Discharge Prescriptions    No medications on file     No discharge procedures on file.       ED Provider  Electronically Signed by     Jett German DO  12/03/24 0008

## 2024-12-03 NOTE — ASSESSMENT & PLAN NOTE
Lab Results   Component Value Date    HGBA1C 6.1 (H) 12/03/2024       Recent Labs     12/03/24  0232 12/03/24  0520 12/03/24  0735 12/03/24  1108   POCGLU 85 101 124 128       Blood Sugar Average: Last 72 hrs:  (P) 109.5

## 2024-12-03 NOTE — RESPIRATORY THERAPY NOTE
12/03/24 0634   Respiratory Assessment   Resp Comments Pt is suppose to wear cpap hs, pt refused multiple times. Pt was agreeable this morning when c02 was high. Pt placed on bipap 14/6 w/ 5l. Pt says he will only wear for a few hours.   Non-Invasive Information   O2 Interface Device Face mask   Non-Invasive Ventilation Mode BiPAP   $ Intermittent NIV Yes   $ Pulse Oximetry Spot Check Charge Completed   Non-Invasive Settings   IPAP (cm) 14 cm   EPAP (cm) 6 cm   Rate (Set) 16   FiO2 (%)   (5l)   Pressure Support (cm H2O) 8   Non-Invasive Readings   Total Rate 19   Spontaneous Vt (mL) 620   I/E Ratio (Obs) 1/6.3   Leak (lpm) 20   Skin Intervention Skin intact   Non-Invasive Alarms   Insp Pressure Low (cm H20) 5   MV Low (L/min) 0.03   High Resp Rate (BPM) 60 BPM   Low Resp Rate (BPM) 10 BPM

## 2024-12-03 NOTE — ASSESSMENT & PLAN NOTE
Patient with diagnosed SUSY noncompliant with CPAP  -Patient instructed in the importance of nightly use  -continue nightly bipap while hospitalized

## 2024-12-03 NOTE — ASSESSMENT & PLAN NOTE
Presented the ER with fatigue, cough x 3 days  Chest x-ray concerning for right-sided pneumonia on my read awaiting official read  Requiring 6 L nasal cannula in the ER  Patient additionally with episodes of hypotension in the ER for which she was given 3 L IV fluid as well as albumin  Case discussed with critical care given patient's systolic blood pressure in the mid 90s after 3 L as well as albumin in the setting of requiring 6 L.  Recommendations for stepdown 2 but if patient's MAP should drop, then would need ICU  Admit to medicine.  Treat with ceftriaxone as well as azithromycin.  Respiratory protocol.  Judicious use of additional IV fluids given patient's current 6 L O2 requirement.

## 2024-12-03 NOTE — CONSULTS
Please see full consult from earlier today.    Ricardo Ham D.O.  PGY-5, Pulmonary/Critical Care  [facility]

## 2024-12-03 NOTE — PLAN OF CARE
Problem: OCCUPATIONAL THERAPY ADULT  Goal: Performs self-care activities at highest level of function for planned discharge setting.  See evaluation for individualized goals.  Description: Treatment Interventions: ADL retraining, Functional transfer training, UE strengthening/ROM, Endurance training, Patient/family training, Equipment evaluation/education, Compensatory technique education, Continued evaluation, Energy conservation, Activityengagement          See flowsheet documentation for full assessment, interventions and recommendations.   Outcome: Progressing  Note: Limitation: Decreased ADL status, Decreased endurance, Decreased high-level ADLs, Decreased self-care trans  Prognosis: Good  Assessment: Pt is a 68 y/o male that was admitted to [facility] 12/2/2024 with acute hypoxemic respiratory failure. Pt  has a past medical history of Acid reflux, Acute blood loss anemia, Anxiety, Arthritis, Cellulitis, CPAP (continuous positive airway pressure) dependence, Diabetes mellitus ([facility]), Hypertension, Idiopathic chronic venous hypertension of right lower extremity with ulcer ([facility]), Sleep apnea, and Twitching. Pt lives two level house with 6 YASHIRA, standard toilet, and walk in shower with shower chair. Pt reports using SPC for functional mobility. Prior to admission pt (I) ADLs and functional mobility, pt requires assistance for IADLs. Pt currently requires supervision to complete UB ADLs and functional transfers. Pt requires MIN A to complete LB ADLs, toielting, and to take steps with SPC. Pt limited by decreased ADL status, functional transfers, functional mobility, and activity tolerance. Pt supine in bed at begning of session, pt seated in bedside chair at end of session with alarm set and items within reach. The patient's raw score on the -PAC Daily Activity Inpatient Short Form is 21. A raw score of greater than or equal to 19 suggests the patient may benefit from discharge to home. Please refer to  the recommendation of the Occupational Therapist for safe discharge planning. Recommend Level III minimum intensity OT services  at d/c to maximize pt function.     Rehab Resource Intensity Level, OT: III (Minimum Resource Intensity)

## 2024-12-03 NOTE — OCCUPATIONAL THERAPY NOTE
Occupational Therapy Evaluation     Patient Name: Edgar Obando  Today's Date: 12/3/2024  Problem List  Principal Problem:    Acute hypoxemic respiratory failure (HCC)  Active Problems:    Type 2 diabetes mellitus, without long-term current use of insulin (HCC)    HTN (hypertension)    Anxiety    SUSY (obstructive sleep apnea)    Past Medical History  Past Medical History:   Diagnosis Date    Acid reflux     Acute blood loss anemia 07/15/2016    Anxiety     Arthritis     Cellulitis     left ankle    CPAP (continuous positive airway pressure) dependence     as needed per patient    Diabetes mellitus (HCC)     Hypertension     Idiopathic chronic venous hypertension of right lower extremity with ulcer (HCC) 06/12/2023    Sleep apnea     Twitching      Past Surgical History  Past Surgical History:   Procedure Laterality Date    APPENDECTOMY      CERVICAL FUSION Bilateral 5/2/2024    Procedure: FUSION CERVICAL ANTERIOR W DISCECTOMY, C3-7 ACDF;  Surgeon: Conrad Richardson MD;  Location: [facility];  Service: Orthopedics    CHOLECYSTECTOMY      COLONOSCOPY      IR PICC PLACEMENT DOUBLE LUMEN  5/2/2024    KNEE ARTHROSCOPY      MO ARTHRP KNE CONDYLE&PLATU MEDIAL&LAT COMPARTMENTS Left 07/13/2016    Procedure: TOTAL  KNEE REPLACEMENT ;  Surgeon: Zack Montenegro MD;  Location: [facility];  Service: Orthopedics         12/03/24 1341   OT Last Visit   OT Visit Date 12/03/24   Note Type   Note type Evaluation   Pain Assessment   Pain Assessment Tool 0-10   Pain Score No Pain   Restrictions/Precautions   Weight Bearing Precautions Per Order No   Other Precautions Chair Alarm;Bed Alarm;Multiple lines;Telemetry;O2;Fall Risk   Home Living   Type of Home House   Home Layout Two level;Stairs to enter with rails  (6 YASHIRA)   Bathroom Shower/Tub Walk-in shower   Bathroom Toilet Standard   Bathroom Equipment Shower chair   Home Equipment Cane   Prior Function   Level of [place] Independent with ADLs;Independent with functional  mobility;Needs assistance with IADLS   Lives With Spouse   Receives Help From Family   IADLs Independent with driving;Independent with meal prep;Independent with medication management   Falls in the last 6 months 0   Vocational Retired   Lifestyle   Autonomy Pt reports (I) with ADLs and functional mobility. Pt reuqires assistance for IADLs. Pt +  and retired   Reciprocal Relationships family   Service to Others retired   ADL   Where Assessed Edge of bed   Eating Assistance 6  Modified independent   Grooming Assistance 6  Modified Independent   UB Bathing Assistance 5  Supervision/Setup   LB Bathing Assistance 4  Minimal Assistance   UB Dressing Assistance 5  Supervision/Setup   LB Dressing Assistance 4  Minimal Assistance   Toileting Assistance  4  Minimal Assistance   Functional Assistance 4  Minimal Assistance   Bed Mobility   Supine to Sit 5  Supervision   Additional items Increased time required;Verbal cues   Transfers   Sit to Stand 5  Supervision   Additional items Increased time required;Verbal cues   Stand to Sit 5  Supervision   Additional items Increased time required;Verbal cues   Additional Comments with SPC   Functional Mobility   Functional Mobility 4  Minimal assistance   Additional Comments Pt requires MIN A to take steps with SPC   Additional items SPC   Balance   Static Sitting Fair +   Dynamic Sitting Fair +   Static Standing Fair   Dynamic Standing Fair -   Ambulatory Poor +   Activity Tolerance   Activity Tolerance Patient limited by fatigue   Medical Staff Made Aware PT   Nurse Made Aware RN Cleared   RUE Assessment   RUE Assessment WFL   LUE Assessment   LUE Assessment WFL   Hand Function   Gross Motor Coordination Functional   Fine Motor Coordination Functional   Cognition   Overall Cognitive Status WFL   Arousal/Participation Responsive;Alert;Cooperative   Attention Within functional limits   Orientation Level Oriented X4   Memory Within functional limits   Following Commands Follows  all commands and directions without difficulty   Comments Pt agreeable to therapy with good safety awareness   Assessment   Limitation Decreased ADL status;Decreased endurance;Decreased high-level ADLs;Decreased self-care trans   Prognosis Good   Assessment Pt is a 70 y/o male that was admitted to [facility] 12/2/2024 with acute hypoxemic respiratory failure. Pt  has a past medical history of Acid reflux, Acute blood loss anemia, Anxiety, Arthritis, Cellulitis, CPAP (continuous positive airway pressure) dependence, Diabetes mellitus ([facility]), Hypertension, Idiopathic chronic venous hypertension of right lower extremity with ulcer ([facility]), Sleep apnea, and Twitching. Pt lives two level house with 6 YASHIRA, standard toilet, and walk in shower with shower chair. Pt reports using SPC for functional mobility. Prior to admission pt (I) ADLs and functional mobility, pt requires assistance for IADLs. Pt currently requires supervision to complete UB ADLs and functional transfers. Pt requires MIN A to complete LB ADLs, toielting, and to take steps with SPC. Pt limited by decreased ADL status, functional transfers, functional mobility, and activity tolerance. Pt supine in bed at begning of session, pt seated in bedside chair at end of session with alarm set and items within reach. The patient's raw score on the AM-PAC Daily Activity Inpatient Short Form is 21. A raw score of greater than or equal to 19 suggests the patient may benefit from discharge to home. Please refer to the recommendation of the Occupational Therapist for safe discharge planning. Recommend Level III minimum intensity OT services  at d/c to maximize pt function.   Goals   Patient Goals to go home   LTG Time Frame 10-14   Plan   Treatment Interventions ADL retraining;Functional transfer training;UE strengthening/ROM;Endurance training;Patient/family training;Equipment evaluation/education;Compensatory technique education;Continued evaluation;Energy  conservation;Activityengagement   Goal Expiration Date 12/17/24   OT Frequency 2-3x/wk   Discharge Recommendation   Rehab Resource Intensity Level, OT III (Minimum Resource Intensity)   AM-PAC Daily Activity Inpatient   Lower Body Dressing 3   Bathing 3   Toileting 3   Upper Body Dressing 4   Grooming 4   Eating 4   Daily Activity Raw Score 21   Daily Activity Standardized Score (Calc for Raw Score >=11) 44.27   AM-PAC Applied Cognition Inpatient   Following a Speech/Presentation 4   Understanding Ordinary Conversation 4   Taking Medications 4   Remembering Where Things Are Placed or Put Away 4   Remembering List of 4-5 Errands 4   Taking Care of Complicated Tasks 4   Applied Cognition Raw Score 24   Applied Cognition Standardized Score 62.21   End of Consult   Education Provided Yes   Patient Position at End of Consult Bedside chair;Bed/Chair alarm activated;All needs within reach   Nurse Communication Nurse aware of consult     Goals:    Pt will complete functional transfers with MOD IND and appropriate AD to maximize pt safety.    Pt will complete bed mobility with MOD IND  to maximize pt safety.    Pt will complete grooming tasks with MOD IND to maximize pt independence.    Pt will complete LB ADLs with MOD IND  to maximize pt independence.    Pt will complete UB ADLs with MOD IND to maximize pt independence.    Pt will complete toileting with MOD IND to maximize pt independence.    Pt will complete functional household distance mobility with MOD IND and appropriate AD to maximize pt safety.    Pt will complete simulated IADL tasks with supervision to maximize pt independence.     Pt will be able to tolerate 30 minutes of functional activity during therapy session.    Pt will participate in continued cognitive evaluation for safe discharge planning.      NEO Gomez, OTR/L

## 2024-12-03 NOTE — PROCEDURES
POC Cardiac US    Date/Time: 12/2/2024 4:59 PM    Performed by: Jovi Yañez DO  Authorized by: Jovi Yañez DO    Patient location:  ICU  Other Assisting Provider: No    Procedure details:     Exam Type:  Diagnostic    Indications comment:  Volume status    Assessment / Evaluation for: cardiac function and pericardial effusion      Exam Type: follow-up exam      Image quality: limited diagnostic      Image availability:  Images available in PACS  Patient Details:     Cardiac Rhythm:  Regular    Mechanically ventilated: on BiPAP 12/6 38%.    Cardiac findings:     Echo technique: limited 2D      Views obtained: parasternal long axis, parasternal short axis, subcostal and apical      Pericardial effusion: absent      Tamponade physiology: absent      Wall motion: normal      LV systolic function: normal    Pulmonary findings:     Left Lung Findings: left lung sliding      Right lung findings: right lung sliding      B-lines: more than 3    IVC findings:     IVC Size: dilated      IVC Inspiratory Collapse: absent      Maximum IVC Diameter (cm):  2.2    Minimum IVC Diameter (cm):  2    IVC Variability (%):  9  Interpretation:     Fluid Status:  Hypervolemic     POCUS consistent with hypervolemia/venous congestion: B-lines, dilated IVC and intrahepatic veins.

## 2024-12-03 NOTE — CONSULTS
Consultation - Pulmonology   Name: Edgar Obando 69 y.o. male I MRN: [id number]  [facility] I Date of Admission: 12/2/2024   Date of Service: 12/3/2024 I Hospital Day: 0   Consults  Physician Requesting Evaluation: Cecil Ragland, *   Reason for Evaluation / Principal Problem: Respiratory failure    Assessment & Plan  Acute hypoxemic respiratory failure (HCC)  Patient with shortness of breath since Friday with productive cough. Patient was hypotensive requiring fluid resuscitation and hypoxic on initial evaluation. Patient was also found to be in respiratory acidosis with elevated CO2 requiring placement on Bipap.  Likely secondary to bacterial community acquired pneumonia plus untreated sleep apnea with CPAP noncompliance. Respiratory acidosis improved with bipap and patient has imporved clinically with IVF and antibiotics.  - CXR with hazy RUL,RLL infiltrate, procal 13  - Calculated Drip score 2, continue abx with ceftriaxone and azithromycin  - sputum cx, legionella, strep, MRSA, BNP ordered  - nightly bipap while hospitalized, can resume CPAP at home, will schedule outpatient pulm follow up to discuss permanently swtiching to bipap in the setting of chronic CO2 retention  SUSY (obstructive sleep apnea)  Patient with diagnosed SUSY noncompliant with CPAP  -Patient instructed in the importance of nightly use  -continue nightly bipap while hospitalized  Pneumonia involving right lung  [facility]  Type 2 diabetes mellitus, without long-term current use of insulin ([facility])  Lab Results   Component Value Date    HGBA1C 6.1 (H) 12/03/2024       Recent Labs     12/03/24  0232 12/03/24  0520 12/03/24  0735 12/03/24  1108   POCGLU 85 101 124 128       Blood Sugar Average: Last 72 hrs:  (P) 109.5    HTN (hypertension)    Anxiety        History of Present Illness   Edgar Obando is a 69 y.o. male with a past medical history of SUSY (nonadherent to CPAP) htn who presents with shortness of breath and cough from  acute on chronic hypoxic and hypercapnic respiratory failure in the setting of pneumonia. Patient reports that he first felt ill on Friday and since that time has had productive cough, shortness of breath, and general malaise. Patient was hypotensive and hypoxic upon initial ED eval so he received fluid resuscitation with 3L isolyte and 25 g albumin. Patient's BP improved however he had a VBG done this AM for AMS that was revealing of respiratory acidosis. Patient was started on bipap at that time, and symptoms resolved. He is also being treated for pneumonia with ceftriaxone and azithromycin.   Patient reports improved shortness of breath. He denies fever, chills, chest pain.     Review of Systems   Constitutional:  Positive for activity change. Negative for chills and fever.   HENT:  Negative for ear pain and sore throat.    Eyes:  Negative for pain and visual disturbance.   Respiratory:  Positive for cough and shortness of breath.    Cardiovascular:  Negative for chest pain and palpitations.   Gastrointestinal:  Negative for abdominal pain and vomiting.   Genitourinary:  Negative for dysuria and hematuria.   Musculoskeletal:  Positive for arthralgias. Negative for back pain.   Skin:  Negative for color change and rash.   Neurological:  Negative for seizures and syncope.   All other systems reviewed and are negative.      Historical Information   I have reviewed the patient's PMH, PSH, Social History, Family History, Meds, and Allergies  Historical Information   Past Medical History:   Diagnosis Date    Acid reflux     Acute blood loss anemia 07/15/2016    Anxiety     Arthritis     Cellulitis     left ankle    CPAP (continuous positive airway pressure) dependence     as needed per patient    Diabetes mellitus (HCC)     Hypertension     Idiopathic chronic venous hypertension of right lower extremity with ulcer (HCC) 06/12/2023    Sleep apnea     Twitching      Past Surgical History:   Procedure Laterality Date     APPENDECTOMY      CERVICAL FUSION Bilateral 2024    Procedure: FUSION CERVICAL ANTERIOR W DISCECTOMY, C3-7 ACDF;  Surgeon: Conrad Richardson MD;  Location: [facility];  Service: Orthopedics    CHOLECYSTECTOMY      COLONOSCOPY      IR PICC PLACEMENT DOUBLE LUMEN  2024    KNEE ARTHROSCOPY      OR ARTHRP KNE CONDYLE&PLATU MEDIAL&LAT COMPARTMENTS Left 2016    Procedure: TOTAL  KNEE REPLACEMENT ;  Surgeon: Zack Montenegro MD;  Location: [facility];  Service: Orthopedics     Social History     Tobacco Use    Smoking status: Former     Current packs/day: 0.00     Types: Cigarettes     Quit date:      Years since quittin.9    Smokeless tobacco: Never   Vaping Use    Vaping status: Never Used   Substance and Sexual Activity    Alcohol use: Not Currently    Drug use: Not Currently     Comment: tried multiple drugs in the past    Sexual activity: Not Currently     E-Cigarette/Vaping    E-Cigarette Use Never User      E-Cigarette/Vaping Substances    Nicotine No     THC No     CBD No     Flavoring No     Other No     Unknown No        Social History     Tobacco Use    Smoking status: Former     Current packs/day: 0.00     Types: Cigarettes     Quit date:      Years since quittin.9    Smokeless tobacco: Never   Vaping Use    Vaping status: Never Used   Substance and Sexual Activity    Alcohol use: Not Currently    Drug use: Not Currently     Comment: tried multiple drugs in the past    Sexual activity: Not Currently       Current Facility-Administered Medications:     acetaminophen (TYLENOL) tablet 650 mg, Q6H PRN    albuterol (PROVENTIL HFA,VENTOLIN HFA) inhaler 2 puff, Q6H PRN    aspirin (ECOTRIN LOW STRENGTH) EC tablet 81 mg, Daily    azithromycin (ZITHROMAX) tablet 500 mg, Q24H    ceftriaxone (ROCEPHIN) 1 g/50 mL in dextrose IVPB, Q24H    fluticasone (FLONASE) 50 mcg/act nasal spray 1 spray, BID    gabapentin (NEURONTIN) capsule 100 mg, TID    insulin lispro (HumALOG/ADMELOG) 100 units/mL  subcutaneous injection 1-5 Units, TID AC **AND** Fingerstick Glucose (POCT), TID AC    insulin lispro (HumALOG/ADMELOG) 100 units/mL subcutaneous injection 1-5 Units, HS    pantoprazole (PROTONIX) EC tablet 40 mg, Early Morning    polyethylene glycol (MIRALAX) packet 17 g, Daily    pravastatin (PRAVACHOL) tablet 10 mg, Daily With Dinner  Prior to Admission Medications   Prescriptions Last Dose Informant Patient Reported? Taking?   Blood Glucose Monitoring Suppl (ONE TOUCH ULTRA 2) w/Device KIT  Self Yes No   Sig: USE TO TEST BLOOD GLUCOSE   Lancets 30G MISC  Self Yes No   Sig: 3 (three) times a day Use to test blood glucose   Multiple Vitamins-Minerals (multivitamin with minerals) tablet   Yes No   Sig: Take 1 tablet by mouth daily   NARCAN 4 MG/0.1ML LIQD  Self Yes No   Sig: Has if needed   OneTouch Ultra test strip  Self Yes No   Sig: USE TO TEST BLOOD GLUCOSE TWICE DAILY   Trulicity 4.5 MG/0.5ML SOPN  Self Yes No   Sig: every 7 days On    acetaminophen (TYLENOL) 650 mg CR tablet   No No   Sig: Take 1 tablet (650 mg total) by mouth every 8 (eight) hours as needed for mild pain   albuterol (PROVENTIL HFA,VENTOLIN HFA) 90 mcg/act inhaler  Self Yes No   Sig: Inhale 2 puffs every 6 (six) hours as needed for wheezing As needed   aspirin (ECOTRIN LOW STRENGTH) 81 mg EC tablet  Self Yes No   Sig: Take 81 mg by mouth daily   b complex vitamins capsule   Yes No   Sig: Take 1 capsule by mouth daily   bisacodyl (DULCOLAX) 5 mg EC tablet   No No   Sig: Take 1 tablet (5 mg total) by mouth once for 1 dose   budesonide (RINOCORT AQUA) 32 MCG/ACT nasal spray  Self Yes No   Si spray into each nostril 2 (two) times a day As needed   busPIRone (BUSPAR) 10 mg tablet   No No   Sig: TAKE 1 TABLET (10 MG TOTAL) BY MOUTH TWO (TWO) TIMES A DAY   busPIRone (BUSPAR) 7.5 mg tablet   Yes No   chlorhexidine (PERIDEX) 0.12 % solution  Self Yes No   Sig: RINSE MOUTH WITH 15ML (1 CAPFUL) FOR 30 SECONDS IN MORNING AND EVENING AFTER  BRUSHING, THEN SPIT   cloNIDine (CATAPRES) 0.1 mg tablet  Self Yes No   Sig: Take 0.1 mg by mouth 2 (two) times a day   clonazePAM (KlonoPIN) 0.5 mg tablet   Yes No   clonazePAM (KlonoPIN) 1 mg tablet   No No   Sig: Take 0.5 tablets (0.5 mg total) by mouth 2 (two) times a day   clotrimazole (LOTRIMIN) 1 % cream   Yes No   docusate sodium (COLACE) 100 mg capsule  Self Yes No   Sig: Take 100 mg by mouth 2 (two) times a day   famotidine (PEPCID) 20 mg tablet   Yes No   fluticasone (FLOVENT HFA) 220 mcg/act inhaler  Self Yes No   Sig: Inhale 1 puff daily as needed As needed   furosemide (LASIX) 40 mg tablet  Self Yes No   Sig: Take 40 mg by mouth as needed in the morning and 40 mg as needed in the evening.   gabapentin (NEURONTIN) 100 mg capsule   Yes No   losartan (COZAAR) 25 mg tablet  Self Yes No   lovastatin (MEVACOR) 10 MG tablet   Yes No   Sig: Take 10 mg by mouth daily at bedtime   methylPREDNISolone 4 MG tablet therapy pack   No No   Sig: Use as directed on package. Do not take other anti-inflammatory (NSAID) medications while on Medrol dose chandu. You should not suddenly stop using these medications. Follow the instructions listed on the prescription about tapering your dose.   mupirocin (BACTROBAN) 2 % ointment  Self Yes No   naloxone (NARCAN) 4 mg/0.1 mL nasal spray   No No   Sig: Administer 1 spray into a nostril. If no response after 2-3 minutes, give another dose in the other nostril using a new spray.   naproxen (NAPROSYN) 500 mg tablet  Self Yes No   Patient not taking: Reported on 11/14/2024   oxyCODONE (Roxicodone) 5 immediate release tablet   No No   Sig: Take 1 tablet (5 mg total) by mouth every 6 (six) hours as needed for moderate pain Max Daily Amount: 20 mg   Patient not taking: Reported on 11/14/2024   pantoprazole (PROTONIX) 40 mg tablet   Yes No   Sig: Take 40 mg by mouth daily   potassium chloride (K-DUR,KLOR-CON) 10 mEq tablet  Self Yes No   predniSONE 20 mg tablet   Yes No   Patient not  taking: Reported on 11/14/2024   propranolol (INDERAL) 10 mg tablet   Yes No   tamsulosin (FLOMAX) 0.4 mg  Self No No   Sig: TAKE 1 CAPSULE BY MOUTH EVERY DAY WITH DINNER   testosterone (ANDROGEL) 1.62 % TD gel pump  Self Yes No   tiZANidine (ZANAFLEX) 2 mg tablet   No No   Sig: Take 1 tablet (2 mg total) by mouth every 8 (eight) hours as needed for muscle spasms May make you drowsy. Do not drive until you know how it affects you. Do not take any other muscle relaxers while taking this medication   traZODone (DESYREL) 100 mg tablet   Yes No   zinc gluconate 50 mg tablet  Self Yes No   Sig: Take 50 mg by mouth in the morning.      Facility-Administered Medications: None     Patient has no known allergies.  Tobacco History: Reportedly quit in his 20s, approx 10 pack year      Objective :  Temp:  [97.5 °F (36.4 °C)-99.5 °F (37.5 °C)] 98.3 °F (36.8 °C)  HR:  [65-84] 65  BP: ()/(36-67) 97/52  Resp:  [14-23] 14  SpO2:  [80 %-98 %] 97 %  O2 Device: Nasal cannula  Nasal Cannula O2 Flow Rate (L/min):  [4 L/min-6 L/min] 4 L/min    Physical Exam  Vitals and nursing note reviewed.   Constitutional:       Appearance: He is well-developed. He is ill-appearing.   HENT:      Head: Normocephalic and atraumatic.      Mouth/Throat:      Pharynx: Oropharynx is clear.   Eyes:      Conjunctiva/sclera: Conjunctivae normal.   Cardiovascular:      Rate and Rhythm: Normal rate and regular rhythm.      Heart sounds: No murmur heard.  Pulmonary:      Effort: Pulmonary effort is normal. No respiratory distress.      Breath sounds: Rales present.   Abdominal:      Palpations: Abdomen is soft.      Tenderness: There is no abdominal tenderness.   Musculoskeletal:         General: No swelling.      Cervical back: Neck supple.   Skin:     General: Skin is warm and dry.      Capillary Refill: Capillary refill takes less than 2 seconds.   Neurological:      Mental Status: He is alert and oriented to person, place, and time.   Psychiatric:          Mood and Affect: Mood normal.           Lab Results: I have reviewed the following results:  .     12/02/24  1807 12/03/24  0505 12/03/24  0814   WBC 15.73* 10.17*  --    HGB 10.6* 9.6*  --    HCT 33.6* 32.1*  --     144*  --    SODIUM 138 140  --    K 4.0 4.0  --    CL 97 98  --    CO2 36* 37*  --    BUN 37* 30*  --    CREATININE 1.67* 1.45*  --    GLUC 118 115  --    MG  --  2.2  --    AST 12* 12*  --    ALT 12 8  --    ALB 3.2* 3.3*  --    TBILI 0.86 0.65  --    ALKPHOS 75 67  --    PTT 44*  --  40*   INR 1.36*  --  1.28*   LACTICACID 0.8  --   --      ABG:   .     12/03/24  1140   PHART 7.315*   HKS1QYN 69.1*   PO2ART 108.4   XSC7QCK 34.4*   BEART 6.4           PFT Results Reviewed: none    VTE Prophylaxis: VTE covered by:    None

## 2024-12-04 ENCOUNTER — TELEPHONE (OUTPATIENT)
Dept: PULMONOLOGY | Facility: CLINIC | Age: 69
End: 2024-12-04

## 2024-12-04 LAB
ANION GAP SERPL CALCULATED.3IONS-SCNC: 4 MMOL/L (ref 4–13)
ARTERIAL PATENCY WRIST A: YES
BASE EX.OXY STD BLDV CALC-SCNC: 64.3 % (ref 60–80)
BASE EXCESS BLDA CALC-SCNC: 8.9 MMOL/L
BASE EXCESS BLDV CALC-SCNC: 14.1 MMOL/L
BUN SERPL-MCNC: 24 MG/DL (ref 5–25)
CALCIUM SERPL-MCNC: 9 MG/DL (ref 8.4–10.2)
CHLORIDE SERPL-SCNC: 99 MMOL/L (ref 96–108)
CO2 SERPL-SCNC: 39 MMOL/L (ref 21–32)
CREAT SERPL-MCNC: 1.18 MG/DL (ref 0.6–1.3)
ERYTHROCYTE [DISTWIDTH] IN BLOOD BY AUTOMATED COUNT: 14.6 % (ref 11.6–15.1)
GFR SERPL CREATININE-BSD FRML MDRD: 62 ML/MIN/1.73SQ M
GLUCOSE SERPL-MCNC: 106 MG/DL (ref 65–140)
GLUCOSE SERPL-MCNC: 113 MG/DL (ref 65–140)
GLUCOSE SERPL-MCNC: 120 MG/DL (ref 65–140)
GLUCOSE SERPL-MCNC: 156 MG/DL (ref 65–140)
HCO3 BLDA-SCNC: 35.8 MMOL/L (ref 22–28)
HCO3 BLDV-SCNC: 43.9 MMOL/L (ref 24–30)
HCT VFR BLD AUTO: 35.7 % (ref 36.5–49.3)
HGB BLD-MCNC: 10.5 G/DL (ref 12–17)
MCH RBC QN AUTO: 29.6 PG (ref 26.8–34.3)
MCHC RBC AUTO-ENTMCNC: 29.4 G/DL (ref 31.4–37.4)
MCV RBC AUTO: 101 FL (ref 82–98)
MRSA NOSE QL CULT: NORMAL
NASAL CANNULA: 5
NON VENT- BIPAP: ABNORMAL
O2 CT BLDA-SCNC: 14.6 ML/DL (ref 16–23)
O2 CT BLDV-SCNC: 9.9 ML/DL
OXYHGB MFR BLDA: 94.2 % (ref 94–97)
PCO2 BLDA: 62.4 MM HG (ref 36–44)
PCO2 BLDV: 91.4 MM HG (ref 42–50)
PH BLDA: 7.38 [PH] (ref 7.35–7.45)
PH BLDV: 7.3 [PH] (ref 7.3–7.4)
PLATELET # BLD AUTO: 178 THOUSANDS/UL (ref 149–390)
PMV BLD AUTO: 10.5 FL (ref 8.9–12.7)
PO2 BLDA: 75.2 MM HG (ref 75–129)
PO2 BLDV: 33 MM HG (ref 35–45)
POTASSIUM SERPL-SCNC: 4.2 MMOL/L (ref 3.5–5.3)
RBC # BLD AUTO: 3.55 MILLION/UL (ref 3.88–5.62)
SODIUM SERPL-SCNC: 142 MMOL/L (ref 135–147)
SPECIMEN SOURCE: ABNORMAL
WBC # BLD AUTO: 7.29 THOUSAND/UL (ref 4.31–10.16)

## 2024-12-04 PROCEDURE — 36600 WITHDRAWAL OF ARTERIAL BLOOD: CPT

## 2024-12-04 PROCEDURE — 80048 BASIC METABOLIC PNL TOTAL CA: CPT

## 2024-12-04 PROCEDURE — 99232 SBSQ HOSP IP/OBS MODERATE 35: CPT | Performed by: INTERNAL MEDICINE

## 2024-12-04 PROCEDURE — 97535 SELF CARE MNGMENT TRAINING: CPT

## 2024-12-04 PROCEDURE — 85027 COMPLETE CBC AUTOMATED: CPT

## 2024-12-04 PROCEDURE — 82948 REAGENT STRIP/BLOOD GLUCOSE: CPT

## 2024-12-04 PROCEDURE — 82805 BLOOD GASES W/O2 SATURATION: CPT

## 2024-12-04 PROCEDURE — 99233 SBSQ HOSP IP/OBS HIGH 50: CPT

## 2024-12-04 PROCEDURE — 97530 THERAPEUTIC ACTIVITIES: CPT

## 2024-12-04 PROCEDURE — 94760 N-INVAS EAR/PLS OXIMETRY 1: CPT

## 2024-12-04 RX ORDER — AZITHROMYCIN 250 MG/1
500 TABLET, FILM COATED ORAL EVERY 24 HOURS
Status: DISCONTINUED | OUTPATIENT
Start: 2024-12-04 | End: 2024-12-05 | Stop reason: HOSPADM

## 2024-12-04 RX ORDER — ENOXAPARIN SODIUM 100 MG/ML
40 INJECTION SUBCUTANEOUS EVERY 12 HOURS
Status: DISCONTINUED | OUTPATIENT
Start: 2024-12-04 | End: 2024-12-05 | Stop reason: HOSPADM

## 2024-12-04 RX ADMIN — FLUTICASONE PROPIONATE 1 SPRAY: 50 SPRAY, METERED NASAL at 18:16

## 2024-12-04 RX ADMIN — ASPIRIN 81 MG: 81 TABLET, COATED ORAL at 08:09

## 2024-12-04 RX ADMIN — ENOXAPARIN SODIUM 40 MG: 40 INJECTION SUBCUTANEOUS at 13:45

## 2024-12-04 RX ADMIN — GABAPENTIN 100 MG: 100 CAPSULE ORAL at 18:16

## 2024-12-04 RX ADMIN — GABAPENTIN 100 MG: 100 CAPSULE ORAL at 08:09

## 2024-12-04 RX ADMIN — ACETAMINOPHEN 650 MG: 325 TABLET, FILM COATED ORAL at 10:11

## 2024-12-04 RX ADMIN — POLYETHYLENE GLYCOL 3350 17 G: 17 POWDER, FOR SOLUTION ORAL at 08:09

## 2024-12-04 RX ADMIN — PANTOPRAZOLE SODIUM 40 MG: 40 TABLET, DELAYED RELEASE ORAL at 08:09

## 2024-12-04 RX ADMIN — INSULIN LISPRO 1 UNITS: 100 INJECTION, SOLUTION INTRAVENOUS; SUBCUTANEOUS at 12:49

## 2024-12-04 RX ADMIN — FLUTICASONE PROPIONATE 1 SPRAY: 50 SPRAY, METERED NASAL at 08:10

## 2024-12-04 RX ADMIN — CEFTRIAXONE SODIUM 1000 MG: 10 INJECTION, POWDER, FOR SOLUTION INTRAVENOUS at 18:50

## 2024-12-04 RX ADMIN — PRAVASTATIN SODIUM 10 MG: 10 TABLET ORAL at 18:16

## 2024-12-04 NOTE — ASSESSMENT & PLAN NOTE
Lab Results   Component Value Date    HGBA1C 6.1 (H) 12/03/2024       Recent Labs     12/03/24  1108 12/03/24  1617 12/03/24  2319 12/04/24  0806   POCGLU 128 162* 147* 120       Blood Sugar Average: Last 72 hrs:  (P) [id number]

## 2024-12-04 NOTE — PROGRESS NOTES
"Progress Note - Hospitalist   Name: Edgar Obando 69 y.o. male I MRN: [id number]  [facility] Date of Admission: 12/2/2024   Date of Service: 12/4/2024 I Hospital Day: 1     Assessment & Plan  Acute hypoxemic respiratory failure (HCC)  No results for input(s): \"PH\", \"[id number]\", \"XN1YUOTGPYE\", \"CVD0OLZULNQQ\" in the last 72 hours.  Recent Labs     12/03/24  1140 12/04/24  0928   PHART 7.315* 7.377   CFC2PGF 69.1* 62.4*   PO2ART 108.4 75.2   RQC4QMP 34.4* 35.8*       Presented the ER with fatigue, cough x 3 days  Chest x-ray concerning for right-sided pneumonia on my read awaiting official read  Requiring 6 L nasal cannula in the ER  Patient is much better today alert and awake oriented x 4  Patient was compliant with BiPAP overnight.  Patient's pCO2 appears improved and pH trended down this morning.  Of note VBG has huge discrepancy with ABG.  Patient has a CPAP at night and we highly recommended  Pulm was consulted during this admission, appreciate recommendations  Continue ceftriaxone azithromycin to complete 7 days  Discussed with .  Patient wants outpatient rehab.  Type 2 diabetes mellitus, without long-term current use of insulin (HCC)  Lab Results   Component Value Date    HGBA1C 6.1 (H) 12/03/2024       Recent Labs     12/03/24  1617 12/03/24  2319 12/04/24  0806 12/04/24  1101   POCGLU 162* 147* 120 156*       Blood Sugar Average: Last 72 hrs:  (P) 127.875    Sliding scale insulin with meals   Continue PTA gabapentin  HTN (hypertension)  Holding PTA Lasix 60 mg daily as well as losartan given episodes of hypotension in the ER for which patient required IV fluids in the ER  Anxiety  Continue PTA as needed Klonopin as well as BuSpar  SUSY (obstructive sleep apnea)  Plan noted above    Pneumonia involving right lung      VTE Pharmacologic Prophylaxis: VTE Score: 4 Moderate Risk (Score 3-4) - Pharmacological DVT Prophylaxis Ordered: enoxaparin (Lovenox).    Mobility:   Basic Mobility " Inpatient Raw Score: 21  JH-HLM Goal: 6: Walk 10 steps or more  JH-HLM Achieved: 2: Bed activities/Dependent transfer  JH-HLM Goal achieved. Continue to encourage appropriate mobility.    Patient Centered Rounds: I performed bedside rounds with nursing staff today.   Discussions with Specialists or Other Care Team Provider: Pulm    Education and Discussions with Family / Patient: Updated  (wife) via phone.    Current Length of Stay: 1 day(s)  Current Patient Status: Inpatient   Certification Statement: The patient will continue to require additional inpatient hospital stay due to Shortness of breath  Discharge Plan: Anticipate discharge tomorrow to home.    Code Status: Level 1 - Full Code    Subjective   No complaints    Objective :  Temp:  [97.8 °F (36.6 °C)-99.3 °F (37.4 °C)] 99.3 °F (37.4 °C)  HR:  [65-98] 76  BP: ()/(52-67) 119/58  Resp:  [14-38] 38  SpO2:  [89 %-98 %] 95 %  O2 Device: Nasal cannula  Nasal Cannula O2 Flow Rate (L/min):  [4 L/min-5 L/min] 5 L/min    Body mass index is 40.56 kg/m².     Input and Output Summary (last 24 hours):     Intake/Output Summary (Last 24 hours) at 12/4/2024 1248  Last data filed at 12/4/2024 1000  Gross per 24 hour   Intake 650 ml   Output 1500 ml   Net -850 ml       Physical Exam  Vitals and nursing note reviewed.   Constitutional:       Appearance: He is well-developed and normal weight.   HENT:      Head: Normocephalic and atraumatic.      Nose: Nose normal.      Mouth/Throat:      Mouth: Mucous membranes are moist.   Eyes:      Conjunctiva/sclera: Conjunctivae normal.   Cardiovascular:      Rate and Rhythm: Normal rate and regular rhythm.      Heart sounds: Normal heart sounds. No murmur heard.  Pulmonary:      Effort: Pulmonary effort is normal. No respiratory distress.      Breath sounds: Normal breath sounds.   Abdominal:      General: Abdomen is flat.      Palpations: Abdomen is soft.      Tenderness: There is no abdominal tenderness.    Musculoskeletal:         General: No swelling.      Cervical back: Neck supple.      Right lower leg: No edema.      Left lower leg: No edema.   Skin:     General: Skin is warm and dry.      Capillary Refill: Capillary refill takes less than 2 seconds.   Neurological:      General: No focal deficit present.      Mental Status: He is alert and oriented to person, place, and time. Mental status is at baseline.   Psychiatric:         Mood and Affect: Mood normal.           Lines/Drains:              Lab Results: I have reviewed the following results:   Results from last 7 days   Lab Units 12/04/24  0539 12/03/24  0505   WBC Thousand/uL 7.29 10.17*   HEMOGLOBIN g/dL 10.5* 9.6*   HEMATOCRIT % 35.7* 32.1*   PLATELETS Thousands/uL 178 144*   SEGS PCT %  --  84*   LYMPHO PCT %  --  7*   MONO PCT %  --  6   EOS PCT %  --  2     Results from last 7 days   Lab Units 12/04/24  0539 12/03/24  0505   SODIUM mmol/L 142 140   POTASSIUM mmol/L 4.2 4.0   CHLORIDE mmol/L 99 98   CO2 mmol/L 39* 37*   BUN mg/dL 24 30*   CREATININE mg/dL 1.18 1.45*   ANION GAP mmol/L 4 5   CALCIUM mg/dL 9.0 8.2*   ALBUMIN g/dL  --  3.3*   TOTAL BILIRUBIN mg/dL  --  0.65   ALK PHOS U/L  --  67   ALT U/L  --  8   AST U/L  --  12*   GLUCOSE RANDOM mg/dL 113 115     Results from last 7 days   Lab Units 12/03/24  0814   INR  1.28*     Results from last 7 days   Lab Units 12/04/24  1101 12/04/24  0806 12/03/24  2319 12/03/24  1617 12/03/24  1108 12/03/24  0735 12/03/24  0520 12/03/24  0232   POC GLUCOSE mg/dl 156* 120 147* 162* 128 124 101 85     Results from last 7 days   Lab Units 12/03/24  0530   HEMOGLOBIN A1C % 6.1*     Results from last 7 days   Lab Units 12/03/24  0505 12/02/24  1807   LACTIC ACID mmol/L  --  0.8   PROCALCITONIN ng/ml 8.34* 13.02*       Recent Cultures (last 7 days):   Results from last 7 days   Lab Units 12/03/24  1403 12/03/24  1358 12/02/24  1822 12/02/24  1807   BLOOD CULTURE   --   --  No Growth at 24 hrs. No Growth at 24 hrs.    SPUTUM CULTURE  Culture too young- will reincubate  --   --   --    GRAM STAIN RESULT  Rare Epithelial cells per low power field*  No polys seen*  Rare Gram positive rods*  --   --   --    LEGIONELLA URINARY ANTIGEN   --  Negative  --   --        Imaging Results Review: I personally reviewed the following image studies/reports in PACS and discussed pertinent findings with Radiology: chest xray. My interpretation of the radiology images/reports is: Above.  Other Study Results Review: EKG was reviewed.     Last 24 Hours Medication List:     Current Facility-Administered Medications:     acetaminophen (TYLENOL) tablet 650 mg, Q6H PRN    albuterol (PROVENTIL HFA,VENTOLIN HFA) inhaler 2 puff, Q6H PRN    aspirin (ECOTRIN LOW STRENGTH) EC tablet 81 mg, Daily    azithromycin (ZITHROMAX) tablet 500 mg, Q24H    ceftriaxone (ROCEPHIN) 1 g/50 mL in dextrose IVPB, Q24H, Last Rate: Stopped (12/03/24 1746)    fluticasone (FLONASE) 50 mcg/act nasal spray 1 spray, BID    gabapentin (NEURONTIN) capsule 100 mg, TID    insulin lispro (HumALOG/ADMELOG) 100 units/mL subcutaneous injection 1-5 Units, TID AC **AND** Fingerstick Glucose (POCT), TID AC    insulin lispro (HumALOG/ADMELOG) 100 units/mL subcutaneous injection 1-5 Units, HS    pantoprazole (PROTONIX) EC tablet 40 mg, Early Morning    polyethylene glycol (MIRALAX) packet 17 g, Daily    pravastatin (PRAVACHOL) tablet 10 mg, Daily With Dinner    Administrative Statements   Today, Patient Was Seen By: Cecil Ragland MD  I have spent a total time of 60 minutes in caring for this patient on the day of the visit/encounter including Diagnostic results, Prognosis, Risks and benefits of tx options, Instructions for management, Patient and family education, Importance of tx compliance, Risk factor reductions, Impressions, Counseling / Coordination of care, Documenting in the medical record, Reviewing / ordering tests, medicine, procedures  , Obtaining or reviewing history  ,  and Communicating with other healthcare professionals .    **Please Note: This note may have been constructed using a voice recognition system.**

## 2024-12-04 NOTE — TELEPHONE ENCOUNTER
Received in basket message to schedule patient for a HFU. Patient previously followed with sanket so I scheduled with her for 12/18 as that was the first available.    In basket message:    DO TIM [place] Clerical  [facility],    Can we set this patient up for follow-up after discharge, please?    Thank you,  Ricardo Ham, DO

## 2024-12-04 NOTE — UTILIZATION REVIEW
Initial Clinical Review    Tx start date 12/2 in ED    Admission: Date/Time/Statement:   Admission Orders (From admission, onward)       Ordered        12/03/24 0006  INPATIENT ADMISSION  Once                          Orders Placed This Encounter   Procedures    INPATIENT ADMISSION     Standing Status:   Standing     Number of Occurrences:   1     Level of Care:   Level 2 Stepdown / HOT [14]     Estimated length of stay:   More than 2 Midnights     Certification:   I certify that inpatient services are medically necessary for this patient for a duration of greater than two midnights. See H&P and MD Progress Notes for additional information about the patient's course of treatment.     ED Arrival Information       Expected   -    Arrival   12/2/2024 16:48    Acuity   Less Urgent              Means of arrival   Wheelchair    Escorted by   Spouse    Service   Hospitalist    Admission type   Emergency              Arrival complaint   Medical Problem             Chief Complaint   Patient presents with    Flu Symptoms     Pt has been experiencing cough, congestion, constipation, fevers, and weakness for the past few days.        Initial Presentation: 69 y.o. male to ED from home via wheelchair on 12/2 with fatigue, cough x 3 days. On presentation pt will-appearing, congestion, rhonchi with increased wob. O2 sat 80% on RA. WBC 15.73, BUN 37, Cr 1.67. CXR concerning for R-sided PNA on prelim read. Pt requiring 6L NC in ED, additionally with episodes of hypotension in the ED for which she was given 3 L IV fluid as well as albumin. Started on IV abx in ED. PMHx: HTN, T2 Dm, Anxiety, SUSY  Admitted Inpatient to [facility] 2 step down unit for Acute Hypoxemic Respiratory Failure -- continue IV ceftriaxone and po azithromycin. IVFs. O2 6L nc,wean as venita. Accu-checks w/ ssi. Continue pta po meds. CPAP @ . SCDs. Reg diet.    Anticipated Length of Stay/Certification Statement:  Patient will be admitted on an inpatient basis with an  anticipated length of stay of greater than 2 midnights secondary to PNA, Respiratory Failure.     Pulmonology consult -- A: Acute hypoxemic respiratory failure, Acute on chronic hypercapnic respiratory failure, CAP, SUSY not adherent with CPAP, Class 3 obesity  Plan: Can use BiPAP qHS for now. On d/c should improve adherence with CPAP. Ceftriaxone/Azithromycin. F/u micro studies. Wean O2 for SPO2 >88%    Date: 12/4  Day 3: Has surpassed a 2nd midnight with active treatments and services.  Pt reports that he feels weak. Continues to have a productive cough. Remains on 6L nc. Pt refused Bipap overnight, elevated CO2 this am.  Continue abx with ceftriaxone and azithromycin day 3. Strep/ legionella negative. Sputum cx, MRSA pending. Family to bring in CPAP for use instructions with RT.      ED Treatment-Medication Administration from 12/02/2024 1647 to 12/03/2024 0046         Date/Time Order Dose Route Action     12/02/2024 1821 multi-electrolyte (ISOLYTE-S PH 7.4) bolus 1,000 mL 1,000 mL Intravenous New Bag     12/02/2024 1828 acetaminophen (TYLENOL) tablet 975 mg 975 mg Oral Incomplete     12/02/2024 1940 ceftriaxone (ROCEPHIN) 2 g/50 mL in dextrose IVPB 2,000 mg Intravenous New Bag     12/02/2024 2037 azithromycin (ZITHROMAX) 500 mg in sodium chloride 0.9% 250mL IVPB 500 mg 500 mg Intravenous New Bag     12/02/2024 1940 multi-electrolyte (ISOLYTE-S PH 7.4) bolus 1,000 mL 1,000 mL Intravenous New Bag     12/02/2024 2228 albumin human (FLEXBUMIN) 5 % injection 25 g 25 g Intravenous New Bag     12/02/2024 2348 multi-electrolyte (ISOLYTE-S PH 7.4) bolus 1,000 mL 1,000 mL Intravenous New Bag         Scheduled Medications:  aspirin, 81 mg, Oral, Daily  azithromycin, 500 mg, Oral, Q24H  cefTRIAXone, 1,000 mg, Intravenous, Q24H  enoxaparin, 40 mg, Subcutaneous, Q12H  fluticasone, 1 spray, Each Nare, BID  gabapentin, 100 mg, Oral, TID  insulin lispro, 1-5 Units, Subcutaneous, TID AC  insulin lispro, 1-5 Units, Subcutaneous,  HS  pantoprazole, 40 mg, Oral, Early Morning  polyethylene glycol, 17 g, Oral, Daily  pravastatin, 10 mg, Oral, Daily With Dinner    PRN Meds:  acetaminophen, 650 mg, Oral, Q6H PRN  albuterol, 2 puff, Inhalation, Q6H PRN      ED Triage Vitals [12/02/24 1653]   Temperature Pulse Respirations Blood Pressure SpO2 Pain Score   97.5 °F (36.4 °C) 78 20 124/58 92 % 2     Weight (last 2 days)       Date/Time Weight    12/03/24 0050 114 (251.32)            Vital Signs (last 3 days)       Date/Time Temp Pulse Resp BP MAP (mmHg) SpO2 Calculated FIO2 (%) - Nasal Cannula Nasal Cannula O2 Flow Rate (L/min) O2 Device O2 Interface Device Patient Position - Orthostatic VS Yoder Coma Scale Score Pain    12/04/24 1500 98.6 °F (37 °C) 75 31 130/66 92 95 % -- -- Nasal cannula -- Sitting -- --    12/04/24 1300 -- -- -- -- -- -- 32 3 L/min Nasal cannula -- -- -- --    12/04/24 1200 -- -- -- -- -- -- -- -- -- -- -- 15 --    12/04/24 1100 99.3 °F (37.4 °C) 76 38 119/58 83 95 % -- -- Nasal cannula -- Sitting -- --    12/04/24 1051 -- -- -- -- -- -- -- -- -- -- -- -- 7    12/04/24 1011 -- -- -- -- -- -- -- -- -- -- -- -- 7    12/04/24 0936 -- -- -- -- -- -- 40 5 L/min Nasal cannula -- -- -- --    12/04/24 0800 98.6 °F (37 °C) 92 18 155/67 96 92 % -- -- -- -- -- 15 No Pain    12/04/24 0700 -- 73 22 115/57 82 96 % -- -- -- -- -- -- --    12/04/24 0600 -- 73 16 110/55 79 96 % -- -- -- -- -- -- --    12/04/24 0500 -- 73 16 105/54 77 96 % -- -- -- -- -- -- --    12/04/24 0435 -- -- -- -- -- -- -- -- -- -- -- 15 --    12/04/24 0400 -- 98 38 116/58 83 97 % -- -- -- -- -- -- --    12/04/24 0300 -- 72 17 112/53 76 97 % -- -- -- -- -- -- --    12/04/24 0200 -- 75 18 115/58 83 98 % -- -- -- -- -- -- --    12/04/24 0100 -- 78 21 142/63 91 89 % -- -- -- -- -- -- 2    12/04/24 0000 -- 77 31 138/66 94 97 % -- -- -- -- -- 15 --    12/03/24 2300 -- 73 16 124/60 86 90 % -- -- -- -- -- -- --    12/03/24 2200 -- 73 15 120/56 81 93 % -- -- -- -- -- -- --     12/03/24 2000 98.5 °F (36.9 °C) -- -- -- -- -- -- -- -- -- -- 15 --    12/03/24 1600 97.8 °F (36.6 °C) 67 20 118/59 85 97 % 36 4 L/min Nasal cannula -- Sitting 15 No Pain    12/03/24 1500 97.8 °F (36.6 °C) 65 14 97/52 72 97 % 36 4 L/min Nasal cannula -- -- -- --    12/03/24 1400 -- 79 19 134/67 89 96 % 40 5 L/min Nasal cannula -- -- -- --    12/03/24 1341 -- -- -- -- -- -- -- -- -- -- -- -- No Pain    12/03/24 1340 -- -- -- -- -- -- -- -- -- -- -- -- No Pain    12/03/24 1330 -- 81 14 113/54 78 95 % -- -- -- -- -- -- --    12/03/24 1300 -- 81 19 103/54 76 95 % -- -- -- -- -- -- --    12/03/24 1230 -- 84 21 118/56 81 95 % -- -- -- -- -- 15 No Pain    12/03/24 1150 -- -- -- -- -- -- 40 5 L/min Nasal cannula -- -- -- --    12/03/24 1100 98.3 °F (36.8 °C) 70 19 129/58 84 96 % -- -- -- -- -- -- --    12/03/24 1000 -- 71 -- 130/62 89 97 % -- -- BiPAP -- -- -- --    12/03/24 0911 -- -- -- -- -- -- -- -- -- Face mask -- -- --    12/03/24 0900 -- 69 -- 94/50 69 95 % -- -- -- -- -- 15 No Pain    12/03/24 0839 -- -- -- -- -- -- 40 5 L/min Nasal cannula -- -- -- --    12/03/24 0800 -- 80 -- 131/63 91 98 % -- -- -- -- -- -- --    12/03/24 0730 -- 67 -- 112/59 79 98 % -- -- -- -- -- -- --    12/03/24 0634 -- -- -- -- -- -- -- -- -- Face mask -- -- --    12/03/24 0400 -- -- -- -- -- -- -- -- -- -- -- 15 --    12/03/24 0240 -- -- 22 -- -- -- -- -- -- -- -- -- --    12/03/24 0100 98.1 °F (36.7 °C) -- -- -- -- -- -- -- -- -- -- -- 3    12/03/24 0050 98.1 °F (36.7 °C) 80 23 118/58 82 95 % -- -- -- -- -- 15 --    12/03/24 0015 -- 77 22 102/57 73 94 % 44 6 L/min Nasal cannula -- Lying -- --    12/03/24 0000 -- 71 18 100/55 70 98 % 44 6 L/min Nasal cannula -- Lying -- --    12/02/24 2330 -- 70 22 90/52 66 95 % 44 6 L/min Nasal cannula -- Lying -- --    12/02/24 2315 -- 72 22 92/44 63 96 % 44 6 L/min Nasal cannula -- Lying -- --    12/02/24 2301 -- -- -- -- -- -- -- -- Nasal cannula -- -- -- --    12/02/24 2300 -- 70 22 90/48 66 93 % 44 6  L/min Nasal cannula -- Lying -- --    12/02/24 2245 -- 72 21 109/56 74 95 % 44 6 L/min Nasal cannula -- Lying -- --    12/02/24 2218 -- -- -- 92/55 -- -- -- -- -- -- -- -- --    12/02/24 2215 -- 75 20 97/54 70 95 % 44 6 L/min Nasal cannula -- -- -- --    12/02/24 2143 -- -- -- 102/46 -- -- -- -- -- -- Lying -- --    12/02/24 2130 -- 78 22 89/45 64 93 % 44 6 L/min Nasal cannula -- Lying -- --    12/02/24 2124 -- -- -- -- -- -- -- -- -- -- -- 15 --    12/02/24 2116 -- 76 18 89/51 -- 93 % 44 6 L/min Nasal cannula -- Lying -- --    12/02/24 2100 -- 80 20 76/36 52 93 % -- -- -- -- -- -- --    12/02/24 2045 -- 79 20 103/43 62 94 % 44 6 L/min Nasal cannula -- -- -- --    12/02/24 2030 -- 79 20 86/47 63 93 % 44 6 L/min Nasal cannula -- -- -- --    12/02/24 2015 -- 77 22 96/53 72 92 % 44 6 L/min Nasal cannula -- Lying -- --    12/02/24 2000 -- 78 22 88/51 67 95 % 44 6 L/min Nasal cannula -- -- -- --    12/02/24 1945 -- 73 20 90/49 65 96 % 44 6 L/min Nasal cannula -- Lying -- --    12/02/24 1915 -- 74 22 92/51 67 96 % 44 6 L/min Nasal cannula -- Lying -- --    12/02/24 1900 -- 68 22 93/54 70 96 % 44 6 L/min Nasal cannula -- Lying -- --    12/02/24 1823 99.5 °F (37.5 °C) -- -- -- -- -- -- -- -- -- -- -- --    12/02/24 1819 -- 73 -- 92/55 -- 96 % 44 6 L/min Nasal cannula -- -- -- --    12/02/24 1732 -- -- -- 102/54 -- 80 % -- -- None (Room air) -- -- -- --    12/02/24 1728 -- -- -- -- -- 97 % 44 6 L/min Nasal cannula -- -- -- --    12/02/24 1724 -- -- -- -- -- 80 % -- -- None (Room air) -- -- -- --    12/02/24 1653 97.5 °F (36.4 °C) 78 20 124/58 -- 92 % -- -- None (Room air) -- -- -- 2     Pertinent Labs/Diagnostic Test Results:   Radiology:  XR chest pa and lateral   ED Interpretation by Helder Ng MD (12/02 1856)   Right-sided lobar pneumonia      Final Interpretation by Alfonso Whitlock MD (12/03 0101)      Hypoinflation limiting evaluation.   Small right pleural effusion and mild bibasilar atelectasis. Scattered hazy  pulmonary infiltrates in the right upper lobe and right lower lobe are seen.             Results from last 7 days   Lab Units 12/04/24  0539 12/03/24  0505 12/02/24  1807   WBC Thousand/uL 7.29 10.17* 15.73*   HEMOGLOBIN g/dL 10.5* 9.6* 10.6*   HEMATOCRIT % 35.7* 32.1* 33.6*   PLATELETS Thousands/uL 178 144* 192   TOTAL NEUT ABS Thousands/µL  --  8.54* 13.77*       Results from last 7 days   Lab Units 12/04/24  0539 12/03/24  0505 12/02/24  1807   SODIUM mmol/L 142 140 138   POTASSIUM mmol/L 4.2 4.0 4.0   CHLORIDE mmol/L 99 98 97   CO2 mmol/L 39* 37* 36*   ANION GAP mmol/L 4 5 5   BUN mg/dL 24 30* 37*   CREATININE mg/dL 1.18 1.45* 1.67*   EGFR ml/min/1.73sq m 62 48 41   CALCIUM mg/dL 9.0 8.2* 8.5   MAGNESIUM mg/dL  --  2.2  --      Results from last 7 days   Lab Units 12/03/24  0505 12/02/24  1807   AST U/L 12* 12*   ALT U/L 8 12   ALK PHOS U/L 67 75   TOTAL PROTEIN g/dL 6.6 6.6   ALBUMIN g/dL 3.3* 3.2*   TOTAL BILIRUBIN mg/dL 0.65 0.86     Results from last 7 days   Lab Units 12/04/24  1101 12/04/24  0806 12/03/24  2319 12/03/24  1617 12/03/24  1108 12/03/24  0735 12/03/24  0520 12/03/24  0232   POC GLUCOSE mg/dl 156* 120 147* 162* 128 124 101 85     Results from last 7 days   Lab Units 12/04/24  0539 12/03/24  0505 12/02/24  1807   GLUCOSE RANDOM mg/dL 113 115 118     Results from last 7 days   Lab Units 12/03/24  0530   HEMOGLOBIN A1C % 6.1*   EAG mg/dl 128     Results from last 7 days   Lab Units 12/04/24  0928 12/03/24  1140   PH ART  7.377 7.315*   PCO2 ART mm Hg 62.4* 69.1*   PO2 ART mm Hg 75.2 108.4   HCO3 ART mmol/L 35.8* 34.4*   BASE EXC ART mmol/L 8.9 6.4   O2 CONTENT ART mL/dL 14.6* 15.3*   O2 HGB, ARTERIAL % 94.2 96.9   ABG SOURCE  Radial, Left Radial, Left     Results from last 7 days   Lab Units 12/04/24  0539 12/03/24  0815 12/03/24  0530   PH HANK  7.299* 7.269* 7.257*   PCO2 HANK mm Hg 91.4* 83.3* 79.7*   PO2 HANK mm Hg 33.0* 50.9* 36.5   HCO3 HANK mmol/L 43.9* 37.3* 34.7*   BASE EXC HANK mmol/L 14.1  8.0 5.7   O2 CONTENT HANK ml/dL 9.9 12.4 9.4   O2 HGB, VENOUS % 64.3 81.5* 64.5     Results from last 7 days   Lab Units 12/03/24  0814 12/02/24  1807   PROTIME seconds 16.3* 17.0*   INR  1.28* 1.36*   PTT seconds 40* 44*       Results from last 7 days   Lab Units 12/03/24  0505 12/02/24  1807   PROCALCITONIN ng/ml 8.34* 13.02*     Results from last 7 days   Lab Units 12/02/24  1807   LACTIC ACID mmol/L 0.8       Results from last 7 days   Lab Units 12/03/24  0530   BNP pg/mL 113*       Results from last 7 days   Lab Units 12/02/24  1937   CLARITY UA  Clear   COLOR UA  Light Yellow   SPEC GRAV UA  1.014   PH UA  5.5   GLUCOSE UA mg/dl Negative   KETONES UA mg/dl Negative   BLOOD UA  Negative   PROTEIN UA mg/dl Trace*   NITRITE UA  Negative   BILIRUBIN UA  Negative   UROBILINOGEN UA (BE) mg/dl 2.0*   LEUKOCYTES UA  Negative   WBC UA /hpf None Seen   RBC UA /hpf None Seen   BACTERIA UA /hpf Occasional   EPITHELIAL CELLS WET PREP /hpf Occasional   MUCUS THREADS  Occasional*     Results from last 7 days   Lab Units 12/03/24  1358   STREP PNEUMONIAE ANTIGEN, URINE  Negative   LEGIONELLA URINARY ANTIGEN  Negative     Results from last 7 days   Lab Units 12/03/24  1403 12/02/24  1822 12/02/24  1807   BLOOD CULTURE   --  No Growth at 24 hrs. No Growth at 24 hrs.   SPUTUM CULTURE  Culture too young- will reincubate  --   --    GRAM STAIN RESULT  Rare Epithelial cells per low power field*  No polys seen*  Rare Gram positive rods*  --   --      Past Medical History:   Diagnosis Date    Acid reflux     Acute blood loss anemia 07/15/2016    Anxiety     Arthritis     Cellulitis     left ankle    CPAP (continuous positive airway pressure) dependence     as needed per patient    Diabetes mellitus (HCC)     Hypertension     Idiopathic chronic venous hypertension of right lower extremity with ulcer ([facility]) 06/12/2023    Sleep apnea     Twitching      Present on Admission:   Acute hypoxemic respiratory failure ([facility])   Type 2 diabetes  mellitus, without long-term current use of insulin (HCC)   SUSY (obstructive sleep apnea)   Anxiety   HTN (hypertension)      Admitting Diagnosis: Pneumonia [J18.9]  LAKHWINDER (acute kidney injury) (HCC) [N17.9]  Chronic diastolic congestive heart failure (HCC) [I50.32]  Flu-like symptoms [R68.89]  Sepsis (HCC) [A41.9]  Age/Sex: 69 y.o. male    Network Utilization Review Department  ATTENTION: Please call with any questions or concerns to [phone] and carefully listen to the prompts so that you are directed to the right person. All voicemails are confidential.   For Discharge needs, contact Care Management DC Support Team at [phone] opt. 2  Send all requests for admission clinical reviews, approved or denied determinations and any other requests to dedicated fax number below belonging to the campus where the patient is receiving treatment. List of dedicated fax numbers for the Facilities:  FACILITY NAME UR FAX NUMBER   ADMISSION DENIALS (Administrative/Medical Necessity) [phone]   DISCHARGE SUPPORT TEAM (NETWORK) [phone]   PARENT CHILD HEALTH (Maternity/NICU/Pediatrics) [phone]   [facility] [phone]   [facility] [phone]   Critical access hospital [phone]   [facility] [phone]   [facility] [phone]   [facility] [phone]   [facility] [phone]   [facility] [phone]   [facility] [phone]   [facility] [phone]   [facility] [phone]   [facility] [phone]

## 2024-12-04 NOTE — CASE MANAGEMENT
Case Management Discharge Planning Note    Patient name Edgar Obando  Location [facility] MRN [id number]  : 1955 Date 2024       Current Admission Date: 2024  Current Admission Diagnosis:Acute hypoxemic respiratory failure (HCC)   Patient Active Problem List    Diagnosis Date Noted Date Diagnosed    Acute hypoxemic respiratory failure (HCC) 2024     Pneumonia involving right lung 2024     Protein-calorie malnutrition, unspecified severity ([facility]) 2024     Current mild episode of major depressive disorder, unspecified whether recurrent ([facility]) 2024     Thrombocytopenia ([facility]) 2024     S/P cervical spinal fusion 2024     Abnormal chest x-ray 2024     Stage 3a chronic kidney disease ([facility]) 2023     Hepatitis C virus carrier state ([facility]) 2023     Cervical spondylosis 2023     Chronic diastolic congestive heart failure ([facility]) 2023     Respiratory failure with hypoxia and hypercapnia, unspecified chronicity ([facility]) 2023     Obstructive sleep apnea syndrome 2023     Iron deficiency anemia, unspecified 2022     Chronic pain syndrome 2022     Lumbar spondylosis 2022     Neck pain 2022     Right shoulder pain 2022     Chronic low back pain with bilateral sciatica 2022     Diabetic neuropathy ([facility]) 2020     RBD (REM behavioral disorder) 2020     Primary osteoarthritis of right knee 2019     Chronic pain of right knee 2019     Chronic venous insufficiency 2019     Bilateral lower extremity edema 2019     BMI 40.0-44.9, adult ([facility]) 2019     Myoclonus      DDD (degenerative disc disease), lumbar 2018     Spinal stenosis of lumbar region 2018     Low back pain with sciatica 2018     S/P total knee replacement 07/15/2016     Type 2 diabetes mellitus, without long-term current use of insulin ([facility]) 07/15/2016     HTN (hypertension) 07/15/2016      GERD (gastroesophageal reflux disease) 07/15/2016     Anxiety 07/15/2016     SUSY (obstructive sleep apnea) 07/15/2016       LOS (days): 1  Geometric Mean LOS (GMLOS) (days): 4  Days to GMLOS:2.5     OBJECTIVE:  Risk of Unplanned Readmission Score: 11.54         Current admission status: Inpatient   Preferred Pharmacy:   [facility] - [place], [place] - [address]  [address]  [address]  [place] [address]  Phone: [phone] Fax: [phone]    [id number] - [place], [place] - [address]  [address]  [place] [address]  Phone: [phone] Fax: [phone]    [facility] [place]  [place], [place] - [address]  [address]  [place] [address]  Phone: [phone] Fax: [phone]    Primary Care Provider: Ana Rosa Win MD    Primary Insurance: [facility]  Secondary Insurance:     DISCHARGE DETAILS:     Additional Comments: Patient reviewed during care coordination rounds, pt likely medically stable for discharge in 24 hours. CM met with pt at bedside to review level 3 recommendation, pt reports that his preference is for OPPT as his house is under construction. CM provided OPPT list at bedside and provider aware to place OPPT orders for discharge. Pt currently on 5L 02 and doesn't wear 02 at baseline, CM requested a home 02 eval. CM department to follow.

## 2024-12-04 NOTE — PLAN OF CARE
Problem: Prexisting or High Potential for Compromised Skin Integrity  Goal: Skin integrity is maintained or improved  Description: INTERVENTIONS:  - Identify patients at risk for skin breakdown  - Assess and monitor skin integrity  - Assess and monitor nutrition and hydration status  - Monitor labs   - Assess for incontinence   - Turn and reposition patient  - Assist with mobility/ambulation  - Relieve pressure over bony prominences  - Avoid friction and shearing  - Provide appropriate hygiene as needed including keeping skin clean and dry  - Evaluate need for skin moisturizer/barrier cream  - Collaborate with interdisciplinary team   - Patient/family teaching  - Consider wound care consult   Outcome: Progressing     Problem: PAIN - ADULT  Goal: Verbalizes/displays adequate comfort level or baseline comfort level  Description: Interventions:  - Encourage patient to monitor pain and request assistance  - Assess pain using appropriate pain scale  - Administer analgesics based on type and severity of pain and evaluate response  - Implement non-pharmacological measures as appropriate and evaluate response  - Consider cultural and social influences on pain and pain management  - Notify physician/advanced practitioner if interventions unsuccessful or patient reports new pain  Outcome: Progressing     Problem: DISCHARGE PLANNING  Goal: Discharge to home or other facility with appropriate resources  Description: INTERVENTIONS:  - Identify barriers to discharge w/patient and caregiver  - Arrange for needed discharge resources and transportation as appropriate  - Identify discharge learning needs (meds, wound care, etc.)  - Arrange for interpretive services to assist at discharge as needed  - Refer to Case Management Department for coordinating discharge planning if the patient needs post-hospital services based on physician/advanced practitioner order or complex needs related to functional status, cognitive ability, or  social support system  Outcome: Progressing     Problem: Knowledge Deficit  Goal: Patient/family/caregiver demonstrates understanding of disease process, treatment plan, medications, and discharge instructions  Description: Complete learning assessment and assess knowledge base.  Interventions:  - Provide teaching at level of understanding  - Provide teaching via preferred learning methods  Outcome: Progressing

## 2024-12-04 NOTE — OCCUPATIONAL THERAPY NOTE
Occupational Therapy Progress Note     Patient Name: Edgar Obando  Today's Date: 12/4/2024  Problem List  Principal Problem:    Acute hypoxemic respiratory failure (HCC)  Active Problems:    Type 2 diabetes mellitus, without long-term current use of insulin (HCC)    HTN (hypertension)    Anxiety    SUSY (obstructive sleep apnea)    Pneumonia involving right lung        12/04/24 1051   OT Last Visit   OT Visit Date 12/04/24   Note Type   Note Type Treatment   Pain Assessment   Pain Assessment Tool 0-10   Pain Score 7   Pain Location/Orientation Location: Head   Restrictions/Precautions   Weight Bearing Precautions Per Order No   Other Precautions Bed Alarm;Chair Alarm;Telemetry;Fall Risk;O2;Pain   Lifestyle   Autonomy Pt reports (I) with ADLs and functional mobility. Pt reuqires assistance for IADLs. Pt +  and retired   Reciprocal Relationships family   Service to Others retired   ADL   Where Assessed Edge of bed   LB Dressing Assistance 4  Minimal Assistance   LB Dressing Deficit Increased time to complete;Don/doff R shoe;Don/doff L shoe   LB Dressing Comments Pt requires MIN A to don slip on shoes EOB   Toileting Assistance  4  Minimal Assistance   Toileting Deficit Steadying;Use of bedpan/urinal setup   Toileting Comments Pt requries MIN A for steadying to use urinal in standing   Bed Mobility   Supine to Sit 5  Supervision   Additional items Increased time required;Verbal cues   Transfers   Sit to Stand 4  Minimal assistance   Additional items Assist x 1;Increased time required;Verbal cues   Stand to Sit 4  Minimal assistance   Additional items Assist x 1;Increased time required;Verbal cues   Additional Comments SPC   Functional Mobility   Functional Mobility 4  Minimal assistance   Additional Comments Pt requires MIN Ax1 to ambulate short household distances with SPC and multiple standing rest breaks. O2 to 88 during ambulation, recovered with rest   Additional items SPC   Cognition   Overall Cognitive  Status WFL   Arousal/Participation Responsive;Alert;Cooperative   Attention Within functional limits   Orientation Level Oriented X4   Memory Within functional limits   Following Commands Follows all commands and directions without difficulty   Comments Pt agreeable to therapy   Activity Tolerance   Activity Tolerance Patient limited by fatigue   Medical Staff Made Aware RN Cleared   Assessment   Assessment Pt was seen on 12/4/2024 to address ADL retraining, functional transfer training, and activity tolerance/endurance. Pt demonstrating improvements and currently requires MIN A to don shoes seated EOB, use urinal in standing, complete functional transfers, and ambulate short household distances with SPC and multiple standing rest breaks. Pt educated on energy conservation techniques and home safety. Pt is limited by decreased ADL status, functional transfers, functional mobility, and activity tolerance. Pt supine in bed at beginning of session and seated in bedside chair at end of session with alarm set and all items within reach. The patient's raw score on the AM-PAC Daily Activity Inpatient Short Form is 20. A raw score of greater than or equal to 19 suggests the patient may benefit from discharge to home. Please refer to the recommendation of the Occupational Therapist for safe discharge planning. Recommend Level III minimum intensity OT services  at d/c to maximize pt function.   Plan   Treatment Interventions ADL retraining;Functional transfer training;UE strengthening/ROM;Endurance training;Cognitive reorientation;Patient/family training;Equipment evaluation/education;Neuromuscular reeducation;Compensatory technique education;Fine motor coordination activities;Continued evaluation;Energy conservation;Activityengagement   Goal Expiration Date 12/17/24   OT Treatment Day 1   OT Frequency 2-3x/wk   Discharge Recommendation   Rehab Resource Intensity Level, OT III (Minimum Resource Intensity)   AM-PAC Daily  Activity Inpatient   Lower Body Dressing 3   Bathing 3   Toileting 3   Upper Body Dressing 3   Grooming 4   Eating 4   Daily Activity Raw Score 20   Daily Activity Standardized Score (Calc for Raw Score >=11) 42.03   AM-PAC Applied Cognition Inpatient   Following a Speech/Presentation 4   Understanding Ordinary Conversation 4   Taking Medications 4   Remembering Where Things Are Placed or Put Away 4   Remembering List of 4-5 Errands 4   Taking Care of Complicated Tasks 4   Applied Cognition Raw Score 24   Applied Cognition Standardized Score 62.21   End of Consult   Education Provided Yes  (Pt educated on energy conservation techniques)   Patient Position at End of Consult Bedside chair;Bed/Chair alarm activated;All needs within reach   Nurse Communication Nurse aware of consult     NEO Gomez, OTR/L

## 2024-12-04 NOTE — PLAN OF CARE
Problem: OCCUPATIONAL THERAPY ADULT  Goal: Performs self-care activities at highest level of function for planned discharge setting.  See evaluation for individualized goals.  Description: Treatment Interventions: ADL retraining, Functional transfer training, UE strengthening/ROM, Endurance training, Patient/family training, Equipment evaluation/education, Compensatory technique education, Continued evaluation, Energy conservation, Activityengagement          See flowsheet documentation for full assessment, interventions and recommendations.   Outcome: Progressing  Note: Limitation: Decreased ADL status, Decreased endurance, Decreased high-level ADLs, Decreased self-care trans  Prognosis: Good  Assessment: Pt was seen on 12/4/2024 to address ADL retraining, functional transfer training, and activity tolerance/endurance. Pt demonstrating improvements and currently requires MIN A to don shoes seated EOB, use urinal in standing, complete functional transfers, and ambulate short household distances with SPC and multiple standing rest breaks. Pt educated on energy conservation techniques and home safety. Pt is limited by decreased ADL status, functional transfers, functional mobility, and activity tolerance. Pt supine in bed at beginning of session and seated in bedside chair at end of session with alarm set and all items within reach. The patient's raw score on the -PAC Daily Activity Inpatient Short Form is 20. A raw score of greater than or equal to 19 suggests the patient may benefit from discharge to home. Please refer to the recommendation of the Occupational Therapist for safe discharge planning. Recommend Level III minimum intensity OT services  at d/c to maximize pt function.     Rehab Resource Intensity Level, OT: III (Minimum Resource Intensity)

## 2024-12-04 NOTE — ASSESSMENT & PLAN NOTE
Patient with diagnosed SUSY noncompliant with CPAP, likely component of obesity hypoventilation syndrome  -Patient instructed in the importance of nightly use  -continue nightly bipap while hospitalized  - family to bring in CPAP for use instructions with RT

## 2024-12-04 NOTE — PROGRESS NOTES
Progress Note - Pulmonology   Name: Edgar Obando 69 y.o. male I MRN: [id number]  [facility] I Date of Admission: 12/2/2024   Date of Service: 12/4/2024 I Hospital Day: 1     Assessment & Plan  Acute hypoxemic respiratory failure (HCC)  Patient with shortness of breath since Friday with productive cough. Patient was hypotensive requiring fluid resuscitation and hypoxic on initial evaluation. Patient was also found to be in respiratory acidosis with elevated CO2 requiring placement on Bipap.  Likely secondary to bacterial community acquired pneumonia plus untreated sleep apnea with CPAP noncompliance. Respiratory acidosis improved with bipap and patient has imporved clinically with IVF and antibiotics. CXR with hazy RUL,RLL infiltrate, procal 13  Patient reportedly refused Bipap overnight, elevated CO2 this am.   - Continue abx with ceftriaxone and azithromycin day 3  - strep/ legionella negative  - sputum cx, MRSA pending  - nightly bipap while hospitalized, can resume CPAP at home, patient's family will bring in home CPAP to have RT show proper use  SUSY (obstructive sleep apnea)  Patient with diagnosed SUSY noncompliant with CPAP, likely component of obesity hypoventilation syndrome  -Patient instructed in the importance of nightly use  -continue nightly bipap while hospitalized  - family to bring in CPAP for use instructions with RT  Pneumonia involving right lung  See AHRF  Type 2 diabetes mellitus, without long-term current use of insulin ([facility])  Lab Results   Component Value Date    HGBA1C 6.1 (H) 12/03/2024       Recent Labs     12/03/24  1108 12/03/24  1617 12/03/24  2319 12/04/24  0806   POCGLU 128 162* 147* 120       Blood Sugar Average: Last 72 hrs:  (P) 123.3574324216290347    HTN (hypertension)    Anxiety      24 Hour Events : Patient refused bipap overnight  Subjective : Patient seen and examined this morning. Patient reports that he feels weak. He was unable to recall refusing bipap last  evening.He continues to have a productive cough.     Objective :  Temp:  [97.8 °F (36.6 °C)-98.6 °F (37 °C)] 98.6 °F (37 °C)  HR:  [65-98] 92  BP: ()/(52-67) 155/67  Resp:  [14-38] 18  SpO2:  [89 %-98 %] 92 %  O2 Device: Nasal cannula  Nasal Cannula O2 Flow Rate (L/min):  [4 L/min-5 L/min] 5 L/min    Physical Exam  Vitals and nursing note reviewed.   Constitutional:       General: He is not in acute distress.     Appearance: He is well-developed. He is ill-appearing.   HENT:      Head: Normocephalic and atraumatic.      Mouth/Throat:      Pharynx: Oropharynx is clear.   Eyes:      Conjunctiva/sclera: Conjunctivae normal.   Cardiovascular:      Rate and Rhythm: Normal rate and regular rhythm.      Heart sounds: No murmur heard.  Pulmonary:      Effort: Pulmonary effort is normal. No respiratory distress.      Breath sounds: Examination of the right-lower field reveals rales. Rales present.   Abdominal:      Palpations: Abdomen is soft.      Tenderness: There is no abdominal tenderness.   Musculoskeletal:         General: No swelling.      Cervical back: Neck supple.   Skin:     General: Skin is warm and dry.      Capillary Refill: Capillary refill takes less than 2 seconds.   Neurological:      General: No focal deficit present.      Mental Status: He is alert.   Psychiatric:         Mood and Affect: Mood normal.           Lab Results: I have reviewed the following results:   .     12/04/24  0539   WBC 7.29   HGB 10.5*   HCT 35.7*      SODIUM 142   K 4.2   CL 99   CO2 39*   BUN 24   CREATININE 1.18   GLUC 113     ABG:   .     12/03/24  1140 12/04/24  0928   PHART 7.315* 7.377   BJR3YGB 69.1* 62.4*   PO2ART 108.4 75.2   QIY1QJD 34.4* 35.8*   BEART 6.4 8.9

## 2024-12-04 NOTE — ASSESSMENT & PLAN NOTE
"No results for input(s): \"PH\", \"VYC9ZOVXWJSH\", \"UV6UNYZJMEY\", \"FAG1XYAYNGWK\" in the last 72 hours.  Recent Labs     12/03/24  1140 12/04/24  0928   PHART 7.315* 7.377   ALX6EBJ 69.1* 62.4*   PO2ART 108.4 75.2   HCM1GIX 34.4* 35.8*       Presented the ER with fatigue, cough x 3 days  Chest x-ray concerning for right-sided pneumonia on my read awaiting official read  Requiring 6 L nasal cannula in the ER  Patient is much better today alert and awake oriented x 4  Patient was compliant with BiPAP overnight.  Patient's pCO2 appears improved and pH trended down this morning.  Of note VBG has huge discrepancy with ABG.  Patient has a CPAP at night and we highly recommended  Pulm was consulted during this admission, appreciate recommendations  Continue ceftriaxone azithromycin to complete 7 days  Discussed with .  Patient wants outpatient rehab.  "

## 2024-12-04 NOTE — PROGRESS NOTES
Pastoral Care Progress Note             12/04/24 1500   Clinical Encounter Type   Visited With Patient  (Father Darlin)   Restorationist Encounters   Restorationist Needs Prayer  (Father Darlin offered the patient a blessing)   Sacramental Encounters   Sacrament of Sick-Anointing Patient declined anointing

## 2024-12-04 NOTE — ASSESSMENT & PLAN NOTE
Patient with shortness of breath since Friday with productive cough. Patient was hypotensive requiring fluid resuscitation and hypoxic on initial evaluation. Patient was also found to be in respiratory acidosis with elevated CO2 requiring placement on Bipap.  Likely secondary to bacterial community acquired pneumonia plus untreated sleep apnea with CPAP noncompliance. Respiratory acidosis improved with bipap and patient has imporved clinically with IVF and antibiotics. CXR with hazy RUL,RLL infiltrate, procal 13  Patient reportedly refused Bipap overnight, elevated CO2 this am.   - Continue abx with ceftriaxone and azithromycin day 3  - strep/ legionella negative  - sputum cx, MRSA pending  - nightly bipap while hospitalized, can resume CPAP at home, patient's family will bring in home CPAP to have RT show proper use

## 2024-12-04 NOTE — RESPIRATORY THERAPY NOTE
12/03/24 2923   Respiratory Assessment   Resp Comments pt refusing bipap tonight, pt aware of retaining c02

## 2024-12-04 NOTE — ASSESSMENT & PLAN NOTE
Lab Results   Component Value Date    HGBA1C 6.1 (H) 12/03/2024       Recent Labs     12/03/24  1617 12/03/24  2319 12/04/24  0806 12/04/24  1101   POCGLU 162* 147* 120 156*       Blood Sugar Average: Last 72 hrs:  (P) 127.875    Sliding scale insulin with meals   Continue PTA gabapentin

## 2024-12-05 VITALS
OXYGEN SATURATION: 96 % | WEIGHT: 251.32 LBS | BODY MASS INDEX: 40.39 KG/M2 | TEMPERATURE: 98.4 F | SYSTOLIC BLOOD PRESSURE: 133 MMHG | DIASTOLIC BLOOD PRESSURE: 63 MMHG | HEIGHT: 66 IN | RESPIRATION RATE: 18 BRPM | HEART RATE: 83 BPM

## 2024-12-05 LAB
ANION GAP SERPL CALCULATED.3IONS-SCNC: 8 MMOL/L (ref 4–13)
BACTERIA SPT RESP CULT: ABNORMAL
BUN SERPL-MCNC: 19 MG/DL (ref 5–25)
CALCIUM SERPL-MCNC: 9.3 MG/DL (ref 8.4–10.2)
CHLORIDE SERPL-SCNC: 98 MMOL/L (ref 96–108)
CO2 SERPL-SCNC: 36 MMOL/L (ref 21–32)
CREAT SERPL-MCNC: 0.9 MG/DL (ref 0.6–1.3)
ERYTHROCYTE [DISTWIDTH] IN BLOOD BY AUTOMATED COUNT: 14.3 % (ref 11.6–15.1)
GFR SERPL CREATININE-BSD FRML MDRD: 86 ML/MIN/1.73SQ M
GLUCOSE SERPL-MCNC: 132 MG/DL (ref 65–140)
GLUCOSE SERPL-MCNC: 140 MG/DL (ref 65–140)
GLUCOSE SERPL-MCNC: 157 MG/DL (ref 65–140)
GRAM STN SPEC: ABNORMAL
HCT VFR BLD AUTO: 33.5 % (ref 36.5–49.3)
HGB BLD-MCNC: 10.3 G/DL (ref 12–17)
MCH RBC QN AUTO: 29.9 PG (ref 26.8–34.3)
MCHC RBC AUTO-ENTMCNC: 30.7 G/DL (ref 31.4–37.4)
MCV RBC AUTO: 97 FL (ref 82–98)
PLATELET # BLD AUTO: 183 THOUSANDS/UL (ref 149–390)
PMV BLD AUTO: 10.9 FL (ref 8.9–12.7)
POTASSIUM SERPL-SCNC: 4.1 MMOL/L (ref 3.5–5.3)
RBC # BLD AUTO: 3.44 MILLION/UL (ref 3.88–5.62)
SODIUM SERPL-SCNC: 142 MMOL/L (ref 135–147)
WBC # BLD AUTO: 5.97 THOUSAND/UL (ref 4.31–10.16)

## 2024-12-05 PROCEDURE — 82948 REAGENT STRIP/BLOOD GLUCOSE: CPT

## 2024-12-05 PROCEDURE — 94761 N-INVAS EAR/PLS OXIMETRY MLT: CPT

## 2024-12-05 PROCEDURE — 99239 HOSP IP/OBS DSCHRG MGMT >30: CPT

## 2024-12-05 PROCEDURE — 85027 COMPLETE CBC AUTOMATED: CPT

## 2024-12-05 PROCEDURE — 99232 SBSQ HOSP IP/OBS MODERATE 35: CPT | Performed by: INTERNAL MEDICINE

## 2024-12-05 PROCEDURE — 80048 BASIC METABOLIC PNL TOTAL CA: CPT

## 2024-12-05 RX ORDER — AZITHROMYCIN 500 MG/1
500 TABLET, FILM COATED ORAL EVERY 24 HOURS
Qty: 3 TABLET | Refills: 0 | Status: SHIPPED | OUTPATIENT
Start: 2024-12-05 | End: 2024-12-08

## 2024-12-05 RX ORDER — LABETALOL HYDROCHLORIDE 5 MG/ML
10 INJECTION, SOLUTION INTRAVENOUS ONCE
Status: COMPLETED | OUTPATIENT
Start: 2024-12-05 | End: 2024-12-05

## 2024-12-05 RX ORDER — CEFDINIR 300 MG/1
300 CAPSULE ORAL EVERY 12 HOURS SCHEDULED
Qty: 6 CAPSULE | Refills: 0 | Status: SHIPPED | OUTPATIENT
Start: 2024-12-05 | End: 2024-12-08

## 2024-12-05 RX ADMIN — AZITHROMYCIN DIHYDRATE 500 MG: 250 TABLET ORAL at 00:01

## 2024-12-05 RX ADMIN — ENOXAPARIN SODIUM 40 MG: 40 INJECTION SUBCUTANEOUS at 00:01

## 2024-12-05 RX ADMIN — GABAPENTIN 100 MG: 100 CAPSULE ORAL at 09:02

## 2024-12-05 RX ADMIN — LABETALOL HYDROCHLORIDE 10 MG: 5 INJECTION, SOLUTION INTRAVENOUS at 00:36

## 2024-12-05 RX ADMIN — PANTOPRAZOLE SODIUM 40 MG: 40 TABLET, DELAYED RELEASE ORAL at 06:04

## 2024-12-05 RX ADMIN — INSULIN LISPRO 1 UNITS: 100 INJECTION, SOLUTION INTRAVENOUS; SUBCUTANEOUS at 12:16

## 2024-12-05 RX ADMIN — ACETAMINOPHEN 650 MG: 325 TABLET, FILM COATED ORAL at 00:01

## 2024-12-05 RX ADMIN — ENOXAPARIN SODIUM 40 MG: 40 INJECTION SUBCUTANEOUS at 12:16

## 2024-12-05 RX ADMIN — POLYETHYLENE GLYCOL 3350 17 G: 17 POWDER, FOR SOLUTION ORAL at 09:02

## 2024-12-05 RX ADMIN — FLUTICASONE PROPIONATE 1 SPRAY: 50 SPRAY, METERED NASAL at 09:02

## 2024-12-05 RX ADMIN — ASPIRIN 81 MG: 81 TABLET, COATED ORAL at 09:02

## 2024-12-05 NOTE — DISCHARGE SUMMARY
"Discharge Summary - Hospitalist   Name: Edgar Obando 69 y.o. male I MRN: [id number]  Unit/Bed#: [facility] 411-01 I Date of Admission: 12/2/2024   Date of Service: 12/5/2024 I Hospital Day: 2     Assessment & Plan  Acute hypoxemic respiratory failure (HCC)  No results for input(s): \"PH\", \"YTA8DGKRYDUX\", \"BD1XHZTIBTA\", \"FSM6FSWAFOYX\" in the last 72 hours.  Recent Labs     12/03/24  1140 12/04/24  0928   PHART 7.315* 7.377   JHS8CTU 69.1* 62.4*   PO2ART 108.4 75.2   OIE5NRL 34.4* 35.8*       Presented the ER with fatigue, cough x 3 days  Chest x-ray concerning for right-sided pneumonia on my read awaiting official read  Requiring 6 L nasal cannula in the ER  Patient is much better today alert and awake oriented x 4  Patient highly encouraged to continue CPAP at home  Advised to follow-up with pulmonology on 12/18  Advised to follow-up with sleep because patient has some discomfort with the facial mask on his CPAP  Completed 7-day course for antibiotics    Type 2 diabetes mellitus, without long-term current use of insulin (HCC)  Lab Results   Component Value Date    HGBA1C 6.1 (H) 12/03/2024       Recent Labs     12/04/24  1101 12/04/24  1718 12/05/24  0758 12/05/24  1158   POCGLU 156* 106 132 157*       Blood Sugar Average: Last 72 hrs:  (P) 128.5915547914626979  Continue home medication.  HTN (hypertension)  Holding PTA Lasix 60 mg daily as well as losartan given episodes of hypotension in the ER for which patient required IV fluids in the ER  Anxiety  Continue PTA as needed Klonopin as well as BuSpar  SUSY (obstructive sleep apnea)  Plan noted above    Pneumonia involving right lung  Plan noted above.     Medical Problems       Resolved Problems  Date Reviewed: 11/6/2024   None       Discharging Physician / Practitioner: Cecil Ragland MD  PCP: Ana Rosa Win MD  Admission Date:   Admission Orders (From admission, onward)       Ordered        12/03/24 0006  INPATIENT ADMISSION  Once                      " "    Discharge Date: 12/05/24    Consultations During Hospital Stay:  Pulm    Procedures Performed:   none    Significant Findings / Test Results:   XR chest pa and lateral  Result Date: 12/3/2024  Impression: Hypoinflation limiting evaluation.   Small right pleural effusion and mild bibasilar atelectasis. Scattered hazy pulmonary infiltrates in the right upper lobe and right lower lobe are seen. Workstation performed: [id number]       No Chest XR results available for this patient.   No results for input(s): \"PH\", \"KZU1DROFAMYI\", \"LQ2NZZOBKIG\", \"FDA4ESDGUWJZ\" in the last 72 hours.  Recent Labs     12/03/24  1140 12/04/24  0928   PHART 7.315* 7.377   YER9VSO 69.1* 62.4*   PO2ART 108.4 75.2   USG1HLZ 34.4* 35.8*         Incidental Findings:   None       Test Results Pending at Discharge (will require follow up):   none     Outpatient Tests Requested:  none    Complications:  none    Reason for Admission: [facility]    Hospital Course:   Edgar Obando is a 69 y.o. male patient who originally presented to the hospital on 12/2/2024 due to altered mental status.  Patient was found to have elevated CO2 levels.  Pulm was consulted.  Patient was advised to continue site CPAP which she was not compliant with.  Close follow-up with sleep and also pulmonology.  No other subjective complaints.          Please see above list of diagnoses and related plan for additional information.     Condition at Discharge: good    Discharge Day Visit / Exam:   Subjective: Patient was highly advised to continue CPAP therapy.  Advised to follow-up with pulmonology agreeable for plan.  Patient feels weak however is able to speak in mentate and walk around.  No other subjective complaints.  Vitals: Blood Pressure: 133/63 (12/05/24 0700)  Pulse: 83 (12/05/24 0700)  Temperature: 98.4 °F (36.9 °C) (12/05/24 0700)  Temp Source: Oral (12/05/24 0700)  Respirations: 18 (12/05/24 0700)  Height: 5' 6\" (167.6 cm) (12/03/24 0050)  Weight - Scale: 114 kg (251 lb 5.2 " oz) (12/03/24 0050)  SpO2: 96 % (12/05/24 1100)  Physical Exam  Vitals and nursing note reviewed.   Constitutional:       Appearance: He is well-developed and normal weight.   HENT:      Head: Normocephalic and atraumatic.      Nose: Nose normal.      Mouth/Throat:      Mouth: Mucous membranes are moist.   Eyes:      Conjunctiva/sclera: Conjunctivae normal.   Cardiovascular:      Rate and Rhythm: Normal rate and regular rhythm.      Heart sounds: Normal heart sounds. No murmur heard.  Pulmonary:      Effort: Pulmonary effort is normal. No respiratory distress.      Breath sounds: Normal breath sounds.   Abdominal:      General: Abdomen is flat.      Palpations: Abdomen is soft.      Tenderness: There is no abdominal tenderness.   Musculoskeletal:         General: No swelling.      Cervical back: Neck supple.      Right lower leg: No edema.      Left lower leg: No edema.   Skin:     General: Skin is warm and dry.      Capillary Refill: Capillary refill takes less than 2 seconds.   Neurological:      General: No focal deficit present.      Mental Status: He is alert and oriented to person, place, and time. Mental status is at baseline.   Psychiatric:         Mood and Affect: Mood normal.          Discussion with Family: Attempted to update  (wife) via phone. Unable to contact.    Discharge instructions/Information to patient and family:   See after visit summary for information provided to patient and family.      Provisions for Follow-Up Care:  See after visit summary for information related to follow-up care and any pertinent home health orders.      Mobility at time of Discharge:   Basic Mobility Inpatient Raw Score: 21  JH-HLM Goal: 6: Walk 10 steps or more  JH-HLM Achieved: 4: Move to chair/commode  HLM Goal achieved. Continue to encourage appropriate mobility.     Disposition:   Home    Planned Readmission: None    Discharge Medications:  See after visit summary for reconciled discharge medications  provided to patient and/or family.      Administrative Statements   Discharge Statement:  I have spent a total time of 45 minutes in caring for this patient on the day of the visit/encounter. >30 minutes of time was spent on: Diagnostic results, Prognosis, Risks and benefits of tx options, Instructions for management, Patient and family education, Importance of tx compliance, Risk factor reductions, Impressions, Counseling / Coordination of care, Documenting in the medical record, Reviewing / ordering tests, medicine, procedures  , and Communicating with other healthcare professionals .    **Please Note: This note may have been constructed using a voice recognition system**

## 2024-12-05 NOTE — MALNUTRITION/BMI
This medical record reflects one or more clinical indicators suggestive of morbid obesity.      BMI Findings:  Adult BMI Classifications: Morbid Obesity 40-44.9        Body mass index is 40.56 kg/m².     See Nutrition note dated 12/5/24 for additional details.  Completed nutrition assessment is viewable in the nutrition documentation.  
Normal rate

## 2024-12-05 NOTE — ASSESSMENT & PLAN NOTE
"No results for input(s): \"PH\", \"CUI5DXFOMMMT\", \"LT1QHWFULLD\", \"PAZ4UCJPSEUK\" in the last 72 hours.  Recent Labs     12/03/24  1140 12/04/24  0928   PHART 7.315* 7.377   HXO4DEE 69.1* 62.4*   PO2ART 108.4 75.2   LQX4IBU 34.4* 35.8*       Presented the ER with fatigue, cough x 3 days  Chest x-ray concerning for right-sided pneumonia on my read awaiting official read  Requiring 6 L nasal cannula in the ER  Patient is much better today alert and awake oriented x 4  Patient highly encouraged to continue CPAP at home  Advised to follow-up with pulmonology on 12/18  Advised to follow-up with sleep because patient has some discomfort with the facial mask on his CPAP  Completed 7-day course for antibiotics    "

## 2024-12-05 NOTE — ASSESSMENT & PLAN NOTE
Lab Results   Component Value Date    HGBA1C 6.1 (H) 12/03/2024       Recent Labs     12/04/24  1101 12/04/24  1718 12/05/24  0758 12/05/24  1158   POCGLU 156* 106 132 157*       Blood Sugar Average: Last 72 hrs:  (P) 128.4711476823758592  Continue home medication.

## 2024-12-05 NOTE — ASSESSMENT & PLAN NOTE
Patient with shortness of breath since Friday with productive cough. Patient was hypotensive requiring fluid resuscitation and hypoxic on initial evaluation. Patient was also found to be in respiratory acidosis with elevated CO2 requiring placement on Bipap.  Likely secondary to bacterial community acquired pneumonia plus untreated sleep apnea with CPAP noncompliance. Respiratory acidosis improved with bipap and patient has imporved clinically with IVF and antibiotics. CXR with hazy RUL,RLL infiltrate, procal 13  Patient wore home CPAP last night.  Patient qualifies for home oxygen with desaturation on ambulation.   - Continue abx with ceftriaxone day 4/7, completed azithromycin therapy day 3/3  - strep, legionella, MRSA negative  - sputum cx pending  - nightly cpap, home O2  - patient has outpatient pulmonary follow up with Dr. Woods on 12/18

## 2024-12-05 NOTE — PROGRESS NOTES
Progress Note - Pulmonology   Name: Edgar Obando 69 y.o. male I MRN: [id number]  Unit/Bed#: [facility] 411-01 I Date of Admission: 12/2/2024   Date of Service: 12/5/2024 I Hospital Day: 2     Assessment & Plan  Acute hypoxemic respiratory failure (HCC)  Patient with shortness of breath since Friday with productive cough. Patient was hypotensive requiring fluid resuscitation and hypoxic on initial evaluation. Patient was also found to be in respiratory acidosis with elevated CO2 requiring placement on Bipap.  Likely secondary to bacterial community acquired pneumonia plus untreated sleep apnea with CPAP noncompliance. Respiratory acidosis improved with bipap and patient has imporved clinically with IVF and antibiotics. CXR with hazy RUL,RLL infiltrate, procal 13  Patient wore home CPAP last night.  Patient qualifies for home oxygen with desaturation on ambulation.   - Continue abx with ceftriaxone day 4/7, completed azithromycin therapy day 3/3  - strep, legionella, MRSA negative  - sputum cx pending  - nightly cpap, home O2  - patient has outpatient pulmonary follow up with Dr. Woods on 12/18  SUSY (obstructive sleep apnea)  Patient with diagnosed SUSY noncompliant with CPAP, likely component of obesity hypoventilation syndrome  -Patient instructed in the importance of nightly use  -continue nightly bipap while hospitalized  - family to bring in CPAP for use instructions with RT  [facility] involving right lung  [facility]  Type 2 diabetes mellitus, without long-term current use of insulin ([facility])  Lab Results   Component Value Date    HGBA1C 6.1 (H) 12/03/2024       Recent Labs     12/04/24  0806 12/04/24  1101 12/04/24  1718 12/05/24  0758   POCGLU 120 156* 106 132       Blood Sugar Average: Last 72 hrs:  (P) 126.1    HTN (hypertension)    Anxiety      24 Hour Events : no acute events  Subjective : Patient seen resting in chair on examination today. He reports that he continues to have generalized weakness. He wore his  home CPAP overnight. Patient states that his shortness of breath has slightly improved and he continues to have cough. He denies new symptoms today.     Objective :  Temp:  [97.8 °F (36.6 °C)-99.3 °F (37.4 °C)] 98.4 °F (36.9 °C)  HR:  [75-95] 83  BP: (119-172)/(58-82) 133/63  Resp:  [18-38] 18  SpO2:  [95 %-96 %] 96 %  O2 Device: Nasal cannula  Nasal Cannula O2 Flow Rate (L/min):  [2 L/min-3 L/min] 2 L/min    Physical Exam  Vitals and nursing note reviewed.   Constitutional:       General: He is not in acute distress.     Appearance: He is well-developed.   HENT:      Head: Normocephalic and atraumatic.   Eyes:      Conjunctiva/sclera: Conjunctivae normal.   Cardiovascular:      Rate and Rhythm: Normal rate and regular rhythm.      Heart sounds: No murmur heard.  Pulmonary:      Effort: Pulmonary effort is normal. No respiratory distress.      Breath sounds: Examination of the left-lower field reveals rales. Rales present.   Abdominal:      Palpations: Abdomen is soft.      Tenderness: There is no abdominal tenderness.   Musculoskeletal:         General: No swelling.      Cervical back: Neck supple.   Skin:     General: Skin is warm and dry.      Capillary Refill: Capillary refill takes less than 2 seconds.   Neurological:      Mental Status: He is alert.   Psychiatric:         Mood and Affect: Mood normal.           Lab Results: I have reviewed the following results:   .     12/05/24  0604   WBC 5.97   HGB 10.3*   HCT 33.5*      SODIUM 142   K 4.1   CL 98   CO2 36*   BUN 19   CREATININE 0.90   GLUC 140     ABG: No new results in last 24 hours.

## 2024-12-05 NOTE — PLAN OF CARE
Problem: Prexisting or High Potential for Compromised Skin Integrity  Goal: Skin integrity is maintained or improved  Description: INTERVENTIONS:  - Identify patients at risk for skin breakdown  - Assess and monitor skin integrity  - Assess and monitor nutrition and hydration status  - Monitor labs   - Assess for incontinence   - Turn and reposition patient  - Assist with mobility/ambulation  - Relieve pressure over bony prominences  - Avoid friction and shearing  - Provide appropriate hygiene as needed including keeping skin clean and dry  - Evaluate need for skin moisturizer/barrier cream  - Collaborate with interdisciplinary team   - Patient/family teaching  - Consider wound care consult   Outcome: Progressing     Problem: PAIN - ADULT  Goal: Verbalizes/displays adequate comfort level or baseline comfort level  Description: Interventions:  - Encourage patient to monitor pain and request assistance  - Assess pain using appropriate pain scale  - Administer analgesics based on type and severity of pain and evaluate response  - Implement non-pharmacological measures as appropriate and evaluate response  - Consider cultural and social influences on pain and pain management  - Notify physician/advanced practitioner if interventions unsuccessful or patient reports new pain  Outcome: Progressing     Problem: INFECTION - ADULT  Goal: Absence or prevention of progression during hospitalization  Description: INTERVENTIONS:  - Assess and monitor for signs and symptoms of infection  - Monitor lab/diagnostic results  - Monitor all insertion sites, i.e. indwelling lines, tubes, and drains  - Monitor endotracheal if appropriate and nasal secretions for changes in amount and color  - [place] appropriate cooling/warming therapies per order  - Administer medications as ordered  - Instruct and encourage patient and family to use good hand hygiene technique  - Identify and instruct in appropriate isolation precautions for  identified infection/condition  Outcome: Progressing     Problem: SAFETY ADULT  Goal: Patient will remain free of falls  Description: INTERVENTIONS:  - Educate patient/family on patient safety including physical limitations  - Instruct patient to call for assistance with activity   - Consult OT/PT to assist with strengthening/mobility   - Keep Call bell within reach  - Keep bed low and locked with side rails adjusted as appropriate  - Keep care items and personal belongings within reach  - Initiate and maintain comfort rounds  - Make Fall Risk Sign visible to staff  - Offer Toileting every  Hours, in advance of need  - Initiate/Maintain alarm  - Obtain necessary fall risk management equipment:   - Apply yellow socks and bracelet for high fall risk patients  - Consider moving patient to room near nurses station  Outcome: Progressing  Goal: Maintains/Returns to pre admission functional level  Description: INTERVENTIONS:  - Perform AM-PAC 6 Click Basic Mobility/ Daily Activity assessment daily.  - Set and communicate daily mobility goal to care team and patient/family/caregiver.   - Collaborate with rehabilitation services on mobility goals if consulted  - Perform Range of Motion  times a day.  - Reposition patient every  hours.  - Dangle patient  times a day  - Stand patient  times a day  - Ambulate patient  times a day  - Out of bed to chair  times a day   - Out of bed for meals  times a day  - Out of bed for toileting  - Record patient progress and toleration of activity level   Outcome: Progressing     Problem: DISCHARGE PLANNING  Goal: Discharge to home or other facility with appropriate resources  Description: INTERVENTIONS:  - Identify barriers to discharge w/patient and caregiver  - Arrange for needed discharge resources and transportation as appropriate  - Identify discharge learning needs (meds, wound care, etc.)  - Arrange for interpretive services to assist at discharge as needed  - Refer to Case Management  Department for coordinating discharge planning if the patient needs post-hospital services based on physician/advanced practitioner order or complex needs related to functional status, cognitive ability, or social support system  Outcome: Progressing     Problem: Knowledge Deficit  Goal: Patient/family/caregiver demonstrates understanding of disease process, treatment plan, medications, and discharge instructions  Description: Complete learning assessment and assess knowledge base.  Interventions:  - Provide teaching at level of understanding  - Provide teaching via preferred learning methods  Outcome: Progressing

## 2024-12-05 NOTE — ASSESSMENT & PLAN NOTE
Lab Results   Component Value Date    HGBA1C 6.1 (H) 12/03/2024       Recent Labs     12/04/24  0806 12/04/24  1101 12/04/24  1718 12/05/24  0758   POCGLU 120 156* 106 132       Blood Sugar Average: Last 72 hrs:  (P) 126.1

## 2024-12-05 NOTE — PLAN OF CARE
Problem: Prexisting or High Potential for Compromised Skin Integrity  Goal: Skin integrity is maintained or improved  Description: INTERVENTIONS:  - Identify patients at risk for skin breakdown  - Assess and monitor skin integrity  - Assess and monitor nutrition and hydration status  - Monitor labs   - Assess for incontinence   - Turn and reposition patient  - Assist with mobility/ambulation  - Relieve pressure over bony prominences  - Avoid friction and shearing  - Provide appropriate hygiene as needed including keeping skin clean and dry  - Evaluate need for skin moisturizer/barrier cream  - Collaborate with interdisciplinary team   - Patient/family teaching  - Consider wound care consult   Outcome: Progressing     Problem: PAIN - ADULT  Goal: Verbalizes/displays adequate comfort level or baseline comfort level  Description: Interventions:  - Encourage patient to monitor pain and request assistance  - Assess pain using appropriate pain scale  - Administer analgesics based on type and severity of pain and evaluate response  - Implement non-pharmacological measures as appropriate and evaluate response  - Consider cultural and social influences on pain and pain management  - Notify physician/advanced practitioner if interventions unsuccessful or patient reports new pain  Outcome: Progressing     Problem: INFECTION - ADULT  Goal: Absence or prevention of progression during hospitalization  Description: INTERVENTIONS:  - Assess and monitor for signs and symptoms of infection  - Monitor lab/diagnostic results  - Monitor all insertion sites, i.e. indwelling lines, tubes, and drains  - Monitor endotracheal if appropriate and nasal secretions for changes in amount and color  - [place] appropriate cooling/warming therapies per order  - Administer medications as ordered  - Instruct and encourage patient and family to use good hand hygiene technique  - Identify and instruct in appropriate isolation precautions for  identified infection/condition  Outcome: Progressing  Goal: Absence of fever/infection during neutropenic period  Description: INTERVENTIONS:  - Monitor WBC    Outcome: Progressing     Problem: SAFETY ADULT  Goal: Patient will remain free of falls  Description: INTERVENTIONS:  - Educate patient/family on patient safety including physical limitations  - Instruct patient to call for assistance with activity   - Consult OT/PT to assist with strengthening/mobility   - Keep Call bell within reach  - Keep bed low and locked with side rails adjusted as appropriate  - Keep care items and personal belongings within reach  - Initiate and maintain comfort rounds  - Make Fall Risk Sign visible to staff  - Offer Toileting every 2 Hours, in advance of need  - Initiate/Maintain bed/chair alarm  - Obtain necessary fall risk management equipment:   - Apply yellow socks and bracelet for high fall risk patients  - Consider moving patient to room near nurses station  Outcome: Progressing     Problem: Prexisting or High Potential for Compromised Skin Integrity  Goal: Skin integrity is maintained or improved  Description: INTERVENTIONS:  - Identify patients at risk for skin breakdown  - Assess and monitor skin integrity  - Assess and monitor nutrition and hydration status  - Monitor labs   - Assess for incontinence   - Turn and reposition patient  - Assist with mobility/ambulation  - Relieve pressure over bony prominences  - Avoid friction and shearing  - Provide appropriate hygiene as needed including keeping skin clean and dry  - Evaluate need for skin moisturizer/barrier cream  - Collaborate with interdisciplinary team   - Patient/family teaching  - Consider wound care consult   Outcome: Progressing     Problem: INFECTION - ADULT  Goal: Absence or prevention of progression during hospitalization  Description: INTERVENTIONS:  - Assess and monitor for signs and symptoms of infection  - Monitor lab/diagnostic results  - Monitor all  insertion sites, i.e. indwelling lines, tubes, and drains  - Monitor endotracheal if appropriate and nasal secretions for changes in amount and color  - [place] appropriate cooling/warming therapies per order  - Administer medications as ordered  - Instruct and encourage patient and family to use good hand hygiene technique  - Identify and instruct in appropriate isolation precautions for identified infection/condition  Outcome: Progressing     Problem: INFECTION - ADULT  Goal: Absence of fever/infection during neutropenic period  Description: INTERVENTIONS:  - Monitor WBC    Outcome: Progressing     Problem: SAFETY ADULT  Goal: Patient will remain free of falls  Description: INTERVENTIONS:  - Educate patient/family on patient safety including physical limitations  - Instruct patient to call for assistance with activity   - Consult OT/PT to assist with strengthening/mobility   - Keep Call bell within reach  - Keep bed low and locked with side rails adjusted as appropriate  - Keep care items and personal belongings within reach  - Initiate and maintain comfort rounds  - Make Fall Risk Sign visible to staff  - Offer Toileting every 2 Hours, in advance of need  - Initiate/Maintain bed/chair alarm  - Obtain necessary fall risk management equipment:  - Apply yellow socks and bracelet for high fall risk patients  - Consider moving patient to room near nurses station  Outcome: Progressing

## 2024-12-05 NOTE — CASE MANAGEMENT
Case Management Discharge Planning Note    Patient name Edgar Obando  [facility]N [id number]  : 1955 Date 2024       Current Admission Date: 2024  Current Admission Diagnosis:Acute hypoxemic respiratory failure (HCC)   Patient Active Problem List    Diagnosis Date Noted Date Diagnosed    Acute hypoxemic respiratory failure (HCC) 2024     Pneumonia involving right lung 2024     Protein-calorie malnutrition, unspecified severity ([facility]) 2024     Current mild episode of major depressive disorder, unspecified whether recurrent ([facility]) 2024     Thrombocytopenia ([facility]) 2024     S/P cervical spinal fusion 2024     Abnormal chest x-ray 2024     Stage 3a chronic kidney disease ([facility]) 2023     Hepatitis C virus carrier state ([facility]) 2023     Cervical spondylosis 2023     Chronic diastolic congestive heart failure ([facility]) 2023     Respiratory failure with hypoxia and hypercapnia, unspecified chronicity ([facility]) 2023     Obstructive sleep apnea syndrome 2023     Iron deficiency anemia, unspecified 2022     Chronic pain syndrome 2022     Lumbar spondylosis 2022     Neck pain 2022     Right shoulder pain 2022     Chronic low back pain with bilateral sciatica 2022     Diabetic neuropathy ([facility]) 2020     RBD (REM behavioral disorder) 2020     Primary osteoarthritis of right knee 2019     Chronic pain of right knee 2019     Chronic venous insufficiency 2019     Bilateral lower extremity edema 2019     BMI 40.0-44.9, adult ([facility]) 2019     Myoclonus      DDD (degenerative disc disease), lumbar 2018     Spinal stenosis of lumbar region 2018     Low back pain with sciatica 2018     S/P total knee replacement 07/15/2016     Type 2 diabetes mellitus, without long-term current use of insulin ([facility]) 07/15/2016     HTN (hypertension) 07/15/2016      GERD (gastroesophageal reflux disease) 07/15/2016     Anxiety 07/15/2016     SUSY (obstructive sleep apnea) 07/15/2016       LOS (days): 2  Geometric Mean LOS (GMLOS) (days): 4  Days to GMLOS:1.5     OBJECTIVE:  Risk of Unplanned Readmission Score: 12.99         Current admission status: Inpatient   Preferred Pharmacy:   [facility] - [place] - [address]  [address]  [address]  [place] [address]  Phone: [phone] Fax: [phone]    [id number] - [place], [place] - [address]  [address]  [place] [address]  Phone: [phone] Fax: [phone]    [facility]  [place] - [address]  [address]  [place] [address]  Phone: [phone] Fax: [phone]    Primary Care Provider: Ana Rosa Win MD    Primary Insurance: [facility]  Secondary Insurance:     DISCHARGE DETAILS:            Other Referral/Resources/Interventions Provided:  Interventions: DME  Referral Comments: Home O2 eval completed, pt qualifies for home O2. CM placed referral in [place], order approved. O2 concentrator delivered to bedside by [employer] Liaison. Per Liaison, Jarrett HERRERA [employer] was attempting to reach pt for home setup delivery. Pt is medically stable for d/c.         Treatment Team Recommendation: Outpatient Rehab  Discharge Destination Plan:: Outpatient Rehab

## 2024-12-05 NOTE — RESPIRATORY THERAPY NOTE
Home Oxygen Qualifying Test     Patient name: Edgar Obando        : 1955   Date of Test:  2024  Diagnosis:    Home Oxygen Test:    **Medicare Guidelines require item(s) 1-5 on all ambulatory patients or 1 and 2 on non-ambulatory patients.    1. Baseline SPO2 on Room Air at rest 93%   If <= 88% on Room Air add O2 via NC to obtain SpO2 >=88%. If LPM needed, document LPM N/A needed to reach =>88%    SPO2 during exertion on Room Air 86 %  During exertion monitor SPO2. If SPO2 increases >=89%, do not add supplemental oxygen    SPO2 on Oxygen at Rest 96% at 2 LPM    SPO2 during exertion on Oxygen 89% at 2 LPM    Test performed during exertion activity.      [x]  Supplemental Home Oxygen is indicated.    []  Client does not qualify for home oxygen.    Respiratory Additional Notes- Pt ambulated with assistance of walker. O2 applied as indicated by SpO2.    Mackenzie Gregory, RT

## 2024-12-05 NOTE — PROGRESS NOTES
PT Discharge      Today's date: 2024  Patient name: Edgar Obando  : 1955  MRN: [id number]  Referring provider: Morelia Sethi*  Dx:   Encounter Diagnosis     ICD-10-CM    1. Chronic neck pain  M54.2     G89.29       2. Poor balance  R26.89       3. S/P cervical spinal fusion  Z98.1           Start Time: 1048  Stop Time: 1131  Total time in clinic (min): 43 minutes    PT spoke with patient over the phone on 24. Patient reported he was recently hospitalized due to pneumonia. Reports he is back home now and recovering. As a result of recent medical complications, patient is being discharged from this episode of care.

## 2024-12-06 ENCOUNTER — TELEPHONE (OUTPATIENT)
Dept: FAMILY MEDICINE CLINIC | Facility: CLINIC | Age: 69
End: 2024-12-06

## 2024-12-06 ENCOUNTER — TRANSITIONAL CARE MANAGEMENT (OUTPATIENT)
Dept: FAMILY MEDICINE CLINIC | Facility: CLINIC | Age: 69
End: 2024-12-06

## 2024-12-06 LAB
DME PARACHUTE DELIVERY DATE ACTUAL: NORMAL
DME PARACHUTE DELIVERY DATE EXPECTED: NORMAL
DME PARACHUTE DELIVERY DATE REQUESTED: NORMAL
DME PARACHUTE ITEM DESCRIPTION: NORMAL
DME PARACHUTE ORDER STATUS: NORMAL
DME PARACHUTE SUPPLIER NAME: NORMAL
DME PARACHUTE SUPPLIER PHONE: NORMAL

## 2024-12-06 NOTE — UTILIZATION REVIEW
NOTIFICATION OF ADMISSION DISCHARGE   This is a Notification of Discharge from [facility]. Please be advised that this patient has been discharge from our facility. Below you will find the admission and discharge date and time including the patient’s disposition.   UTILIZATION REVIEW CONTACT:  Clemente Ferreira  Utilization   Network Utilization Review Department  Phone: [phone] x carefully listen to the prompts. All voicemails are confidential.  Email: [email or web address]     ADMISSION INFORMATION  PRESENTATION DATE: 12/2/2024  4:59 PM  OBERVATION ADMISSION DATE: N/A  INPATIENT ADMISSION DATE: 12/3/24 12:06 AM   DISCHARGE DATE: 12/5/2024  3:39 PM   DISPOSITION:Home/Self Care    Network Utilization Review Department  ATTENTION: Please call with any questions or concerns to [phone] and carefully listen to the prompts so that you are directed to the right person. All voicemails are confidential.   For Discharge needs, contact Care Management DC Support Team at [phone] opt. 2  Send all requests for admission clinical reviews, approved or denied determinations and any other requests to dedicated fax number below belonging to the campus where the patient is receiving treatment. List of dedicated fax numbers for the Facilities:  FACILITY NAME UR FAX NUMBER   ADMISSION DENIALS (Administrative/Medical Necessity) [phone]   DISCHARGE SUPPORT TEAM ([facility]) [phone]   PARENT CHILD HEALTH (Maternity/NICU/Pediatrics) [phone]   [facility] [phone]   [facility] [phone]   [facility] [phone]   [facility] [phone]   [facility] [phone]   [facility] [phone]   [facility] [phone]   [facility] [phone]   [facility]  [facility] [phone]   [facility] [phone]   [facility] [phone]   [facility] [phone]

## 2024-12-07 LAB
BACTERIA BLD CULT: NORMAL
BACTERIA BLD CULT: NORMAL

## 2024-12-12 ENCOUNTER — HOSPITAL ENCOUNTER (INPATIENT)
Facility: HOSPITAL | Age: 69
LOS: 3 days | Discharge: HOME | DRG: 682 | End: 2024-12-15
Attending: EMERGENCY MEDICINE | Admitting: FAMILY MEDICINE
Payer: COMMERCIAL

## 2024-12-12 DIAGNOSIS — N17.9 AKI (ACUTE KIDNEY INJURY) (HCC): ICD-10-CM

## 2024-12-12 DIAGNOSIS — M62.81 TRUNCAL MUSCLE WEAKNESS: ICD-10-CM

## 2024-12-12 DIAGNOSIS — R25.1 TREMOR: Primary | ICD-10-CM

## 2024-12-12 DIAGNOSIS — I10 HTN (HYPERTENSION): ICD-10-CM

## 2024-12-12 DIAGNOSIS — R26.2 AMBULATORY DYSFUNCTION: ICD-10-CM

## 2024-12-12 DIAGNOSIS — E11.40 DIABETIC NEUROPATHY (HCC): ICD-10-CM

## 2024-12-12 PROBLEM — R53.83 LETHARGY: Status: ACTIVE | Noted: 2024-12-12

## 2024-12-12 LAB
ALBUMIN SERPL BCG-MCNC: 3.3 G/DL (ref 3.5–5)
ALP SERPL-CCNC: 73 U/L (ref 34–104)
ALT SERPL W P-5'-P-CCNC: 9 U/L (ref 7–52)
ANION GAP SERPL CALCULATED.3IONS-SCNC: 4 MMOL/L (ref 4–13)
AST SERPL W P-5'-P-CCNC: 12 U/L (ref 13–39)
BASE EX.OXY STD BLDV CALC-SCNC: 43.9 % (ref 60–80)
BASE EXCESS BLDV CALC-SCNC: 4.4 MMOL/L
BASOPHILS # BLD AUTO: 0.02 THOUSANDS/ΜL (ref 0–0.1)
BASOPHILS NFR BLD AUTO: 0 % (ref 0–1)
BILIRUB SERPL-MCNC: 0.5 MG/DL (ref 0.2–1)
BUN SERPL-MCNC: 34 MG/DL (ref 5–25)
CA-I BLD-SCNC: 1.1 MMOL/L (ref 1.12–1.32)
CALCIUM ALBUM COR SERPL-MCNC: 8.9 MG/DL (ref 8.3–10.1)
CALCIUM SERPL-MCNC: 8.3 MG/DL (ref 8.4–10.2)
CHLORIDE SERPL-SCNC: 98 MMOL/L (ref 96–108)
CO2 SERPL-SCNC: 35 MMOL/L (ref 21–32)
CREAT SERPL-MCNC: 3.38 MG/DL (ref 0.6–1.3)
EOSINOPHIL # BLD AUTO: 0.27 THOUSAND/ΜL (ref 0–0.61)
EOSINOPHIL NFR BLD AUTO: 4 % (ref 0–6)
ERYTHROCYTE [DISTWIDTH] IN BLOOD BY AUTOMATED COUNT: 14.4 % (ref 11.6–15.1)
GFR SERPL CREATININE-BSD FRML MDRD: 17 ML/MIN/1.73SQ M
GLUCOSE SERPL-MCNC: 114 MG/DL (ref 65–140)
HCO3 BLDV-SCNC: 35 MMOL/L (ref 24–30)
HCT VFR BLD AUTO: 35 % (ref 36.5–49.3)
HGB BLD-MCNC: 10.4 G/DL (ref 12–17)
IMM GRANULOCYTES # BLD AUTO: 0.02 THOUSAND/UL (ref 0–0.2)
IMM GRANULOCYTES NFR BLD AUTO: 0 % (ref 0–2)
LYMPHOCYTES # BLD AUTO: 1.26 THOUSANDS/ΜL (ref 0.6–4.47)
LYMPHOCYTES NFR BLD AUTO: 17 % (ref 14–44)
MAGNESIUM SERPL-MCNC: 2.1 MG/DL (ref 1.9–2.7)
MCH RBC QN AUTO: 29.2 PG (ref 26.8–34.3)
MCHC RBC AUTO-ENTMCNC: 29.7 G/DL (ref 31.4–37.4)
MCV RBC AUTO: 98 FL (ref 82–98)
MONOCYTES # BLD AUTO: 0.57 THOUSAND/ΜL (ref 0.17–1.22)
MONOCYTES NFR BLD AUTO: 8 % (ref 4–12)
NEUTROPHILS # BLD AUTO: 5.39 THOUSANDS/ΜL (ref 1.85–7.62)
NEUTS SEG NFR BLD AUTO: 71 % (ref 43–75)
NRBC BLD AUTO-RTO: 0 /100 WBCS
O2 CT BLDV-SCNC: 6.9 ML/DL
PCO2 BLDV: 93.3 MM HG (ref 42–50)
PH BLDV: 7.19 [PH] (ref 7.3–7.4)
PHOSPHATE SERPL-MCNC: 5.6 MG/DL (ref 2.3–4.1)
PLATELET # BLD AUTO: 251 THOUSANDS/UL (ref 149–390)
PMV BLD AUTO: 10.3 FL (ref 8.9–12.7)
PO2 BLDV: 26.1 MM HG (ref 35–45)
POTASSIUM SERPL-SCNC: 4.6 MMOL/L (ref 3.5–5.3)
PROT SERPL-MCNC: 7.3 G/DL (ref 6.4–8.4)
RBC # BLD AUTO: 3.56 MILLION/UL (ref 3.88–5.62)
SODIUM SERPL-SCNC: 137 MMOL/L (ref 135–147)
TSH SERPL DL<=0.05 MIU/L-ACNC: 1.25 UIU/ML (ref 0.45–4.5)
WBC # BLD AUTO: 7.53 THOUSAND/UL (ref 4.31–10.16)

## 2024-12-12 PROCEDURE — 83735 ASSAY OF MAGNESIUM: CPT

## 2024-12-12 PROCEDURE — 36415 COLL VENOUS BLD VENIPUNCTURE: CPT

## 2024-12-12 PROCEDURE — 80053 COMPREHEN METABOLIC PANEL: CPT

## 2024-12-12 PROCEDURE — TCMPR: Performed by: FAMILY MEDICINE

## 2024-12-12 PROCEDURE — 96365 THER/PROPH/DIAG IV INF INIT: CPT

## 2024-12-12 PROCEDURE — 36000 PLACE NEEDLE IN VEIN: CPT | Performed by: EMERGENCY MEDICINE

## 2024-12-12 PROCEDURE — 85025 COMPLETE CBC W/AUTO DIFF WBC: CPT

## 2024-12-12 PROCEDURE — 93005 ELECTROCARDIOGRAM TRACING: CPT

## 2024-12-12 PROCEDURE — 82805 BLOOD GASES W/O2 SATURATION: CPT | Performed by: FAMILY MEDICINE

## 2024-12-12 PROCEDURE — 82330 ASSAY OF CALCIUM: CPT

## 2024-12-12 PROCEDURE — 99285 EMERGENCY DEPT VISIT HI MDM: CPT | Performed by: EMERGENCY MEDICINE

## 2024-12-12 PROCEDURE — 84443 ASSAY THYROID STIM HORMONE: CPT

## 2024-12-12 PROCEDURE — 99285 EMERGENCY DEPT VISIT HI MDM: CPT

## 2024-12-12 PROCEDURE — 99223 1ST HOSP IP/OBS HIGH 75: CPT | Performed by: FAMILY MEDICINE

## 2024-12-12 PROCEDURE — 94760 N-INVAS EAR/PLS OXIMETRY 1: CPT

## 2024-12-12 PROCEDURE — 84100 ASSAY OF PHOSPHORUS: CPT

## 2024-12-12 PROCEDURE — 76942 ECHO GUIDE FOR BIOPSY: CPT | Performed by: EMERGENCY MEDICINE

## 2024-12-12 RX ORDER — TRAZODONE HYDROCHLORIDE 100 MG/1
100 TABLET ORAL
Status: DISCONTINUED | OUTPATIENT
Start: 2024-12-12 | End: 2024-12-12

## 2024-12-12 RX ORDER — ACETAMINOPHEN 325 MG/1
650 TABLET ORAL EVERY 6 HOURS PRN
Status: DISCONTINUED | OUTPATIENT
Start: 2024-12-12 | End: 2024-12-15 | Stop reason: HOSPADM

## 2024-12-12 RX ORDER — PANTOPRAZOLE SODIUM 40 MG/1
40 TABLET, DELAYED RELEASE ORAL DAILY
Status: DISCONTINUED | OUTPATIENT
Start: 2024-12-13 | End: 2024-12-15 | Stop reason: HOSPADM

## 2024-12-12 RX ORDER — CALCIUM GLUCONATE 20 MG/ML
2 INJECTION, SOLUTION INTRAVENOUS ONCE
Status: COMPLETED | OUTPATIENT
Start: 2024-12-12 | End: 2024-12-12

## 2024-12-12 RX ORDER — HEPARIN SODIUM 5000 [USP'U]/ML
5000 INJECTION, SOLUTION INTRAVENOUS; SUBCUTANEOUS EVERY 8 HOURS SCHEDULED
Status: DISCONTINUED | OUTPATIENT
Start: 2024-12-12 | End: 2024-12-15 | Stop reason: HOSPADM

## 2024-12-12 RX ORDER — GABAPENTIN 100 MG/1
100 CAPSULE ORAL 3 TIMES DAILY
Status: CANCELLED | OUTPATIENT
Start: 2024-12-12

## 2024-12-12 RX ORDER — ASPIRIN 81 MG/1
81 TABLET ORAL DAILY
Status: DISCONTINUED | OUTPATIENT
Start: 2024-12-13 | End: 2024-12-15 | Stop reason: HOSPADM

## 2024-12-12 RX ORDER — ALBUTEROL SULFATE 90 UG/1
2 INHALANT RESPIRATORY (INHALATION) EVERY 6 HOURS PRN
Status: DISCONTINUED | OUTPATIENT
Start: 2024-12-12 | End: 2024-12-15 | Stop reason: HOSPADM

## 2024-12-12 RX ORDER — BUSPIRONE HYDROCHLORIDE 10 MG/1
10 TABLET ORAL 2 TIMES DAILY
Status: DISCONTINUED | OUTPATIENT
Start: 2024-12-12 | End: 2024-12-15 | Stop reason: HOSPADM

## 2024-12-12 RX ORDER — CLONIDINE HYDROCHLORIDE 0.1 MG/1
0.1 TABLET ORAL 2 TIMES DAILY
Status: CANCELLED | OUTPATIENT
Start: 2024-12-12

## 2024-12-12 RX ORDER — SODIUM CHLORIDE, SODIUM GLUCONATE, SODIUM ACETATE, POTASSIUM CHLORIDE, MAGNESIUM CHLORIDE, SODIUM PHOSPHATE, DIBASIC, AND POTASSIUM PHOSPHATE .53; .5; .37; .037; .03; .012; .00082 G/100ML; G/100ML; G/100ML; G/100ML; G/100ML; G/100ML; G/100ML
75 INJECTION, SOLUTION INTRAVENOUS CONTINUOUS
Status: DISCONTINUED | OUTPATIENT
Start: 2024-12-12 | End: 2024-12-13

## 2024-12-12 RX ORDER — DOCUSATE SODIUM 100 MG/1
100 CAPSULE, LIQUID FILLED ORAL 2 TIMES DAILY
Status: DISCONTINUED | OUTPATIENT
Start: 2024-12-12 | End: 2024-12-15 | Stop reason: HOSPADM

## 2024-12-12 RX ORDER — PRAVASTATIN SODIUM 20 MG
20 TABLET ORAL
Status: DISCONTINUED | OUTPATIENT
Start: 2024-12-12 | End: 2024-12-15 | Stop reason: HOSPADM

## 2024-12-12 RX ORDER — TAMSULOSIN HYDROCHLORIDE 0.4 MG/1
0.4 CAPSULE ORAL
Status: DISCONTINUED | OUTPATIENT
Start: 2024-12-12 | End: 2024-12-15 | Stop reason: HOSPADM

## 2024-12-12 RX ADMIN — SODIUM CHLORIDE, SODIUM GLUCONATE, SODIUM ACETATE, POTASSIUM CHLORIDE, MAGNESIUM CHLORIDE, SODIUM PHOSPHATE, DIBASIC, AND POTASSIUM PHOSPHATE 75 ML/HR: .53; .5; .37; .037; .03; .012; .00082 INJECTION, SOLUTION INTRAVENOUS at 19:19

## 2024-12-12 RX ADMIN — CALCIUM GLUCONATE 2 G: 20 INJECTION, SOLUTION INTRAVENOUS at 15:22

## 2024-12-12 RX ADMIN — SODIUM CHLORIDE 1000 ML: 0.9 INJECTION, SOLUTION INTRAVENOUS at 16:16

## 2024-12-12 RX ADMIN — HEPARIN SODIUM 5000 UNITS: 5000 INJECTION, SOLUTION INTRAVENOUS; SUBCUTANEOUS at 19:19

## 2024-12-12 RX ADMIN — TAMSULOSIN HYDROCHLORIDE 0.4 MG: 0.4 CAPSULE ORAL at 19:18

## 2024-12-12 RX ADMIN — BUSPIRONE HYDROCHLORIDE 10 MG: 10 TABLET ORAL at 19:18

## 2024-12-12 RX ADMIN — DOCUSATE SODIUM 100 MG: 100 CAPSULE, LIQUID FILLED ORAL at 19:19

## 2024-12-12 RX ADMIN — PRAVASTATIN SODIUM 20 MG: 20 TABLET ORAL at 19:18

## 2024-12-12 NOTE — H&P
H&P - Hospitalist   Name: Edgar Obando 69 y.o. male I MRN: [id number]  Unit/Bed#: ED 21 I Date of Admission: 12/12/2024   Date of Service: 12/12/2024 I Hospital Day: 0     Assessment & Plan  LAKHWINDER (acute kidney injury) (HCC)  69-year-old male with PMH of CKD 3, HTN, anxiety, recent hospitalization for pneumonia, discharged on supplemental oxygen 2 L via NC, return to ED with complaint of increasing lethargy, generalized tremors and poor oral intake.  Noted to have significant LAKHWINDER with creatinine 3.38, baseline 0.9-1.  Holding PTA Lasix 60 mg daily as well as losartan   Hold home gabapentin.   Patient received 1 L IVF bolus, start IVF infusion  Intake/output, urinalysis, bladder scan,  HTN (hypertension)  Soft BP in ED. Hold home lasix, losartan, clonidine.   Anxiety  Continue buspar, hold trazodone and gabapentin due to increased lethargy  SUSY (obstructive sleep apnea)  CPAP at bedtime  Chronic diastolic congestive heart failure (HCC)  Wt Readings from Last 3 Encounters:   12/03/24 114 kg (251 lb 5.2 oz)   11/14/24 112 kg (247 lb 9.6 oz)   11/06/24 112 kg (246 lb 14.6 oz)     Now hypovolemic. Hold lasix,   Last echo 10/24 shows normal systolic and diastolic function. LVEF 60%.  Start IVF        Respiratory failure with hypoxia and hypercapnia, unspecified chronicity (HCC)  Patient was recently admitted in hospital with pneumonia and was discharged on 2L oxygen via NC, currently remains at 2L  Lethargy  Patient has been sleeping mostly during my interview. He wakes up with name calling, oriented to self, place and time. But falls back quickly to sleep.   Saturating well with 2L oxygen via NC.   Obtain VBG.  Hold home gabapentin, trazodone.       VTE Pharmacologic Prophylaxis:   Moderate Risk (Score 3-4) - Pharmacological DVT Prophylaxis Ordered: heparin.  Code Status: Prior full code  Discussion with family: Updated  (wife) via phone.    Anticipated Length of Stay: Patient will be admitted on an  inpatient basis with an anticipated length of stay of greater than 2 midnights secondary to LAKHWINDER.    History of Present Illness   Chief Complaint: Fatigue, tremors    Edgar Obando is a 69-year-old male with PMH of CKD 3, HTN, anxiety, recent hospitalization for pneumonia, discharged on supplemental oxygen 2 L via NC, return to ED with complaint of increasing lethargy, generalized tremors and poor oral intake.  Noted to have significant LAKHWINDER with creatinine 3.38, baseline 0.9-1.  On my exam, patient is sleeping, breathing comfortably on 2 L oxygen via nasal cannula.  Wakes up with a calm, oriented to self, place, time, situation.  However falls back asleep quickly during interview.    Review of Systems   Unable to perform ROS: Acuity of condition       Historical Information   Past Medical History:   Diagnosis Date    Acid reflux     Acute blood loss anemia 07/15/2016    Anxiety     Arthritis     Cellulitis     left ankle    CPAP (continuous positive airway pressure) dependence     as needed per patient    Diabetes mellitus (HCC)     Hypertension     Idiopathic chronic venous hypertension of right lower extremity with ulcer (HCC) 2023    Sleep apnea     Twitching      Past Surgical History:   Procedure Laterality Date    APPENDECTOMY      CERVICAL FUSION Bilateral 2024    Procedure: FUSION CERVICAL ANTERIOR W DISCECTOMY, C3-7 ACDF;  Surgeon: Conrad Richardson MD;  Location: [facility];  Service: Orthopedics    CHOLECYSTECTOMY      COLONOSCOPY      IR PICC PLACEMENT DOUBLE LUMEN  2024    KNEE ARTHROSCOPY      MI ARTHRP KNE CONDYLE&PLATU MEDIAL&LAT COMPARTMENTS Left 2016    Procedure: TOTAL  KNEE REPLACEMENT ;  Surgeon: Zack Montenegro MD;  Location: [facility];  Service: Orthopedics     Social History     Tobacco Use    Smoking status: Former     Current packs/day: 0.00     Types: Cigarettes     Quit date:      Years since quittin.9    Smokeless tobacco: Never   Vaping Use    Vaping status:  Never Used   Substance and Sexual Activity    Alcohol use: Not Currently    Drug use: Not Currently     Comment: tried multiple drugs in the past    Sexual activity: Not Currently     E-Cigarette/Vaping    E-Cigarette Use Never User      E-Cigarette/Vaping Substances    Nicotine No     THC No     CBD No     Flavoring No     Other No     Unknown No      Family History   Problem Relation Age of Onset    Diabetes Mother     Diabetes Father     Cancer Father      Social History:  Marital Status: /Civil Union        Meds/Allergies   I have reviewed home medications using recent Epic encounter.  Prior to Admission medications    Medication Sig Start Date End Date Taking? Authorizing Provider   acetaminophen (TYLENOL) 650 mg CR tablet Take 1 tablet (650 mg total) by mouth every 8 (eight) hours as needed for mild pain 5/3/24   Morelia Sethi PA-C   albuterol (PROVENTIL HFA,VENTOLIN HFA) 90 mcg/act inhaler Inhale 2 puffs every 6 (six) hours as needed for wheezing As needed    Historical Provider, MD   aspirin (ECOTRIN LOW STRENGTH) 81 mg EC tablet Take 81 mg by mouth daily    Historical Provider, MD   b complex vitamins capsule Take 1 capsule by mouth daily    Historical Provider, MD   bisacodyl (DULCOLAX) 5 mg EC tablet Take 1 tablet (5 mg total) by mouth once for 1 dose 8/26/22 11/14/24  Aziza Nelsno PA-C   Blood Glucose Monitoring Suppl (ONE TOUCH ULTRA 2) w/Device KIT USE TO TEST BLOOD GLUCOSE 8/4/22   Historical Provider, MD   budesonide (RINOCORT AQUA) 32 MCG/ACT nasal spray 1 spray into each nostril 2 (two) times a day As needed    Historical Provider, MD   busPIRone (BUSPAR) 10 mg tablet TAKE 1 TABLET (10 MG TOTAL) BY MOUTH TWO (TWO) TIMES A DAY 7/25/24   Vineet Orr,    chlorhexidine (PERIDEX) 0.12 % solution RINSE MOUTH WITH 15ML (1 CAPFUL) FOR 30 SECONDS IN MORNING AND EVENING AFTER BRUSHING, THEN SPIT 10/10/23   Historical Provider, MD   cloNIDine (CATAPRES) 0.1 mg tablet Take 0.1  mg by mouth 2 (two) times a day    Historical Provider, MD   clotrimazole (LOTRIMIN) 1 % cream  10/11/24   Historical Provider, MD   docusate sodium (COLACE) 100 mg capsule Take 100 mg by mouth 2 (two) times a day 7/4/22   Historical Provider, MD   famotidine (PEPCID) 20 mg tablet  7/25/24   Historical Provider, MD   fluticasone (FLOVENT HFA) 220 mcg/act inhaler Inhale 1 puff daily as needed As needed    Historical Provider, MD   furosemide (LASIX) 40 mg tablet Take 40 mg by mouth as needed in the morning and 40 mg as needed in the evening.    Historical Provider, MD   gabapentin (NEURONTIN) 100 mg capsule  10/15/24   Historical Provider, MD   Lancets 30G MISC 3 (three) times a day Use to test blood glucose 9/5/23   Historical Provider, MD   losartan (COZAAR) 25 mg tablet  9/29/23   Historical Provider, MD   lovastatin (MEVACOR) 10 MG tablet Take 10 mg by mouth daily at bedtime    Historical Provider, MD   Multiple Vitamins-Minerals (multivitamin with minerals) tablet Take 1 tablet by mouth daily    Historical Provider, MD   mupirocin (BACTROBAN) 2 % ointment  1/11/22   Historical Provider, MD   naloxone (NARCAN) 4 mg/0.1 mL nasal spray Administer 1 spray into a nostril. If no response after 2-3 minutes, give another dose in the other nostril using a new spray. 5/17/24 5/17/25  Morelia Sethi PA-C   OneTouch Ultra test strip USE TO TEST BLOOD GLUCOSE TWICE DAILY 8/4/22   Historical Provider, MD   pantoprazole (PROTONIX) 40 mg tablet Take 40 mg by mouth daily    Historical Provider, MD   potassium chloride (K-DUR,KLOR-CON) 10 mEq tablet  6/13/19   Historical Provider, MD   propranolol (INDERAL) 10 mg tablet  10/23/24   Historical Provider, MD   tamsulosin (FLOMAX) 0.4 mg TAKE 1 CAPSULE BY MOUTH EVERY DAY WITH DINNER 8/10/23   DAYSI Garland   testosterone (ANDROGEL) 1.62 % TD gel pump  11/1/22   Historical Provider, MD   tiZANidine (ZANAFLEX) 2 mg tablet Take 1 tablet (2 mg total) by mouth every  8 (eight) hours as needed for muscle spasms May make you drowsy. Do not drive until you know how it affects you. Do not take any other muscle relaxers while taking this medication 6/19/24   Morelia Sethi PA-C   traZODone (DESYREL) 100 mg tablet  10/3/24   Historical Provider, MD   Trulicity 4.5 MG/0.5ML SOPN every 7 days On Mondays 8/22/22   Historical Provider, MD   zinc gluconate 50 mg tablet Take 50 mg by mouth in the morning.    Historical Provider, MD     No Known Allergies    Objective :  Temp:  [99.2 °F (37.3 °C)] 99.2 °F (37.3 °C)  HR:  [84-93] 90  BP: ()/(54) 88/54  Resp:  [20-22] 20  SpO2:  [91 %-97 %] 96 %  O2 Device: None (Room air)  Nasal Cannula O2 Flow Rate (L/min):  [2 L/min] 2 L/min    Physical Exam  Vitals and nursing note reviewed.   Constitutional:       General: He is sleeping. He is not in acute distress.     Appearance: Normal appearance. He is ill-appearing.   HENT:      Head: Normocephalic and atraumatic.      Right Ear: External ear normal.      Left Ear: External ear normal.   Pulmonary:      Effort: Pulmonary effort is normal.   Abdominal:      General: There is no distension.      Tenderness: There is no abdominal tenderness.   Neurological:      Mental Status: He is easily aroused. Mental status is at baseline. He is lethargic.      Comments: On exam, no tremors observed.  Patient is sleeping wakes up with name calling.  Oriented to self place time.  However falls back asleep quickly.  Follows direction, no tongue deviation.  No facial drooping   Psychiatric:         Mood and Affect: Mood normal.          Lines/Drains:            Lab Results: I have reviewed the following results:  Results from last 7 days   Lab Units 12/12/24  1453   WBC Thousand/uL 7.53   HEMOGLOBIN g/dL 10.4*   HEMATOCRIT % 35.0*   PLATELETS Thousands/uL 251   SEGS PCT % 71   LYMPHO PCT % 17   MONO PCT % 8   EOS PCT % 4     Results from last 7 days   Lab Units 12/12/24  1453   SODIUM mmol/L 137    POTASSIUM mmol/L 4.6   CHLORIDE mmol/L 98   CO2 mmol/L 35*   BUN mg/dL 34*   CREATININE mg/dL 3.38*   ANION GAP mmol/L 4   CALCIUM mg/dL 8.3*   ALBUMIN g/dL 3.3*   TOTAL BILIRUBIN mg/dL 0.50   ALK PHOS U/L 73   ALT U/L 9   AST U/L 12*   GLUCOSE RANDOM mg/dL 114             Lab Results   Component Value Date    HGBA1C 6.1 (H) 12/03/2024    HGBA1C 6.1 08/28/2024    HGBA1C 6.1 (H) 09/14/2023              ** Please Note: This note has been constructed using a voice recognition system. **

## 2024-12-12 NOTE — ED PROVIDER NOTES
Time reflects when diagnosis was documented in both MDM as applicable and the Disposition within this note       Time User Action Codes Description Comment    12/12/2024  3:02 PM Vineet Hills [R25.1] Tremor     12/12/2024  3:02 PM Vineet Hills [M62.81] Truncal muscle weakness     12/12/2024  3:38 PM Vineet Hills [N17.9] LAKHWINDER (acute kidney injury) ([facility])           ED Disposition       ED Disposition   Admit    Condition   Stable    Date/Time   Thu Dec 12, 2024  3:02 PM    Comment   --             Assessment & Plan       Medical Decision Making  Patient is a 69-year-old male presenting for evaluation of full body tremors    Differential: Electrolyte abnormality versus dehydration versus endocrine/metabolic disorder doubt CVA doubt mass    Plan: Labs fluids likely admission    Ionized calcium mildly low will give 2 g calcium gluconate.  Patient with LAKHWINDER will give fluids.  Will admit for LAKHWINDER and further workup of tremors.  Samantha with medicine who accepts patient for admission.  HD stable at time of admission    Amount and/or Complexity of Data Reviewed  Labs: ordered.    Risk  Prescription drug management.  Decision regarding hospitalization.             Medications   calcium gluconate 2 g in sodium chloride 0.9% 100 mL (premix) (2 g Intravenous New Bag 12/12/24 1522)   sodium chloride 0.9 % bolus 1,000 mL (has no administration in time range)       ED Risk Strat Scores                          SBIRT 20yo+      Flowsheet Row Most Recent Value   Initial Alcohol Screen: US AUDIT-C     1. How often do you have a drink containing alcohol? 0 Filed at: 12/12/2024 1431   2. How many drinks containing alcohol do you have on a typical day you are drinking?  0 Filed at: 12/12/2024 1431   3a. Male UNDER 65: How often do you have five or more drinks on one occasion? 0 Filed at: 12/12/2024 1431   3b. FEMALE Any Age, or MALE 65+: How often do you have 4 or more drinks on one occassion? 0 Filed at: 12/12/2024 1431    Audit-C Score 0 Filed at: 2024 1431   ADI: How many times in the past year have you...    Used an illegal drug or used a prescription medication for non-medical reasons? Never Filed at: 2024 1431                            History of Present Illness       Chief Complaint   Patient presents with    Tremors     C/o tremors that started last night and unable to ambulate. Rx txt of pneumonia recently finished course of abx.        Past Medical History:   Diagnosis Date    Acid reflux     Acute blood loss anemia 07/15/2016    Anxiety     Arthritis     Cellulitis     left ankle    CPAP (continuous positive airway pressure) dependence     as needed per patient    Diabetes mellitus (HCC)     Hypertension     Idiopathic chronic venous hypertension of right lower extremity with ulcer (HCC) 2023    Sleep apnea     Twitching       Past Surgical History:   Procedure Laterality Date    APPENDECTOMY      CERVICAL FUSION Bilateral 2024    Procedure: FUSION CERVICAL ANTERIOR W DISCECTOMY, C3-7 ACDF;  Surgeon: Conrad Richardson MD;  Location: [facility];  Service: Orthopedics    CHOLECYSTECTOMY      COLONOSCOPY      IR PICC PLACEMENT DOUBLE LUMEN  2024    KNEE ARTHROSCOPY      WY ARTHRP KNE CONDYLE&PLATU MEDIAL&LAT COMPARTMENTS Left 2016    Procedure: TOTAL  KNEE REPLACEMENT ;  Surgeon: Zack Montenegro MD;  Location: [facility];  Service: Orthopedics      Family History   Problem Relation Age of Onset    Diabetes Mother     Diabetes Father     Cancer Father       Social History     Tobacco Use    Smoking status: Former     Current packs/day: 0.00     Types: Cigarettes     Quit date:      Years since quittin.9    Smokeless tobacco: Never   Vaping Use    Vaping status: Never Used   Substance Use Topics    Alcohol use: Not Currently    Drug use: Not Currently     Comment: tried multiple drugs in the past      E-Cigarette/Vaping    E-Cigarette Use Never User       E-Cigarette/Vaping Substances     Nicotine No     THC No     CBD No     Flavoring No     Other No     Unknown No       I have reviewed and agree with the history as documented.     Patient is a 69-year-old male past medical history of CKD hypertension recently hospitalized for pneumonia that presents to the ED for evaluation of generalized weakness and tremors.  Patient reports that his breathing has felt good however starting yesterday has had global weakness as well as full body tremors that are uncontrollable.  Feels more tired than usual.  Denying fever chills cough shortness of breath chest pain abdominal pain nausea vomiting numbness weakness tingling back pain neck pain or any other complaints at this time.          Review of Systems   Constitutional:  Negative for chills and fever.   HENT:  Negative for ear pain and sore throat.    Eyes:  Negative for pain and visual disturbance.   Respiratory:  Negative for cough and shortness of breath.    Cardiovascular:  Negative for chest pain and palpitations.   Gastrointestinal:  Negative for abdominal pain and vomiting.   Genitourinary:  Negative for dysuria and hematuria.   Musculoskeletal:  Negative for arthralgias and back pain.   Skin:  Negative for color change and rash.   Neurological:  Positive for tremors and weakness. Negative for dizziness, seizures, syncope, facial asymmetry, speech difficulty, light-headedness, numbness and headaches.   All other systems reviewed and are negative.          Objective       ED Triage Vitals   Temperature Pulse Blood Pressure Respirations SpO2 Patient Position - Orthostatic VS   12/12/24 1433 12/12/24 1429 12/12/24 1429 12/12/24 1429 12/12/24 1429 12/12/24 1433   99.2 °F (37.3 °C) 84 112/54 22 91 % Lying      Temp Source Heart Rate Source BP Location FiO2 (%) Pain Score    12/12/24 1433 12/12/24 1433 12/12/24 1433 -- --    Oral Monitor Right arm        Vitals      Date and Time Temp Pulse SpO2 Resp BP Pain Score FACES Pain Rating User   12/12/24 1433 99.2  °F (37.3 °C) 93 97 % 20 112/54 -- -- ES   12/12/24 1431 -- -- 94 % -- -- -- -- CS   12/12/24 1429 -- 84 91 % 22 112/54 -- -- CS            Physical Exam  Vitals and nursing note reviewed.   Constitutional:       General: He is not in acute distress.     Appearance: He is well-developed. He is not ill-appearing.   HENT:      Head: Normocephalic and atraumatic.      Mouth/Throat:      Mouth: Mucous membranes are moist.   Eyes:      Extraocular Movements: Extraocular movements intact.      Conjunctiva/sclera: Conjunctivae normal.   Cardiovascular:      Rate and Rhythm: Normal rate and regular rhythm.      Heart sounds: No murmur heard.  Pulmonary:      Effort: Pulmonary effort is normal. No respiratory distress.      Breath sounds: Normal breath sounds.   Abdominal:      Palpations: Abdomen is soft.      Tenderness: There is no abdominal tenderness.   Musculoskeletal:         General: No swelling. Normal range of motion.      Cervical back: Normal range of motion and neck supple.      Right lower leg: No edema.      Left lower leg: No edema.   Skin:     General: Skin is warm and dry.      Capillary Refill: Capillary refill takes less than 2 seconds.   Neurological:      General: No focal deficit present.      Mental Status: He is alert and oriented to person, place, and time.      Cranial Nerves: Cranial nerves 2-12 are intact. No dysarthria or facial asymmetry.      Sensory: No sensory deficit.      Motor: Weakness (Truncal weakness, 5 out of 5 in the extremities) and tremor present.      Coordination: Finger-Nose-Finger Test normal.   Psychiatric:         Mood and Affect: Mood normal.         Results Reviewed       Procedure Component Value Units Date/Time    Comprehensive metabolic panel [[id number]]  (Abnormal) Collected: 12/12/24 1453    Lab Status: Final result Specimen: Blood from Arm, Right Updated: 12/12/24 1526     Sodium 137 mmol/L      Potassium 4.6 mmol/L      Chloride 98 mmol/L      CO2 35 mmol/L       ANION GAP 4 mmol/L      BUN 34 mg/dL      Creatinine 3.38 mg/dL      Glucose 114 mg/dL      Calcium 8.3 mg/dL      Corrected Calcium 8.9 mg/dL      AST 12 U/L      ALT 9 U/L      Alkaline Phosphatase 73 U/L      Total Protein 7.3 g/dL      Albumin 3.3 g/dL      Total Bilirubin 0.50 mg/dL      eGFR 17 ml/min/1.73sq m     Narrative:      National Kidney Disease Foundation guidelines for Chronic Kidney Disease (CKD):     Stage 1 with normal or high GFR (GFR > 90 mL/min/1.73 square meters)    Stage 2 Mild CKD (GFR = 60-89 mL/min/1.73 square meters)    Stage 3A Moderate CKD (GFR = 45-59 mL/min/1.73 square meters)    Stage 3B Moderate CKD (GFR = 30-44 mL/min/1.73 square meters)    Stage 4 Severe CKD (GFR = 15-29 mL/min/1.73 square meters)    Stage 5 End Stage CKD (GFR <15 mL/min/1.73 square meters)  Note: GFR calculation is accurate only with a steady state creatinine    Magnesium [[id number]]  (Normal) Collected: 12/12/24 1453    Lab Status: Final result Specimen: Blood from Arm, Right Updated: 12/12/24 1526     Magnesium 2.1 mg/dL     Phosphorus [[id number]]  (Abnormal) Collected: 12/12/24 1453    Lab Status: Final result Specimen: Blood from Arm, Right Updated: 12/12/24 1526     Phosphorus 5.6 mg/dL     Calcium, ionized [[id number]]  (Abnormal) Collected: 12/12/24 1453    Lab Status: Final result Specimen: Blood from Arm, Right Updated: 12/12/24 1509     Calcium, Ionized 1.10 mmol/L     CBC and differential [[id number]]  (Abnormal) Collected: 12/12/24 1453    Lab Status: Final result Specimen: Blood from Arm, Right Updated: 12/12/24 1505     WBC 7.53 Thousand/uL      RBC 3.56 Million/uL      Hemoglobin 10.4 g/dL      Hematocrit 35.0 %      MCV 98 fL      MCH 29.2 pg      MCHC 29.7 g/dL      RDW 14.4 %      MPV 10.3 fL      Platelets 251 Thousands/uL      nRBC 0 /100 WBCs      Segmented % 71 %      Immature Grans % 0 %      Lymphocytes % 17 %      Monocytes % 8 %      Eosinophils Relative 4 %      Basophils Relative 0 %       Absolute Neutrophils 5.39 Thousands/µL      Absolute Immature Grans 0.02 Thousand/uL      Absolute Lymphocytes 1.26 Thousands/µL      Absolute Monocytes 0.57 Thousand/µL      Eosinophils Absolute 0.27 Thousand/µL      Basophils Absolute 0.02 Thousands/µL     TSH, 3rd generation with Free T4 reflex [[id number]]     Lab Status: No result Specimen: Blood             No orders to display       Procedures    ED Medication and Procedure Management   Prior to Admission Medications   Prescriptions Last Dose Informant Patient Reported? Taking?   Blood Glucose Monitoring Suppl (ONE TOUCH ULTRA 2) w/Device KIT  Self Yes No   Sig: USE TO TEST BLOOD GLUCOSE   Lancets 30G MISC  Self Yes No   Sig: 3 (three) times a day Use to test blood glucose   Multiple Vitamins-Minerals (multivitamin with minerals) tablet   Yes No   Sig: Take 1 tablet by mouth daily   OneTouch Ultra test strip  Self Yes No   Sig: USE TO TEST BLOOD GLUCOSE TWICE DAILY   Trulicity 4.5 MG/0.5ML SOPN  Self Yes No   Sig: every 7 days On    acetaminophen (TYLENOL) 650 mg CR tablet   No No   Sig: Take 1 tablet (650 mg total) by mouth every 8 (eight) hours as needed for mild pain   albuterol (PROVENTIL HFA,VENTOLIN HFA) 90 mcg/act inhaler  Self Yes No   Sig: Inhale 2 puffs every 6 (six) hours as needed for wheezing As needed   aspirin (ECOTRIN LOW STRENGTH) 81 mg EC tablet  Self Yes No   Sig: Take 81 mg by mouth daily   b complex vitamins capsule   Yes No   Sig: Take 1 capsule by mouth daily   bisacodyl (DULCOLAX) 5 mg EC tablet   No No   Sig: Take 1 tablet (5 mg total) by mouth once for 1 dose   budesonide (RINOCORT AQUA) 32 MCG/ACT nasal spray  Self Yes No   Si spray into each nostril 2 (two) times a day As needed   busPIRone (BUSPAR) 10 mg tablet   No No   Sig: TAKE 1 TABLET (10 MG TOTAL) BY MOUTH TWO (TWO) TIMES A DAY   chlorhexidine (PERIDEX) 0.12 % solution  Self Yes No   Sig: RINSE MOUTH WITH 15ML (1 CAPFUL) FOR 30 SECONDS IN MORNING AND EVENING AFTER  BRUSHING, THEN SPIT   cloNIDine (CATAPRES) 0.1 mg tablet  Self Yes No   Sig: Take 0.1 mg by mouth 2 (two) times a day   clotrimazole (LOTRIMIN) 1 % cream   Yes No   docusate sodium (COLACE) 100 mg capsule  Self Yes No   Sig: Take 100 mg by mouth 2 (two) times a day   famotidine (PEPCID) 20 mg tablet   Yes No   fluticasone (FLOVENT HFA) 220 mcg/act inhaler  Self Yes No   Sig: Inhale 1 puff daily as needed As needed   furosemide (LASIX) 40 mg tablet  Self Yes No   Sig: Take 40 mg by mouth as needed in the morning and 40 mg as needed in the evening.   gabapentin (NEURONTIN) 100 mg capsule   Yes No   losartan (COZAAR) 25 mg tablet  Self Yes No   lovastatin (MEVACOR) 10 MG tablet   Yes No   Sig: Take 10 mg by mouth daily at bedtime   mupirocin (BACTROBAN) 2 % ointment  Self Yes No   naloxone (NARCAN) 4 mg/0.1 mL nasal spray   No No   Sig: Administer 1 spray into a nostril. If no response after 2-3 minutes, give another dose in the other nostril using a new spray.   pantoprazole (PROTONIX) 40 mg tablet   Yes No   Sig: Take 40 mg by mouth daily   potassium chloride (K-DUR,KLOR-CON) 10 mEq tablet  Self Yes No   propranolol (INDERAL) 10 mg tablet   Yes No   tamsulosin (FLOMAX) 0.4 mg  Self No No   Sig: TAKE 1 CAPSULE BY MOUTH EVERY DAY WITH DINNER   testosterone (ANDROGEL) 1.62 % TD gel pump  Self Yes No   tiZANidine (ZANAFLEX) 2 mg tablet   No No   Sig: Take 1 tablet (2 mg total) by mouth every 8 (eight) hours as needed for muscle spasms May make you drowsy. Do not drive until you know how it affects you. Do not take any other muscle relaxers while taking this medication   traZODone (DESYREL) 100 mg tablet   Yes No   zinc gluconate 50 mg tablet  Self Yes No   Sig: Take 50 mg by mouth in the morning.      Facility-Administered Medications: None     Patient's Medications   Discharge Prescriptions    No medications on file     No discharge procedures on file.  ED SEPSIS DOCUMENTATION   Time reflects when diagnosis was  documented in both MDM as applicable and the Disposition within this note       Time User Action Codes Description Comment    12/12/2024  3:02 PM Vineet Hills [R25.1] Tremor     12/12/2024  3:02 PM Vineet Hills [M62.81] Truncal muscle weakness     12/12/2024  3:38 PM Vineet Hills [N17.9] LAKHWINDER (acute kidney injury) (HCC)                  Vineet Hills DO  12/18/24 1152

## 2024-12-12 NOTE — ED PROCEDURE NOTE
Procedure  Complex Venous Access Line    Date/Time: 12/12/2024 2:55 PM    Performed by: Simón Mccullough DO  Authorized by: Simón Mccullough DO    Patient location:  ED  Other Assisting Provider: Yes (comment) (Dr. Bedolla)    Consent:     Consent obtained:  Verbal    Consent given by:  Patient  Universal protocol:     Procedure explained and questions answered to patient or proxy's satisfaction: yes    Pre-procedure details:     Skin preparation:  2% chlorhexidine    Skin preparation agent: Skin preparation agent completely dried prior to procedure    Procedure details:     Complex Venous Access Line Type: US Guided Peripheral IV      Peripheral IV Indications: other specified disorders of the vein and other disorder of the circulatory system      Orientation:  Right    Location:  Forearm    Catheter size:  18 gauge    Ultrasound image availability:  Images available in PACS    Sterile ultrasound techniques: Sterile gel and sterile probe covers were used      Number of attempts:  1    Successful placement: yes    Anesthesia (see MAR for exact dosages):     Anesthesia method:  None  Post-procedure details:     Post-procedure:  Dressing applied    Assessment:  Blood return through all ports and free fluid flow    Patient tolerance of procedure:  Tolerated well, no immediate complications                   Simón Mccullough DO  12/12/24 1453

## 2024-12-13 LAB
ANION GAP SERPL CALCULATED.3IONS-SCNC: 3 MMOL/L (ref 4–13)
ATRIAL RATE: 163 BPM
BACTERIA UR QL AUTO: ABNORMAL /HPF
BILIRUB UR QL STRIP: NEGATIVE
BUN SERPL-MCNC: 29 MG/DL (ref 5–25)
CALCIUM SERPL-MCNC: 8.4 MG/DL (ref 8.4–10.2)
CHLORIDE SERPL-SCNC: 102 MMOL/L (ref 96–108)
CLARITY UR: CLEAR
CO2 SERPL-SCNC: 34 MMOL/L (ref 21–32)
COLOR UR: ABNORMAL
CREAT SERPL-MCNC: 1.97 MG/DL (ref 0.6–1.3)
ERYTHROCYTE [DISTWIDTH] IN BLOOD BY AUTOMATED COUNT: 14.4 % (ref 11.6–15.1)
GFR SERPL CREATININE-BSD FRML MDRD: 33 ML/MIN/1.73SQ M
GLUCOSE SERPL-MCNC: 117 MG/DL (ref 65–140)
GLUCOSE SERPL-MCNC: 131 MG/DL (ref 65–140)
GLUCOSE SERPL-MCNC: 155 MG/DL (ref 65–140)
GLUCOSE UR STRIP-MCNC: NEGATIVE MG/DL
HCT VFR BLD AUTO: 33.4 % (ref 36.5–49.3)
HGB BLD-MCNC: 9.7 G/DL (ref 12–17)
HGB UR QL STRIP.AUTO: ABNORMAL
KETONES UR STRIP-MCNC: NEGATIVE MG/DL
LEUKOCYTE ESTERASE UR QL STRIP: NEGATIVE
MAGNESIUM SERPL-MCNC: 2.1 MG/DL (ref 1.9–2.7)
MCH RBC QN AUTO: 28.7 PG (ref 26.8–34.3)
MCHC RBC AUTO-ENTMCNC: 29 G/DL (ref 31.4–37.4)
MCV RBC AUTO: 99 FL (ref 82–98)
MUCOUS THREADS UR QL AUTO: ABNORMAL
NITRITE UR QL STRIP: NEGATIVE
NON-SQ EPI CELLS URNS QL MICRO: ABNORMAL /HPF
PH UR STRIP.AUTO: 5 [PH]
PHOSPHATE SERPL-MCNC: 2.9 MG/DL (ref 2.3–4.1)
PLATELET # BLD AUTO: 194 THOUSANDS/UL (ref 149–390)
PMV BLD AUTO: 10.2 FL (ref 8.9–12.7)
POTASSIUM SERPL-SCNC: 4.8 MMOL/L (ref 3.5–5.3)
PROT UR STRIP-MCNC: ABNORMAL MG/DL
QRS AXIS: 43 DEGREES
QRSD INTERVAL: 80 MS
QT INTERVAL: 352 MS
QTC INTERVAL: 486 MS
RBC # BLD AUTO: 3.38 MILLION/UL (ref 3.88–5.62)
RBC #/AREA URNS AUTO: ABNORMAL /HPF
SODIUM SERPL-SCNC: 139 MMOL/L (ref 135–147)
SP GR UR STRIP.AUTO: 1.02 (ref 1–1.03)
T WAVE AXIS: 98 DEGREES
UROBILINOGEN UR STRIP-ACNC: <2 MG/DL
VENTRICULAR RATE: 115 BPM
WBC # BLD AUTO: 5.47 THOUSAND/UL (ref 4.31–10.16)
WBC #/AREA URNS AUTO: ABNORMAL /HPF

## 2024-12-13 PROCEDURE — 82948 REAGENT STRIP/BLOOD GLUCOSE: CPT

## 2024-12-13 PROCEDURE — 99232 SBSQ HOSP IP/OBS MODERATE 35: CPT | Performed by: INTERNAL MEDICINE

## 2024-12-13 PROCEDURE — 93010 ELECTROCARDIOGRAM REPORT: CPT | Performed by: INTERNAL MEDICINE

## 2024-12-13 PROCEDURE — 94760 N-INVAS EAR/PLS OXIMETRY 1: CPT

## 2024-12-13 PROCEDURE — 84100 ASSAY OF PHOSPHORUS: CPT | Performed by: FAMILY MEDICINE

## 2024-12-13 PROCEDURE — 80048 BASIC METABOLIC PNL TOTAL CA: CPT | Performed by: FAMILY MEDICINE

## 2024-12-13 PROCEDURE — 85027 COMPLETE CBC AUTOMATED: CPT | Performed by: FAMILY MEDICINE

## 2024-12-13 PROCEDURE — 81001 URINALYSIS AUTO W/SCOPE: CPT | Performed by: FAMILY MEDICINE

## 2024-12-13 PROCEDURE — 83735 ASSAY OF MAGNESIUM: CPT | Performed by: FAMILY MEDICINE

## 2024-12-13 RX ORDER — INSULIN LISPRO 100 [IU]/ML
1-5 INJECTION, SOLUTION INTRAVENOUS; SUBCUTANEOUS
Status: DISCONTINUED | OUTPATIENT
Start: 2024-12-13 | End: 2024-12-15 | Stop reason: HOSPADM

## 2024-12-13 RX ADMIN — DOCUSATE SODIUM 100 MG: 100 CAPSULE, LIQUID FILLED ORAL at 17:46

## 2024-12-13 RX ADMIN — TAMSULOSIN HYDROCHLORIDE 0.4 MG: 0.4 CAPSULE ORAL at 17:46

## 2024-12-13 RX ADMIN — HEPARIN SODIUM 5000 UNITS: 5000 INJECTION, SOLUTION INTRAVENOUS; SUBCUTANEOUS at 21:42

## 2024-12-13 RX ADMIN — PANTOPRAZOLE SODIUM 40 MG: 40 TABLET, DELAYED RELEASE ORAL at 09:58

## 2024-12-13 RX ADMIN — FLUTICASONE FUROATE 1 PUFF: 200 POWDER RESPIRATORY (INHALATION) at 09:59

## 2024-12-13 RX ADMIN — BUSPIRONE HYDROCHLORIDE 10 MG: 10 TABLET ORAL at 17:46

## 2024-12-13 RX ADMIN — BUSPIRONE HYDROCHLORIDE 10 MG: 10 TABLET ORAL at 09:58

## 2024-12-13 RX ADMIN — PRAVASTATIN SODIUM 20 MG: 20 TABLET ORAL at 17:46

## 2024-12-13 RX ADMIN — SODIUM CHLORIDE, SODIUM GLUCONATE, SODIUM ACETATE, POTASSIUM CHLORIDE, MAGNESIUM CHLORIDE, SODIUM PHOSPHATE, DIBASIC, AND POTASSIUM PHOSPHATE 75 ML/HR: .53; .5; .37; .037; .03; .012; .00082 INJECTION, SOLUTION INTRAVENOUS at 10:09

## 2024-12-13 RX ADMIN — HEPARIN SODIUM 5000 UNITS: 5000 INJECTION, SOLUTION INTRAVENOUS; SUBCUTANEOUS at 14:55

## 2024-12-13 RX ADMIN — ASPIRIN 81 MG: 81 TABLET, COATED ORAL at 09:58

## 2024-12-13 RX ADMIN — HEPARIN SODIUM 5000 UNITS: 5000 INJECTION, SOLUTION INTRAVENOUS; SUBCUTANEOUS at 05:58

## 2024-12-13 RX ADMIN — DOCUSATE SODIUM 100 MG: 100 CAPSULE, LIQUID FILLED ORAL at 09:58

## 2024-12-13 NOTE — CASE MANAGEMENT
Case Management Assessment & Discharge Planning Note    Patient name Edgar Obando  Location [facility]-01 MRN [id number]  : 1955 Date 2024       Current Admission Date: 2024  Current Admission Diagnosis:LAKHWINDER (acute kidney injury) ([facility])   Patient Active Problem List    Diagnosis Date Noted Date Diagnosed    LAKHWINDER (acute kidney injury) ([facility]) 2024     Lethargy 2024     Acute hypoxemic respiratory failure ([facility]) 2024     Pneumonia involving right lung 2024     Protein-calorie malnutrition, unspecified severity ([facility]) 2024     Current mild episode of major depressive disorder, unspecified whether recurrent ([facility]) 2024     Thrombocytopenia ([facility]) 2024     S/P cervical spinal fusion 2024     Abnormal chest x-ray 2024     Stage 3a chronic kidney disease ([facility]) 2023     Hepatitis C virus carrier state ([facility]) 2023     Cervical spondylosis 2023     Chronic diastolic congestive heart failure ([facility]) 2023     Respiratory failure with hypoxia and hypercapnia, unspecified chronicity ([facility]) 2023     Obstructive sleep apnea syndrome 2023     Iron deficiency anemia, unspecified 2022     Chronic pain syndrome 2022     Lumbar spondylosis 2022     Neck pain 2022     Right shoulder pain 2022     Chronic low back pain with bilateral sciatica 2022     Diabetic neuropathy ([facility]) 2020     RBD (REM behavioral disorder) 2020     Primary osteoarthritis of right knee 2019     Chronic pain of right knee 2019     Chronic venous insufficiency 2019     Bilateral lower extremity edema 2019     BMI 40.0-44.9, adult ([facility]) 2019     Myoclonus      DDD (degenerative disc disease), lumbar 2018     Spinal stenosis of lumbar region 2018     Low back pain with sciatica 2018     S/P total knee replacement 07/15/2016     Type 2 diabetes mellitus, without  long-term current use of insulin (HCC) 07/15/2016     HTN (hypertension) 07/15/2016     GERD (gastroesophageal reflux disease) 07/15/2016     Anxiety 07/15/2016     SUSY (obstructive sleep apnea) 07/15/2016       LOS (days): 1  Geometric Mean LOS (GMLOS) (days):   Days to GMLOS:     OBJECTIVE:  PATIENT READMITTED TO HOSPITAL  Risk of Unplanned Readmission Score: 25.39      Current admission status: Inpatient    Preferred Pharmacy:   [facility] - [place], [place] - [address]  [address]  [address]  [place] [address]  Phone: [phone] Fax: [phone]    [id number] - [place], [place] - [address]  [place] [address]  Phone: [phone] Fax: [phone]    [facility] [place]  [place] - [address]  [address]  [place] [address]  Phone: [phone] Fax: [phone]    Primary Care Provider: Ana Rosa Win MD    Primary Insurance: [employer]  Secondary Insurance:     ASSESSMENT:  Active Health Care Proxies       Nayeli Obando Health Care Representative - Spouse   Primary Phone: [phone] (Mobile)                 Readmission Root Cause  30 Day Readmission: Yes  During your hospital stay, did someone (provider, nurse, ) explain your care to you in a way you could understand?: Yes  Did you feel medically stable to leave the hospital?: Yes  Were you able to pay for your medication at the pharmacy?: Yes  Did you have reliable transportation to take you to your appointments?: Yes  During previous admission, was a post-acute recommendation made?: Yes  What post-acute resources were offered?: OP Therapy  Patient was readmitted due to: LAKHWINDER  Action Plan: TBD    Patient Information  Admitted from:: Home  Mental Status: Alert  During Assessment patient was accompanied by: Not accompanied during assessment  Assessment information provided by:: Patient  Primary Caregiver: Self  Support Systems: Family members,  Spouse/significant other  County of Residence: [place]  What city do you live in?: [place]  Home entry access options. Select all that apply.: Stairs  Number of steps to enter home.: 6  Do the steps have railings?: Yes  Type of Current Residence: 2 story home  Upon entering residence, is there a bedroom on the main floor (no further steps)?: No  A bedroom is located on the following floor levels of residence (select all that apply):: 2nd Floor  Upon entering residence, is there a bathroom on the main floor (no further steps)?: Yes  Number of steps to 2nd floor from main floor: One Flight  Living Arrangements: Lives w/ Spouse/significant other  Is patient a ?: Yes  Is patient active with [facility]: No    Activities of Daily Living Prior to Admission  Functional Status: Independent  Completes ADLs independently?: Yes  Ambulates independently?: Yes  Does patient use assisted devices?: Yes  Assisted Devices (DME) used: Straight Cane, CPAP  Does patient currently own DME?: Yes  What DME does the patient currently own?: Straight Cane, CPAP  Does patient have a history of Outpatient Therapy (PT/OT)?: Yes  Does the patient have a history of Short-Term Rehab?: No  Does patient have a history of HHC?: No  Does patient currently have HHC?: No    Patient Information Continued  Income Source: Pension/snf  Does patient have prescription coverage?: Yes  Does patient receive dialysis treatments?: No  Does patient have a history of substance abuse?: No  Does patient have a history of Mental Health Diagnosis?: Yes (anxiety)  Is patient receiving treatment for mental health?: No. Patient declined treatment information.  Has patient received inpatient treatment related to mental health in the last 2 years?: No    Means of Transportation  Means of Transport to Appts:: Drives Self    DISCHARGE DETAILS:    Pt is a 30 day readmit due to LAKHWINDER.  CM completed assessment from CM note by INO Giles on 12/3/24.    Pt  lives w/ spouse in [address] w/ [address].  IPTA, drives, uses cane/CPAP, retired.  CM following

## 2024-12-13 NOTE — PLAN OF CARE
Problem: Potential for Falls  Goal: Patient will remain free of falls  Description: INTERVENTIONS:  - Educate patient/family on patient safety including physical limitations  - Instruct patient to call for assistance with activity   - Consult OT/PT to assist with strengthening/mobility   - Keep Call bell within reach  - Keep bed low and locked with side rails adjusted as appropriate  - Keep care items and personal belongings within reach  - Initiate and maintain comfort rounds  - Make Fall Risk Sign visible to staff  - Apply yellow socks and bracelet for high fall risk patients  - Consider moving patient to room near nurses station  Outcome: Progressing     Problem: SAFETY ADULT  Goal: Patient will remain free of falls  Description: INTERVENTIONS:  - Educate patient/family on patient safety including physical limitations  - Instruct patient to call for assistance with activity   - Consult OT/PT to assist with strengthening/mobility   - Keep Call bell within reach  - Keep bed low and locked with side rails adjusted as appropriate  - Keep care items and personal belongings within reach  - Initiate and maintain comfort rounds  - Make Fall Risk Sign visible to staff  - Apply yellow socks and bracelet for high fall risk patients  - Consider moving patient to room near nurses station  Outcome: Progressing  Goal: Maintain or return to baseline ADL function  Description: INTERVENTIONS:  -  Assess patient's ability to carry out ADLs; assess patient's baseline for ADL function and identify physical deficits which impact ability to perform ADLs (bathing, care of mouth/teeth, toileting, grooming, dressing, etc.)  - Assess/evaluate cause of self-care deficits   - Assess range of motion  - Assess patient's mobility; develop plan if impaired  - Assess patient's need for assistive devices and provide as appropriate  - Encourage maximum independence but intervene and supervise when necessary  - Involve family in performance of  ADLs  - Assess for home care needs following discharge   - Consider OT consult to assist with ADL evaluation and planning for discharge  - Provide patient education as appropriate  Outcome: Progressing  Goal: Maintains/Returns to pre admission functional level  Description: INTERVENTIONS:  - Perform AM-PAC 6 Click Basic Mobility/ Daily Activity assessment daily.  - Set and communicate daily mobility goal to care team and patient/family/caregiver.   - Collaborate with rehabilitation services on mobility goals if consulted  - Out of bed for toileting  - Record patient progress and toleration of activity level   Outcome: Progressing     Problem: DISCHARGE PLANNING  Goal: Discharge to home or other facility with appropriate resources  Description: INTERVENTIONS:  - Identify barriers to discharge w/patient and caregiver  - Arrange for needed discharge resources and transportation as appropriate  - Identify discharge learning needs (meds, wound care, etc.)  - Arrange for interpretive services to assist at discharge as needed  - Refer to Case Management Department for coordinating discharge planning if the patient needs post-hospital services based on physician/advanced practitioner order or complex needs related to functional status, cognitive ability, or social support system  Outcome: Progressing     Problem: Knowledge Deficit  Goal: Patient/family/caregiver demonstrates understanding of disease process, treatment plan, medications, and discharge instructions  Description: Complete learning assessment and assess knowledge base.  Interventions:  - Provide teaching at level of understanding  - Provide teaching via preferred learning methods  Outcome: Progressing     Problem: METABOLIC, FLUID AND ELECTROLYTES - ADULT  Goal: Electrolytes maintained within normal limits  Description: INTERVENTIONS:  - Monitor labs and assess patient for signs and symptoms of electrolyte imbalances  - Administer electrolyte replacement as  ordered  - Monitor response to electrolyte replacements, including repeat lab results as appropriate  - Instruct patient on fluid and nutrition as appropriate  Outcome: Progressing  Goal: Fluid balance maintained  Description: INTERVENTIONS:  - Monitor labs   - Monitor I/O and WT  - Instruct patient on fluid and nutrition as appropriate  - Assess for signs & symptoms of volume excess or deficit  Outcome: Progressing

## 2024-12-13 NOTE — PLAN OF CARE
Problem: Potential for Falls  Goal: Patient will remain free of falls  Description: INTERVENTIONS:  - Educate patient/family on patient safety including physical limitations  - Instruct patient to call for assistance with activity   - Consult OT/PT to assist with strengthening/mobility   - Keep Call bell within reach  - Keep bed low and locked with side rails adjusted as appropriate  - Keep care items and personal belongings within reach  - Initiate and maintain comfort rounds  - Make Fall Risk Sign visible to staff  - Apply yellow socks and bracelet for high fall risk patients  - Consider moving patient to room near nurses station  Outcome: Progressing     Problem: SAFETY ADULT  Goal: Patient will remain free of falls  Description: INTERVENTIONS:  - Educate patient/family on patient safety including physical limitations  - Instruct patient to call for assistance with activity   - Consult OT/PT to assist with strengthening/mobility   - Keep Call bell within reach  - Keep bed low and locked with side rails adjusted as appropriate  - Keep care items and personal belongings within reach  - Initiate and maintain comfort rounds  - Make Fall Risk Sign visible to staff  - Apply yellow socks and bracelet for high fall risk patients  - Consider moving patient to room near nurses station  Outcome: Progressing  Goal: Maintain or return to baseline ADL function  Description: INTERVENTIONS:  -  Assess patient's ability to carry out ADLs; assess patient's baseline for ADL function and identify physical deficits which impact ability to perform ADLs (bathing, care of mouth/teeth, toileting, grooming, dressing, etc.)  - Assess/evaluate cause of self-care deficits   - Assess range of motion  - Assess patient's mobility; develop plan if impaired  - Assess patient's need for assistive devices and provide as appropriate  - Encourage maximum independence but intervene and supervise when necessary  - Involve family in performance of  ADLs  - Assess for home care needs following discharge   - Consider OT consult to assist with ADL evaluation and planning for discharge  - Provide patient education as appropriate  Outcome: Progressing  Goal: Maintains/Returns to pre admission functional level  Description: INTERVENTIONS:  - Perform AM-PAC 6 Click Basic Mobility/ Daily Activity assessment daily.  - Set and communicate daily mobility goal to care team and patient/family/caregiver.   - Collaborate with rehabilitation services on mobility goals if consulted  - Out of bed for toileting  - Record patient progress and toleration of activity level   Outcome: Progressing     Problem: DISCHARGE PLANNING  Goal: Discharge to home or other facility with appropriate resources  Description: INTERVENTIONS:  - Identify barriers to discharge w/patient and caregiver  - Arrange for needed discharge resources and transportation as appropriate  - Identify discharge learning needs (meds, wound care, etc.)  - Arrange for interpretive services to assist at discharge as needed  - Refer to Case Management Department for coordinating discharge planning if the patient needs post-hospital services based on physician/advanced practitioner order or complex needs related to functional status, cognitive ability, or social support system  Outcome: Progressing     Problem: Knowledge Deficit  Goal: Patient/family/caregiver demonstrates understanding of disease process, treatment plan, medications, and discharge instructions  Description: Complete learning assessment and assess knowledge base.  Interventions:  - Provide teaching at level of understanding  - Provide teaching via preferred learning methods  Outcome: Progressing     Problem: METABOLIC, FLUID AND ELECTROLYTES - ADULT  Goal: Electrolytes maintained within normal limits  Description: INTERVENTIONS:  - Monitor labs and assess patient for signs and symptoms of electrolyte imbalances  - Administer electrolyte replacement as  ordered  - Monitor response to electrolyte replacements, including repeat lab results as appropriate  - Instruct patient on fluid and nutrition as appropriate  Outcome: Progressing  Goal: Fluid balance maintained  Description: INTERVENTIONS:  - Monitor labs   - Monitor I/O and WT  - Instruct patient on fluid and nutrition as appropriate  - Assess for signs & symptoms of volume excess or deficit  Outcome: Progressing     Problem: Prexisting or High Potential for Compromised Skin Integrity  Goal: Skin integrity is maintained or improved  Description: INTERVENTIONS:  - Identify patients at risk for skin breakdown  - Assess and monitor skin integrity  - Assess and monitor nutrition and hydration status  - Monitor labs   - Assess for incontinence   - Turn and reposition patient  - Assist with mobility/ambulation  - Relieve pressure over bony prominences  - Avoid friction and shearing  - Provide appropriate hygiene as needed including keeping skin clean and dry  - Evaluate need for skin moisturizer/barrier cream  - Collaborate with interdisciplinary team   - Patient/family teaching  - Consider wound care consult   Outcome: Progressing     Problem: Nutrition/Hydration-ADULT  Goal: Nutrient/Hydration intake appropriate for improving, restoring or maintaining nutritional needs  Description: Monitor and assess patient's nutrition/hydration status for malnutrition. Collaborate with interdisciplinary team and initiate plan and interventions as ordered.  Monitor patient's weight and dietary intake as ordered or per policy. Utilize nutrition screening tool and intervene as necessary. Determine patient's food preferences and provide high-protein, high-caloric foods as appropriate.     INTERVENTIONS:  - Monitor oral intake, urinary output, labs, and treatment plans  - Assess nutrition and hydration status and recommend course of action  - Evaluate amount of meals eaten  - Assist patient with eating if necessary   - Allow adequate  time for meals  - Recommend/ encourage appropriate diets, oral nutritional supplements, and vitamin/mineral supplements  - Order, calculate, and assess calorie counts as needed  - Recommend, monitor, and adjust tube feedings and TPN/PPN based on assessed needs  - Assess need for intravenous fluids  - Provide specific nutrition/hydration education as appropriate  - Include patient/family/caregiver in decisions related to nutrition  Outcome: Progressing

## 2024-12-13 NOTE — PROGRESS NOTES
Progress Note - Hospitalist   Name: Edgar Obando 69 y.o. male I MRN: [id number]  [facility] Date of Admission: 12/12/2024   Date of Service: 12/13/2024 I Hospital Day: 1    Assessment & Plan  LAKHWINDER (acute kidney injury) (HCC)  69-year-old male with PMH of CKD 3, HTN, anxiety, recent hospitalization for pneumonia, discharged on supplemental oxygen 2 L via NC, return to ED with complaint of increasing lethargy, generalized tremors and poor oral intake.  Noted to have significant LAKHWINDER with creatinine 3.38, baseline 0.9-1.  Holding PTA Lasix 60 mg daily as well as losartan   Hold home gabapentin.   Patient received 1 L IVF bolus and started on fluids  Intake/output, urinalysis, bladder scan,  Creatinine improved to 1.9  Hold further fluids  Repeat labs in am.   HTN (hypertension)  Bp stable off of home lasix, losartan, clonidine.   Will monitor bp and determine when to restart medications   Anxiety  Continue buspar, hold trazodone and gabapentin due to increased lethargy  SUSY (obstructive sleep apnea)  CPAP at bedtime  Chronic diastolic congestive heart failure (HCC)  Wt Readings from Last 3 Encounters:   12/12/24 111 kg (245 lb 2.4 oz)   12/03/24 114 kg (251 lb 5.2 oz)   11/14/24 112 kg (247 lb 9.6 oz)     was hypovolemic on admission. Hold lasix,   Last echo 10/24 shows normal systolic and diastolic function. LVEF 60%.  S/p iv fluids. Will hold further fluids and monitor         Respiratory failure with hypoxia and hypercapnia, unspecified chronicity (HCC)  Chronic respiratory failure in which he uses 2 liters outpt  Currently on 2 liters of oxygen   Lethargy  Patient has been sleeping mostly during my interview. He wakes up with name calling, oriented to self, place and time. But falls back quickly to sleep.   Saturating well with 2L oxygen via NC.   Hold home gabapentin, trazodone.   Has since resolved  Would consider restarting medications tomorrow     VTE Pharmacologic Prophylaxis:   heparin      Mobility:   Basic Mobility Inpatient Raw Score: 10  -[facility] Goal: 4: Move to chair/commode  -[facility] Achieved: 2: Bed activities/Dependent transfer      Patient Centered Rounds:  discussed with his nurse       Education and Discussions with Family / Patient: attempted to call his wife but no answer     Current Length of Stay: 1 day(s)  Current Patient Status: Inpatient     Discharge Plan: Anticipate discharge in 24-48 hrs to tbd awaiting pt/ot eval     Code Status: Level 1 - Full Code    Subjective   Pt seen and examined. He states he tries to drink water at home but cut back a lot recently. He is eating and drinking well here. No other problems were reported.     Objective :  Temp:  [98.3 °F (36.8 °C)-99.7 °F (37.6 °C)] 99.7 °F (37.6 °C)  HR:  [68-90] 75  BP: ()/(53-70) 112/58  Resp:  [16-20] 16  SpO2:  [96 %-98 %] 96 %  O2 Device: CPAP  Nasal Cannula O2 Flow Rate (L/min):  [2 L/min] 2 L/min    Body mass index is 39.57 kg/m².     Input and Output Summary (last 24 hours):     Intake/Output Summary (Last 24 hours) at 12/13/2024 1526  Last data filed at 12/13/2024 1446  Gross per 24 hour   Intake 2180 ml   Output 700 ml   Net 1480 ml       Physical Exam  Constitutional:       Appearance: Normal appearance.   HENT:      Head: Normocephalic and atraumatic.   Eyes:      Extraocular Movements: Extraocular movements intact.      Pupils: Pupils are equal, round, and reactive to light.   Cardiovascular:      Rate and Rhythm: Normal rate and regular rhythm.      Heart sounds: No murmur heard.     No friction rub. No gallop.   Pulmonary:      Effort: Pulmonary effort is normal. No respiratory distress.      Breath sounds: Normal breath sounds. No wheezing, rhonchi or rales.   Abdominal:      General: Bowel sounds are normal. There is no distension.      Palpations: Abdomen is soft.      Tenderness: There is no abdominal tenderness. There is no guarding or rebound.   Neurological:      Mental Status: He is alert and  oriented to person, place, and time.       Lines/Drains:      Lab Results: I have reviewed the following results:   Results from last 7 days   Lab Units 12/13/24  1020 12/12/24  1453   WBC Thousand/uL 5.47 7.53   HEMOGLOBIN g/dL 9.7* 10.4*   HEMATOCRIT % 33.4* 35.0*   PLATELETS Thousands/uL 194 251   SEGS PCT %  --  71   LYMPHO PCT %  --  17   MONO PCT %  --  8   EOS PCT %  --  4     Results from last 7 days   Lab Units 12/13/24  1020 12/12/24  1453   SODIUM mmol/L 139 137   POTASSIUM mmol/L 4.8 4.6   CHLORIDE mmol/L 102 98   CO2 mmol/L 34* 35*   BUN mg/dL 29* 34*   CREATININE mg/dL 1.97* 3.38*   ANION GAP mmol/L 3* 4   CALCIUM mg/dL 8.4 8.3*   ALBUMIN g/dL  --  3.3*   TOTAL BILIRUBIN mg/dL  --  0.50   ALK PHOS U/L  --  73   ALT U/L  --  9   AST U/L  --  12*   GLUCOSE RANDOM mg/dL 155* 114                       Recent Cultures (last 7 days):         Imaging Results Review: No pertinent imaging studies reviewed.  Other Study Results Review: No additional pertinent studies reviewed.    Last 24 Hours Medication List:     Current Facility-Administered Medications:     acetaminophen (TYLENOL) tablet 650 mg, Q6H PRN    albuterol (PROVENTIL HFA,VENTOLIN HFA) inhaler 2 puff, Q6H PRN    aspirin (ECOTRIN LOW STRENGTH) EC tablet 81 mg, Daily    busPIRone (BUSPAR) tablet 10 mg, BID    docusate sodium (COLACE) capsule 100 mg, BID    fluticasone (ARNUITY ELLIPTA) 200 MCG/ACT inhaler 1 puff, Daily    heparin (porcine) subcutaneous injection 5,000 Units, Q8H CASANDRA **AND** [CANCELED] Platelet count, Once    insulin lispro (HumALOG/ADMELOG) 100 units/mL subcutaneous injection 1-5 Units, TID AC **AND** Fingerstick Glucose (POCT), TID AC    insulin lispro (HumALOG/ADMELOG) 100 units/mL subcutaneous injection 1-5 Units, HS    pantoprazole (PROTONIX) EC tablet 40 mg, Daily    pravastatin (PRAVACHOL) tablet 20 mg, Daily With Dinner    tamsulosin (FLOMAX) capsule 0.4 mg, Daily With Dinner    Administrative Statements   Today, Patient Was  Seen By: Morelia Castellon, DO

## 2024-12-13 NOTE — UTILIZATION REVIEW
Initial Clinical Review    Admission: Date/Time/Statement:   Admission Orders (From admission, onward)       Ordered        12/12/24 1554  INPATIENT ADMISSION  Once                          Orders Placed This Encounter   Procedures    INPATIENT ADMISSION     Standing Status:   Standing     Number of Occurrences:   1     Level of Care:   Med Surg [16]     Estimated length of stay:   More than 2 Midnights     Certification:   I certify that inpatient services are medically necessary for this patient for a duration of greater than two midnights. See H&P and MD Progress Notes for additional information about the patient's course of treatment.     ED Arrival Information       Expected   -    Arrival   12/12/2024 14:21    Acuity   Emergent              Means of arrival   Ambulance    Escorted by   [facility]    Service   Hospitalist    Admission type   Emergency              Arrival complaint   Tremors             Chief Complaint   Patient presents with    Tremors     C/o tremors that started last night and unable to ambulate. Rx txt of pneumonia recently finished course of abx.        Initial Presentation: 69 y.o. male with PMHx includes CKD 3, HTN, anxiety, recent hospitalization for pneumonia, discharged on supplemental oxygen 2 L via NC, who presented on 12/12/24 to [facility] ED via EMS due to increasing lethargy, generalized tremors and poor oral intake. Labs revealed hgb 10.4, BUN 34, Cr 3.38, Ca 8.3, phos 5.6, AST 12. Given 1L IVF bolus followed by infusion, Ca gluconate IV in ED.     Plan:  Admit Inpatient status to med surg dt LAKHWINDER, Lethargy:   continue supplemental O2, obtain VBG, hold home gabapentin, trazodone, lasix. Continue IVF, monitor BP.     Anticipated Length of Stay/Certification Statement: Patient will be admitted on an inpatient basis with an anticipated length of stay of greater than 2 midnights secondary to LAKHWINDER.     Date: 12/13   Day 2:  No new complaints or concerns today.  Continue to monitor VS, labs, IO, bladder scan, repeat labs in AM. Hold further IVF.     Date: 12/14  Day 3: Has surpassed a 2nd midnight with active treatments and services. No new complaints or concerns today. Cr improving today, no further IVF. Monitor BP and if elevated today, resume losartan. Resume gabapentin, continue to hold lasix. PT OT eval.     ED Treatment-Medication Administration from 12/12/2024 1421 to 12/12/2024 2020         Date/Time Order Dose Route Action     12/12/2024 1522 calcium gluconate 2 g in sodium chloride 0.9% 100 mL (premix) 2 g Intravenous New Bag     12/12/2024 1616 sodium chloride 0.9 % bolus 1,000 mL 1,000 mL Intravenous New Bag     12/12/2024 1918 busPIRone (BUSPAR) tablet 10 mg 10 mg Oral Given     12/12/2024 1919 docusate sodium (COLACE) capsule 100 mg 100 mg Oral Given     12/12/2024 1918 pravastatin (PRAVACHOL) tablet 20 mg 20 mg Oral Given     12/12/2024 1918 tamsulosin (FLOMAX) capsule 0.4 mg 0.4 mg Oral Given     12/12/2024 1919 multi-electrolyte (PLASMALYTE-A/ISOLYTE-S PH 7.4) IV solution 75 mL/hr Intravenous New Bag     12/12/2024 1919 heparin (porcine) subcutaneous injection 5,000 Units 5,000 Units Subcutaneous Given            Scheduled Medications:  aspirin, 81 mg, Oral, Daily  busPIRone, 10 mg, Oral, BID  docusate sodium, 100 mg, Oral, BID  fluticasone, 1 puff, Inhalation, Daily  gabapentin, 100 mg, Oral, TID  heparin (porcine), 5,000 Units, Subcutaneous, Q8H CASANDRA  insulin lispro, 1-5 Units, Subcutaneous, TID AC  insulin lispro, 1-5 Units, Subcutaneous, HS  losartan, 25 mg, Oral, Daily  pantoprazole, 40 mg, Oral, Daily  pravastatin, 20 mg, Oral, Daily With Dinner  tamsulosin, 0.4 mg, Oral, Daily With Dinner  traZODone, 100 mg, Oral, HS      Continuous IV Infusions:   multi-electrolyte (PLASMALYTE-A/ISOLYTE-S PH 7.4) IV solution  Rate: 75 mL/hr Dose: 75 mL/hr  Freq: Continuous Route: IV  Last Dose: Stopped (12/13/24 1615)  Start: 12/12/24 1745 End: 12/13/24 1522        PRN Meds:  acetaminophen, 650 mg, Oral, Q6H PRN  albuterol, 2 puff, Inhalation, Q6H PRN      ED Triage Vitals   Temperature Pulse Respirations Blood Pressure SpO2 Pain Score   12/12/24 1433 12/12/24 1429 12/12/24 1429 12/12/24 1429 12/12/24 1429 12/12/24 1616   99.2 °F (37.3 °C) 84 22 112/54 91 % No Pain     Weight (last 2 days)       Date/Time Weight    12/12/24 2029 111 (245.15)            Vital Signs (last 3 days)       Date/Time Temp Pulse Resp BP MAP (mmHg) SpO2 Calculated FIO2 (%) - Nasal Cannula O2 Flow Rate (L/min) Nasal Cannula O2 Flow Rate (L/min) O2 Device O2 Interface Device Patient Position - Orthostatic VS Sarah Coma Scale Score Pain    12/14/24 11:29:55 98.3 °F (36.8 °C) 80 20 148/75 99 95 % -- -- -- -- -- -- -- --    12/14/24 1038 -- -- -- -- -- 95 % 28 -- 2 L/min Nasal cannula -- -- 15 No Pain    12/14/24 0247 -- -- -- -- -- 96 % -- -- -- -- Face mask -- -- --    12/13/24 2344 -- -- -- -- -- 98 % -- -- -- -- Face mask -- -- --    12/13/24 2159 99.2 °F (37.3 °C) 80 16 154/72 99 97 % -- -- -- -- -- -- -- --    12/13/24 2005 -- -- -- -- -- -- -- -- -- None (Room air) -- -- 15 No Pain    12/13/24 15:13:06 99.7 °F (37.6 °C) -- 16 112/58 76 -- -- -- -- -- -- -- -- --    12/13/24 1000 -- -- -- -- -- -- -- -- -- -- -- -- 15 No Pain    12/13/24 08:27:48 98.8 °F (37.1 °C) -- 17 108/57 74 -- -- -- -- -- -- -- -- --    12/13/24 0415 -- -- -- -- -- 96 % -- -- -- -- Face mask -- -- --    12/12/24 2239 -- -- -- -- -- 96 % -- 2 L/min -- CPAP Face mask -- -- --    12/12/24 22:27:32 98.3 °F (36.8 °C) 75 16 118/60 79 96 % -- -- -- -- -- -- -- --    12/12/24 20:31:37 98.5 °F (36.9 °C) -- -- 94/70 78 -- -- -- -- -- -- -- -- --    12/12/24 2029 -- -- -- -- -- -- 28 -- 2 L/min Nasal cannula -- -- 15 No Pain    12/12/24 1915 -- 68 18 115/53 76 97 % 28 -- 2 L/min Nasal cannula -- Lying -- --    12/12/24 1830 -- 68 16 107/55 77 98 % 28 -- 2 L/min Nasal cannula -- Lying -- --    12/12/24 1745 -- 70 20 114/58 83 97 % 28  -- 2 L/min Nasal cannula -- Lying -- --    12/12/24 1616 -- 90 20 88/54  -- 96 % -- -- -- None (Room air) -- Sitting -- No Pain    12/12/24 1433 99.2 °F (37.3 °C) 93 20 112/54 -- 97 % 28 -- 2 L/min Nasal cannula -- Lying 15 --    12/12/24 1431 -- -- -- -- -- 94 % 28 -- 2 L/min Nasal cannula -- -- -- --    12/12/24 1429 -- 84 22 112/54 -- 91 % -- -- -- -- -- -- -- --              Pertinent Labs/Diagnostic Test Results:   Radiology:  No orders to display     Cardiology:  ECG 12 lead   Final Result by Edgar Babb MD (12/13 5006)      Poor data quality, interpretation may be adversely affected   Sinus rhythm with occasional Premature atrial complexes   Abnormal ECG   Confirmed by Edgar Babb ([id number]) on 12/13/2024 1:46:34 PM        GI:  No orders to display           Results from last 7 days   Lab Units 12/14/24  1026 12/13/24  1020 12/12/24  1453   WBC Thousand/uL 5.34 5.47 7.53   HEMOGLOBIN g/dL 9.8* 9.7* 10.4*   HEMATOCRIT % 32.9* 33.4* 35.0*   PLATELETS Thousands/uL 211 194 251   TOTAL NEUT ABS Thousands/µL 3.68  --  5.39         Results from last 7 days   Lab Units 12/14/24  1026 12/13/24  1020 12/12/24  1453   SODIUM mmol/L 139 139 137   POTASSIUM mmol/L 4.6 4.8 4.6   CHLORIDE mmol/L 101 102 98   CO2 mmol/L 34* 34* 35*   ANION GAP mmol/L 4 3* 4   BUN mg/dL 18 29* 34*   CREATININE mg/dL 1.16 1.97* 3.38*   EGFR ml/min/1.73sq m 63 33 17   CALCIUM mg/dL 9.0 8.4 8.3*   CALCIUM, IONIZED mmol/L  --   --  1.10*   MAGNESIUM mg/dL  --  2.1 2.1   PHOSPHORUS mg/dL  --  2.9 5.6*     Results from last 7 days   Lab Units 12/12/24  1453   AST U/L 12*   ALT U/L 9   ALK PHOS U/L 73   TOTAL PROTEIN g/dL 7.3   ALBUMIN g/dL 3.3*   TOTAL BILIRUBIN mg/dL 0.50     Results from last 7 days   Lab Units 12/14/24  1131 12/14/24  0729 12/13/24  2046 12/13/24  1638   POC GLUCOSE mg/dl 110 113 117 131     Results from last 7 days   Lab Units 12/14/24  1026 12/13/24  1020 12/12/24  1453   GLUCOSE RANDOM mg/dL 114 155* 114     Results from  last 7 days   Lab Units 12/12/24  1924   PH HANK  7.192*   PCO2 HANK mm Hg 93.3*   PO2 HANK mm Hg 26.1*   HCO3 HANK mmol/L 35.0*   BASE EXC HANK mmol/L 4.4   O2 CONTENT HANK ml/dL 6.9   O2 HGB, VENOUS % 43.9*     Results from last 7 days   Lab Units 12/12/24  1453   TSH 3RD GENERATON uIU/mL 1.254     Results from last 7 days   Lab Units 12/13/24  1136   CLARITY UA  Clear   COLOR UA  Light Yellow   SPEC GRAV UA  1.016   PH UA  5.0   GLUCOSE UA mg/dl Negative   KETONES UA mg/dl Negative   BLOOD UA  Trace*   PROTEIN UA mg/dl 30 (1+)*   NITRITE UA  Negative   BILIRUBIN UA  Negative   UROBILINOGEN UA (BE) mg/dl <2.0   LEUKOCYTES UA  Negative   WBC UA /hpf None Seen   RBC UA /hpf None Seen   BACTERIA UA /hpf None Seen   EPITHELIAL CELLS WET PREP /hpf None Seen   MUCUS THREADS  Occasional*     Past Medical History:   Diagnosis Date    Acid reflux     Acute blood loss anemia 07/15/2016    Anxiety     Arthritis     Cellulitis     left ankle    CPAP (continuous positive airway pressure) dependence     as needed per patient    Diabetes mellitus (HCC)     Hypertension     Idiopathic chronic venous hypertension of right lower extremity with ulcer ([facility]) 06/12/2023    Sleep apnea     Twitching      Present on Admission:   HTN (hypertension)   Anxiety   SUSY (obstructive sleep apnea)   Chronic diastolic congestive heart failure ([facility])   Respiratory failure with hypoxia and hypercapnia, unspecified chronicity ([facility])      Admitting Diagnosis: Tremor [R25.1]  LAKHWINDER (acute kidney injury) ([facility]) [N17.9]  Tremors of nervous system [R25.1]  Truncal muscle weakness [M62.81]  Age/Sex: 69 y.o. male    Network Utilization Review Department  ATTENTION: Please call with any questions or concerns to [phone] and carefully listen to the prompts so that you are directed to the right person. All voicemails are confidential.   For Discharge needs, contact Care Management DC Support Team at [phone] opt. [phone]  Send all requests for admission clinical  reviews, approved or denied determinations and any other requests to dedicated fax number below belonging to the campus where the patient is receiving treatment. List of dedicated fax numbers for the Facilities:  FACILITY NAME UR FAX NUMBER   ADMISSION DENIALS (Administrative/Medical Necessity) [phone]   DISCHARGE SUPPORT TEAM (NETWORK) [phone]   PARENT CHILD HEALTH (Maternity/NICU/Pediatrics) [phone]   [facility] [phone]   [facility] [phone]   [facility] [phone]   [facility] [phone]   [facility] [phone]   [facility] [phone]   [facility] [phone]   [facility] [phone]   [facility] [phone]   [facility] [phone]   [facility] [phone]   [facility] [phone]

## 2024-12-14 PROBLEM — R26.2 AMBULATORY DYSFUNCTION: Status: ACTIVE | Noted: 2024-12-14

## 2024-12-14 LAB
ANION GAP SERPL CALCULATED.3IONS-SCNC: 4 MMOL/L (ref 4–13)
BASOPHILS # BLD AUTO: 0 THOUSANDS/ΜL (ref 0–0.1)
BASOPHILS NFR BLD AUTO: 0 % (ref 0–1)
BUN SERPL-MCNC: 18 MG/DL (ref 5–25)
CALCIUM SERPL-MCNC: 9 MG/DL (ref 8.4–10.2)
CHLORIDE SERPL-SCNC: 101 MMOL/L (ref 96–108)
CO2 SERPL-SCNC: 34 MMOL/L (ref 21–32)
CREAT SERPL-MCNC: 1.16 MG/DL (ref 0.6–1.3)
EOSINOPHIL # BLD AUTO: 0.2 THOUSAND/ΜL (ref 0–0.61)
EOSINOPHIL NFR BLD AUTO: 4 % (ref 0–6)
ERYTHROCYTE [DISTWIDTH] IN BLOOD BY AUTOMATED COUNT: 13.8 % (ref 11.6–15.1)
GFR SERPL CREATININE-BSD FRML MDRD: 63 ML/MIN/1.73SQ M
GLUCOSE SERPL-MCNC: 110 MG/DL (ref 65–140)
GLUCOSE SERPL-MCNC: 113 MG/DL (ref 65–140)
GLUCOSE SERPL-MCNC: 114 MG/DL (ref 65–140)
GLUCOSE SERPL-MCNC: 167 MG/DL (ref 65–140)
GLUCOSE SERPL-MCNC: 94 MG/DL (ref 65–140)
HCT VFR BLD AUTO: 32.9 % (ref 36.5–49.3)
HGB BLD-MCNC: 9.8 G/DL (ref 12–17)
IMM GRANULOCYTES # BLD AUTO: 0.02 THOUSAND/UL (ref 0–0.2)
IMM GRANULOCYTES NFR BLD AUTO: 0 % (ref 0–2)
LYMPHOCYTES # BLD AUTO: 0.87 THOUSANDS/ΜL (ref 0.6–4.47)
LYMPHOCYTES NFR BLD AUTO: 16 % (ref 14–44)
MCH RBC QN AUTO: 28.3 PG (ref 26.8–34.3)
MCHC RBC AUTO-ENTMCNC: 29.8 G/DL (ref 31.4–37.4)
MCV RBC AUTO: 95 FL (ref 82–98)
MONOCYTES # BLD AUTO: 0.57 THOUSAND/ΜL (ref 0.17–1.22)
MONOCYTES NFR BLD AUTO: 11 % (ref 4–12)
NEUTROPHILS # BLD AUTO: 3.68 THOUSANDS/ΜL (ref 1.85–7.62)
NEUTS SEG NFR BLD AUTO: 69 % (ref 43–75)
NRBC BLD AUTO-RTO: 0 /100 WBCS
PLATELET # BLD AUTO: 211 THOUSANDS/UL (ref 149–390)
PMV BLD AUTO: 10.4 FL (ref 8.9–12.7)
POTASSIUM SERPL-SCNC: 4.6 MMOL/L (ref 3.5–5.3)
RBC # BLD AUTO: 3.46 MILLION/UL (ref 3.88–5.62)
SODIUM SERPL-SCNC: 139 MMOL/L (ref 135–147)
WBC # BLD AUTO: 5.34 THOUSAND/UL (ref 4.31–10.16)

## 2024-12-14 PROCEDURE — 97163 PT EVAL HIGH COMPLEX 45 MIN: CPT

## 2024-12-14 PROCEDURE — 82948 REAGENT STRIP/BLOOD GLUCOSE: CPT

## 2024-12-14 PROCEDURE — 80048 BASIC METABOLIC PNL TOTAL CA: CPT | Performed by: INTERNAL MEDICINE

## 2024-12-14 PROCEDURE — 85025 COMPLETE CBC W/AUTO DIFF WBC: CPT | Performed by: INTERNAL MEDICINE

## 2024-12-14 PROCEDURE — 97167 OT EVAL HIGH COMPLEX 60 MIN: CPT

## 2024-12-14 PROCEDURE — 94760 N-INVAS EAR/PLS OXIMETRY 1: CPT

## 2024-12-14 PROCEDURE — 99232 SBSQ HOSP IP/OBS MODERATE 35: CPT | Performed by: PHYSICIAN ASSISTANT

## 2024-12-14 RX ORDER — LOSARTAN POTASSIUM 25 MG/1
25 TABLET ORAL DAILY
Status: DISCONTINUED | OUTPATIENT
Start: 2024-12-14 | End: 2024-12-15 | Stop reason: HOSPADM

## 2024-12-14 RX ORDER — TRAZODONE HYDROCHLORIDE 100 MG/1
100 TABLET ORAL
Status: DISCONTINUED | OUTPATIENT
Start: 2024-12-14 | End: 2024-12-15 | Stop reason: HOSPADM

## 2024-12-14 RX ORDER — GABAPENTIN 100 MG/1
100 CAPSULE ORAL 3 TIMES DAILY
Status: DISCONTINUED | OUTPATIENT
Start: 2024-12-14 | End: 2024-12-15 | Stop reason: HOSPADM

## 2024-12-14 RX ADMIN — ASPIRIN 81 MG: 81 TABLET, COATED ORAL at 09:35

## 2024-12-14 RX ADMIN — HEPARIN SODIUM 5000 UNITS: 5000 INJECTION, SOLUTION INTRAVENOUS; SUBCUTANEOUS at 22:23

## 2024-12-14 RX ADMIN — GABAPENTIN 100 MG: 100 CAPSULE ORAL at 22:23

## 2024-12-14 RX ADMIN — TRAZODONE HYDROCHLORIDE 100 MG: 100 TABLET ORAL at 22:23

## 2024-12-14 RX ADMIN — HEPARIN SODIUM 5000 UNITS: 5000 INJECTION, SOLUTION INTRAVENOUS; SUBCUTANEOUS at 14:39

## 2024-12-14 RX ADMIN — INSULIN LISPRO 1 UNITS: 100 INJECTION, SOLUTION INTRAVENOUS; SUBCUTANEOUS at 17:38

## 2024-12-14 RX ADMIN — BUSPIRONE HYDROCHLORIDE 10 MG: 10 TABLET ORAL at 09:35

## 2024-12-14 RX ADMIN — PANTOPRAZOLE SODIUM 40 MG: 40 TABLET, DELAYED RELEASE ORAL at 09:35

## 2024-12-14 RX ADMIN — PRAVASTATIN SODIUM 20 MG: 20 TABLET ORAL at 17:39

## 2024-12-14 RX ADMIN — GABAPENTIN 100 MG: 100 CAPSULE ORAL at 14:38

## 2024-12-14 RX ADMIN — FLUTICASONE FUROATE 1 PUFF: 200 POWDER RESPIRATORY (INHALATION) at 09:36

## 2024-12-14 RX ADMIN — LOSARTAN POTASSIUM 25 MG: 25 TABLET, FILM COATED ORAL at 14:38

## 2024-12-14 RX ADMIN — BUSPIRONE HYDROCHLORIDE 10 MG: 10 TABLET ORAL at 17:39

## 2024-12-14 RX ADMIN — DOCUSATE SODIUM 100 MG: 100 CAPSULE, LIQUID FILLED ORAL at 09:35

## 2024-12-14 RX ADMIN — TAMSULOSIN HYDROCHLORIDE 0.4 MG: 0.4 CAPSULE ORAL at 17:39

## 2024-12-14 RX ADMIN — DOCUSATE SODIUM 100 MG: 100 CAPSULE, LIQUID FILLED ORAL at 17:39

## 2024-12-14 RX ADMIN — HEPARIN SODIUM 5000 UNITS: 5000 INJECTION, SOLUTION INTRAVENOUS; SUBCUTANEOUS at 05:00

## 2024-12-14 NOTE — PLAN OF CARE
Problem: PHYSICAL THERAPY ADULT  Goal: Performs mobility at highest level of function for planned discharge setting.  See evaluation for individualized goals.  Description: Treatment/Interventions: Functional transfer training, LE strengthening/ROM, Elevations, Therapeutic exercise, Endurance training, Patient/family training, Equipment eval/education, Bed mobility, Gait training, Spoke to nursing, Spoke to case management, OT  Equipment Recommended: Walker (pt owns)       See flowsheet documentation for full assessment, interventions and recommendations.  12/14/2024 1254 by Jaqueline Coughlin PT  Note: Prognosis: Good  Problem List: Decreased strength, Decreased endurance, Impaired balance, Decreased mobility  Assessment: Pt is a 69 y.o. male seen for PT evaluation s/p admit to [facility] on 12/12/2024. Pt was admitted with a primary dx of: LAKHWINDER.  PT now consulted for assessment of mobility and d/c needs. Pt with  active PT eval/ treat  orders.  Pts current comorbidities effecting treatment include: lethargy, ambulatory dysfunction. Pt  has a past medical history of Acid reflux, Acute blood loss anemia (07/15/2016), Anxiety, Arthritis, Cellulitis, CPAP (continuous positive airway pressure) dependence, Diabetes mellitus (HCC), Hypertension, Idiopathic chronic venous hypertension of right lower extremity with ulcer (HCC) (06/12/2023), Sleep apnea, and Twitching. Pts current clinical presentation is Unstable/ Unpredictable (high complexity) due to Ongoing medical management for primary dx, Increased reliance on more restrictive AD compared to baseline, Decreased activity tolerance compared to baseline, Fall risk, Increased assistance needed from caregiver at current time, Increased O2 via NC from pts baseline, Trending lab values, Continuous pulse oximetry monitoring . Prior to admission, pt was residing with spouse in 2  with 6 YASHIRA ([facility]) and was independent. Upon evaluation, pt currently is requiring mod A  for bed mobility; mod Ax2 for transfers; mod Ax2 for ambulation. Pt presents at PT eval functioning below baseline and currently w/ overall mobility deficits 2* to: BLE weakness, impaired balance, decreased endurance, gait deviations, decreased activity tolerance compared to baseline, decreased functional mobility tolerance compared to baseline, fall risk. Pt currently at a fall risk 2* to impairments listed above.  Pt will continue to benefit from skilled acute PT interventions to address stated impairments; to maximize functional mobility; for ongoing pt/ family training; and DME needs. At conclusion of PT session pt returned back in chair and chair alarm engaged with phone and call bell within reach. Pt denies any further questions at this time. The patient's AM-PAC Basic Mobility Inpatient Short Form Raw Score is 9. A Raw score of less than or equal to 16 suggests the patient may benefit from discharge to post-acute rehabilitation services. Please also refer to the recommendation of the Physical Therapist for safe discharge planning. PT to continue to follow pt t/o hospital stay, recommend level I resource intensity upon hospital D/C.  Barriers to Discharge: Inaccessible home environment     Rehab Resource Intensity Level, PT: I (Maximum Resource Intensity)    See flowsheet documentation for full assessment.

## 2024-12-14 NOTE — OCCUPATIONAL THERAPY NOTE
Occupational Therapy Evaluation     Patient Name: Edgar Obando  Today's Date: 12/14/2024  Problem List  Principal Problem:    LAKHWINDER (acute kidney injury) ([facility])  Active Problems:    HTN (hypertension)    Anxiety    SUSY (obstructive sleep apnea)    Chronic diastolic congestive heart failure (HCC)    Respiratory failure with hypoxia and hypercapnia, unspecified chronicity ([facility])    Lethargy    Ambulatory dysfunction    Past Medical History  Past Medical History:   Diagnosis Date    Acid reflux     Acute blood loss anemia 07/15/2016    Anxiety     Arthritis     Cellulitis     left ankle    CPAP (continuous positive airway pressure) dependence     as needed per patient    Diabetes mellitus ([facility])     Hypertension     Idiopathic chronic venous hypertension of right lower extremity with ulcer ([facility]) 06/12/2023    Sleep apnea     Twitching      Past Surgical History  Past Surgical History:   Procedure Laterality Date    APPENDECTOMY      CERVICAL FUSION Bilateral 5/2/2024    Procedure: FUSION CERVICAL ANTERIOR W DISCECTOMY, C3-7 ACDF;  Surgeon: Conrad Richardson MD;  [facility]: [facility];  Service: Orthopedics    CHOLECYSTECTOMY      COLONOSCOPY      IR PICC PLACEMENT DOUBLE LUMEN  5/2/2024    KNEE ARTHROSCOPY      [place] ARTHRP KNE CONDYLE&PLATU MEDIAL&LAT COMPARTMENTS Left 07/13/2016    Procedure: TOTAL  KNEE REPLACEMENT ;  Surgeon: Zack Montenegro MD;  Location: [facility];  Service: Orthopedics        12/14/24 0958   OT Last Visit   OT Visit Date 12/14/24   Note Type   Note type Evaluation   Pain Assessment   Pain Assessment Tool FLACC   Pain Location/Orientation Location: Buttocks   Effect of Pain on Daily Activities limits activity tolerance, mobility, and I w/ ADLs   Patient's Stated Pain Goal No pain   Hospital Pain Intervention(s) Repositioned;Ambulation/increased activity;Emotional support   Pain Rating: FLACC (Rest) - Face 1   Pain Rating: FLACC (Rest) - Legs 0   Pain Rating: FLACC (Rest) - Activity 1   Pain Rating:  FLACC (Rest) - Cry 0   Pain Rating: FLACC (Rest) - Consolability 0   Score: FLACC (Rest) 2   Pain Rating: FLACC (Activity) - Face 1   Pain Rating: FLACC (Activity) - Legs 0   Pain Rating: FLACC (Activity) - Activity 1   Pain Rating: FLACC (Activity) - Cry 0   Pain Rating: FLACC (Activity) - Consolability 0   Score: FLACC (Activity) 2   Restrictions/Precautions   Weight Bearing Precautions Per Order No   Other Precautions Multiple lines;O2;Fall Risk;Pain   Home Living   Type of Home House   Home Layout Two level;Performs ADLs on one level;Able to live on main level with bedroom/bathroom  (6 [address])   Bathroom Shower/Tub Walk-in shower   Bathroom Toilet Raised   Bathroom Equipment Grab bars in shower;Shower chair   Home Equipment Walker;Cane  (was uisng SPC PTA)   Prior Function   Level of [place] Independent with ADLs;Independent with functional mobility;Independent with IADLS  (Pt hasbeen requiring increased assistance for past week.)   Lives With Spouse   Receives Help From Family   IADLs Independent with driving;Independent with meal prep;Independent with medication management   Falls in the last 6 months 0   Vocational Retired   Lifestyle   Autonomy Pt reports I w/ ADLs, IADLs, and fnxl mobility w/ SPC at baseline; + driving. Pt reports that he has been requiring increased assistance for ~1 week.   Reciprocal Relationships Pt lives w/ his spouse. She works 3x/wk.   Service to Others Pt is retired.   Intrinsic Gratification Pt enjoys spending time w/ his grandchildren.   General   Family/Caregiver Present No   ADL   Where Assessed Edge of bed   Eating Assistance 7  Independent   Grooming Assistance 5  Supervision/Setup   UB Bathing Assistance 4  Minimal Assistance   LB Bathing Assistance 3  Moderate Assistance   UB Dressing Assistance 4  Minimal Assistance   UB Dressing Deficit Setup;Verbal cueing;Increased time to complete;Thread RUE;Thread LUE;Pull around back;Fasteners   LB Dressing Assistance 2  Maximal  Assistance   LB Dressing Deficit Setup;Don/doff R shoe;Don/doff L shoe   Toileting Assistance  2  Maximal Assistance   Bed Mobility   Supine to Sit 3  Moderate assistance   Additional items Assist x 1;HOB elevated;Bedrails;Increased time required;Verbal cues   Sit to Supine Unable to assess   Additional Comments Pt seated OOB in chair at end of OT evaluation w/ alarm activated and all needs within reach.   Transfers   Sit to Stand 3  Moderate assistance   Additional items Assist x 2;Increased time required;Verbal cues   Stand to Sit 3  Moderate assistance   Additional items Assist x 2;Increased time required;Verbal cues   Additional Comments w/ b/l HHA for support   Functional Mobility   Functional Mobility 3  Moderate assistance   Additional Comments Pt took few steps from EOB > chair w/ mod Ax2 using RW for support.   Additional items Hand hold assistance   Balance   Static Sitting Fair   Dynamic Sitting Fair -   Static Standing Poor +   Dynamic Standing Poor   Ambulatory Poor   Activity Tolerance   Activity Tolerance Patient limited by fatigue;Patient limited by pain   Medical Staff Made Aware PTJaqueline, due to pt's medical complexity and multiple comorbidities   Nurse Made Aware RN clearance prior to session   RUE Assessment   RUE Assessment WFL   LUE Assessment   LUE Assessment WFL   Hand Function   Gross Motor Coordination Functional   Fine Motor Coordination Functional   Cognition   Arousal/Participation Alert;Responsive;Cooperative   Attention Attends with cues to redirect   Orientation Level Oriented X4   Memory Within functional limits  (Pt able to recall social hx.)   Following Commands Follows one step commands without difficulty   Comments Pt was identified by name and . Pt was pleasant, cooperative, and willing to participate in OT evaluation.   Assessment   Limitation Decreased ADL status;Decreased endurance;Decreased self-care trans;Decreased high-level ADLs   Assessment Pt is a 69 y.o. male seen  for OT evaluation s/p admission to [facility] on 12/12/2024 due to lethargy and poor oral intake. Pt diagnosed with LAKHWINDER (acute kidney injury) ([facility]). Pt has a significant PMH impacting occupational performance including: Acid reflux, Acute blood loss anemia, Anxiety, Arthritis, Cellulitis, CPAP (continuous positive airway pressure) dependence, Diabetes mellitus (HCC), Hypertension, Idiopathic chronic venous hypertension of right lower extremity with ulcer (HCC), Sleep apnea, and Twitching. Pt with active OT evaluation and treatment orders and activity orders. PTA, pt living with his spouse in a 2 SH w/ 6 [address]IRA and [facility]. Pt reports I w/ ADLs, IADLs, and fxnl mobility w/ SPC at baseline; + driving. Pt has been requiring increased assistance for ~1 week. Pt agreeable and willing to participate in OT evaluation. During evaluation, pt I for eating, S for grooming, min A for UB ADLs, mod-max A for LB ADLs, and max A for toileting. Pt also required mod Ax1 for bed mobility and mod Ax2 for transfers and fxnl mobility w/ b/l HHA for support. Performance deficits that affect the pt’s occupational performance during the initial evaluation include decreased ADL status, decreased activity tolerance, decreased endurance, decreased sitting tolerance, decreased sitting balance, decreased standing tolerance, decreased standing balance, decreased transfer skills, decreased fxnl mobility, pain, and generalized weakness . Based on pt’s functional performance and deficits the following occupations will be addressed in OT treatments in order to maximize pt’s independence and overall occupational performance: grooming, bathing/showering, toileting and toilet hygiene, dressing, and functional mobility. Goals are listed below.  From OT perspective, recommend Level II (Moderate Resource Intensity) upon d/c when pt medically stable to d/c from acute care. Will continue to follow.   Goals   Patient Goals to regain his independence   LTG  Time Frame 10-14   Plan   Treatment Interventions ADL retraining;Functional transfer training;Endurance training;Patient/family training;Equipment evaluation/education;Compensatory technique education;Continued evaluation;Energy conservation;Activityengagement   Goal Expiration Date 12/28/24   OT Treatment Day 0   OT Frequency 2-3x/wk   Discharge Recommendation   Rehab Resource Intensity Level, OT II (Moderate Resource Intensity)   AM-PAC Daily Activity Inpatient   Lower Body Dressing 2   Bathing 2   Toileting 2   Upper Body Dressing 3   Grooming 3   Eating 3   Daily Activity Raw Score 15   Daily Activity Standardized Score (Calc for Raw Score >=11) 34.69   AM-PAC Applied Cognition Inpatient   Following a Speech/Presentation 4   Understanding Ordinary Conversation 4   Taking Medications 3   Remembering Where Things Are Placed or Put Away 4   Remembering List of 4-5 Errands 3   Taking Care of Complicated Tasks 3   Applied Cognition Raw Score 21   Applied Cognition Standardized Score 44.3       The patient's raw score on the AM-PAC Daily Activity Inpatient Short Form is 15. A raw score of less than 19 suggests the patient may benefit from discharge to post-acute rehabilitation services. Please refer to the recommendation of the Occupational Therapist for safe discharge planning.    Goals:      - Pt will complete UB ADLs w/ mod I to maximize independence and return home.    - Pt will complete LB ADLs w/ min A to maximize independence and return home.     - Pt will complete toileting routine (transfers, hygiene, and clothing management) w/ min A to maximize independence and return to prior level of function.    - Pt will complete bed mobility supine >< sit w/ S to maximize independence and return home.    - Pt will transfer to bed, chair, and toilet w/ min Ax1 using AD / DME as needed to maximize independence and reduce burden of care.     - Pt will ambulate household distances w/ min Ax1 using least restrictive device  to maximize independence and return home.     - Pt will increase activity tolerance (and sitting tolerance) by eating all meals OOB in the chair.     - Pt will increase standing tolerance to 2-3 minutes to maximize independence w/ grooming tasks standing at the sink.     - Pt will tolerate therapeutic activities for greater than 30 minutes in order to increase tolerance for functional activities.     - Pt will participate in ongoing OT evaluation of cognitive skills to assist with safe d/c planning/recommendations.      NEO Rosario, OTR/L

## 2024-12-14 NOTE — PHYSICAL THERAPY NOTE
Physical Therapy Evaluation     Patient's Name: Edgar Obando    Admitting Diagnosis  Tremor [R25.1]  LAKHWINDER (acute kidney injury) (HCC) [N17.9]  Tremors of nervous system [R25.1]  Truncal muscle weakness [M62.81]    Problem List  Patient Active Problem List   Diagnosis    S/P total knee replacement    Type 2 diabetes mellitus, without long-term current use of insulin (HCC)    HTN (hypertension)    GERD (gastroesophageal reflux disease)    Anxiety    SUSY (obstructive sleep apnea)    Low back pain with sciatica    DDD (degenerative disc disease), lumbar    Spinal stenosis of lumbar region    Myoclonus    Chronic venous insufficiency    Bilateral lower extremity edema    BMI 40.0-44.9, adult (HCC)    Primary osteoarthritis of right knee    Chronic pain of right knee    Diabetic neuropathy (HCC)    RBD (REM behavioral disorder)    Lumbar spondylosis    Neck pain    Right shoulder pain    Chronic low back pain with bilateral sciatica    Chronic pain syndrome    Iron deficiency anemia, unspecified    Obstructive sleep apnea syndrome    Chronic diastolic congestive heart failure (HCC)    Respiratory failure with hypoxia and hypercapnia, unspecified chronicity (HCC)    Cervical spondylosis    Hepatitis C virus carrier state (HCC)    Stage 3a chronic kidney disease (HCC)    Abnormal chest x-ray    S/P cervical spinal fusion    Thrombocytopenia (HCC)    Protein-calorie malnutrition, unspecified severity (HCC)    Current mild episode of major depressive disorder, unspecified whether recurrent (HCC)    Acute hypoxemic respiratory failure (HCC)    Pneumonia involving right lung    LAKHWINDER (acute kidney injury) (HCC)    Lethargy    Ambulatory dysfunction       Past Medical History  Past Medical History:   Diagnosis Date    Acid reflux     Acute blood loss anemia 07/15/2016    Anxiety     Arthritis     Cellulitis     left ankle    CPAP (continuous positive airway pressure) dependence     as needed per patient    Diabetes mellitus  (HCC)     Hypertension     Idiopathic chronic venous hypertension of right lower extremity with ulcer (HCC) 06/12/2023    Sleep apnea     Twitching        Past Surgical History  Past Surgical History:   Procedure Laterality Date    APPENDECTOMY      CERVICAL FUSION Bilateral 5/2/2024    Procedure: FUSION CERVICAL ANTERIOR W DISCECTOMY, C3-7 ACDF;  Surgeon: Conrad Richardson MD;  [facility];  Service: Orthopedics    CHOLECYSTECTOMY      COLONOSCOPY      IR PICC PLACEMENT DOUBLE LUMEN  5/2/2024    KNEE ARTHROSCOPY      LA ARTHRP KNE CONDYLE&PLATU MEDIAL&LAT COMPARTMENTS Left 07/13/2016    Procedure: TOTAL  KNEE REPLACEMENT ;  Surgeon: Zack Montenegro MD;  [facility];  Service: Orthopedics        12/14/24 0959   PT Last Visit   PT Visit Date 12/14/24   Note Type   Note type Evaluation   Pain Assessment   Pain Assessment Tool FLACC   Pain Location/Orientation Location: [facility] Pain Intervention(s) Repositioned;Ambulation/increased activity;Emotional support  (+ waffle cushion on chair @ end of session)   Pain Rating: FLACC (Rest) - Face 1   Pain Rating: FLACC (Rest) - Legs 0   Pain Rating: FLACC (Rest) - Activity 1   Pain Rating: FLACC (Rest) - Cry 0   Pain Rating: FLACC (Rest) - Consolability 0   Score: FLACC (Rest) 2   Pain Rating: FLACC (Activity) - Face 1   Pain Rating: FLACC (Activity) - Legs 0   Pain Rating: FLACC (Activity) - Activity 1   Pain Rating: FLACC (Activity) - Cry 0   Pain Rating: FLACC (Activity) - Consolability 0   Score: FLACC (Activity) 2   Restrictions/Precautions   Weight Bearing Precautions Per Order No   Other Precautions O2;Fall Risk;Multiple lines  (2LO2)   Home Living   Type of Home House   Home Layout Two level;Performs ADLs on one level;Able to live on main level with bedroom/bathroom;Stairs to enter with rails  (6 YASHIRA)   Bathroom Shower/Tub Walk-in shower   Bathroom Toilet Raised   Bathroom Equipment Grab bars in shower;Shower chair   Home Equipment  Walker;Cane  (was using cane PRN)   Prior Function   Level of [place] Independent with ADLs;Independent with functional mobility;Independent with IADLS  (has been requiring increased A ~1wk)   Lives With Spouse  (works part time- 3x/wk)   Receives Help From Family   IADLs Independent with driving;Independent with meal prep;Independent with medication management   Falls in the last 6 months 0   Vocational Retired   General   Family/Caregiver Present No   Cognition   Arousal/Participation Alert   Orientation Level Oriented X4   Following Commands Follows one step commands without difficulty   Comments pleasant and cooperative, very motivated to participate in therapy session/ regain independence   RLE Assessment   RLE Assessment   (functionally 3+/5)   LLE Assessment   LLE Assessment   (functionally 3+/5)   Bed Mobility   Supine to Sit 3  Moderate assistance   Additional items Assist x 1;HOB elevated;Bedrails;Increased time required;Verbal cues   Sit to Supine Unable to assess   Additional Comments pt supine in bed upon arrival. Pt left sitting OOB in recliner with all needs wihtin reach - alarm on post session   Transfers   Sit to Stand 3  Moderate assistance   Additional items Assist x 2;Increased time required;Verbal cues   Stand to Sit 3  Moderate assistance   Additional items Assist x 2;Increased time required   Additional Comments transfers with HHA   Ambulation/Elevation   Gait pattern Excessively slow;Short stride;Decreased foot clearance;Wide JOSE LUIS;Improper Weight shift  (+ lateral sway, + generalized unsteadiness)   Gait Assistance 3  Moderate assist   Additional items Assist x 2;Verbal cues;Tactile cues   Assistive Device Other (Comment)  (b/l HHA)   Distance 4'  (limited 2* fatigue)   Balance   Static Sitting Fair   Dynamic Sitting Fair -   Static Standing Poor +   Dynamic Standing Poor   Ambulatory Poor   Endurance Deficit   Endurance Deficit Yes   Endurance Deficit Description generalized weakness,  fatigue, decreased activity tolerance, LE edmea   Activity Tolerance   Activity Tolerance Patient limited by fatigue   Medical Staff Made Aware OT Rose; co-session completed this date 2* increased medical complexity and multiple co-morbidities   Nurse Made Aware RN cleared   Assessment   Prognosis Good   Problem List Decreased strength;Decreased endurance;Impaired balance;Decreased mobility   Assessment Pt is a 69 y.o. male seen for PT evaluation s/p admit to [facility] on 12/12/2024. Pt was admitted with a primary dx of: LAKHWINDER.  PT now consulted for assessment of mobility and d/c needs. Pt with  active PT eval/ treat  orders.  Pts current comorbidities effecting treatment include: lethargy, ambulatory dysfunction. Pt  has a past medical history of Acid reflux, Acute blood loss anemia (07/15/2016), Anxiety, Arthritis, Cellulitis, CPAP (continuous positive airway pressure) dependence, Diabetes mellitus (HCC), Hypertension, Idiopathic chronic venous hypertension of right lower extremity with ulcer (HCC) (06/12/2023), Sleep apnea, and Twitching. Pts current clinical presentation is Unstable/ Unpredictable (high complexity) due to Ongoing medical management for primary dx, Increased reliance on more restrictive AD compared to baseline, Decreased activity tolerance compared to baseline, Fall risk, Increased assistance needed from caregiver at current time, Increased O2 via NC from pts baseline, Trending lab values, Continuous pulse oximetry monitoring . Prior to admission, pt was residing with spouse in 2  with [facility] ([facility]) and was independent. Upon evaluation, pt currently is requiring mod A for bed mobility; mod Ax2 for transfers; mod Ax2 for ambulation. Pt presents at PT eval functioning below baseline and currently w/ overall mobility deficits 2* to: BLE weakness, impaired balance, decreased endurance, gait deviations, decreased activity tolerance compared to baseline, decreased functional mobility  tolerance compared to baseline, fall risk. Pt currently at a fall risk 2* to impairments listed above.  Pt will continue to benefit from skilled acute PT interventions to address stated impairments; to maximize functional mobility; for ongoing pt/ family training; and DME needs. At conclusion of PT session pt returned back in chair and chair alarm engaged with phone and call bell within reach. Pt denies any further questions at this time. The patient's AM-PAC Basic Mobility Inpatient Short Form Raw Score is 9. A Raw score of less than or equal to 16 suggests the patient may benefit from discharge to post-acute rehabilitation services. Please also refer to the recommendation of the Physical Therapist for safe discharge planning. PT to continue to follow pt t/o hospital stay, recommend level I resource intensity upon hospital D/C.   Barriers to Discharge Inaccessible home environment   Goals   Patient Goals to regain his independence   STG Expiration Date 12/28/24   Short Term Goal #1 STG 1. Pt will be able to perform bed mobility tasks with mod I level in order to improve overall functional mobility and assist in safe d/c. STG 2. Pt with sit EOB for at least 25 minutes at mod I level in order to strengthen abdominal musculature and assist in future transfers/ ambulation. STG 3. Pt will be able to perform functional transfer with mod I level in order to improve overall functional mobility and assist in safe d/c. STG 4. Pt will be able to ambulate at least 250 feet with least restrictive device with mod I level A in order to improve overall functional mobility and assist in safe d/c. STG 5. Pt will improve sitting/standing static/dynamic balance 1/2 grade in order to improve functional mobility and assist in safe d/c. STG 6. Pt will improve LE strength by 1/2 grade in order to improve functional mobility and assist in safe d/c. STG 7. Pt will be able to negotiate at least 6 stairs with least restrictive device with mod  I level A in order to improve overall functional mobility and assist in safe d/c.   PT Treatment Day 0   Plan   Treatment/Interventions Functional transfer training;LE strengthening/ROM;Elevations;Therapeutic exercise;Endurance training;Patient/family training;Equipment eval/education;Bed mobility;Gait training;Spoke to nursing;Spoke to case management;OT   PT Frequency 3-5x/wk   Discharge Recommendation   Rehab Resource Intensity Level, PT I (Maximum Resource Intensity)   Equipment Recommended Walker  (pt owns)   AM-PAC Basic Mobility Inpatient   Turning in Flat Bed Without Bedrails 2   Lying on Back to Sitting on Edge of Flat Bed Without Bedrails 2   Moving Bed to Chair 2   Standing Up From Chair Using Arms 1   Walk in Room 1   Climb 3-5 Stairs With Railing 1   Basic Mobility Inpatient Raw Score 9   [facility] Highest Level Of Mobility   -HLM Goal 3: Sit at edge of bed   JH-HLM Achieved 4: Move to chair/commode   Modified Isabela Scale   Modified Isabela Scale 4           Jaqueline Coughlin, PT DPT

## 2024-12-14 NOTE — ED ATTENDING ATTESTATION
12/12/2024  I, Amarjit Monterroso MD, saw and evaluated the patient. I have discussed the patient with the resident/non-physician practitioner and agree with the resident's/non-physician practitioner's findings, Plan of Care, and MDM as documented in the resident's/non-physician practitioner's note, except where noted. All available labs and Radiology studies were reviewed.  I was present for key portions of any procedure(s) performed by the resident/non-physician practitioner and I was immediately available to provide assistance.       At this point I agree with the current assessment done in the Emergency Department.  I have conducted an independent evaluation of this patient a history and physical is as follows:    ED Course    Impression: Tremor, lethargy, generalized weakness  Differential diagnosis: Dehydration, electrolyte abnormality, doubt sepsis,    Plan check screening labs electrolytes.  Anticipate admission as patient is unable to ambulate safely or participate in ADLs due to significant weakness.      Labs reviewed and significant for LAKHWINDER.  Plan to admit patient to hospital for further evaluation and management    Critical Care Time  Procedures

## 2024-12-14 NOTE — PLAN OF CARE
Problem: OCCUPATIONAL THERAPY ADULT  Goal: Performs self-care activities at highest level of function for planned discharge setting.  See evaluation for individualized goals.  Description: Treatment Interventions: ADL retraining, Functional transfer training, Endurance training, Patient/family training, Equipment evaluation/education, Compensatory technique education, Continued evaluation, Energy conservation, Activityengagement          See flowsheet documentation for full assessment, interventions and recommendations.   Note: Limitation: Decreased ADL status, Decreased endurance, Decreased self-care trans, Decreased high-level ADLs     Assessment: Pt is a 69 y.o. male seen for OT evaluation s/p admission to [facility] on 12/12/2024 due to lethargy and poor oral intake. Pt diagnosed with LAKHWINDER (acute kidney injury) ([facility]). Pt has a significant PMH impacting occupational performance including: Acid reflux, Acute blood loss anemia, Anxiety, Arthritis, Cellulitis, CPAP (continuous positive airway pressure) dependence, Diabetes mellitus ([facility]), Hypertension, Idiopathic chronic venous hypertension of right lower extremity with ulcer ([facility]), Sleep apnea, and Twitching. Pt with active OT evaluation and treatment orders and activity orders. PTA, pt living with his spouse in a 2 SH w/ 6 [facility] and [facility]. Pt reports I w/ ADLs, IADLs, and fxnl mobility w/ SPC at baseline; + driving. Pt has been requiring increased assistance for ~1 week. Pt agreeable and willing to participate in OT evaluation. During evaluation, pt I for eating, S for grooming, min A for UB ADLs, mod-max A for LB ADLs, and max A for toileting. Pt also required mod Ax1 for bed mobility and mod Ax2 for transfers and fxnl mobility w/ b/l HHA for support. Performance deficits that affect the pt’s occupational performance during the initial evaluation include decreased ADL status, decreased activity tolerance, decreased endurance, decreased sitting tolerance,  decreased sitting balance, decreased standing tolerance, decreased standing balance, decreased transfer skills, decreased fxnl mobility, pain, and generalized weakness . Based on pt’s functional performance and deficits the following occupations will be addressed in OT treatments in order to maximize pt’s independence and overall occupational performance: grooming, bathing/showering, toileting and toilet hygiene, dressing, and functional mobility. Goals are listed below.  From OT perspective, recommend Level II (Moderate Resource Intensity) upon d/c when pt medically stable to d/c from acute care. Will continue to follow.     Rehab Resource Intensity Level, OT: II (Moderate Resource Intensity)

## 2024-12-14 NOTE — PROGRESS NOTES
Progress Note - Hospitalist   Name: Edgar Obando 69 y.o. male I MRN: [id number]  [facility] I Date of Admission: 12/12/2024   Date of Service: 12/14/2024 I Hospital Day: 2    Assessment & Plan  LAKHWINDER (acute kidney injury) (HCC)  69-year-old male with PMH of CKD 3, HTN, anxiety, recent hospitalization for pneumonia, discharged on supplemental oxygen 2 L via NC, return to ED with complaint of increasing lethargy, generalized tremors and poor oral intake.  Noted to have significant LAKHWINDER with creatinine 3.38, baseline 0.9-1.  Holding Losartan, Lasix, clonidine and gabapentin   S/p IV fluids, Cr has improved at 1.1.  No further IV fluids.  If BP elevated today, will plan to resume Losartan.  Can resume gabapentin.  Continue to hold lasix for now, which he takes PRN.   HTN (hypertension)  On losartan (he believes this was recently increased luisana 25 mg to 50 mg in interest of weaning clonidine), clonidine, PRN lasix which were all held on admission 2/2 soft BP and LAKHWINDER   BP now somewhat elevated, will resume losartan at 25 mg daily and monitor   Ambulatory dysfunction  Due to primary problem, recent pneumonia dx   PT/OT   Lethargy  Due to primary problem and now resolved  Can resume home gabapentin and trazodone   Anxiety  Continue buspar, held trazodone and gabapentin due to increased lethargy on admission which has now resolved.  Can resume 12/14.   Chronic diastolic congestive heart failure (HCC)  Wt Readings from Last 3 Encounters:   12/12/24 111 kg (245 lb 2.4 oz)   12/03/24 114 kg (251 lb 5.2 oz)   11/14/24 112 kg (247 lb 9.6 oz)     Was hypovolemic on admission. Hold lasix,   Last echo 10/24 shows normal systolic and diastolic function. LVEF 60%.  S/p iv fluids. Will hold further fluids and monitor  Respiratory failure with hypoxia and hypercapnia, unspecified chronicity (HCC)  Was discharged on 2 L NC recently after dx with pneumonia, continues on 2 L NC at this time   SUSY (obstructive sleep apnea)  CPAP at  bedtime    VTE Pharmacologic Prophylaxis:   Moderate Risk (Score 3-4) - Pharmacological DVT Prophylaxis Ordered: heparin.    Mobility:   Basic Mobility Inpatient Raw Score: 10  JH-HLM Goal: 4: Move to chair/commode  JH-HLM Achieved: 4: Move to chair/commode  JH-HLM Goal achieved. Continue to encourage appropriate mobility.    Patient Centered Rounds: I performed bedside rounds with nursing staff today.   Discussions with Specialists or Other Care Team Provider:     Education and Discussions with Family / Patient: Patient declined call to .     Current Length of Stay: 2 day(s)  Current Patient Status: Inpatient   Certification Statement: The patient will continue to require additional inpatient hospital stay due to monitor renal function, therapy evals for safe dispo planning  Discharge Plan: Anticipate discharge in 24-48 hrs to discharge location to be determined pending rehab evaluations.    Code Status: Level 1 - Full Code    Subjective   Tired today, didn't sleep well last night which he reports is because the bed is uncomfortable.  He reports overall he thinks he feels better than when he came in.  Denies SOB, leg swelling.      Objective :  Temp:  [98.3 °F (36.8 °C)-99.7 °F (37.6 °C)] 98.3 °F (36.8 °C)  HR:  [80] 80  BP: (112-154)/(58-75) 148/75  Resp:  [16-20] 20  SpO2:  [95 %-98 %] 95 %  O2 Device: Nasal cannula  Nasal Cannula O2 Flow Rate (L/min):  [2 L/min] 2 L/min    Body mass index is 39.57 kg/m².     Input and Output Summary (last 24 hours):     Intake/Output Summary (Last 24 hours) at 12/14/2024 1208  Last data filed at 12/14/2024 0526  Gross per 24 hour   Intake 240 ml   Output 1125 ml   Net -885 ml       Physical Exam  Vitals reviewed.   Constitutional:       General: He is not in acute distress.     Appearance: He is not toxic-appearing.   HENT:      Head: Normocephalic and atraumatic.   Eyes:      Extraocular Movements: Extraocular movements intact.   Cardiovascular:      Rate and  Rhythm: Normal rate.   Pulmonary:      Effort: Pulmonary effort is normal. No respiratory distress.   Abdominal:      General: Bowel sounds are normal. There is no distension.      Palpations: Abdomen is soft.      Tenderness: There is no abdominal tenderness.   Musculoskeletal:         General: Normal range of motion.      Right lower leg: No edema.      Left lower leg: No edema.   Skin:     General: Skin is warm and dry.   Neurological:      General: No focal deficit present.      Mental Status: He is alert and oriented to person, place, and time.   Psychiatric:         Mood and Affect: Mood normal.         Behavior: Behavior normal.         Thought Content: Thought content normal.       Lines/Drains:              Lab Results: I have reviewed the following results:   Results from last 7 days   Lab Units 12/14/24  1026   WBC Thousand/uL 5.34   HEMOGLOBIN g/dL 9.8*   HEMATOCRIT % 32.9*   PLATELETS Thousands/uL 211   SEGS PCT % 69   LYMPHO PCT % 16   MONO PCT % 11   EOS PCT % 4     Results from last 7 days   Lab Units 12/14/24  1026 12/13/24  1020 12/12/24  1453   SODIUM mmol/L 139   < > 137   POTASSIUM mmol/L 4.6   < > 4.6   CHLORIDE mmol/L 101   < > 98   CO2 mmol/L 34*   < > 35*   BUN mg/dL 18   < > 34*   CREATININE mg/dL 1.16   < > 3.38*   ANION GAP mmol/L 4   < > 4   CALCIUM mg/dL 9.0   < > 8.3*   ALBUMIN g/dL  --   --  3.3*   TOTAL BILIRUBIN mg/dL  --   --  0.50   ALK PHOS U/L  --   --  73   ALT U/L  --   --  9   AST U/L  --   --  12*   GLUCOSE RANDOM mg/dL 114   < > 114    < > = values in this interval not displayed.         Results from last 7 days   Lab Units 12/14/24  1131 12/14/24  0729 12/13/24  2046 12/13/24  1638   POC GLUCOSE mg/dl 110 113 117 131               Recent Cultures (last 7 days):         Imaging Results Review: No pertinent imaging studies reviewed.  Other Study Results Review: No additional pertinent studies reviewed.    Last 24 Hours Medication List:     Current Facility-Administered  Medications:     acetaminophen (TYLENOL) tablet 650 mg, Q6H PRN    albuterol (PROVENTIL HFA,VENTOLIN HFA) inhaler 2 puff, Q6H PRN    aspirin (ECOTRIN LOW STRENGTH) EC tablet 81 mg, Daily    busPIRone (BUSPAR) tablet 10 mg, BID    docusate sodium (COLACE) capsule 100 mg, BID    fluticasone (ARNUITY ELLIPTA) 200 MCG/ACT inhaler 1 puff, Daily    heparin (porcine) subcutaneous injection 5,000 Units, Q8H CASANDRA **AND** [CANCELED] Platelet count, Once    insulin lispro (HumALOG/ADMELOG) 100 units/mL subcutaneous injection 1-5 Units, TID AC **AND** Fingerstick Glucose (POCT), TID AC    insulin lispro (HumALOG/ADMELOG) 100 units/mL subcutaneous injection 1-5 Units, HS    pantoprazole (PROTONIX) EC tablet 40 mg, Daily    pravastatin (PRAVACHOL) tablet 20 mg, Daily With Dinner    tamsulosin (FLOMAX) capsule 0.4 mg, Daily With Dinner    Administrative Statements   Today, Patient Was Seen By: Gilma Swift PA-C      **Please Note: This note may have been constructed using a voice recognition system.**

## 2024-12-15 VITALS
TEMPERATURE: 97.6 F | WEIGHT: 245.15 LBS | BODY MASS INDEX: 39.4 KG/M2 | OXYGEN SATURATION: 97 % | RESPIRATION RATE: 20 BRPM | SYSTOLIC BLOOD PRESSURE: 139 MMHG | DIASTOLIC BLOOD PRESSURE: 72 MMHG | HEIGHT: 66 IN | HEART RATE: 83 BPM

## 2024-12-15 PROBLEM — R53.83 LETHARGY: Status: RESOLVED | Noted: 2024-12-12 | Resolved: 2024-12-15

## 2024-12-15 LAB
GLUCOSE SERPL-MCNC: 110 MG/DL (ref 65–140)
GLUCOSE SERPL-MCNC: 112 MG/DL (ref 65–140)

## 2024-12-15 PROCEDURE — 99232 SBSQ HOSP IP/OBS MODERATE 35: CPT | Performed by: PHYSICIAN ASSISTANT

## 2024-12-15 PROCEDURE — 99239 HOSP IP/OBS DSCHRG MGMT >30: CPT | Performed by: PHYSICIAN ASSISTANT

## 2024-12-15 PROCEDURE — 82948 REAGENT STRIP/BLOOD GLUCOSE: CPT

## 2024-12-15 RX ORDER — GABAPENTIN 100 MG/1
100 CAPSULE ORAL 3 TIMES DAILY
Start: 2024-12-15

## 2024-12-15 RX ORDER — LOSARTAN POTASSIUM 25 MG/1
25 TABLET ORAL DAILY
Qty: 30 TABLET | Refills: 0 | Status: SHIPPED | OUTPATIENT
Start: 2024-12-15

## 2024-12-15 RX ADMIN — FLUTICASONE FUROATE 1 PUFF: 200 POWDER RESPIRATORY (INHALATION) at 09:33

## 2024-12-15 RX ADMIN — ASPIRIN 81 MG: 81 TABLET, COATED ORAL at 09:33

## 2024-12-15 RX ADMIN — GABAPENTIN 100 MG: 100 CAPSULE ORAL at 09:32

## 2024-12-15 RX ADMIN — PANTOPRAZOLE SODIUM 40 MG: 40 TABLET, DELAYED RELEASE ORAL at 09:33

## 2024-12-15 RX ADMIN — BUSPIRONE HYDROCHLORIDE 10 MG: 10 TABLET ORAL at 09:32

## 2024-12-15 RX ADMIN — HEPARIN SODIUM 5000 UNITS: 5000 INJECTION, SOLUTION INTRAVENOUS; SUBCUTANEOUS at 06:07

## 2024-12-15 RX ADMIN — DOCUSATE SODIUM 100 MG: 100 CAPSULE, LIQUID FILLED ORAL at 09:33

## 2024-12-15 RX ADMIN — HEPARIN SODIUM 5000 UNITS: 5000 INJECTION, SOLUTION INTRAVENOUS; SUBCUTANEOUS at 13:00

## 2024-12-15 RX ADMIN — LOSARTAN POTASSIUM 25 MG: 25 TABLET, FILM COATED ORAL at 09:33

## 2024-12-15 NOTE — ASSESSMENT & PLAN NOTE
69-year-old male with PMH of CKD 3, HTN, anxiety, recent hospitalization for pneumonia, discharged on supplemental oxygen 2 L via NC, return to ED with complaint of increasing lethargy, generalized tremors and poor oral intake.  Noted to have significant LAKHWINDER with creatinine 3.38, baseline 0.9-1.  Holding Losartan, Lasix, clonidine and gabapentin   S/p IV fluids, Cr has improved at 1.1.  No further IV fluids.  BP elevated, resume Losartan.  Can resume gabapentin.  Continue to hold lasix for now, which he takes PRN.   
69-year-old male with PMH of CKD 3, HTN, anxiety, recent hospitalization for pneumonia, discharged on supplemental oxygen 2 L via NC, return to ED with complaint of increasing lethargy, generalized tremors and poor oral intake.  Noted to have significant LAKHWINDER with creatinine 3.38, baseline 0.9-1.  Holding Losartan, Lasix, clonidine and gabapentin   S/p IV fluids, Cr has improved at 1.1.  No further IV fluids.  If BP elevated today, will plan to resume Losartan.  Can resume gabapentin.  Continue to hold lasix for now, which he takes PRN.   
69-year-old male with PMH of CKD 3, HTN, anxiety, recent hospitalization for pneumonia, discharged on supplemental oxygen 2 L via NC, return to ED with complaint of increasing lethargy, generalized tremors and poor oral intake.  Noted to have significant LAKHWINDER with creatinine 3.38, baseline 0.9-1.  Holding PTA Lasix 60 mg daily as well as losartan   Hold home gabapentin.   Patient received 1 L IVF bolus and started on fluids  Intake/output, urinalysis, bladder scan,  Creatinine improved to 1.9  Hold further fluids  Repeat labs in am.   
69-year-old male with PMH of CKD 3, HTN, anxiety, recent hospitalization for pneumonia, discharged on supplemental oxygen 2 L via NC, return to ED with complaint of increasing lethargy, generalized tremors and poor oral intake.  Noted to have significant LAKHWINDER with creatinine 3.38, baseline 0.9-1.  Holding PTA Lasix 60 mg daily as well as losartan   Hold home gabapentin.   Patient received 1 L IVF bolus, start IVF infusion  Intake/output, urinalysis, bladder scan,  
69-year-old male with PMH of CKD 3, HTN, anxiety, recent hospitalization for pneumonia, discharged on supplemental oxygen 2 L via NC, return to ED with complaint of increasing lethargy, generalized tremors and poor oral intake.  Noted to have significant LAKHWINDER with creatinine 3.38, baseline 0.9-1.  On admission held Losartan, Lasix, clonidine and gabapentin   S/p IV fluids, Cr has improved at 1.1.  No further IV fluids.  BP elevated, resume Losartan at reduced dosing 25 mg daily.  Can resume gabapentin.  Continue lasix PRN at d/c.  Hold clonidine at d/c as BP is acceptable at this time off of it.   
Bp stable off of home lasix, losartan, clonidine.   Will monitor bp and determine when to restart medications   
CPAP at bedtime  
Chronic respiratory failure in which he uses 2 liters outpt  Currently on 2 liters of oxygen   
Continue buspar, held trazodone and gabapentin due to increased lethargy on admission which has now resolved.  Can resume 12/14.   
Continue buspar, held trazodone and gabapentin due to increased lethargy on admission which has now resolved.  Resumed 12/14.   
Continue buspar, held trazodone and gabapentin due to increased lethargy on admission which has now resolved.  Resumed 12/14.   
Continue buspar, hold trazodone and gabapentin due to increased lethargy  
Continue buspar, hold trazodone and gabapentin due to increased lethargy  
Due to primary problem and now resolved  Can resume home gabapentin and trazodone   
Due to primary problem, recent pneumonia dx   PT/OT   
Due to primary problem, recent pneumonia dx   PT/OT   
Due to primary problem, recent pneumonia dx   PT/OT -- pt defers any rehab or HHC, wants to go home with outpt PT   
On losartan (he believes this was recently increased from 25 mg to 50 mg in interest of weaning clonidine), clonidine, PRN lasix which were all held on admission 2/2 soft BP and LAKHWINDER   BP now somewhat elevated, will resume losartan at 25 mg daily and monitor   
On losartan (he believes this was recently increased from 25 mg to 50 mg in interest of weaning clonidine), clonidine, PRN lasix which were all held on admission 2/2 soft BP and LAKHWINDER   BP now somewhat elevated, will resume losartan at 25 mg daily.  D/c clonidine at discharge as BP is reasonable off of this and on only losartan, can be further monitored in the outpt setting.  Continue PRN Lasix at d/c.    
On losartan (he believes this was recently increased luisana 25 mg to 50 mg in interest of weaning clonidine), clonidine, PRN lasix which were all held on admission 2/2 soft BP and LAKHWINDER   BP now somewhat elevated, will resume losartan at 25 mg daily and monitor   
Patient has been sleeping mostly during my interview. He wakes up with name calling, oriented to self, place and time. But falls back quickly to sleep.   Saturating well with 2L oxygen via NC.   Hold home gabapentin, trazodone.   Has since resolved  Would consider restarting medications tomorrow   
Patient has been sleeping mostly during my interview. He wakes up with name calling, oriented to self, place and time. But falls back quickly to sleep.   Saturating well with 2L oxygen via NC.   Obtain VBG.  Hold home gabapentin, trazodone.   
Patient was recently admitted in hospital with pneumonia and was discharged on 2L oxygen via NC, currently remains at 2L  
Soft BP in ED. Hold home lasix, losartan, clonidine.   
Was discharged on 2 L NC recently after dx with pneumonia, continues on 2 L NC at this time   
Wt Readings from Last 3 Encounters:   12/03/24 114 kg (251 lb 5.2 oz)   11/14/24 112 kg (247 lb 9.6 oz)   11/06/24 112 kg (246 lb 14.6 oz)     Now hypovolemic. Hold lasix,   Last echo 10/24 shows normal systolic and diastolic function. LVEF 60%.  Start IVF        
Wt Readings from Last 3 Encounters:   12/12/24 111 kg (245 lb 2.4 oz)   12/03/24 114 kg (251 lb 5.2 oz)   11/14/24 112 kg (247 lb 9.6 oz)     Was hypovolemic on admission. Can resume lasix PRN at d/c   
Wt Readings from Last 3 Encounters:   12/12/24 111 kg (245 lb 2.4 oz)   12/03/24 114 kg (251 lb 5.2 oz)   11/14/24 112 kg (247 lb 9.6 oz)     Was hypovolemic on admission. Hold lasix,   Last echo 10/24 shows normal systolic and diastolic function. LVEF 60%.  S/p iv fluids. Will hold further fluids and monitor  
15-Feb-2022 14:05

## 2024-12-15 NOTE — DISCHARGE INSTR - AVS FIRST PAGE
Hold clonidine at discharge.  Reduce losartan/Cozaar dosing back down to 25 mg daily.  You can resume Trulicty injection next Monday, 12/23/24.  Continue lasix/furosemide as needed.  Continue the remainder of your medications as previously prescribed.

## 2024-12-15 NOTE — PLAN OF CARE
Problem: Potential for Falls  Goal: Patient will remain free of falls  Description: INTERVENTIONS:  - Educate patient/family on patient safety including physical limitations  - Instruct patient to call for assistance with activity   - Consult OT/PT to assist with strengthening/mobility   - Keep Call bell within reach  - Keep bed low and locked with side rails adjusted as appropriate  - Keep care items and personal belongings within reach  - Initiate and maintain comfort rounds  - Make Fall Risk Sign visible to staff  - Initiate/Maintain alarm  - Obtain necessary fall risk management equipment:   - Apply yellow socks and bracelet for high fall risk patients  - Consider moving patient to room near nurses station  Outcome: Progressing     Problem: SAFETY ADULT  Goal: Patient will remain free of falls  Description: INTERVENTIONS:  - Educate patient/family on patient safety including physical limitations  - Instruct patient to call for assistance with activity   - Consult OT/PT to assist with strengthening/mobility   - Keep Call bell within reach  - Keep bed low and locked with side rails adjusted as appropriate  - Keep care items and personal belongings within reach  - Initiate and maintain comfort rounds  - Make Fall Risk Sign visible to staff  - Initiate/Maintain alarm  - Obtain necessary fall risk management equipment:   - Apply yellow socks and bracelet for high fall risk patients  - Consider moving patient to room near nurses station  Outcome: Progressing  Goal: Maintain or return to baseline ADL function  Description: INTERVENTIONS:  -  Assess patient's ability to carry out ADLs; assess patient's baseline for ADL function and identify physical deficits which impact ability to perform ADLs (bathing, care of mouth/teeth, toileting, grooming, dressing, etc.)  - Assess/evaluate cause of self-care deficits   - Assess range of motion  - Assess patient's mobility; develop plan if impaired  - Assess patient's need for  assistive devices and provide as appropriate  - Encourage maximum independence but intervene and supervise when necessary  - Involve family in performance of ADLs  - Assess for home care needs following discharge   - Consider OT consult to assist with ADL evaluation and planning for discharge  - Provide patient education as appropriate  Outcome: Progressing  Goal: Maintains/Returns to pre admission functional level  Description: INTERVENTIONS:  - Perform AM-PAC 6 Click Basic Mobility/ Daily Activity assessment daily.  - Set and communicate daily mobility goal to care team and patient/family/caregiver.   - Collaborate with rehabilitation services on mobility goals if consulted    - Out of bed for toileting  - Record patient progress and toleration of activity level   Outcome: Progressing

## 2024-12-15 NOTE — CASE MANAGEMENT
Case Management Discharge Planning Note    Patient name Edgar Obando  Location [facility] 810/[facility] 810-01 MRN [id number]  : 1955 Date 12/15/2024       Current Admission Date: 2024  Current Admission Diagnosis:LAKHWINDER (acute kidney injury) ([facility])   Patient Active Problem List    Diagnosis Date Noted Date Diagnosed    Ambulatory dysfunction 2024     LAKHWINDER (acute kidney injury) ([facility]) 2024     Lethargy 2024     Acute hypoxemic respiratory failure ([facility]) 2024     Pneumonia involving right lung 2024     Protein-calorie malnutrition, unspecified severity ([facility]) 2024     Current mild episode of major depressive disorder, unspecified whether recurrent ([facility]) 2024     Thrombocytopenia ([facility]) 2024     S/P cervical spinal fusion 2024     Abnormal chest x-ray 2024     Stage 3a chronic kidney disease ([facility]) 2023     Hepatitis C virus carrier state ([facility]) 2023     Cervical spondylosis 2023     Chronic diastolic congestive heart failure ([facility]) 2023     Respiratory failure with hypoxia and hypercapnia, unspecified chronicity ([facility]) 2023     Obstructive sleep apnea syndrome 2023     Iron deficiency anemia, unspecified 2022     Chronic pain syndrome 2022     Lumbar spondylosis 2022     Neck pain 2022     Right shoulder pain 2022     Chronic low back pain with bilateral sciatica 2022     Diabetic neuropathy ([facility]) 2020     RBD (REM behavioral disorder) 2020     Primary osteoarthritis of right knee 2019     Chronic pain of right knee 2019     Chronic venous insufficiency 2019     Bilateral lower extremity edema 2019     BMI 40.0-44.9, adult ([facility]) 2019     Myoclonus      DDD (degenerative disc disease), lumbar 2018     Spinal stenosis of lumbar region 2018     Low back pain with sciatica 2018     S/P total knee replacement 07/15/2016     Type 2 diabetes  mellitus, without long-term current use of insulin (HCC) 07/15/2016     HTN (hypertension) 07/15/2016     GERD (gastroesophageal reflux disease) 07/15/2016     Anxiety 07/15/2016     SUSY (obstructive sleep apnea) 07/15/2016       LOS (days): 3  Geometric Mean LOS (GMLOS) (days): 3  Days to GMLOS:0.2     OBJECTIVE:  Risk of Unplanned Readmission Score: 19.7         Current admission status: Inpatient   Preferred Pharmacy:   [facility] - [place], [place] - [address]  [address]  [address]  [place] [address]  Phone: [phone] Fax: [phone]    [id number] - [place], [place] - [address]  [place] [address]  Phone: [phone] Fax: [phone]    [facility]  [place], [place] - [address]  [address]  [place] [address]  Phone: [phone] Fax: [phone]    Primary Care Provider: Ana Rosa Win MD    Primary Insurance: [employer] RE[employer]  Secondary Insurance:     DISCHARGE DETAILS:    Discharge planning discussed with:: Patient  Freedom of Choice: Yes  Comments - Freedom of Choice: Discussed FOC  CM contacted family/caregiver?: No- see comments (declined)  Were Treatment Team discharge recommendations reviewed with patient/caregiver?: Yes  Did patient/caregiver verbalize understanding of patient care needs?: Yes  Were patient/caregiver advised of the risks associated with not following Treatment Team discharge recommendations?: Yes    Other Referral/Resources/Interventions Provided:  Referral Comments: This CM discussed therapy rec for level II resources, patient states he does not want STR or HH.  Patient is requesting to work with therapy again either today v tomorrow prior to DC.  At time of discussion, patient reports that he would like to do OP therapy.    IMM Given (Date):: 12/15/24  IMM Given to:: Patient    IMM reviewed with patient, patient agrees with discharge determination.

## 2024-12-15 NOTE — PLAN OF CARE
Problem: Potential for Falls  Goal: Patient will remain free of falls  Description: INTERVENTIONS:  - Educate patient/family on patient safety including physical limitations  - Instruct patient to call for assistance with activity   - Consult OT/PT to assist with strengthening/mobility   - Keep Call bell within reach  - Keep bed low and locked with side rails adjusted as appropriate  - Keep care items and personal belongings within reach  - Initiate and maintain comfort rounds  - Make Fall Risk Sign visible to staff  - Offer Toileting every 1 Hours, in advance of need  - Initiate/Maintain alarm  - Obtain necessary fall risk management equipment  - Apply yellow socks and bracelet for high fall risk patients  - Consider moving patient to room near nurses station  Outcome: Progressing     Problem: SAFETY ADULT  Goal: Patient will remain free of falls  Description: INTERVENTIONS:  - Educate patient/family on patient safety including physical limitations  - Instruct patient to call for assistance with activity   - Consult OT/PT to assist with strengthening/mobility   - Keep Call bell within reach  - Keep bed low and locked with side rails adjusted as appropriate  - Keep care items and personal belongings within reach  - Initiate and maintain comfort rounds  - Make Fall Risk Sign visible to staff  - Offer Toileting every 1 Hours, in advance of need  - Initiate/Maintain alarm  - Obtain necessary fall risk management equipment  - Apply yellow socks and bracelet for high fall risk patients  - Consider moving patient to room near nurses station  Outcome: Progressing  Goal: Maintain or return to baseline ADL function  Description: INTERVENTIONS:  -  Assess patient's ability to carry out ADLs; assess patient's baseline for ADL function and identify physical deficits which impact ability to perform ADLs (bathing, care of mouth/teeth, toileting, grooming, dressing, etc.)  - Assess/evaluate cause of self-care deficits   - Assess  range of motion  - Assess patient's mobility; develop plan if impaired  - Assess patient's need for assistive devices and provide as appropriate  - Encourage maximum independence but intervene and supervise when necessary  - Involve family in performance of ADLs  - Assess for home care needs following discharge   - Consider OT consult to assist with ADL evaluation and planning for discharge  - Provide patient education as appropriate  Outcome: Progressing  Goal: Maintains/Returns to pre admission functional level  Description: INTERVENTIONS:  - Perform AM-PAC 6 Click Basic Mobility/ Daily Activity assessment daily.  - Set and communicate daily mobility goal to care team and patient/family/caregiver.   - Collaborate with rehabilitation services on mobility goals if consulted  - Perform Range of Motion 3 times a day.  - Reposition patient every 2 hours.  - Dangle patient 3 times a day  - Stand patient 3 times a day  - Ambulate patient 3 times a day  - Out of bed to chair 3 times a day   - Out of bed for meals 3 times a day  - Out of bed for toileting  - Record patient progress and toleration of activity level   Outcome: Progressing     Problem: DISCHARGE PLANNING  Goal: Discharge to home or other facility with appropriate resources  Description: INTERVENTIONS:  - Identify barriers to discharge w/patient and caregiver  - Arrange for needed discharge resources and transportation as appropriate  - Identify discharge learning needs (meds, wound care, etc.)  - Arrange for interpretive services to assist at discharge as needed  - Refer to Case Management Department for coordinating discharge planning if the patient needs post-hospital services based on physician/advanced practitioner order or complex needs related to functional status, cognitive ability, or social support system  Outcome: Progressing     Problem: Knowledge Deficit  Goal: Patient/family/caregiver demonstrates understanding of disease process, treatment plan,  medications, and discharge instructions  Description: Complete learning assessment and assess knowledge base.  Interventions:  - Provide teaching at level of understanding  - Provide teaching via preferred learning methods  Outcome: Progressing     Problem: METABOLIC, FLUID AND ELECTROLYTES - ADULT  Goal: Electrolytes maintained within normal limits  Description: INTERVENTIONS:  - Monitor labs and assess patient for signs and symptoms of electrolyte imbalances  - Administer electrolyte replacement as ordered  - Monitor response to electrolyte replacements, including repeat lab results as appropriate  - Instruct patient on fluid and nutrition as appropriate  Outcome: Progressing  Goal: Fluid balance maintained  Description: INTERVENTIONS:  - Monitor labs   - Monitor I/O and WT  - Instruct patient on fluid and nutrition as appropriate  - Assess for signs & symptoms of volume excess or deficit  Outcome: Progressing     Problem: Prexisting or High Potential for Compromised Skin Integrity  Goal: Skin integrity is maintained or improved  Description: INTERVENTIONS:  - Identify patients at risk for skin breakdown  - Assess and monitor skin integrity  - Assess and monitor nutrition and hydration status  - Monitor labs   - Assess for incontinence   - Turn and reposition patient  - Assist with mobility/ambulation  - Relieve pressure over bony prominences  - Avoid friction and shearing  - Provide appropriate hygiene as needed including keeping skin clean and dry  - Evaluate need for skin moisturizer/barrier cream  - Collaborate with interdisciplinary team   - Patient/family teaching  - Consider wound care consult   Outcome: Progressing     Problem: Nutrition/Hydration-ADULT  Goal: Nutrient/Hydration intake appropriate for improving, restoring or maintaining nutritional needs  Description: Monitor and assess patient's nutrition/hydration status for malnutrition. Collaborate with interdisciplinary team and initiate plan and  interventions as ordered.  Monitor patient's weight and dietary intake as ordered or per policy. Utilize nutrition screening tool and intervene as necessary. Determine patient's food preferences and provide high-protein, high-caloric foods as appropriate.     INTERVENTIONS:  - Monitor oral intake, urinary output, labs, and treatment plans  - Assess nutrition and hydration status and recommend course of action  - Evaluate amount of meals eaten  - Assist patient with eating if necessary   - Allow adequate time for meals  - Recommend/ encourage appropriate diets, oral nutritional supplements, and vitamin/mineral supplements  - Order, calculate, and assess calorie counts as needed  - Recommend, monitor, and adjust tube feedings and TPN/PPN based on assessed needs  - Assess need for intravenous fluids  - Provide specific nutrition/hydration education as appropriate  - Include patient/family/caregiver in decisions related to nutrition  Outcome: Progressing

## 2024-12-15 NOTE — DISCHARGE SUMMARY
Discharge Summary - Hospitalist   Name: Edgar Obando 69 y.o. male I MRN: [id number]  [facility] I Date of Admission: 12/12/2024   Date of Service: 12/15/2024 I Hospital Day: 3     Assessment & Plan  LAKHWINDER (acute kidney injury) (HCC)  69-year-old male with PMH of CKD 3, HTN, anxiety, recent hospitalization for pneumonia, discharged on supplemental oxygen 2 L via NC, return to ED with complaint of increasing lethargy, generalized tremors and poor oral intake.  Noted to have significant LAKHWINDER with creatinine 3.38, baseline 0.9-1.  On admission held Losartan, Lasix, clonidine and gabapentin   S/p IV fluids, Cr has improved at 1.1.  No further IV fluids.  BP elevated, resume Losartan at reduced dosing 25 mg daily.  Can resume gabapentin.  Continue lasix PRN at d/c.  Hold clonidine at d/c as BP is acceptable at this time off of it.   HTN (hypertension)  On losartan (he believes this was recently increased from 25 mg to 50 mg in interest of weaning clonidine), clonidine, PRN lasix which were all held on admission 2/2 soft BP and LAKHWINDER   BP now somewhat elevated, will resume losartan at 25 mg daily.  D/c clonidine at discharge as BP is reasonable off of this and on only losartan, can be further monitored in the outpt setting.  Continue PRN Lasix at d/c.    Ambulatory dysfunction  Due to primary problem, recent pneumonia dx   PT/OT -- pt defers any rehab or HHC, wants to go home with outpt PT   Lethargy (Resolved: 12/15/2024)  Due to primary problem and now resolved  Can resume home gabapentin and trazodone   Anxiety  Continue buspar, held trazodone and gabapentin due to increased lethargy on admission which has now resolved.  Resumed 12/14.   Chronic diastolic congestive heart failure (HCC)  Wt Readings from Last 3 Encounters:   12/12/24 111 kg (245 lb 2.4 oz)   12/03/24 114 kg (251 lb 5.2 oz)   11/14/24 112 kg (247 lb 9.6 oz)     Was hypovolemic on admission. Can resume lasix PRN at d/c   Respiratory failure with  hypoxia and hypercapnia, unspecified chronicity (HCC)  Was discharged on 2 L NC recently after dx with pneumonia, continues on 2 L NC at this time   SUSY (obstructive sleep apnea)  CPAP at bedtime     Medical Problems       Resolved Problems  Date Reviewed: 12/13/2024          Resolved    Lethargy 12/15/2024     Resolved by  Gilma Swift PA-C        Discharging Physician / Practitioner: Gilma Swift PA-C  PCP: Ana Rosa Win MD  Admission Date:   Admission Orders (From admission, onward)       Ordered        12/12/24 1554  INPATIENT ADMISSION  Once                          Discharge Date: 12/15/24    Consultations During Hospital Stay:  None     Procedures Performed:   None    Significant Findings / Test Results:   LAKHWINDER, as above     Incidental Findings:   None     Test Results Pending at Discharge (will require follow up):   None      Outpatient Tests Requested:  None    Complications:  None    Reason for Admission: LAKHWINDER     Hospital Course:   Edgar Obando is a 69 y.o. male patient who originally presented to the hospital on 12/12/2024 due to lethargy, weakness, tremors.  Noted to have LAKHWINDER and admitted for tx/management of this.  His losartan, lasix, clonidine were held initially on admission and he was given IV fluids.  Renal function has significantly improved back to baseline.  He feels much better as well, and reports he felt like himself today.  He reports he does not have any interest in going to rehab or for [facility].      Resumed his losartan at lower dosing 25 mg daily.  Continue to hold clonidine at d/c.  Can continue lasix PRN and remainder of other medications as prescribed as d/c.  He should f/u with PCP in 1 week.     Please see above list of diagnoses and related plan for additional information.     Condition at Discharge: good    Discharge Day Visit / Exam:   Subjective:  No acute complaints, feels well today and feels like himself.  Eager to go home.  Does not want rehab   Vitals: Blood Pressure:  "139/72 (12/15/24 0726)  Pulse: 83 (12/15/24 0726)  Temperature: 97.6 °F (36.4 °C) (12/15/24 0726)  Temp Source: Oral (12/12/24 1433)  Respirations: 20 (12/15/24 0726)  Height: 5' 6\" (167.6 cm) (12/12/24 2029)  Weight - Scale: 111 kg (245 lb 2.4 oz) (12/12/24 2029)  SpO2: 97 % (12/15/24 0726)  Physical Exam  Vitals reviewed.   Constitutional:       General: He is not in acute distress.     Appearance: He is not toxic-appearing.   HENT:      Head: Normocephalic and atraumatic.   Eyes:      Extraocular Movements: Extraocular movements intact.   Cardiovascular:      Rate and Rhythm: Normal rate and regular rhythm.   Pulmonary:      Effort: Pulmonary effort is normal. No respiratory distress.   Abdominal:      General: Bowel sounds are normal. There is no distension.      Palpations: Abdomen is soft.   Musculoskeletal:         General: Normal range of motion.   Neurological:      General: No focal deficit present.      Mental Status: He is alert and oriented to person, place, and time.   Psychiatric:         Mood and Affect: Mood normal.         Behavior: Behavior normal.         Thought Content: Thought content normal.          Discussion with Family: attempted to call wife x 2, no answer.     Discharge instructions/Information to patient and family:   See after visit summary for information provided to patient and family.      Provisions for Follow-Up Care:  See after visit summary for information related to follow-up care and any pertinent home health orders.      Mobility at time of Discharge:   Basic Mobility Inpatient Raw Score: 12  JH-HLM Goal: 4: Move to chair/commode  JH-HLM Achieved: 4: Move to chair/commode  HLM Goal achieved. Continue to encourage appropriate mobility.     Disposition:   Home    Planned Readmission: no    Discharge Medications:  See after visit summary for reconciled discharge medications provided to patient and/or family.      Administrative Statements   Discharge Statement:  I have spent a " total time of 35 minutes in caring for this patient on the day of the visit/encounter. >30 minutes of time was spent on: Risks and benefits of tx options, Instructions for management, Patient and family education, Counseling / Coordination of care, Documenting in the medical record, Reviewing / ordering tests, medicine, procedures  , and Communicating with other healthcare professionals     **Please Note: This note may have been constructed using a voice recognition system**

## 2024-12-15 NOTE — RESPIRATORY THERAPY NOTE
RT Ventilator Management Note  Edgar Obando 69 y.o. male MRN: [id number]  Unit/Bed#: [facility] 810-01 Encounter: [id number]     12/14/24 2111   Respiratory Assessment   Resp Comments Patient refused to go on cpap tonight. He said he would not get to sleep. He is not tired. Does not want to use it tonight        12/14/24 2111   Respiratory Assessment   Resp Comments Patient refused to go on cpap tonight. He said he would not get to sleep. He is not tired. Does not want to use it tonight         Daily Screen    No data found in the last 10 encounters.           Physical Exam:          Resp Comments: (P) Patient refused to go on cpap tonight. He said he would not get to sleep. He is not tired. Does not want to use it tonight

## 2024-12-15 NOTE — PROGRESS NOTES
Progress Note - Hospitalist   Name: Edgar Obando 69 y.o. male I MRN: [id number]  [facility] I Date of Admission: 12/12/2024   Date of Service: 12/15/2024 I Hospital Day: 3    Assessment & Plan  LAKHWINDER (acute kidney injury) (HCC)  69-year-old male with PMH of CKD 3, HTN, anxiety, recent hospitalization for pneumonia, discharged on supplemental oxygen 2 L via NC, return to ED with complaint of increasing lethargy, generalized tremors and poor oral intake.  Noted to have significant LAKHWINDER with creatinine 3.38, baseline 0.9-1.  Holding Losartan, Lasix, clonidine and gabapentin   S/p IV fluids, Cr has improved at 1.1.  No further IV fluids.  BP elevated, resume Losartan.  Can resume gabapentin.  Continue to hold lasix for now, which he takes PRN.   HTN (hypertension)  On losartan (he believes this was recently increased from 25 mg to 50 mg in interest of weaning clonidine), clonidine, PRN lasix which were all held on admission 2/2 soft BP and LAKHWINDER   BP now somewhat elevated, will resume losartan at 25 mg daily and monitor   Ambulatory dysfunction  Due to primary problem, recent pneumonia dx   PT/OT   Lethargy  Due to primary problem and now resolved  Can resume home gabapentin and trazodone   Anxiety  Continue buspar, held trazodone and gabapentin due to increased lethargy on admission which has now resolved.  Resumed 12/14.   Chronic diastolic congestive heart failure (HCC)  Wt Readings from Last 3 Encounters:   12/12/24 111 kg (245 lb 2.4 oz)   12/03/24 114 kg (251 lb 5.2 oz)   11/14/24 112 kg (247 lb 9.6 oz)     Was hypovolemic on admission. Hold lasix,   Last echo 10/24 shows normal systolic and diastolic function. LVEF 60%.  S/p iv fluids. Will hold further fluids and monitor  Respiratory failure with hypoxia and hypercapnia, unspecified chronicity (HCC)  Was discharged on 2 L NC recently after dx with pneumonia, continues on 2 L NC at this time   SUSY (obstructive sleep apnea)  CPAP at bedtime    VTE  Pharmacologic Prophylaxis:   Moderate Risk (Score 3-4) - Pharmacological DVT Prophylaxis Ordered: heparin.    Mobility:   Basic Mobility Inpatient Raw Score: 12  JH-HLM Goal: 4: Move to chair/commode  JH-HLM Achieved: 4: Move to chair/commode  JH-HLM Goal achieved. Continue to encourage appropriate mobility.    Patient Centered Rounds: I performed bedside rounds with nursing staff today.   Discussions with Specialists or Other Care Team Provider: CM    Education and Discussions with Family / Patient: Attempted to update  (wife) via phone. Left voicemail.     Current Length of Stay: 3 day(s)  Current Patient Status: Inpatient   Certification Statement: The patient will continue to require additional inpatient hospital stay due to ambulatory trial  Discharge Plan: Anticipate discharge later today or tomorrow to home.    Code Status: Level 1 - Full Code    Subjective   No complaints, feels like himself today.  Reports he is not interested in rehab.  He wants to do another ambulatory trial prior to d/c.     Objective :  Temp:  [97.6 °F (36.4 °C)-98.8 °F (37.1 °C)] 97.6 °F (36.4 °C)  HR:  [75-84] 83  BP: (139-148)/(70-75) 139/72  Resp:  [16-20] 20  SpO2:  [95 %-97 %] 97 %  O2 Device: Nasal cannula  Nasal Cannula O2 Flow Rate (L/min):  [2 L/min] 2 L/min    Body mass index is 39.57 kg/m².     Input and Output Summary (last 24 hours):     Intake/Output Summary (Last 24 hours) at 12/15/2024 0952  Last data filed at 12/15/2024 0300  Gross per 24 hour   Intake 298 ml   Output 450 ml   Net -152 ml       Physical Exam  Vitals reviewed.   Constitutional:       General: He is not in acute distress.     Appearance: He is not toxic-appearing.   HENT:      Head: Normocephalic and atraumatic.   Eyes:      Extraocular Movements: Extraocular movements intact.   Cardiovascular:      Rate and Rhythm: Normal rate and regular rhythm.   Pulmonary:      Effort: Pulmonary effort is normal. No respiratory distress.   Abdominal:       General: Bowel sounds are normal. There is no distension.      Palpations: Abdomen is soft.      Tenderness: There is no abdominal tenderness.   Musculoskeletal:         General: No swelling. Normal range of motion.   Neurological:      General: No focal deficit present.      Mental Status: He is alert and oriented to person, place, and time.   Psychiatric:         Mood and Affect: Mood normal.         Behavior: Behavior normal.         Thought Content: Thought content normal.       Lines/Drains:              Lab Results: I have reviewed the following results:   Results from last 7 days   Lab Units 12/14/24  1026   WBC Thousand/uL 5.34   HEMOGLOBIN g/dL 9.8*   HEMATOCRIT % 32.9*   PLATELETS Thousands/uL 211   SEGS PCT % 69   LYMPHO PCT % 16   MONO PCT % 11   EOS PCT % 4     Results from last 7 days   Lab Units 12/14/24  1026 12/13/24  1020 12/12/24  1453   SODIUM mmol/L 139   < > 137   POTASSIUM mmol/L 4.6   < > 4.6   CHLORIDE mmol/L 101   < > 98   CO2 mmol/L 34*   < > 35*   BUN mg/dL 18   < > 34*   CREATININE mg/dL 1.16   < > 3.38*   ANION GAP mmol/L 4   < > 4   CALCIUM mg/dL 9.0   < > 8.3*   ALBUMIN g/dL  --   --  3.3*   TOTAL BILIRUBIN mg/dL  --   --  0.50   ALK PHOS U/L  --   --  73   ALT U/L  --   --  9   AST U/L  --   --  12*   GLUCOSE RANDOM mg/dL 114   < > 114    < > = values in this interval not displayed.         Results from last 7 days   Lab Units 12/15/24  0726 12/14/24  2053 12/14/24  1618 12/14/24  1131 12/14/24  0729 12/13/24  2046 12/13/24  1638   POC GLUCOSE mg/dl 112 94 167* 110 113 117 131               Recent Cultures (last 7 days):         Imaging Results Review: No pertinent imaging studies reviewed.  Other Study Results Review: No additional pertinent studies reviewed.    Last 24 Hours Medication List:     Current Facility-Administered Medications:     acetaminophen (TYLENOL) tablet 650 mg, Q6H PRN    albuterol (PROVENTIL HFA,VENTOLIN HFA) inhaler 2 puff, Q6H PRN    aspirin (ECOTRIN LOW  STRENGTH) EC tablet 81 mg, Daily    busPIRone (BUSPAR) tablet 10 mg, BID    docusate sodium (COLACE) capsule 100 mg, BID    fluticasone (ARNUITY ELLIPTA) 200 MCG/ACT inhaler 1 puff, Daily    gabapentin (NEURONTIN) capsule 100 mg, TID    heparin (porcine) subcutaneous injection 5,000 Units, Q8H CASANDRA **AND** [CANCELED] Platelet count, Once    insulin lispro (HumALOG/ADMELOG) 100 units/mL subcutaneous injection 1-5 Units, TID AC **AND** Fingerstick Glucose (POCT), TID AC    insulin lispro (HumALOG/ADMELOG) 100 units/mL subcutaneous injection 1-5 Units, HS    losartan (COZAAR) tablet 25 mg, Daily    pantoprazole (PROTONIX) EC tablet 40 mg, Daily    pravastatin (PRAVACHOL) tablet 20 mg, Daily With Dinner    tamsulosin (FLOMAX) capsule 0.4 mg, Daily With Dinner    traZODone (DESYREL) tablet 100 mg, HS    Administrative Statements   Today, Patient Was Seen By: Gilma Swift PA-C      **Please Note: This note may have been constructed using a voice recognition system.**

## 2024-12-15 NOTE — PLAN OF CARE
Problem: Potential for Falls  Goal: Patient will remain free of falls  Description: INTERVENTIONS:  - Educate patient/family on patient safety including physical limitations  - Instruct patient to call for assistance with activity   - Consult OT/PT to assist with strengthening/mobility   - Keep Call bell within reach  - Keep bed low and locked with side rails adjusted as appropriate  - Keep care items and personal belongings within reach  - Initiate and maintain comfort rounds  - Make Fall Risk Sign visible to staff  - Offer Toileting every 1 Hours, in advance of need  - Initiate/Maintain alarm  - Obtain necessary fall risk management equipment  - Apply yellow socks and bracelet for high fall risk patients  - Consider moving patient to room near nurses station  12/15/2024 1440 by Viraj Guerrero RN  Outcome: Adequate for Discharge  12/15/2024 0959 by Viraj Guerrero RN  Outcome: Progressing     Problem: SAFETY ADULT  Goal: Patient will remain free of falls  Description: INTERVENTIONS:  - Educate patient/family on patient safety including physical limitations  - Instruct patient to call for assistance with activity   - Consult OT/PT to assist with strengthening/mobility   - Keep Call bell within reach  - Keep bed low and locked with side rails adjusted as appropriate  - Keep care items and personal belongings within reach  - Initiate and maintain comfort rounds  - Make Fall Risk Sign visible to staff  - Offer Toileting every 1 Hours, in advance of need  - Initiate/Maintain alarm  - Obtain necessary fall risk management equipment  - Apply yellow socks and bracelet for high fall risk patients  - Consider moving patient to room near nurses station  12/15/2024 1440 by Viraj Guerrero RN  Outcome: Adequate for Discharge  12/15/2024 0959 by Viraj Guerrero RN  Outcome: Progressing  Goal: Maintain or return to baseline ADL function  Description: INTERVENTIONS:  -  Assess patient's ability to carry out ADLs; assess patient's baseline  for ADL function and identify physical deficits which impact ability to perform ADLs (bathing, care of mouth/teeth, toileting, grooming, dressing, etc.)  - Assess/evaluate cause of self-care deficits   - Assess range of motion  - Assess patient's mobility; develop plan if impaired  - Assess patient's need for assistive devices and provide as appropriate  - Encourage maximum independence but intervene and supervise when necessary  - Involve family in performance of ADLs  - Assess for home care needs following discharge   - Consider OT consult to assist with ADL evaluation and planning for discharge  - Provide patient education as appropriate  12/15/2024 1440 by Viraj Guerrero RN  Outcome: Adequate for Discharge  12/15/2024 0959 by Viraj Guerrero RN  Outcome: Progressing  Goal: Maintains/Returns to pre admission functional level  Description: INTERVENTIONS:  - Perform AM-PAC 6 Click Basic Mobility/ Daily Activity assessment daily.  - Set and communicate daily mobility goal to care team and patient/family/caregiver.   - Collaborate with rehabilitation services on mobility goals if consulted  - Perform Range of Motion 3 times a day.  - Reposition patient every 2 hours.  - Dangle patient 3 times a day  - Stand patient 3 times a day  - Ambulate patient 3 times a day  - Out of bed to chair 3 times a day   - Out of bed for meals 3 times a day  - Out of bed for toileting  - Record patient progress and toleration of activity level   12/15/2024 1440 by Viraj Guerrero RN  Outcome: Adequate for Discharge  12/15/2024 0959 by Viraj Guerrero RN  Outcome: Progressing     Problem: DISCHARGE PLANNING  Goal: Discharge to home or other facility with appropriate resources  Description: INTERVENTIONS:  - Identify barriers to discharge w/patient and caregiver  - Arrange for needed discharge resources and transportation as appropriate  - Identify discharge learning needs (meds, wound care, etc.)  - Arrange for interpretive services to assist at  discharge as needed  - Refer to Case Management Department for coordinating discharge planning if the patient needs post-hospital services based on physician/advanced practitioner order or complex needs related to functional status, cognitive ability, or social support system  12/15/2024 1440 by Viraj Guerrero RN  Outcome: Adequate for Discharge  12/15/2024 0959 by Viraj Guerrero RN  Outcome: Progressing     Problem: Knowledge Deficit  Goal: Patient/family/caregiver demonstrates understanding of disease process, treatment plan, medications, and discharge instructions  Description: Complete learning assessment and assess knowledge base.  Interventions:  - Provide teaching at level of understanding  - Provide teaching via preferred learning methods  12/15/2024 1440 by Viraj Guerrero RN  Outcome: Adequate for Discharge  12/15/2024 0959 by Viraj Guerrero RN  Outcome: Progressing     Problem: METABOLIC, FLUID AND ELECTROLYTES - ADULT  Goal: Electrolytes maintained within normal limits  Description: INTERVENTIONS:  - Monitor labs and assess patient for signs and symptoms of electrolyte imbalances  - Administer electrolyte replacement as ordered  - Monitor response to electrolyte replacements, including repeat lab results as appropriate  - Instruct patient on fluid and nutrition as appropriate  12/15/2024 1440 by Viraj Guerrero RN  Outcome: Adequate for Discharge  12/15/2024 0959 by Viraj Guerrero RN  Outcome: Progressing  Goal: Fluid balance maintained  Description: INTERVENTIONS:  - Monitor labs   - Monitor I/O and WT  - Instruct patient on fluid and nutrition as appropriate  - Assess for signs & symptoms of volume excess or deficit  12/15/2024 1440 by Viraj Guerrero RN  Outcome: Adequate for Discharge  12/15/2024 0959 by Viraj Guerrero RN  Outcome: Progressing     Problem: Prexisting or High Potential for Compromised Skin Integrity  Goal: Skin integrity is maintained or improved  Description: INTERVENTIONS:  - Identify patients at risk  for skin breakdown  - Assess and monitor skin integrity  - Assess and monitor nutrition and hydration status  - Monitor labs   - Assess for incontinence   - Turn and reposition patient  - Assist with mobility/ambulation  - Relieve pressure over bony prominences  - Avoid friction and shearing  - Provide appropriate hygiene as needed including keeping skin clean and dry  - Evaluate need for skin moisturizer/barrier cream  - Collaborate with interdisciplinary team   - Patient/family teaching  - Consider wound care consult   12/15/2024 1440 by Viraj Guerrero RN  Outcome: Adequate for Discharge  12/15/2024 0959 by Viraj Guerrero RN  Outcome: Progressing     Problem: Nutrition/Hydration-ADULT  Goal: Nutrient/Hydration intake appropriate for improving, restoring or maintaining nutritional needs  Description: Monitor and assess patient's nutrition/hydration status for malnutrition. Collaborate with interdisciplinary team and initiate plan and interventions as ordered.  Monitor patient's weight and dietary intake as ordered or per policy. Utilize nutrition screening tool and intervene as necessary. Determine patient's food preferences and provide high-protein, high-caloric foods as appropriate.     INTERVENTIONS:  - Monitor oral intake, urinary output, labs, and treatment plans  - Assess nutrition and hydration status and recommend course of action  - Evaluate amount of meals eaten  - Assist patient with eating if necessary   - Allow adequate time for meals  - Recommend/ encourage appropriate diets, oral nutritional supplements, and vitamin/mineral supplements  - Order, calculate, and assess calorie counts as needed  - Recommend, monitor, and adjust tube feedings and TPN/PPN based on assessed needs  - Assess need for intravenous fluids  - Provide specific nutrition/hydration education as appropriate  - Include patient/family/caregiver in decisions related to nutrition  12/15/2024 1440 by Viraj Guerrero RN  Outcome: Adequate for  Discharge  12/15/2024 0959 by Viraj Guerrero RN  Outcome: Progressing     Problem: PHYSICAL THERAPY ADULT  Goal: Performs mobility at highest level of function for planned discharge setting.  See evaluation for individualized goals.  Description: Treatment/Interventions: Functional transfer training, LE strengthening/ROM, Elevations, Therapeutic exercise, Endurance training, Patient/family training, Equipment eval/education, Bed mobility, Gait training, Spoke to nursing, Spoke to case management, OT  Equipment Recommended: Walker (pt owns)       See flowsheet documentation for full assessment, interventions and recommendations.  Outcome: Adequate for Discharge     Problem: OCCUPATIONAL THERAPY ADULT  Goal: Performs self-care activities at highest level of function for planned discharge setting.  See evaluation for individualized goals.  Description: Treatment Interventions: ADL retraining, Functional transfer training, Endurance training, Patient/family training, Equipment evaluation/education, Compensatory technique education, Continued evaluation, Energy conservation, Activityengagement          See flowsheet documentation for full assessment, interventions and recommendations.   Outcome: Adequate for Discharge

## 2024-12-16 ENCOUNTER — TRANSITIONAL CARE MANAGEMENT (OUTPATIENT)
Dept: FAMILY MEDICINE CLINIC | Facility: CLINIC | Age: 69
End: 2024-12-16

## 2024-12-16 ENCOUNTER — TELEPHONE (OUTPATIENT)
Dept: FAMILY MEDICINE CLINIC | Facility: CLINIC | Age: 69
End: 2024-12-16

## 2024-12-16 NOTE — UTILIZATION REVIEW
NOTIFICATION OF ADMISSION DISCHARGE   This is a Notification of Discharge from [facility]. Please be advised that this patient has been discharge from our facility. Below you will find the admission and discharge date and time including the patient’s disposition.   UTILIZATION REVIEW CONTACT:  Rhea Hoyos  Utilization   Network Utilization Review Department  Phone: [phone] x carefully listen to the prompts. All voicemails are confidential.  Email: [email or web address]     ADMISSION INFORMATION  PRESENTATION DATE: 12/12/2024  2:21 PM  OBERVATION ADMISSION DATE: N/A  INPATIENT ADMISSION DATE: 12/12/24  3:54 PM   DISCHARGE DATE: 12/15/2024  4:33 PM   DISPOSITION:Home/Self Care    Network Utilization Review Department  ATTENTION: Please call with any questions or concerns to [phone] and carefully listen to the prompts so that you are directed to the right person. All voicemails are confidential.   For Discharge needs, contact Care Management DC Support Team at [phone] opt. 2  Send all requests for admission clinical reviews, approved or denied determinations and any other requests to dedicated fax number below belonging to the campus where the patient is receiving treatment. List of dedicated fax numbers for the Facilities:  FACILITY NAME UR FAX NUMBER   ADMISSION DENIALS (Administrative/Medical Necessity) [phone]   DISCHARGE SUPPORT TEAM ([facility]) [phone]   PARENT CHILD HEALTH (Maternity/NICU/Pediatrics) [phone]   [facility] [phone]   [facility] [phone]   [facility] [phone]   General acute hospital [phone]   [facility], [facility] [phone]   [facility] [phone]   [facility] [phone]   [facility]  [phone]   [facility] [phone]   [facility] [phone]   [facility] [phone]   [facility] [phone]

## 2024-12-17 ENCOUNTER — OFFICE VISIT (OUTPATIENT)
Dept: PULMONOLOGY | Facility: CLINIC | Age: 69
End: 2024-12-17
Payer: COMMERCIAL

## 2024-12-17 VITALS
SYSTOLIC BLOOD PRESSURE: 126 MMHG | DIASTOLIC BLOOD PRESSURE: 78 MMHG | HEART RATE: 84 BPM | TEMPERATURE: 97.7 F | OXYGEN SATURATION: 97 %

## 2024-12-17 DIAGNOSIS — J18.9 PNEUMONIA OF RIGHT LUNG DUE TO INFECTIOUS ORGANISM, UNSPECIFIED PART OF LUNG: ICD-10-CM

## 2024-12-17 DIAGNOSIS — G47.33 OBSTRUCTIVE SLEEP APNEA SYNDROME: ICD-10-CM

## 2024-12-17 DIAGNOSIS — J96.01 ACUTE HYPOXEMIC RESPIRATORY FAILURE (HCC): Primary | ICD-10-CM

## 2024-12-17 PROCEDURE — G2211 COMPLEX E/M VISIT ADD ON: HCPCS | Performed by: INTERNAL MEDICINE

## 2024-12-17 PROCEDURE — 99214 OFFICE O/P EST MOD 30 MIN: CPT | Performed by: INTERNAL MEDICINE

## 2024-12-17 NOTE — PROGRESS NOTES
"Attending Statement:  I have seen and examined the patient. I have discussed the patient's care with the pulmonary fellow.    I agree with the findings, assessment, and plan as outlined in the note by Dr Brock with the addition of the following:    Imaging Studies were reviewed personally on the PAC system:     CXR 10/2024: clear lungs    CXR 12/2024: elevated L diapghragm, shall lung volumes b/l    EXAMINATION:   Diminished breath sounds bl     A/P:  69M hx SUSY on CPAP, obesity, chronic neck pain, elevated left hemidiapghragm presenting for hospital follow up.    # Hypoxia  # Recent admission for PNA  Multiple iamging with \"small lung ovlumes and crowding\" likley extrinsic restriction form obesity with BMI 40. Recently admitted for PNA and then LAKHWINDER, sp abx.  Hypoxia due to hypoventilation (high bicarb can be from chronic hypercapnia) vs atelectasis  Chronic hypercapnia  - complete PFTs w 6 min walk (on RA)  - incentive spirometer  - follow up CXR ordered     # Elevated left hemidiaphragm  Imaging with low lung ovlumes but since Dec 2023 left side appears higher. No known trauma  Never had sniff test done  - recommend sniff test    # SUSY on CPAP  # Obesity  - using CPAP for 6 hrs  - continue sleep follow up w Dr King Kennedy MD  [facility]   "

## 2024-12-17 NOTE — PROGRESS NOTES
Pulmonary Follow Up Note   Edgar Obando 69 y.o. male MRN: [id number]  12/17/2024      Assessment:  Acute on Chronic Hypoxic and Hypercapnic Respiratory Failure  Will obtain PFTs with 6-minute walk.  Advised to increase physical activity around the house to increase stamina along with incentive spirometer use.    Recent hospitalization secondary to CAP  Completed antibiotic course. Advised to continue to increase physical exertion around the house and use incentive spirometer    Left hemidiaphragm elevation  Advised to obtain sniff test as previously ordered by Dr. Woods    SUSY  On CPAP 14cm H2O. Follows with Dr. Malik    Plan:    Diagnoses and all orders for this visit:    Acute hypoxemic respiratory failure (HCC)  -     Complete PFT with post Bronchodilator and Six Minute walk; Future    Pneumonia of right lung due to infectious organism, unspecified part of lung    Obstructive sleep apnea syndrome    Other orders  -     semaglutide, 0.25 or 0.5 mg/dose, (Ozempic) 2 mg/1.5 mL injection pen; Inject 0.5 mg under the skin every 7 days        Return in about 3 months (around 3/17/2025) for Next scheduled follow up.    History of Present Illness   HPI:  Edgar Obando is a 69 y.o. male with past medical history of SUSY, left hemidiaphragm elevation, chronic hypercapnic respiratory failure who presents for hospital follow-up. Recently hopsitalized 12/2-12/5 for pneumonia then rehospitalized 12/12- 12/15 for LAKHWINDER. Has been on 2LNC since discharge. Has not been active at home since discharge. Limited mobility due to pain. States using CPAP most nights for most of the night. Not requiring inhaler as needed.  Denies any shortness of breath, chest pain, lightheadedness, fevers, chills.    Review of Systems   Constitutional:  Positive for fatigue. Negative for activity change, chills, diaphoresis and fever.   HENT:  Negative for congestion, postnasal drip, rhinorrhea, sinus pressure, sinus pain and sore throat.    Eyes:  Negative.    Respiratory:  Negative for cough, chest tightness and shortness of breath.    Cardiovascular:  Negative for chest pain, palpitations and leg swelling.   Gastrointestinal:  Negative for abdominal distention, abdominal pain, constipation, diarrhea, nausea and vomiting.   Endocrine: Negative.    Genitourinary: Negative.    Musculoskeletal:  Negative for back pain, gait problem and neck pain.   Skin: Negative.    Allergic/Immunologic: Negative.    Neurological:  Negative for dizziness, syncope, weakness, light-headedness and headaches.   Hematological: Negative.    Psychiatric/Behavioral: Negative.     All other systems reviewed and are negative.      Historical Information   Past Medical History:   Diagnosis Date    Acid reflux     Acute blood loss anemia 07/15/2016    Anxiety     Arthritis     Cellulitis     left ankle    CPAP (continuous positive airway pressure) dependence     as needed per patient    Diabetes mellitus (HCC)     Hypertension     Idiopathic chronic venous hypertension of right lower extremity with ulcer (HCC) 06/12/2023    Sleep apnea     Twitching      Past Surgical History:   Procedure Laterality Date    APPENDECTOMY      CERVICAL FUSION Bilateral 5/2/2024    Procedure: FUSION CERVICAL ANTERIOR W DISCECTOMY, C3-7 ACDF;  Surgeon: Conrad Richardson MD;  Location: [facility];  Service: Orthopedics    CHOLECYSTECTOMY      COLONOSCOPY      IR PICC PLACEMENT DOUBLE LUMEN  5/2/2024    KNEE ARTHROSCOPY      AZ ARTHRP KNE CONDYLE&PLATU MEDIAL&LAT COMPARTMENTS Left 07/13/2016    Procedure: TOTAL  KNEE REPLACEMENT ;  Surgeon: Zack Montenegro MD;  Location: [facility];  Service: Orthopedics     Family History   Problem Relation Age of Onset    Diabetes Mother     Diabetes Father     Cancer Father          Meds/Allergies     Current Outpatient Medications:     acetaminophen (TYLENOL) 650 mg CR tablet, Take 1 tablet (650 mg total) by mouth every 8 (eight) hours as needed for mild pain, Disp: 30  tablet, Rfl: 0    albuterol (PROVENTIL HFA,VENTOLIN HFA) 90 mcg/act inhaler, Inhale 2 puffs every 6 (six) hours as needed for wheezing As needed, Disp: , Rfl:     aspirin (ECOTRIN LOW STRENGTH) 81 mg EC tablet, Take 81 mg by mouth daily (Patient not taking: Reported on 12/12/2024), Disp: , Rfl:     b complex vitamins capsule, Take 1 capsule by mouth daily, Disp: , Rfl:     Blood Glucose Monitoring Suppl (ONE TOUCH ULTRA 2) w/Device KIT, USE TO TEST BLOOD GLUCOSE, Disp: , Rfl:     budesonide (RINOCORT AQUA) 32 MCG/ACT nasal spray, 1 spray into each nostril 2 (two) times a day As needed, Disp: , Rfl:     busPIRone (BUSPAR) 10 mg tablet, TAKE 1 TABLET (10 MG TOTAL) BY MOUTH TWO (TWO) TIMES A DAY, Disp: 180 tablet, Rfl: 1    docusate sodium (COLACE) 100 mg capsule, Take 100 mg by mouth 2 (two) times a day, Disp: , Rfl:     fluticasone (FLOVENT HFA) 220 mcg/act inhaler, Inhale 1 puff daily as needed As needed, Disp: , Rfl:     furosemide (LASIX) 40 mg tablet, Take 40 mg by mouth as needed in the morning and 40 mg as needed in the evening., Disp: , Rfl:     gabapentin (NEURONTIN) 100 mg capsule, Take 1 capsule (100 mg total) by mouth 3 (three) times a day, Disp: , Rfl:     Lancets 30G MISC, 3 (three) times a day Use to test blood glucose, Disp: , Rfl:     losartan (COZAAR) 25 mg tablet, Take 1 tablet (25 mg total) by mouth daily, Disp: 30 tablet, Rfl: 0    lovastatin (MEVACOR) 10 MG tablet, Take 10 mg by mouth daily at bedtime, Disp: , Rfl:     Multiple Vitamins-Minerals (multivitamin with minerals) tablet, Take 1 tablet by mouth daily, Disp: , Rfl:     mupirocin (BACTROBAN) 2 % ointment, , Disp: , Rfl:     naloxone (NARCAN) 4 mg/0.1 mL nasal spray, Administer 1 spray into a nostril. If no response after 2-3 minutes, give another dose in the other nostril using a new spray., Disp: 1 each, Rfl: 1    OneTouch Ultra test strip, USE TO TEST BLOOD GLUCOSE TWICE DAILY, Disp: , Rfl:     pantoprazole (PROTONIX) 40 mg tablet, Take  40 mg by mouth daily, Disp: , Rfl:     potassium chloride (K-DUR,KLOR-CON) 10 mEq tablet, , Disp: , Rfl:     tamsulosin (FLOMAX) 0.4 mg, TAKE 1 CAPSULE BY MOUTH EVERY DAY WITH DINNER, Disp: 90 capsule, Rfl: 2    testosterone (ANDROGEL) 1.62 % TD gel pump, , Disp: , Rfl:     tiZANidine (ZANAFLEX) 2 mg tablet, Take 1 tablet (2 mg total) by mouth every 8 (eight) hours as needed for muscle spasms May make you drowsy. Do not drive until you know how it affects you. Do not take any other muscle relaxers while taking this medication, Disp: 30 tablet, Rfl: 0    traZODone (DESYREL) 100 mg tablet, , Disp: , Rfl:     Trulicity 4.5 MG/0.5ML SOPN, every 7 days On Mondays, Disp: , Rfl:     zinc gluconate 50 mg tablet, Take 50 mg by mouth in the morning., Disp: , Rfl:   No Known Allergies    Vitals: Blood pressure 126/78, pulse 84, temperature 97.7 °F (36.5 °C), SpO2 97%. There is no height or weight on file to calculate BMI.        Physical Exam  Physical Exam  Vitals and nursing note reviewed.   Constitutional:       Appearance: He is obese.      Comments: Chronically ill-appearing   HENT:      Head: Normocephalic and atraumatic.      Right Ear: External ear normal.      Left Ear: External ear normal.      Nose: Nose normal.      Mouth/Throat:      Mouth: Mucous membranes are moist.      Pharynx: Oropharynx is clear.   Eyes:      Conjunctiva/sclera: Conjunctivae normal.   Cardiovascular:      Rate and Rhythm: Normal rate and regular rhythm.      Pulses: Normal pulses.      Heart sounds: Normal heart sounds.   Pulmonary:      Effort: Pulmonary effort is normal.      Comments: Diminished breath sounds in bilateral bases  Abdominal:      General: Abdomen is flat. Bowel sounds are normal.      Palpations: Abdomen is soft.   Musculoskeletal:         General: No swelling or tenderness.      Cervical back: Neck supple. No muscular tenderness.   Skin:     General: Skin is warm and dry.      Capillary Refill: Capillary refill takes less  "than 2 seconds.   Neurological:      Mental Status: He is alert and oriented to person, place, and time. Mental status is at baseline.   Psychiatric:         Mood and Affect: Mood normal.         Behavior: Behavior normal.         Thought Content: Thought content normal.         Judgment: Judgment normal.         Preventative:  Influenza vaccine: 2024  Pneumonia vaccine: 2021    Labs: I have personally reviewed pertinent lab results.  Lab Results   Component Value Date    WBC 5.34 12/14/2024    HGB 9.8 (L) 12/14/2024    HCT 32.9 (L) 12/14/2024    MCV 95 12/14/2024     12/14/2024     Lab Results   Component Value Date    GLUCOSE 122 05/02/2024    CALCIUM 9.0 12/14/2024     10/04/2014    K 4.6 12/14/2024    CO2 34 (H) 12/14/2024     12/14/2024    BUN 18 12/14/2024    CREATININE 1.16 12/14/2024     No results found for: \"IGE\"  Lab Results   Component Value Date    ALT 9 12/12/2024    AST 12 (L) 12/12/2024    ALKPHOS 73 12/12/2024    BILITOT 0.76 10/04/2014         Imaging and other studies: Results Review Statement: I personally reviewed the following image studies in PACS and associated radiology reports: chest xray. My interpretation of the radiology images/reports is:  .  Chest x-ray 12-24: Hypoinflation limiting evaluation, small right pleural effusion and mild bibasilar atelectasis, right upper and lower lobe infiltrate  Chest x-ray 10/16/2024: Atelectasis of bibasilar with low lung volumes  Pulmonary function testing: None available    EKG, Pathology, and Other Studies: Results Review Statement: I personally reviewed the following image studies in PACS and associated radiology reports: Echocardiogram. My interpretation of the radiology images/reports is:  .  Echocardiogram 10/30/2024: EF of 60% normal RV    Jaime Brock MD  Pulmonary/Critical Care Fellow PGY-VI  [facility]      Disclaimer: Portions of the record may have been created with voice recognition " "software. Occasional wrong word or \"sound a like\" substitutions may have occurred due to the inherent limitations of voice recognition software. Careful consideration should be taken to recognize, using context, where substitutions have occurred.    "

## 2024-12-29 ENCOUNTER — HOSPITAL ENCOUNTER (EMERGENCY)
Facility: HOSPITAL | Age: 69
Discharge: HOME | End: 2024-12-30
Attending: EMERGENCY MEDICINE
Payer: COMMERCIAL

## 2024-12-29 ENCOUNTER — APPOINTMENT (EMERGENCY)
Dept: RADIOLOGY | Facility: HOSPITAL | Age: 69
End: 2024-12-29
Payer: COMMERCIAL
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
## 2024-12-29 DIAGNOSIS — R06.89 HYPERCAPNIA: Primary | ICD-10-CM

## 2024-12-29 LAB
ALBUMIN SERPL BCG-MCNC: 4 G/DL (ref 3.5–5)
ALP SERPL-CCNC: 85 U/L (ref 34–104)
ALT SERPL W P-5'-P-CCNC: 10 U/L (ref 7–52)
ANION GAP SERPL CALCULATED.3IONS-SCNC: 9 MMOL/L (ref 4–13)
AST SERPL W P-5'-P-CCNC: 20 U/L (ref 13–39)
BASE EX.OXY STD BLDV CALC-SCNC: 57.9 % (ref 60–80)
BASE EXCESS BLDV CALC-SCNC: 1.6 MMOL/L
BASOPHILS # BLD AUTO: 0.02 THOUSANDS/ΜL (ref 0–0.1)
BASOPHILS NFR BLD AUTO: 0 % (ref 0–1)
BILIRUB SERPL-MCNC: 0.87 MG/DL (ref 0.2–1)
BUN SERPL-MCNC: 25 MG/DL (ref 5–25)
CALCIUM SERPL-MCNC: 9.6 MG/DL (ref 8.4–10.2)
CARDIAC TROPONIN I PNL SERPL HS: 4 NG/L (ref ?–50)
CHLORIDE SERPL-SCNC: 100 MMOL/L (ref 96–108)
CO2 SERPL-SCNC: 27 MMOL/L (ref 21–32)
CREAT SERPL-MCNC: 1.5 MG/DL (ref 0.6–1.3)
EOSINOPHIL # BLD AUTO: 0.17 THOUSAND/ΜL (ref 0–0.61)
EOSINOPHIL NFR BLD AUTO: 2 % (ref 0–6)
ERYTHROCYTE [DISTWIDTH] IN BLOOD BY AUTOMATED COUNT: 14.1 % (ref 11.6–15.1)
GFR SERPL CREATININE-BSD FRML MDRD: 46 ML/MIN/1.73SQ M
GLUCOSE SERPL-MCNC: 239 MG/DL (ref 65–140)
HCO3 BLDV-SCNC: 31.7 MMOL/L (ref 24–30)
HCT VFR BLD AUTO: 37.1 % (ref 36.5–49.3)
HGB BLD-MCNC: 11.4 G/DL (ref 12–17)
IMM GRANULOCYTES # BLD AUTO: 0.03 THOUSAND/UL (ref 0–0.2)
IMM GRANULOCYTES NFR BLD AUTO: 0 % (ref 0–2)
LYMPHOCYTES # BLD AUTO: 1.07 THOUSANDS/ΜL (ref 0.6–4.47)
LYMPHOCYTES NFR BLD AUTO: 12 % (ref 14–44)
MCH RBC QN AUTO: 29.5 PG (ref 26.8–34.3)
MCHC RBC AUTO-ENTMCNC: 30.7 G/DL (ref 31.4–37.4)
MCV RBC AUTO: 96 FL (ref 82–98)
MONOCYTES # BLD AUTO: 0.69 THOUSAND/ΜL (ref 0.17–1.22)
MONOCYTES NFR BLD AUTO: 8 % (ref 4–12)
NEUTROPHILS # BLD AUTO: 7.2 THOUSANDS/ΜL (ref 1.85–7.62)
NEUTS SEG NFR BLD AUTO: 78 % (ref 43–75)
NRBC BLD AUTO-RTO: 0 /100 WBCS
O2 CT BLDV-SCNC: 9.8 ML/DL
PCO2 BLDV: 82.7 MM HG (ref 42–50)
PH BLDV: 7.2 [PH] (ref 7.3–7.4)
PLATELET # BLD AUTO: 209 THOUSANDS/UL (ref 149–390)
PMV BLD AUTO: 10.4 FL (ref 8.9–12.7)
PO2 BLDV: 34.9 MM HG (ref 35–45)
POTASSIUM SERPL-SCNC: 5.2 MMOL/L (ref 3.5–5.3)
PROT SERPL-MCNC: 8.5 G/DL (ref 6.4–8.4)
RBC # BLD AUTO: 3.87 MILLION/UL (ref 3.88–5.62)
SODIUM SERPL-SCNC: 136 MMOL/L (ref 135–147)
WBC # BLD AUTO: 9.18 THOUSAND/UL (ref 4.31–10.16)

## 2024-12-29 PROCEDURE — 71045 X-RAY EXAM CHEST 1 VIEW: CPT

## 2024-12-29 PROCEDURE — 93005 ELECTROCARDIOGRAM TRACING: CPT

## 2024-12-29 PROCEDURE — 70450 CT HEAD/BRAIN W/O DYE: CPT

## 2024-12-29 PROCEDURE — 82805 BLOOD GASES W/O2 SATURATION: CPT | Performed by: EMERGENCY MEDICINE

## 2024-12-29 PROCEDURE — 80053 COMPREHEN METABOLIC PANEL: CPT | Performed by: EMERGENCY MEDICINE

## 2024-12-29 PROCEDURE — 84484 ASSAY OF TROPONIN QUANT: CPT | Performed by: EMERGENCY MEDICINE

## 2024-12-29 PROCEDURE — 85025 COMPLETE CBC W/AUTO DIFF WBC: CPT | Performed by: EMERGENCY MEDICINE

## 2024-12-29 PROCEDURE — 99284 EMERGENCY DEPT VISIT MOD MDM: CPT | Performed by: EMERGENCY MEDICINE

## 2024-12-29 PROCEDURE — 99285 EMERGENCY DEPT VISIT HI MDM: CPT

## 2024-12-29 PROCEDURE — 36415 COLL VENOUS BLD VENIPUNCTURE: CPT | Performed by: EMERGENCY MEDICINE

## 2024-12-29 RX ORDER — NALOXONE HYDROCHLORIDE 1 MG/ML
1 INJECTION PARENTERAL ONCE
Status: COMPLETED | OUTPATIENT
Start: 2024-12-29 | End: 2024-12-29

## 2024-12-30 VITALS
SYSTOLIC BLOOD PRESSURE: 175 MMHG | OXYGEN SATURATION: 98 % | HEART RATE: 81 BPM | DIASTOLIC BLOOD PRESSURE: 81 MMHG | TEMPERATURE: 97.5 F | RESPIRATION RATE: 16 BRPM

## 2024-12-30 NOTE — ED NOTES
This RN spoke to pt's wife. She is unable to pick him up right now. BLS ride home requested via round trip     Zeny Dale RN  12/30/24 [id number]

## 2024-12-30 NOTE — ED ATTENDING ATTESTATION
Final Diagnoses:     1. Hypercapnia    2. Fall, initial encounter      ED Course as of 01/01/25 1005   Sun Dec 29, 2024   2247 pCO2, Kevyn(!!): 82.7   2247 pH, Kevyn(!): 7.201       I, Cesar Hinojosa MD, saw and evaluated the patient. All available labs and X-rays were ordered by me or the resident / non-physician and have been reviewed by myself. I discussed the patient with the resident / non-physician and agree with the resident's / non-physician practitioner's findings and plan as documented in the resident's / non-physician practicitioner's note, except where noted.   At this point, I agree with the current assessment done in the ED.   I was present during key portions of all procedures performed unless otherwise stated.     HPI:  NURSING TRIAGE:    This is a 69 y.o. male presenting for evaluation of dyspnea, fall.  Patient had an unwitnessed fall at home, so wife heard thud, then called EMS.    He was found saturating 60%, poorly responsive, confused, pupils pinpoint  Given narcan en route 2mg IV which helped?  Patient was also placed on NRB oxygen,  Unclear which helped him the most    Upon arrival, no longer miotic pupils, speaking in full sentences    Supposed to be on oxygen supplementation at night but doesn't do so.     Narcan is on his medication list but denies opiate use.     Denies any urinary tract infection symptoms (burning, itching, pain, blood, frequency).  Denies anything else being wrong with him.   Chief Complaint   Patient presents with    Overdose - Accidental    Fall     Pt arrives EMS from home after a fall was found with O2 in 60%'s. Is supposed to wear 2L O2 at home is non-compliant. EMS suspected OD of Rx opioids pin point pupils  and pt was given Narcan. Arrived on 15L non-rebreather 100% O2.       PHYSICAL: ASSESSMENT + PLAN:   Pertinent: speaking in full sentences  Sounds like he needs to cough  No midline tenderness  No LE edema.     General: VSS, NAD, awake, alert. Well-nourished,  well-developed. Appears stated age.   Speaking normally in full sentences.   Head: Normocephalic, atraumatic, nontender.  Eyes: PERRL, EOM-I. No diplopia.   No hyphema.   No subconjunctival hemorrhages.  Symmetrical lids.   ENT: Atraumatic external nose and ears.    MMM  No malocclusion. No stridor. Normal phonation. No drooling. Normal swallowing.   Neck: Symmetric, trachea midline. No JVD.  CV: RRR. +S1/S2  No murmurs or gallops  Peripheral pulses +2 throughout. No chest wall tenderness.   Lungs:   Unlabored No retractions  CTAB, lungs sounds equal bilateral.   No tachypnea.   Abd: +BS, soft, NT/ND.   MSK:   FROM   Back:   No rashes  Skin: Dry, intact.   Neuro: AAOx3, GCS 15, CN II-XII grossly intact.   Motor grossly intact.  Psychiatric/Behavioral: Appropriate mood and affect   Exam: deferred    Vitals:    24 2142 24 2200 24 2230 24 0133   BP: (!) 175/81      Pulse: 82 85 81    Resp: 16 20 16    Temp:    97.5 °F (36.4 °C)   TempSrc:    Oral   SpO2: 100% 100% 98%     VBG given EMS story  CXR for PNA  CTH given fall.     The patient has none of the followin. Focal neurologic deficit  2. Midline spinal tenderness  3. AMS  4. Intoxication  5. Distracting injury  The C-Spine can be cleared clinically by these criteria; imaging is not required.    Will clear neck.         There are no obvious limitations to social determinants of care.   Nursing note reviewed.   Vitals reviewed.   Orders placed by myself and/or advanced practitioner / resident.    Previous chart was reviewed  History obtained from: EMS and Patient  Language barrier: None  Limitations to the history obtained: None    Past Medical: Past Surgical:    has a past medical history of Acid reflux, Acute blood loss anemia (07/15/2016), Anxiety, Arthritis, Cellulitis, CPAP (continuous positive airway pressure) dependence, Diabetes mellitus (HCC), Hypertension, Idiopathic chronic venous hypertension of right lower extremity with  ulcer (HCC) (2023), Sleep apnea, and Twitching.  has a past surgical history that includes Appendectomy; Cholecystectomy; Knee arthroscopy; pr arthrp kne condyle&platu medial&lat compartments (Left, 2016); Colonoscopy; Cervical fusion (Bilateral, 2024); and IR PICC placement double lumen (2024).   Social: Cardiac (Echo/Cath)   Social History     Substance and Sexual Activity   Alcohol Use Not Currently     Social History     Tobacco Use   Smoking Status Former    Current packs/day: 0.00    Types: Cigarettes    Quit date:     Years since quittin.0   Smokeless Tobacco Never     Social History     Substance and Sexual Activity   Drug Use Not Currently    Comment: tried multiple drugs in the past    No results found for this or any previous visit.    No results found for this or any previous visit.    No results found for this or any previous visit.     Labs: Imaging:   Labs Reviewed   CBC AND DIFFERENTIAL - Abnormal       Result Value Ref Range Status    WBC 9.18  4.31 - 10.16 Thousand/uL Final    RBC 3.87 (*) 3.88 - 5.62 Million/uL Final    Hemoglobin 11.4 (*) 12.0 - 17.0 g/dL Final    Hematocrit 37.1  36.5 - 49.3 % Final    MCV 96  82 - 98 fL Final    MCH 29.5  26.8 - 34.3 pg Final    MCHC 30.7 (*) 31.4 - 37.4 g/dL Final    RDW 14.1  11.6 - 15.1 % Final    MPV 10.4  8.9 - 12.7 fL Final    Platelets 209  149 - 390 Thousands/uL Final    nRBC 0  /100 WBCs Final    Segmented % 78 (*) 43 - 75 % Final    Immature Grans % 0  0 - 2 % Final    Lymphocytes % 12 (*) 14 - 44 % Final    Monocytes % 8  4 - 12 % Final    Eosinophils Relative 2  0 - 6 % Final    Basophils Relative 0  0 - 1 % Final    Absolute Neutrophils 7.20  1.85 - 7.62 Thousands/µL Final    Absolute Immature Grans 0.03  0.00 - 0.20 Thousand/uL Final    Absolute Lymphocytes 1.07  0.60 - 4.47 Thousands/µL Final    Absolute Monocytes 0.69  0.17 - 1.22 Thousand/µL Final    Eosinophils Absolute 0.17  0.00 - 0.61 Thousand/µL Final     Basophils Absolute 0.02  0.00 - 0.10 Thousands/µL Final   COMPREHENSIVE METABOLIC PANEL - Abnormal    Sodium 136  135 - 147 mmol/L Final    Potassium 5.2  3.5 - 5.3 mmol/L Final    Comment: Slightly Hemolyzed:Results may be affected.    Chloride 100  96 - 108 mmol/L Final    CO2 27  21 - 32 mmol/L Final    ANION GAP 9  4 - 13 mmol/L Final    BUN 25  5 - 25 mg/dL Final    Creatinine 1.50 (*) 0.60 - 1.30 mg/dL Final    Comment: Standardized to IDMS reference method    Glucose 239 (*) 65 - 140 mg/dL Final    Comment: If the patient is fasting, the ADA then defines impaired fasting glucose as > 100 mg/dL and diabetes as > or equal to 123 mg/dL.    Calcium 9.6  8.4 - 10.2 mg/dL Final    AST 20  13 - 39 U/L Final    Comment: Slightly Hemolyzed:Results may be affected.    ALT 10  7 - 52 U/L Final    Comment: Specimen collection should occur prior to Sulfasalazine administration due to the potential for falsely depressed results.     Alkaline Phosphatase 85  34 - 104 U/L Final    Total Protein 8.5 (*) 6.4 - 8.4 g/dL Final    Albumin 4.0  3.5 - 5.0 g/dL Final    Total Bilirubin 0.87  0.20 - 1.00 mg/dL Final    Comment: Use of this assay is not recommended for patients undergoing treatment with eltrombopag due to the potential for falsely elevated results.  N-acetyl-p-benzoquinone imine (metabolite of Acetaminophen) will generate erroneously low results in samples for patients that have taken an overdose of Acetaminophen.    eGFR 46  ml/min/1.73sq m Final    Narrative:     National Kidney Disease Foundation guidelines for Chronic Kidney Disease (CKD):     Stage 1 with normal or high GFR (GFR > 90 mL/min/1.73 square meters)    Stage 2 Mild CKD (GFR = 60-89 mL/min/1.73 square meters)    Stage 3A Moderate CKD (GFR = 45-59 mL/min/1.73 square meters)    Stage 3B Moderate CKD (GFR = 30-44 mL/min/1.73 square meters)    Stage 4 Severe CKD (GFR = 15-29 mL/min/1.73 square meters)    Stage 5 End Stage CKD (GFR <15 mL/min/1.73 square  "meters)  Note: GFR calculation is accurate only with a steady state creatinine   BLOOD GAS, VENOUS - Abnormal    pH, Kevyn 7.201 (*) 7.300 - 7.400 Final    pCO2, Kevyn 82.7 (*) 42.0 - 50.0 mm Hg Final    pO2, Kevyn 34.9 (*) 35.0 - 45.0 mm Hg Final    HCO3, Kevyn 31.7 (*) 24 - 30 mmol/L Final    Base Excess, Kevyn 1.6  mmol/L Final    O2 Content, Kevyn 9.8  ml/dL Final    O2 HGB, VENOUS 57.9 (*) 60.0 - 80.0 % Final   HS TROPONIN I 0HR - Normal    hs TnI 0hr 4  \"Refer to ACS Flowchart\"- see link ng/L Final    Comment:                                              Initial (time 0) result  If >=50 ng/L, Myocardial injury suggested ;  Type of myocardial injury and treatment strategy  to be determined.  If 5-49 ng/L, a delta result at 2 hours and or 4 hours will be needed to further evaluate.  If <4 ng/L, and chest pain has been >3 hours since onset, patient may qualify for discharge based on the HEART score in the ED.  If <5 ng/L and <3hours since onset of chest pain, a delta result at 2 hours will be needed to further evaluate.    HS Troponin 99th Percentile URL of a Health Population=12 ng/L with a 95% Confidence Interval of 8-18 ng/L.    Second Troponin (time 2 hours)  If calculated delta >= 20 ng/L,  Myocardial injury suggested ; Type of myocardial injury and treatment strategy to be determined.  If 5-49 ng/L and the calculated delta is 5-19 ng/L, consult medical service for evaluation.  Continue evaluation for ischemia on ecg and other possible etiology and repeat hs troponin at 4 hours.  If delta is <5 ng/L at 2 hours, consider discharge based on risk stratification via the HEART score (if in ED), or JUAN risk score in IP/Observation.    HS Troponin 99th Percentile URL of a Health Population=12 ng/L with a 95% Confidence Interval of 8-18 ng/L.    CT head without contrast   Final Result      No calvarial fracture or acute intracranial abnormality is seen.      Other findings as above.                  Workstation performed: " [id number]         XR chest 1 view portable   Final Result      Improved patchy right lung opacities compared to 12/2/2024, possibly related to resolving infectious/inflammatory etiology versus pulmonary edema.            Resident: LINDA BOOTH I, the attending radiologist, have reviewed the images and agree with the final report above.      Workstation performed: [id number]            Medications: Code Status:   Medications   naloxone (FOR EMS ONLY) (NARCAN) 2 MG/2ML injection 2 mg (0 mg Does not apply Given to EMS 12/29/24 9407)    Code Status: Prior  Advance Directive and Living Will:      Power of :    POLST:       Orders Placed This Encounter   Procedures    CT head without contrast    XR chest 1 view portable    CBC and differential    Comprehensive metabolic panel    HS Troponin 0hr (reflex protocol)    Blood gas, venous    ECG 12 lead    ECG 12 lead    Insert Complex Venous Access Line     Time reflects when diagnosis was documented in both MDM as applicable and the Disposition within this note       Time User Action Codes Description Comment    12/30/2024  1:19 AM [place] Add [R06.89] Hypercapnia     12/30/2024  1:19 AM [place] Add [W19.XXXA] Fall, initial encounter           ED Disposition       ED Disposition   Discharge    Condition   Stable    Date/Time   Mon Dec 30, 2024  1:19 AM    Comment   Edgar Obando discharge to home/self care.                   Follow-up Information       Follow up With Specialties Details Why Contact Info    Ana Rosa Win MD Family Medicine   [address] [place] [address]  [address]  [facility] [address]  [phone]            Discharge Medication List as of 12/30/2024  1:20 AM        CONTINUE these medications which have NOT CHANGED    Details   acetaminophen (TYLENOL) 650 mg CR tablet Take 1 tablet (650 mg total) by mouth every 8 (eight) hours as needed for mild pain, Starting Fri 5/3/2024, Normal      albuterol (PROVENTIL HFA,VENTOLIN HFA) 90  mcg/act inhaler Inhale 2 puffs every 6 (six) hours as needed for wheezing As needed, Historical Med      aspirin (ECOTRIN LOW STRENGTH) 81 mg EC tablet Take 81 mg by mouth daily, Historical Med      b complex vitamins capsule Take 1 capsule by mouth daily, Historical Med      Blood Glucose Monitoring Suppl (ONE TOUCH ULTRA 2) w/Device KIT USE TO TEST BLOOD GLUCOSE, Historical Med      budesonide (RINOCORT AQUA) 32 MCG/ACT nasal spray 1 spray into each nostril 2 (two) times a day As needed, Historical Med      busPIRone (BUSPAR) 10 mg tablet TAKE 1 TABLET (10 MG TOTAL) BY MOUTH TWO (TWO) TIMES A DAY, Normal      docusate sodium (COLACE) 100 mg capsule Take 100 mg by mouth 2 (two) times a day, Starting Mon 7/4/2022, Historical Med      fluticasone (FLOVENT HFA) 220 mcg/act inhaler Inhale 1 puff daily as needed As needed, Historical Med      furosemide (LASIX) 40 mg tablet Take 40 mg by mouth as needed in the morning and 40 mg as needed in the evening., Historical Med      gabapentin (NEURONTIN) 100 mg capsule Take 1 capsule (100 mg total) by mouth 3 (three) times a day, Starting Sun 12/15/2024, No Print      Lancets 30G MISC 3 (three) times a day Use to test blood glucose, Starting Tue 9/5/2023, Historical Med      losartan (COZAAR) 25 mg tablet Take 1 tablet (25 mg total) by mouth daily, Starting Sun 12/15/2024, Normal      lovastatin (MEVACOR) 10 MG tablet Take 10 mg by mouth daily at bedtime, Historical Med      Multiple Vitamins-Minerals (multivitamin with minerals) tablet Take 1 tablet by mouth daily, Historical Med      mupirocin (BACTROBAN) 2 % ointment Starting Tue 1/11/2022, Historical Med      naloxone (NARCAN) 4 mg/0.1 mL nasal spray Administer 1 spray into a nostril. If no response after 2-3 minutes, give another dose in the other nostril using a new spray., Normal      OneTouch Ultra test strip USE TO TEST BLOOD GLUCOSE TWICE DAILY, Historical Med      pantoprazole (PROTONIX) 40 mg tablet Take 40 mg by  mouth daily, Historical Med      potassium chloride (K-DUR,KLOR-CON) 10 mEq tablet Starting Thu 6/13/2019, Historical Med      semaglutide, 0.25 or 0.5 mg/dose, (Ozempic) 2 mg/1.5 mL injection pen Inject 0.5 mg under the skin every 7 days, Historical Med      tamsulosin (FLOMAX) 0.4 mg TAKE 1 CAPSULE BY MOUTH EVERY DAY WITH DINNER, Normal      testosterone (ANDROGEL) 1.62 % TD gel pump Historical Med      tiZANidine (ZANAFLEX) 2 mg tablet Take 1 tablet (2 mg total) by mouth every 8 (eight) hours as needed for muscle spasms May make you drowsy. Do not drive until you know how it affects you. Do not take any other muscle relaxers while taking this medication, Starting Wed 6/19/2024, Normal      traZODone (DESYREL) 100 mg tablet Historical Med      Trulicity 4.5 MG/0.5ML SOPN every 7 days On Mondays, Starting Mon 8/22/2022, Historical Med      zinc gluconate 50 mg tablet Take 50 mg by mouth in the morning., Historical Med           No discharge procedures on file.  Prior to Admission Medications   Prescriptions Last Dose Informant Patient Reported? Taking?   Blood Glucose Monitoring Suppl (ONE TOUCH ULTRA 2) w/Device KIT  Self Yes No   Sig: USE TO TEST BLOOD GLUCOSE   Lancets 30G MISC  Self Yes No   Sig: 3 (three) times a day Use to test blood glucose   Multiple Vitamins-Minerals (multivitamin with minerals) tablet   Yes No   Sig: Take 1 tablet by mouth daily   OneTouch Ultra test strip  Self Yes No   Sig: USE TO TEST BLOOD GLUCOSE TWICE DAILY   Trulicity 4.5 MG/0.5ML SOPN  Self Yes No   Sig: every 7 days On Mondays   Patient not taking: Reported on 12/17/2024   acetaminophen (TYLENOL) 650 mg CR tablet   No No   Sig: Take 1 tablet (650 mg total) by mouth every 8 (eight) hours as needed for mild pain   albuterol (PROVENTIL HFA,VENTOLIN HFA) 90 mcg/act inhaler  Self Yes No   Sig: Inhale 2 puffs every 6 (six) hours as needed for wheezing As needed   aspirin (ECOTRIN LOW STRENGTH) 81 mg EC tablet  Self Yes No   Sig: Take  81 mg by mouth daily   b complex vitamins capsule   Yes No   Sig: Take 1 capsule by mouth daily   budesonide (RINOCORT AQUA) 32 MCG/ACT nasal spray  Self Yes No   Si spray into each nostril 2 (two) times a day As needed   Patient not taking: Reported on 2024   busPIRone (BUSPAR) 10 mg tablet   No No   Sig: TAKE 1 TABLET (10 MG TOTAL) BY MOUTH TWO (TWO) TIMES A DAY   docusate sodium (COLACE) 100 mg capsule  Self Yes No   Sig: Take 100 mg by mouth 2 (two) times a day   fluticasone (FLOVENT HFA) 220 mcg/act inhaler  Self Yes No   Sig: Inhale 1 puff daily as needed As needed   furosemide (LASIX) 40 mg tablet  Self Yes No   Sig: Take 40 mg by mouth as needed in the morning and 40 mg as needed in the evening.   gabapentin (NEURONTIN) 100 mg capsule   No No   Sig: Take 1 capsule (100 mg total) by mouth 3 (three) times a day   Patient not taking: Reported on 2024   losartan (COZAAR) 25 mg tablet   No No   Sig: Take 1 tablet (25 mg total) by mouth daily   lovastatin (MEVACOR) 10 MG tablet   Yes No   Sig: Take 10 mg by mouth daily at bedtime   mupirocin (BACTROBAN) 2 % ointment  Self Yes No   naloxone (NARCAN) 4 mg/0.1 mL nasal spray   No No   Sig: Administer 1 spray into a nostril. If no response after 2-3 minutes, give another dose in the other nostril using a new spray.   pantoprazole (PROTONIX) 40 mg tablet   Yes No   Sig: Take 40 mg by mouth daily   potassium chloride (K-DUR,KLOR-CON) 10 mEq tablet  Self Yes No   semaglutide, 0.25 or 0.5 mg/dose, (Ozempic) 2 mg/1.5 mL injection pen   Yes No   Sig: Inject 0.5 mg under the skin every 7 days   tamsulosin (FLOMAX) 0.4 mg  Self No No   Sig: TAKE 1 CAPSULE BY MOUTH EVERY DAY WITH DINNER   testosterone (ANDROGEL) 1.62 % TD gel pump  Self Yes No   tiZANidine (ZANAFLEX) 2 mg tablet   No No   Sig: Take 1 tablet (2 mg total) by mouth every 8 (eight) hours as needed for muscle spasms May make you drowsy. Do not drive until you know how it affects you. Do not take any  "other muscle relaxers while taking this medication   Patient not taking: Reported on 12/17/2024   traZODone (DESYREL) 100 mg tablet   Yes No   zinc gluconate 50 mg tablet  Self Yes No   Sig: Take 50 mg by mouth in the morning.      Facility-Administered Medications: None                        Portions of the record may have been created with voice recognition software. Occasional wrong word or \"sound a like\" substitutions may have occurred due to the inherent limitations of voice recognition software. Read the chart carefully and recognize, using context, where substitutions have occurred.    Electronically signed by:  Cesar Hinojosa  "

## 2024-12-30 NOTE — DISCHARGE INSTRUCTIONS
You were seen and evaluated in the emergency room for a fall.  Your workup in the emergency department revealed an elevated carbon dioxide.  This is most likely the cause of her fall today.  Your CT head revealed no acute intracranial abnormalities.  It is essential that you wear your CPAP machine when you sleep.  And use your supplemental oxygen when needed.  Please follow-up with your primary care physician since you were seen and evaluated in the emergency department today.  If your CPAP machine does not fit you properly or you have questions about your CPAP machine please ask your primary care physician for assistance.    Please return to the emergency department for any new or concerning symptoms such as additional falls, headaches, chest pain, shortness of breath.

## 2024-12-30 NOTE — ED PROVIDER NOTES
Time reflects when diagnosis was documented in both MDM as applicable and the Disposition within this note       Time User Action Codes Description Comment    12/30/2024  1:19 AM Tamara Blount Add [R06.89] Hypercapnia     12/30/2024  1:19 AM Tamara Blount Add [W19.XXXA] Fall, initial encounter           ED Disposition       ED Disposition   Discharge    Condition   Stable    Date/Time   Mon Dec 30, 2024  1:19 AM    Comment   Edgar Obando discharge to home/self care.                   Assessment & Plan       Medical Decision Making  Patient is a 69-year-old male presents today for evaluation after a fall.  Vitals on arrival blood pressure 175/81, heart rate 82 satting 100% on nonrebreather.  Patient was taken off nonrebreather and placed on 6 L nasal cannula still satting 100%.  Physical exam reveals a well-appearing male in no acute distress.  Able to speak in full sentences.  No pinpoint pupils.  Normal night rate and rhythm, lungs are clear to auscultation bilaterally, abdomen soft nontender nondistended.  No lower extremity edema.  Differential diagnosis includes but is not limited to hypercapnia, pneumonia, intracranial abnormality, electrolyte abnormality, infection.  Will obtain CBC to evaluate for leukocytosis and anemia, CMP to evaluate for electrolyte abnormalities, renal function and hepatic function, troponin, VBG to evaluate for acidosis and hypercapnia, EKG and CT head to evaluate for intracranial abnormalities.      Patient's O2 was weaned to 2 L nasal cannula which is his home oxygen satting 98%.  CT head with no acute abnormalities.  CMP without acute abnormalities, CBC without acute abnormalities.  VBG with hypercapnia but patient has history of hypercapnia.  Patient is not wearing his CPAP as he is supposed to at home.  I had a lengthy discussion with the patient informing him that he needs to wear his CPAP even if he is sleeping in his recliner.  At this time patient is stable to be  discharged home with close follow-up with his pulmonologist and his primary care physician.  He informed me that he has follow-up appointment scheduled with both of his physicians in early January informed him to call the physicians tomorrow to schedule a follow-up appointment sooner.  Patient is agreeable with this plan.  Strict return precautions were given.    Amount and/or Complexity of Data Reviewed  Labs: ordered.  Radiology: ordered. Decision-making details documented in ED Course.        ED Course as of 12/30/24 0202   Sun Dec 29, 2024   2143 Static 60% on EMS arrival.  Pinpoint pupils given 2   nasal Narcan.  He arrived on a nonrebreather.  Was then placed on 6 L nasal cannula satting 100%.   2307 Spoke with patient at bedside he states that he has not been wearing his CPAP like he is supposed to.   Mon Dec 30, 2024   0115 CT head without contrast    No calvarial fracture or acute intracranial abnormality is seen.     Other findings as above.            Medications   naloxone (FOR EMS ONLY) (NARCAN) 2 MG/2ML injection 2 mg (0 mg Does not apply Given to EMS 12/29/24 2159)       ED Risk Strat Scores                                              History of Present Illness       Chief Complaint   Patient presents with    Overdose - Accidental    Fall     Pt arrives EMS from home after a fall was found with O2 in 60%'s. Is supposed to wear 2L O2 at home is non-compliant. EMS suspected OD of Rx opioids pin point pupils  and pt was given Narcan. Arrived on 15L non-rebreather 100% O2.        Past Medical History:   Diagnosis Date    Acid reflux     Acute blood loss anemia 07/15/2016    Anxiety     Arthritis     Cellulitis     left ankle    CPAP (continuous positive airway pressure) dependence     as needed per patient    Diabetes mellitus (HCC)     Hypertension     Idiopathic chronic venous hypertension of right lower extremity with ulcer (HCC) 06/12/2023    Sleep apnea     Twitching       Past Surgical History:    Procedure Laterality Date    APPENDECTOMY      CERVICAL FUSION Bilateral 2024    Procedure: FUSION CERVICAL ANTERIOR W DISCECTOMY, C3-7 ACDF;  Surgeon: Conrad Richardson MD;  Location: [facility];  Service: Orthopedics    CHOLECYSTECTOMY      COLONOSCOPY      IR PICC PLACEMENT DOUBLE LUMEN  2024    KNEE ARTHROSCOPY      IN ARTHRP KNE CONDYLE&PLATU MEDIAL&LAT COMPARTMENTS Left 2016    Procedure: TOTAL  KNEE REPLACEMENT ;  Surgeon: Zack Montenegro MD;  Location: [facility];  Service: Orthopedics      Family History   Problem Relation Age of Onset    Diabetes Mother     Diabetes Father     Cancer Father       Social History     Tobacco Use    Smoking status: Former     Current packs/day: 0.00     Types: Cigarettes     Quit date:      Years since quittin.0    Smokeless tobacco: Never   Vaping Use    Vaping status: Never Used   Substance Use Topics    Alcohol use: Not Currently    Drug use: Not Currently     Comment: tried multiple drugs in the past      E-Cigarette/Vaping    E-Cigarette Use Never User       E-Cigarette/Vaping Substances    Nicotine No     THC No     CBD No     Flavoring No     Other No     Unknown No       I have reviewed and agree with the history as documented.     Patient is a 69-year-old male with past medical history of anxiety, diabetes, hypertension and sleep apnea who presents today for evaluation after a fall.  Per EMS patient was found on the ground satting 60%.  Patient is supposed to wear 2 L nasal cannula and use a CPAP while sleeping.  He is noncompliant with nasal cannula and CPAP.  On EMS evaluation patient had pinpoint pupils was given to of intranasal Narcan.  Patient was placed on believe there by EMS.  Patient states that he does not take any pain medications except Tylenol.  He denies shortness of breath, chest pain, abdominal pain, nausea, vomiting, cough, nasal congestion or rhinorrhea.  Patient denies recent illnesses.  He states that he fell asleep in  his recliner and stood up quickly to go to the bathroom and fell to the ground.  He does not remember anything else.      Overdose - Accidental  Associated symptoms: no abdominal pain, no chest pain, no diarrhea, no headaches, no nausea, no shortness of breath and no vomiting    Fall  Associated symptoms: no abdominal pain, no back pain, no chest pain, no headaches, no nausea and no vomiting        Review of Systems   Constitutional:  Negative for chills and fever.   HENT:  Negative for congestion, rhinorrhea and sore throat.    Respiratory:  Negative for shortness of breath.    Cardiovascular:  Negative for chest pain.   Gastrointestinal:  Negative for abdominal pain, diarrhea, nausea and vomiting.   Genitourinary:  Negative for dysuria and hematuria.   Musculoskeletal:  Negative for back pain.   Neurological:  Negative for dizziness, weakness, light-headedness and headaches.           Objective       ED Triage Vitals   Temperature Pulse Blood Pressure Respirations SpO2 Patient Position - Orthostatic VS   12/30/24 0133 12/29/24 2142 12/29/24 2142 12/29/24 2142 12/29/24 2142 --   97.5 °F (36.4 °C) 82 (!) 175/81 16 100 %       Temp Source Heart Rate Source BP Location FiO2 (%) Pain Score    12/30/24 0133 12/29/24 2230 -- -- 12/29/24 2142    Oral Monitor   8      Vitals      Date and Time Temp Pulse SpO2 Resp BP Pain Score FACES Pain Rating User   12/30/24 0133 97.5 °F (36.4 °C) -- -- -- -- -- -- [facility]   12/29/24 2230 -- 81 98 % 16 -- -- -- [facility]   12/29/24 2200 -- 85 100 % 20 -- -- -- [facility]   12/29/24 2142 -- 82 100 % 16 175/81 8 -- [facility]            Physical Exam  Vitals and nursing note reviewed.   Constitutional:       General: He is not in acute distress.     Appearance: Normal appearance. He is well-developed and normal weight. He is not ill-appearing.   HENT:      Head: Normocephalic and atraumatic.      Mouth/Throat:      Mouth: Mucous membranes are moist.   Eyes:      Conjunctiva/sclera: Conjunctivae normal.    Cardiovascular:      Rate and Rhythm: Normal rate and regular rhythm.      Heart sounds: No murmur heard.     Comments: Patient on nonrebreather speaking in full sentences.  Pulmonary:      Effort: Pulmonary effort is normal. No respiratory distress.      Breath sounds: Normal breath sounds.   Abdominal:      General: Abdomen is flat. There is no distension.      Palpations: Abdomen is soft.      Tenderness: There is no abdominal tenderness.   Musculoskeletal:         General: No swelling.      Cervical back: Neck supple.   Skin:     General: Skin is warm and dry.      Capillary Refill: Capillary refill takes less than 2 seconds.   Neurological:      General: No focal deficit present.      Mental Status: He is alert and oriented to person, place, and time.   Psychiatric:         Mood and Affect: Mood normal.         Behavior: Behavior normal.         Results Reviewed       Procedure Component Value Units Date/Time    HS Troponin I 4hr [[id number]]     Lab Status: No result Specimen: Blood     Blood gas, venous [[id number]]  (Abnormal) Collected: 12/29/24 2231    Lab Status: Final result Specimen: Blood from Arm, Left Updated: 12/29/24 2244     pH, Kevyn 7.201     pCO2, Kevyn 82.7 mm Hg      pO2, Kevyn 34.9 mm Hg      HCO3, Kevyn 31.7 mmol/L      Base Excess, Kevyn 1.6 mmol/L      O2 Content, Kevyn 9.8 ml/dL      O2 HGB, VENOUS 57.9 %     CBC and differential [[id number]]  (Abnormal) Collected: 12/29/24 2214    Lab Status: Final result Specimen: Blood from Arm, Left Updated: 12/29/24 2225     WBC 9.18 Thousand/uL      RBC 3.87 Million/uL      Hemoglobin 11.4 g/dL      Hematocrit 37.1 %      MCV 96 fL      MCH 29.5 pg      MCHC 30.7 g/dL      RDW 14.1 %      MPV 10.4 fL      Platelets 209 Thousands/uL      nRBC 0 /100 WBCs      Segmented % 78 %      Immature Grans % 0 %      Lymphocytes % 12 %      Monocytes % 8 %      Eosinophils Relative 2 %      Basophils Relative 0 %      Absolute Neutrophils 7.20 Thousands/µL      Absolute  Immature Grans 0.03 Thousand/uL      Absolute Lymphocytes 1.07 Thousands/µL      Absolute Monocytes 0.69 Thousand/µL      Eosinophils Absolute 0.17 Thousand/µL      Basophils Absolute 0.02 Thousands/µL     HS Troponin I 2hr [[id number]]     Lab Status: No result Specimen: Blood     HS Troponin 0hr (reflex protocol) [[id number]]  (Normal) Collected: 12/29/24 2147    Lab Status: Final result Specimen: Blood from Arm, Left Updated: 12/29/24 2219     hs TnI 0hr 4 ng/L     Comprehensive metabolic panel [[id number]]  (Abnormal) Collected: 12/29/24 2147    Lab Status: Final result Specimen: Blood from Arm, Left Updated: 12/29/24 2217     Sodium 136 mmol/L      Potassium 5.2 mmol/L      Chloride 100 mmol/L      CO2 27 mmol/L      ANION GAP 9 mmol/L      BUN 25 mg/dL      Creatinine 1.50 mg/dL      Glucose 239 mg/dL      Calcium 9.6 mg/dL      AST 20 U/L      ALT 10 U/L      Alkaline Phosphatase 85 U/L      Total Protein 8.5 g/dL      Albumin 4.0 g/dL      Total Bilirubin 0.87 mg/dL      eGFR 46 ml/min/1.73sq m     Narrative:      [employer] Kid[employer] guidelines for Chronic Kidney Disease (CKD):     Stage 1 with normal or high GFR (GFR > 90 mL/min/1.73 square meters)    Stage 2 Mild CKD (GFR = 60-89 mL/min/1.73 square meters)    Stage 3A Moderate CKD (GFR = 45-59 mL/min/1.73 square meters)    Stage 3B Moderate CKD (GFR = 30-44 mL/min/1.73 square meters)    Stage 4 Severe CKD (GFR = 15-29 mL/min/1.73 square meters)    Stage 5 End Stage CKD (GFR <15 mL/min/1.73 square meters)  Note: GFR calculation is accurate only with a steady state creatinine    Rapid drug screen, urine [[id number]]     Lab Status: No result Specimen: Urine             CT head without contrast   Final Interpretation by Jose Antonio Blackman DO (12/30 0109)      No calvarial fracture or acute intracranial abnormality is seen.      Other findings as above.                  Workstation performed: [id number]         XR chest 1 view portable     (Results Pending)       Complex Venous Access Line    Date/Time: 12/29/2024 9:43 PM    Performed by: Tamara Blount DO  Authorized by: Tamara Blount DO    Consent:     Consent obtained:  Verbal  Pre-procedure details:     Skin preparation:  2% chlorhexidine    Skin preparation agent: Skin preparation agent completely dried prior to procedure    Procedure details:     Complex Venous Access Line Type: US Guided Peripheral IV      Orientation:  Left    Location:  Arm    Catheter size:  20 gauge    Sterile ultrasound techniques: Sterile gel and sterile probe covers were used      Number of attempts:  1    Successful placement: yes      Landmarks identified: yes    Post-procedure details:     Post-procedure:  Dressing applied    Assessment:  Blood return through all ports and free fluid flow    Patient tolerance of procedure:  Tolerated well, no immediate complications      ED Medication and Procedure Management   Prior to Admission Medications   Prescriptions Last Dose Informant Patient Reported? Taking?   Blood Glucose Monitoring Suppl (ONE TOUCH ULTRA 2) w/Device KIT  Self Yes No   Sig: USE TO TEST BLOOD GLUCOSE   Lancets 30G MISC  Self Yes No   Sig: 3 (three) times a day Use to test blood glucose   Multiple Vitamins-Minerals (multivitamin with minerals) tablet   Yes No   Sig: Take 1 tablet by mouth daily   OneTouch Ultra test strip  Self Yes No   Sig: USE TO TEST BLOOD GLUCOSE TWICE DAILY   Trulicity 4.5 MG/0.5ML SOPN  Self Yes No   Sig: every 7 days On Mondays   Patient not taking: Reported on 12/17/2024   acetaminophen (TYLENOL) 650 mg CR tablet   No No   Sig: Take 1 tablet (650 mg total) by mouth every 8 (eight) hours as needed for mild pain   albuterol (PROVENTIL HFA,VENTOLIN HFA) 90 mcg/act inhaler  Self Yes No   Sig: Inhale 2 puffs every 6 (six) hours as needed for wheezing As needed   aspirin (ECOTRIN LOW STRENGTH) 81 mg EC tablet  Self Yes No   Sig: Take 81 mg by mouth daily   b complex vitamins  capsule   Yes No   Sig: Take 1 capsule by mouth daily   budesonide (RINOCORT AQUA) 32 MCG/ACT nasal spray  Self Yes No   Si spray into each nostril 2 (two) times a day As needed   Patient not taking: Reported on 2024   busPIRone (BUSPAR) 10 mg tablet   No No   Sig: TAKE 1 TABLET (10 MG TOTAL) BY MOUTH TWO (TWO) TIMES A DAY   docusate sodium (COLACE) 100 mg capsule  Self Yes No   Sig: Take 100 mg by mouth 2 (two) times a day   fluticasone (FLOVENT HFA) 220 mcg/act inhaler  Self Yes No   Sig: Inhale 1 puff daily as needed As needed   furosemide (LASIX) 40 mg tablet  Self Yes No   Sig: Take 40 mg by mouth as needed in the morning and 40 mg as needed in the evening.   gabapentin (NEURONTIN) 100 mg capsule   No No   Sig: Take 1 capsule (100 mg total) by mouth 3 (three) times a day   Patient not taking: Reported on 2024   losartan (COZAAR) 25 mg tablet   No No   Sig: Take 1 tablet (25 mg total) by mouth daily   lovastatin (MEVACOR) 10 MG tablet   Yes No   Sig: Take 10 mg by mouth daily at bedtime   mupirocin (BACTROBAN) 2 % ointment  Self Yes No   naloxone (NARCAN) 4 mg/0.1 mL nasal spray   No No   Sig: Administer 1 spray into a nostril. If no response after 2-3 minutes, give another dose in the other nostril using a new spray.   pantoprazole (PROTONIX) 40 mg tablet   Yes No   Sig: Take 40 mg by mouth daily   potassium chloride (K-DUR,KLOR-CON) 10 mEq tablet  Self Yes No   semaglutide, 0.25 or 0.5 mg/dose, (Ozempic) 2 mg/1.5 mL injection pen   Yes No   Sig: Inject 0.5 mg under the skin every 7 days   tamsulosin (FLOMAX) 0.4 mg  Self No No   Sig: TAKE 1 CAPSULE BY MOUTH EVERY DAY WITH DINNER   testosterone (ANDROGEL) 1.62 % TD gel pump  Self Yes No   tiZANidine (ZANAFLEX) 2 mg tablet   No No   Sig: Take 1 tablet (2 mg total) by mouth every 8 (eight) hours as needed for muscle spasms May make you drowsy. Do not drive until you know how it affects you. Do not take any other muscle relaxers while taking this  medication   Patient not taking: Reported on 12/17/2024   traZODone (DESYREL) 100 mg tablet   Yes No   zinc gluconate 50 mg tablet  Self Yes No   Sig: Take 50 mg by mouth in the morning.      Facility-Administered Medications: None     Patient's Medications   Discharge Prescriptions    No medications on file     No discharge procedures on file.  ED SEPSIS DOCUMENTATION   Time reflects when diagnosis was documented in both MDM as applicable and the Disposition within this note       Time User Action Codes Description Comment    12/30/2024  1:19 AM Tamara Blount [R06.89] Hypercapnia     12/30/2024  1:19 AM Tamara Blount [W19.XXXA] Fall, initial encounter                  Tamara Blount DO  12/30/24 0202

## 2024-12-30 NOTE — ED NOTES
Offered pt a BLS ride home d/t O2 requirement. Pt states he does not have his house keys. Is trying to call his wife        Zeny Chelo Dale RN  12/30/24 [id number]

## 2024-12-31 LAB
ATRIAL RATE: 84 BPM
P AXIS: 53 DEGREES
PR INTERVAL: 130 MS
QRS AXIS: 0 DEGREES
QRSD INTERVAL: 84 MS
QT INTERVAL: 374 MS
QTC INTERVAL: 441 MS
T WAVE AXIS: 27 DEGREES
VENTRICULAR RATE: 84 BPM

## 2024-12-31 PROCEDURE — 93010 ELECTROCARDIOGRAM REPORT: CPT | Performed by: INTERNAL MEDICINE

## 2025-01-02 ENCOUNTER — TELEPHONE (OUTPATIENT)
Dept: NEUROLOGY | Facility: CLINIC | Age: 70
End: 2025-01-02

## 2025-01-02 PROBLEM — J18.9 PNEUMONIA INVOLVING RIGHT LUNG: Status: RESOLVED | Noted: 2024-12-03 | Resolved: 2025-01-02

## 2025-01-02 NOTE — TELEPHONE ENCOUNTER
Called pt to confirm appt. Pt stated they were unsure if they will make the appt and said they will call back to cancel if needed.

## 2025-01-03 ENCOUNTER — TELEPHONE (OUTPATIENT)
Age: 70
End: 2025-01-03

## 2025-01-03 NOTE — TELEPHONE ENCOUNTER
Hello,    Please advise if a forced appointment can be accommodated for the patient:    Call back #: [phone]    Insurance: [employer]    Reason for appointment: Patient fell and was seen in ED on 12/29. Patient has since been experiencing severe pain in his back and would like to see the Dr as soon as possible. Was able to schedule the patient for 1/27. Please advise if sooner apt if possible for pt.     Requested doctor and/or location: [facility] / [place]      Thank you.

## 2025-01-08 DIAGNOSIS — Z98.1 S/P CERVICAL SPINAL FUSION: ICD-10-CM

## 2025-01-08 RX ORDER — TIZANIDINE 2 MG/1
2 TABLET ORAL 2 TIMES DAILY PRN
Qty: 30 TABLET | Refills: 0 | Status: SHIPPED | OUTPATIENT
Start: 2025-01-08

## 2025-01-08 NOTE — TELEPHONE ENCOUNTER
Called and spoke with pt who states he had a fall on 12/29. ED got a catscan and said everything looked okay and to take tylenol for the pain. Pt states he still has pain and sometimes has trouble swallowing. Pt wanted to know if something can be prescribed for the acute pain until he is seen in office.

## 2025-01-09 ENCOUNTER — OFFICE VISIT (OUTPATIENT)
Dept: OBGYN CLINIC | Facility: HOSPITAL | Age: 70
End: 2025-01-09
Payer: COMMERCIAL

## 2025-01-09 VITALS — HEIGHT: 66 IN | WEIGHT: 237 LBS | BODY MASS INDEX: 38.09 KG/M2

## 2025-01-09 DIAGNOSIS — M25.561 CHRONIC PAIN OF RIGHT KNEE: Primary | ICD-10-CM

## 2025-01-09 DIAGNOSIS — M17.11 PRIMARY OSTEOARTHRITIS OF RIGHT KNEE: ICD-10-CM

## 2025-01-09 DIAGNOSIS — G89.29 CHRONIC PAIN OF RIGHT KNEE: Primary | ICD-10-CM

## 2025-01-09 PROCEDURE — 20610 DRAIN/INJ JOINT/BURSA W/O US: CPT | Performed by: ORTHOPAEDIC SURGERY

## 2025-01-09 PROCEDURE — 99213 OFFICE O/P EST LOW 20 MIN: CPT | Performed by: ORTHOPAEDIC SURGERY

## 2025-01-09 RX ORDER — BETAMETHASONE SODIUM PHOSPHATE AND BETAMETHASONE ACETATE 3; 3 MG/ML; MG/ML
12 INJECTION, SUSPENSION INTRA-ARTICULAR; INTRALESIONAL; INTRAMUSCULAR; SOFT TISSUE
Status: COMPLETED | OUTPATIENT
Start: 2025-01-09 | End: 2025-01-09

## 2025-01-09 RX ORDER — BUPIVACAINE HYDROCHLORIDE 2.5 MG/ML
2 INJECTION, SOLUTION INFILTRATION; PERINEURAL
Status: COMPLETED | OUTPATIENT
Start: 2025-01-09 | End: 2025-01-09

## 2025-01-09 RX ORDER — LIDOCAINE 50 MG/G
1 PATCH TOPICAL EVERY 24 HOURS
COMMUNITY
Start: 2025-01-04 | End: 2026-01-04

## 2025-01-09 RX ORDER — LIDOCAINE HYDROCHLORIDE 10 MG/ML
2 INJECTION, SOLUTION INFILTRATION; PERINEURAL
Status: COMPLETED | OUTPATIENT
Start: 2025-01-09 | End: 2025-01-09

## 2025-01-09 RX ORDER — METHOCARBAMOL 500 MG/1
500 TABLET, FILM COATED ORAL 4 TIMES DAILY PRN
COMMUNITY
Start: 2025-01-04 | End: 2025-01-14

## 2025-01-09 RX ADMIN — BETAMETHASONE SODIUM PHOSPHATE AND BETAMETHASONE ACETATE 12 MG: 3; 3 INJECTION, SUSPENSION INTRA-ARTICULAR; INTRALESIONAL; INTRAMUSCULAR; SOFT TISSUE at 14:30

## 2025-01-09 RX ADMIN — BUPIVACAINE HYDROCHLORIDE 2 ML: 2.5 INJECTION, SOLUTION INFILTRATION; PERINEURAL at 14:30

## 2025-01-09 RX ADMIN — LIDOCAINE HYDROCHLORIDE 2 ML: 10 INJECTION, SOLUTION INFILTRATION; PERINEURAL at 14:30

## 2025-01-09 NOTE — PROGRESS NOTES
Assessment:  1. Chronic pain of right knee        2. Primary osteoarthritis of right knee            Plan:  Right knee osteoarthritis  The patient was provided with right knee steroid injection.  The patient tolerated the procedure well.    The patient should follow up in 3 months.      To do next visit:  Return in about 3 months (around 4/9/2025).    The above stated was discussed in layman's terms and the patient expressed understanding.  All questions were answered to the patient's satisfaction.       Scribe Attestation      I,:  Augustus Spain MA am acting as a scribe while in the presence of the attending physician.:       I,:  Zack Montenegro MD personally performed the services described in this documentation    as scribed in my presence.:               Subjective:   Edgar Obando is a 69 y.o. male who presents for follow up of right knee.  He has been falling recently with history of cervical myelopathy and cervical surgery with .  Today he complains of increased right generalized knee pain.  Most activity aggravates while rest alleviates.  He does use walker for ambulation.        Review of systems negative unless otherwise specified in HPI    Past Medical History:   Diagnosis Date    Acid reflux     Acute blood loss anemia 07/15/2016    Anxiety     Arthritis     Cellulitis     left ankle    CPAP (continuous positive airway pressure) dependence     as needed per patient    Diabetes mellitus (HCC)     Hypertension     Idiopathic chronic venous hypertension of right lower extremity with ulcer (HCC) 06/12/2023    Sleep apnea     Twitching        Past Surgical History:   Procedure Laterality Date    APPENDECTOMY      CERVICAL FUSION Bilateral 5/2/2024    Procedure: FUSION CERVICAL ANTERIOR W DISCECTOMY, C3-7 ACDF;  Surgeon: Conrad Richardson MD;  [facility];  Service: Orthopedics    CHOLECYSTECTOMY      COLONOSCOPY      IR PICC PLACEMENT DOUBLE LUMEN  5/2/2024    KNEE ARTHROSCOPY       IN ARTHRP KNE CONDYLE&PLATU MEDIAL&LAT COMPARTMENTS Left 2016    Procedure: TOTAL  KNEE REPLACEMENT ;  Surgeon: Zack Montenegro MD;  Location[facility];  Service: Orthopedics       Family History   Problem Relation Age of Onset    Diabetes Mother     Diabetes Father     Cancer Father        Social History     Occupational History    Occupation: Attendance officer    Occupation: Retired-    Tobacco Use    Smoking status: Former     Current packs/day: 0.00     Types: Cigarettes     Quit date:      Years since quittin.0    Smokeless tobacco: Never   Vaping Use    Vaping status: Never Used   Substance and Sexual Activity    Alcohol use: Not Currently    Drug use: Not Currently     Comment: tried multiple drugs in the past    Sexual activity: Not Currently         Current Outpatient Medications:     lidocaine (LIDODERM) 5 %, Place 1 patch on the skin every 24 hours, Disp: , Rfl:     methocarbamol (ROBAXIN) 500 mg tablet, Take 500 mg by mouth 4 (four) times a day as needed, Disp: , Rfl:     acetaminophen (TYLENOL) 650 mg CR tablet, Take 1 tablet (650 mg total) by mouth every 8 (eight) hours as needed for mild pain, Disp: 30 tablet, Rfl: 0    albuterol (PROVENTIL HFA,VENTOLIN HFA) 90 mcg/act inhaler, Inhale 2 puffs every 6 (six) hours as needed for wheezing As needed, Disp: , Rfl:     aspirin (ECOTRIN LOW STRENGTH) 81 mg EC tablet, Take 81 mg by mouth daily, Disp: , Rfl:     b complex vitamins capsule, Take 1 capsule by mouth daily, Disp: , Rfl:     Blood Glucose Monitoring Suppl (ONE TOUCH ULTRA 2) w/Device KIT, USE TO TEST BLOOD GLUCOSE, Disp: , Rfl:     budesonide (RINOCORT AQUA) 32 MCG/ACT nasal spray, 1 spray into each nostril 2 (two) times a day As needed (Patient not taking: Reported on 2024), Disp: , Rfl:     busPIRone (BUSPAR) 10 mg tablet, TAKE 1 TABLET (10 MG TOTAL) BY MOUTH TWO (TWO) TIMES A DAY, Disp: 180 tablet, Rfl: 1    docusate sodium (COLACE) 100 mg capsule, Take  100 mg by mouth 2 (two) times a day, Disp: , Rfl:     fluticasone (FLOVENT HFA) 220 mcg/act inhaler, Inhale 1 puff daily as needed As needed, Disp: , Rfl:     furosemide (LASIX) 40 mg tablet, Take 40 mg by mouth as needed in the morning and 40 mg as needed in the evening., Disp: , Rfl:     gabapentin (NEURONTIN) 100 mg capsule, Take 1 capsule (100 mg total) by mouth 3 (three) times a day (Patient not taking: Reported on 12/17/2024), Disp: , Rfl:     Lancets 30G MISC, 3 (three) times a day Use to test blood glucose, Disp: , Rfl:     losartan (COZAAR) 25 mg tablet, Take 1 tablet (25 mg total) by mouth daily, Disp: 30 tablet, Rfl: 0    lovastatin (MEVACOR) 10 MG tablet, Take 10 mg by mouth daily at bedtime, Disp: , Rfl:     Multiple Vitamins-Minerals (multivitamin with minerals) tablet, Take 1 tablet by mouth daily, Disp: , Rfl:     mupirocin (BACTROBAN) 2 % ointment, , Disp: , Rfl:     naloxone (NARCAN) 4 mg/0.1 mL nasal spray, Administer 1 spray into a nostril. If no response after 2-3 minutes, give another dose in the other nostril using a new spray., Disp: 1 each, Rfl: 1    OneTouch Ultra test strip, USE TO TEST BLOOD GLUCOSE TWICE DAILY, Disp: , Rfl:     pantoprazole (PROTONIX) 40 mg tablet, Take 40 mg by mouth daily, Disp: , Rfl:     potassium chloride (K-DUR,KLOR-CON) 10 mEq tablet, , Disp: , Rfl:     semaglutide, 0.25 or 0.5 mg/dose, (Ozempic) 2 mg/1.5 mL injection pen, Inject 0.5 mg under the skin every 7 days, Disp: , Rfl:     tamsulosin (FLOMAX) 0.4 mg, TAKE 1 CAPSULE BY MOUTH EVERY DAY WITH DINNER, Disp: 90 capsule, Rfl: 2    testosterone (ANDROGEL) 1.62 % TD gel pump, , Disp: , Rfl:     tiZANidine (ZANAFLEX) 2 mg tablet, Take 1 tablet (2 mg total) by mouth 2 (two) times a day as needed for muscle spasms May make you drowsy. Do not drive until you know how it affects you. Do not take any other muscle relaxers while taking this medication, Disp: 30 tablet, Rfl: 0    traZODone (DESYREL) 100 mg tablet, , Disp:  ", Rfl:     Trulicity 4.5 MG/0.5ML SOPN, every 7 days On Mondays (Patient not taking: Reported on 12/17/2024), Disp: , Rfl:     zinc gluconate 50 mg tablet, Take 50 mg by mouth in the morning., Disp: , Rfl:     No Known Allergies       There were no vitals filed for this visit.    Objective:  Physical exam  General: Awake, Alert, Oriented  Eyes: Pupils equal, round and reactive to light  Heart: regular rate and rhythm  Lungs: No audible wheezing  Abdomen: soft                    Ortho Exam  Right knee:  Patient uses walker for ambulation  No erythema or ecchymosis  No effusion or swelling  Normal strength  Good ROM with crepitus   Calf compartments soft and supple  Sensation intact  Toes are warm sensate and mobile    Cervical spine:  Positive right Hoffmans  Negative left Hoffmans       Diagnostics, reviewed and taken today if performed as documented:    None performed     Procedures, if performed today:    Large joint arthrocentesis: R knee  Universal Protocol:  Consent: Verbal consent obtained.  Risks and benefits: risks, benefits and alternatives were discussed  Consent given by: patient  Time out: Immediately prior to procedure a \"time out\" was called to verify the correct patient, procedure, equipment, support staff and site/side marked as required.  Timeout called at: 1/9/2025 2:53 PM.  Patient understanding: patient states understanding of the procedure being performed  Site marked: the operative site was marked  Patient identity confirmed: verbally with patient  Supporting Documentation  Indications: pain   Procedure Details  Location: knee - R knee  Preparation: Patient was prepped and draped in the usual sterile fashion  Needle size: 22 G  Ultrasound guidance: no  Approach: anterolateral  Medications administered: 12 mg betamethasone acetate-betamethasone sodium phosphate 6 (3-3) mg/mL; 2 mL bupivacaine 0.25 %; 2 mL lidocaine 1 %    Patient tolerance: patient tolerated the procedure well with no immediate " "complications  Dressing:  Sterile dressing applied             Portions of the record may have been created with voice recognition software.  Occasional wrong word or \"sound a like\" substitutions may have occurred due to the inherent limitations of voice recognition software.  Read the chart carefully and recognize, using context, where substitutions have occurred.    "

## 2025-01-13 ENCOUNTER — HOSPITAL ENCOUNTER (OUTPATIENT)
Dept: RADIOLOGY | Facility: HOSPITAL | Age: 70
Discharge: HOME | End: 2025-01-13
Payer: COMMERCIAL

## 2025-01-13 DIAGNOSIS — J98.6 ELEVATED HEMIDIAPHRAGM: ICD-10-CM

## 2025-01-13 PROCEDURE — 76000 FLUOROSCOPY <1 HR PHYS/QHP: CPT

## 2025-01-14 ENCOUNTER — OFFICE VISIT (OUTPATIENT)
Dept: NEUROLOGY | Facility: CLINIC | Age: 70
End: 2025-01-14
Payer: COMMERCIAL

## 2025-01-14 VITALS
WEIGHT: 230.2 LBS | BODY MASS INDEX: 37 KG/M2 | SYSTOLIC BLOOD PRESSURE: 120 MMHG | HEART RATE: 76 BPM | OXYGEN SATURATION: 97 % | DIASTOLIC BLOOD PRESSURE: 62 MMHG | HEIGHT: 66 IN

## 2025-01-14 DIAGNOSIS — R25.1 TREMOR: ICD-10-CM

## 2025-01-14 DIAGNOSIS — G62.9 POLYNEUROPATHY: Primary | ICD-10-CM

## 2025-01-14 PROCEDURE — 99215 OFFICE O/P EST HI 40 MIN: CPT | Performed by: STUDENT IN AN ORGANIZED HEALTH CARE EDUCATION/TRAINING PROGRAM

## 2025-01-14 PROCEDURE — 99417 PROLNG OP E/M EACH 15 MIN: CPT | Performed by: STUDENT IN AN ORGANIZED HEALTH CARE EDUCATION/TRAINING PROGRAM

## 2025-01-14 NOTE — PROGRESS NOTES
Neurology Ambulatory Visit  Name: Edgar Obando       : 1955       MRN: [id number]   Encounter Provider: Nga Goel MD   Encounter Date: 2025  Encounter department: [facility]    Edgar Obando is a 69 y.o. male with PMH of cervical stenosis s/p cervical fusion, DMII, SUSY not on CPAP who presents for imbalance and tremors. His imbalance is likely a combination of diabetic neuropathy and cervical myelopathy. As far as his tremors, he has an irregular postural tremor of unclear etiology. He does not wish to be treated with medications at this time.     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   - has stopped gabapentin (which can worsen myoclonus)       RTC PRN       HISTORY OF PRESENT ILLNESS    Per my chart review:   He was admitted from 24 to 12/15/24 with lethargy and tremors. He was found to have an LAKHWINDER. He also presented to the ED on  after a fall and found to have an O2 of 60%, in the setting of noncompliance with home O2. His wife shows me a video in which she is having multifocal myoclonic jerks mostly of his trunk. He can have tremors in his whole body.     Per patient:  He has had tremors for the last few years. They are intermittent. His wife says he jerks in his sleep. He has been worked up in the past for seizures. His wife shows me a video in which he has moderate amplitude jerking movements of his trunks and arms.     He was started on oxygen recently due to hypoxia from pneumonia.     He gets numbness in his hands and feet. It feels like his feet are in water. Once in a while there is a shooting pain. He previously took gabapentin and it didn't help, he stopped taking it 2 days ago.     He has fallen twice in 2 months. Usually he trips over something. He ambulates with a  walker. He goes to physical therapy which helps.     Regarding his cervical fusion, he was dropping things with both hands and decided to have surgery in May. He says since then his dexterity has improved.       Prior Work-up:   MRI cervical spine wwo (8/8/23)  1.  There is no enhancement associated to the T2 cord signal abnormality at level C6 seen on recent MRIs favoring myelomalacia related signal change.  2.  Advanced degenerative change, evaluated in details in prior MRI.  MRI cervical spine wo (8/3/23)  C2-C3: Central disc protrusion. Mild uncovertebral spurring. Mild canal stenosis. No neural foraminal narrowing.  C3-C4: Disc bulge and superimposed central disc protrusion with marginal osteophytes resulting in severe canal stenosis. Bilateral uncovertebral spurring. Moderate to severe bilateral foraminal narrowing.  C4-C5: Central disc protrusion resulting in severe canal stenosis. Bilateral uncovertebral spurring. Moderate to severe right and moderate left foraminal narrowing.  C5-C6: Disc osteophyte complex resulting in moderate canal stenosis. Bilateral uncovertebral spurring resulting in moderate to severe left and moderate right foraminal stenosis.  C6-C7: Disc osteophyte complex. Bilateral uncovertebral spurring. There is moderate to severe canal stenosis. Mild bilateral foraminal stenosis.  C7-T1: No significant disc bulge. No canal or foraminal stenosis.  MRI lumbar spine 6/27/23  L1-L2: There is bilateral facet hypertrophy. There is a small left neural foraminal disc protrusion. Mild left neural foraminal narrowing. Central canal right neural foramen patent. This level is similar to the prior study.    L2-L3: There is bilateral facet hypertrophy. There is loss of disc height and signal. There is a left neural foraminal disc protrusion. Mild central canal and left neural foraminal narrowing. Right neural foramen patent. Spondylotic narrowing is   progressed from the prior study.    L3-L4: There is  loss of disc height and signal there is a central/left paracentral disc herniation, protrusion type. Severe central canal and left neural foraminal narrowing. Severe right neural foraminal narrowing. The size of the disc herniation is   slightly smaller than on prior study although the degree of associated tricompartmental narrowing is very similar.    L4-L5: There is infolding of ligamentum flavum. There is bilateral facet hypertrophy. There is a right neural foraminal disc protrusion. Severe tricompartmental narrowing. Questionable synovial cyst projecting medially from the right facet on series 7,   image 18. Spondylotic narrowing has progressed slightly from the prior study.    L5-S1: There is uncovering the intervertebral disc space. There is bilateral facet hypertrophy. There is a right neural foraminal disc protrusion. Severe right neural foraminal narrowing. Moderate central canal narrowing. Moderate left neural foraminal   narrowing. This level is similar to the prior study.  MRI brain 7/10/23  No acute disease. No pathologic enhancement. Footprint of mild degree of chronic small vessel disease within the lalo and supratentorial parenchyma.     Past Medical History:    Past Medical History:   Diagnosis Date    Acid reflux     Acute blood loss anemia 07/15/2016    Anxiety     Arthritis     Cellulitis     left ankle    CPAP (continuous positive airway pressure) dependence     as needed per patient    Diabetes mellitus (HCC)     Hypertension     Idiopathic chronic venous hypertension of right lower extremity with ulcer (HCC) 06/12/2023    Sleep apnea     Twitching      Past Surgical History:   Procedure Laterality Date    APPENDECTOMY      CERVICAL FUSION Bilateral 5/2/2024    Procedure: FUSION CERVICAL ANTERIOR W DISCECTOMY, C3-7 ACDF;  Surgeon: Conrad Richardson MD;  [facility];  Service: Orthopedics    CHOLECYSTECTOMY      COLONOSCOPY      IR PICC PLACEMENT DOUBLE LUMEN  5/2/2024    KNEE  ARTHROSCOPY      CT ARTHRP KNE CONDYLE&PLATU MEDIAL&LAT COMPARTMENTS Left 2016    Procedure: TOTAL  KNEE REPLACEMENT ;  Surgeon: Zack Montenegro MD;  [facility];  Service: Orthopedics       Family History:  Family History   Problem Relation Age of Onset    Diabetes Mother     Diabetes Father     Cancer Father        Social History:  Social History     Tobacco Use    Smoking status: Former     Current packs/day: 0.00     Types: Cigarettes     Quit date:      Years since quittin.0    Smokeless tobacco: Never   Vaping Use    Vaping status: Never Used   Substance Use Topics    Alcohol use: Not Currently    Drug use: Not Currently     Comment: tried multiple drugs in the past        Allergies:  No Known Allergies    Medications:    Current Outpatient Medications:     acetaminophen (TYLENOL) 650 mg CR tablet, Take 1 tablet (650 mg total) by mouth every 8 (eight) hours as needed for mild pain, Disp: 30 tablet, Rfl: 0    albuterol (PROVENTIL HFA,VENTOLIN HFA) 90 mcg/act inhaler, Inhale 2 puffs every 6 (six) hours as needed for wheezing As needed, Disp: , Rfl:     aspirin (ECOTRIN LOW STRENGTH) 81 mg EC tablet, Take 81 mg by mouth daily, Disp: , Rfl:     b complex vitamins capsule, Take 1 capsule by mouth daily, Disp: , Rfl:     Blood Glucose Monitoring Suppl (ONE TOUCH ULTRA 2) w/Device KIT, USE TO TEST BLOOD GLUCOSE, Disp: , Rfl:     busPIRone (BUSPAR) 10 mg tablet, TAKE 1 TABLET (10 MG TOTAL) BY MOUTH TWO (TWO) TIMES A DAY, Disp: 180 tablet, Rfl: 1    docusate sodium (COLACE) 100 mg capsule, Take 100 mg by mouth 2 (two) times a day, Disp: , Rfl:     fluticasone (FLOVENT HFA) 220 mcg/act inhaler, Inhale 1 puff daily as needed As needed, Disp: , Rfl:     furosemide (LASIX) 40 mg tablet, Take 40 mg by mouth as needed in the morning and 40 mg as needed in the evening., Disp: , Rfl:     Lancets 30G MISC, 3 (three) times a day Use to test blood glucose, Disp: , Rfl:     lidocaine (LIDODERM) 5 %, Place 1  patch on the skin every 24 hours, Disp: , Rfl:     losartan (COZAAR) 25 mg tablet, Take 1 tablet (25 mg total) by mouth daily, Disp: 30 tablet, Rfl: 0    lovastatin (MEVACOR) 10 MG tablet, Take 10 mg by mouth daily at bedtime, Disp: , Rfl:     methocarbamol (ROBAXIN) 500 mg tablet, Take 500 mg by mouth 4 (four) times a day as needed, Disp: , Rfl:     Multiple Vitamins-Minerals (multivitamin with minerals) tablet, Take 1 tablet by mouth daily, Disp: , Rfl:     mupirocin (BACTROBAN) 2 % ointment, , Disp: , Rfl:     naloxone (NARCAN) 4 mg/0.1 mL nasal spray, Administer 1 spray into a nostril. If no response after 2-3 minutes, give another dose in the other nostril using a new spray., Disp: 1 each, Rfl: 1    OneTouch Ultra test strip, USE TO TEST BLOOD GLUCOSE TWICE DAILY, Disp: , Rfl:     pantoprazole (PROTONIX) 40 mg tablet, Take 40 mg by mouth daily, Disp: , Rfl:     potassium chloride (K-DUR,KLOR-CON) 10 mEq tablet, , Disp: , Rfl:     semaglutide, 0.25 or 0.5 mg/dose, (Ozempic) 2 mg/1.5 mL injection pen, Inject 0.5 mg under the skin every 7 days, Disp: , Rfl:     tamsulosin (FLOMAX) 0.4 mg, TAKE 1 CAPSULE BY MOUTH EVERY DAY WITH DINNER, Disp: 90 capsule, Rfl: 2    testosterone (ANDROGEL) 1.62 % TD gel pump, , Disp: , Rfl:     tiZANidine (ZANAFLEX) 2 mg tablet, Take 1 tablet (2 mg total) by mouth 2 (two) times a day as needed for muscle spasms May make you drowsy. Do not drive until you know how it affects you. Do not take any other muscle relaxers while taking this medication, Disp: 30 tablet, Rfl: 0    traZODone (DESYREL) 100 mg tablet, , Disp: , Rfl:     zinc gluconate 50 mg tablet, Take 50 mg by mouth in the morning., Disp: , Rfl:     budesonide (RINOCORT AQUA) 32 MCG/ACT nasal spray, 1 spray into each nostril 2 (two) times a day As needed (Patient not taking: Reported on 12/17/2024), Disp: , Rfl:     gabapentin (NEURONTIN) 100 mg capsule, Take 1 capsule (100 mg total) by mouth 3 (three) times a day (Patient not  "taking: Reported on 1/14/2025), Disp: , Rfl:       OBJECTIVE  /62 (BP Location: Left arm, Patient Position: Sitting, Cuff Size: Large)   Pulse 76   Ht 5' 6\" (1.676 m)   Wt 104 kg (230 lb 3.2 oz)   SpO2 97%   BMI 37.16 kg/m²      Labs  I have reviewed pertinent labs:  CBC:   Lab Results   Component Value Date    WBC 9.18 12/29/2024    RBC 3.87 (L) 12/29/2024    HGB 11.4 (L) 12/29/2024    HCT 37.1 12/29/2024    MCV 96 12/29/2024     12/29/2024    MCH 29.5 12/29/2024    MCHC 30.7 (L) 12/29/2024    RDW 14.1 12/29/2024    MPV 10.4 12/29/2024    NEUTROABS 7.20 12/29/2024     CMP:   Lab Results   Component Value Date    SODIUM 140 01/04/2025    K 4.3 01/04/2025     01/04/2025    CO2 35 (H) 01/04/2025    AGAP 5 01/04/2025    BUN 12 01/04/2025    CREATININE 0.94 01/04/2025    GLUC 107 (H) 01/04/2025    GLUF 101 (H) 09/14/2023    CALCIUM 10.0 01/04/2025    AST 18 01/04/2025    ALT 15 01/04/2025    ALKPHOS 76 01/04/2025    TP 7.5 01/04/2025    ALB 3.6 01/04/2025    TBILI 0.7 01/04/2025    EGFR 87 01/04/2025       Lab Results   Component Value Date/Time    YOVBVEQU59 911 10/12/2023 10:43 AM    FLBN75XVPZLX 53.6 02/08/2021 02:32 PM    TSH 2.50 10/12/2023 10:43 AM    XRA8FQUTYIYW 1.254 12/12/2024 02:53 PM    HGBA1C 6.1 (H) 12/03/2024 05:30 AM    HGBA1C 5.9 (H) 01/26/2023 10:17 AM    SPEP See Comment 08/10/2022 10:45 AM    FOLATE >22.3 10/12/2023 10:43 AM    TU Positive (A) 09/12/2018 08:36 AM    RF Negative 09/12/2018 08:36 AM    CRP 9.9 (H) 01/07/2018 04:42 PM    ESR 28 (H) 01/07/2018 05:10 PM    SSAAB <0.2 09/12/2018 08:36 AM    SSBAB 0.3 09/12/2018 08:36 AM    AMMONIA 16 09/12/2018 08:36 AM            General Exam  GENERAL APPEARANCE:  No distress, alert, interactive and cooperative.  CARDIOVASCULAR: Warm and well perfused  LUNGS: normal work of breathing on room air  EXTREMITIES: no peripheral edema     Neurologic Exam  MENTAL STATE:  Orientation was normal to time, place and person. Recent and remote " memory was intact.  Attention span and concentration were normal. Language testing was normal for fluency and comprehension.      CRANIAL NERVES:  CN 3, 4, 6  Pupils round, 4 mm in diameter, equally reactive to light. Lids symmetric; no ptosis. EOMs normal alignment, full range. No nystagmus.  CN 5  Facial sensation intact bilaterally.  CN 7  Normal and symmetric facial strength.   CN 8  Hearing intact to conversation.  CN 9  Palate elevates symmetrically.  CN 11  Normal strength of shoulder shrug and neck turning.  CN 12  Tongue midline, with normal bulk and strength.     MOTOR:  Increased tone in upper extremities. Muscle strength was 5/5 in distal and proximal muscles in both upper and lower extremities. Jerky irregular low amplitude tremor noted with both hands outstretched.      REFLEXES:  RIGHT UE   LEFT UE  BR:2              BR:2    Biceps:2      Biceps:2    Triceps:2     Triceps:2       RIGHT LLE   LEFT LLE    Knee:0           Knee:0    Ankle:0         Ankle:0    No clonus.     SENSORY:  Decreased pinprick and vibration up to just above ankles bilaterally. Decreased pinprick and vibration in fingers.      COORDINATION:   In both upper extremities, finger-nose-finger was intact without dysmetria or overshoot. In both lower extremities, heel-to-shin was intact.     GAIT:  Station was unstable and slow. Unable to perform tandem.     Administrative Statements   The total amount of time spent with the patient and on chart review and documentation was 55 minutes. Issues addressed during this visit included counseling on diagnosis and prognosis, counseling on medical management, counseling on medication side effects, discussion of exam findings, and discussion of imaging findings.

## 2025-01-22 ENCOUNTER — OFFICE VISIT (OUTPATIENT)
Dept: PULMONOLOGY | Facility: CLINIC | Age: 70
End: 2025-01-22
Payer: COMMERCIAL

## 2025-01-22 VITALS
TEMPERATURE: 96.7 F | HEART RATE: 74 BPM | DIASTOLIC BLOOD PRESSURE: 58 MMHG | OXYGEN SATURATION: 96 % | SYSTOLIC BLOOD PRESSURE: 112 MMHG

## 2025-01-22 DIAGNOSIS — G47.33 OSA (OBSTRUCTIVE SLEEP APNEA): ICD-10-CM

## 2025-01-22 DIAGNOSIS — J96.12 CHRONIC RESPIRATORY FAILURE WITH HYPERCAPNIA (HCC): ICD-10-CM

## 2025-01-22 DIAGNOSIS — I50.32 CHRONIC DIASTOLIC (CONGESTIVE) HEART FAILURE (HCC): ICD-10-CM

## 2025-01-22 DIAGNOSIS — J18.9 PNEUMONIA OF RIGHT LUNG DUE TO INFECTIOUS ORGANISM, UNSPECIFIED PART OF LUNG: ICD-10-CM

## 2025-01-22 DIAGNOSIS — J96.11 CHRONIC HYPOXIC RESPIRATORY FAILURE (HCC): Primary | ICD-10-CM

## 2025-01-22 PROCEDURE — 99214 OFFICE O/P EST MOD 30 MIN: CPT | Performed by: STUDENT IN AN ORGANIZED HEALTH CARE EDUCATION/TRAINING PROGRAM

## 2025-01-22 PROCEDURE — 94618 PULMONARY STRESS TESTING: CPT

## 2025-01-22 NOTE — PROGRESS NOTES
Pulmonary Follow Up Note   Edgar Obando 69 y.o. male MRN: [id number]  1/22/2025      Assessment/Plan:  Hypoxic respiratory failure- resolved   Pneumonia   Patient with recent hospitalization and treatment for pneumonia with antibiotic therapy. Recent imaging showing improving right sided patchy infiltrates. Persistent low lung volumes and left sided hemidiaphragm elevation without paralysis noted on sniff testing. Patient reports improvement in symptoms of shortness of breath. POCT oxygen titration showing SpO2 89% on RA with exertion.   - Given borderline oxygen saturation on exertion, patient may utilize supplemental oxygen on exertion as needed. Will reassess oxygen titration testing at follow up appointment prior to discontinuation of supplemental oxygen  - Complete PFT ordered   - Will follow up repeat chest x-ray prior to next appointment     Elevated left hemidiaphragm   Left sided hemidiaphragm elevation without paralysis noted on sniff testing  - Pulmonary function testing as above     Chronic hypercapnic respiratory failure  SUSY, severe  Patient with history of chronic hypercapnic respiratory failure and SUSY. He has been on CPAP therapy, 14 cm H2O, however, he has not been using his CPAP machine routinely secondary to mask discomfort. Previously followed with Dr. Malik and is due to re-establish care with sleep medicine   - Discussed importance of compliance with CPAP device  - Encourage patient to schedule follow up appointment with sleep medicine   - Will order Mask fit testing in the mean time to see if this assists with CPAP compliance     Follow up already scheduled for 4/16    History of Present Illness   HPI:  Edgar Obando is a 69 y.o. male with SUSY, left hemidiaphragm elevation, chronic hypercapnic respiratory failure who presents for a follow up appointment. Last seen in our office by Dr. Brock on 12/17/2024 for hospital follow up appointment in the setting of pneumonia. Since this appointment,  patient has had 2 ED visits on 12/29 and 1/4. He sustained a fall at home on 12/29 and [facility] for [facility]. Per EMS, patient was found at home with SpO2 in the 60s and placed on non-rebreather, though in the emergency department patient was saturating appropriately on 2L NC (home dose since PNA diagnosis). He had normal CT head. Chest x-ray showing improved patchy right lung opacities with persistent left hemidiaphragm elevation and low lung volumes. Labs were notable for hypercapnia (pH 7.20, pCO2 82.7, pO2 34.9, HCO3 31.7). He was sent home with instruction to wear his CPAP at night. He represented to [facility] ED as he developed worsening pain. A CT chest/abdomen/pelvis was completed which showed improving right lung opacities.     Sniff testing completed 1/13 showing no evidence of diaphragmatic paralysis, persistent elevated left hemidiaphragm. He has PFT scheduled but not completed. He over all reports feeling better from a respiratory standpoint. He has been working with physical therapy at home. He has overall improvement in his shortness of breath. He wears his oxygen most of the day. He checks his home oxygen levels and they range from 92-98%. He is wondering if he can discontinue the use of his oxygen. When discussing his CPAP, he notes that his mask is uncomfortable and he has trouble wearing it at night. He notes only using his CPAP for short periods of time on 3 times weekly. He states that he is to follow up with sleep medicine but has mot scheduled his appointment     Review of systems:  12 point review of systems was completed and was otherwise negative except as listed in HPI.    Historical Information   Past Medical History:   Diagnosis Date   • Acid reflux    • Acute blood loss anemia 07/15/2016   • Anxiety    • Arthritis    • Cellulitis     left ankle   • CPAP (continuous positive airway pressure) dependence     as needed per patient   • Diabetes mellitus (HCC)    • Hypertension    •  Idiopathic chronic venous hypertension of right lower extremity with ulcer (HCC) 2023   • Sleep apnea    • Twitching      Past Surgical History:   Procedure Laterality Date   • APPENDECTOMY     • CERVICAL FUSION Bilateral 2024    Procedure: FUSION CERVICAL ANTERIOR W DISCECTOMY, C3-7 ACDF;  Surgeon: Conrad Richardson MD;  [facility];  Service: Orthopedics   • CHOLECYSTECTOMY     • COLONOSCOPY     • IR PICC PLACEMENT DOUBLE LUMEN  2024   • KNEE ARTHROSCOPY     • CO ARTHRP KNE CONDYLE&PLATU MEDIAL&LAT COMPARTMENTS Left 2016    Procedure: TOTAL  KNEE REPLACEMENT ;  Surgeon: Zack Montenegro MD;  [facility];  Service: Orthopedics     Family History   Problem Relation Age of Onset   • Diabetes Mother    • Diabetes Father    • Cancer Father      Social History     Tobacco Use   Smoking Status Former   • Current packs/day: 0.00   • Types: Cigarettes   • Quit date:    • Years since quittin.0   Smokeless Tobacco Never       Meds/Allergies     Current Outpatient Medications:   •  acetaminophen (TYLENOL) 650 mg CR tablet, Take 1 tablet (650 mg total) by mouth every 8 (eight) hours as needed for mild pain, Disp: 30 tablet, Rfl: 0  •  albuterol (PROVENTIL HFA,VENTOLIN HFA) 90 mcg/act inhaler, Inhale 2 puffs every 6 (six) hours as needed for wheezing As needed, Disp: , Rfl:   •  aspirin (ECOTRIN LOW STRENGTH) 81 mg EC tablet, Take 81 mg by mouth daily, Disp: , Rfl:   •  b complex vitamins capsule, Take 1 capsule by mouth daily, Disp: , Rfl:   •  Blood Glucose Monitoring Suppl (ONE TOUCH ULTRA 2) w/Device KIT, USE TO TEST BLOOD GLUCOSE, Disp: , Rfl:   •  busPIRone (BUSPAR) 10 mg tablet, TAKE 1 TABLET (10 MG TOTAL) BY MOUTH TWO (TWO) TIMES A DAY, Disp: 180 tablet, Rfl: 1  •  docusate sodium (COLACE) 100 mg capsule, Take 100 mg by mouth 2 (two) times a day, Disp: , Rfl:   •  fluticasone (FLOVENT HFA) 220 mcg/act inhaler, Inhale 1 puff daily as needed As needed, Disp: , Rfl:   •   furosemide (LASIX) 40 mg tablet, Take 40 mg by mouth as needed in the morning and 40 mg as needed in the evening., Disp: , Rfl:   •  Lancets 30G MISC, 3 (three) times a day Use to test blood glucose, Disp: , Rfl:   •  lidocaine (LIDODERM) 5 %, Place 1 patch on the skin every 24 hours, Disp: , Rfl:   •  losartan (COZAAR) 25 mg tablet, Take 1 tablet (25 mg total) by mouth daily, Disp: 30 tablet, Rfl: 0  •  lovastatin (MEVACOR) 10 MG tablet, Take 10 mg by mouth daily at bedtime, Disp: , Rfl:   •  Multiple Vitamins-Minerals (multivitamin with minerals) tablet, Take 1 tablet by mouth daily, Disp: , Rfl:   •  mupirocin (BACTROBAN) 2 % ointment, , Disp: , Rfl:   •  naloxone (NARCAN) 4 mg/0.1 mL nasal spray, Administer 1 spray into a nostril. If no response after 2-3 minutes, give another dose in the other nostril using a new spray., Disp: 1 each, Rfl: 1  •  OneTouch Ultra test strip, USE TO TEST BLOOD GLUCOSE TWICE DAILY, Disp: , Rfl:   •  pantoprazole (PROTONIX) 40 mg tablet, Take 40 mg by mouth daily, Disp: , Rfl:   •  potassium chloride (K-DUR,KLOR-CON) 10 mEq tablet, , Disp: , Rfl:   •  semaglutide, 0.25 or 0.5 mg/dose, (Ozempic) 2 mg/1.5 mL injection pen, Inject 0.5 mg under the skin every 7 days, Disp: , Rfl:   •  tamsulosin (FLOMAX) 0.4 mg, TAKE 1 CAPSULE BY MOUTH EVERY DAY WITH DINNER, Disp: 90 capsule, Rfl: 2  •  testosterone (ANDROGEL) 1.62 % TD gel pump, , Disp: , Rfl:   •  tiZANidine (ZANAFLEX) 2 mg tablet, Take 1 tablet (2 mg total) by mouth 2 (two) times a day as needed for muscle spasms May make you drowsy. Do not drive until you know how it affects you. Do not take any other muscle relaxers while taking this medication, Disp: 30 tablet, Rfl: 0  •  traZODone (DESYREL) 100 mg tablet, , Disp: , Rfl:   •  zinc gluconate 50 mg tablet, Take 50 mg by mouth in the morning., Disp: , Rfl:   •  budesonide (RINOCORT AQUA) 32 MCG/ACT nasal spray, 1 spray into each nostril 2 (two) times a day As needed (Patient not  "taking: Reported on 12/17/2024), Disp: , Rfl:   •  gabapentin (NEURONTIN) 100 mg capsule, Take 1 capsule (100 mg total) by mouth 3 (three) times a day (Patient not taking: Reported on 12/17/2024), Disp: , Rfl:   No Known Allergies    Vitals: Blood pressure 112/58, pulse 74, temperature (!) 96.7 °F (35.9 °C), temperature source Tympanic Core, SpO2 96%. There is no height or weight on file to calculate BMI. Oxygen Therapy  SpO2: 96 %  Oxygen Therapy: Supplemental oxygen  O2 Delivery Method: Nasal cannula  O2 Flow Rate (L/min): 2 L/min      Physical Exam  General: No acute distress  HENT: Normocephalic, atraumatic.  PERRL, EOMI, Moist mucous membranes.  Neck: Supple  Lungs: Clear to auscultation bilaterally, no wheezes, rales, rhonchi.  On room air  Heart: Regular rate and rhythm, S1-S2 present, no murmurs rubs or gallops  Extremities: Warm and well perfused, no cyanosis, clubbing, or edema  Skin: Warm, dry, no rashes or lesions  Neurologic: No focal neurologic deficits    Labs: I have personally reviewed pertinent lab results.  Lab Results   Component Value Date    WBC 9.18 12/29/2024    HGB 11.4 (L) 12/29/2024    HCT 37.1 12/29/2024    MCV 96 12/29/2024     12/29/2024     Lab Results   Component Value Date    GLUCOSE 122 05/02/2024    CALCIUM 10.0 01/04/2025     10/04/2014    K 4.3 01/04/2025    CO2 35 (H) 01/04/2025     01/04/2025    BUN 12 01/04/2025    CREATININE 0.94 01/04/2025     No results found for: \"IGE\"  Lab Results   Component Value Date    ALT 15 01/04/2025    AST 18 01/04/2025    ALKPHOS 76 01/04/2025    BILITOT 0.76 10/04/2014       Imaging and other studies:   FL sniff test  Result Date: 1/14/2025  Impression: No evidence of diaphragmatic paralysis. Elevated left hemidiaphragm. Workstation performed: [id number]     CXR 12/2/2024  IMPRESSION:  Hypoinflation limiting evaluation.   Small right pleural effusion and mild bibasilar atelectasis. Scattered hazy pulmonary infiltrates in the " "right upper lobe and right lower lobe are seen.    CXR 12/29  IMPRESSION:  Improved patchy right lung opacities compared to 12/2/2024, possibly related to resolving infectious/inflammatory etiology versus pulmonary edema.    CT chest/abd/pel 1/4  IMPRESSION:   1. No acute traumatic findings in the chest, abdomen or pelvis.   2.  Right lung opacities suspicious for pneumonia, pulmonary edema or   aspiration.      Pulmonary function testing: No prior data    EKG, Pathology, and Other Studies:   TTE 10/30/2024  •  Left Ventricle: Left ventricular cavity size is normal. Wall thickness is mildly increased. The left ventricular ejection fraction is 60%. Systolic function is normal. Although no diagnostic regional wall motion abnormality was identified, this possibility cannot be completely excluded on the basis of this study. Diastolic function is normal.  •  Right Ventricle: Right ventricular cavity size is normal. Systolic function is normal.  •  Left Atrium: The atrium is mildly dilated.  •  Aortic Valve: There is aortic valve sclerosis.  •  Mitral Valve: There is mild annular calcification.  •  Tricuspid Valve: There is mild regurgitation.    Disclaimer: Portions of the record may have been created with voice recognition software. Occasional wrong word or \"sound a like\" substitutions may have occurred due to the inherent limitations of voice recognition software. Careful consideration should be taken to recognize, using context, where substitutions have occurred.    Lester Woods, DO   Pulmonary & Critical Care Medicine Fellow PGY-V  [facility]  "

## 2025-01-22 NOTE — PATIENT INSTRUCTIONS
It was a pleasure seeing you today!    - Repeat chest x-ray in 6-8 weeks   - Mask fit testing ordered   - Please schedule your sleep medicine appointment   - Continue 2 L NC with exertion as needed for now   - Please obtain pulmonary function testing as previously prescribe

## 2025-01-29 ENCOUNTER — HOSPITAL ENCOUNTER (OUTPATIENT)
Dept: RADIOLOGY | Facility: HOSPITAL | Age: 70
Discharge: HOME | End: 2025-01-29
Attending: ORTHOPAEDIC SURGERY
Payer: COMMERCIAL

## 2025-01-29 ENCOUNTER — OFFICE VISIT (OUTPATIENT)
Dept: OBGYN CLINIC | Facility: HOSPITAL | Age: 70
End: 2025-01-29
Payer: COMMERCIAL

## 2025-01-29 VITALS — BODY MASS INDEX: 36.85 KG/M2 | WEIGHT: 229.28 LBS | HEIGHT: 66 IN

## 2025-01-29 DIAGNOSIS — R52 PAIN: Primary | ICD-10-CM

## 2025-01-29 DIAGNOSIS — R52 PAIN: ICD-10-CM

## 2025-01-29 PROCEDURE — 72050 X-RAY EXAM NECK SPINE 4/5VWS: CPT

## 2025-01-29 PROCEDURE — 99212 OFFICE O/P EST SF 10 MIN: CPT | Performed by: ORTHOPAEDIC SURGERY

## 2025-01-29 NOTE — PROGRESS NOTES
Assessment & Plan/Medical Decision Makin y.o. male with status post C3-7 ACDF for cervical myelopathy on 2024        Edgar reports steady improvements postoperatively with improved balance and ambulation, decreased numbness in upper extremities. He is here for evaluation after a recent fall on 2024. He reports pain has improved greatly since the fall and feels like he is almost back to his baseline. He has been compliant with postoperative physical therapy which has been beneficial for cervical range of motion, he is planning to start outpatient physical therapy soon. Continue with physical therapy, range of motion exercises strengthening exercises.     Did previously place an order for speech language pathology given some ongoing swallowing difficulties, he has not scheduled an appointment yet.  Continue medications as needed. Can use heating pad as needed.  Plan for 1 year postop follow-up with x-rays and re-evaluation.     Subjective:      Chief Complaint: s/p C3-7 ACDF for cervical myelopathy    HPI:  Edgar Obando is a 69 y.o. male presenting for follow-up visit.  Patient reports ongoing benefit from cervical surgery with improved ambulatory function and balance, decreased numbness.  He notes his neck pain has been resolved however has developed a left-sided anterior lateral neck pain over the last several weeks.  He denies any radicular symptoms or any new numbness or tingling.  He denies any weakness.  He has been using a cane.  He has been seeing orthopedic joints and considering knee replacement however continues with knee injections at this time.  He reports that his postoperative aphasia has continued to improve but occasionally does have some difficulty swallowing.  He notes some throat discomfort from time to time as well.  He occasionally takes Tylenol for various aches and pains in his neck and bilateral upper and lower extremities.    Update HPI on 2025:  Edgar is here for  follow up. He reports falling on 12/29/2024 for which he was seen in the ED. Had increase in total body pain/spasms, including his neck. Was taking a muscle relaxer and tylenol with some benefit. Notes his pain has improved, however does continue with some neck discomfort/stiffness primarily in the morning. Currently denies new onset of pain or numbness/tingling into his upper extremities. Using walker for ambulation, noting he is using it for caution. Does report some increased tremors in his hands, was present prior to surgery. Has been continuing with at-home physical therapy with benefit. Does wish to transition to outpatient physical therapy in the near future.    Objective:     Family History   Problem Relation Age of Onset    Diabetes Mother     Diabetes Father     Cancer Father        Past Medical History:   Diagnosis Date    Acid reflux     Acute blood loss anemia 07/15/2016    Anxiety     Arthritis     Cellulitis     left ankle    CPAP (continuous positive airway pressure) dependence     as needed per patient    Diabetes mellitus (HCC)     Hypertension     Idiopathic chronic venous hypertension of right lower extremity with ulcer (HCC) 06/12/2023    Sleep apnea     Twitching        Current Outpatient Medications   Medication Sig Dispense Refill    acetaminophen (TYLENOL) 650 mg CR tablet Take 1 tablet (650 mg total) by mouth every 8 (eight) hours as needed for mild pain 30 tablet 0    albuterol (PROVENTIL HFA,VENTOLIN HFA) 90 mcg/act inhaler Inhale 2 puffs every 6 (six) hours as needed for wheezing As needed      aspirin (ECOTRIN LOW STRENGTH) 81 mg EC tablet Take 81 mg by mouth daily      b complex vitamins capsule Take 1 capsule by mouth daily      Blood Glucose Monitoring Suppl (ONE TOUCH ULTRA 2) w/Device KIT USE TO TEST BLOOD GLUCOSE      busPIRone (BUSPAR) 10 mg tablet TAKE 1 TABLET (10 MG TOTAL) BY MOUTH TWO (TWO) TIMES A  tablet 1    docusate sodium (COLACE) 100 mg capsule Take 100 mg by  mouth 2 (two) times a day      fluticasone (FLOVENT HFA) 220 mcg/act inhaler Inhale 1 puff daily as needed As needed      furosemide (LASIX) 40 mg tablet Take 40 mg by mouth as needed in the morning and 40 mg as needed in the evening.      Lancets 30G MISC 3 (three) times a day Use to test blood glucose      lidocaine (LIDODERM) 5 % Place 1 patch on the skin every 24 hours      losartan (COZAAR) 25 mg tablet Take 1 tablet (25 mg total) by mouth daily 30 tablet 0    lovastatin (MEVACOR) 10 MG tablet Take 10 mg by mouth daily at bedtime      Multiple Vitamins-Minerals (multivitamin with minerals) tablet Take 1 tablet by mouth daily      mupirocin (BACTROBAN) 2 % ointment       naloxone (NARCAN) 4 mg/0.1 mL nasal spray Administer 1 spray into a nostril. If no response after 2-3 minutes, give another dose in the other nostril using a new spray. 1 each 1    OneTouch Ultra test strip USE TO TEST BLOOD GLUCOSE TWICE DAILY      pantoprazole (PROTONIX) 40 mg tablet Take 40 mg by mouth daily      potassium chloride (K-DUR,KLOR-CON) 10 mEq tablet       semaglutide, 0.25 or 0.5 mg/dose, (Ozempic) 2 mg/1.5 mL injection pen Inject 0.5 mg under the skin every 7 days      tamsulosin (FLOMAX) 0.4 mg TAKE 1 CAPSULE BY MOUTH EVERY DAY WITH DINNER 90 capsule 2    testosterone (ANDROGEL) 1.62 % TD gel pump       traZODone (DESYREL) 100 mg tablet       zinc gluconate 50 mg tablet Take 50 mg by mouth in the morning.      budesonide (RINOCORT AQUA) 32 MCG/ACT nasal spray 1 spray into each nostril 2 (two) times a day As needed (Patient not taking: Reported on 12/17/2024)      gabapentin (NEURONTIN) 100 mg capsule Take 1 capsule (100 mg total) by mouth 3 (three) times a day (Patient not taking: Reported on 12/17/2024)      tiZANidine (ZANAFLEX) 2 mg tablet Take 1 tablet (2 mg total) by mouth 2 (two) times a day as needed for muscle spasms May make you drowsy. Do not drive until you know how it affects you. Do not take any other muscle  relaxers while taking this medication (Patient not taking: Reported on 2025) 30 tablet 0     No current facility-administered medications for this visit.       Past Surgical History:   Procedure Laterality Date    APPENDECTOMY      CERVICAL FUSION Bilateral 2024    Procedure: FUSION CERVICAL ANTERIOR W DISCECTOMY, C3-7 ACDF;  Surgeon: Conrad Richardson MD;  [facility];  Service: Orthopedics    CHOLECYSTECTOMY      COLONOSCOPY      IR PICC PLACEMENT DOUBLE LUMEN  2024    KNEE ARTHROSCOPY      [place] ARTHRP KNE CONDYLE&PLATU MEDIAL&LAT COMPARTMENTS Left 2016    Procedure: TOTAL  KNEE REPLACEMENT ;  Surgeon: Zack Montenegro MD;  [facility];  Service: Orthopedics       Social History     Socioeconomic History    Marital status: /Civil Union     Spouse name: Nayeli    Number of children: 3    Years of education: BS    Highest education level: Not on file   Occupational History    Occupation: Attendance officer    Occupation: Retired-    Tobacco Use    Smoking status: Former     Current packs/day: 0.00     Types: Cigarettes     Quit date:      Years since quittin.0    Smokeless tobacco: Never   Vaping Use    Vaping status: Never Used   Substance and Sexual Activity    Alcohol use: Not Currently    Drug use: Not Currently     Comment: tried multiple drugs in the past    Sexual activity: Not Currently   Other Topics Concern    Not on file   Social History Narrative    Not on file     Social Drivers of Health     Financial Resource Strain: Low Risk  (2023)    Overall Financial Resource Strain (CARDIA)     Difficulty of Paying Living Expenses: Not hard at all   Food Insecurity: No Food Insecurity (2024)    Nursing - Inadequate Food Risk Classification     Worried About Running Out of Food in the Last Year: Never true     Ran Out of Food in the Last Year: Never true     Ran Out of Food in the Last Year: Never true   Transportation Needs: No  "Transportation Needs (2024)    Nursing - Transportation Risk Classification     Lack of Transportation: Not on file     Lack of Transportation: No   Physical Activity: Not on file   Stress: Not on file   Social Connections: Not on file   Intimate Partner Violence: Unknown (2024)    Nursing IPS     Feels Physically and Emotionally Safe: Not on file     Physically Hurt by Someone: Not on file     Humiliated or Emotionally Abused by Someone: Not on file     Physically Hurt by Someone: No     Hurt or Threatened by Someone: No   Housing Stability: Unknown (2024)    Nursing: Inadequate Housing Risk Classification     Has Housing: Not on file     Worried About Losing Housing: Not on file     Unable to Get Utilities: Not on file     Unable to Pay for Housing in the Last Year: No     Has Housin       No Known Allergies    Review of Systems  General- denies fever/chills  HEENT- denies hearing loss or sore throat  Eyes- denies eye pain or visual disturbances, denies red eyes  Respiratory- denies cough or SOB  Cardio- denies chest pain or palpitations  GI- denies abdominal pain  Endocrine- denies urinary frequency  Urinary- denies pain with urination  Musculoskeletal- Negative except noted above  Skin- denies rashes or wounds  Neurological- denies dizziness or headache  Psychiatric- denies anxiety or difficulty concentrating    Physical Exam  Ht 5' 6\" (1.676 m)   Wt 104 kg (229 lb 4.5 oz)   BMI 37.01 kg/m²     General/Constitutional: No apparent distress: well-nourished and well developed.  Lymphatic: No appreciable lymphadenopathy  Respiratory: Non-labored breathing  Vascular: No edema, swelling or tenderness, except as noted in detailed exam.  Integumentary: No impressive skin lesions present, except as noted in detailed exam.  Psych: Normal mood and affect, oriented to person, place and time.  MSK: normal other than stated in HPI and exam  Gait & balance: no evidence of myelopathic gait, ambulates " "with a Cane    Cervical  spine range of motion:  Limited but intact flexion and extension, rotation approximately 30 degrees right and left  There is no point tenderness with palpation along the posterior cervical, thoracic, lumbar spine.   Well-healed anterior neck surgery site    Neurologic:  Upper Extremity Motor Function    Right  Left    Deltoid  5/5  5/5    Bicep  5/5  5/5    Wrist extension  5/5  5/5    Tricep  5/5  5/5    Finger flexion/  5/5  5/5    Hand intrinsic  5/5  5/5        Sensory: light touch is intact to bilateral upper and lower extremities     Reflexes:  intact     Other tests:  Shoulder: painless active & passive ROM bilateral     Diagnostic Tests   IMAGING: I have personally reviewed the images and these are my findings:  Cervical Spine X-rays from 11/6/24:  C3-7 ACDF construct intact, no signs of hardware failure, stable appearing cervical spondylosis, no changes in adjacent segments    Cervical Spine X-rays from 1/29/2025: C3-7 ACDF construct intact, no signs of hardware failure, stable appearing cervical spondylosis, no changes in adjacent segments    Electronic Medical Records were reviewed including radiology reports     Procedures, if performed today     None performed       Portions of the record may have been created with voice recognition software.  Occasional wrong word or \"sound a like\" substitutions may have occurred due to the inherent limitations of voice recognition software.  Read the chart carefully and recognize, using context, where substitutions have occurred.   "

## 2025-02-06 ENCOUNTER — TELEPHONE (OUTPATIENT)
Age: 70
End: 2025-02-06

## 2025-02-06 DIAGNOSIS — I10 HTN (HYPERTENSION): ICD-10-CM

## 2025-02-06 RX ORDER — LOSARTAN POTASSIUM 25 MG/1
25 TABLET ORAL DAILY
Qty: 90 TABLET | Refills: 1 | Status: SHIPPED | OUTPATIENT
Start: 2025-02-06

## 2025-02-06 NOTE — TELEPHONE ENCOUNTER
Received call from ANTONIO Gill form [facility] to confirm patient medications. Patient reports:   Discontinued clonidine; EMR shows it was dc'd in hospital 12/15.   Decreased dose for losartan from 50 mg to 25 mg. Last order filled was from hospital discharge 12/15 for losartan 25 mg and 30-day supply.  Rn review of patient chart shows hospital admission 12/12-12/15.with TCM OV planned.. Two ER visits since then. Patient needs OV with PCP for evaluation and medication review. RN now sure if patient is cutting losartan 50 mg for 25 mg dose, but no new order has been sent in for this medication. Please follow up with patient for provider response.

## 2025-02-06 NOTE — TELEPHONE ENCOUNTER
Per the cardiology note 3 days ago, he is to take 25 mg of losartan only. No clonidine. Refill sent for losartan 25

## 2025-02-07 ENCOUNTER — RA CDI HCC (OUTPATIENT)
Dept: OTHER | Facility: HOSPITAL | Age: 70
End: 2025-02-07

## 2025-02-07 NOTE — PROGRESS NOTES
HCC coding opportunities          Chart Reviewed number of suggestions sent to Provider: 5     Patients Insurance   I13.0, E66.01, E11.22, E11.51 and E11.59  Medicare Insurance: [employer]na [employer] Advantage

## 2025-02-24 ENCOUNTER — TELEPHONE (OUTPATIENT)
Age: 70
End: 2025-02-24

## 2025-02-24 DIAGNOSIS — J96.11 CHRONIC HYPOXIC RESPIRATORY FAILURE (HCC): Primary | ICD-10-CM

## 2025-02-24 NOTE — TELEPHONE ENCOUNTER
Pt states he is calling to follow up on  of oxygen he states at his last visit he was under the assumption that he is good with out oxygen. So he is wondering when does the oxygen get picked up and they informed him that order needs to be placed. Please advise

## 2025-02-25 ENCOUNTER — EVALUATION (OUTPATIENT)
Dept: PHYSICAL THERAPY | Facility: REHABILITATION | Age: 70
End: 2025-02-25
Payer: COMMERCIAL

## 2025-02-25 DIAGNOSIS — R26.2 AMBULATORY DYSFUNCTION: Primary | ICD-10-CM

## 2025-02-25 PROCEDURE — 97112 NEUROMUSCULAR REEDUCATION: CPT | Performed by: PHYSICAL THERAPIST

## 2025-02-25 PROCEDURE — 97163 PT EVAL HIGH COMPLEX 45 MIN: CPT | Performed by: PHYSICAL THERAPIST

## 2025-02-25 NOTE — PROGRESS NOTES
PT Evaluation     Today's date: 2025  Patient name: Edgar Obando  : 1955  MRN: [id number]  Referring provider: Gilma Swift PA-C  Dx:   Encounter Diagnosis     ICD-10-CM    1. Ambulatory dysfunction  R26.2 Ambulatory Referral to Physical Therapy                     Assessment  Impairments: abnormal coordination, abnormal gait, abnormal muscle firing, abnormal muscle tone, abnormal or restricted ROM, abnormal movement, activity intolerance, impaired balance, impaired physical strength, lacks appropriate home exercise program, pain with function, poor posture , poor body mechanics, unable to perform ADL, participation limitations, activity limitations and endurance  Symptom irritability: moderate    Assessment details: Edgar Obando is a 69 year-old male who presents to physical therapy due to post-operative impairments following an ACDF for cervical myelopathy. He previously was in physical therapy for the same impairments following surgery, but had to stop due to other health complications. He returns with primary movement impairments which include decreased neck range of motion, poor UQ strength, decreased global stamina, and poor gait. He is currently a fall risk at this time, and has had 2 recent falls, neither of which resulted in any injuries. The patient would benefit from skilled PT to address his current deficits and improve his quality of life. No further referral is warranted at this time based upon examination results.     Goals  Impairment Based Goals:  1. Decreased pain by 50% in 4 weeks.  2. Improve ROM to WFL by discharge.   3. Improve strength by 1/2 measure in 6 weeks.   4. Improve FOTO greater than predicted outcome score by discharge.      Functional Based Goals: To be met upon discharge  1. Patient will reach goal of being able to ambulate in his community independently for at least 15 minutes.   2. Patient will be fully independent with HEP by discharge  3. Patient will be able to  manage symptoms independently.       Plan  Patient would benefit from: skilled physical therapy  Referral necessary: No    Planned therapy interventions: abdominal trunk stabilization, activity modification, balance, balance/weight bearing training, behavior modification, body mechanics training, breathing training, compression, coordination, flexibility, functional ROM exercises, gait training, graded activity, graded exercise, graded motor, group therapy, home exercise program, therapeutic activities, therapeutic exercise, stretching, strengthening, self care, postural training, patient/caregiver education, neuromuscular re-education, nerve gliding, muscle pump exercises, motor coordination training, Alvarado taping, massage, manual therapy and IASTM    Frequency: 2x week  Plan of Care beginning date: 2025  Plan of Care expiration date: 2025  Treatment plan discussed with: patient        Subjective Evaluation    History of Present Illness  Mechanism of injury: I had pneumonia and was in the hospital for it. I did end up having 2 falls at home. I lost a lot of confidence in my ability to move around. I did have nursing and OT at home for a period of time. I did start to have some improvements, but I am very eager to continue improving. I mostly want to work on my walking, strength in my legs, balance, and overall function. I intermittently have had some pains but overall not too bad. Primarily the pain that I get is in the neck area. I feel like I have to hold on to things a lot when I am walking around and tripping a lot.           Recurrent probem    Patient Goals  Patient goals for therapy: decreased pain, improved balance, increased motion, independence with ADLs/IADLs, return to sport/leisure activities and increased strength    Pain  Current pain ratin  At best pain ratin  At worst pain ratin  Quality: dull ache, discomfort and tight    Treatments  Previous treatment: physical  therapy  Current treatment: physical therapy        Objective     Functional Assessment        Comments  TU seconds with SPC     DGI:     Balance:                                   Romberg Eyes Open: 30 seconds          Romberg on Foam Eyes Open: 20   Romberg Eyes Closed: 8 seconds          Half Tandem Right in Front: 12 second                                                                                                                                                                              Cervical Spine AR  Flexion: Min loss    Extension: Mod loss       Rotation Right: Mod los                        UQS: Unremarkable                  POC expires PT/OT + Visit Limit?   2025  BOMN       Visit/Unit Tracking  AUTH Status:  Date         [employer] Used 1               Precautions: History of anxiety and depression, history of short term memory loss, fall risk, deconditioning, post ACDF for cervical myelopathy       Manuals                                                                 Neuro Re-Ed             Patient Ed 15'            Nancy Negotiation             Romberg Balance on Foam with Perturbations             Biodex LOS             Cone Weaving             Step Taps                          Ther Ex             UBE             Rows             Shoulder Extensions             Trunk Rotations             Sit to Stands             Leg Press             Standing Heel Raises             Suitcase Carry              Ther Activity                                       Gait Training                                       Modalities

## 2025-02-26 ENCOUNTER — TELEPHONE (OUTPATIENT)
Age: 70
End: 2025-02-26

## 2025-02-26 LAB
DME PARACHUTE DELIVERY DATE REQUESTED: NORMAL
DME PARACHUTE ITEM DESCRIPTION: NORMAL
DME PARACHUTE ORDER STATUS: NORMAL
DME PARACHUTE SUPPLIER NAME: NORMAL
DME PARACHUTE SUPPLIER PHONE: NORMAL

## 2025-02-26 NOTE — TELEPHONE ENCOUNTER
Contacted patient off of [facility]  wait list to verify needs of services in attempts to update list with patient preferences. spoke with patient whom stated would like to remain on WL. Pt understood will be contacted upon availability for scheduling.

## 2025-02-27 ENCOUNTER — OFFICE VISIT (OUTPATIENT)
Dept: PODIATRY | Facility: CLINIC | Age: 70
End: 2025-02-27
Payer: COMMERCIAL

## 2025-02-27 ENCOUNTER — APPOINTMENT (OUTPATIENT)
Dept: PHYSICAL THERAPY | Facility: REHABILITATION | Age: 70
End: 2025-02-27
Payer: COMMERCIAL

## 2025-02-27 VITALS — BODY MASS INDEX: 36.96 KG/M2 | WEIGHT: 229 LBS

## 2025-02-27 DIAGNOSIS — B35.3 TINEA PEDIS OF BOTH FEET: ICD-10-CM

## 2025-02-27 DIAGNOSIS — E11.9 CONTROLLED TYPE 2 DIABETES MELLITUS WITHOUT COMPLICATION, WITHOUT LONG-TERM CURRENT USE OF INSULIN (HCC): Primary | ICD-10-CM

## 2025-02-27 PROCEDURE — 99213 OFFICE O/P EST LOW 20 MIN: CPT | Performed by: PODIATRIST

## 2025-02-27 NOTE — PROGRESS NOTES
Patient presents for assessment of soles of feet.  At last visit, patient was diagnosed with onychomycosis along with chronic tinea pedis.  He had not responded to a topical such as Lotrisone.  For this reason, he was placed on 14 days of oral Lamisil.    On exam, continued presence of scaly rash on soles of feet consistent with tinea.  As he did not have success, he was advised to utilize a moisturizer on a regular basis.  He is rescheduled in 3 months for palliative nail care.

## 2025-03-04 ENCOUNTER — OFFICE VISIT (OUTPATIENT)
Dept: PHYSICAL THERAPY | Facility: REHABILITATION | Age: 70
End: 2025-03-04
Payer: COMMERCIAL

## 2025-03-04 DIAGNOSIS — R26.2 AMBULATORY DYSFUNCTION: Primary | ICD-10-CM

## 2025-03-04 PROCEDURE — 97112 NEUROMUSCULAR REEDUCATION: CPT | Performed by: PHYSICAL THERAPIST

## 2025-03-04 PROCEDURE — 97110 THERAPEUTIC EXERCISES: CPT | Performed by: PHYSICAL THERAPIST

## 2025-03-04 NOTE — PROGRESS NOTES
Daily Note     Today's date: 3/4/2025  Patient name: Edgar Obando  : 1955  MRN: [id number]  Referring provider: Gilma Swift PA-C  Dx:   Encounter Diagnosis     ICD-10-CM    1. Ambulatory dysfunction  R26.2                      Subjective: Overall the neck is feeling stiff today       Objective: See treatment diary below      Assessment: Tolerated treatment well. Responded well to initial interventions today. Did move slowly through exercises, fatiguing quickly, and requiring rest between sets. Patient would benefit from continued PT      Plan: Continue per plan of care.        POC expires PT/OT + Visit Limit?   2025  BOMN       Visit/Unit Tracking  AUTH Status:  Date         [employer]  Used 1               Precautions: History of anxiety and depression, history of short term memory loss, fall risk, deconditioning, post ACDF for cervical myelopathy       Manuals 2/25 3/4                                                               Neuro Re-Ed             Patient Ed 15'            Nancy Negotiation             Cane Seated Overhead Shoulder Flexion  2x10 3# cuff weight           Chin Tuck  2x10 (cueing)           Romberg Balance on Foam with Perturbations             Biodex LOS             Cone Weaving             Step Taps             Wall Slide  2x10           Ther Ex             UBE  /           Rows - Seated  3x12 12#           Shoulder Extensions             Trunk Rotations             Sit to Stands             Leg Press             Standing Heel Raises             Suitcase Carry              Ther Activity                                       Gait Training                                       Modalities

## 2025-03-06 ENCOUNTER — OFFICE VISIT (OUTPATIENT)
Dept: PHYSICAL THERAPY | Facility: REHABILITATION | Age: 70
End: 2025-03-06
Payer: COMMERCIAL

## 2025-03-06 DIAGNOSIS — R26.2 AMBULATORY DYSFUNCTION: Primary | ICD-10-CM

## 2025-03-06 PROCEDURE — 97112 NEUROMUSCULAR REEDUCATION: CPT

## 2025-03-06 PROCEDURE — 97110 THERAPEUTIC EXERCISES: CPT

## 2025-03-06 NOTE — PROGRESS NOTES
"Daily Note     Today's date: 3/6/2025  Patient name: Edgar Obando  : 1955  MRN: [id number]  Referring provider: Gilma Swift PA-C  Dx:   Encounter Diagnosis     ICD-10-CM    1. Ambulatory dysfunction  R26.2                      Subjective: Pt. reports no change in status upon arrival.      Objective: See treatment diary below      Assessment: Tolerated treatment well. Patient demonstrated fatigue post treatment and would benefit from continued PT.  Pt. able to complete all exercises with no increase in pain during or after session.  Pt able to progress exercises this session without complaint.  Pt does require some additional time to complete exercises.  Pt. 1:1 with PTA for entirety.      Plan: Continue per plan of care.        POC expires PT/OT + Visit Limit?   2025  BOMN       Visit/Unit Tracking  AUTH Status:  Date 2/25  3/6      [facility]             Precautions: History of anxiety and depression, history of short term memory loss, fall risk, deconditioning, post ACDF for cervical myelopathy       Manuals 2/25 3/4 3/6                                                              Neuro Re-Ed             Patient Ed 15'            Nancy Negotiation             Cane Seated Overhead Shoulder Flexion  2x10 3# cuff weight 2x10 3# cuff          Chin Tuck  2x10 (cueing) 2x10           Romberg Balance on Foam with Perturbations   3x30\" FTEO   on foam          Biodex LOS             Cone Weaving             Step Taps             Wall Slide  2x10 2x10          Ther Ex             UBE            Rows - Seated  3x12 12# 3x12 14#          Shoulder Extensions             Trunk Rotations             Sit to Stands             Leg Press             Standing Heel Raises   20x          Suitcase Carry              Ther Activity                                       Gait Training                                       Modalities                                              "

## 2025-03-11 ENCOUNTER — APPOINTMENT (OUTPATIENT)
Dept: PHYSICAL THERAPY | Facility: REHABILITATION | Age: 70
End: 2025-03-11
Payer: COMMERCIAL

## 2025-03-11 DIAGNOSIS — F41.9 ANXIETY: ICD-10-CM

## 2025-03-12 RX ORDER — BUSPIRONE HYDROCHLORIDE 10 MG/1
TABLET ORAL
Qty: 180 TABLET | Refills: 1 | Status: SHIPPED | OUTPATIENT
Start: 2025-03-12

## 2025-03-13 ENCOUNTER — APPOINTMENT (OUTPATIENT)
Dept: PHYSICAL THERAPY | Facility: REHABILITATION | Age: 70
End: 2025-03-13
Payer: COMMERCIAL

## 2025-03-14 ENCOUNTER — TELEPHONE (OUTPATIENT)
Age: 70
End: 2025-03-14

## 2025-03-14 DIAGNOSIS — Z98.1 S/P CERVICAL SPINAL FUSION: ICD-10-CM

## 2025-03-14 RX ORDER — TIZANIDINE 2 MG/1
2 TABLET ORAL 2 TIMES DAILY PRN
Qty: 30 TABLET | Refills: 0 | Status: SHIPPED | OUTPATIENT
Start: 2025-03-14 | End: 2025-03-21 | Stop reason: SDUPTHER

## 2025-03-14 NOTE — TELEPHONE ENCOUNTER
Called and spoke with pt. He states he is having the same neck pain he has been having all along it just is on and off. He is doing PT and sometimes it helps and sometimes it irritates his neck. He takes Tylenol daily, he cannot take NSAIDS, he does use a heating pad. He is asking for a refill of the zanaflex since that usually helps when he gets the spasms. Please advise, thanks!

## 2025-03-14 NOTE — TELEPHONE ENCOUNTER
Caller: Patient     Doctor: Dr. Richardson    Reason for call: Patient called stated having discomfort in neck and requested pain medication, offered to schedule follow up patient declined. Patient requested a call back from nurse. Please advise.    Call back#: [phone]

## 2025-03-17 ENCOUNTER — OFFICE VISIT (OUTPATIENT)
Dept: PHYSICAL THERAPY | Facility: REHABILITATION | Age: 70
End: 2025-03-17
Payer: COMMERCIAL

## 2025-03-17 DIAGNOSIS — R26.2 AMBULATORY DYSFUNCTION: Primary | ICD-10-CM

## 2025-03-17 PROCEDURE — 97112 NEUROMUSCULAR REEDUCATION: CPT

## 2025-03-17 PROCEDURE — 97110 THERAPEUTIC EXERCISES: CPT

## 2025-03-17 NOTE — PROGRESS NOTES
"Daily Note     Today's date: 3/20/2025  Patient name: Edgar Obando  : 1955  MRN: [id number]  Referring provider: Gilma Swift PA-C  Dx:   Encounter Diagnosis     ICD-10-CM    1. Ambulatory dysfunction  R26.2             Start Time: 1400  Stop Time: 1448  Total time in clinic (min): 48 minutes    Subjective: Pt. reports no change in status upon arrival. Did have a bad night of sleep which is uncommon for him. My neck is the main area I have pain other than the neuropathy. My walking is still unsteady.      Objective: See treatment diary below      Assessment: Tolerated treatment well. Patient demonstrated fatigue post treatment and would benefit from continued PT.  Pt. able to complete all exercises with no increase in pain during or after session. Patient required close supervision for Aurelio standing exercises and contact guarding for balance tasks due to increase postural instability and body swaying. He required slight cueing for cervical retractions and positions during OH movements. Pt able to progress exercises this session without complaint. 1:1 with Danelle Bolaños PT, DPT for entirety of tx.         Plan: Continue per plan of care.        POC expires PT/OT + Visit Limit?   2025  BOMN       Visit/Unit Tracking  AUTH Status:  Date 2/25 3/4 3/6 3/17     [employer]  Used 1 2 3 4            Precautions: History of anxiety and depression, history of short term memory loss, fall risk, deconditioning, post ACDF for cervical myelopathy       Manuals 2/25 3/4 3/6 3/17                                                             Neuro Re-Ed             Patient Ed 15'            Nancy Negotiation             Cane Seated Overhead Shoulder Flexion  2x10 3# cuff weight 2x10 3# cuff 2x10 3# cuff         Seated Chin Tuck  2x10 (cueing) 2x10  2x10         Romberg Balance on Foam with Perturbations   3x30\" FTEO   on foam 3x30\" FTEO   on foam         Biodex LOS             Cone Weaving             Step Taps           "   Wall Slide  2x10 2x10          Ther Ex             UBE  4/4 4/4 4'/4'         Rows - Seated  3x12 12# 3x12 14# 3x12 15#         Shoulder Extensions    Stand  #10  2x10         Seated Trunk Rotations    Kesier  #10  10x B/L  Close supervision           Sit to Stands    On foam  3x6         Leg Press             Standing Heel Raises   20x On foam  20x         Suitcase Carry              Ther Activity                                       Gait Training                                       Modalities

## 2025-03-18 ENCOUNTER — APPOINTMENT (OUTPATIENT)
Dept: PHYSICAL THERAPY | Facility: REHABILITATION | Age: 70
End: 2025-03-18
Payer: COMMERCIAL

## 2025-03-20 ENCOUNTER — TELEPHONE (OUTPATIENT)
Age: 70
End: 2025-03-20

## 2025-03-20 ENCOUNTER — OFFICE VISIT (OUTPATIENT)
Dept: PHYSICAL THERAPY | Facility: REHABILITATION | Age: 70
End: 2025-03-20
Payer: COMMERCIAL

## 2025-03-20 DIAGNOSIS — R26.2 AMBULATORY DYSFUNCTION: Primary | ICD-10-CM

## 2025-03-20 PROCEDURE — 97112 NEUROMUSCULAR REEDUCATION: CPT

## 2025-03-20 PROCEDURE — 97110 THERAPEUTIC EXERCISES: CPT

## 2025-03-20 NOTE — PROGRESS NOTES
"Daily Note     Today's date: 3/20/2025  Patient name: Edgar Obando  : 1955  MRN: [id number]  Referring provider: Gilma Swift PA-C  Dx:   Encounter Diagnosis     ICD-10-CM    1. Ambulatory dysfunction  R26.2                      Subjective: Pt. reports no change in status upon arrival.  Pt states swelling is intermittent due to fluid retention, feels that it's improved today.       Objective: See treatment diary below      Assessment: Tolerated treatment well. Patient would benefit from continued PT.  Pt. able to complete all exercises with no increase in pain during or after session.  Pt performed leyda exercises seated, did well with exercises throughout session.  Pt 1:1 with PTA [id number], IEP for remainder.        Plan: Continue per plan of care.        POC expires PT/OT + Visit Limit?   2025  BOMN       Visit/Unit Tracking  AUTH Status:  Date 2/25 3/4 3/6 3/17 3/20    [employer] Used 1 2 3 4 5           Precautions: History of anxiety and depression, history of short term memory loss, fall risk, deconditioning, post ACDF for cervical myelopathy       Manuals 2/25 3/4 3/6 3/17 3/20                                                            Neuro Re-Ed             Patient Ed 15'            Nancy Negotiation             Cane Seated Overhead Shoulder Flexion  2x10 3# cuff weight 2x10 3# cuff 2x10 3# cuff 2x10 3# cuff        Seated Chin Tuck  2x10 (cueing) 2x10  2x10 2x10        Romberg Balance on Foam with Perturbations   3x30\" FTEO   on foam 3x30\" FTEO   on foam 3x30\" FTEO on foam        Biodex LOS             Cone Weaving             Step Taps             Wall Slide  2x10 2x10          Ther Ex             UBE   4'/'         Rows - Seated  3x12 12# 3x12 14# 3x12 15# 3x12 15#        Shoulder Extensions    Stand  #10  2x10 2x10 10# seated        Seated Trunk Rotations    Kesier  #10  10x B/L  Close supervision   10x b/l 10#        Sit to Stands    On foam  3x6 2x10 on foam        Leg Press   "           Standing Heel Raises   20x On foam  20x 20x on foam        Suitcase Carry              Ther Activity                                       Gait Training                                       Modalities

## 2025-03-20 NOTE — TELEPHONE ENCOUNTER
Caller: Patient     Doctor: Dr. Richardson     Reason for call: Patient states he is still having a lot of pain     Call back#: [phone]

## 2025-03-21 ENCOUNTER — OFFICE VISIT (OUTPATIENT)
Dept: FAMILY MEDICINE CLINIC | Facility: CLINIC | Age: 70
End: 2025-03-21
Payer: COMMERCIAL

## 2025-03-21 VITALS
SYSTOLIC BLOOD PRESSURE: 118 MMHG | TEMPERATURE: 98.4 F | HEIGHT: 66 IN | WEIGHT: 247 LBS | DIASTOLIC BLOOD PRESSURE: 68 MMHG | OXYGEN SATURATION: 97 % | BODY MASS INDEX: 39.7 KG/M2 | RESPIRATION RATE: 17 BRPM | HEART RATE: 70 BPM

## 2025-03-21 DIAGNOSIS — R39.9 LOWER URINARY TRACT SYMPTOMS (LUTS): ICD-10-CM

## 2025-03-21 DIAGNOSIS — R35.1 NOCTURIA: ICD-10-CM

## 2025-03-21 DIAGNOSIS — E11.42 TYPE 2 DIABETES MELLITUS WITH DIABETIC POLYNEUROPATHY, WITHOUT LONG-TERM CURRENT USE OF INSULIN (HCC): ICD-10-CM

## 2025-03-21 DIAGNOSIS — I10 PRIMARY HYPERTENSION: ICD-10-CM

## 2025-03-21 DIAGNOSIS — R45.4 IRRITABLE: ICD-10-CM

## 2025-03-21 DIAGNOSIS — D68.2 HEREDITARY DEFICIENCY OF OTHER CLOTTING FACTORS (HCC): ICD-10-CM

## 2025-03-21 DIAGNOSIS — I50.32 CHRONIC DIASTOLIC (CONGESTIVE) HEART FAILURE (HCC): ICD-10-CM

## 2025-03-21 DIAGNOSIS — Z98.1 S/P CERVICAL SPINAL FUSION: ICD-10-CM

## 2025-03-21 DIAGNOSIS — R13.10 DYSPHAGIA, UNSPECIFIED TYPE: Primary | ICD-10-CM

## 2025-03-21 DIAGNOSIS — N18.31 STAGE 3A CHRONIC KIDNEY DISEASE (HCC): ICD-10-CM

## 2025-03-21 DIAGNOSIS — G95.9 DISEASE OF SPINAL CORD, UNSPECIFIED (HCC): ICD-10-CM

## 2025-03-21 DIAGNOSIS — E11.49 OTHER DIABETIC NEUROLOGICAL COMPLICATION ASSOCIATED WITH TYPE 2 DIABETES MELLITUS (HCC): ICD-10-CM

## 2025-03-21 DIAGNOSIS — F41.9 ANXIETY: ICD-10-CM

## 2025-03-21 PROBLEM — B18.2 HEPATITIS C VIRUS CARRIER STATE (HCC): Status: RESOLVED | Noted: 2023-05-17 | Resolved: 2025-03-21

## 2025-03-21 PROCEDURE — G2211 COMPLEX E/M VISIT ADD ON: HCPCS | Performed by: FAMILY MEDICINE

## 2025-03-21 PROCEDURE — 99214 OFFICE O/P EST MOD 30 MIN: CPT | Performed by: FAMILY MEDICINE

## 2025-03-21 RX ORDER — ESCITALOPRAM OXALATE 5 MG/1
5 TABLET ORAL DAILY
Qty: 30 TABLET | Refills: 1 | Status: SHIPPED | OUTPATIENT
Start: 2025-03-21

## 2025-03-21 NOTE — PROGRESS NOTES
Name: Edgar Obando      : 1955      MRN: [id number]  Encounter Provider: Ana Rosa Win MD  Encounter Date: 3/21/2025   Encounter department: [facility]  :  Assessment & Plan  Dysphagia, unspecified type  With food and pills. Scheduled to see ENT especially since his hoarsness has gotten worst. Taking PPI for chronic GERD. Will get barium swallow to look for stricture or dysmotility   Orders:    FL barium swallow ROUTINE esophagus; Future    Primary hypertension  At goal on losartan 25 mg  Orders:    Albumin / creatinine urine ratio    S/P cervical spinal fusion  Still with pain following fusion in May 2024. Attending PT. Stiffness noted. Will increased tizanidine from 2--> 4 mg   Orders:    tiZANidine (ZANAFLEX) 4 mg tablet; Take 1 tablet (4 mg total) by mouth 2 (two) times a day as needed for muscle spasms May make you drowsy. Do not drive until you know how it affects you. Do not take any other muscle relaxers while taking this medication    Disease of spinal cord, unspecified (HCC)         Hereditary deficiency of other clotting factors (HCC)  On low dose ASA        Chronic diastolic (congestive) heart failure (HCC)  Wt Readings from Last 3 Encounters:   25 112 kg (247 lb)   25 104 kg (229 lb)   25 104 kg (229 lb 4.5 oz)     Euvolemic on exam. Has lost significant weight with diuretic. No SOB. Continue lasix 40 mg daily                  Other diabetic neurological complication associated with type 2 diabetes mellitus (HCC)    Lab Results   Component Value Date    HGBA1C 6.1 (H) 2024   Controlled. Will recheck A1c in 3 months. Diabetic eye exam recently performed at [facility]. Will contact care gap     Orders:    Lipid panel; Future    Stage 3a chronic kidney disease (HCC)  Lab Results   Component Value Date    EGFR 78 2025    EGFR 87 2025    EGFR 46 2024    CREATININE 1.03 2025    CREATININE 0.94 2025     CREATININE 1.50 (H) 12/29/2024     Improved following last admission       Lower urinary tract symptoms (LUTS)  Reports nocturia and hesitancy. Is on TRT which can cause prostamegaly. Will check PSA and T levels. Deferred ELLIE today. If symptoms ongoing, will refer to urology. Increase flomax from 0.4-->8 mg dose   Orders:    PSA Total, Diagnostic; Future    Testosterone, free, total; Future    Irritable  Noted buy his wife. Pt also notes feeling anxious. Discussed medications and will add lexapro 5 mg to buspar 10 BID   Orders:    escitalopram (LEXAPRO) 5 mg tablet; Take 1 tablet (5 mg total) by mouth daily    Comprehensive metabolic panel; Future    Anxiety    Orders:    escitalopram (LEXAPRO) 5 mg tablet; Take 1 tablet (5 mg total) by mouth daily    Comprehensive metabolic panel; Future    Nocturia    Orders:    PSA Total, Diagnostic; Future    Type 2 diabetes mellitus with diabetic polyneuropathy, without long-term current use of insulin ([facility])    Lab Results   Component Value Date    HGBA1C 6.1 (H) 12/03/2024       Orders:    Lipid panel; Future    Albumin / creatinine urine ratio           History of Present Illness   Seen today following 2 admissions  Fall in January and PNA was Dec 2   Went back on the 12th after not being able to walk and was found to have PNA on chest imaging   Suggested inpatient rehab but declined.  Currently doing rehab OP  Strength improving in the lower legs but still having difficulty walking  Breathing has been ok   Had neck surgery in May  Now having pain in the shoulder and neck   Having issues swallowing. Food gets stuck in the throat  Feels like he is choking with certain foods or when he takes his pills    Seeing ENT   Also noted hoarseness   Using advair BID   On diurectic. Was retaiting water. Previously 238 now 247  No longer on 02 and was returned Last month   Just saw the eye doctor today   No longer taking Trulicity and is now on Ozempic which is less  "expensive        Review of Systems   Constitutional:  Positive for fatigue (chronic). Negative for fever.   Respiratory:  Positive for shortness of breath (improved).    Cardiovascular: Negative.    Gastrointestinal: Negative.    Genitourinary:  Positive for difficulty urinating and frequency.   Musculoskeletal:  Positive for arthralgias, back pain, gait problem, neck pain and neck stiffness.   Neurological:  Positive for weakness (in the LE) and numbness.   Psychiatric/Behavioral:  Positive for agitation and sleep disturbance. The patient is nervous/anxious.        Objective   /68 (BP Location: Left arm, Patient Position: Sitting, Cuff Size: Large)   Pulse 70   Temp 98.4 °F (36.9 °C) (Temporal)   Resp 17   Ht 5' 6\" (1.676 m)   Wt 112 kg (247 lb)   SpO2 97%   BMI 39.87 kg/m²      Physical Exam  Constitutional:       General: He is not in acute distress.     Appearance: Normal appearance. He is not ill-appearing or toxic-appearing.   HENT:      Head: Normocephalic and atraumatic.      Mouth/Throat:      Mouth: Mucous membranes are moist.   Eyes:      Extraocular Movements: Extraocular movements intact.   Cardiovascular:      Rate and Rhythm: Normal rate and regular rhythm.      Heart sounds: No murmur heard.  Pulmonary:      Effort: Pulmonary effort is normal. No respiratory distress.      Breath sounds: Normal breath sounds. No stridor. No wheezing, rhonchi or rales.   Musculoskeletal:      Cervical back: Rigidity and tenderness present.      Right lower leg: Edema (1+ edema, compression stocking present) present.      Left lower leg: Edema (1+ edema, compression stocking present) present.   Neurological:      Mental Status: He is alert.   Psychiatric:         Mood and Affect: Mood normal.         Behavior: Behavior normal.         Thought Content: Thought content normal.       "

## 2025-03-22 PROBLEM — M51.26 LUMBAR HERNIATED DISC: Status: ACTIVE | Noted: 2018-01-19

## 2025-03-22 PROBLEM — K76.0 METABOLIC DYSFUNCTION-ASSOCIATED STEATOTIC LIVER DISEASE (MASLD): Status: ACTIVE | Noted: 2024-08-26

## 2025-03-22 PROBLEM — E29.1 HYPOGONADISM IN MALE: Status: ACTIVE | Noted: 2019-08-20

## 2025-03-22 PROBLEM — R53.82 CHRONIC FATIGUE: Status: ACTIVE | Noted: 2020-02-24

## 2025-03-22 PROBLEM — N18.2 STAGE 2 CHRONIC KIDNEY DISEASE: Chronic | Status: ACTIVE | Noted: 2021-06-28

## 2025-03-22 NOTE — ASSESSMENT & PLAN NOTE
Lab Results   Component Value Date    HGBA1C 6.1 (H) 12/03/2024   Controlled. Will recheck A1c in 3 months. Diabetic eye exam recently performed at [facility]. Will contact care gap     Orders:    Lipid panel; Future

## 2025-03-22 NOTE — ASSESSMENT & PLAN NOTE
Lab Results   Component Value Date    HGBA1C 6.1 (H) 12/03/2024       Orders:    Lipid panel; Future    Albumin / creatinine urine ratio

## 2025-03-22 NOTE — ASSESSMENT & PLAN NOTE
Orders:    escitalopram (LEXAPRO) 5 mg tablet; Take 1 tablet (5 mg total) by mouth daily    Comprehensive metabolic panel; Future

## 2025-03-22 NOTE — ASSESSMENT & PLAN NOTE
Lab Results   Component Value Date    EGFR 78 01/29/2025    EGFR 87 01/04/2025    EGFR 46 12/29/2024    CREATININE 1.03 01/29/2025    CREATININE 0.94 01/04/2025    CREATININE 1.50 (H) 12/29/2024     Improved following last admission

## 2025-03-22 NOTE — ASSESSMENT & PLAN NOTE
Still with pain following fusion in May 2024. Attending PT. Stiffness noted. Will increased tizanidine from 2--> 4 mg   Orders:    tiZANidine (ZANAFLEX) 4 mg tablet; Take 1 tablet (4 mg total) by mouth 2 (two) times a day as needed for muscle spasms May make you drowsy. Do not drive until you know how it affects you. Do not take any other muscle relaxers while taking this medication

## 2025-03-24 ENCOUNTER — TELEPHONE (OUTPATIENT)
Dept: ADMINISTRATIVE | Facility: OTHER | Age: 70
End: 2025-03-24

## 2025-03-24 NOTE — TELEPHONE ENCOUNTER
----- Message from Danelle BATISTA sent at 3/21/2025 12:03 PM EDT -----  Regarding: Care Gap Request  03/21/25 12:04 PM    Hello, our patient above has had Diabetic Eye Exam completed/performed. Please assist in updating the patient chart by making an External outreach to [facility] located in [place], [place]. The date of service is 3/21/2025.    Thank you,  Danelle Randhawa MA  [facility]

## 2025-03-24 NOTE — LETTER
Diabetic Eye Exam Form    Date Requested: 25  Patient: Edgar Obando  Patient : 1955   Referring Provider: Ana Rosa Win MD      DIABETIC Eye Exam Date _______________________________      Type of Exam MUST be documented for Diabetic Eye Exams. Please CHECK ONE.     Retinal Exam       Dilated Retinal Exam       OCT       Optomap-Iris Exam      Fundus Photography       Left Eye - Please check Retinopathy or No Retinopathy        Exam did show retinopathy    Exam did not show retinopathy       Right Eye - Please check Retinopathy or No Retinopathy       Exam did show retinopathy    Exam did not show retinopathy       Comments ____2025 or Most recent  ______________________________________________________    Practice Providing Exam ______________________________________________    Exam Performed By (print name) _______________________________________      Provider Signature ___________________________________________________      These reports are needed for  compliance.    Please fax this completed form and a copy of the Diabetic Eye Exam report to the [facility] Based Department as soon as possible via Fax [phone], attention Maura: Phone [phone]. Our office is located at [address] [place] [address].     We thank you for your assistance in treating our mutual patient.

## 2025-03-24 NOTE — TELEPHONE ENCOUNTER
----- Message from Ana Rosa Win MD sent at 3/22/2025  2:23 PM EDT -----  Regarding: eye exam  CinthiaFrankie Edgar had a diabetic eye exam at [place] [facility]. Could you please contact their office for records? Thank you

## 2025-03-24 NOTE — TELEPHONE ENCOUNTER
Upon review of the In Basket request and the patient's chart, initial outreach has been made via fax to facility. Please see Contacts section for details.     Thank you  Maura Sewell MA

## 2025-03-24 NOTE — TELEPHONE ENCOUNTER
Upon review of the In Basket request we have found this is a duplicate request and no further action is needed. This request is currently being processed and documentation is being completed in the initial request. This message will now be closed.      Any additional questions or concerns should be emailed to the Practice Liaisons via the appropriate education email address, please do not reply via In Basket.    Thank you  Roney Cabrera MA   [facility]

## 2025-03-24 NOTE — TELEPHONE ENCOUNTER
----- Message from Ana Rosa Win MD sent at 3/22/2025  2:23 PM EDT -----  Regarding: eye exam  CinthiaFrankie Edgar had a diabetic eye exam at [place] [facility]. Could you please contact their office for records? Thank you

## 2025-03-24 NOTE — TELEPHONE ENCOUNTER
----- Message from Ana Rosa Win MD sent at 3/22/2025  2:23 PM EDT -----  Regarding: eye exam  CinthiaFrankie Edgar had a diabetic eye exam at [place] [facility]. Could you please contact their office for records? Thank you

## 2025-03-25 ENCOUNTER — OFFICE VISIT (OUTPATIENT)
Dept: PHYSICAL THERAPY | Facility: REHABILITATION | Age: 70
End: 2025-03-25
Payer: COMMERCIAL

## 2025-03-25 DIAGNOSIS — R26.2 AMBULATORY DYSFUNCTION: Primary | ICD-10-CM

## 2025-03-25 PROCEDURE — 97112 NEUROMUSCULAR REEDUCATION: CPT

## 2025-03-25 PROCEDURE — 97110 THERAPEUTIC EXERCISES: CPT

## 2025-03-25 NOTE — PROGRESS NOTES
"Daily Note     Today's date: 3/25/2025  Patient name: Edgar Obando  : 1955  MRN: [id number]  Referring provider: Gilma Swift PA-C  Dx:   Encounter Diagnosis     ICD-10-CM    1. Ambulatory dysfunction  R26.2           Start Time: 1145  Stop Time: 1230  Total time in clinic (min): 45 minutes    Subjective: Pt. reports he had the worst night sleep last night due to his neuropathy in bilat LE, felt \"burning\" in legs that kept him up at night. Notes he didn't do anything different and his diet was the same as usual.         Objective: See treatment diary below      Assessment: Tolerated treatment well. Patient performed UBE for ROM/strength and to increase blood flow to the area being treated, prepare the muscle for strength training and lengthening and to improve overall endurance and tolerance to activity. Patient did well with program, able to progress in program with reps and balance activities with positive response and no adverse reactions. Pt is challenged with mod tandem on foam with unsteadiness noted however was able to perform with min to no HR assist. Pt is challenged during step taps especially on R due hx of of knee symptoms. Patient is appropriately fatigued post session and would benefit from skilled PT to maintain global stamina and improve functional mobility and safety.           Plan: Continue per plan of care.        POC expires PT/OT + Visit Limit?   2025  BOMN       Visit/Unit Tracking  AUTH Status:  Date 2/25 3/4 3/6 3/17 3/20 3/25   [employer] MC Used 1 2 3 4 5 6          Precautions: History of anxiety and depression, history of short term memory loss, fall risk, deconditioning, post ACDF for cervical myelopathy       Manuals 2/25 3/4 3/6 3/17 3/20 3/25                                                           Neuro Re-Ed             Patient Ed 15'            Nancy Negotiation             Cane Seated Overhead Shoulder Flexion  2x10 3# cuff weight 2x10 3# cuff 2x10 3# cuff 2x10 3# cuff " "2x10 3# cuff       Seated Chin Tuck  2x10 (cueing) 2x10  2x10 2x10 2x10       Romberg Balance on Foam with Perturbations   3x30\" FTEO   on foam 3x30\" FTEO   on foam 3x30\" FTEO on foam 3x30\" FTEO on foam    Mod Tandem foam  1x30\"       Biodex LOS             [employer]one Weaving             Step Taps      6\" x10 ea       Wall Slide  2x10 2x10          Ther Ex             UBE  4/4 4/4 4'/4' 4/4 4/4       Rows - Seated  3x12 12# 3x12 14# 3x12 15# 3x12 15# 3x15  15#       Shoulder Extensions    Stand  #10  2x10 2x10 10# seated 2x10 10# seated       Seated Trunk Rotations    Saul  #10  10x B/L  Close supervision   10x b/l 10# 15x b/l 10#       Sit to Stands    On foam  3x6 2x10 on foam 2x10 on foam       Leg Press             Standing Heel Raises   20x On foam  20x 20x on foam 20x on foam       Suitcase Carry              Ther Activity                                       Gait Training                                       Modalities                                                "

## 2025-03-27 ENCOUNTER — OFFICE VISIT (OUTPATIENT)
Dept: PHYSICAL THERAPY | Facility: REHABILITATION | Age: 70
End: 2025-03-27
Payer: COMMERCIAL

## 2025-03-27 DIAGNOSIS — R26.2 AMBULATORY DYSFUNCTION: Primary | ICD-10-CM

## 2025-03-27 PROCEDURE — 97112 NEUROMUSCULAR REEDUCATION: CPT

## 2025-03-27 PROCEDURE — 97110 THERAPEUTIC EXERCISES: CPT

## 2025-03-27 NOTE — PROGRESS NOTES
"Daily Note     Today's date: 3/27/2025  Patient name: Edgar Obando  : 1955  MRN: [id number]  Referring provider: Gilma Swift PA-C  Dx:   Encounter Diagnosis     ICD-10-CM    1. Ambulatory dysfunction  R26.2                      Subjective: Pt states that neuropathy is progressing, states that his legs are not feeling good today.      Objective: See treatment diary below      Assessment: Tolerated treatment well. Patient would benefit from continued PT.  Pt. able to complete all exercises with no increase in pain during or after session.  Pt. 1:1 with PTA for entirety.      Plan: Continue per plan of care.        POC expires PT/OT + Visit Limit?   2025  BOMN       Visit/Unit Tracking  AUTH Status:  Date 2/25 3/4 3/6 3/17 3/20 3/25 3/27   [employer] MC Used 1 2 3 4 5 6 7          Precautions: History of anxiety and depression, history of short term memory loss, fall risk, deconditioning, post ACDF for cervical myelopathy       Manuals 2/25 3/4 3/6 3/17 3/20 3/25 3/27                                                          Neuro Re-Ed             Patient Ed 15'            Nancy Negotiation             Cane Seated Overhead Shoulder Flexion  2x10 3# cuff weight 2x10 3# cuff 2x10 3# cuff 2x10 3# cuff 2x10 3# cuff 2x10 3# cuff      Seated Chin Tuck  2x10 (cueing) 2x10  2x10 2x10 2x10 2x10      Romberg Balance on Foam with Perturbations   3x30\" FTEO   on foam 3x30\" FTEO   on foam 3x30\" FTEO on foam 3x30\" FTEO on foam    Mod Tandem foam  1x30\" 3x30\" FTEO on foam      Biodex LOS             [employer] Weaving             Step Taps      6\" x10 ea       Wall Slide  2x10 2x10          Ther Ex             UBE   4'/4'       Rows - Seated  3x12 12# 3x12 14# 3x12 15# 3x12 15# 3x15  15# 3x12 15#      Shoulder Extensions    Stand  #10  2x10 2x10 10# seated 2x10 10# seated 3x10 10# seated      Seated Trunk Rotations    Kesier  #10  10x B/L  Close supervision   10x b/l 10# 15x b/l 10# 15x b/l 10#      Sit to " Stands    On foam  3x6 2x10 on foam 2x10 on foam 2x10 on foam      Leg Press             Standing Heel Raises   20x On foam  20x 20x on foam 20x on foam 20x on foam      Suitcase Carry              Ther Activity                                       Gait Training                                       Modalities

## 2025-03-28 ENCOUNTER — APPOINTMENT (OUTPATIENT)
Dept: LAB | Facility: CLINIC | Age: 70
End: 2025-03-28
Payer: COMMERCIAL

## 2025-03-28 DIAGNOSIS — R35.1 NOCTURIA: ICD-10-CM

## 2025-03-28 DIAGNOSIS — R45.4 IRRITABLE: ICD-10-CM

## 2025-03-28 DIAGNOSIS — N18.31 STAGE 3A CHRONIC KIDNEY DISEASE (HCC): ICD-10-CM

## 2025-03-28 DIAGNOSIS — F41.9 ANXIETY: ICD-10-CM

## 2025-03-28 DIAGNOSIS — R39.9 LOWER URINARY TRACT SYMPTOMS (LUTS): ICD-10-CM

## 2025-03-28 DIAGNOSIS — E11.42 TYPE 2 DIABETES MELLITUS WITH DIABETIC POLYNEUROPATHY, WITHOUT LONG-TERM CURRENT USE OF INSULIN (HCC): ICD-10-CM

## 2025-03-28 DIAGNOSIS — E11.49 OTHER DIABETIC NEUROLOGICAL COMPLICATION ASSOCIATED WITH TYPE 2 DIABETES MELLITUS (HCC): ICD-10-CM

## 2025-03-28 LAB
ALBUMIN SERPL BCG-MCNC: 4.1 G/DL (ref 3.5–5)
ALP SERPL-CCNC: 100 U/L (ref 34–104)
ALT SERPL W P-5'-P-CCNC: 9 U/L (ref 7–52)
ANION GAP SERPL CALCULATED.3IONS-SCNC: 9 MMOL/L (ref 4–13)
AST SERPL W P-5'-P-CCNC: 15 U/L (ref 13–39)
BILIRUB SERPL-MCNC: 1.15 MG/DL (ref 0.2–1)
BUN SERPL-MCNC: 30 MG/DL (ref 5–25)
CALCIUM SERPL-MCNC: 9.2 MG/DL (ref 8.4–10.2)
CHLORIDE SERPL-SCNC: 98 MMOL/L (ref 96–108)
CHOLEST SERPL-MCNC: 132 MG/DL (ref ?–200)
CO2 SERPL-SCNC: 29 MMOL/L (ref 21–32)
CREAT SERPL-MCNC: 1.33 MG/DL (ref 0.6–1.3)
GFR SERPL CREATININE-BSD FRML MDRD: 54 ML/MIN/1.73SQ M
GLUCOSE P FAST SERPL-MCNC: 114 MG/DL (ref 65–99)
HDLC SERPL-MCNC: 53 MG/DL
LDLC SERPL CALC-MCNC: 58 MG/DL (ref 0–100)
NONHDLC SERPL-MCNC: 79 MG/DL
POTASSIUM SERPL-SCNC: 4.2 MMOL/L (ref 3.5–5.3)
PROT SERPL-MCNC: 7.5 G/DL (ref 6.4–8.4)
PSA SERPL-MCNC: 0.03 NG/ML (ref 0–4)
PTH-INTACT SERPL-MCNC: 99 PG/ML (ref 12–88)
SODIUM SERPL-SCNC: 136 MMOL/L (ref 135–147)
TRIGL SERPL-MCNC: 104 MG/DL (ref ?–150)

## 2025-03-28 PROCEDURE — 84403 ASSAY OF TOTAL TESTOSTERONE: CPT

## 2025-03-28 PROCEDURE — 36415 COLL VENOUS BLD VENIPUNCTURE: CPT

## 2025-03-28 PROCEDURE — 83970 ASSAY OF PARATHORMONE: CPT

## 2025-03-28 PROCEDURE — 84402 ASSAY OF FREE TESTOSTERONE: CPT

## 2025-03-28 PROCEDURE — 80053 COMPREHEN METABOLIC PANEL: CPT

## 2025-03-28 PROCEDURE — 84153 ASSAY OF PSA TOTAL: CPT

## 2025-03-28 PROCEDURE — 80061 LIPID PANEL: CPT

## 2025-03-29 ENCOUNTER — APPOINTMENT (OUTPATIENT)
Dept: LAB | Facility: CLINIC | Age: 70
End: 2025-03-29
Payer: COMMERCIAL

## 2025-03-29 LAB
CREAT UR-MCNC: 94.4 MG/DL
MICROALBUMIN UR-MCNC: 10 MG/L
MICROALBUMIN/CREAT 24H UR: 11 MG/G CREATININE (ref 0–30)
TESTOST FREE SERPL-MCNC: 3.4 PG/ML (ref 6.6–18.1)
TESTOST SERPL-MCNC: 137 NG/DL (ref 264–916)

## 2025-04-02 ENCOUNTER — OFFICE VISIT (OUTPATIENT)
Dept: PHYSICAL THERAPY | Facility: REHABILITATION | Age: 70
End: 2025-04-02
Payer: COMMERCIAL

## 2025-04-02 DIAGNOSIS — R26.2 AMBULATORY DYSFUNCTION: Primary | ICD-10-CM

## 2025-04-02 PROCEDURE — 97110 THERAPEUTIC EXERCISES: CPT | Performed by: PHYSICAL THERAPIST

## 2025-04-02 PROCEDURE — 97112 NEUROMUSCULAR REEDUCATION: CPT | Performed by: PHYSICAL THERAPIST

## 2025-04-02 NOTE — PROGRESS NOTES
"Daily Note     Today's date: 2025  Patient name: Edgar Obando  : 1955  MRN: [id number]  Referring provider: Gilma Swift PA-C  Dx:   Encounter Diagnosis     ICD-10-CM    1. Ambulatory dysfunction  R26.2                      Subjective: Patient reports no change in status upon arrival.       Objective: See treatment diary below      Assessment: Tolerated treatment well. Patient continues to be appropriately challenged at current therapeutic volume and intensity. Patient would benefit from continued PT      Plan: Continue per plan of care.        POC expires PT/OT + Visit Limit?   2025  BOMN       Visit/Unit Tracking  AUTH Status:  Date 2/25 3/4 3/6 3/17 3/20 3/25 3/27   [employer] MC Used 1 2 3 4 5 6 7          Precautions: History of anxiety and depression, history of short term memory loss, fall risk, deconditioning, post ACDF for cervical myelopathy       Manuals /                                  Neuro Re-Ed       Patient Ed       Nancy Negotiation 3 laps F      Cane Seated Overhead Shoulder Flexion 2x10 3# cuff      Seated Chin Tuck 2x10      Romberg Balance on Foam with Perturbations 3x30\" FTEO on foam      Biodex LOS       Cone Weaving       Step Taps       Wall Slide       Ther Ex       UBE       Rows - Seated 3x12 15#      Shoulder Extensions 3x10 10# seated      Seated Trunk Rotations 15x b/l 10#      Sit to Stands 2x10 on foam      Leg Press       Standing Heel Raises 20x on foam      Suitcase Carry        Ther Activity                     Gait Training                     Modalities                                  "

## 2025-04-03 ENCOUNTER — TELEPHONE (OUTPATIENT)
Dept: OBGYN CLINIC | Facility: HOSPITAL | Age: 70
End: 2025-04-03

## 2025-04-03 NOTE — TELEPHONE ENCOUNTER
Caller: Patient    Doctor: Dr. Richardson    Reason for call: Patient stated the muscle relaxer isn't helping and he's unable to sleep due to neck and shoulder pain(10/10). Patient is currently taking Tyelnol but wants to know if Dr. Richardson can prescribe pain meds. Please advise.    Call back#: [phone]

## 2025-04-03 NOTE — TELEPHONE ENCOUNTER
Called and spoke with pt. Pt was frustrated when I relayed Dr Richardson's message. He feels like he is not getting the help that he needs with the issues he's having which he feels are related to his surgery. He will contact his PCP

## 2025-04-03 NOTE — TELEPHONE ENCOUNTER
Received transfer call and spoke w/pt and he is having pain in neck and b/l shoulders right worse than left. Pain 10+/10. Tizanidine muscle relaxer  is not helping. Tylenol is not helping. Would like something for pain. Hasn't slept in 3 days. Throat does not feel right and having difficulty swallow and mucus build up. Pt frequently clearing throat on phone. Appt scheduled for 4/9/25. Please review and advise. If symptoms get worse go to ED. Make sure sitting upright when eating, chew foot well. Not big bites of food.  If swallowing gets worse go to ED and or pain is severe.     5/2/24FUSION CERVICAL ANTERIOR W DISCECTOMY, C3-7 ACDF (Bilateral: Spine Cervical

## 2025-04-04 ENCOUNTER — OFFICE VISIT (OUTPATIENT)
Dept: PHYSICAL THERAPY | Facility: REHABILITATION | Age: 70
End: 2025-04-04
Payer: COMMERCIAL

## 2025-04-04 DIAGNOSIS — R26.2 AMBULATORY DYSFUNCTION: Primary | ICD-10-CM

## 2025-04-04 PROCEDURE — 97110 THERAPEUTIC EXERCISES: CPT

## 2025-04-04 PROCEDURE — 97112 NEUROMUSCULAR REEDUCATION: CPT

## 2025-04-04 NOTE — PROGRESS NOTES
"Daily Note     Today's date: 2025  Patient name: Edgar Obando  : 1955  MRN: [id number]  Referring provider: Gilma Swift PA-C  Dx:   Encounter Diagnosis     ICD-10-CM    1. Ambulatory dysfunction  R26.2           Start Time: 1300  Stop Time: 1345  Total time in clinic (min): 45 minutes    Subjective: Patient states he got good rest last night and is feeling better overall today.       Objective: See treatment diary below      Assessment: Tolerated treatment well. Rest breaks taken as needed due to muscle fatigue. Patient exhibited good technique with therapeutic exercises and would benefit from continued PT      Plan: Continue per plan of care.  Progress treatment as tolerated.         POC expires PT/OT + Visit Limit?   2025  BOMN       Visit/Unit Tracking  AUTH Status:  Date 2/25 3/4 3/6 3/17 3/20 3/25 3/27   [employer] MC Used 1 2 3 4 5 6 7          Precautions: History of anxiety and depression, history of short term memory loss, fall risk, deconditioning, post ACDF for cervical myelopathy       Manuals                                  Neuro Re-Ed       Patient Ed       Nancy Negotiation 3 laps F L 4 laps     Cane Seated Overhead Shoulder Flexion 2x10 3# cuff 2x10 3# cuff     Seated Chin Tuck 2x10 2x10     Romberg Balance on Foam with Perturbations 3x30\" FTEO on foam Mod tandem 2x30\"     Biodex LOS       [employer] Weaving       Step Taps       Wall Slide       Ther Ex       UBE      Rows - Seated 3x12 15# 3x12 15#     Shoulder Extensions 3x10 10# seated 3x10 10# seated     Seated Trunk Rotations 15x b/l 10# 15x b/l 10#     Sit to Stands 2x10 on foam 2x10 on foam     Leg Press       Standing Heel Raises 20x on foam 20x on foam     Suitcase Carry        Ther Activity                     Gait Training                     Modalities                                    "

## 2025-04-07 ENCOUNTER — APPOINTMENT (OUTPATIENT)
Dept: PHYSICAL THERAPY | Facility: REHABILITATION | Age: 70
End: 2025-04-07
Payer: COMMERCIAL

## 2025-04-08 ENCOUNTER — OFFICE VISIT (OUTPATIENT)
Dept: PHYSICAL THERAPY | Facility: REHABILITATION | Age: 70
End: 2025-04-08
Payer: COMMERCIAL

## 2025-04-08 DIAGNOSIS — R26.2 AMBULATORY DYSFUNCTION: Primary | ICD-10-CM

## 2025-04-08 PROCEDURE — 97112 NEUROMUSCULAR REEDUCATION: CPT

## 2025-04-08 PROCEDURE — 97110 THERAPEUTIC EXERCISES: CPT

## 2025-04-08 NOTE — PROGRESS NOTES
"Daily Note     Today's date: 2025  Patient name: Edgar Obando  : 1955  MRN: [id number]  Referring provider: Gilma Swift PA-C  Dx:   Encounter Diagnosis     ICD-10-CM    1. Ambulatory dysfunction  R26.2                      Subjective: Pt reported increased pain in his neck and his neuropathy has been increased.       Objective: See treatment diary below      Assessment: Tolerated treatment well. Patient demonstrated fatigue post treatment and exhibited good technique with therapeutic exercises. VC's needed for correct technique throughout session. Multiple breaks required to help with performing exercises. Challenged with balance.  Monitor NV.       Plan: Continue per plan of care. 1 on 1 with PTA for 45 mins       POC expires PT/OT + Visit Limit?   2025  BOMN       Visit/Unit Tracking  AUTH Status:  Date 2/25 3/4 3/6 3/17 3/20 3/25 3/27 4/8   [employer] MC Used 1 2 3 4 5 6 7 8          Precautions: History of anxiety and depression, history of short term memory loss, fall risk, deconditioning, post ACDF for cervical myelopathy       Manuals                                 Neuro Re-Ed       Patient Ed       Nancy Negotiation 3 laps F L 4 laps     Cane Seated Overhead Shoulder Flexion 2x10 3# cuff 2x10 3# cuff 2x10 3# cuff    Seated Chin Tuck 2x10 2x10 2x10    Romberg Balance on Foam with Perturbations 3x30\" FTEO on foam Mod tandem 2x30\" Mod tandem 2x30\"    Biodex LOS       [employer]aving       Step Taps       Wall Slide       Ther Ex       UBE  4'/4'    Rows - Seated 3x12 15# 3x12 15# 3x12 15#    Shoulder Extensions 3x10 10# seated 3x10 10# seated 3x10 13# seated    Seated Trunk Rotations 15x b/l 10# 15x b/l 10# 15 b/l 10#    Sit to Stands 2x10 on foam 2x10 on foam 2x10 on foam    Leg Press       Standing Heel Raises 20x on foam 20x on foam 2x10 on foam    Suitcase Carry        Ther Activity                     Gait Training                     Modalities                   "

## 2025-04-10 ENCOUNTER — TELEPHONE (OUTPATIENT)
Dept: OTHER | Facility: OTHER | Age: 70
End: 2025-04-10

## 2025-04-10 ENCOUNTER — OFFICE VISIT (OUTPATIENT)
Dept: PHYSICAL THERAPY | Facility: REHABILITATION | Age: 70
End: 2025-04-10
Payer: COMMERCIAL

## 2025-04-10 DIAGNOSIS — R26.2 AMBULATORY DYSFUNCTION: Primary | ICD-10-CM

## 2025-04-10 PROCEDURE — 97112 NEUROMUSCULAR REEDUCATION: CPT

## 2025-04-10 PROCEDURE — 97110 THERAPEUTIC EXERCISES: CPT

## 2025-04-10 NOTE — PROGRESS NOTES
"Daily Note     Today's date: 4/10/2025  Patient name: Edgar Obando  : 1955  MRN: [id number]  Referring provider: Gilma Swift PA-C  Dx:   Encounter Diagnosis     ICD-10-CM    1. Ambulatory dysfunction  R26.2                      Subjective: Pt reports that feet feel \"heavy\" today, has some neuropathy moving up leg towards glutes.      Objective: See treatment diary below      Assessment: Tolerated treatment well. Patient would benefit from continued PT.  Pt. able to complete all exercises with no increase in pain during or after session.  Pt noted increased fatigue today, but able to complete all exercises without complaint.  Pt. 1:1 with PTA [id number], IEP for remainder.      Plan: Continue per plan of care.        POC expires PT/OT + Visit Limit?   2025  BOMN       Visit/Unit Tracking  AUTH Status:  Date 2/25 3/4 3/6 3/17 3/20 3/25 3/27 4/8 4/10   [employer] MC Used 1 2 3 4 5 6 7 8 9          Precautions: History of anxiety and depression, history of short term memory loss, fall risk, deconditioning, post ACDF for cervical myelopathy       Manuals 4/2 4/4 4/8 4/10                               Neuro Re-Ed       Patient Ed       Nancy Negotiation 3 laps F L 4 laps     Cane Seated Overhead Shoulder Flexion 2x10 3# cuff 2x10 3# cuff 2x10 3# cuff 2x10 3# cuff   Seated Chin Tuck 2x10 2x10 2x10 2x10   Romberg Balance on Foam with Perturbations 3x30\" FTEO on foam Mod tandem 2x30\" Mod tandem 2x30\" 2x30\" b/l mod tandem   Biodex LOS       [employer] Weaving       Step Taps       Wall Slide       Ther Ex       UBE  4'/'    Rows - Seated 3x12 15# 3x12 15# 3x12 15# 3x12 15#   Shoulder Extensions 3x10 10# seated 3x10 10# seated 3x10 13# seated 3x10 13# seated   Seated Trunk Rotations 15x b/l 10# 15x b/l 10# 15 b/l 10# 15x b/l 10#   Sit to Stands 2x10 on foam 2x10 on foam 2x10 on foam 2x10 on foam   Leg Press       Standing Heel Raises 20x on foam 20x on foam 2x10 on foam 2x10 on foam   Suitcase Carry        Ther " Activity                     Gait Training                     Modalities

## 2025-04-11 ENCOUNTER — TELEPHONE (OUTPATIENT)
Dept: FAMILY MEDICINE CLINIC | Facility: CLINIC | Age: 70
End: 2025-04-11

## 2025-04-11 NOTE — TELEPHONE ENCOUNTER
Patient called the RX Refill Line. Message is being forwarded to the office.     Patient is requesting a refill for clonazepam 1 mg 1 tablet 2 times a day. The medication is not on patients current medication list. Please review. Patient stated that he takes the medication for anxiety. Patient uses [facility] - [place], [place] - [address]     Please contact patient at [phone] with any further questions

## 2025-04-11 NOTE — TELEPHONE ENCOUNTER
Patient is calling regarding cancelling an appointment.    Date/Time:  4/11/25 at 10:00 AM    Patient was rescheduled: YES [] NO [x]    Patient requesting call back to reschedule: YES [x] NO []

## 2025-04-14 ENCOUNTER — HOSPITAL ENCOUNTER (OUTPATIENT)
Dept: RADIOLOGY | Facility: HOSPITAL | Age: 70
Discharge: HOME | End: 2025-04-14
Payer: COMMERCIAL

## 2025-04-14 DIAGNOSIS — R13.10 DYSPHAGIA, UNSPECIFIED TYPE: ICD-10-CM

## 2025-04-14 DIAGNOSIS — F41.9 ANXIETY: Primary | ICD-10-CM

## 2025-04-14 PROCEDURE — 74220 X-RAY XM ESOPHAGUS 1CNTRST: CPT

## 2025-04-14 RX ORDER — CLONAZEPAM 0.5 MG/1
0.5 TABLET, ORALLY DISINTEGRATING ORAL 2 TIMES DAILY
Qty: 30 TABLET | Refills: 0 | Status: SHIPPED | OUTPATIENT
Start: 2025-04-14 | End: 2025-04-22 | Stop reason: SDUPTHER

## 2025-04-15 ENCOUNTER — OFFICE VISIT (OUTPATIENT)
Dept: PHYSICAL THERAPY | Facility: REHABILITATION | Age: 70
End: 2025-04-15
Payer: COMMERCIAL

## 2025-04-15 DIAGNOSIS — R26.2 AMBULATORY DYSFUNCTION: Primary | ICD-10-CM

## 2025-04-15 PROCEDURE — 97112 NEUROMUSCULAR REEDUCATION: CPT

## 2025-04-15 PROCEDURE — 97110 THERAPEUTIC EXERCISES: CPT

## 2025-04-15 NOTE — PROGRESS NOTES
"Daily Note     Today's date: 4/15/2025  Patient name: Edgar Obando  : 1955  MRN: [id number]  Referring provider: Gilma Swift PA-C  Dx:   Encounter Diagnosis     ICD-10-CM    1. Ambulatory dysfunction  R26.2             Start Time: 1205  Stop Time: 1254  Total time in clinic (min): 49 minutes    Subjective: Patient reports continue neuropathy pain. Patient declines reaching out to health care provider due medication refusal.       Objective: See treatment diary below      Assessment: Tolerated treatment well. Patient would benefit from continued PT.  Pt. able to complete all exercises with no increase in pain during or after session. Patient and I discussed the relationship of sugar management and neuropathy. Patient states he has been trialing intermittent fasting for 3 weeks with great compliance so far. Pt noted increased fatigue today, but able to complete all exercises without complaint.       Plan: Continue per plan of care.        POC expires PT/OT + Visit Limit?   2025  BOMN       Visit/Unit Tracking  AUTH Status:  Date 3/17 3/20 3/25 3/27 4/8 4/10 4/15   [employer] MC Used 4 5 6 7 8 9 10          Precautions: History of anxiety and depression, history of short term memory loss, fall risk, deconditioning, post ACDF for cervical myelopathy       Manuals 4/2 4/4 4/8 4/10 4/15                                   Neuro Re-Ed        Patient Ed        Nancy Negotiation 3 laps F L 4 laps      Cane Seated Overhead Shoulder Flexion 2x10 3# cuff 2x10 3# cuff 2x10 3# cuff 2x10 3# cuff 2x10 3# cuff   Seated Chin Tuck 2x10 2x10 2x10 2x10 2x10   Romberg Balance on Foam with Perturbations 3x30\" FTEO on foam Mod tandem 2x30\" Mod tandem 2x30\" 2x30\" b/l mod tandem 3x30\" b/l mod tandem   Biodex LOS        [employer] Weaving        Step Taps        Wall Slide        Ther Ex        UBE '/'    Rows - Seated 3x12 15# 3x12 15# 3x12 15# 3x12 15# 3x12 15#   Shoulder Extensions 3x10 10# seated 3x10 10# seated 3x10 " 13# seated 3x10 13# seated 3x10 13# seated   Seated Trunk Rotations 15x b/l 10# 15x b/l 10# 15 b/l 10# 15x b/l 10# 15x b/l 10#   Sit to Stands 2x10 on foam 2x10 on foam 2x10 on foam 2x10 on foam 2x10 on foam   Leg Press        Standing Heel Raises 20x on foam 20x on foam 2x10 on foam 2x10 on foam 3x10 on foam   Suitcase Carry         Ther Activity                        Gait Training                        Modalities

## 2025-04-16 ENCOUNTER — TELEPHONE (OUTPATIENT)
Age: 70
End: 2025-04-16

## 2025-04-16 NOTE — TELEPHONE ENCOUNTER
Called and spoke w/pt and scheduled for 4/28/25 at 1115 w/Dr Richardson. Pt is having pain on right side of neck w/knot in right side of neck. He is using heat and muscle relaxers that just don't seem to work. Would like something called in for pain. Advised to continue OTC pain meds. Pt states he understands that is what they suggest. CB if needed.

## 2025-04-16 NOTE — TELEPHONE ENCOUNTER
Caller: Patient    Doctor: Dr. Richardson    Reason for call: Patient is calling stating he is still having pain in the back of neck down to shoulders, primarily on right side. He is having a hard time swallowing his food. He would like advice on what to do. He did have an appt scheduled 4/11 but had to cancel    Call back#: [phone]

## 2025-04-16 NOTE — TELEPHONE ENCOUNTER
CLM on  that returning pt's call. Await CB. Reviewed chart and note pt cancelled last two appts w/Dr Richardson on 4/9/25 and 4/11/25. Pt also has had a swallowing study done that was ordered by his PCP-will need to f/u w/PCP.

## 2025-04-17 ENCOUNTER — OFFICE VISIT (OUTPATIENT)
Dept: PHYSICAL THERAPY | Facility: REHABILITATION | Age: 70
End: 2025-04-17
Payer: COMMERCIAL

## 2025-04-17 ENCOUNTER — EVALUATION (OUTPATIENT)
Dept: SPEECH THERAPY | Facility: CLINIC | Age: 70
End: 2025-04-17
Attending: ORTHOPAEDIC SURGERY
Payer: COMMERCIAL

## 2025-04-17 DIAGNOSIS — R13.14 PHARYNGOESOPHAGEAL DYSPHAGIA: Primary | ICD-10-CM

## 2025-04-17 DIAGNOSIS — Z98.1 S/P CERVICAL SPINAL FUSION: ICD-10-CM

## 2025-04-17 DIAGNOSIS — R26.2 AMBULATORY DYSFUNCTION: Primary | ICD-10-CM

## 2025-04-17 PROCEDURE — 97112 NEUROMUSCULAR REEDUCATION: CPT

## 2025-04-17 PROCEDURE — 97110 THERAPEUTIC EXERCISES: CPT

## 2025-04-17 NOTE — PROGRESS NOTES
"Daily Note     Today's date: 2025  Patient name: Edgar Obando  : 1955  MRN: [id number]  Referring provider: Gilma Swift PA-C  Dx:   Encounter Diagnosis     ICD-10-CM    1. Ambulatory dysfunction  R26.2                      Subjective: Pt. reports no change in status upon arrival.  Pt states he has some improvement with feeling in feet, but had a difficult night sleeping last night.      Objective: See treatment diary below      Assessment: Tolerated treatment well. Patient would benefit from continued PT.  Pt. able to complete all exercises with no increase in pain during or after session.  Pt challenged with balance exercises this session, required some mild assist.  Pt. 1:1 with PTA for entirety.      Plan: Continue per plan of care.        POC expires PT/OT + Visit Limit?   2025  BOMN       Visit/Unit Tracking  AUTH Status:  Date 3/17 3/20 3/25 3/27 4/8 4/10 4/15 4/17   [employer] MC Used 4 5 6 7 8 9 10 11          Precautions: History of anxiety and depression, history of short term memory loss, fall risk, deconditioning, post ACDF for cervical myelopathy       Manuals 4/2 4/4 4/8 4/10 4/15 4/17                                       Neuro Re-Ed         Patient Ed         Nancy Negotiation 3 laps F L 4 laps       Cane Seated Overhead Shoulder Flexion 2x10 3# cuff 2x10 3# cuff 2x10 3# cuff 2x10 3# cuff 2x10 3# cuff 2x10 3# cuff   Seated Chin Tuck 2x10 2x10 2x10 2x10 2x10 2x10   Romberg Balance on Foam with Perturbations 3x30\" FTEO on foam Mod tandem 2x30\" Mod tandem 2x30\" 2x30\" b/l mod tandem 3x30\" b/l mod tandem 3x30\" b/l mod tandem   Biodex LOS         [employer]         Step Taps         Wall Slide         Ther Ex         UBE ''    Rows - Seated 3x12 15# 3x12 15# 3x12 15# 3x12 15# 3x12 15# 3x12 15#   Shoulder Extensions 3x10 10# seated 3x10 10# seated 3x10 13# seated 3x10 13# seated 3x10 13# seated 3x10 13# seated   Seated Trunk Rotations 15x b/l 10# 15x b/l 10# 15 b/l " 10# 15x b/l 10# 15x b/l 10# 15x b/l 10#   Sit to Stands 2x10 on foam 2x10 on foam 2x10 on foam 2x10 on foam 2x10 on foam 2x10 on foam   Leg Press         Standing Heel Raises 20x on foam 20x on foam 2x10 on foam 2x10 on foam 3x10 on foam 3x10 on foam   Suitcase Carry          Ther Activity                           Gait Training                           Modalities

## 2025-04-17 NOTE — PROGRESS NOTES
Speech-Language Pathology Initial Evaluation    Today's date: 2025   Patient’s name: Edgar Obando  : 1955  MRN: [id number]  Safety measures: Hx of ACDF, fall risk  Referring provider: Conrad Richardson MD    Encounter Diagnosis     ICD-10-CM    1. Pharyngoesophageal dysphagia  R13.14       2. S/P cervical spinal fusion  Z98.1 Ambulatory Referral to Speech Therapy          Assessment:   Patient with reported dysphagia and dysphonia since his ACDF surgery (May 2024). Voice symptoms have increased over the past few months and may have been exacerbated by GERD related symptoms and respiratory challenges due to pneumonia. During today's evaluation, patient's voice is moderately dysphonic.  Today's evaluation focused on testing of swallow function. During evaluation, no signs/symptoms of aspiration were noted. Recommend referral for video swallow study to evaluate swallow function fully.     Recommend patient participate in speech therapy to target dysphonia and any noted challenges based on findings of video swallow study. Plan of care was discussed with the patient and he is in agreement at this time.       -Factors affecting performance: None    -Safety concerns: Risk for aspiration and Risk for choking    -Risk factors: GERD and Other ACDF surgery      SWALLOWING SAFETY PRECAUTIONS:  -Recommended solids: Regular - due to missing back molars, recommend moist meats with gravy/sauce    -Recommended liquids: Thin    -Recommended medication form: 1 at a time with water    -Frequent/thorough oral care     -Aspiration precautions   *Monitor for signs/symptoms concerning for aspiration (e.g., low grade fever, increase in WBC, change in chest x-ray, increased congestion, increased coughing with P.O. intake)    -Reflux precautions     -Strategies: Small sips and bites when eating, Slow rate, swallow between bites, and Alternate liquids and solids      -Positioning: Upright position during meals and Upright  position at least 30 minutes after P.O. intake    -Compensatory strategies: Effortful swallow    -Supervision: Independent     -Referrals: Videofluroscopic Swallow Study, Gastroenterology, and Pulmonology      Short-term goals:  Patient will participate in a voice evaluation to further assessment degree of dysphonia and interventions required for rehabilitation, to be achieved in 1 to 2 weeks.       Long-term goals:  Patient will receive a videofluoroscopic swallow study (VFSS) to fully assess physiology and anatomy of the swallow and to determine the appropriate diet and/or rehabilitation exercises by discharge.       Plan  Patient would benefit from outpatient skilled Speech Therapy services: Voice therapy    Frequency: 1-2x weekly  Duration: 6-8 weeks    Intervention certification from: 4/17/25  Intervention certification to: 7/17/25      Subjective  History of present illness: Patient is a 69 y.o. male who was referred to outpatient skilled Speech Therapy services for a dysphagia evaluation.     Patient has a prior medical history of pneumonia, GERD, SUSY, HTN, anxiety, CPAP, chronic fatigue, chronic pain syndrome, respiratory failure (see history for full list).    4/14/25 Results of Video Esophagram:    FINDINGS:     Incidentally noted was tracheobronchial aspiration with upright swallows.     There is elevation of the left hemidiaphragm.     Normal caliber of the esophagus. Primary peristalsis is normal and emptying of contrast from the esophagus is prompt. There were mild to nonperistaltic contractions. No mucosal lesion, ulceration, or evidence of fold thickening.     Gastroesophageal reflux was not observed.     There is mild angulation of the gastroesophageal junction secondary to the elevated hemidiaphragm. No stricture or obstruction. No hiatal hernia.     IMPRESSION:     1.  Tracheobronchial aspiration. Follow-up evaluation with speech therapy is recommended.     2.  Elevated left hemidiaphragm  "resulting in angulated position of the GE junction. No stricture or obstruction.     3.  Mild nonperistaltic contractions.     3/21/25 Visit with Dr. Win (PCP) - Patient with history of cervical spinal fusion surgery (May 2024). Reports difficulty swallowing pills and food. He is taking PPIs for GERD. His hoarseness has worsened.     4/11/25 Visit with Dr. Victoria (ENT) - \"Larynx: True vocal folds mobile, no masses or lesions, widely patent glottic chink, arytenoids erythema and evidence of PND. \"    During today's evaluation, patient reports that voice and swallow difficulties have been since his spinal fusion surgery since May 2024.    His voice has gotten worse over the past few months.    He had stopped his reflux meds and had rebound reflux. He is now back on medication. No current reflux symptoms. He will every once in a while feel pressure in his chest.    Dysphagia symptoms he is noting: foods will get stuck in his throat. He will drink a lot of water and it will either go down or come back up. He has trouble with his pills but he also takes them all at once. Some are larger.    Patient's goal(s): To improve his voice and to be able to swallow in a way that he doesn't have to worry about something getting stuck in his throat.    Pain: Present 8 - right side of his neck that radiates into his shoulder - it is a 10 most of the time (from when he fell).      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    -Eating Assessment Tool (EAT-10) Swallowing Screening Tool is a patient self-assessment tool that rates the percieved impairment a swallowing disorder has on the Functional, Physical, and Emotional aspects of one's life.  EAT-10 score: 25/40    -Difficulty swallowing: Solids and Pills    -Current diet (solids): Regular  -Current diet (liquids): Thin  -Current pill intake method: with water - all pills taken at the same time  -Alternative Feeding Method?: No    -Facial appearance Symmetrical   -Mandible function Adequate ROM   -Dentition Adequate and some missing molars on the upper and lower teeth   -Labial function WFL   -Lingual function WFL   -Velar function Symmetrical   -Oral apraxia? Absent   -Vocal quality Breathy, Dysphonic, Hoarse, and Rough   -Volitional cough Strong/productive   -Respiration WFL   -Drooling? No   -Tremor/involuntary movement? Not present     LIQUID CONSISTENCY TESTING:   Item(s) tested: (Thin) - water     Administered by: Cup and Self-fed    Clinical findings:  -Oral phase impairments: N/A, patient WFL  -Pharyngeal phase impairments: N/A, patient WFL    Other notes: N/A    Strategies, attempts, and responses: N/A      SOLID CONSISTENCY TESTING:  Item(s) tested: (Regular, Mechanical Soft, Mixed, and Puree) - pretzels, soft fruit bar, mandarin oranges in thin liquid juice, and applesauce    Administered by: Spoon and Self-fed    Clinical findings:  -Oral phase impairments: N/A, patient WFL  -Pharyngeal phase impairments: N/A, patient WFL    Other notes: N/A    Strategies, attempts, and responses: N/A          Visit tracking:    Re-eval Due Auth Expiration Date ST Visit Limit   7/17/25 12/31/25 BOMN                       Visit/Unit Tracking  AUTH Status:  Date 4/17/25   No authorization required Used 1    Remaining  BOMN       Intervention comments:  45 minutes of swallow evaluation time

## 2025-04-18 PROCEDURE — 92610 EVALUATE SWALLOWING FUNCTION: CPT | Performed by: SPEECH-LANGUAGE PATHOLOGIST

## 2025-04-22 ENCOUNTER — EVALUATION (OUTPATIENT)
Dept: PHYSICAL THERAPY | Facility: REHABILITATION | Age: 70
End: 2025-04-22
Payer: COMMERCIAL

## 2025-04-22 ENCOUNTER — OFFICE VISIT (OUTPATIENT)
Dept: FAMILY MEDICINE CLINIC | Facility: CLINIC | Age: 70
End: 2025-04-22
Payer: COMMERCIAL

## 2025-04-22 VITALS
HEIGHT: 66 IN | HEART RATE: 70 BPM | BODY MASS INDEX: 39.5 KG/M2 | SYSTOLIC BLOOD PRESSURE: 100 MMHG | OXYGEN SATURATION: 97 % | WEIGHT: 245.8 LBS | RESPIRATION RATE: 20 BRPM | DIASTOLIC BLOOD PRESSURE: 50 MMHG | TEMPERATURE: 97.8 F

## 2025-04-22 DIAGNOSIS — F41.9 ANXIETY: ICD-10-CM

## 2025-04-22 DIAGNOSIS — Z98.1 S/P CERVICAL SPINAL FUSION: Primary | ICD-10-CM

## 2025-04-22 DIAGNOSIS — R26.2 AMBULATORY DYSFUNCTION: Primary | ICD-10-CM

## 2025-04-22 DIAGNOSIS — M54.6 PAIN IN THORACIC SPINE: ICD-10-CM

## 2025-04-22 DIAGNOSIS — M47.812 CERVICAL SPONDYLOSIS: ICD-10-CM

## 2025-04-22 DIAGNOSIS — M89.8X9 BONY GROWTH: ICD-10-CM

## 2025-04-22 PROCEDURE — G2211 COMPLEX E/M VISIT ADD ON: HCPCS | Performed by: FAMILY MEDICINE

## 2025-04-22 PROCEDURE — 97164 PT RE-EVAL EST PLAN CARE: CPT | Performed by: PHYSICAL THERAPIST

## 2025-04-22 PROCEDURE — 97110 THERAPEUTIC EXERCISES: CPT | Performed by: PHYSICAL THERAPIST

## 2025-04-22 PROCEDURE — 99214 OFFICE O/P EST MOD 30 MIN: CPT | Performed by: FAMILY MEDICINE

## 2025-04-22 PROCEDURE — 97112 NEUROMUSCULAR REEDUCATION: CPT | Performed by: PHYSICAL THERAPIST

## 2025-04-22 RX ORDER — OXYCODONE AND ACETAMINOPHEN 5; 325 MG/1; MG/1
1 TABLET ORAL 2 TIMES DAILY PRN
Qty: 15 TABLET | Refills: 0 | Status: SHIPPED | OUTPATIENT
Start: 2025-04-22 | End: 2025-05-01 | Stop reason: SDUPTHER

## 2025-04-22 RX ORDER — METHOCARBAMOL 750 MG/1
750 TABLET, FILM COATED ORAL EVERY 8 HOURS SCHEDULED
Qty: 90 TABLET | Refills: 1 | Status: SHIPPED | OUTPATIENT
Start: 2025-04-22

## 2025-04-22 RX ORDER — CLONAZEPAM 1 MG/1
1 TABLET, ORALLY DISINTEGRATING ORAL 2 TIMES DAILY
Qty: 60 TABLET | Refills: 0 | Status: SHIPPED | OUTPATIENT
Start: 2025-04-22

## 2025-04-22 NOTE — ASSESSMENT & PLAN NOTE
Orders:    oxyCODONE-acetaminophen (Percocet) 5-325 mg per tablet; Take 1 tablet by mouth 2 (two) times a day as needed for moderate pain Max Daily Amount: 2 tablets

## 2025-04-22 NOTE — PROGRESS NOTES
PT Re-Evaluation     Today's date: 2025  Patient name: Edgar Obando  : 1955  MRN: [id number]  Referring provider: Gilma Swift PA-C  Dx:   Encounter Diagnosis     ICD-10-CM    1. Ambulatory dysfunction  R26.2                      Subjective: I am having quite a bit of neck pain today. Just feel tight and very stiff.     Current Pain Ratin/10      Objective: See treatment diary below    TU seconds with SPC     DGI:     Balance:                                   Romberg Eyes Open: 30 seconds          Romberg on Foam Eyes Open: 20   Romberg Eyes Closed: 12 seconds          Half Tandem Right in Front: 15 second                                                                                                                                     Cervical Spine AR  Flexion: Min loss    Extension: Mod loss       Rotation Right: Mod los      Assessment: Edgar Obando is a 69 year-old male who presents to physical therapy due to post-operative impairments following an ACDF for cervical myelopathy. He previously was in physical therapy for the same impairments following surgery, but had to stop due to other health complications. Since initiating physical therapy again, he has made some marginal improvements in his current impairments, but continues to exhibit poor range of motion, decresed conditioning globally, poor gait, poor balance, and poor strength. He would continue to benefit from skilled PT at this time to address his impairments, improve his quality of life, and promote optimal function.     Goals - NOT MET  Impairment Based Goals:  1. Decreased pain by 50% in 4 weeks.  2. Improve ROM to WFL by discharge.   3. Improve strength by 1/2 measure in 6 weeks.   4. Improve FOTO greater than predicted outcome score by discharge.      Functional Based Goals: To be met upon discharge  1. Patient will reach goal of being able to ambulate in his community independently for at least 15 minutes.   2.  "Patient will be fully independent with HEP by discharge  3. Patient will be able to manage symptoms independently.     Plan: Continue per plan of care.    POC beginning Date: 4/22/25  POC Expiration Date: 6/22/25    POC expires PT/OT + Visit Limit?   5/6/2025  BOMN       Visit/Unit Tracking  AUTH Status:  Date 3/17 3/20 3/25 3/27 4/8 4/10 4/15 4/17   [employer] Used 4 5 6 7 8 9 10 11          Precautions: History of anxiety and depression, history of short term memory loss, fall risk, deconditioning, post ACDF for cervical myelopathy       Manuals 4/22                   Neuro Re-Ed    Patient Ed    Nancy Negotiation    Cane Seated Overhead Shoulder Flexion 2x10 3# cuff   Seated Chin Tuck 2x10   Romberg Balance on Foam with Perturbations 3x30\" b/l mod tandem   Biodex LOS    Cone Weaving    Step Taps    Wall Slide    Ther Ex    UBE 4/4   Rows - Seated 3x12 15#   Shoulder Extensions 3x10 13# seated   Seated Trunk Rotations 15x b/l 10#   Sit to Stands 2x10 on foam   Leg Press    Standing Heel Raises 3x10 on foam   Suitcase Carry     Ther Activity            Gait Training            Modalities                                   "

## 2025-04-22 NOTE — ASSESSMENT & PLAN NOTE
Refill provided.  Orders:    clonazePAM (KlonoPIN) 1 MG disintegrating tablet; Take 1 tablet (1 mg total) by mouth 2 (two) times a day

## 2025-04-22 NOTE — PROGRESS NOTES
Name: Edgar Obando      : 1955      MRN: [id number]  Encounter Provider: Ana Rosa Win MD  Encounter Date: 2025   Encounter department: [facility]  :  Assessment & Plan  Anxiety  Refill provided.  Orders:  •  clonazePAM (KlonoPIN) 1 MG disintegrating tablet; Take 1 tablet (1 mg total) by mouth 2 (two) times a day    S/P cervical spinal fusion  Patient with ongoing pain in the cervical and thoracic region despite spinal fusion in May.  Will get imaging and provide a short course of pain management.  Patient has appointment with orthopedics next week.  Orders:  •  oxyCODONE-acetaminophen (Percocet) 5-325 mg per tablet; Take 1 tablet by mouth 2 (two) times a day as needed for moderate pain Max Daily Amount: 2 tablets    Cervical spondylosis    Orders:  •  oxyCODONE-acetaminophen (Percocet) 5-325 mg per tablet; Take 1 tablet by mouth 2 (two) times a day as needed for moderate pain Max Daily Amount: 2 tablets    Body mass index (BMI) 40.0-44.9, adult (HCC)  Patient is trying to ambulate but pain is limiting.  He is watching his diet.  Continue Ozempic       Bony growth  Bony growth felt about the sternum.  Will get x-ray to further characterize  Orders:  •  XR ribs bilateral 4+ vw w pa chest; Future    Pain in thoracic spine  Patient with ongoing pain despite cervical spine surgery in May 2023.  Will switch muscle relaxer to Robaxin and provide a short course of opioids.  Patient has appointment with orthopedist next week  Orders:  •  methocarbamol (Robaxin-750) 750 mg tablet; Take 1 tablet (750 mg total) by mouth every 8 (eight) hours  •  oxyCODONE-acetaminophen (Percocet) 5-325 mg per tablet; Take 1 tablet by mouth 2 (two) times a day as needed for moderate pain Max Daily Amount: 2 tablets  •  XR spine thoracic 2 vw; Future          Falls Plan of Care: balance, strength, and gait training instructions were provided. Recommended assistive device to help with gait and balance.  "Medications that increase falls were reviewed.   History of Present Illness   Presents with pain in the neck and difficulty swallowing   Had swallowing eval and didn't show reflux but did aspiratorion   Feet are bothersome  Has an appt next week with orthopedic  Surgery was performed in May 2024.  Expected not to have pain this far out.  He is still doing physical therapy.  He also started speech therapy.  He has tried gabapentin and Lyrica in the past with little relief.  Tylenol and Zanaflex also ineffective.         Review of Systems   Constitutional:  Positive for fatigue.   HENT:  Positive for sore throat, trouble swallowing and voice change.    Respiratory:  Positive for cough.    Musculoskeletal:  Positive for back pain, gait problem (chronic), neck pain and neck stiffness.   Neurological:  Positive for numbness. Negative for weakness.   Psychiatric/Behavioral:  Positive for dysphoric mood (due to pain) and sleep disturbance.        Objective   /50 (BP Location: Left arm, Patient Position: Sitting, Cuff Size: Large)   Pulse 70   Temp 97.8 °F (36.6 °C) (Temporal)   Resp 20   Ht 5' 6\" (1.676 m)   Wt 111 kg (245 lb 12.8 oz)   SpO2 97%   BMI 39.67 kg/m²      Physical Exam  Vitals reviewed.   Constitutional:       General: He is in acute distress (due to pain).   HENT:      Head: Normocephalic and atraumatic.   Eyes:      Extraocular Movements: Extraocular movements intact.   Pulmonary:      Effort: Pulmonary effort is normal.   Musculoskeletal:      Cervical back: Rigidity and tenderness present. Pain with movement and muscular tenderness present. Decreased range of motion.      Thoracic back: Spasms and tenderness present. No swelling or edema. Decreased range of motion.        Back:    Skin:     General: Skin is warm.   Neurological:      Mental Status: He is oriented to person, place, and time.      Sensory: No sensory deficit.      Motor: No weakness.      Gait: Gait abnormal.   Psychiatric:    "      Mood and Affect: Mood normal.         Behavior: Behavior normal.

## 2025-04-22 NOTE — ASSESSMENT & PLAN NOTE
Patient with ongoing pain in the cervical and thoracic region despite spinal fusion in May.  Will get imaging and provide a short course of pain management.  Patient has appointment with orthopedics next week.  Orders:    oxyCODONE-acetaminophen (Percocet) 5-325 mg per tablet; Take 1 tablet by mouth 2 (two) times a day as needed for moderate pain Max Daily Amount: 2 tablets

## 2025-04-23 ENCOUNTER — APPOINTMENT (OUTPATIENT)
Dept: LAB | Facility: CLINIC | Age: 70
End: 2025-04-23
Payer: COMMERCIAL

## 2025-04-23 ENCOUNTER — TRANSCRIBE ORDERS (OUTPATIENT)
Dept: LAB | Facility: CLINIC | Age: 70
End: 2025-04-23

## 2025-04-23 ENCOUNTER — HOSPITAL ENCOUNTER (OUTPATIENT)
Dept: RADIOLOGY | Facility: HOSPITAL | Age: 70
Discharge: HOME | End: 2025-04-23
Payer: COMMERCIAL

## 2025-04-23 DIAGNOSIS — M54.6 PAIN IN THORACIC SPINE: ICD-10-CM

## 2025-04-23 DIAGNOSIS — I10 ESSENTIAL HYPERTENSION, MALIGNANT: ICD-10-CM

## 2025-04-23 DIAGNOSIS — Z79.899 POLYPHARMACY: ICD-10-CM

## 2025-04-23 DIAGNOSIS — M89.8X9 BONY GROWTH: ICD-10-CM

## 2025-04-23 DIAGNOSIS — J44.9 VANISHING LUNG (HCC): ICD-10-CM

## 2025-04-23 LAB
25(OH)D3 SERPL-MCNC: 53.5 NG/ML (ref 30–100)
ALBUMIN SERPL BCG-MCNC: 4 G/DL (ref 3.5–5)
ALP SERPL-CCNC: 87 U/L (ref 34–104)
ALT SERPL W P-5'-P-CCNC: 12 U/L (ref 7–52)
ANION GAP SERPL CALCULATED.3IONS-SCNC: 12 MMOL/L (ref 4–13)
AST SERPL W P-5'-P-CCNC: 19 U/L (ref 13–39)
BASOPHILS # BLD AUTO: 0.02 THOUSANDS/ÂΜL (ref 0–0.1)
BASOPHILS NFR BLD AUTO: 0 % (ref 0–1)
BILIRUB SERPL-MCNC: 0.98 MG/DL (ref 0.2–1)
BNP SERPL-MCNC: 66 PG/ML (ref 0–100)
BUN SERPL-MCNC: 39 MG/DL (ref 5–25)
CALCIUM SERPL-MCNC: 9.5 MG/DL (ref 8.4–10.2)
CHLORIDE SERPL-SCNC: 91 MMOL/L (ref 96–108)
CHOLEST SERPL-MCNC: 123 MG/DL (ref ?–200)
CO2 SERPL-SCNC: 32 MMOL/L (ref 21–32)
CREAT SERPL-MCNC: 1.86 MG/DL (ref 0.6–1.3)
CREAT UR-MCNC: 78.4 MG/DL
EOSINOPHIL # BLD AUTO: 0.21 THOUSAND/ÂΜL (ref 0–0.61)
EOSINOPHIL NFR BLD AUTO: 4 % (ref 0–6)
ERYTHROCYTE [DISTWIDTH] IN BLOOD BY AUTOMATED COUNT: 13.9 % (ref 11.6–15.1)
FOLATE SERPL-MCNC: >22.3 NG/ML
GFR SERPL CREATININE-BSD FRML MDRD: 36 ML/MIN/1.73SQ M
GLUCOSE P FAST SERPL-MCNC: 103 MG/DL (ref 65–99)
HCT VFR BLD AUTO: 35.4 % (ref 36.5–49.3)
HDLC SERPL-MCNC: 54 MG/DL
HGB BLD-MCNC: 10.7 G/DL (ref 12–17)
IMM GRANULOCYTES # BLD AUTO: 0.02 THOUSAND/UL (ref 0–0.2)
IMM GRANULOCYTES NFR BLD AUTO: 0 % (ref 0–2)
LDLC SERPL CALC-MCNC: 55 MG/DL (ref 0–100)
LYMPHOCYTES # BLD AUTO: 1.5 THOUSANDS/ÂΜL (ref 0.6–4.47)
LYMPHOCYTES NFR BLD AUTO: 27 % (ref 14–44)
MCH RBC QN AUTO: 27.8 PG (ref 26.8–34.3)
MCHC RBC AUTO-ENTMCNC: 30.2 G/DL (ref 31.4–37.4)
MCV RBC AUTO: 92 FL (ref 82–98)
MICROALBUMIN UR-MCNC: 7.5 MG/L
MICROALBUMIN/CREAT 24H UR: 10 MG/G CREATININE (ref 0–30)
MONOCYTES # BLD AUTO: 0.58 THOUSAND/ÂΜL (ref 0.17–1.22)
MONOCYTES NFR BLD AUTO: 11 % (ref 4–12)
NEUTROPHILS # BLD AUTO: 3.15 THOUSANDS/ÂΜL (ref 1.85–7.62)
NEUTS SEG NFR BLD AUTO: 58 % (ref 43–75)
NONHDLC SERPL-MCNC: 69 MG/DL
NRBC BLD AUTO-RTO: 0 /100 WBCS
PLATELET # BLD AUTO: 184 THOUSANDS/UL (ref 149–390)
PMV BLD AUTO: 11.1 FL (ref 8.9–12.7)
POTASSIUM SERPL-SCNC: 4.1 MMOL/L (ref 3.5–5.3)
PROT SERPL-MCNC: 7.8 G/DL (ref 6.4–8.4)
PTH-INTACT SERPL-MCNC: 94.5 PG/ML (ref 12–88)
RBC # BLD AUTO: 3.85 MILLION/UL (ref 3.88–5.62)
SODIUM SERPL-SCNC: 135 MMOL/L (ref 135–147)
TRIGL SERPL-MCNC: 71 MG/DL (ref ?–150)
TSH SERPL DL<=0.05 MIU/L-ACNC: 1.96 UIU/ML (ref 0.45–4.5)
VIT B12 SERPL-MCNC: 908 PG/ML (ref 180–914)
WBC # BLD AUTO: 5.48 THOUSAND/UL (ref 4.31–10.16)

## 2025-04-23 PROCEDURE — 36415 COLL VENOUS BLD VENIPUNCTURE: CPT

## 2025-04-23 PROCEDURE — 85025 COMPLETE CBC W/AUTO DIFF WBC: CPT

## 2025-04-23 PROCEDURE — 83970 ASSAY OF PARATHORMONE: CPT

## 2025-04-23 PROCEDURE — 82103 ALPHA-1-ANTITRYPSIN TOTAL: CPT

## 2025-04-23 PROCEDURE — 87522 HEPATITIS C REVRS TRNSCRPJ: CPT

## 2025-04-23 PROCEDURE — 82570 ASSAY OF URINE CREATININE: CPT

## 2025-04-23 PROCEDURE — 82607 VITAMIN B-12: CPT

## 2025-04-23 PROCEDURE — 84443 ASSAY THYROID STIM HORMONE: CPT

## 2025-04-23 PROCEDURE — 83880 ASSAY OF NATRIURETIC PEPTIDE: CPT

## 2025-04-23 PROCEDURE — 82306 VITAMIN D 25 HYDROXY: CPT

## 2025-04-23 PROCEDURE — 82746 ASSAY OF FOLIC ACID SERUM: CPT

## 2025-04-23 PROCEDURE — 80053 COMPREHEN METABOLIC PANEL: CPT

## 2025-04-23 PROCEDURE — 72070 X-RAY EXAM THORAC SPINE 2VWS: CPT

## 2025-04-23 PROCEDURE — 86803 HEPATITIS C AB TEST: CPT

## 2025-04-23 PROCEDURE — 80061 LIPID PANEL: CPT

## 2025-04-23 PROCEDURE — 82043 UR ALBUMIN QUANTITATIVE: CPT

## 2025-04-23 PROCEDURE — 71111 X-RAY EXAM RIBS/CHEST4/> VWS: CPT

## 2025-04-24 ENCOUNTER — OFFICE VISIT (OUTPATIENT)
Dept: PHYSICAL THERAPY | Facility: REHABILITATION | Age: 70
End: 2025-04-24
Payer: COMMERCIAL

## 2025-04-24 DIAGNOSIS — R26.2 AMBULATORY DYSFUNCTION: Primary | ICD-10-CM

## 2025-04-24 LAB — A1AT SERPL-MCNC: 168 MG/DL (ref 101–187)

## 2025-04-24 PROCEDURE — 97112 NEUROMUSCULAR REEDUCATION: CPT

## 2025-04-24 PROCEDURE — 97110 THERAPEUTIC EXERCISES: CPT

## 2025-04-24 NOTE — PROGRESS NOTES
Speech-Language Pathology Re-Evaluation    Today's date: 2025   Patient’s name: Edgar Obando  : 1955  MRN: [id number]  Safety measures: Hx of ACDF, fall risk   Referring provider: Conrad Richardson MD    Encounter Diagnosis     ICD-10-CM    1. Dysphonia  R49.0       2. Pharyngoesophageal dysphagia  R13.14       3. S/P cervical spinal fusion  Z98.1           Assessment:   Today is a continuation of patient's initial evaluation, today, focusing on evaluation of voice. Patient presents with a moderate to severely dysphonic voice, characterized by roughness, strain, breathiness and reduced pitch and loudness, following his ACDF surgery in May of 2024. His dysphonia has more recently been exacerbated by symptoms related to GERD. Patient is stimulable to RVT and SOVT. Recommend a course of speech therapy to address dysphonia, provided instruction in reflux management, vocal hygiene, respiratory restraining and easy onset, forward focused resonance voice exercises. Plan of care was discussed with the patient and he is in agreement at this time.      Short-term goals:  Patient will participate in a voice evaluation to further assessment degree of dysphonia and interventions required for rehabilitation, to be achieved in 1 to 2 weeks. MET    New Voice ST Goals  Patient will be educated on vocal hygiene and demonstrate understanding of recommendations to facilitate increased quality of voice, to be achieved in 2-4 weeks.    Patient will be educated on diaphragmatic breathing (versus clavicular breathing) to establish controlled, rhythmic breathing, to be achieved in 2-4 weeks.  Diaphragmatic breathing was reviewed. Patient practiced on exhalation of sustained /s/. Tactile cueing was provided (e.g. patient stood with back against wall and hand on abdomen). Patient demonstrated some engagement of abdominal muscles.      Patient will utilize semi-occluded vocal tract exercises without laryngeal tension to promote  "healthy vocal function with 80% accuracy, to be achieved in 4-6 weeks.   Straw phonation - Patient was able to produce a forward focused voice with short bursts utilizing a wide straw with 30% accuracy.    Patient will utilize a forward tone focused/resonant voice to decrease vocal hyperfunction and improve voice quality and vocal projection, to be achieved in 4-6 weeks.   Patient demonstrated the ability to produce a resonant voice provided basic instructions to open his mouth wide on \"Ah\", elevating pitch and producing a clear voice. He was able to find resonance in his lips humming on /m/ with 50% accuracy.    Patient will produce coordinated voice production in different situations (e.g. telephone, over background noise, emotional topics, speaking at a distance) with 80% accuracy as perceptually judged by clinician.         Long Term Goals:   Patient will receive a videofluoroscopic swallow study (VFSS) to fully assess physiology and anatomy of the swallow and to determine the appropriate diet and/or rehabilitation exercises by discharge. MET - WILL BE PARTICIPATING IN THIS LATER TODAY    Patient will improve vocal quality for increased functional communication by discharge.        Plan:  Patient would benefit from outpatient skilled Speech Therapy services: Voice therapy    Frequency: 1x weekly  Duration: 6-8 weeks    Intervention certification from: 4/25/2025  Intervention certification to: 7/17/2025      Subjective:  Patient reports that he woke up feeling achy. His neck is hurting him.    Patient's goal(s): To start speaking again normally.    Pain: Present (scale: 10)      Objective (testing):  VOICE EVALUATION:    Interview:   -Chief complaint: It bothers him that he is so hoarse. His wife is a hearing challenging.  -Onset: Sudden (beginning: following ACDF surgery)    -Water intake: at least 64 oz  -Caffeine intake: tea with cleanse drink  -Alcohol intake: no  -Smoking history: yes - quit 40 years " ago  -Laryngeal demand (work, home, socially): socially  -Throat clearing: all day  -Coughing: No  -Lozenges (mentholated?): yes  -Exposure to environmental triggers (e.g., temperature changes, smoke, chemicals, allergens): No  -History: Pneumonia, Head/neck injury, Post nasal drip, Hiatal hernia, Anemia, Reflux, Dysphagia, and Surgeries (ACDF)   -Dyspnea: Sometimes - he has inhalers - he uses these consistently  -Dysphagia: Yes  -Allergy testing: No  -Nasal symptoms: post nasal drip  -GERD/LPR symptoms: yes  -Recurrent URI: No   -Previous voice therapy: No          Patient Self-Ratings:    -The Voice Handicap Index 10-Item (VHI-10) is a self-administered, 10-item outcomes instrument to quantify patients' perception of their voice handicap. The total score ranges from a 0 to 40 based on the sum of responses to 10 questions on a 5-point scale ranging from “never a problem” to “always a problem.” No Impact = 0%; Moderate Impact = 50%; Significant Impact = 100%. Patient obtained a score of 28/40 - 70% = SIGNIFICANT IMPACT (see scanned document)    Vocal Fatigue Index (VFI): These are some symptoms usually associated with voice problems. Patient indicated how frequently she has the same experience using a rating point scale (“never” = 0, “almost never” = 1, “sometimes” = 2, “almost always” = 3, and “always” = 4). Patient's results were as follows:    Part 1: 31  Part 2: 18  Part 3: 6        Objective Measurements/Voice Parameters:  RESPIRATORY EFFICIENCY:   -S:Z Ratio Task: Patient was instructed to sustain the sounds /s/ and /z/ to examine the coordination and efficiency of respiration and voice production. Normative data suggests that adults can prolong these sounds for 20-25 seconds. Ratios of 1.4 and above are consistent with laryngeal inefficiency, and ratios of 2.0 and above are suggestive of vocal fold pathology. Patient's S:Z ratio was 0.94 (6.56 sec : 6.95 sec).     -Maximum Phonation Time: Patient was  "instructed to sustain /a/ to measure respiratory and laryngeal coordination and efficiency. Adults are typically able to prolong vowels sound for 15-20 seconds. Reduced MPT may suggest poor respiratory support, or poor medial glottal closure. Patient's MPT was 8.9 seconds = REDUCED      MEASURES OF PITCH:  The Visi-Pitch IV, a computer-driven voice analysis program, was used to collect objective voice data using the Visi-Pitch Multidimensional Voice Program (MDVP) & Real Time Pitch Program (MTPP).     -Multi-Dimensional Voice Profile (MDVP): This is obtained on the phonation of the sound /a/, in which dimensions of the voice, including frequncy perturbations, amplitude perturbations, variations in F0, noise to harmonic ratio, voice turbulence Index, soft phonation Index, tremours in frequency and amplitude, degree of subharmonics, and degree of voicing apart from the frequency and amplitude, are reflected.      Normative Data:   Mean Fundamental Frequency (F0) 215.092 Hz 145.22 Hz   Jitter (RAP) 4.040% 0.345%   Shimmer 26.029% 2.523%   Ovwbt-nx-Zbmzxqoss Ratio (NHR) 0.809 0.122       -Habitual Pitch: Patient was instructed to engage in two different speaking tasks (counting and reading) to calculate the pitch he uses most frequently.     Task: Mean F0 (Hz) Min-Max Range (Hz) Normative Data:   Speaking (counting 1-10) 198.99 Hz  126.50 Hz -312.79 Hz 128 Hz (100 Hz -150 Hz)   Reading (\"[place]”) 209.05 Hz  104.87 Hz -358.43 Hz 128 Hz (100 Hz -150 Hz)       -Maximal Phonational Frequency Range: Patient was instructed to voice from lowest to highest notes.     Task Min-Max Range (Hz) Normative Data:   Gliding 97.32 Hz -657.84 Hz 77 Hz-576 Hz     Treatment:  See goals targeted above    Visit Tracking:  Re-eval Due Auth Expiration Date ST Visit Limit   7/17/25 12/31/25 BOMN                                   Visit/Unit Tracking  AUTH Status:  Date 4/17/25 4/25/25   No authorization required Used 1 2     Remaining  " BOMN BOMN         Intervention comments:  30 minutes of voice evaluation time and 15 minutes of speech treatment time

## 2025-04-24 NOTE — PROGRESS NOTES
"Daily Note     Today's date: 2025  Patient name: Edgar Obando  : 1955  MRN: [id number]  Referring provider: Gilma Swift PA-C  Dx:   Encounter Diagnosis     ICD-10-CM    1. Ambulatory dysfunction  R26.2                      Subjective: Pt. reports no change in status upon arrival.  Pt reports that he feels a \"little shaky\" today vs last time.      Objective: See treatment diary below      Assessment: Tolerated treatment well. Patient would benefit from continued PT.  Pt. able to complete all exercises with no increase in pain during or after session.  Pt. 1:1 with PTA for entirety.      Plan: Continue per plan of care.      POC beginning Date: 25  POC Expiration Date: 25    POC expires PT/OT + Visit Limit?   2025  BOMN       Visit/Unit Tracking  AUTH Status:  Date 4/10 4/15 4/17 4/24   [employer] Used 9 10 11 12          Precautions: History of anxiety and depression, history of short term memory loss, fall risk, deconditioning, post ACDF for cervical myelopathy       Manuals                        Neuro Re-Ed     Patient Ed     Nancy Negotiation     Cane Seated Overhead Shoulder Flexion 2x10 3# cuff 2x10 3# cuff   Seated Chin Tuck 2x10 2x10   Romberg Balance on Foam with Perturbations 3x30\" b/l mod tandem 3x30\" b/l mod tandem   [employer] LOS     [employer]     Step Taps     Wall Slide     Ther Ex     UBE    Rows - Seated 3x12 15# 3x12 15#   Shoulder Extensions 3x10 13# seated 3x10 13# seated   Seated Trunk Rotations 15x b/l 10# 15x b/l 10#   Sit to Stands 2x10 on foam 2x10 on foam   Leg Press     Standing Heel Raises 3x10 on foam 3x10 on foam   Suitcase Carry      Ther Activity               Gait Training               Modalities                                        "

## 2025-04-25 ENCOUNTER — HOSPITAL ENCOUNTER (OUTPATIENT)
Dept: RADIOLOGY | Facility: HOSPITAL | Age: 70
Discharge: HOME | End: 2025-04-25
Attending: ORTHOPAEDIC SURGERY
Payer: COMMERCIAL

## 2025-04-25 ENCOUNTER — EVALUATION (OUTPATIENT)
Dept: SPEECH THERAPY | Facility: CLINIC | Age: 70
End: 2025-04-25
Attending: ORTHOPAEDIC SURGERY
Payer: COMMERCIAL

## 2025-04-25 DIAGNOSIS — R49.0 DYSPHONIA: Primary | ICD-10-CM

## 2025-04-25 DIAGNOSIS — Z98.1 S/P CERVICAL SPINAL FUSION: ICD-10-CM

## 2025-04-25 DIAGNOSIS — R13.14 PHARYNGOESOPHAGEAL DYSPHAGIA: ICD-10-CM

## 2025-04-25 PROCEDURE — 92611 MOTION FLUOROSCOPY/SWALLOW: CPT

## 2025-04-25 PROCEDURE — 74230 X-RAY XM SWLNG FUNCJ C+: CPT

## 2025-04-25 PROCEDURE — 92507 TX SP LANG VOICE COMM INDIV: CPT | Performed by: SPEECH-LANGUAGE PATHOLOGIST

## 2025-04-25 PROCEDURE — 92524 BEHAVRAL QUALIT ANALYS VOICE: CPT | Performed by: SPEECH-LANGUAGE PATHOLOGIST

## 2025-04-25 NOTE — PROCEDURES
Speech Pathology - Modified Barium Swallow Study    Patient Name: Edgar Obando    Today's Date: 4/25/2025     Problem List  Active Problems:  There are no active Hospital Problems.    Past Medical History  Past Medical History:   Diagnosis Date    Acid reflux     Acute blood loss anemia 07/15/2016    Anxiety     Arthritis     Cellulitis     left ankle    CPAP (continuous positive airway pressure) dependence     as needed per patient    Diabetes mellitus (HCC)     Hypertension     Idiopathic chronic venous hypertension of right lower extremity with ulcer (HCC) 06/12/2023    Sleep apnea     Twitching      Past Surgical History  Past Surgical History:   Procedure Laterality Date    APPENDECTOMY      CERVICAL FUSION Bilateral 5/2/2024    Procedure: FUSION CERVICAL ANTERIOR W DISCECTOMY, C3-7 ACDF;  Surgeon: Conrad Ricahrdson MD;  Location: [facility];  Service: Orthopedics    CHOLECYSTECTOMY      COLONOSCOPY      IR PICC PLACEMENT DOUBLE LUMEN  5/2/2024    KNEE ARTHROSCOPY      RI ARTHRP KNE CONDYLE&PLATU MEDIAL&LAT COMPARTMENTS Left 07/13/2016    Procedure: TOTAL  KNEE REPLACEMENT ;  Surgeon: Zack Montenegro MD;  Location: [facility];  Service: Orthopedics       Assessment Summary:    Pt presents with mild pharyngeal dysphagia characterized by reduced epiglottic inversion and incomplete airway closure, as well as narrowing through the PES. Trace silent aspiration occurred with thin liquids, which was eliminated with use of head turn and chin tuck. Penetration without aspiration was seen with the remaining textures/trials. Chronic throat clearing noted throughout the study. Three was stasis of the barium tablet in the PES which cleared with additional intake of liquid. Rec continuing regular diet and thin liquids now with use of head turn R/chin tuck posture, continue working with ST and continue f/u with providers as recommended by physicians. Note: Images are available for review in PACS as  desired.    Recommendations:   Recommended Diet: regular diet and thin liquids   Recommended Form of Medications:  small pills whole with puree, crush meds as needed    Aspiration precautions and compensatory swallowing strategies: upright posture, slow rate of feeding, small bites/sips, chin tuck, and head turn right  SLP Dysphagia therapy recommended: yes, continue ST f/u for dysphagia and voice tx    Results Reviewed with: patient         General Information;  Pt is a 69 y.o. male with a PMH remarkable for C spine surgery 5/2/24, dysphonia and dysphagia reported following surgery persisting to present, however pt has not seen ST until recently. Current concerns for dysphagia include globus sensation and concern for aspiration. MBS was recommended to assess oropharyngeal stage swallowing skills at this time.       Pt was viewed sitting upright in the lateral and AP positions. Trials administered were consistent with MBSImP Validated Protocol: Pt was given 5-mL thin liquid x2, 20-mL cup sip thin, 40-mL sequential swallow thin, 5-mL nectar thick, 20-mL cup sip nectar thick, 40-mL sequential swallow nectar thick, 5-mL honey thick, 5-mL pudding, ½ cookie coated with 3-mL pudding, 5-mL nectar thick in the AP position, and 5-mL pudding in the AP position. Pt was also given thin liquids by straw, a barium tablet with pudding and thin liquid wash, as well as additional thin to trial strategies.      Initial view observations/comments: Clear view of the upper airway and Hardware visible      8-Point Penetration-Aspiration Scale   Thin liquid 8 - Material enters the airway, passes below the vocal folds, and no effort is made to eject     Nectar thick liquid 2 - Material enters the airway, remains above the vocal folds, and is ejected  from the airway   Honey thick liquid 2 - Material enters the airway, remains above the vocal folds, and is ejected  from the airway   Puree (pudding) 2 - Material enters the airway, remains  above the vocal folds, and is ejected  from the airway   Solid 2 - Material enters the airway, remains above the vocal folds, and is ejected  from the airway      Strategies and Efficacy: Head turn R with chin tuck resulted in best airway protection and eliminated aspiration with thin liquid. Chin tuck alone did not prevent aspiration.    Aspiration Response and Efficacy: Pt had intermittent throat clearing throughout the study but required cueing to cough after trace aspiration.    MBS IMP Rating    ORAL Impairment  Compinent 1--Lip Closure  Judged at any point during the swallow.  1 - Interlabial escape; no progression to anterior lip    Component 2--Tongue Control During Bolus Hold  Judged on held liquid boluses only and prior to productive tongue movement.   0 - Cohesive bolus between tongue to palatal seal    Component 3--Bolus Preparation/Mastication  Judged only during presentation of 1/2 shortbread cookie coated in pudding.   0 - Timely and efficient chewing and mashing    Component 4--Bolus Transport/Lingual Motion  Judged after first productive tongue movement for oral bolus transport.  0 - Brisk tongue motion    Component 5--Oral Residue  Judged after first swallow or after the last swallow of the sequential swallow task.  2 - Residue collection on oral structures   Location   B - Palate and C - Tongue    Component 6--Initiation of Pharyngeal Swallow  Judged at first movement of the brisk superior-anterior hyoid trajectory.  1 - Bolus Head in valleculae      PHARYNGEAL Impairment  Component 7--Soft Palate Elevation  Judged during maximum displacement of soft palate.  0 - No bolus between the soft palate (SP)/pharyngeal wall (PW)    Component 8--Laryngeal Elevation  Judged when epiglottis is in its most horizontal position.  0 - Complete superior movement of thyroid cartilage with complete approximation of arytenoids to epiglottic petiole    Component 9--Anterior Hyoid Excursion  Judged at height of  swallow/maximal anterior hyoid displacement.  0 - Complete anterior movement    Component 10--Epiglottic Movement  Judged at height of swallow/maximal anterior hyoid displacement.  2 - No inversion    Component 11--Laryngeal Vestibular Closure  Judged at height of swallow/maximal anterior hyoid displacement.  1 - Incomplete; narrow column air/contrast in laryngeal vestibule    Component 12--Pharyngeal Stripping Wave  Judged during the full duration of the pharyngeal swallow.  1 - Present - diminished    Component 13--Pharyngeal Contraction  Judged in AP view at rest and throughout maximum movement of structures.  0 - Complete    Component 14--Pharyngoesophageal Segment Opening  Judged during maximum distension of PES and throughout opening and closure.  1 - Partial distension/partial duration; partial obstruction to flow    Component 15--Tongue Base (TB) Retraction  Judged during maximum retraction of the tongue base.  1 - Trace column of contrast or air between TB and PW    Component 16--Pharyngeal Residue  Judged after first swallow or after the last swallow of the sequential swallow task.  1 - Trace residue within or on pharyngeal structures   Location   F - Diffuse (>3 areas)      ESOPHAGEAL Impairment  Component 17--Esophageal Clearance Upright Position  Judged in AP view during bolus transit through the oral cavity to the LES  1 - Esophageal retention (AP scoring, holdup of barium tablet seen in the PES)

## 2025-04-28 ENCOUNTER — TELEPHONE (OUTPATIENT)
Age: 70
End: 2025-04-28

## 2025-04-28 ENCOUNTER — RESULTS FOLLOW-UP (OUTPATIENT)
Dept: FAMILY MEDICINE CLINIC | Facility: CLINIC | Age: 70
End: 2025-04-28

## 2025-04-28 ENCOUNTER — OFFICE VISIT (OUTPATIENT)
Dept: OBGYN CLINIC | Facility: HOSPITAL | Age: 70
End: 2025-04-28
Payer: COMMERCIAL

## 2025-04-28 ENCOUNTER — HOSPITAL ENCOUNTER (OUTPATIENT)
Dept: RADIOLOGY | Facility: HOSPITAL | Age: 70
Discharge: HOME | End: 2025-04-28
Attending: ORTHOPAEDIC SURGERY
Payer: COMMERCIAL

## 2025-04-28 VITALS — WEIGHT: 244.71 LBS | BODY MASS INDEX: 39.33 KG/M2 | HEIGHT: 66 IN

## 2025-04-28 DIAGNOSIS — Z98.1 S/P CERVICAL SPINAL FUSION: Primary | ICD-10-CM

## 2025-04-28 DIAGNOSIS — Z98.1 S/P CERVICAL SPINAL FUSION: ICD-10-CM

## 2025-04-28 DIAGNOSIS — M54.2 CERVICAL PAIN: ICD-10-CM

## 2025-04-28 PROCEDURE — 99214 OFFICE O/P EST MOD 30 MIN: CPT | Performed by: ORTHOPAEDIC SURGERY

## 2025-04-28 PROCEDURE — 72050 X-RAY EXAM NECK SPINE 4/5VWS: CPT

## 2025-04-28 NOTE — PROGRESS NOTES
Name: Edgar Obando      : 1955       MRN: [id number]   Encounter Provider: Conrad Richardson MD   Encounter Date: 25  Encounter department: [facility] SURG[facility]    Medical Decision Making:     Assessment & Plan  S/P cervical spinal fusion    The clinical, physical and imaging findings were reviewed with the patient  Radiographs demonstrate stable appearing C3-7 ACDF construct with no abnormal motion on flexion-extension films  Discussed the etiology and pathomechanics of cervical disc degeneration, cervical spine fusion  Discussed treatment options   Non operative treatment options include physical therapy, at home exercises, activity modifications, chiropractic medicine, acupuncture, oral medications, and interventional spine procedures  After discussion with the patient we agreed upon continued conservative treatments  Recommend ongoing physical therapy/HEP to work on cervical ROM, strengthening, and stretching exercises.   Oral steroid rx sent to patient's pharmacy. Discussed reasoning for prescribing medication, proper usage, and potential side effects.   Ice/heat, OTC pain medication as needed.  Continue to follow-up with speech/swallow therapy, ENT for dysphagia reported symptoms  Follow-up:  Return in about 3-6 months for follow up after further conservative treatments; we did discuss if symptoms continue we will further investigate pain with advanced imaging and possible pain management referral and repeat physical therapy treatment    Orders:    XR spine cervical complete 4 or 5 vw non injury; Future    methylPREDNISolone 4 MG tablet therapy pack; Use as directed on package    Cervical pain    Orders:    methylPREDNISolone 4 MG tablet therapy pack; Use as directed on package         Diagnostic Tests   IMAGING: I have personally reviewed the images and these are my findings:  Cervical Spine X-rays from 2025: Demonstrate  stable C3-C7 ACDF without hardware complication or alignment change.    Subjective:      Chief Complaint: Neck Pain  HPI:  Edgar Obando is a 69 y.o. male presenting for follow-up visit.  Patient reports ongoing benefit from cervical surgery with improved ambulatory function and balance, decreased numbness.  He notes his neck pain has been resolved however has developed a left-sided anterior lateral neck pain over the last several weeks.  He denies any radicular symptoms or any new numbness or tingling.  He denies any weakness.  He has been using a cane.  He has been seeing orthopedic joints and considering knee replacement however continues with knee injections at this time.  He reports that his postoperative aphasia has continued to improve but occasionally does have some difficulty swallowing.  He notes some throat discomfort from time to time as well.  He occasionally takes Tylenol for various aches and pains in his neck and bilateral upper and lower extremities.     Update HPI on 1/29/2025:  Edgar is here for follow up. He reports falling on 12/29/2024 for which he was seen in the ED. Had increase in total body pain/spasms, including his neck. Was taking a muscle relaxer and tylenol with some benefit. Notes his pain has improved, however does continue with some neck discomfort/stiffness primarily in the morning. Currently denies new onset of pain or numbness/tingling into his upper extremities. Using walker for ambulation, noting he is using it for caution. Does report some increased tremors in his hands, was present prior to surgery. Has been continuing with at-home physical therapy with benefit. Does wish to transition to outpatient physical therapy in the near future.      Update HPI on 4/28/25:  Edgar Obando is a 69 y.o. male presenting for 1 year postoperative valuation of his cervical spine.  He is approximately 12 months status post C3-C7 ACDF.  Overall he does feel that there are improvements.   However, does experience difficulty with swallowing and speech.  He is set up for initiation of speech therapy in the coming weeks.  He states that he does also experience a painful soft tissue knotting to the anterior right aspect of his neck adjacent to his collarbone.  He denies any radicular symptoms.  He notes some persistent balance issues and does ambulate with a cane and does intermittently utilize a walker as a precautionary measure.  He did see ear nose and throat and did have a flexible laryngoscopy he 4/11/2025 which did not result in significant findings.  In addition we did review his postoperative course.  Patient notes that he was having timeframe of no neck pain.  He did have a significant fall in early December which may have exacerbated his neck pain symptoms.    Objective:     Family History   Problem Relation Age of Onset    Diabetes Mother     Diabetes Father     Cancer Father        Past Medical History:   Diagnosis Date    Acid reflux     Acute blood loss anemia 07/15/2016    Anxiety     Arthritis     Cellulitis     left ankle    CPAP (continuous positive airway pressure) dependence     as needed per patient    Diabetes mellitus (HCC)     Hypertension     Idiopathic chronic venous hypertension of right lower extremity with ulcer (HCC) 06/12/2023    Sleep apnea     Twitching        Current Outpatient Medications   Medication Sig Dispense Refill    acetaminophen (TYLENOL) 650 mg CR tablet Take 1 tablet (650 mg total) by mouth every 8 (eight) hours as needed for mild pain 30 tablet 0    albuterol (PROVENTIL HFA,VENTOLIN HFA) 90 mcg/act inhaler Inhale 2 puffs every 6 (six) hours as needed for wheezing As needed      aspirin (ECOTRIN LOW STRENGTH) 81 mg EC tablet Take 81 mg by mouth daily      b complex vitamins capsule Take 1 capsule by mouth daily (Patient not taking: Reported on 4/22/2025)      Blood Glucose Monitoring Suppl (ONE TOUCH ULTRA 2) w/Device KIT USE TO TEST BLOOD GLUCOSE       budesonide (RINOCORT AQUA) 32 MCG/ACT nasal spray 1 spray into each nostril 2 (two) times a day As needed      busPIRone (BUSPAR) 10 mg tablet TAKE 1 TABLET BY MOUTH TWO (TWO) TIMES A  tablet 1    clonazePAM (KlonoPIN) 1 MG disintegrating tablet Take 1 tablet (1 mg total) by mouth 2 (two) times a day 60 tablet 0    docusate sodium (COLACE) 100 mg capsule Take 100 mg by mouth 2 (two) times a day      escitalopram (LEXAPRO) 5 mg tablet Take 1 tablet (5 mg total) by mouth daily 30 tablet 1    furosemide (LASIX) 40 mg tablet Take 40 mg by mouth as needed in the morning and 40 mg as needed in the evening.      Lancets 30G MISC 3 (three) times a day Use to test blood glucose      lidocaine (LIDODERM) 5 % Place 1 patch on the skin every 24 hours (Patient not taking: Reported on 4/22/2025)      losartan (COZAAR) 25 mg tablet Take 1 tablet (25 mg total) by mouth daily 90 tablet 1    lovastatin (MEVACOR) 10 MG tablet Take 10 mg by mouth daily at bedtime      methocarbamol (Robaxin-750) 750 mg tablet Take 1 tablet (750 mg total) by mouth every 8 (eight) hours 90 tablet 1    Multiple Vitamins-Minerals (multivitamin with minerals) tablet Take 1 tablet by mouth daily      mupirocin (BACTROBAN) 2 % ointment       naloxone (NARCAN) 4 mg/0.1 mL nasal spray Administer 1 spray into a nostril. If no response after 2-3 minutes, give another dose in the other nostril using a new spray. 1 each 1    OneTouch Ultra test strip USE TO TEST BLOOD GLUCOSE TWICE DAILY      oxyCODONE-acetaminophen (Percocet) 5-325 mg per tablet Take 1 tablet by mouth 2 (two) times a day as needed for moderate pain Max Daily Amount: 2 tablets 15 tablet 0    pantoprazole (PROTONIX) 40 mg tablet Take 40 mg by mouth daily      potassium chloride (K-DUR,KLOR-CON) 10 mEq tablet       semaglutide, 0.25 or 0.5 mg/dose, (Ozempic) 2 mg/1.5 mL injection pen Inject 0.5 mg under the skin every 7 days      tamsulosin (FLOMAX) 0.4 mg TAKE 1 CAPSULE BY MOUTH EVERY DAY  WITH DINNER 90 capsule 2    testosterone (ANDROGEL) 1.62 % TD gel pump       tiZANidine (ZANAFLEX) 4 mg tablet Take 1 tablet (4 mg total) by mouth 2 (two) times a day as needed for muscle spasms May make you drowsy. Do not drive until you know how it affects you. Do not take any other muscle relaxers while taking this medication      traZODone (DESYREL) 100 mg tablet       zinc gluconate 50 mg tablet Take 50 mg by mouth in the morning.       No current facility-administered medications for this visit.       Past Surgical History:   Procedure Laterality Date    APPENDECTOMY      CERVICAL FUSION Bilateral 2024    Procedure: FUSION CERVICAL ANTERIOR W DISCECTOMY, C3-7 ACDF;  Surgeon: Conrad Richardson MD;  [facility];  Service: Orthopedics    CHOLECYSTECTOMY      COLONOSCOPY      IR PICC PLACEMENT DOUBLE LUMEN  2024    KNEE ARTHROSCOPY      [place] ARTHRP KNE CONDYLE&PLATU MEDIAL&LAT COMPARTMENTS Left 2016    Procedure: TOTAL  KNEE REPLACEMENT ;  Surgeon: Zack Montenegro MD;  Location[facility];  Service: Orthopedics       Social History     Socioeconomic History    Marital status: /Civil Union     Spouse name: Nayeli    Number of children: 3    Years of education: BS    Highest education level: Not on file   Occupational History    Occupation: Attendance officer    Occupation: Retired-    Tobacco Use    Smoking status: Former     Current packs/day: 0.00     Types: Cigarettes     Quit date:      Years since quittin.3    Smokeless tobacco: Never   Vaping Use    Vaping status: Never Used   Substance and Sexual Activity    Alcohol use: Not Currently    Drug use: Not Currently     Comment: tried multiple drugs in the past    Sexual activity: Not Currently   Other Topics Concern    Not on file   Social History Narrative    Not on file     Social Drivers of Health     Financial Resource Strain: Low Risk  (2023)    Overall Financial Resource Strain (CARDIA)     Difficulty  "of Paying Living Expenses: Not hard at all   Food Insecurity: No Food Insecurity (2024)    Nursing - Inadequate Food Risk Classification     Worried About Running Out of Food in the Last Year: Never true     Ran Out of Food in the Last Year: Never true     Ran Out of Food in the Last Year: Never true   Transportation Needs: No Transportation Needs (2024)    Nursing - Transportation Risk Classification     Lack of Transportation: Not on file     Lack of Transportation: No   Physical Activity: Not on file   Stress: Not on file   Social Connections: Not on file   Intimate Partner Violence: Unknown (2024)    Nursing IPS     Feels Physically and Emotionally Safe: Not on file     Physically Hurt by Someone: Not on file     Humiliated or Emotionally Abused by Someone: Not on file     Physically Hurt by Someone: No     Hurt or Threatened by Someone: No   Housing Stability: Unknown (2024)    Nursing: Inadequate Housing Risk Classification     Has Housing: Not on file     Worried About Losing Housing: Not on file     Unable to Get Utilities: Not on file     Unable to Pay for Housing in the Last Year: No     Has Housin       No Known Allergies    Review of Systems  General- denies fever/chills  HEENT- denies hearing loss or sore throat  Eyes- denies eye pain or visual disturbances, denies red eyes  Respiratory- denies cough or SOB  Cardio- denies chest pain or palpitations  GI- denies abdominal pain  Endocrine- denies urinary frequency  Urinary- denies pain with urination  Musculoskeletal- Negative except noted above  Skin- denies rashes or wounds  Neurological- denies dizziness or headache  Psychiatric- denies anxiety or difficulty concentrating    Physical Exam  Ht 5' 6\" (1.676 m)   Wt 111 kg (244 lb 11.4 oz)   BMI 39.50 kg/m²     General/Constitutional: No apparent distress: well-nourished and well developed.  Lymphatic: No appreciable lymphadenopathy  Respiratory: Non-labored " "breathing  Vascular: No edema, swelling or tenderness, except as noted in detailed exam.  Integumentary: No impressive skin lesions present, except as noted in detailed exam.  Psych: Normal mood and affect, oriented to person, place and time.  MSK: normal other than stated in HPI and exam  Gait & balance: no evidence of myelopathic gait, ambulates with a Cane    Cervical  spine range of motion:  -Forward flexion chin to chest  -Extension to 60  -Lateral bend 30 right, 30 left  -Rotation 45 right, 45 left.    There is no point tenderness with palpation along the posterior cervical, thoracic, lumbar spine.     Neurologic:  Upper Extremity Motor Function    Right  Left    Deltoid  5/5  5/5    Bicep  5/5  5/5    Wrist extension  5/5  5/5    Tricep  5/5  5/5    Finger flexion/  5/5  5/5    Hand intrinsic  5/5  5/5        Sensory: light touch is intact to bilateral upper and lower extremities     Reflexes:  intact, symmetric       Electronic Medical Records were reviewed including prior office notes, physical therapy notes, ear nose and throat notes, image studies    Procedures, if performed today     None performed       Portions of the record may have been created with voice recognition software.  Occasional wrong word or \"sound a like\" substitutions may have occurred due to the inherent limitations of voice recognition software.  Read the chart carefully and recognize, using context, where substitutions have occurred.    "

## 2025-04-28 NOTE — PROGRESS NOTES
Assessment & Plan/Medical Decision Makin y.o. male with status post C3-7 ACDF for cervical myelopathy on 2024        Edgar reports steady improvements postoperatively with improved balance and ambulation, decreased numbness in upper extremities. He is here for evaluation after a recent fall on 2024. He reports pain has improved greatly since the fall and feels like he is almost back to his baseline. He has been compliant with postoperative physical therapy which has been beneficial for cervical range of motion, he is planning to start outpatient physical therapy soon. Continue with physical therapy, range of motion exercises strengthening exercises.     Did previously place an order for speech language pathology given some ongoing swallowing difficulties, he has not scheduled an appointment yet.  Continue medications as needed. Can use heating pad as needed.  Plan for 1 year postop follow-up with x-rays and re-evaluation.     Subjective:      Chief Complaint: s/p C3-7 ACDF for cervical myelopathy    HPI:  Edgar Obando is a 69 y.o. male presenting for follow-up visit.  Patient reports ongoing benefit from cervical surgery with improved ambulatory function and balance, decreased numbness.  He notes his neck pain has been resolved however has developed a left-sided anterior lateral neck pain over the last several weeks.  He denies any radicular symptoms or any new numbness or tingling.  He denies any weakness.  He has been using a cane.  He has been seeing orthopedic joints and considering knee replacement however continues with knee injections at this time.  He reports that his postoperative aphasia has continued to improve but occasionally does have some difficulty swallowing.  He notes some throat discomfort from time to time as well.  He occasionally takes Tylenol for various aches and pains in his neck and bilateral upper and lower extremities.    Update HPI on 2025:  Edgar is here for  follow up. He reports falling on 12/29/2024 for which he was seen in the ED. Had increase in total body pain/spasms, including his neck. Was taking a muscle relaxer and tylenol with some benefit. Notes his pain has improved, however does continue with some neck discomfort/stiffness primarily in the morning. Currently denies new onset of pain or numbness/tingling into his upper extremities. Using walker for ambulation, noting he is using it for caution. Does report some increased tremors in his hands, was present prior to surgery. Has been continuing with at-home physical therapy with benefit. Does wish to transition to outpatient physical therapy in the near future.    Objective:     Family History   Problem Relation Age of Onset    Diabetes Mother     Diabetes Father     Cancer Father        Past Medical History:   Diagnosis Date    Acid reflux     Acute blood loss anemia 07/15/2016    Anxiety     Arthritis     Cellulitis     left ankle    CPAP (continuous positive airway pressure) dependence     as needed per patient    Diabetes mellitus (HCC)     Hypertension     Idiopathic chronic venous hypertension of right lower extremity with ulcer (HCC) 06/12/2023    Sleep apnea     Twitching        Current Outpatient Medications   Medication Sig Dispense Refill    acetaminophen (TYLENOL) 650 mg CR tablet Take 1 tablet (650 mg total) by mouth every 8 (eight) hours as needed for mild pain 30 tablet 0    albuterol (PROVENTIL HFA,VENTOLIN HFA) 90 mcg/act inhaler Inhale 2 puffs every 6 (six) hours as needed for wheezing As needed      aspirin (ECOTRIN LOW STRENGTH) 81 mg EC tablet Take 81 mg by mouth daily      b complex vitamins capsule Take 1 capsule by mouth daily (Patient not taking: Reported on 4/22/2025)      Blood Glucose Monitoring Suppl (ONE TOUCH ULTRA 2) w/Device KIT USE TO TEST BLOOD GLUCOSE      budesonide (RINOCORT AQUA) 32 MCG/ACT nasal spray 1 spray into each nostril 2 (two) times a day As needed      busPIRone  (BUSPAR) 10 mg tablet TAKE 1 TABLET BY MOUTH TWO (TWO) TIMES A  tablet 1    clonazePAM (KlonoPIN) 1 MG disintegrating tablet Take 1 tablet (1 mg total) by mouth 2 (two) times a day 60 tablet 0    docusate sodium (COLACE) 100 mg capsule Take 100 mg by mouth 2 (two) times a day      escitalopram (LEXAPRO) 5 mg tablet Take 1 tablet (5 mg total) by mouth daily 30 tablet 1    furosemide (LASIX) 40 mg tablet Take 40 mg by mouth as needed in the morning and 40 mg as needed in the evening.      Lancets 30G MISC 3 (three) times a day Use to test blood glucose      lidocaine (LIDODERM) 5 % Place 1 patch on the skin every 24 hours (Patient not taking: Reported on 4/22/2025)      losartan (COZAAR) 25 mg tablet Take 1 tablet (25 mg total) by mouth daily 90 tablet 1    lovastatin (MEVACOR) 10 MG tablet Take 10 mg by mouth daily at bedtime      methocarbamol (Robaxin-750) 750 mg tablet Take 1 tablet (750 mg total) by mouth every 8 (eight) hours 90 tablet 1    Multiple Vitamins-Minerals (multivitamin with minerals) tablet Take 1 tablet by mouth daily      mupirocin (BACTROBAN) 2 % ointment       naloxone (NARCAN) 4 mg/0.1 mL nasal spray Administer 1 spray into a nostril. If no response after 2-3 minutes, give another dose in the other nostril using a new spray. 1 each 1    OneTouch Ultra test strip USE TO TEST BLOOD GLUCOSE TWICE DAILY      oxyCODONE-acetaminophen (Percocet) 5-325 mg per tablet Take 1 tablet by mouth 2 (two) times a day as needed for moderate pain Max Daily Amount: 2 tablets 15 tablet 0    pantoprazole (PROTONIX) 40 mg tablet Take 40 mg by mouth daily      potassium chloride (K-DUR,KLOR-CON) 10 mEq tablet       semaglutide, 0.25 or 0.5 mg/dose, (Ozempic) 2 mg/1.5 mL injection pen Inject 0.5 mg under the skin every 7 days      tamsulosin (FLOMAX) 0.4 mg TAKE 1 CAPSULE BY MOUTH EVERY DAY WITH DINNER 90 capsule 2    testosterone (ANDROGEL) 1.62 % TD gel pump       tiZANidine (ZANAFLEX) 4 mg tablet Take 1 tablet  (4 mg total) by mouth 2 (two) times a day as needed for muscle spasms May make you drowsy. Do not drive until you know how it affects you. Do not take any other muscle relaxers while taking this medication      traZODone (DESYREL) 100 mg tablet       zinc gluconate 50 mg tablet Take 50 mg by mouth in the morning.       No current facility-administered medications for this visit.       Past Surgical History:   Procedure Laterality Date    APPENDECTOMY      CERVICAL FUSION Bilateral 2024    Procedure: FUSION CERVICAL ANTERIOR W DISCECTOMY, C3-7 ACDF;  Surgeon: Conrad Richardson MD;  Location: [facility];  Service: Orthopedics    CHOLECYSTECTOMY      COLONOSCOPY      IR PICC PLACEMENT DOUBLE LUMEN  2024    KNEE ARTHROSCOPY      LA ARTHRP KNE CONDYLE&PLATU MEDIAL&LAT COMPARTMENTS Left 2016    Procedure: TOTAL  KNEE REPLACEMENT ;  Surgeon: Zack Montenegro MD;  Location: [facility];  Service: Orthopedics       Social History     Socioeconomic History    Marital status: /Civil Union     Spouse name: Nayeli    Number of children: 3    Years of education: BS    Highest education level: Not on file   Occupational History    Occupation: Attendance officer    Occupation: Retired-    Tobacco Use    Smoking status: Former     Current packs/day: 0.00     Types: Cigarettes     Quit date:      Years since quittin.3    Smokeless tobacco: Never   Vaping Use    Vaping status: Never Used   Substance and Sexual Activity    Alcohol use: Not Currently    Drug use: Not Currently     Comment: tried multiple drugs in the past    Sexual activity: Not Currently   Other Topics Concern    Not on file   Social History Narrative    Not on file     Social Drivers of Health     Financial Resource Strain: Low Risk  (2023)    Overall Financial Resource Strain (CARDIA)     Difficulty of Paying Living Expenses: Not hard at all   Food Insecurity: No Food Insecurity (2024)    Nursing - Inadequate Food  "Risk Classification     Worried About Running Out of Food in the Last Year: Never true     Ran Out of Food in the Last Year: Never true     Ran Out of Food in the Last Year: Never true   Transportation Needs: No Transportation Needs (2024)    Nursing - Transportation Risk Classification     Lack of Transportation: Not on file     Lack of Transportation: No   Physical Activity: Not on file   Stress: Not on file   Social Connections: Not on file   Intimate Partner Violence: Unknown (2024)    Nursing IPS     Feels Physically and Emotionally Safe: Not on file     Physically Hurt by Someone: Not on file     Humiliated or Emotionally Abused by Someone: Not on file     Physically Hurt by Someone: No     Hurt or Threatened by Someone: No   Housing Stability: Unknown (2024)    Nursing: Inadequate Housing Risk Classification     Has Housing: Not on file     Worried About Losing Housing: Not on file     Unable to Get Utilities: Not on file     Unable to Pay for Housing in the Last Year: No     Has Housin       No Known Allergies    Review of Systems  General- denies fever/chills  HEENT- denies hearing loss or sore throat  Eyes- denies eye pain or visual disturbances, denies red eyes  Respiratory- denies cough or SOB  Cardio- denies chest pain or palpitations  GI- denies abdominal pain  Endocrine- denies urinary frequency  Urinary- denies pain with urination  Musculoskeletal- Negative except noted above  Skin- denies rashes or wounds  Neurological- denies dizziness or headache  Psychiatric- denies anxiety or difficulty concentrating    Physical Exam  Ht 5' 6\" (1.676 m)   Wt 111 kg (244 lb 11.4 oz)   BMI 39.50 kg/m²     General/Constitutional: No apparent distress: well-nourished and well developed.  Lymphatic: No appreciable lymphadenopathy  Respiratory: Non-labored breathing  Vascular: No edema, swelling or tenderness, except as noted in detailed exam.  Integumentary: No impressive skin lesions present, " "except as noted in detailed exam.  Psych: Normal mood and affect, oriented to person, place and time.  MSK: normal other than stated in HPI and exam  Gait & balance: no evidence of myelopathic gait, ambulates with a Cane    Cervical  spine range of motion:  Limited but intact flexion and extension, rotation approximately 30 degrees right and left  There is no point tenderness with palpation along the posterior cervical, thoracic, lumbar spine.   Well-healed anterior neck surgery site    Neurologic:  Upper Extremity Motor Function    Right  Left    Deltoid  5/5  5/5    Bicep  5/5  5/5    Wrist extension  5/5  5/5    Tricep  5/5  5/5    Finger flexion/  5/5  5/5    Hand intrinsic  5/5  5/5        Sensory: light touch is intact to bilateral upper and lower extremities     Reflexes:  intact     Other tests:  Shoulder: painless active & passive ROM bilateral     Diagnostic Tests   IMAGING: I have personally reviewed the images and these are my findings:  Cervical Spine X-rays from 11/6/24:  C3-7 ACDF construct intact, no signs of hardware failure, stable appearing cervical spondylosis, no changes in adjacent segments    Cervical Spine X-rays from 1/29/2025: C3-7 ACDF construct intact, no signs of hardware failure, stable appearing cervical spondylosis, no changes in adjacent segments    Electronic Medical Records were reviewed including radiology reports     Procedures, if performed today     None performed       Portions of the record may have been created with voice recognition software.  Occasional wrong word or \"sound a like\" substitutions may have occurred due to the inherent limitations of voice recognition software.  Read the chart carefully and recognize, using context, where substitutions have occurred.   "

## 2025-04-28 NOTE — TELEPHONE ENCOUNTER
Caller: Self    Doctor: Dr. Richardson    Reason for call: Patient following up on Medrol dose pack order Dr Richardson was going to send in to pharmacy. They have not received anything yet    [facility] - [place], [place] - [address] [facility]] [[id number]]     Call back#: [phone]

## 2025-04-28 NOTE — ASSESSMENT & PLAN NOTE
The clinical, physical and imaging findings were reviewed with the patient  Radiographs demonstrate stable appearing C3-7 ACDF construct with no abnormal motion on flexion-extension films  Discussed the etiology and pathomechanics of cervical disc degeneration, cervical spine fusion  Discussed treatment options   Non operative treatment options include physical therapy, at home exercises, activity modifications, chiropractic medicine, acupuncture, oral medications, and interventional spine procedures  After discussion with the patient we agreed upon continued conservative treatments  Recommend ongoing physical therapy/HEP to work on cervical ROM, strengthening, and stretching exercises.   Oral steroid rx sent to patient's pharmacy. Discussed reasoning for prescribing medication, proper usage, and potential side effects.   Ice/heat, OTC pain medication as needed.  Continue to follow-up with speech/swallow therapy, ENT for dysphagia reported symptoms  Follow-up:  Return in about 3-6 months for follow up after further conservative treatments; we did discuss if symptoms continue we will further investigate pain with advanced imaging and possible pain management referral and repeat physical therapy treatment    Orders:    XR spine cervical complete 4 or 5 vw non injury; Future    methylPREDNISolone 4 MG tablet therapy pack; Use as directed on package

## 2025-04-29 ENCOUNTER — OFFICE VISIT (OUTPATIENT)
Dept: PHYSICAL THERAPY | Facility: REHABILITATION | Age: 70
End: 2025-04-29
Payer: COMMERCIAL

## 2025-04-29 DIAGNOSIS — R26.2 AMBULATORY DYSFUNCTION: Primary | ICD-10-CM

## 2025-04-29 LAB
DIAGNOSTIC IMP SPEC-IMP: NORMAL
HCV AB S/CO SERPL IA: REACTIVE
HCV RNA # SERPL NAA+PROBE: NORMAL IU/ML
TEST INFORMATION: NORMAL

## 2025-04-29 PROCEDURE — 97110 THERAPEUTIC EXERCISES: CPT

## 2025-04-29 PROCEDURE — 97112 NEUROMUSCULAR REEDUCATION: CPT

## 2025-04-29 RX ORDER — METHYLPREDNISOLONE 4 MG/1
TABLET ORAL
Qty: 1 EACH | Refills: 0 | Status: SHIPPED | OUTPATIENT
Start: 2025-04-29

## 2025-04-29 NOTE — PROGRESS NOTES
"Daily Note     Today's date: 2025  Patient name: Edgar Obando  : 1955  MRN: [id number]  Referring provider: Gilma Swift PA-C  Dx:   Encounter Diagnosis     ICD-10-CM    1. Ambulatory dysfunction  R26.2             Start Time: 1130  Stop Time: 1210  Total time in clinic (min): 40 minutes    Subjective: Pt. reports no updates at this time. His feet may feel a little better today.         Objective: See treatment diary below      Assessment: Tolerated treatment well. Patient would benefit from continued PT.  Good form with exercises present. Fatigue noted in feet with airex surface today, decreased reps of mod tandem balance exercise able to be performed.       Plan: Continue per plan of care.      POC beginning Date: 25  POC Expiration Date: 25    POC expires PT/OT + Visit Limit?   2025  BOMN       Visit/Unit Tracking  AUTH Status:  Date 4/10 4/15 4/17 4/24 4/29   [employer] Used 9 10 11 12 13          Precautions: History of anxiety and depression, history of short term memory loss, fall risk, deconditioning, post ACDF for cervical myelopathy       Manuals                            Neuro Re-Ed      Patient Ed      Nancy Negotiation      Cane Seated Overhead Shoulder Flexion 2x10 3# cuff 2x10 3# cuff 2x10 3# cuff   Seated Chin Tuck 2x10 2x10 2x10   Romberg Balance on Foam with Perturbations 3x30\" b/l mod tandem 3x30\" b/l mod tandem 2x30\" b/l mod tandem   Biodex LOS      Cone Weaving      Step Taps      Wall Slide      Ther Ex      UBE    Rows - Seated 3x12 15# 3x12 15# 3x12 15#   Shoulder Extensions 3x10 13# seated 3x10 13# seated 3x10 13# seated   Seated Trunk Rotations 15x b/l 10# 15x b/l 10# 15x b/l 10#   Sit to Stands 2x10 on foam 2x10 on foam 2x10 on foam   Leg Press      Standing Heel Raises 3x10 on foam 3x10 on foam 3x10 on foam   Suitcase Carry       Ther Activity                  Gait Training                  Modalities                "

## 2025-04-30 ENCOUNTER — TELEPHONE (OUTPATIENT)
Age: 70
End: 2025-04-30

## 2025-04-30 ENCOUNTER — OFFICE VISIT (OUTPATIENT)
Dept: SPEECH THERAPY | Facility: CLINIC | Age: 70
End: 2025-04-30
Attending: ORTHOPAEDIC SURGERY
Payer: COMMERCIAL

## 2025-04-30 DIAGNOSIS — Z98.1 S/P CERVICAL SPINAL FUSION: ICD-10-CM

## 2025-04-30 DIAGNOSIS — R49.0 DYSPHONIA: Primary | ICD-10-CM

## 2025-04-30 DIAGNOSIS — R13.14 PHARYNGOESOPHAGEAL DYSPHAGIA: ICD-10-CM

## 2025-04-30 PROCEDURE — 92507 TX SP LANG VOICE COMM INDIV: CPT | Performed by: SPEECH-LANGUAGE PATHOLOGIST

## 2025-04-30 PROCEDURE — 92526 ORAL FUNCTION THERAPY: CPT | Performed by: SPEECH-LANGUAGE PATHOLOGIST

## 2025-04-30 NOTE — PROGRESS NOTES
Daily Speech Treatment Note    Today's date: 2025   Patient’s name: Edgar Obando  : 1955  MRN: [id number]  Safety measures: Hx of ACDF, fall risk   Referring provider: Conrad Richardson MD    Encounter Diagnosis     ICD-10-CM    1. Dysphonia  R49.0       2. Pharyngoesophageal dysphagia  R13.14       3. S/P cervical spinal fusion  Z98.1         Visit Tracking:  Re-eval Due Auth Expiration Date ST Visit Limit   25 BOMN                                   Visit/Unit Tracking  AUTH Status:  Date 25   No authorization required Used 1 2     Remaining  BOMN BOMN         Subjective/Behavioral:  - Patient reports that he is coughing up a lot of mucus and felt hot and clammy yesterday. He is not feeling hot and clammy today. He also has been dealing with some upper respiratory symptoms. Recommended that the patient call his doctor or head to the ER following today's session.     He had difficulty with diaphragmatic breathing at home. He will use straw phonation for his voice when it gets rough and this has helped.    Objective/Assessment:  -Reviewed patients MBSS results.    MBSS Results from 25:    Assessment Summary:    Pt presents with mild pharyngeal dysphagia characterized by reduced epiglottic inversion and incomplete airway closure, as well as narrowing through the PES. Trace silent aspiration occurred with thin liquids, which was eliminated with use of head turn and chin tuck. Penetration without aspiration was seen with the remaining textures/trials. Chronic throat clearing noted throughout the study. Three was stasis of the barium tablet in the PES which cleared with additional intake of liquid. Rec continuing regular diet and thin liquids now with use of head turn R/chin tuck posture, continue working with ST and continue f/u with providers as recommended by physicians. Note: Images are available for review in PACS as desired.     Recommendations:   Recommended Diet:  regular diet and thin liquids   Recommended Form of Medications:  small pills whole with puree, crush meds as needed    Aspiration precautions and compensatory swallowing strategies: upright posture, slow rate of feeding, small bites/sips, chin tuck, and head turn right  SLP Dysphagia therapy recommended: yes, continue ST f/u for dysphagia and voice tx    Patient has not been consistent with chin tuck and head turn to the right.    Thin liquids were tested today. He reported that turning his neck to the right was painful. A wet gurgly voice and throat clearing was noted following head turn as it was not fully committed to. Reinforced that he needs to commit to strategies consistently for airway protection.     Short-term goals:  Patient will be educated on vocal hygiene and demonstrate understanding of recommendations to facilitate increased quality of voice, to be achieved in 2-4 weeks.     Patient will be educated on diaphragmatic breathing (versus clavicular breathing) to establish controlled, rhythmic breathing, to be achieved in 2-4 weeks.  Diaphragmatic breathing was reviewed. Patient practiced on exhalation of sustained /s/. Tactile cueing was provided (e.g. patient laid in a reclined position with hand on abdomen). Patient demonstrated engagement of abdominal muscles, with a maximum phonation time of 6.03 seconds.        Patient will utilize semi-occluded vocal tract exercises without laryngeal tension to promote healthy vocal function with 80% accuracy, to be achieved in 4-6 weeks.    Semi-Occluded Vocal Tract Exercises  To target relaxed laryngeal posture and coordination between phonation and respiration, the following activities were completed using principles of SOVTE.     Straw Only  With phonation (humming):  Patient was  able to achieve coordination of breath with voice with 30% accuracy. Patient benefited from short bursts and using a higher pitch.       Patient will utilize a forward tone  focused/resonant voice to decrease vocal hyperfunction and improve voice quality and vocal projection, to be achieved in 4-6 weeks.   Utilizing Flow Phonation, patient began by blowing with a tissue to connect breath. Patient demonstrated good airflow with 100% accuracy. Task progressed to phoneme level, focusing on easy onset phonation with a clear voice, demonstrating 100% accuracy. Task completed with 90% accuracy at word level.    Continuing with easy onset phonation, patient read 1 syllable words beginning with the letter /h/. Patient maintained a clear, forward focused voice with 90% accuracy.       Patient will produce coordinated voice production in different situations (e.g. telephone, over background noise, emotional topics, speaking at a distance) with 80% accuracy as perceptually judged by clinician.        Plan:  -Patient was provided with home exercises/activities to target goals in plan of care at the end of today's session.  -Continue with current plan of care.

## 2025-04-30 NOTE — TELEPHONE ENCOUNTER
Patient called in to report post pneumonia x 2 occurrences, he feels he has not fully recovered. Still feels sob, coughing with yellowish-green sputum, and fatigue. Increased coughing at bedtime last night.  Denies fever.     Patient requesting OV for evaluation. PCP has 2 Same Day OV for Thurs 5/1; patient would prefer 10 AM if provider approves OR 10 AM with Dr. Orr. Please follow up with patient to confirm OV for Thurs 5/1.

## 2025-05-01 ENCOUNTER — APPOINTMENT (OUTPATIENT)
Dept: PHYSICAL THERAPY | Facility: REHABILITATION | Age: 70
End: 2025-05-01
Payer: COMMERCIAL

## 2025-05-01 ENCOUNTER — OFFICE VISIT (OUTPATIENT)
Dept: FAMILY MEDICINE CLINIC | Facility: CLINIC | Age: 70
End: 2025-05-01
Payer: COMMERCIAL

## 2025-05-01 VITALS
DIASTOLIC BLOOD PRESSURE: 70 MMHG | SYSTOLIC BLOOD PRESSURE: 112 MMHG | WEIGHT: 238.2 LBS | HEART RATE: 75 BPM | HEIGHT: 66 IN | OXYGEN SATURATION: 98 % | RESPIRATION RATE: 18 BRPM | TEMPERATURE: 96.9 F | BODY MASS INDEX: 38.28 KG/M2

## 2025-05-01 DIAGNOSIS — M54.6 PAIN IN THORACIC SPINE: ICD-10-CM

## 2025-05-01 DIAGNOSIS — M47.812 CERVICAL SPONDYLOSIS: ICD-10-CM

## 2025-05-01 DIAGNOSIS — Z98.1 S/P CERVICAL SPINAL FUSION: ICD-10-CM

## 2025-05-01 DIAGNOSIS — J40 BRONCHITIS: Primary | ICD-10-CM

## 2025-05-01 PROCEDURE — G2211 COMPLEX E/M VISIT ADD ON: HCPCS | Performed by: FAMILY MEDICINE

## 2025-05-01 PROCEDURE — 99214 OFFICE O/P EST MOD 30 MIN: CPT | Performed by: FAMILY MEDICINE

## 2025-05-01 RX ORDER — AZITHROMYCIN 250 MG/1
TABLET, FILM COATED ORAL
Qty: 6 TABLET | Refills: 0 | Status: SHIPPED | OUTPATIENT
Start: 2025-05-01 | End: 2025-05-06

## 2025-05-01 RX ORDER — OXYCODONE AND ACETAMINOPHEN 5; 325 MG/1; MG/1
1 TABLET ORAL 2 TIMES DAILY PRN
Qty: 10 TABLET | Refills: 0 | Status: SHIPPED | OUTPATIENT
Start: 2025-05-01

## 2025-05-01 RX ORDER — GUAIFENESIN 600 MG/1
600 TABLET, EXTENDED RELEASE ORAL EVERY 12 HOURS SCHEDULED
Qty: 28 TABLET | Refills: 0 | Status: SHIPPED | OUTPATIENT
Start: 2025-05-01 | End: 2025-05-15

## 2025-05-01 NOTE — PROGRESS NOTES
Name: Edgar Obando      : 1955      MRN: [id number]  Encounter Provider: Ana Rosa Win MD  Encounter Date: 2025   Encounter department: [facility]  :  Assessment & Plan  Bronchitis  Cough and chest congestion with history of PNA twice in the past year. Lungs clear. Decreased air sounds in the bases. Will start z pack and mucinex. Asked to call if symptoms do not improve  Orders:    azithromycin (Zithromax) 250 mg tablet; Take 2 tablets (500 mg total) by mouth daily for 1 day, THEN 1 tablet (250 mg total) daily for 4 days.    guaiFENesin (MUCINEX) 600 mg 12 hr tablet; Take 1 tablet (600 mg total) by mouth every 12 (twelve) hours for 14 days    S/P cervical spinal fusion  Improving with steroid but turned his neck 2 days ago and re-exacerbated the pain. Agreed to provide one more refill for oxycodone and is to use sparingly for severe pain   In the meantime, continue medrol dose chandu per ortho and muscle relaxer at night   Orders:    oxyCODONE-acetaminophen (Percocet) 5-325 mg per tablet; Take 1 tablet by mouth 2 (two) times a day as needed for moderate pain Max Daily Amount: 2 tablets    Cervical spondylosis    Orders:    oxyCODONE-acetaminophen (Percocet) 5-325 mg per tablet; Take 1 tablet by mouth 2 (two) times a day as needed for moderate pain Max Daily Amount: 2 tablets    Pain in thoracic spine    Orders:    oxyCODONE-acetaminophen (Percocet) 5-325 mg per tablet; Take 1 tablet by mouth 2 (two) times a day as needed for moderate pain Max Daily Amount: 2 tablets           History of Present Illness   Presents with ongoing respiratory symptoms   Had pneumonia twice   Reports cough and Sob  Cough is more productive and sputum is yellow/green in color   Hoarseness is worse the more he speaks   Associated with PND which is worse at night  No chest tightness   Felt clammy a few days ago but no objective fevers  Cough drops tend to irritate the throat more  No known sick contacts  Has  "lost weight since we last met 4/22  Was prescribed a medrol dose chandu for the cervical/ thoracic pain  First dose taken yesterday  Felt better but may have turned his head the wrong way and elicited the pain   Oxycodone helped manage the pain. Requesting a refill to take at night prn         Review of Systems    Objective   /70 (BP Location: Left arm, Patient Position: Sitting, Cuff Size: Large)   Pulse 75   Temp (!) 96.9 °F (36.1 °C) (Tympanic)   Resp 18   Ht 5' 6\" (1.676 m)   Wt 108 kg (238 lb 3.2 oz)   SpO2 98%   BMI 38.45 kg/m²      Physical Exam  Vitals reviewed.   Constitutional:       General: He is not in acute distress.     Appearance: Normal appearance. He is not ill-appearing or toxic-appearing.   HENT:      Head: Normocephalic and atraumatic.   Eyes:      Extraocular Movements: Extraocular movements intact.   Cardiovascular:      Rate and Rhythm: Normal rate and regular rhythm.      Heart sounds: No murmur heard.  Pulmonary:      Effort: Pulmonary effort is normal.      Comments: Decreased aeration in the lung bases   Skin:     General: Skin is warm.   Neurological:      Mental Status: He is alert.   Psychiatric:         Mood and Affect: Mood normal.         Behavior: Behavior normal.       "

## 2025-05-02 NOTE — ASSESSMENT & PLAN NOTE
Improving with steroid but turned his neck 2 days ago and re-exacerbated the pain. Agreed to provide one more refill for oxycodone and is to use sparingly for severe pain   In the meantime, continue medrol dose chandu per ortho and muscle relaxer at night   Orders:    oxyCODONE-acetaminophen (Percocet) 5-325 mg per tablet; Take 1 tablet by mouth 2 (two) times a day as needed for moderate pain Max Daily Amount: 2 tablets

## 2025-05-05 NOTE — TELEPHONE ENCOUNTER
S/w the patient and reviewed. He will contact Dr. [facility] [place]. No, psychiatric follow up needed - call with questions... No, psychiatric follow up needed - call with questions... No, psychiatric follow up needed - call with questions... Yes...

## 2025-05-06 ENCOUNTER — OFFICE VISIT (OUTPATIENT)
Dept: SPEECH THERAPY | Facility: CLINIC | Age: 70
End: 2025-05-06
Attending: ORTHOPAEDIC SURGERY
Payer: COMMERCIAL

## 2025-05-06 DIAGNOSIS — Z98.1 S/P CERVICAL SPINAL FUSION: ICD-10-CM

## 2025-05-06 DIAGNOSIS — R13.14 PHARYNGOESOPHAGEAL DYSPHAGIA: ICD-10-CM

## 2025-05-06 DIAGNOSIS — R49.0 DYSPHONIA: Primary | ICD-10-CM

## 2025-05-06 PROCEDURE — 92507 TX SP LANG VOICE COMM INDIV: CPT | Performed by: SPEECH-LANGUAGE PATHOLOGIST

## 2025-05-06 PROCEDURE — 92526 ORAL FUNCTION THERAPY: CPT | Performed by: SPEECH-LANGUAGE PATHOLOGIST

## 2025-05-06 NOTE — PROGRESS NOTES
Daily Speech Treatment Note    Today's date: 2025   Patient’s name: Edgar Obando  : 1955  MRN: [id number]  Safety measures: Hx of ACDF, fall risk   Referring provider: Conrad Richardson MD    Encounter Diagnosis     ICD-10-CM    1. Dysphonia  R49.0       2. Pharyngoesophageal dysphagia  R13.14       3. S/P cervical spinal fusion  Z98.1         Visit Tracking:  Re-eval Due Auth Expiration Date ST Visit Limit   25 BOMN                                   Visit/Unit Tracking  AUTH Status:  Date 25   No authorization required Used 1 2 3     Remaining  BOMN BOMN BOMN         Subjective/Behavioral:  - Patient reports that he is on steroids and mucinex following a follow-up visit with Dr. Win. His lungs were clear. His voice continues to be on and off. Sometimes it gets clear and then with increased voice use it gets worse. He practiced /h/ words but found that he got tired.     Objective/Assessment:  -See goals targeted below during today's treatment session.    Patient feels like he has a sore throat. Reviewed chin tuck and right head turn with thin liquids as follow-up to last week's education. Patient demonstrated s/s of aspiration in 2 out of 3 single cup sips. Utilizing a 3rd swallow (multiple swallows) allowed for liquids to fully pass without s/s of aspiration. Symptoms noted were throat clearing, small cough and wet/gurgly voice.     Short-term goals:  Patient will be educated on vocal hygiene and demonstrate understanding of recommendations to facilitate increased quality of voice, to be achieved in 2-4 weeks.     Patient will be educated on diaphragmatic breathing (versus clavicular breathing) to establish controlled, rhythmic breathing, to be achieved in 2-4 weeks.        Patient will utilize semi-occluded vocal tract exercises without laryngeal tension to promote healthy vocal function with 80% accuracy, to be achieved in 4-6 weeks.   Semi-Occluded Vocal Tract  Exercises  To target relaxed laryngeal posture and coordination between phonation and respiration, the following activities were completed using principles of SOVTE.     Cup Bubbling (straw and cup with water):     Without phonation: 100% accuracy - max sustained phonation 19.97    With phonation (humming):  Patient was  able to achieve coordination of breath with voice 80% accuracy. Patient communicated that it felt like less taxing on his voice.    SOVT was practiced alternating with 1 and 2 syllable /h/ words. Patient maintained a forward focused voice with 60% accuracy.    Improved vocal quality was noted throughout exercises by both the clinician and patient. Patient stated that he felt great following today's session.       Patient will utilize a forward tone focused/resonant voice to decrease vocal hyperfunction and improve voice quality and vocal projection, to be achieved in 4-6 weeks.   RVT practiced at phoneme level on /m/. Patient maintained a forward focused voice with 50% accuracy.       Patient will produce coordinated voice production in different situations (e.g. telephone, over background noise, emotional topics, speaking at a distance) with 80% accuracy as perceptually judged by clinician.        Plan:  -Patient was provided with home exercises/activities to target goals in plan of care at the end of today's session.  -Continue with current plan of care.

## 2025-05-08 ENCOUNTER — OFFICE VISIT (OUTPATIENT)
Dept: PHYSICAL THERAPY | Facility: REHABILITATION | Age: 70
End: 2025-05-08
Payer: COMMERCIAL

## 2025-05-08 DIAGNOSIS — R26.2 AMBULATORY DYSFUNCTION: Primary | ICD-10-CM

## 2025-05-08 PROCEDURE — 97112 NEUROMUSCULAR REEDUCATION: CPT | Performed by: PHYSICAL MEDICINE & REHABILITATION

## 2025-05-08 PROCEDURE — 97110 THERAPEUTIC EXERCISES: CPT | Performed by: PHYSICAL MEDICINE & REHABILITATION

## 2025-05-08 NOTE — PROGRESS NOTES
"Daily Note     Today's date: 2025  Patient name: Edgar Obando  : 1955  MRN: [id number]  Referring provider: Gilma Swift PA-C  Dx:   Encounter Diagnosis     ICD-10-CM    1. Ambulatory dysfunction  R26.2                      Subjective: Patient offers no new complaints to begin session.     Objective: See treatment diary below    Assessment: Tolerated treatment well. Most challenged with tandem balance on foam. Patient would benefit from continued PT.  Continue per patient tolerance nv.     Plan: Continue per plan of care.      POC beginning Date: 25  POC Expiration Date: 25    POC expires PT/OT + Visit Limit?   2025  BOMN       Visit/Unit Tracking  AUTH Status:  Date 4/10 4/15 4/17 4/24 4/29   [employer]  Used 9 10 11 12 13      Precautions: History of anxiety and depression, history of short term memory loss, fall risk, deconditioning, post ACDF for cervical myelopathy     Manuals                                     Neuro Re-Ed        Patient Ed        Nancy Negotiation        Cane Seated Overhead Shoulder Flexion 2x10 3# cuff 2x10 3# cuff 2x10 3# cuff 2x10 3# cuff    Seated Chin Tuck 2x10 2x10 2x10 2x10    Romberg Balance on Foam with Perturbations 3x30\" b/l mod tandem 3x30\" b/l mod tandem 2x30\" b/l mod tandem 2x30\" ea mod tandem    Biodex LOS        [employer]        Step Taps        Wall Slide        Ther Ex        UBE  4'/4' seated    Rows - Seated 3x12 15# 3x12 15# 3x12 15# 15# 3x12    Shoulder Extensions- seated 3x10 13# seated 3x10 13# seated 3x10 13# seated 13# 3x12    Seated Trunk Rotations 15x b/l 10# 15x b/l 10# 15x b/l 10# 10# 15x ea    Sit to Stands 2x10 on foam 2x10 on foam 2x10 on foam 2x10 foam    Leg Press        Standing Heel Raises 3x10 on foam 3x10 on foam 3x10 on foam 3x10 foam    Suitcase Carry         Ther Activity                        Gait Training                        Modalities                                               "

## 2025-05-12 NOTE — PROGRESS NOTES
Daily Speech Treatment Note    Today's date: 2025   Patient’s name: Edgar Obando  : 1955  MRN: [id number]  Safety measures: Hx of ACDF, fall risk   Referring provider: Conrad Richardson MD    Encounter Diagnosis     ICD-10-CM    1. Dysphonia  R49.0       2. Pharyngoesophageal dysphagia  R13.14       3. S/P cervical spinal fusion  Z98.1           Visit Tracking:  Re-eval Due Auth Expiration Date ST Visit Limit   25 BOMN                                   Visit/Unit Tracking  AUTH Status:  Date 25   No authorization required Used 1 2 3 4     Remaining  BOMN BOMN BOMN BOMN         Subjective/Behavioral:  - Patient reports that his voice has been better. When he rests his voice it will sound better. He has even had moments of clarity with his voice. Since the voice exercises are helping, he has continued to do them. His throat still hurts a bit. He is eating better but supplements are intermittently getting stuck.     Objective/Assessment:  -See goals targeted below during today's treatment session.    Patient is experiencing significant mucus at night and in the morning - will be seeing the GI.     Short-term goals:  Patient will be educated on vocal hygiene and demonstrate understanding of recommendations to facilitate increased quality of voice, to be achieved in 2-4 weeks.     Patient will be educated on diaphragmatic breathing (versus clavicular breathing) to establish controlled, rhythmic breathing, to be achieved in 2-4 weeks.        Patient will utilize semi-occluded vocal tract exercises without laryngeal tension to promote healthy vocal function with 80% accuracy, to be achieved in 4-6 weeks.   SOVT was targeted with straw phonation with a wide straw and humming with cup bubbling. Patient demonstrated laryngeal strain with both task but more significantly with cup bubbling. Task was switched to reduce laryngeal tension. Patient reported that he had had  lunch within the past half hour, which was leading to increased mucus and hoarseness.      Patient will utilize a forward tone focused/resonant voice to decrease vocal hyperfunction and improve voice quality and vocal projection, to be achieved in 4-6 weeks.   RVT practiced at phoneme level. Patient maintained a forward focused voice with 0% accuracy, with noted laryngeal tension    Utilizing Flow Phonation, patient began by blowing with a tissue to connect breath. Patient demonstrated good airflow with 100% accuracy. Task progressed to phoneme level, focusing on easy onset phonation with a clear voice, demonstrating 100% accuracy. Task completed with 80% accuracy at word level.    Lastly, patient read /h/ words at 1 syllable, 2 syllable and sentence level. Patient demonstrated the ability to maintain a forward focused, resonant voice with 80% accuracy at word level and 75% accuracy at sentence level. Sentences were targeted with an emphasis on a higher, calling voice, which improved patient's ability to project and maintain a clear voice. /h/ words have been patient's best way to improve vocal quality and reduce laryngeal tension to this point in therapy.    Patient will produce coordinated voice production in different situations (e.g. telephone, over background noise, emotional topics, speaking at a distance) with 80% accuracy as perceptually judged by clinician.       Patient will perform compensatory strategies (e.g., upright positioning, small bites/sips, slow rate, alternation of consistencies, multiple swallows, effortful swallow, chin tuck, head turn, etc.) with minimal verbal cues to prevent overt s/sx penetration/aspiration of least restrictive food/liquid consistencies.       Plan:  -Patient was provided with home exercises/activities to target goals in plan of care at the end of today's session.  -Continue with current plan of care.

## 2025-05-13 ENCOUNTER — OFFICE VISIT (OUTPATIENT)
Dept: SPEECH THERAPY | Facility: CLINIC | Age: 70
End: 2025-05-13
Attending: ORTHOPAEDIC SURGERY
Payer: COMMERCIAL

## 2025-05-13 DIAGNOSIS — R49.0 DYSPHONIA: Primary | ICD-10-CM

## 2025-05-13 DIAGNOSIS — Z98.1 S/P CERVICAL SPINAL FUSION: ICD-10-CM

## 2025-05-13 DIAGNOSIS — R13.14 PHARYNGOESOPHAGEAL DYSPHAGIA: ICD-10-CM

## 2025-05-13 PROCEDURE — 92507 TX SP LANG VOICE COMM INDIV: CPT | Performed by: SPEECH-LANGUAGE PATHOLOGIST

## 2025-05-14 ENCOUNTER — OFFICE VISIT (OUTPATIENT)
Dept: PHYSICAL THERAPY | Facility: REHABILITATION | Age: 70
End: 2025-05-14
Payer: COMMERCIAL

## 2025-05-14 DIAGNOSIS — R26.2 AMBULATORY DYSFUNCTION: Primary | ICD-10-CM

## 2025-05-14 PROCEDURE — 97530 THERAPEUTIC ACTIVITIES: CPT

## 2025-05-14 PROCEDURE — 97110 THERAPEUTIC EXERCISES: CPT

## 2025-05-14 PROCEDURE — 97112 NEUROMUSCULAR REEDUCATION: CPT

## 2025-05-14 NOTE — PROGRESS NOTES
"Daily Note     Today's date: 2025  Patient name: Edgar Obando  : 1955  MRN: [id number]  Referring provider: Gilma Swift PA-C  Dx:   Encounter Diagnosis     ICD-10-CM    1. Ambulatory dysfunction  R26.2           Start Time: 1045  Stop Time: 1130  Total time in clinic (min): 45 minutes    Subjective: Pt states he is feeling better and had a great night sleep with a desire to increase therapy.       Objective: See treatment diary below      Assessment: Tolerated treatment well. Pt was able to coordinate chin tuck with multiple exercises with positive feedback. Pt completed all tasks with mild cueing and increased repetitions and weight with increased fatigue. Patient would benefit from continued PT      Plan: Progress treatment as tolerated.       POC beginning Date: 25  POC Expiration Date: 25    POC expires PT/OT + Visit Limit?   2025  BOMN       Visit/Unit Tracking  AUTH Status:  Date 4/10 4/15 4/17 4/24 4/29   [employer] Used 9 10 11 12 13      Precautions: History of anxiety and depression, history of short term memory loss, fall risk, deconditioning, post ACDF for cervical myelopathy     Manuals                                         Neuro Re-Ed         Patient Ed         Nancy Negotiation         Cane Seated Overhead Shoulder Flexion 2x10 3# cuff 2x10 3# cuff 2x10 3# cuff 2x10 3# cuff 2x10 5# cuff    Seated Chin Tuck 2x10 2x10 2x10 2x10 2x10 with row    Romberg Balance on Foam with Perturbations 3x30\" b/l mod tandem 3x30\" b/l mod tandem 2x30\" b/l mod tandem 2x30\" ea mod tandem 2x1 min ea tandem     Biode[employer] LOS         [employer]         Step Taps         Wall Slide         Ther Ex         UBE /4 4/4 4/4 4'/4' seated 4'/4'    Rows - Seated 3x12 15# 3x12 15# 3x12 15# 15# 3x12 3x15 15#    Shoulder Extensions- seated 3x10 13# seated 3x10 13# seated 3x10 13# seated 13# 3x12 15# 3x12    Seated Trunk Rotations 15x b/l 10# 15x b/l 10# 15x b/l 10# 10# 15x ea " 13# 15x ea    Sit to Stands 2x10 on foam 2x10 on foam 2x10 on foam 2x10 foam 2x15 on foam    Leg Press         Standing Heel Raises 3x10 on foam 3x10 on foam 3x10 on foam 3x10 foam x30 on foam    Suitcase Carry          Ther Activity                           Gait Training                           Modalities

## 2025-05-16 ENCOUNTER — APPOINTMENT (OUTPATIENT)
Dept: PHYSICAL THERAPY | Facility: REHABILITATION | Age: 70
End: 2025-05-16
Payer: COMMERCIAL

## 2025-05-16 ENCOUNTER — HOSPITAL ENCOUNTER (OUTPATIENT)
Dept: RADIOLOGY | Facility: HOSPITAL | Age: 70
Discharge: HOME | End: 2025-05-16
Attending: NURSE PRACTITIONER

## 2025-05-16 ENCOUNTER — APPOINTMENT (OUTPATIENT)
Dept: LAB | Facility: CLINIC | Age: 70
End: 2025-05-16
Attending: NURSE PRACTITIONER
Payer: COMMERCIAL

## 2025-05-16 DIAGNOSIS — N17.9 AKI (ACUTE KIDNEY INJURY) (HCC): ICD-10-CM

## 2025-05-16 DIAGNOSIS — R22.2 MASS OF SKIN OF ABDOMEN: ICD-10-CM

## 2025-05-16 DIAGNOSIS — N18.2 CHRONIC KIDNEY DISEASE, STAGE II (MILD): ICD-10-CM

## 2025-05-16 DIAGNOSIS — I50.32 CHRONIC DIASTOLIC HEART FAILURE (HCC): ICD-10-CM

## 2025-05-16 DIAGNOSIS — R22.2 MASS, CHEST: ICD-10-CM

## 2025-05-16 LAB
ANION GAP SERPL CALCULATED.3IONS-SCNC: 10 MMOL/L (ref 4–13)
BNP SERPL-MCNC: 43 PG/ML (ref 0–100)
BUN SERPL-MCNC: 24 MG/DL (ref 5–25)
CALCIUM SERPL-MCNC: 9.7 MG/DL (ref 8.4–10.2)
CHLORIDE SERPL-SCNC: 94 MMOL/L (ref 96–108)
CO2 SERPL-SCNC: 33 MMOL/L (ref 21–32)
CREAT SERPL-MCNC: 1.31 MG/DL (ref 0.6–1.3)
GFR SERPL CREATININE-BSD FRML MDRD: 54 ML/MIN/1.73SQ M
GLUCOSE P FAST SERPL-MCNC: 141 MG/DL (ref 65–99)
POTASSIUM SERPL-SCNC: 4.2 MMOL/L (ref 3.5–5.3)
SODIUM SERPL-SCNC: 137 MMOL/L (ref 135–147)

## 2025-05-16 PROCEDURE — 36415 COLL VENOUS BLD VENIPUNCTURE: CPT

## 2025-05-16 PROCEDURE — 80048 BASIC METABOLIC PNL TOTAL CA: CPT

## 2025-05-16 PROCEDURE — 83880 ASSAY OF NATRIURETIC PEPTIDE: CPT

## 2025-05-16 NOTE — PROGRESS NOTES
"Daily Note     Today's date: 2025  Patient name: Edgar Obando  : 1955  MRN: [id number]  Referring provider: Gilma Swift PA-C  Dx: No diagnosis found.               Subjective: ***      Objective: See treatment diary below      Assessment: Tolerated treatment {Tolerated treatment :[id number]}. Patient {assessment:[id number]}      Plan: Continue per plan of care.      POC beginning Date: 25  POC Expiration Date: 25    POC expires PT/OT + Visit Limit?   2025  BOMN       Visit/Unit Tracking  AUTH Status:  Date 4/10 4/15 4/17 4/24 4/29   [employer] Used 9 10 11 12 13      Precautions: History of anxiety and depression, history of short term memory loss, fall risk, deconditioning, post ACDF for cervical myelopathy     Manuals                                             Neuro Re-Ed          Patient Ed          Nancy Negotiation          Cane Seated Overhead Shoulder Flexion 2x10 3# cuff 2x10 3# cuff 2x10 3# cuff 2x10 3# cuff 2x10 5# cuff     Seated Chin Tuck 2x10 2x10 2x10 2x10 2x10 with row     Romberg Balance on Foam with Perturbations 3x30\" b/l mod tandem 3x30\" b/l mod tandem 2x30\" b/l mod tandem 2x30\" ea mod tandem 2x1 min ea tandem      Bi[employer]x LOS          [employer]          Step Taps          Wall Slide          Ther Ex          UBE  4'/4' seated 4'/4'     Rows - Seated 3x12 15# 3x12 15# 3x12 15# 15# 3x12 3x15 15#     Shoulder Extensions- seated 3x10 13# seated 3x10 13# seated 3x10 13# seated 13# 3x12 15# 3x12     Seated Trunk Rotations 15x b/l 10# 15x b/l 10# 15x b/l 10# 10# 15x ea 13# 15x ea     Sit to Stands 2x10 on foam 2x10 on foam 2x10 on foam 2x10 foam 2x15 on foam     Leg Press          Standing Heel Raises 3x10 on foam 3x10 on foam 3x10 on foam 3x10 foam x30 on foam     Suitcase Carry           Ther Activity                              Gait Training                              Modalities                            "

## 2025-05-17 ENCOUNTER — HOSPITAL ENCOUNTER (OUTPATIENT)
Dept: RADIOLOGY | Facility: HOSPITAL | Age: 70
Discharge: HOME | End: 2025-05-17
Attending: NURSE PRACTITIONER
Payer: COMMERCIAL

## 2025-05-17 PROCEDURE — 74150 CT ABDOMEN W/O CONTRAST: CPT

## 2025-05-17 PROCEDURE — 71250 CT THORAX DX C-: CPT

## 2025-05-19 ENCOUNTER — OFFICE VISIT (OUTPATIENT)
Dept: PHYSICAL THERAPY | Facility: REHABILITATION | Age: 70
End: 2025-05-19
Payer: COMMERCIAL

## 2025-05-19 ENCOUNTER — TELEPHONE (OUTPATIENT)
Dept: PHYSICAL THERAPY | Facility: REHABILITATION | Age: 70
End: 2025-05-19

## 2025-05-19 DIAGNOSIS — R26.2 AMBULATORY DYSFUNCTION: Primary | ICD-10-CM

## 2025-05-19 DIAGNOSIS — F41.9 ANXIETY: ICD-10-CM

## 2025-05-19 PROCEDURE — 97112 NEUROMUSCULAR REEDUCATION: CPT

## 2025-05-19 PROCEDURE — 97110 THERAPEUTIC EXERCISES: CPT

## 2025-05-19 RX ORDER — CLONAZEPAM 1 MG/1
1 TABLET, ORALLY DISINTEGRATING ORAL 2 TIMES DAILY
Qty: 60 TABLET | Refills: 0 | Status: SHIPPED | OUTPATIENT
Start: 2025-05-20

## 2025-05-19 NOTE — PROGRESS NOTES
"Daily Note     Today's date: 2025  Patient name: Edgar Obando  : 1955  MRN: [id number]  Referring provider: Gilma Swift PA-C  Dx:   Encounter Diagnosis     ICD-10-CM    1. Ambulatory dysfunction  R26.2                      Subjective: Pt reports some improvement overall upon arrival      Objective: See treatment diary below      Assessment: Tolerated treatment well. Patient would benefit from continued PT.  Pt. able to complete all exercises with no increase in pain during or after session.  Pt challenged with current levels of balance.  Pt 1:1 with PTA 2220-2865, IEP for remainder.      Plan: Continue per plan of care.      POC beginning Date: 25  POC Expiration Date: 25    POC expires PT/OT + Visit Limit?   2025  BOMN       Visit/Unit Tracking  AUTH Status:  Date 4/10 4/15 4/17 4/24 4/29 5/19   [employer] Used 9 10 11 12 13 14      Precautions: History of anxiety and depression, history of short term memory loss, fall risk, deconditioning, post ACDF for cervical myelopathy     Manuals                                        Neuro Re-Ed         Patient Ed         Nancy Negotiation         Cane Seated Overhead Shoulder Flexion 2x10 3# cuff 2x10 3# cuff 2x10 3# cuff 2x10 3# cuff 2x10 5# cuff 2x10 5# aw   Seated Chin Tuck 2x10 2x10 2x10 2x10 2x10 with row 2x10   Romberg Balance on Foam with Perturbations 3x30\" b/l mod tandem 3x30\" b/l mod tandem 2x30\" b/l mod tandem 2x30\" ea mod tandem 2x1 min ea tandem  2x1' ea mod tdm   Biodex LOS         [employer]         Step Taps         Wall Slide         Ther Ex         UBE  4/4 4/4 4'/4' seated 4'/4' 4/4   Rows - Seated 3x12 15# 3x12 15# 3x12 15# 15# 3x12 3x15 15# 3x15 15#   Shoulder Extensions- seated 3x10 13# seated 3x10 13# seated 3x10 13# seated 13# 3x12 15# 3x12 3x12 15#   Seated Trunk Rotations 15x b/l 10# 15x b/l 10# 15x b/l 10# 10# 15x ea 13# 15x ea 15x b/l 13#   Sit to Stands 2x10 on foam 2x10 on foam " 2x10 on foam 2x10 foam 2x15 on foam 2x15 on foam   Leg Press         Standing Heel Raises 3x10 on foam 3x10 on foam 3x10 on foam 3x10 foam x30 on foam 30x on foam   Suitcase Carry          Ther Activity                           Gait Training                           Modalities

## 2025-05-19 NOTE — TELEPHONE ENCOUNTER
Medication:  PDMP     04/23/2025 04/22/2025 04/22/2025 clonazePAM (Tablet) 60.0 30 1 MG NA HUMBERTO PAT     Active agreement on file -No

## 2025-05-19 NOTE — TELEPHONE ENCOUNTER
Reason for call:   [x] Refill   [] Prior Auth  [] Other:     Office:   [x] PCP/Provider -   [] Specialty/Provider -     Medication: clonazePAM (KlonoPIN) 1 MG disintegrating tablet     Dose/Frequency: Take 1 tablet (1 mg total) by mouth 2 (two) times a day     Quantity: 60    Pharmacy: [facility]   Does the patient have enough for 3 days?   [] Yes   [x] No - Send as HP to POD

## 2025-05-20 ENCOUNTER — OFFICE VISIT (OUTPATIENT)
Dept: SPEECH THERAPY | Facility: CLINIC | Age: 70
End: 2025-05-20
Attending: ORTHOPAEDIC SURGERY
Payer: COMMERCIAL

## 2025-05-20 DIAGNOSIS — R13.14 PHARYNGOESOPHAGEAL DYSPHAGIA: ICD-10-CM

## 2025-05-20 DIAGNOSIS — R49.0 DYSPHONIA: Primary | ICD-10-CM

## 2025-05-20 DIAGNOSIS — Z98.1 S/P CERVICAL SPINAL FUSION: ICD-10-CM

## 2025-05-20 PROCEDURE — 92507 TX SP LANG VOICE COMM INDIV: CPT | Performed by: SPEECH-LANGUAGE PATHOLOGIST

## 2025-05-20 NOTE — PROGRESS NOTES
Daily Speech Treatment Note    Today's date: 2025   Patient’s name: Edgar Obando  : 1955  MRN: [id number]  Safety measures: Hx of ACDF, fall risk   Referring provider: Conrad Richardson MD    Encounter Diagnosis     ICD-10-CM    1. Dysphonia  R49.0       2. Pharyngoesophageal dysphagia  R13.14       3. S/P cervical spinal fusion  Z98.1           Visit Tracking:  Re-eval Due Auth Expiration Date ST Visit Limit   25 BOMN                                   Visit/Unit Tracking  AUTH Status:  Date 25   No authorization required Used 1 2 3 4 5     Remaining  BOMN BOMN BOMN BOMN BOMN         Subjective/Behavioral:  - Patient reported feeling a bit off today but felt better following treatment session today. He worked hard throughout.    His voice is more hoarse today.    He reports that his swallowing has gotten better but he did have an episode where his drink and the liquid came back up on him. The right head turn has been working overall.     Objective/Assessment:  -See goals targeted below during today's treatment session.      Short-term goals:  Patient will be educated on vocal hygiene and demonstrate understanding of recommendations to facilitate increased quality of voice, to be achieved in 2-4 weeks.     Patient will be educated on diaphragmatic breathing (versus clavicular breathing) to establish controlled, rhythmic breathing, to be achieved in 2-4 weeks.  Due to noted breath holding and laryngeal tension, provided relaxation breathing strategies typically utilized with vocal cord dysfunction. Patient responded well to the tongue/tooth variant. Will practice throughout his day this week to see if this will provide any relieve from symptoms.         Patient will utilize semi-occluded vocal tract exercises without laryngeal tension to promote healthy vocal function with 80% accuracy, to be achieved in 4-6 weeks.    Semi-Occluded Vocal Tract  Exercises  To target relaxed laryngeal posture and coordination between phonation and respiration, the following activities were completed using principles of SOVTE.       Straw Only  With phonation (humming):  Patient was  able to achieve coordination of breath with voice 50% accuracy, utilizing a wide straw and short bursts focusing on a higher pitch.    Cup Bubbling (straw and cup with water):     Without phonation: 50% accuracy increasing to 80% accuracy with cueing. Patient demonstrated moments of breath holding. Provided breathing strategies to incorporate into his day (see above).        Patient will utilize a forward tone focused/resonant voice to decrease vocal hyperfunction and improve voice quality and vocal projection, to be achieved in 4-6 weeks.   Lastly, patient read /h/ words at 1 syllable and sentence level. Patient demonstrated the ability to maintain a forward focused, resonant voice with 70% accuracy at word level and 70% accuracy at sentence level. Exercises were targeted with an emphasis on a higher, calling voice and then transitioned to a quiet, confident voice to further reduce laryngeal tension.     Patient will produce coordinated voice production in different situations (e.g. telephone, over background noise, emotional topics, speaking at a distance) with 80% accuracy as perceptually judged by clinician.       Patient will perform compensatory strategies (e.g., upright positioning, small bites/sips, slow rate, alternation of consistencies, multiple swallows, effortful swallow, chin tuck, head turn, etc.) with minimal verbal cues to prevent overt s/sx penetration/aspiration of least restrictive food/liquid consistencies.       Plan:  -Patient was provided with home exercises/activities to target goals in plan of care at the end of today's session.  -Continue with current plan of care.

## 2025-05-22 ENCOUNTER — OFFICE VISIT (OUTPATIENT)
Dept: PHYSICAL THERAPY | Facility: REHABILITATION | Age: 70
End: 2025-05-22
Payer: COMMERCIAL

## 2025-05-22 DIAGNOSIS — R26.2 AMBULATORY DYSFUNCTION: Primary | ICD-10-CM

## 2025-05-22 PROCEDURE — 97110 THERAPEUTIC EXERCISES: CPT | Performed by: PHYSICAL THERAPIST

## 2025-05-22 PROCEDURE — 97112 NEUROMUSCULAR REEDUCATION: CPT | Performed by: PHYSICAL THERAPIST

## 2025-05-22 NOTE — PROGRESS NOTES
Daily Note     Today's date: 2025  Patient name: Edgar Obando  : 1955  MRN: [id number]  Referring provider: Gilma Swift PA-C  Dx:   Encounter Diagnosis     ICD-10-CM    1. Ambulatory dysfunction  R26.2                      Subjective: I do feel that I am making progress with physical therapy. I am not having the constant pain in my spine that I used to have. I do feel that I still need to work on my feet and my balance.       Objective: See treatment diary below      Assessment: Tolerated treatment well. Patient would benefit from continued PT      Plan: Continue per plan of care.      POC beginning Date: 25  POC Expiration Date: 25    POC expires PT/OT + Visit Limit?   2025  BOMN       Visit/Unit Tracking  AUTH Status:  Date 4/10 4/15 4/17 4/24 4/29 5/19   [employer] Used 9 10 11 12 13 14      Precautions: History of anxiety and depression, history of short term memory loss, fall risk, deconditioning, post ACDF for cervical myelopathy     Manuals                    Neuro Re-Ed    Patient Ed    Nancy Negotiation    Cane Seated Overhead Shoulder Flexion 2x10 5# aw   Seated Chin Tuck 2x10   Romberg Balance on Foam with Perturbations 2x1' ea mod tdm   [employer]ode[employer] LOS    [employer]    Step Taps    Wall Slide    Ther Ex    UBE 4/4   Rows - Seated 3x15 15#   Shoulder Extensions- seated 3x12 15#   Seated Trunk Rotations 15x b/l 13#   Sit to Stands 2x15 on foam   Leg Press    Standing Heel Raises 30x on foam   Suitcase Carry     Ther Activity            Gait Training            Modalities

## 2025-05-27 ENCOUNTER — OFFICE VISIT (OUTPATIENT)
Dept: PHYSICAL THERAPY | Facility: REHABILITATION | Age: 70
End: 2025-05-27
Payer: COMMERCIAL

## 2025-05-27 DIAGNOSIS — R26.2 AMBULATORY DYSFUNCTION: Primary | ICD-10-CM

## 2025-05-27 PROCEDURE — 97110 THERAPEUTIC EXERCISES: CPT

## 2025-05-27 PROCEDURE — 97112 NEUROMUSCULAR REEDUCATION: CPT

## 2025-05-27 NOTE — PROGRESS NOTES
"Daily Note     Today's date: 2025  Patient name: Edgar Obando  : 1955  MRN: [id number]  Referring provider: Gilma Swift PA-C  Dx:   Encounter Diagnosis     ICD-10-CM    1. Ambulatory dysfunction  R26.2                      Subjective: \"The neuropathy in my feet is definitely acting up today but overall I keep getting better\"        Objective: See treatment diary below      Assessment: Tolerated treatment well. Pt. able to complete all exercises with no increase in pain during or after session. Continues to report appropriate challenge w/ resistance/repetitions. Fatigue noted post treatment. Patient would benefit from continued PT        Plan: Continue per plan of care.      POC beginning Date: 25  POC Expiration Date: 25    POC expires PT/OT + Visit Limit?   2025  BOMN       Visit/Unit Tracking  AUTH Status:  Date 4/10 4/15 4/17 4/24 4/29 5/19 5/22 5/27   [employer] Used 9 10 11 12 13 14 15 16      Precautions: History of anxiety and depression, history of short term memory loss, fall risk, deconditioning, post ACDF for cervical myelopathy     Manuals                        Neuro Re-Ed     Patient Ed     Nancy Negotiation     Cane Seated Overhead Shoulder Flexion 2x10 5# aw 2x10 5# AW   Seated Chin Tuck 2x10 2x10   Romberg Balance on Foam with Perturbations 2x1' ea mod tdm 2x1' ea mod tdm   Biode[employer] LOS     [employer]     Step Taps     Wall Slide     Ther Ex     UBE  4'/4'   Rows - Seated 3x15 15# 3x15 15#   Shoulder Extensions- seated 3x12 15# 3x15 15#   Seated Trunk Rotations 15x b/l 13# 15x b/l 13#   Sit to Stands 2x15 on foam 2x15 on foam   Leg Press     Standing Heel Raises 30x on foam 30x on foam   Suitcase Carry      Ther Activity               Gait Training               Modalities                                              "

## 2025-05-27 NOTE — PROGRESS NOTES
Daily Speech Treatment Note    Today's date: 2025   Patient’s name: Edgar Obando  : 1955  MRN: [id number]  Safety measures: Hx of ACDF, fall risk   Referring provider: Conrad Richardson MD    Encounter Diagnosis     ICD-10-CM    1. Dysphonia  R49.0       2. Pharyngoesophageal dysphagia  R13.14       3. S/P cervical spinal fusion  Z98.1           Visit Tracking:  Re-eval Due Auth Expiration Date ST Visit Limit   25 BOMN                                   Visit/Unit Tracking  AUTH Status:  Date 25 ***   No authorization required Used 1 2 3 4 5 ***     Remaining  BOMN BOMN BOMN BOMN BOMN BOMN         Subjective/Behavioral:      Objective/Assessment:  -See goals targeted below during today's treatment session.      Short-term goals:  Patient will be educated on vocal hygiene and demonstrate understanding of recommendations to facilitate increased quality of voice, to be achieved in 2-4 weeks.     Patient will be educated on diaphragmatic breathing (versus clavicular breathing) to establish controlled, rhythmic breathing, to be achieved in 2-4 weeks.        Patient will utilize semi-occluded vocal tract exercises without laryngeal tension to promote healthy vocal function with 80% accuracy, to be achieved in 4-6 weeks.    Semi-Occluded Vocal Tract Exercises  To target relaxed laryngeal posture and coordination between phonation and respiration, the following activities were completed using principles of SOVTE.       Straw Only  With phonation (humming):  Patient was  able to achieve coordination of breath with voice ***% accuracy, utilizing a wide straw and short bursts focusing on a higher pitch.    Cup Bubbling (straw and cup with water):     Without phonation: ***% accuracy increasing to ***% accuracy with cueing. Patient demonstrated moments of breath holding. Provided breathing strategies to incorporate into his day (see above).        Patient will  utilize a forward tone focused/resonant voice to decrease vocal hyperfunction and improve voice quality and vocal projection, to be achieved in 4-6 weeks.   Lastly, patient read /h/ words at 1 syllable and sentence level. Patient demonstrated the ability to maintain a forward focused, resonant voice with 70% accuracy at word level and ***% accuracy at sentence level. Exercises were targeted with an emphasis on a higher, calling voice and then transitioned to a quiet, confident voice to further reduce laryngeal tension.     Patient will produce coordinated voice production in different situations (e.g. telephone, over background noise, emotional topics, speaking at a distance) with 80% accuracy as perceptually judged by clinician.       Patient will perform compensatory strategies (e.g., upright positioning, small bites/sips, slow rate, alternation of consistencies, multiple swallows, effortful swallow, chin tuck, head turn, etc.) with minimal verbal cues to prevent overt s/sx penetration/aspiration of least restrictive food/liquid consistencies.       Plan:  -Patient was provided with home exercises/activities to target goals in plan of care at the end of today's session.  -Continue with current plan of care.

## 2025-05-28 ENCOUNTER — APPOINTMENT (OUTPATIENT)
Dept: SPEECH THERAPY | Facility: CLINIC | Age: 70
End: 2025-05-28
Attending: ORTHOPAEDIC SURGERY
Payer: COMMERCIAL

## 2025-05-28 DIAGNOSIS — R13.14 PHARYNGOESOPHAGEAL DYSPHAGIA: ICD-10-CM

## 2025-05-28 DIAGNOSIS — Z98.1 S/P CERVICAL SPINAL FUSION: ICD-10-CM

## 2025-05-28 DIAGNOSIS — R49.0 DYSPHONIA: Primary | ICD-10-CM

## 2025-05-29 ENCOUNTER — OFFICE VISIT (OUTPATIENT)
Dept: PODIATRY | Facility: CLINIC | Age: 70
End: 2025-05-29
Payer: COMMERCIAL

## 2025-05-29 ENCOUNTER — OFFICE VISIT (OUTPATIENT)
Dept: PHYSICAL THERAPY | Facility: REHABILITATION | Age: 70
End: 2025-05-29
Payer: COMMERCIAL

## 2025-05-29 VITALS — RESPIRATION RATE: 18 BRPM | WEIGHT: 238 LBS | HEIGHT: 66 IN | BODY MASS INDEX: 38.25 KG/M2

## 2025-05-29 DIAGNOSIS — B35.1 ONYCHOMYCOSIS: ICD-10-CM

## 2025-05-29 DIAGNOSIS — E11.9 CONTROLLED TYPE 2 DIABETES MELLITUS WITHOUT COMPLICATION, WITHOUT LONG-TERM CURRENT USE OF INSULIN (HCC): Primary | ICD-10-CM

## 2025-05-29 DIAGNOSIS — R26.2 AMBULATORY DYSFUNCTION: Primary | ICD-10-CM

## 2025-05-29 PROCEDURE — RECHECK: Performed by: PODIATRIST

## 2025-05-29 PROCEDURE — 97110 THERAPEUTIC EXERCISES: CPT | Performed by: PHYSICAL THERAPIST

## 2025-05-29 PROCEDURE — 11721 DEBRIDE NAIL 6 OR MORE: CPT | Performed by: PODIATRIST

## 2025-05-29 PROCEDURE — 97112 NEUROMUSCULAR REEDUCATION: CPT | Performed by: PHYSICAL THERAPIST

## 2025-05-29 NOTE — PROGRESS NOTES
"Name: Edgar Obando      : 1955      MRN: [id number]  Encounter Provider: Marcos Martines DPM  Encounter Date: 2025   Encounter department: [facility] [place]    Treatment consists of debridement of 10 mycotic toenails reducing nails in thickness and removing devitalized tissue and debris.  Electrical and manual debridement performed.  Patient told to continue with moisturizers on a regular basis.    :  Assessment & Plan  Controlled type 2 diabetes mellitus without complication, without long-term current use of insulin ([facility])    Lab Results   Component Value Date    HGBA1C 6.1 (H) 2024            Onychomycosis  Debridement           History of Present Illness   HPI  Edgar Obando is a 70 y.o. male who presents for nail care.  All toenails are thick and yellow with subungual debris and difficult for the patient to cut.  Patient has dry scaly skin and has been using a moisturizer on a regular basis.  Though still scaly, there is improvement.      Review of Systems       Objective   Resp 18   Ht 5' 6\" (1.676 m)   Wt 108 kg (238 lb)   BMI 38.41 kg/m²      Physical Exam    Cardiovascular:      Comments: No palpable pedal pulses bilateral    Skin:     Comments: All toenails are thick and yellow with subungual debris.  Skin on the soles of feet remain scaly.               "

## 2025-05-29 NOTE — PROGRESS NOTES
Daily Note     Today's date: 2025  Patient name: Edgar Obando  : 1955  MRN: [id number]  Referring provider: Gilma Swift PA-C  Dx:   Encounter Diagnosis     ICD-10-CM    1. Ambulatory dysfunction  R26.2                      Subjective: I know I need to keep working on my legs, but feeling more confident.       Objective: See treatment diary below      Assessment: Tolerated treatment well. Patient would benefit from continued PT      Plan: Continue per plan of care.      POC beginning Date: 25  POC Expiration Date: 25    POC expires PT/OT + Visit Limit?   2025  BOMN       Visit/Unit Tracking  AUTH Status:  Date 4/10 4/15 4/17 4/24 4/29 5/19 5/22 5/27   [employer] Used 9 10 11 12 13 14 15 16      Precautions: History of anxiety and depression, history of short term memory loss, fall risk, deconditioning, post ACDF for cervical myelopathy     Manuals                            Neuro Re-Ed      Patient Ed      Nancy Negotiation   4 F/L 3# ankel weights   Cane Seated Overhead Shoulder Flexion 2x10 5# aw 2x10 5# AW 2x10 5# AW   Seated Chin Tuck 2x10 2x10 2x19   Romberg Balance on Foam with Perturbations 2x1' ea mod tdm 2x1' ea mod tdm 2x1' each mod tdm   Biodex LOS      Cone Weaving      Step Taps      Wall Slide      Ther Ex      UBE  4'/4'    Rows - Seated 3x15 15# 3x15 15# 3x15 15#   Shoulder Extensions- seated 3x12 15# 3x15 15# 3x15 15#   Seated Trunk Rotations 15x b/l 13# 15x b/l 13# 15x b/l 13#   Sit to Stands 2x15 on foam 2x15 on foam 215 on foam   Leg Press      Standing Heel Raises 30x on foam 30x on foam 30x on foam   Suitcase Carry       Ther Activity                  Gait Training                  Modalities

## 2025-06-02 ENCOUNTER — OFFICE VISIT (OUTPATIENT)
Dept: OBGYN CLINIC | Facility: HOSPITAL | Age: 70
End: 2025-06-02
Payer: COMMERCIAL

## 2025-06-02 VITALS — HEIGHT: 66 IN | WEIGHT: 238.1 LBS | BODY MASS INDEX: 38.27 KG/M2

## 2025-06-02 DIAGNOSIS — Z98.1 S/P CERVICAL SPINAL FUSION: Primary | ICD-10-CM

## 2025-06-02 PROCEDURE — 99212 OFFICE O/P EST SF 10 MIN: CPT | Performed by: ORTHOPAEDIC SURGERY

## 2025-06-02 NOTE — PROGRESS NOTES
"Daily Speech Treatment Note    Today's date: 6/3/2025   Patient’s name: Edgar Obando  : 1955  MRN: [id number]  Safety measures: Hx of ACDF, fall risk   Referring provider: Conrad Richardson MD    Encounter Diagnosis     ICD-10-CM    1. Dysphonia  R49.0       2. Pharyngoesophageal dysphagia  R13.14       3. S/P cervical spinal fusion  Z98.1             Visit Tracking:  Re-eval Due Auth Expiration Date ST Visit Limit   25 BOMN                                   Visit/Unit Tracking  AUTH Status:  Date 4/17/25 4/25/25 5/6/26 5/13/26 5/20/26 6/3/26   No authorization required Used 1 2 3 4 5 6     Remaining  BOMN BOMN BOMN BOMN BOMN BOMN         Subjective/Behavioral:  Patient reports that his voice is off and on. Sometimes it is clear. He can tell when he is taxing his voice. It gets really scratchy. He feels that there are more moments of clarity with his voice than there was before. His orthopedic is going to hold off on an MRI.     Patient feels that he has more \"gunk\" in his throat today. He is seeing the GI on 25      Objective/Assessment:  -See goals targeted below during today's treatment session.    Discussed possible MFR with patient but he is not feeling any tension currently in his neck area on left side. Only mild tightness on right side.     Short-term goals:  Patient will be educated on vocal hygiene and demonstrate understanding of recommendations to facilitate increased quality of voice, to be achieved in 2-4 weeks.  Discussed that his diet drink (including lemon, tea and natalie) may be exacerbating his reflux symptoms. Recommended drinking water with watermelon in it (safer with diabetes) to reduce inflammation in the throat.    Patient will be educated on diaphragmatic breathing (versus clavicular breathing) to establish controlled, rhythmic breathing, to be achieved in 2-4 weeks.        Patient will utilize semi-occluded vocal tract exercises without laryngeal tension to " promote healthy vocal function with 80% accuracy, to be achieved in 4-6 weeks.    Semi-Occluded Vocal Tract Exercises  To target relaxed laryngeal posture and coordination between phonation and respiration, the following activities were completed using principles of SOVTE.       Cup Bubbling (straw and cup with water):     Without phonation: 100% accuracy - improved accuracy - held for 14.37 seconds    With phonation: 100% accuracy -- small straw - works well with a higher pitch.    With phonation (pitch glides): 60% accuracy - improved as task progressed. Task progressed to alternating with moris, brynn, kwaku. Moris and brynn were successful in keeping forward focused resonance - 75% accuracy.     Patient will utilize a forward tone focused/resonant voice to decrease vocal hyperfunction and improve voice quality and vocal projection, to be achieved in 4-6 weeks.   Lastly, patient read /h/ words at 1 syllable level Patient demonstrated the ability to maintain a forward focused, resonant voice with 80% accuracy.    Patient will produce coordinated voice production in different situations (e.g. telephone, over background noise, emotional topics, speaking at a distance) with 80% accuracy as perceptually judged by clinician.       Patient will perform compensatory strategies (e.g., upright positioning, small bites/sips, slow rate, alternation of consistencies, multiple swallows, effortful swallow, chin tuck, head turn, etc.) with minimal verbal cues to prevent overt s/sx penetration/aspiration of least restrictive food/liquid consistencies.       Plan:  -Patient was provided with home exercises/activities to target goals in plan of care at the end of today's session.  -Continue with current plan of care.

## 2025-06-02 NOTE — ASSESSMENT & PLAN NOTE
The clinical, physical and imaging findings were reviewed with the patient  Radiographs demonstrate stable appearing C3-7 ACDF construct with no abnormal motion on flexion-extension films.  Discussed the etiology and pathomechanics of cervical disc degeneration, cervical spine fusion.  Discussed treatment options   Non operative treatment options include physical therapy, at home exercises, activity modifications, chiropractic medicine, acupuncture, oral medications, and interventional spine procedures  After discussion with the patient we agreed upon continued conservative treatments    Fortunately patient's neck and shoulder pain has since resolved and continues to improve since last evaluation   Recommend ongoing physical therapy/HEP to work on cervical ROM, strengthening, and stretching exercises.   Ice/heat, OTC pain medication as needed.  Continue to follow-up with speech/swallow therapy, ENT for dysphagia reported symptoms.  He will follow up in 6 months.

## 2025-06-02 NOTE — PROGRESS NOTES
Name: Edgar Obando      : 1955       MRN: [id number]   Encounter Provider: Conrad Richardson MD   Encounter Date: 25  Encounter department: [facility] ORTHOPEDIC SPINE SURGERY    Medical Decision Making:     Assessment & Plan  S/P cervical spinal fusion  The clinical, physical and imaging findings were reviewed with the patient  Radiographs demonstrate stable appearing C3-7 ACDF construct with no abnormal motion on flexion-extension films.  Discussed the etiology and pathomechanics of cervical disc degeneration, cervical spine fusion.  Discussed treatment options   Non operative treatment options include physical therapy, at home exercises, activity modifications, chiropractic medicine, acupuncture, oral medications, and interventional spine procedures  After discussion with the patient we agreed upon continued conservative treatments    Fortunately patient's neck and shoulder pain has since resolved and continues to improve since last evaluation   Recommend ongoing physical therapy/HEP to work on cervical ROM, strengthening, and stretching exercises.   Ice/heat, OTC pain medication as needed.  Continue to follow-up with speech/swallow therapy, ENT for dysphagia reported symptoms.  He will follow up in 6 months.          Diagnostic Tests   IMAGING: I have personally reviewed the images and these are my findings:  Cervical Spine X-rays from 2025: Demonstrate stable C3-C7 ACDF without hardware complication or alignment change.    Subjective:      Chief Complaint: Neck Pain  HPI:  Edgar Obando is a 69 y.o. male presenting for follow-up visit.  Patient reports ongoing benefit from cervical surgery with improved ambulatory function and balance, decreased numbness.  He notes his neck pain has been resolved however has developed a left-sided anterior lateral neck pain over the last several weeks.  He denies any radicular symptoms or any new numbness or  tingling.  He denies any weakness.  He has been using a cane.  He has been seeing orthopedic joints and considering knee replacement however continues with knee injections at this time.  He reports that his postoperative aphasia has continued to improve but occasionally does have some difficulty swallowing.  He notes some throat discomfort from time to time as well.  He occasionally takes Tylenol for various aches and pains in his neck and bilateral upper and lower extremities.     Update HPI on 1/29/2025:  Edgar is here for follow up. He reports falling on 12/29/2024 for which he was seen in the ED. Had increase in total body pain/spasms, including his neck. Was taking a muscle relaxer and tylenol with some benefit. Notes his pain has improved, however does continue with some neck discomfort/stiffness primarily in the morning. Currently denies new onset of pain or numbness/tingling into his upper extremities. Using walker for ambulation, noting he is using it for caution. Does report some increased tremors in his hands, was present prior to surgery. Has been continuing with at-home physical therapy with benefit. Does wish to transition to outpatient physical therapy in the near future.      Update HPI on 4/28/25:  Edgar Obando is a 70 y.o. male presenting for 1 year postoperative valuation of his cervical spine.  He is approximately 12 months status post C3-C7 ACDF.  Overall he does feel that there are improvements.  However, does experience difficulty with swallowing and speech.  He is set up for initiation of speech therapy in the coming weeks.  He states that he does also experience a painful soft tissue knotting to the anterior right aspect of his neck adjacent to his collarbone.  He denies any radicular symptoms.  He notes some persistent balance issues and does ambulate with a cane and does intermittently utilize a walker as a precautionary measure.  He did see ear nose and throat and did have a flexible  laryngoscopy he 4/11/2025 which did not result in significant findings.  In addition we did review his postoperative course.  Patient notes that he was having timeframe of no neck pain.  He did have a significant fall in early December which may have exacerbated his neck pain symptoms.    Update HPI on 6/2/25: Patient states that he overall is doing well.  Patient states his pain within his shoulders have completely gone away.  Patient states he believes the steroid provided to him and physical therapy have been beneficial for him.  Patient states it has been easier for him to swallow pills and he believes he is seeing improvement with speech therapy as well.  Patient states he is overall pleased with his progress as his pain continues to decrease.      Objective:     Family History   Problem Relation Name Age of Onset    Diabetes Mother      Diabetes Father      Cancer Father         Past Medical History:   Diagnosis Date    Acid reflux     Acute blood loss anemia 07/15/2016    Anxiety     Arthritis     Cellulitis     left ankle    CPAP (continuous positive airway pressure) dependence     as needed per patient    Diabetes mellitus (HCC)     Hypertension     Idiopathic chronic venous hypertension of right lower extremity with ulcer ([facility]) 06/12/2023    Sleep apnea     Twitching        Current Outpatient Medications   Medication Sig Dispense Refill    acetaminophen (TYLENOL) 650 mg CR tablet Take 1 tablet (650 mg total) by mouth every 8 (eight) hours as needed for mild pain 30 tablet 0    albuterol (PROVENTIL HFA,VENTOLIN HFA) 90 mcg/act inhaler Inhale 2 puffs every 6 (six) hours as needed for wheezing As needed      aspirin (ECOTRIN LOW STRENGTH) 81 mg EC tablet Take 81 mg by mouth daily      Blood Glucose Monitoring Suppl (ONE TOUCH ULTRA 2) w/Device KIT USE TO TEST BLOOD GLUCOSE      budesonide (RINOCORT AQUA) 32 MCG/ACT nasal spray 1 spray into each nostril 2 (two) times a day As needed      busPIRone (BUSPAR) 10  mg tablet TAKE 1 TABLET BY MOUTH TWO (TWO) TIMES A  tablet 1    clonazePAM (KlonoPIN) 1 MG disintegrating tablet Take 1 tablet (1 mg total) by mouth 2 (two) times a day Do not start before May 20, 2025. 60 tablet 0    docusate sodium (COLACE) 100 mg capsule Take 100 mg by mouth 2 (two) times a day      escitalopram (LEXAPRO) 5 mg tablet Take 1 tablet (5 mg total) by mouth daily 30 tablet 1    furosemide (LASIX) 40 mg tablet Take 40 mg by mouth as needed in the morning and 40 mg as needed in the evening.      Lancets 30G MISC 3 (three) times a day Use to test blood glucose      losartan (COZAAR) 25 mg tablet Take 1 tablet (25 mg total) by mouth daily 90 tablet 1    lovastatin (MEVACOR) 10 MG tablet Take 10 mg by mouth daily at bedtime      methocarbamol (Robaxin-750) 750 mg tablet Take 1 tablet (750 mg total) by mouth every 8 (eight) hours 90 tablet 1    methylPREDNISolone 4 MG tablet therapy pack Use as directed on package 1 each 0    Multiple Vitamins-Minerals (multivitamin with minerals) tablet Take 1 tablet by mouth daily      naloxone (NARCAN) 4 mg/0.1 mL nasal spray Administer 1 spray into a nostril. If no response after 2-3 minutes, give another dose in the other nostril using a new spray. 1 each 1    OneTouch Ultra test strip USE TO TEST BLOOD GLUCOSE TWICE DAILY      pantoprazole (PROTONIX) 40 mg tablet Take 40 mg by mouth daily      semaglutide, 0.25 or 0.5 mg/dose, (Ozempic) 2 mg/1.5 mL injection pen Inject 0.5 mg under the skin every 7 days      tamsulosin (FLOMAX) 0.4 mg TAKE 1 CAPSULE BY MOUTH EVERY DAY WITH DINNER 90 capsule 2    testosterone (ANDROGEL) 1.62 % TD gel pump       tiZANidine (ZANAFLEX) 4 mg tablet Take 1 tablet (4 mg total) by mouth 2 (two) times a day as needed for muscle spasms May make you drowsy. Do not drive until you know how it affects you. Do not take any other muscle relaxers while taking this medication      traZODone (DESYREL) 100 mg tablet       zinc gluconate 50 mg  tablet Take 50 mg by mouth in the morning.       No current facility-administered medications for this visit.       Past Surgical History:   Procedure Laterality Date    APPENDECTOMY      CERVICAL FUSION Bilateral 2024    Procedure: FUSION CERVICAL ANTERIOR W DISCECTOMY, C3-7 ACDF;  Surgeon: Conrad Richardson MD;  Location: [facility];  Service: Orthopedics    CHOLECYSTECTOMY      COLONOSCOPY      IR PICC PLACEMENT DOUBLE LUMEN  2024    KNEE ARTHROSCOPY      ID ARTHRP KNE CONDYLE&PLATU MEDIAL&LAT COMPARTMENTS Left 2016    Procedure: TOTAL  KNEE REPLACEMENT ;  Surgeon: Zack Montenegro MD;  Location: [facility];  Service: Orthopedics       Social History     Socioeconomic History    Marital status: /Civil Union     Spouse name: Nayeli    Number of children: 3    Years of education: BS    Highest education level: Not on file   Occupational History    Occupation: Attendance officer    Occupation: Retired-    Tobacco Use    Smoking status: Former     Current packs/day: 0.00     Types: Cigarettes     Quit date:      Years since quittin.4     Passive exposure: Past    Smokeless tobacco: Never   Vaping Use    Vaping status: Never Used   Substance and Sexual Activity    Alcohol use: Not Currently    Drug use: Not Currently     Comment: tried multiple drugs in the past    Sexual activity: Not Currently   Other Topics Concern    Not on file   Social History Narrative    Not on file     Social Drivers of Health     Financial Resource Strain: Low Risk  (2023)    Overall Financial Resource Strain (CARDIA)     Difficulty of Paying Living Expenses: Not hard at all   Food Insecurity: No Food Insecurity (2024)    Nursing - Inadequate Food Risk Classification     Worried About Running Out of Food in the Last Year: Not on file     Ran Out of Food in the Last Year: Not on file     Ran Out of Food in the Last Year: Never true   Transportation Needs: No Transportation Needs  "(2024)    Nursing - Transportation Risk Classification     Lack of Transportation: Not on file     Lack of Transportation: No   Physical Activity: Not on file   Stress: Not on file   Social Connections: Not on file   Intimate Partner Violence: Unknown (2024)    Nursing IPS     Feels Physically and Emotionally Safe: Not on file     Physically Hurt by Someone: Not on file     Humiliated or Emotionally Abused by Someone: Not on file     Physically Hurt by Someone: No     Hurt or Threatened by Someone: No   Housing Stability: Unknown (2024)    Nursing: Inadequate Housing Risk Classification     Has Housing: Not on file     Worried About Losing Housing: Not on file     Unable to Get Utilities: Not on file     Unable to Pay for Housing in the Last Year: No     Has Housin       No Known Allergies    Review of Systems  General- denies fever/chills  HEENT- denies hearing loss or sore throat  Eyes- denies eye pain or visual disturbances, denies red eyes  Respiratory- denies cough or SOB  Cardio- denies chest pain or palpitations  GI- denies abdominal pain  Endocrine- denies urinary frequency  Urinary- denies pain with urination  Musculoskeletal- Negative except noted above  Skin- denies rashes or wounds  Neurological- denies dizziness or headache  Psychiatric- denies anxiety or difficulty concentrating    Physical Exam  Ht 5' 6\" (1.676 m)   Wt 108 kg (238 lb 1.6 oz)   BMI 38.43 kg/m²     General/Constitutional: No apparent distress: well-nourished and well developed.  Lymphatic: No appreciable lymphadenopathy  Respiratory: Non-labored breathing  Vascular: No edema, swelling or tenderness, except as noted in detailed exam.  Integumentary: No impressive skin lesions present, except as noted in detailed exam.  Psych: Normal mood and affect, oriented to person, place and time.  MSK: normal other than stated in HPI and exam  Gait & balance: no evidence of myelopathic gait, ambulates with a Cane    Cervical  " "spine range of motion:  -Forward flexion chin to chest  -Extension to 60  -Lateral bend 30 right, 30 left  -Rotation 45 right, 45 left.    There is no point tenderness with palpation along the posterior cervical, thoracic, lumbar spine.     Neurologic:  Upper Extremity Motor Function    Right  Left    Deltoid  5/5  5/5    Bicep  5/5  5/5    Wrist extension  5/5  5/5    Tricep  5/5  5/5    Finger flexion/  5/5  5/5    Hand intrinsic  5/5  5/5        Sensory: light touch is intact to bilateral upper and lower extremities     Reflexes:  intact, symmetric       Electronic Medical Records were reviewed including prior office notes, physical therapy notes, ear nose and throat notes, image studies    Procedures, if performed today     None performed       Portions of the record may have been created with voice recognition software.  Occasional wrong word or \"sound a like\" substitutions may have occurred due to the inherent limitations of voice recognition software.  Read the chart carefully and recognize, using context, where substitutions have occurred.    Scribe Attestation      I,:  Nii Hopkins am acting as a scribe while in the presence of the attending physician.:       I,:  Conrad Richardson MD personally performed the services described in this documentation    as scribed in my presence.:             "

## 2025-06-03 ENCOUNTER — OFFICE VISIT (OUTPATIENT)
Dept: PHYSICAL THERAPY | Facility: REHABILITATION | Age: 70
End: 2025-06-03
Payer: COMMERCIAL

## 2025-06-03 ENCOUNTER — OFFICE VISIT (OUTPATIENT)
Dept: SPEECH THERAPY | Facility: CLINIC | Age: 70
End: 2025-06-03
Attending: ORTHOPAEDIC SURGERY
Payer: COMMERCIAL

## 2025-06-03 DIAGNOSIS — R26.2 AMBULATORY DYSFUNCTION: Primary | ICD-10-CM

## 2025-06-03 DIAGNOSIS — Z98.1 S/P CERVICAL SPINAL FUSION: ICD-10-CM

## 2025-06-03 DIAGNOSIS — R13.14 PHARYNGOESOPHAGEAL DYSPHAGIA: ICD-10-CM

## 2025-06-03 DIAGNOSIS — R49.0 DYSPHONIA: Primary | ICD-10-CM

## 2025-06-03 PROCEDURE — 92507 TX SP LANG VOICE COMM INDIV: CPT | Performed by: SPEECH-LANGUAGE PATHOLOGIST

## 2025-06-03 PROCEDURE — 97110 THERAPEUTIC EXERCISES: CPT

## 2025-06-03 PROCEDURE — 97112 NEUROMUSCULAR REEDUCATION: CPT

## 2025-06-03 NOTE — PROGRESS NOTES
Daily Note     Today's date: 6/3/2025  Patient name: Edgar Obando  : 1955  MRN: [id number]  Referring provider: Gilma Swift PA-C  Dx:   Encounter Diagnosis     ICD-10-CM    1. Ambulatory dysfunction  R26.2                      Subjective: Pt reports minor improvement upon arrival      Objective: See treatment diary below      Assessment: Tolerated treatment well. Patient would benefit from continued PT.  Pt. able to complete all exercises with no increase in pain during or after session.  Pt demonstrated improvement with hurdles this session.  Pt. 1:1 with PTA for entirety.      Plan: Continue per plan of care.      POC beginning Date: 25  POC Expiration Date: 25    POC expires PT/OT + Visit Limit?   2025  BOMN       Visit/Unit Tracking  AUTH Status:  Date 4/10 4/15 4/17 4/24 4/29 5/19 5/22 5/27 6/3   [employer] MC Used 9 10 11 12 13 14 15 16 17      Precautions: History of anxiety and depression, history of short term memory loss, fall risk, deconditioning, post ACDF for cervical myelopathy     Manuals 5/22 5/27 5/29 6/3                               Neuro Re-Ed       Patient Ed       Nancy Negotiation   4 F/L 3# ankel weights 4 laps ea 4 hurdles 3# aw fwd/lat   Cane Seated Overhead Shoulder Flexion 2x10 5# aw 2x10 5# AW 2x10 5# AW 2x10 5# aw   Seated Chin Tuck 2x10 2x10 2x19 2x10   Romberg Balance on Foam with Perturbations 2x1' ea mod tdm 2x1' ea mod tdm 2x1' each mod tdm 2x1' ea mod tdm   Biodex LOS       [employer]one Weaving       Step Taps       Wall Slide       Ther Ex       UBE  4'/4'    Rows - Seated 3x15 15# 3x15 15# 3x15 15# 3x15 15#   Shoulder Extensions- seated 3x12 15# 3x15 15# 3x15 15# 3x15 15#   Seated Trunk Rotations 15x b/l 13# 15x b/l 13# 15x b/l 13# np   Sit to Stands 2x15 on foam 2x15 on foam 215 on foam 2x15 on foam   Leg Press       Standing Heel Raises 30x on foam 30x on foam 30x on foam 30x on foam   Suitcase Carry        Ther Activity                     Gait Training                      Modalities

## 2025-06-09 ENCOUNTER — APPOINTMENT (OUTPATIENT)
Dept: PHYSICAL THERAPY | Facility: REHABILITATION | Age: 70
End: 2025-06-09
Payer: COMMERCIAL

## 2025-06-09 NOTE — PROGRESS NOTES
Daily Speech Treatment Note    Today's date: 2025 ***  Patient’s name: Edgar Obando  : 1955  MRN: [id number]  Safety measures: Hx of ACDF, fall risk   Referring provider: Conrad Richardson MD    Encounter Diagnosis     ICD-10-CM    1. Dysphonia  R49.0       2. Pharyngoesophageal dysphagia  R13.14       3. S/P cervical spinal fusion  Z98.1             Visit Tracking:  Re-eval Due Auth Expiration Date ST Visit Limit   25 BOMN                                   Visit/Unit Tracking  AUTH Status:  Date 4/17/25 4/25/25 5/6/26 5/13/26 5/20/26 6/3/26 ***   No authorization required Used 1 2 3 4 5 6 ***     Remaining  BOMN BOMN BOMN BOMN BOMN BOMN BOMN         Subjective/Behavioral:  Patient reports that ***      Objective/Assessment:  -See goals targeted below during today's treatment session.    Short-term goals:  Patient will be educated on vocal hygiene and demonstrate understanding of recommendations to facilitate increased quality of voice, to be achieved in 2-4 weeks.    Patient will be educated on diaphragmatic breathing (versus clavicular breathing) to establish controlled, rhythmic breathing, to be achieved in 2-4 weeks.        Patient will utilize semi-occluded vocal tract exercises without laryngeal tension to promote healthy vocal function with 80% accuracy, to be achieved in 4-6 weeks.    Semi-Occluded Vocal Tract Exercises  To target relaxed laryngeal posture and coordination between phonation and respiration, the following activities were completed using principles of SOVTE.       Cup Bubbling (straw and cup with water):     Without phonation: ***% accuracy - improved accuracy - held for *** seconds    With phonation: ***% accuracy -- small straw - works well with a higher pitch.    With phonation (pitch glides): ***% accuracy - improved as task progressed. Task progressed to alternating with vanessa, brynn, kwaku. Vanessa and brynn were successful in keeping forward focused resonance - ***%  accuracy.     Patient will utilize a forward tone focused/resonant voice to decrease vocal hyperfunction and improve voice quality and vocal projection, to be achieved in 4-6 weeks.   Lastly, patient read /h/ words at 1 syllable level Patient demonstrated the ability to maintain a forward focused, resonant voice with ***% accuracy.    Patient will produce coordinated voice production in different situations (e.g. telephone, over background noise, emotional topics, speaking at a distance) with 80% accuracy as perceptually judged by clinician.       Patient will perform compensatory strategies (e.g., upright positioning, small bites/sips, slow rate, alternation of consistencies, multiple swallows, effortful swallow, chin tuck, head turn, etc.) with minimal verbal cues to prevent overt s/sx penetration/aspiration of least restrictive food/liquid consistencies.       Plan:  -Patient was provided with home exercises/activities to target goals in plan of care at the end of today's session.  -Continue with current plan of care.

## 2025-06-10 ENCOUNTER — APPOINTMENT (OUTPATIENT)
Dept: SPEECH THERAPY | Facility: CLINIC | Age: 70
End: 2025-06-10
Attending: ORTHOPAEDIC SURGERY
Payer: COMMERCIAL

## 2025-06-10 DIAGNOSIS — R49.0 DYSPHONIA: Primary | ICD-10-CM

## 2025-06-10 DIAGNOSIS — R13.14 PHARYNGOESOPHAGEAL DYSPHAGIA: ICD-10-CM

## 2025-06-10 DIAGNOSIS — Z98.1 S/P CERVICAL SPINAL FUSION: ICD-10-CM

## 2025-06-12 ENCOUNTER — OFFICE VISIT (OUTPATIENT)
Dept: PHYSICAL THERAPY | Facility: REHABILITATION | Age: 70
End: 2025-06-12
Payer: COMMERCIAL

## 2025-06-12 DIAGNOSIS — R26.2 AMBULATORY DYSFUNCTION: Primary | ICD-10-CM

## 2025-06-12 PROCEDURE — 97110 THERAPEUTIC EXERCISES: CPT | Performed by: PHYSICAL THERAPIST

## 2025-06-12 PROCEDURE — 97112 NEUROMUSCULAR REEDUCATION: CPT | Performed by: PHYSICAL THERAPIST

## 2025-06-12 NOTE — PROGRESS NOTES
Daily Note     Today's date: 2025  Patient name: Edgar Obando  : 1955  MRN: [id number]  Referring provider: Gilma Swift PA-C  Dx:   Encounter Diagnosis     ICD-10-CM    1. Ambulatory dysfunction  R26.2                      Subjective: Overall doing decent. Balance still feels not great.       Objective: See treatment diary below      Assessment: Tolerated treatment well. Patient would benefit from continued PT      Plan: Continue per plan of care.      POC beginning Date: 25  POC Expiration Date: 25    POC expires PT/OT + Visit Limit?   2025  BOMN       Visit/Unit Tracking  AUTH Status:  Date 4/10 4/15 4/17 4/24 4/29 5/19 5/22 5/27 6/3   [employer] MC Used 9 10 11 12 13 14 15 16 17      Precautions: History of anxiety and depression, history of short term memory loss, fall risk, deconditioning, post ACDF for cervical myelopathy     Manuals                    Neuro Re-Ed    Patient Ed    Nancy Negotiation 4 laps ea 4 hurdles 3# aw fwd/lat   Cane Seated Overhead Shoulder Flexion 2x10 5# aw   Seated Chin Tuck 2x10   Romberg Balance on Foam with Perturbations 2x1' ea mod tdm   Biodex LOS    [employer]one Weaving    Step Taps    Wall Slide    Ther Ex    UBE 4/4   Rows - Seated 3x15 15#   Shoulder Extensions- seated 3x15 15#   Seated Trunk Rotations np   Sit to Stands 2x15 on foam   Leg Press    Standing Heel Raises 30x on foam   Suitcase Carry     Ther Activity            Gait Training            Modalities

## 2025-06-16 ENCOUNTER — APPOINTMENT (OUTPATIENT)
Dept: PHYSICAL THERAPY | Facility: REHABILITATION | Age: 70
End: 2025-06-16
Payer: COMMERCIAL

## 2025-06-17 ENCOUNTER — OFFICE VISIT (OUTPATIENT)
Dept: SPEECH THERAPY | Facility: CLINIC | Age: 70
End: 2025-06-17
Attending: ORTHOPAEDIC SURGERY
Payer: COMMERCIAL

## 2025-06-17 DIAGNOSIS — R13.14 PHARYNGOESOPHAGEAL DYSPHAGIA: ICD-10-CM

## 2025-06-17 DIAGNOSIS — R49.0 DYSPHONIA: Primary | ICD-10-CM

## 2025-06-17 DIAGNOSIS — Z98.1 S/P CERVICAL SPINAL FUSION: ICD-10-CM

## 2025-06-17 PROCEDURE — 92507 TX SP LANG VOICE COMM INDIV: CPT | Performed by: SPEECH-LANGUAGE PATHOLOGIST

## 2025-06-17 NOTE — PROGRESS NOTES
Daily Speech Treatment Note    Today's date: 2025   Patient’s name: Edgar Obando  : 1955  MRN: [id number]  Safety measures: Hx of ACDF, fall risk   Referring provider: Conrad Richardson MD    Encounter Diagnosis     ICD-10-CM    1. Dysphonia  R49.0       2. Pharyngoesophageal dysphagia  R13.14       3. S/P cervical spinal fusion  Z98.1             Visit Tracking:  Re-eval Due Auth Expiration Date ST Visit Limit   25 BOMN                                   Visit/Unit Tracking  AUTH Status:  Date 4/17/25 4/25/25 5/6/26 5/13/26 5/20/26 6/3/26 6/17/25   No authorization required Used 1 2 3 4 5 6 7     Remaining  BOMN BOMN BOMN BOMN BOMN BOMN BOMN         Subjective/Behavioral:  Patient reports that thing are going well. There is a lot of problems going on but he is getting up and going every day so that is good.      Objective/Assessment:  -See goals targeted below during today's treatment session.    Short-term goals:  Patient will be educated on vocal hygiene and demonstrate understanding of recommendations to facilitate increased quality of voice, to be achieved in 2-4 weeks.    Pt educated on vocal hygiene techniques, especially avoidance of phonotraumatic behaviors and hydration. Discussed yawn sigh technique to reduce muscle tension.     Patient will be educated on diaphragmatic breathing (versus clavicular breathing) to establish controlled, rhythmic breathing, to be achieved in 2-4 weeks.    Pt reported good strategies and demonstrated such, for diaphragmatic breathing and was educated on additional ways to self monitor breathing.      Patient will utilize semi-occluded vocal tract exercises without laryngeal tension to promote healthy vocal function with 80% accuracy, to be achieved in 4-6 weeks.     Semi-Occluded Vocal Tract Exercises  To target relaxed laryngeal posture and coordination between phonation and respiration, the following activities were completed using principles of  SOVTE.       Cup Bubbling (straw and cup with water):     Without phonation: 100% accuracy - improved accuracy - held for 20 seconds    With phonation: 70% accuracy -- small straw - works well with a higher pitch.    Task progressed to alternating with brynn subramanian mooh. All  were successful in keeping forward focused resonance - after some trials 80% accuracy.     Patient will utilize a forward tone focused/resonant voice to decrease vocal hyperfunction and improve voice quality and vocal projection, to be achieved in 4-6 weeks.   Lastly, patient read /h/ words at 1 syllable level and sentences with /h/ words. Patient demonstrated the ability to maintain a forward focused, resonant voice with 70% accuracy on /h/ words.    Patient will produce coordinated voice production in different situations (e.g. telephone, over background noise, emotional topics, speaking at a distance) with 80% accuracy as perceptually judged by clinician.     Patient will perform compensatory strategies (e.g., upright positioning, small bites/sips, slow rate, alternation of consistencies, multiple swallows, effortful swallow, chin tuck, head turn, etc.) with minimal verbal cues to prevent overt s/sx penetration/aspiration of least restrictive food/liquid consistencies.       Plan:  -Patient was provided with home exercises/activities to target goals in plan of care at the end of today's session.  -Continue with current plan of care.

## 2025-06-18 ENCOUNTER — OFFICE VISIT (OUTPATIENT)
Dept: PHYSICAL THERAPY | Facility: REHABILITATION | Age: 70
End: 2025-06-18
Payer: COMMERCIAL

## 2025-06-18 DIAGNOSIS — R26.2 AMBULATORY DYSFUNCTION: Primary | ICD-10-CM

## 2025-06-18 PROCEDURE — 97112 NEUROMUSCULAR REEDUCATION: CPT | Performed by: PHYSICAL THERAPIST

## 2025-06-18 NOTE — PROGRESS NOTES
Daily Note     Today's date: 2025  Patient name: Edgar Obando  : 1955  MRN: [id number]  Referring provider: Gilma Swift PA-C  Dx:   Encounter Diagnosis     ICD-10-CM    1. Ambulatory dysfunction  R26.2                      Subjective: Patient reports no change in status upon arrival.       Objective: See treatment diary below      Assessment: Tolerated treatment well. Patient continues to be appropriately challenged at current therapeutic volume and intensity. Patient would benefit from continued PT      Plan: Continue per plan of care.      POC beginning Date: 25  POC Expiration Date: 25    POC expires PT/OT + Visit Limit?   2025  BOMN       Visit/Unit Tracking  AUTH Status:  Date 4/10 4/15 4/17 4/24 4/29 5/19 5/22 5/27 6/3   [employer] MC Used 9 10 11 12 13 14 15 16 17      Precautions: History of anxiety and depression, history of short term memory loss, fall risk, deconditioning, post ACDF for cervical myelopathy     Manuals                        Neuro Re-Ed     Patient Ed     Nancy Negotiation 4 laps ea 4 hurdles 3# aw fwd/lat 4 laps ea 4 hurdles 3# aw fwd/lat   Cane Seated Overhead Shoulder Flexion 2x10 5# aw 2x10 5# aw   Seated Chin Tuck 2x10 2x10   Romberg Balance on Foam with Perturbations 2x1' ea mod tdm 2x1' ea mod tdm   Biodex LOS     [employer] Weaving     Step Taps     Wall Slide     Ther Ex     UBE    Rows - Seated 3x15 15# 3x15 15#   Shoulder Extensions- seated 3x15 15# 3x15 15#   Seated Trunk Rotations np    Sit to Stands 2x15 on foam 2x15 on foam   Leg Press     Standing Heel Raises 30x on foam 30x on foam    Suitcase Carry      Ther Activity               Gait Training               Modalities

## 2025-06-19 ENCOUNTER — RA CDI HCC (OUTPATIENT)
Dept: OTHER | Facility: HOSPITAL | Age: 70
End: 2025-06-19

## 2025-06-19 NOTE — PROGRESS NOTES
I13.0, E66.01, e11.22, E11.51, E11.59  HCC coding opportunities          Chart Reviewed number of suggestions sent to Provider: 5     Patients Insurance     Medicare Insurance: [employer] Advantage

## 2025-06-23 DIAGNOSIS — F41.9 ANXIETY: ICD-10-CM

## 2025-06-23 NOTE — PROGRESS NOTES
Daily Speech Treatment Note    Today's date: 2025   Patient’s name: Edgar Obando  : 1955  MRN: [id number]  Safety measures: Hx of ACDF, fall risk   Referring provider: Conrad Richardson MD    Encounter Diagnosis     ICD-10-CM    1. Dysphonia  R49.0       2. Pharyngoesophageal dysphagia  R13.14       3. S/P cervical spinal fusion  Z98.1             Visit Tracking:  Re-eval Due Auth Expiration Date ST Visit Limit   25 BOMN                                   Visit/Unit Tracking  AUTH Status:  Date 4/17/25 4/25/25 5/6/26 5/13/26 5/20/26 6/3/26 6/17/25 6/24/25   No authorization required Used 1 2 3 4 5 6 7 8     Remaining  BOMN BOMN BOMN BOMN BOMN BOMN BOMN BOMN           Subjective/Behavioral:  Patient reports that he feels like his voice is almost back. He is still feeling intermittent pain in his larynx when he is swallowing or after prolonged talking.       Objective/Assessment:  -See goals targeted below during today's treatment session.    Short-term goals:  Patient will be educated on vocal hygiene and demonstrate understanding of recommendations to facilitate increased quality of voice, to be achieved in 2-4 weeks.    Patient will be educated on diaphragmatic breathing (versus clavicular breathing) to establish controlled, rhythmic breathing, to be achieved in 2-4 weeks.        Patient will utilize semi-occluded vocal tract exercises without laryngeal tension to promote healthy vocal function with 80% accuracy, to be achieved in 4-6 weeks.    Semi-Occluded Vocal Tract Exercises  To target relaxed laryngeal posture and coordination between phonation and respiration, the following activities were completed using principles of SOVTE.       Straw phonation:    With phonation (humming with wide straw): 90% accuracy       Cup Bubbling (straw and cup with water):       With phonation: 80% accuracy -- small straw - works well with a higher pitch.        Patient will utilize a forward tone  "focused/resonant voice to decrease vocal hyperfunction and improve voice quality and vocal projection, to be achieved in 4-6 weeks.   Patient demonstrated a forward focused, coordinated voice with 90% accuracy at phoneme level on /m/.    Patient demonstrated a forward focused, coordinated voice with 50% accuracy on \"mum\".    Lastly, patient read /m/ words at 1 syllable level. Patient demonstrated the ability to maintain a forward focused, resonant voice with 90% accuracy. Patient chanted words, demonstrating a clear vocal quality with a slightly higher pitch than he normally speaks at but is comfortable and reduces laryngeal tension.    Patient will produce coordinated voice production in different situations (e.g. telephone, over background noise, emotional topics, speaking at a distance) with 80% accuracy as perceptually judged by clinician.       Patient will perform compensatory strategies (e.g., upright positioning, small bites/sips, slow rate, alternation of consistencies, multiple swallows, effortful swallow, chin tuck, head turn, etc.) with minimal verbal cues to prevent overt s/sx penetration/aspiration of least restrictive food/liquid consistencies.       Plan:  -Patient was provided with home exercises/activities to target goals in plan of care at the end of today's session.  -Continue with current plan of care.    "

## 2025-06-23 NOTE — TELEPHONE ENCOUNTER
Reason for call:   [x] Refill   [] Prior Auth  [] Other:     Office:   [x] PCP/Provider -  Ana Rosa Win MD   [] Specialty/Provider -     Medication:  clonazePAM (KlonoPIN) 1 MG disintegrating tablet    Dose/Frequency: Take 1 tablet (1 mg total) by mouth 2 (two) times a day     Quantity: 60    Pharmacy: [email or web address] - [place], [place] - [address]      Local Pharmacy   Does the patient have enough for 3 days?   [] Yes   [x] No - Send as HP to POD    Mail Away Pharmacy   Does the patient have enough for 10 days?   [] Yes   [] No - Send as HP to POD

## 2025-06-24 ENCOUNTER — OFFICE VISIT (OUTPATIENT)
Dept: SPEECH THERAPY | Facility: CLINIC | Age: 70
End: 2025-06-24
Attending: ORTHOPAEDIC SURGERY
Payer: COMMERCIAL

## 2025-06-24 ENCOUNTER — OFFICE VISIT (OUTPATIENT)
Dept: PHYSICAL THERAPY | Facility: REHABILITATION | Age: 70
End: 2025-06-24
Payer: COMMERCIAL

## 2025-06-24 DIAGNOSIS — R26.2 AMBULATORY DYSFUNCTION: Primary | ICD-10-CM

## 2025-06-24 DIAGNOSIS — R49.0 DYSPHONIA: Primary | ICD-10-CM

## 2025-06-24 DIAGNOSIS — Z98.1 S/P CERVICAL SPINAL FUSION: ICD-10-CM

## 2025-06-24 DIAGNOSIS — R13.14 PHARYNGOESOPHAGEAL DYSPHAGIA: ICD-10-CM

## 2025-06-24 PROCEDURE — 92507 TX SP LANG VOICE COMM INDIV: CPT | Performed by: SPEECH-LANGUAGE PATHOLOGIST

## 2025-06-24 PROCEDURE — 97112 NEUROMUSCULAR REEDUCATION: CPT | Performed by: PHYSICAL THERAPIST

## 2025-06-24 RX ORDER — CLONAZEPAM 1 MG/1
1 TABLET, ORALLY DISINTEGRATING ORAL 2 TIMES DAILY
Qty: 60 TABLET | Refills: 1 | Status: SHIPPED | OUTPATIENT
Start: 2025-06-24 | End: 2025-06-25

## 2025-06-24 NOTE — PROGRESS NOTES
Daily Note     Today's date: 2025  Patient name: Edgar Obando  : 1955  MRN: [id number]  Referring provider: Gilma Swift PA-C  Dx:   Encounter Diagnosis     ICD-10-CM    1. Ambulatory dysfunction  R26.2                      Subjective: Patient reports no change in status upon arrival.       Objective: See treatment diary below      Assessment: Tolerated treatment well. Patient would benefit from continued PT      Plan: Continue per plan of care.      POC beginning Date: 25  POC Expiration Date: 25    POC expires PT/OT + Visit Limit?   2025  BOMN       Visit/Unit Tracking  AUTH Status:  Date 4/10 4/15 4/17 4/24 4/29 5/19 5/22 5/27 6/3   [employer] MC Used 9 10 11 12 13 14 15 16 17      Precautions: History of anxiety and depression, history of short term memory loss, fall risk, deconditioning, post ACDF for cervical myelopathy     Manuals                            Neuro Re-Ed      Patient Ed      Nancy Negotiation 4 laps ea 4 hurdles 3# aw fwd/lat 4 laps ea 4 hurdles 3# aw fwd/lat 4 laps ea 4 hurdles 4# aw fwd/lat   Cane Seated Overhead Shoulder Flexion 2x10 5# aw 2x10 5# aw 2x10 5# aw   Seated Chin Tuck 2x10 2x10 2x10   Romberg Balance on Foam with Perturbations 2x1' ea mod tdm 2x1' ea mod tdm 2x1' ea mod tdm    Biodex LOS      [employer] Weaving      Step Taps      Wall Slide      Ther Ex      UBE    Rows - Seated 3x15 15# 3x15 15# 3x15 15#   Shoulder Extensions- seated 3x15 15# 3x15 15# 3x15 15#   Seated Trunk Rotations np     Sit to Stands 2x15 on foam 2x15 on foam 2x15 on foam   Leg Press      Standing Heel Raises 30x on foam 30x on foam  30x on foam   Suitcase Carry       Ther Activity                  Gait Training                  Modalities

## 2025-06-24 NOTE — TELEPHONE ENCOUNTER
Patient  called the med refill line to see if his script for his Clonazepam 1 MG was sent to his pharmacy yet, I explained to him that the provider still has to send the script to his pharmacy, he wants to know if the script can be sent over soon because he is out of this medication, please call the patient when the script has been sent to his pharmacy, patient's phone number is [phone]

## 2025-06-24 NOTE — TELEPHONE ENCOUNTER
Medication:  PDMP   05/22/2025 05/21/2025 05/19/2025 clonazePAM (Tablet) 60.0 30 1 MG NA HUMBERTO PAT     Active agreement on file -No

## 2025-06-25 ENCOUNTER — TELEPHONE (OUTPATIENT)
Age: 70
End: 2025-06-25

## 2025-06-25 ENCOUNTER — TELEPHONE (OUTPATIENT)
Dept: FAMILY MEDICINE CLINIC | Facility: CLINIC | Age: 70
End: 2025-06-25

## 2025-06-25 DIAGNOSIS — F41.9 ANXIETY: Primary | ICD-10-CM

## 2025-06-25 RX ORDER — CLONAZEPAM 1 MG/1
1 TABLET ORAL 2 TIMES DAILY
Qty: 60 TABLET | Refills: 0 | Status: SHIPPED | OUTPATIENT
Start: 2025-06-25

## 2025-06-25 NOTE — TELEPHONE ENCOUNTER
[facility]    The patients co pay is less for the regular tablets of clonazepam.  They are asking to change the disintegrating tabs to the regular tabs.    Please advise.

## 2025-06-25 NOTE — TELEPHONE ENCOUNTER
"Patient called the RX Refill Line. Message is being forwarded to the office.     Patient states he was told by the pharmacy that his copay his higher because the script was written as \"ODT.\" I did inform patient that is how it was previously written. He is asking if the script can be written differently to not be ODT.    [facility] pharmacy    Please contact patient at [phone]    "

## 2025-06-26 RX ORDER — PROPRANOLOL HYDROCHLORIDE 10 MG/1
TABLET ORAL
COMMUNITY
Start: 2025-05-21

## 2025-06-26 RX ORDER — CHLORHEXIDINE GLUCONATE ORAL RINSE 1.2 MG/ML
SOLUTION DENTAL
COMMUNITY
Start: 2025-03-26

## 2025-06-26 RX ORDER — HYDROCHLOROTHIAZIDE 12.5 MG/1
CAPSULE ORAL
COMMUNITY
Start: 2025-06-25

## 2025-06-26 RX ORDER — ALCOHOL ANTISEPTIC PADS
PADS, MEDICATED (EA) TOPICAL
COMMUNITY
Start: 2025-05-14

## 2025-06-30 ENCOUNTER — OFFICE VISIT (OUTPATIENT)
Dept: GASTROENTEROLOGY | Facility: CLINIC | Age: 70
End: 2025-06-30
Payer: COMMERCIAL

## 2025-06-30 VITALS
BODY MASS INDEX: 37.77 KG/M2 | WEIGHT: 235 LBS | HEIGHT: 66 IN | TEMPERATURE: 98.9 F | SYSTOLIC BLOOD PRESSURE: 120 MMHG | DIASTOLIC BLOOD PRESSURE: 76 MMHG

## 2025-06-30 DIAGNOSIS — K21.9 GASTROESOPHAGEAL REFLUX DISEASE WITHOUT ESOPHAGITIS: ICD-10-CM

## 2025-06-30 DIAGNOSIS — K59.00 CONSTIPATION, UNSPECIFIED CONSTIPATION TYPE: ICD-10-CM

## 2025-06-30 DIAGNOSIS — R13.10 DYSPHAGIA, UNSPECIFIED TYPE: Primary | ICD-10-CM

## 2025-06-30 PROCEDURE — 99204 OFFICE O/P NEW MOD 45 MIN: CPT

## 2025-06-30 RX ORDER — SODIUM CHLORIDE, SODIUM LACTATE, POTASSIUM CHLORIDE, CALCIUM CHLORIDE 600; 310; 30; 20 MG/100ML; MG/100ML; MG/100ML; MG/100ML
125 INJECTION, SOLUTION INTRAVENOUS CONTINUOUS
OUTPATIENT
Start: 2025-06-30

## 2025-06-30 NOTE — PATIENT INSTRUCTIONS
Scheduled date of EGD(as of today):07/23/2025  Physician performing EGD:Sabino  Location of EGD:[place]  Instructions reviewed with patient by:WERO  Clearances: None    Patient is diabetic  Follow up should be 2-3 months but is scheduled for 01/09/2026 and has been put on a wait list for a sooner appointment

## 2025-06-30 NOTE — PROGRESS NOTES
Name: Edgar Obando      : 1955      MRN: [id number]  Encounter Provider: nIez Sanchez PA-C  Encounter Date: 2025   Encounter department: [facility] [place]  :  Assessment & Plan  Dysphagia, unspecified type  Patient states he has had intermittent feeling of foods and pills getting stuck in his throat since his surgery last year. He saw ENT and had laryngoscopy that showed signs of GERD per patient.  Recently with hoarse voice and excessive mucus. Barium swallow showed tracheobronchial aspiration, elevated left hemidiaphragm resulting in angulated position of the GE junction, mild nonperistaltic contractions. He then had video swallow study with speech that showed mild pharyngeal dysphagia and trace silent aspiration with thin liquids.  He has been following with speech therapy for this and has noted improvement in symptoms.  Will plan EGD for further evaluation.  Orders:    EGD; Future    Gastroesophageal reflux disease without esophagitis  We did discuss trial of different PPI as patient has been on pantoprazole for more than 5 years.  He does not want to adjust medicine at this time and will continue with pantoprazole 40 mg daily.  Orders:    EGD; Future    Constipation, unspecified constipation type  Recommend fiber supplement once daily, high fiber diet, increased water intake, and activity as tolerated to prevent/avoid constipation.   Start miralax 1 capful daily.       Follow up after procedure    History of Present Illness   Edgar Obando is a 70 y.o. male with PMH of HTN, CHF, SUSY, GERD, type 2 diabetes, DANNY, MDD s/p cervical spine fusion 2024 presenting for dysphagia, GERD, constipation. Patient states he has had intermittent feeling of foods and pills getting stuck in his throat since his surgery last year. He will drink liquids and this will resolve. No nausea, vomiting, regurgitation. He saw ENT and had laryngoscopy that showed signs of GERD per  "patient. He has heartburn which has been controlled on pantoprazole 40mg daily. He also has hoarse voice and excessive mucus.    He also notes constipation, incomplete emptying which has been ongoing for about 1 year. He does take fiber supplement daily, drinks plenty of water. He does not take anything for constipation. Patient denies any unintentional weight loss, decreased appetite, abdominal pain, nausea, vomiting, change in bowel habits, melena, hematochezia, steatorrhea, odynophagia.     Recent labs showed Hgb 10.7, MCV 92, ptl 184, LFTs and TSH wnl    Prior imaging/procedures:  CT abdomen without contrast 5/2025: No acute abnormalities  Barium swallow 4/2025: Tracheobronchial aspiration, elevated left hemidiaphragm resulting in angulated position of the GE junction, mild nonperistaltic contractions  Video swallow study with speech 4/2025: Mild pharyngeal dysphagia characterized by reduced epiglottic inversion and incomplete airway closure.  Trace silent aspiration with thin liquids.  Recommended speech therapy follow-up.  EGD 10/2022: Normal esophagus, mild gastritis, normal duodenum (stomach biopsy positive for H. pylori)  Colonoscopy 10/2022: 25 mm pedunculated smooth polyp in ascending colon with positive pillow sign.  Sigmoid diverticulosis    Review of Systems   Constitutional:  Negative for appetite change, chills and fever.   HENT:  Positive for trouble swallowing.    Respiratory:  Negative for shortness of breath.    Gastrointestinal:  Positive for constipation. Negative for abdominal pain, blood in stool, diarrhea, nausea and vomiting.     Medical History Reviewed by provider this encounter:     .  Medications Ordered Prior to Encounter[1]   Social History[2]     Objective   /76 (BP Location: Left arm, Patient Position: Sitting, Cuff Size: Adult)   Temp 98.9 °F (37.2 °C) (Tympanic)   Ht 5' 6\" (1.676 m)   Wt 107 kg (235 lb)   BMI 37.93 kg/m²      Physical Exam  Vitals reviewed. "   Constitutional:       General: He is not in acute distress.     Appearance: He is not ill-appearing.   HENT:      Head: Normocephalic and atraumatic.     Cardiovascular:      Rate and Rhythm: Normal rate and regular rhythm.   Pulmonary:      Effort: Pulmonary effort is normal.      Breath sounds: Normal breath sounds.   Abdominal:      General: Abdomen is flat. Bowel sounds are normal. There is no distension.      Palpations: Abdomen is soft.      Tenderness: There is no abdominal tenderness.     Skin:     General: Skin is warm and dry.     Neurological:      Mental Status: He is alert. Mental status is at baseline.                  [1]   Current Outpatient Medications on File Prior to Visit   Medication Sig Dispense Refill    acetaminophen (TYLENOL) 650 mg CR tablet Take 1 tablet (650 mg total) by mouth every 8 (eight) hours as needed for mild pain 30 tablet 0    albuterol (PROVENTIL HFA,VENTOLIN HFA) 90 mcg/act inhaler Inhale 2 puffs every 6 (six) hours as needed for wheezing As needed      aspirin (ECOTRIN LOW STRENGTH) 81 mg EC tablet Take 81 mg by mouth in the morning.      Blood Glucose Monitoring Suppl (ONE TOUCH ULTRA 2) w/Device KIT       budesonide (RINOCORT AQUA) 32 MCG/ACT nasal spray 1 spray into each nostril in the morning and 1 spray in the evening. As needed.      busPIRone (BUSPAR) 10 mg tablet TAKE 1 TABLET BY MOUTH TWO (TWO) TIMES A  tablet 1    chlorhexidine (PERIDEX) 0.12 % solution       clonazePAM (KlonoPIN) 1 mg tablet Take 1 tablet (1 mg total) by mouth 2 (two) times a day 60 tablet 0    Continuous Glucose Sensor (FreeStyle Prem 3 Plus Sensor) MISC       docusate sodium (COLACE) 100 mg capsule Take 100 mg by mouth in the morning and 100 mg in the evening.      escitalopram (LEXAPRO) 5 mg tablet Take 1 tablet (5 mg total) by mouth daily 30 tablet 1    furosemide (LASIX) 40 mg tablet Take 40 mg by mouth as needed in the morning and 40 mg as needed in the evening.      Lancets 30G  MISC in the morning and in the evening and before bedtime. Use to test blood glucose.      losartan (COZAAR) 25 mg tablet Take 1 tablet (25 mg total) by mouth daily 90 tablet 1    lovastatin (MEVACOR) 10 MG tablet Take 10 mg by mouth daily at bedtime      methocarbamol (Robaxin-750) 750 mg tablet Take 1 tablet (750 mg total) by mouth every 8 (eight) hours 90 tablet 1    methylPREDNISolone 4 MG tablet therapy pack Use as directed on package 1 each 0    Multiple Vitamins-Minerals (multivitamin with minerals) tablet Take 1 tablet by mouth in the morning.      OneTouch Ultra test strip       pantoprazole (PROTONIX) 40 mg tablet Take 40 mg by mouth in the morning.      propranolol (INDERAL) 10 mg tablet       semaglutide, 0.25 or 0.5 mg/dose, (Ozempic) 2 mg/1.5 mL injection pen Inject 0.5 mg under the skin every 7 days      tamsulosin (FLOMAX) 0.4 mg TAKE 1 CAPSULE BY MOUTH EVERY DAY WITH DINNER 90 capsule 2    testosterone (ANDROGEL) 1.62 % TD gel pump       tiZANidine (ZANAFLEX) 4 mg tablet Take 1 tablet (4 mg total) by mouth 2 (two) times a day as needed for muscle spasms May make you drowsy. Do not drive until you know how it affects you. Do not take any other muscle relaxers while taking this medication      traZODone (DESYREL) 100 mg tablet       UltiCare Alcohol Swabs 70 % PADS       zinc gluconate 50 mg tablet Take 50 mg by mouth in the morning.      naloxone (NARCAN) 4 mg/0.1 mL nasal spray Administer 1 spray into a nostril. If no response after 2-3 minutes, give another dose in the other nostril using a new spray. 1 each 1     No current facility-administered medications on file prior to visit.   [2]   Social History  Tobacco Use    Smoking status: Former     Current packs/day: 0.00     Types: Cigarettes     Quit date:      Years since quittin.5     Passive exposure: Past    Smokeless tobacco: Never   Vaping Use    Vaping status: Never Used   Substance and Sexual Activity    Alcohol use: Not Currently     Drug use: Not Currently     Comment: tried multiple drugs in the past    Sexual activity: Not Currently

## 2025-06-30 NOTE — ASSESSMENT & PLAN NOTE
We did discuss trial of different PPI as patient has been on pantoprazole for more than 5 years.  He does not want to adjust medicine at this time and will continue with pantoprazole 40 mg daily.  Orders:    EGD; Future

## 2025-07-01 ENCOUNTER — OFFICE VISIT (OUTPATIENT)
Dept: SPEECH THERAPY | Facility: CLINIC | Age: 70
End: 2025-07-01
Attending: ORTHOPAEDIC SURGERY
Payer: COMMERCIAL

## 2025-07-01 ENCOUNTER — OFFICE VISIT (OUTPATIENT)
Dept: PHYSICAL THERAPY | Facility: REHABILITATION | Age: 70
End: 2025-07-01
Payer: COMMERCIAL

## 2025-07-01 DIAGNOSIS — Z98.1 S/P CERVICAL SPINAL FUSION: ICD-10-CM

## 2025-07-01 DIAGNOSIS — R49.0 DYSPHONIA: Primary | ICD-10-CM

## 2025-07-01 DIAGNOSIS — R13.14 PHARYNGOESOPHAGEAL DYSPHAGIA: ICD-10-CM

## 2025-07-01 DIAGNOSIS — R26.2 AMBULATORY DYSFUNCTION: Primary | ICD-10-CM

## 2025-07-01 PROCEDURE — 97112 NEUROMUSCULAR REEDUCATION: CPT

## 2025-07-01 PROCEDURE — 92507 TX SP LANG VOICE COMM INDIV: CPT | Performed by: SPEECH-LANGUAGE PATHOLOGIST

## 2025-07-01 PROCEDURE — 97110 THERAPEUTIC EXERCISES: CPT

## 2025-07-01 NOTE — PROGRESS NOTES
Daily Note     Today's date: 2025  Patient name: Edgar Obando  : 1955  MRN: [id number]  Referring provider: Gilma Swift PA-C  Dx:   Encounter Diagnosis     ICD-10-CM    1. Ambulatory dysfunction  R26.2           Start Time: 1103  Stop Time: 1148  Total time in clinic (min): 45 minutes    Subjective: Presents to therapy noting some increased soreness in his right knee, as he was up on his feet a lot yesterday running errands.      Objective: See treatment diary below      Assessment: Patient tolerated today's session well, continuing to focus on stability and muscular endurance. Alternated between standing and seated exercises to allow for appropriate muscle recovery. Moderate sway performing Rhomberg balance with one hand hold assist. Demonstrated good heel toe sequencing during deny step overs; minimal hip circumduction present. Patient subjectively reported decreased lower extremity tightness upon leaving clinic with appropriate exercise fatigue. Patient would benefit from continued PT to improve function.       Plan: Continue per plan of care.      POC beginning Date: 25  POC Expiration Date: 25    POC expires PT/OT + Visit Limit?   2025  BOMN       Visit/Unit Tracking  AUTH Status:  Date 4/10 4/15 4/17 4/24 4/29 5/19 5/22 5/27 6/3   [employer] MC Used 9 10 11 12 13 14 15 16 17      Precautions: History of anxiety and depression, history of short term memory loss, fall risk, deconditioning, post ACDF for cervical myelopathy     Manuals                                Neuro Re-Ed       Patient Ed       Deny Negotiation 4 laps ea 4 hurdles 3# aw fwd/lat 4 laps ea 4 hurdles 3# aw fwd/lat 4 laps ea 4 hurdles 4# aw fwd/lat 4 laps ea 4 hurdles 4# aw fwd/lat   Cane Seated Overhead Shoulder Flexion 2x10 5# aw 2x10 5# aw 2x10 5# aw 2x10  5# aw   Seated Chin Tuck 2x10 2x10 2x10 2x10   Romberg Balance on Foam with Perturbations 2x1' ea mod tdm 2x1' ea mod tdm 2x1' ea mod tdm   2x1' ea mod tdm    Biodex LOS       [employer]       Step Taps       Wall Slide       Ther Ex       UBE 4/4 4/4 4/4 4/4   Rows - Seated 3x15 15# 3x15 15# 3x15 15#    Shoulder Extensions- seated 3x15 15# 3x15 15# 3x15 15#    Seated Trunk Rotations np      Sit to Stands 2x15 on foam 2x15 on foam 2x15 on foam 2x15 on foam   Leg Press       Standing Heel Raises 30x on foam 30x on foam  30x on foam    Suitcase Carry        Ther Activity                     Gait Training                     Modalities

## 2025-07-01 NOTE — PROGRESS NOTES
Daily Speech Treatment Note    Today's date: 2025   Patient’s name: Edgar Obando  : 1955  MRN: [id number]  Safety measures: Hx of ACDF, fall risk   Referring provider: Conrad Richardson MD    Encounter Diagnosis     ICD-10-CM    1. Dysphonia  R49.0       2. Pharyngoesophageal dysphagia  R13.14       3. S/P cervical spinal fusion  Z98.1             Visit Tracking:  Re-eval Due Auth Expiration Date ST Visit Limit   25 BOMN                                   Visit/Unit Tracking  AUTH Status:  Date 4/17/25 4/25/25 5/6/26 5/13/26 5/20/26 6/3/26 6/17/25 6/24/25 7/1/   No authorization required Used 1 2 3 4 5 6 7 8 9     Remaining  BOMN BOMN BOMN BOMN BOMN BOMN BOMN BOMN BOMN           Subjective/Behavioral:  Patient reports his voice is much better but as the day progresses it gets tired. He has a lot of mucus today. He had a lot clearing to do in this morning.    He will need to leave 10 minutes early today due to another appointment starting at 11am.      Objective/Assessment:  -See goals targeted below during today's treatment session.    Short-term goals:  Patient will be educated on vocal hygiene and demonstrate understanding of recommendations to facilitate increased quality of voice, to be achieved in 2-4 weeks.    Patient will be educated on diaphragmatic breathing (versus clavicular breathing) to establish controlled, rhythmic breathing, to be achieved in 2-4 weeks.        Patient will utilize semi-occluded vocal tract exercises without laryngeal tension to promote healthy vocal function with 80% accuracy, to be achieved in 4-6 weeks.    Semi-Occluded Vocal Tract Exercises  To target relaxed laryngeal posture and coordination between phonation and respiration, the following activities were completed using principles of SOVTE.       Straw phonation:    With phonation (humming with wide straw): 70% accuracy       Cup Bubbling (straw and cup with water):     Without phonation: 90% accuracy  "- wide straw  With phonation: 80% accuracy - wide straw        Patient will utilize a forward tone focused/resonant voice to decrease vocal hyperfunction and improve voice quality and vocal projection, to be achieved in 4-6 weeks.   Patient read /h/ words at 1 syllable level. Patient demonstrated the ability to maintain a forward focused, resonant voice with 75% accuracy. Task progressed to 2 syllable /h/ words with 50% accuracy and noted mild glottal walters throughout.    Due to patient's current symptoms he may benefit from Myofascial Release (MFR) treatment for voice and swallowing. This is a manual therapy technique through myofascial release (stimulation/pressure/stretch) to address patient's symptoms, and will be completed unless otherwise contraindicated.  Following patient's verbal consent to treat, manual therapy technique through myofascial release was applied to patient's laryngeal are.     Region(s) 7/1/25         Hyoid             Anterior Cervical   Neck palpation    Laryngeal stretch    Lowering and rasing of larynx       Oral           Upon palpation, clinician noted left side of neck was tighter than right side. Patient vocalized on \"ah\", \"brynn\" and with SOVT straw phonation to determine if any position improved voicing and reduced tension. Patient performed pitch glides finding reduced roughness and tension mid range. Clinician also had patient attempt yawn/sigh to promote forward focused resonance. Patient demonstrated difficulty in reduction in laryngeal tension but did demonstrate intermittent clarity. Will continue with these exercises at next treatment session.        Patient will produce coordinated voice production in different situations (e.g. telephone, over background noise, emotional topics, speaking at a distance) with 80% accuracy as perceptually judged by clinician.       Patient will perform compensatory strategies (e.g., upright positioning, small bites/sips, slow rate, alternation of " consistencies, multiple swallows, effortful swallow, chin tuck, head turn, etc.) with minimal verbal cues to prevent overt s/sx penetration/aspiration of least restrictive food/liquid consistencies.       Plan:  -Patient was provided with home exercises/activities to target goals in plan of care at the end of today's session.  -Continue with current plan of care.

## 2025-07-03 ENCOUNTER — OFFICE VISIT (OUTPATIENT)
Dept: PHYSICAL THERAPY | Facility: REHABILITATION | Age: 70
End: 2025-07-03
Payer: COMMERCIAL

## 2025-07-03 DIAGNOSIS — R26.2 AMBULATORY DYSFUNCTION: Primary | ICD-10-CM

## 2025-07-03 PROCEDURE — 97112 NEUROMUSCULAR REEDUCATION: CPT

## 2025-07-03 PROCEDURE — 97110 THERAPEUTIC EXERCISES: CPT

## 2025-07-03 NOTE — PROGRESS NOTES
Daily Note     Today's date: 7/3/2025  Patient name: Edgar Obando  : 1955  MRN: [id number]  Referring provider: Gilma Swift PA-C  Dx:   Encounter Diagnosis     ICD-10-CM    1. Ambulatory dysfunction  R26.2           Start Time: 1057  Stop Time: 1148  Total time in clinic (min): 51 minutes    Subjective: Presents to therapy noting improved soreness in his knee from previous session, but still discomfort in his feet.       Objective: See treatment diary below      Assessment: Patient presented to session twelve minutes late, but accommodated for full session time. Continues to require frequent seated rest breaks to allow for appropriate muscle recovery. Patient subjectively reported burning sensation in bilateral feet while completing standing exercises.   Patient demonstrated fatigue post treatment and would benefit from continued PT      Plan: Continue per plan of care.      POC beginning Date: 25  POC Expiration Date: 25    POC expires PT/OT + Visit Limit?   2025  BOMN       Visit/Unit Tracking  AUTH Status:  Date 4/10 4/15 4/17 4/24 4/29 5/19 5/22 5/27 6/3   [employer] MC Used 9 10 11 12 13 14 15 16 17      Precautions: History of anxiety and depression, history of short term memory loss, fall risk, deconditioning, post ACDF for cervical myelopathy     Manuals 6/12 6/18 6/24 7/1 7/3                                   Neuro Re-Ed        Patient Ed        Nancy Negotiation 4 laps ea 4 hurdles 3# aw fwd/lat 4 laps ea 4 hurdles 3# aw fwd/lat 4 laps ea 4 hurdles 4# aw fwd/lat 4 laps ea 4 hurdles 4# aw fwd/lat    Cane Seated Overhead Shoulder Flexion 2x10 5# aw 2x10 5# aw 2x10 5# aw 2x10  5# aw 2x10  5# aw   Seated Chin Tuck 2x10 2x10 2x10 2x10 2x10   Romberg Balance on Foam with Perturbations 2x1' ea mod tdm 2x1' ea mod tdm 2x1' ea mod tdm  2x1' ea mod tdm     Biodex LOS        [employer]        Step Taps        Wall Slide        Ther Ex        UBE  4'/4'   Rows - Seated 3x15 15#  3x15 15# 3x15 15#  3x15 15#   Shoulder Extensions- seated 3x15 15# 3x15 15# 3x15 15#  3x15 15#   Seated Trunk Rotations np       Sit to Stands 2x15 on foam 2x15 on foam 2x15 on foam 2x15 on foam 2x15 on foam   Leg Press        Standing Heel Raises 30x on foam 30x on foam  30x on foam  30x on foam   Suitcase Carry         Ther Activity                        Gait Training                        Modalities

## 2025-07-08 ENCOUNTER — APPOINTMENT (OUTPATIENT)
Dept: SPEECH THERAPY | Facility: CLINIC | Age: 70
End: 2025-07-08
Attending: ORTHOPAEDIC SURGERY
Payer: COMMERCIAL

## 2025-07-08 ENCOUNTER — OFFICE VISIT (OUTPATIENT)
Dept: PHYSICAL THERAPY | Facility: REHABILITATION | Age: 70
End: 2025-07-08
Payer: COMMERCIAL

## 2025-07-08 DIAGNOSIS — R26.2 AMBULATORY DYSFUNCTION: Primary | ICD-10-CM

## 2025-07-08 PROCEDURE — 97112 NEUROMUSCULAR REEDUCATION: CPT

## 2025-07-08 PROCEDURE — 97110 THERAPEUTIC EXERCISES: CPT

## 2025-07-08 NOTE — PROGRESS NOTES
Daily Note     Today's date: 2025  Patient name: Edgar Obando  : 1955  MRN: [id number]  Referring provider: Gilma Swift PA-C  Dx:   Encounter Diagnosis     ICD-10-CM    1. Ambulatory dysfunction  R26.2           Subjective:Patient continues to complain of discomfort in his feet, but no more than his usual.      Objective: See treatment diary below      Assessment: Continued to focus on strengthening and overall conditioning. Added step ups today with increased challenge and weakness noted. Good effort and tolerance throughout without any complaints. Appropriately challenged and fatigued throughout.  Patient demonstrated fatigue post treatment and would benefit from continued PT      Plan: Continue per plan of care.      POC beginning Date: 25  POC Expiration Date: 25    POC expires PT/OT + Visit Limit?   2025  BOMN       Visit/Unit Tracking  AUTH Status:  Date 4/10 4/15 4/17 4/24 4/29 5/19 5/22 5/27 6/3   [employer] MC Used 9 10 11 12 13 14 15 16 17      Precautions: History of anxiety and depression, history of short term memory loss, fall risk, deconditioning, post ACDF for cervical myelopathy     Manuals 7/8 6/18 6/24 7/1 7/3                                   Neuro Re-Ed        Patient Ed        Step ups 6'' fwd  2x10 B       Nancy Negotiation  4 laps ea 4 hurdles 3# aw fwd/lat 4 laps ea 4 hurdles 4# aw fwd/lat 4 laps ea 4 hurdles 4# aw fwd/lat    Cane Seated Overhead Shoulder Flexion 2x10  5# aw 2x10 5# aw 2x10 5# aw 2x10  5# aw 2x10  5# aw   Seated Chin Tuck 2x10 2x10 2x10 2x10 2x10   Romberg Balance on Foam with Perturbations 1x1' ea mod tdm  2x1' ea mod tdm 2x1' ea mod tdm  2x1' ea mod tdm     Biodex LOS        [employer]        Step Taps        Wall Slide        Ther Ex        UBE /4 4 /4 4'/4'   Rows - Seated 3x15 15# 3x15 15# 3x15 15#  3x15 15#   Shoulder Extensions- seated 3x15 15# 3x15 15# 3x15 15#  3x15 15#   Seated Trunk Rotations        Sit to Stands 8#  1 foam on  chair  2x15 2x15 on foam 2x15 on foam 2x15 on foam 2x15 on foam   Leg Press        Standing Heel Raises 30x on foam 30x on foam  30x on foam  30x on foam   Suitcase Carry         Ther Activity                        Gait Training                        Modalities

## 2025-07-10 ENCOUNTER — OFFICE VISIT (OUTPATIENT)
Dept: PHYSICAL THERAPY | Facility: REHABILITATION | Age: 70
End: 2025-07-10
Payer: COMMERCIAL

## 2025-07-10 DIAGNOSIS — R26.2 AMBULATORY DYSFUNCTION: Primary | ICD-10-CM

## 2025-07-10 PROCEDURE — 97112 NEUROMUSCULAR REEDUCATION: CPT

## 2025-07-10 PROCEDURE — 97110 THERAPEUTIC EXERCISES: CPT

## 2025-07-10 NOTE — PROGRESS NOTES
Daily Note     Today's date: 7/10/2025  Patient name: Edgar Obando  : 1955  MRN: [id number]  Referring provider: Gilma Swift PA-C  Dx:   Encounter Diagnosis     ICD-10-CM    1. Ambulatory dysfunction  R26.2           Start Time: 1135  Stop Time: 1235  Total time in clinic (min): 60 minutes    Subjective: Edgar has no new complaints today. States he has been feeling a little bit better since starting therapy but his feet are still an issue. Denies any issues after last PT session.       Objective: See treatment diary below      Assessment: Edgar Tolerated treatment well with appropriate muscle fatigue experienced with ex's. He is making steady gains towards goals  and remains appropriately challenged with their program. Demonstrated improved tolerance of standing ex's. Cont to require UE support with dynamic gait and balance ex's. Required vc's t/o session for proper positioning with ex's. He  would benefit from continued PT and reassess NV for possible d/c.     1:1 with Herminia Richard PTA  for entirety of treatment session.       Plan: Continue per plan of care.      POC beginning Date: 25  POC Expiration Date: 25    POC expires PT/OT + Visit Limit?   2025  BOMN       Visit/Unit Tracking  AUTH Status:  Date 4/10 4/15 4/17 4/24 4/29 5/19 5/22 5/27 6/3   [employer]  Used 9 10 11 12 13 14 15 16 17      Precautions: History of anxiety and depression, history of short term memory loss, fall risk, deconditioning, post ACDF for cervical myelopathy     Manuals 7/8 6/18 6/24 7/1 7/3 7/10                                       Neuro Re-Ed         Patient Ed         Step ups 6'' fwd  2x10 B        Nancy Negotiation  4 laps ea 4 hurdles 3# aw fwd/lat 4 laps ea 4 hurdles 4# aw fwd/lat 4 laps ea 4 hurdles 4# aw fwd/lat  4 laps ea 4#aw fwd/ lat    Cane Seated Overhead Shoulder Flexion 2x10  5# aw 2x10 5# aw 2x10 5# aw 2x10  5# aw 2x10  5# aw 2x10 5#    Seated Chin Tuck 2x10 2x10 2x10 2x10 2x10 2x10     Romberg Balance on Foam with Perturbations 1x1' ea mod tdm  2x1' ea mod tdm 2x1' ea mod tdm  2x1' ea mod tdm      Biodex LOS         [employer]         Step Taps         Wall Slide         Ther Ex         UBE 4/4 4/4 4/4 4/4 4'/4' 4'/4'   Rows - Seated 3x15 15# 3x15 15# 3x15 15#  3x15 15# NV   Shoulder Extensions- seated 3x15 15# 3x15 15# 3x15 15#  3x15 15# NV   Seated Trunk Rotations         Sit to Stands 8#  1 foam on chair  2x15 2x15 on foam 2x15 on foam 2x15 on foam 2x15 on foam On foam 2x15 8#    Leg Press         Standing Heel Raises 30x on foam 30x on foam  30x on foam  30x on foam On foam 30x    Suitcase Carry          Ther Activity                           Gait Training                           Modalities

## 2025-07-14 ENCOUNTER — OFFICE VISIT (OUTPATIENT)
Dept: PHYSICAL THERAPY | Facility: REHABILITATION | Age: 70
End: 2025-07-14
Payer: COMMERCIAL

## 2025-07-14 DIAGNOSIS — R26.2 AMBULATORY DYSFUNCTION: Primary | ICD-10-CM

## 2025-07-14 PROCEDURE — 97110 THERAPEUTIC EXERCISES: CPT

## 2025-07-14 PROCEDURE — 97112 NEUROMUSCULAR REEDUCATION: CPT

## 2025-07-14 NOTE — PROGRESS NOTES
Daily Note     Today's date: 2025  Patient name: Edgar Obando  : 1955  MRN: [id number]  Referring provider: Gilma Swift PA-C  Dx:   Encounter Diagnosis     ICD-10-CM    1. Ambulatory dysfunction  R26.2                      Subjective: Pt notes some overall improvement, would like to d/c to hep at this time      Objective: See treatment diary below      Assessment: Tolerated treatment well. Patient exhibited good technique with therapeutic exercises.  Pt. able to complete all exercises with no increase in pain during or after session.  Reviewed HEP and provided additional instruction, pt understood.  Pt. 1:1 with PTA for entirety.      Plan: D/C to HEP     POC beginning Date: 25  POC Expiration Date: 25    POC expires PT/OT + Visit Limit?   2025  BOMN       Visit/Unit Tracking  AUTH Status:  Date 4/10 4/15 4/17 4/24 4/29 5/19 5/22 5/27 6/3   [employer] Used 9 10 11 12 13 14 15 16 17      Precautions: History of anxiety and depression, history of short term memory loss, fall risk, deconditioning, post ACDF for cervical myelopathy     Manuals 7/8 6/18 6/24 7/1 7/3 7/10 7/14                                           Neuro Re-Ed          Patient Ed          Step ups 6'' fwd  2x10 B         Nancy Negotiation  4 laps ea 4 hurdles 3# aw fwd/lat 4 laps ea 4 hurdles 4# aw fwd/lat 4 laps ea 4 hurdles 4# aw fwd/lat  4 laps ea 4#aw fwd/ lat  4 laps ea 4# aw fwd/lat   Cane Seated Overhead Shoulder Flexion 2x10  5# aw 2x10 5# aw 2x10 5# aw 2x10  5# aw 2x10  5# aw 2x10 5#  2x10 5#   Seated Chin Tuck 2x10 2x10 2x10 2x10 2x10 2x10  2x10   Romberg Balance on Foam with Perturbations 1x1' ea mod tdm  2x1' ea mod tdm 2x1' ea mod tdm  2x1' ea mod tdm       [employer] LOS          [employer]          Step Taps          Wall Slide          Ther Ex          UBE 4/4 4/4 4/4 4/4 4'/4' 4'/4' 4/4   Rows - Seated 3x15 15# 3x15 15# 3x15 15#  3x15 15# NV decl   Shoulder Extensions- seated 3x15 15# 3x15 15# 3x15 15#  3x15  15# NV decl   Seated Trunk Rotations          Sit to Stands 8#  1 foam on chair  2x15 2x15 on foam 2x15 on foam 2x15 on foam 2x15 on foam On foam 2x15 8#  2x15 on foam 8#   Leg Press          Standing Heel Raises 30x on foam 30x on foam  30x on foam  30x on foam On foam 30x  30x on foam   Suitcase Carry           Ther Activity                              Gait Training                              Modalities

## 2025-07-15 ENCOUNTER — APPOINTMENT (OUTPATIENT)
Dept: SPEECH THERAPY | Facility: CLINIC | Age: 70
End: 2025-07-15
Attending: ORTHOPAEDIC SURGERY
Payer: COMMERCIAL

## 2025-07-15 DIAGNOSIS — Z98.1 S/P CERVICAL SPINAL FUSION: ICD-10-CM

## 2025-07-15 DIAGNOSIS — R13.14 PHARYNGOESOPHAGEAL DYSPHAGIA: ICD-10-CM

## 2025-07-15 DIAGNOSIS — R49.0 DYSPHONIA: Primary | ICD-10-CM

## 2025-07-18 ENCOUNTER — EVALUATION (OUTPATIENT)
Dept: SPEECH THERAPY | Facility: CLINIC | Age: 70
End: 2025-07-18
Attending: ORTHOPAEDIC SURGERY
Payer: COMMERCIAL

## 2025-07-18 DIAGNOSIS — R13.14 PHARYNGOESOPHAGEAL DYSPHAGIA: ICD-10-CM

## 2025-07-18 DIAGNOSIS — Z98.1 S/P CERVICAL SPINAL FUSION: ICD-10-CM

## 2025-07-18 DIAGNOSIS — R49.0 DYSPHONIA: Primary | ICD-10-CM

## 2025-07-18 PROCEDURE — 92524 BEHAVRAL QUALIT ANALYS VOICE: CPT | Performed by: SPEECH-LANGUAGE PATHOLOGIST

## 2025-07-18 NOTE — PROGRESS NOTES
Speech-Language Pathology Re-Evaluation    Today's date: 2025   Patient’s name: Edgar Obando  : 1955  MRN: [id number]  Safety measures: Hx of ACDF, fall risk   Referring provider: Conrad Richardson MD    Encounter Diagnosis     ICD-10-CM    1. Dysphonia  R49.0       2. Pharyngoesophageal dysphagia  R13.14       3. S/P cervical spinal fusion  Z98.1           Assessment:   Over the course of treatment patient has demonstrated improvement in swallow function with patient tolerating a regular/thin diet with swallowing strategies. However, he does reports pain when swallowing harder foods. EGD is planned for next week, which may shed light on possible esophageal/reflux involvement.  Patient also demonstrates improved vocal quality with improvements in self-assessment measures and diagnostic scores.  However, patient continues to report vocal fatigue as day progresses (although, improved). Patient at his baseline has a raspy voice. Though SVT and RVT strategies, targeting a slightly higher pitch, patient is demonstrating a voice closer to his optimal pitch range. Would recommend continued ST services to provide patient with tools to maintain his voice more consistently with improved vocal quality. This will ensure that patient will maintain a healthy voice with existing anatomical changes from ACDF surgery. Patient may also benefit from EMST 75 lite, resistance based, expiratory muscle strength training to improve breath support and further encourage glottal closure. Plan of care was discussed with the patient and he is in agreement at this time.       Short-term goals:  Voice ST Goals  Patient will be educated on vocal hygiene and demonstrate understanding of recommendations to facilitate increased quality of voice, to be achieved in 2-4 weeks.MET     Patient will be educated on diaphragmatic breathing (versus clavicular breathing) to establish controlled, rhythmic breathing, to be achieved in 2-4 weeks.  MET       Patient will utilize semi-occluded vocal tract exercises without laryngeal tension to promote healthy vocal function with 80% accuracy, to be achieved in 4-6 weeks. PROGRESSING - CONTINUE       Patient will utilize a forward tone focused/resonant voice to decrease vocal hyperfunction and improve voice quality and vocal projection, to be achieved in 4-6 weeks. PROGRESSING - CONTINUE       Patient will produce coordinated voice production in different situations (e.g. telephone, over background noise, emotional topics, speaking at a distance) with 80% accuracy as perceptually judged by clinician. PROGRESSING - CONTINUE     Patient will perform compensatory strategies (e.g., upright positioning, small bites/sips, slow rate, alternation of consistencies, multiple swallows, effortful swallow, chin tuck, head turn, etc.) with minimal verbal cues to prevent overt s/sx penetration/aspiration of least restrictive food/liquid consistencies. MET    Patient will utilize the EMST 75 lite, resistance based, expiratory muscle strength training device during home practice and in therapy session to improve expiratory muscle strength, breath support and improve glottal closure, to be achieved over 5 weeks. NEW GOAL        Long Term Goals:   Patient will improve vocal quality for increased functional communication by discharge.      Patient will utilize compensatory strategies with optimum safety and efficacy of swallowing function on P.O. intake without overt s/sx of penetration or aspiration by discharge.MET      Plan:  Patient would benefit from outpatient skilled Speech Therapy services: Voice therapy    Frequency: 1x weekly  Duration: 6-8 weeks    Intervention certification from: 7/18/2025  Intervention certification to: 10/18/2025      Subjective:  Patient feels that he is able to manage his voice when he needs it and it has improved. He feels that there is something going on in the throat. He has an EGD next week to  look at possible esophageal challenges.    His throat hurts when he eats hard food. Soft food is fine but steak is difficult.    He feels that he is coughing more. He is starting to wheeze. He misplaced his nebulizer but has found it and hopes to give himself a treatment tonight.    Patient's goal(s): To get back to the [facility] and to get his voice back to pre-surgery and his throat feeling back to normal without pain.    Pain: Present (scale: 3) - middle of throat       Objective (testing):  VOICE RE-EVALUATION:      “IE” (below) indicates the scores from the prior evaluation (4/25/25).      Patient Self-Ratings:    -The Voice Handicap Index 10-Item (VHI-10) is a self-administered, 10-item outcomes instrument to quantify patients' perception of their voice handicap. The total score ranges from a 0 to 40 based on the sum of responses to 10 questions on a 5-point scale ranging from “never a problem” to “always a problem.” No Impact = 0%; Moderate Impact = 50%; Significant Impact = 100%. Patient obtained a score of  19/40 - 48% (see scanned document) (IE: 28/40 - 70%) = IMPROVEMENT    Vocal Fatigue Index (VFI): These are some symptoms usually associated with voice problems. Patient indicated how frequently she has the same experience using a rating point scale (“never” = 0, “almost never” = 1, “sometimes” = 2, “almost always” = 3, and “always” = 4). Patient's results were as follows:    Part 1: 24 (IE: 31) = IMPROVEMENT  Part 2: 19 (IE: 18) = IMPROVEMENT  Part 3: 6 (IE: 6 =) NO CHANGE    -The Reflux Symptom Index (RSI) is a self-administered, 9-item outcomes instrument to quantify symptoms in patients with laryngopharyngeal reflux (LRP). An RSI score of <=13 can be considered normal. Some degree of reflux is present in normal individuals. A score of >13 is suggestive of significant LPR symptoms. Patient obtained a score of 33/45 = SIGNIFICANT LPR SYMPTOMS. (see scanned document) This is the first time patient is  completing this self-assessment.          Objective Measurements/Voice Parameters:  RESPIRATORY EFFICIENCY:   -S:Z Ratio Task: Patient was instructed to sustain the sounds /s/ and /z/ to examine the coordination and efficiency of respiration and voice production. Normative data suggests that adults can prolong these sounds for 20-25 seconds. Ratios of 1.4 and above are consistent with laryngeal inefficiency, and ratios of 2.0 and above are suggestive of vocal fold pathology. Patient's S:Z ratio was 2.16 (13.53 sec : 6.26 sec). (IE: 0.94) = DECLINE - IE was tested at start of day. RE was tested in the mid-afternoon after significant talking, which may be a factor in decline.    -Maximum Phonation Time: Patient was instructed to sustain /a/ to measure respiratory and laryngeal coordination and efficiency. Adults are typically able to prolong vowels sound for 15-20 seconds. Reduced MPT may suggest poor respiratory support, or poor medial glottal closure. Patient's MPT was 2.59 seconds. (IE: 8.9 seconds ) = DECLINE      MEASURES OF PITCH:  The [employer] IV, a computer-driven voice analysis program, was used to collect objective voice data using the Visi-Pitch Multidimensional Voice Program (MDVP) & Real Time Pitch Program (MTPP).     -Multi-Dimensional Voice Profile (MDVP): This is obtained on the phonation of the sound /a/, in which dimensions of the voice, including frequncy perturbations, amplitude perturbations, variations in F0, noise to harmonic ratio, voice turbulence Index, soft phonation Index, tremours in frequency and amplitude, degree of subharmonics, and degree of voicing apart from the frequency and amplitude, are reflected.      Normative Data:   Mean Fundamental Frequency (F0) 220.186 Hz  (IE: 215.092 Hz) 243.9 Hz   Jitter (RAP) 2.115%  (IE: 4.040%) 0.378%   Shimmer 6.577%  (IE: 26.029%) 1.997%   Akfxh-od-Dmvtkyzyo Ratio (NHR) 0.313  (IE: 0.809) 0.112       -Habitual Pitch: Patient was instructed to  "engage in two different speaking tasks (counting and reading) to calculate the pitch she uses most frequently.     Task: Mean F0 (Hz) Min-Max Range (Hz) Normative Data:   Speaking (counting 1-10) 205.01 Hz  (IE: 198.99 Hz ) 156.54 Hz -305.22 Hz  (IE: 126.50 Hz -312.79 Hz) 225 Hz (180 Hz -250 Hz)   Reading (\"[place]”) 200.16 Hz  (IE: 209.05 Hz ) 106.42 Hz -341.02 Hz  (IE: 104.87 Hz -358.43 Hz) 225 Hz (180 Hz -250 Hz)       -Maximal Phonational Frequency Range: Patient was instructed to voice from lowest to highest notes.     Task Min-Max Range (Hz) Normative Data:   Gliding 189.99 Hz -676.65 Hz  (IE: 97.32 Hz -657.84 Hz) 134 Hz-895 Hz               Visit Tracking:  Re-eval Due Auth Expiration Date ST Visit Limit   7/17/25 12/31/25 BOMN   10/8/25 12/31/25 BOMN                           Visit/Unit Tracking  AUTH Status:  Date 4/17/25 4/25/25 5/6/26 5/13/26 5/20/26 6/3/26 6/17/25 6/24/25 7/1/25 7/18/25   No authorization required Used 1 2 3 4 5 6 7 8 9 10     Remaining  BOMN BOMN BOMN BOMN BOMN BOMN BOMN BOMN BOMN BOMN       Intervention comments:  35 minutes of voice evaluation time      "

## 2025-07-22 ENCOUNTER — APPOINTMENT (OUTPATIENT)
Dept: SPEECH THERAPY | Facility: CLINIC | Age: 70
End: 2025-07-22
Attending: ORTHOPAEDIC SURGERY
Payer: COMMERCIAL

## 2025-07-25 ENCOUNTER — OFFICE VISIT (OUTPATIENT)
Dept: SPEECH THERAPY | Facility: CLINIC | Age: 70
End: 2025-07-25
Attending: ORTHOPAEDIC SURGERY
Payer: COMMERCIAL

## 2025-07-25 DIAGNOSIS — Z98.1 S/P CERVICAL SPINAL FUSION: ICD-10-CM

## 2025-07-25 DIAGNOSIS — R49.0 DYSPHONIA: Primary | ICD-10-CM

## 2025-07-25 DIAGNOSIS — R13.14 PHARYNGOESOPHAGEAL DYSPHAGIA: ICD-10-CM

## 2025-07-25 PROCEDURE — 92507 TX SP LANG VOICE COMM INDIV: CPT | Performed by: SPEECH-LANGUAGE PATHOLOGIST

## 2025-07-25 NOTE — PROGRESS NOTES
Daily Speech Treatment Note    Today's date: 2025   Patient’s name: Edgar Obando  : 1955  MRN: [id number]  Safety measures: Hx of ACDF, fall risk   Referring provider: Conrad Richardson MD    Encounter Diagnosis     ICD-10-CM    1. Dysphonia  R49.0       2. Pharyngoesophageal dysphagia  R13.14       3. S/P cervical spinal fusion  Z98.1         Visit Tracking:  Re-eval Due Auth Expiration Date ST Visit Limit   25 BOMN   10/8/25 12/31/25 BOMN                           Visit/Unit Tracking  AUTH Status:  Date 25            No authorization required Used 1              Remaining  BOMN                 Subjective/Behavioral:  - Patient reports that he has stopped drinking his dietary tea due to reflux, excess mucus and throat pain. His voice is getting better and not hurting. He's not dealing with pain in his shoulders (the way it was previously).    Objective/Assessment:  -See goals targeted below during today's treatment session.    Patient with significantly improved voice today.    Short-term goals:  Voice ST Goals     Patient will utilize semi-occluded vocal tract exercises without laryngeal tension to promote healthy vocal function with 80% accuracy, to be achieved in 4-6 weeks. PROGRESSING - CONTINUE   Semi-Occluded Vocal Tract Exercises  To target relaxed laryngeal posture and coordination between phonation and respiration, the following activities were completed using principles of SOVTE.     Straw Only - Wide Straw  With phonation (humming):  Patient was able to achieve coordination of breath with voice 70% accuracy.    With phonation (pitch glides):  Patient was able to achieve coordination of breath with voice 80% accuracy.    Patient will utilize a forward tone focused/resonant voice to decrease vocal hyperfunction and improve voice quality and vocal projection, to be achieved in 4-6 weeks. PROGRESSING - CONTINUE  Patient demonstrated a forward focused, coordinated voice with  "100% accuracy at phoneme level on /m/.    Patient demonstrated a forward focused, coordinated voice with 80% accuracy on \"mum\".    Patient demonstrated a forward focused, coordinated voice with 75% accuracy on \"mo\", \"ma\", \"may\", \"me\", \"moo\".    Patient read /h/ words at 1 syllable, 2 syllable  and sentence level. Patient demonstrated the ability to maintain a forward focused, resonant voice with 100% accuracy at word level and 100% accuracy at sentence level. Exercises were targeted with an emphasis higher pitch, which is more optimal to the patient.     Patient will produce coordinated voice production in different situations (e.g. telephone, over background noise, emotional topics, speaking at a distance) with 80% accuracy as perceptually judged by clinician. PROGRESSING - CONTINUE       Patient will utilize the EMST 75 lite, resistance based, expiratory muscle strength training device during home practice and in therapy session to improve expiratory muscle strength, breath support and improve glottal closure, to be achieved over 5 weeks. NEW GOAL    Plan:  -Patient was provided with home exercises/activities to target goals in plan of care at the end of today's session.  -Continue with current plan of care.        "

## 2025-07-29 ENCOUNTER — OFFICE VISIT (OUTPATIENT)
Dept: FAMILY MEDICINE CLINIC | Facility: CLINIC | Age: 70
End: 2025-07-29
Payer: COMMERCIAL

## 2025-07-29 ENCOUNTER — APPOINTMENT (OUTPATIENT)
Dept: SPEECH THERAPY | Facility: CLINIC | Age: 70
End: 2025-07-29
Attending: ORTHOPAEDIC SURGERY
Payer: COMMERCIAL

## 2025-07-29 VITALS
RESPIRATION RATE: 16 BRPM | SYSTOLIC BLOOD PRESSURE: 124 MMHG | TEMPERATURE: 96.8 F | DIASTOLIC BLOOD PRESSURE: 60 MMHG | HEART RATE: 67 BPM | HEIGHT: 66 IN | OXYGEN SATURATION: 97 % | WEIGHT: 225.2 LBS | BODY MASS INDEX: 36.19 KG/M2

## 2025-07-29 DIAGNOSIS — F41.9 ANXIETY: ICD-10-CM

## 2025-07-29 DIAGNOSIS — D50.9 IRON DEFICIENCY ANEMIA, UNSPECIFIED IRON DEFICIENCY ANEMIA TYPE: ICD-10-CM

## 2025-07-29 DIAGNOSIS — R79.89 LOW TESTOSTERONE IN MALE: ICD-10-CM

## 2025-07-29 DIAGNOSIS — E11.42 TYPE 2 DIABETES MELLITUS WITH DIABETIC POLYNEUROPATHY, WITHOUT LONG-TERM CURRENT USE OF INSULIN (HCC): ICD-10-CM

## 2025-07-29 DIAGNOSIS — G25.81 RESTLESS LEG SYNDROME: ICD-10-CM

## 2025-07-29 DIAGNOSIS — Z00.00 MEDICARE ANNUAL WELLNESS VISIT, SUBSEQUENT: Primary | ICD-10-CM

## 2025-07-29 LAB — SL AMB POCT HEMOGLOBIN AIC: 6 (ref ?–6.5)

## 2025-07-29 PROCEDURE — 83036 HEMOGLOBIN GLYCOSYLATED A1C: CPT | Performed by: FAMILY MEDICINE

## 2025-07-29 PROCEDURE — G0439 PPPS, SUBSEQ VISIT: HCPCS | Performed by: FAMILY MEDICINE

## 2025-07-29 RX ORDER — CLONAZEPAM 1 MG/1
1 TABLET ORAL 2 TIMES DAILY
Qty: 60 TABLET | Refills: 0 | Status: SHIPPED | OUTPATIENT
Start: 2025-07-29

## 2025-07-30 ENCOUNTER — APPOINTMENT (OUTPATIENT)
Dept: SPEECH THERAPY | Facility: CLINIC | Age: 70
End: 2025-07-30
Attending: ORTHOPAEDIC SURGERY
Payer: COMMERCIAL

## 2025-07-30 DIAGNOSIS — R49.0 DYSPHONIA: Primary | ICD-10-CM

## 2025-07-30 DIAGNOSIS — R13.14 PHARYNGOESOPHAGEAL DYSPHAGIA: ICD-10-CM

## 2025-07-30 DIAGNOSIS — Z98.1 S/P CERVICAL SPINAL FUSION: ICD-10-CM

## 2025-08-05 ENCOUNTER — OFFICE VISIT (OUTPATIENT)
Dept: SPEECH THERAPY | Facility: CLINIC | Age: 70
End: 2025-08-05
Attending: ORTHOPAEDIC SURGERY
Payer: COMMERCIAL

## 2025-08-05 DIAGNOSIS — R49.0 DYSPHONIA: Primary | ICD-10-CM

## 2025-08-05 DIAGNOSIS — Z98.1 S/P CERVICAL SPINAL FUSION: ICD-10-CM

## 2025-08-05 DIAGNOSIS — R13.14 PHARYNGOESOPHAGEAL DYSPHAGIA: ICD-10-CM

## 2025-08-05 PROCEDURE — 92507 TX SP LANG VOICE COMM INDIV: CPT | Performed by: SPEECH-LANGUAGE PATHOLOGIST

## 2025-08-12 ENCOUNTER — OFFICE VISIT (OUTPATIENT)
Dept: SPEECH THERAPY | Facility: CLINIC | Age: 70
End: 2025-08-12
Attending: ORTHOPAEDIC SURGERY
Payer: COMMERCIAL

## 2025-08-13 ENCOUNTER — APPOINTMENT (OUTPATIENT)
Dept: LAB | Facility: CLINIC | Age: 70
End: 2025-08-13
Attending: FAMILY MEDICINE
Payer: COMMERCIAL

## 2025-08-13 DIAGNOSIS — G25.81 RESTLESS LEG SYNDROME: ICD-10-CM

## 2025-08-13 DIAGNOSIS — I13.0 HYPERTENSIVE HEART AND RENAL DISEASE WITH CONGESTIVE HEART FAILURE (HCC): ICD-10-CM

## 2025-08-13 DIAGNOSIS — D50.9 IRON DEFICIENCY ANEMIA, UNSPECIFIED IRON DEFICIENCY ANEMIA TYPE: ICD-10-CM

## 2025-08-13 DIAGNOSIS — R79.89 LOW TESTOSTERONE IN MALE: ICD-10-CM

## 2025-08-13 LAB
ALBUMIN SERPL BCG-MCNC: 3.4 G/DL (ref 3.5–5)
ALP SERPL-CCNC: 78 U/L (ref 34–104)
ALT SERPL W P-5'-P-CCNC: 10 U/L (ref 7–52)
ANION GAP SERPL CALCULATED.3IONS-SCNC: 9 MMOL/L (ref 4–13)
AST SERPL W P-5'-P-CCNC: 16 U/L (ref 13–39)
BASOPHILS # BLD AUTO: 0.01 THOUSANDS/ÂΜL (ref 0–0.1)
BASOPHILS NFR BLD AUTO: 0 % (ref 0–1)
BILIRUB SERPL-MCNC: 0.75 MG/DL (ref 0.2–1)
BNP SERPL-MCNC: 78 PG/ML (ref 0–100)
BUN SERPL-MCNC: 18 MG/DL (ref 5–25)
CALCIUM ALBUM COR SERPL-MCNC: 9.4 MG/DL (ref 8.3–10.1)
CALCIUM SERPL-MCNC: 8.9 MG/DL (ref 8.4–10.2)
CHLORIDE SERPL-SCNC: 99 MMOL/L (ref 96–108)
CO2 SERPL-SCNC: 31 MMOL/L (ref 21–32)
CREAT SERPL-MCNC: 0.94 MG/DL (ref 0.6–1.3)
EOSINOPHIL # BLD AUTO: 0.32 THOUSAND/ÂΜL (ref 0–0.61)
EOSINOPHIL NFR BLD AUTO: 7 % (ref 0–6)
ERYTHROCYTE [DISTWIDTH] IN BLOOD BY AUTOMATED COUNT: 13.8 % (ref 11.6–15.1)
FERRITIN SERPL-MCNC: 24 NG/ML (ref 30–336)
GFR SERPL CREATININE-BSD FRML MDRD: 81 ML/MIN/1.73SQ M
GLUCOSE P FAST SERPL-MCNC: 97 MG/DL (ref 65–99)
HCT VFR BLD AUTO: 33.6 % (ref 36.5–49.3)
HGB BLD-MCNC: 10.1 G/DL (ref 12–17)
IMM GRANULOCYTES # BLD AUTO: 0.02 THOUSAND/UL (ref 0–0.2)
IMM GRANULOCYTES NFR BLD AUTO: 0 % (ref 0–2)
IRON SATN MFR SERPL: 12 % (ref 15–50)
IRON SERPL-MCNC: 36 UG/DL (ref 50–212)
LYMPHOCYTES # BLD AUTO: 1.21 THOUSANDS/ÂΜL (ref 0.6–4.47)
LYMPHOCYTES NFR BLD AUTO: 26 % (ref 14–44)
MCH RBC QN AUTO: 26.9 PG (ref 26.8–34.3)
MCHC RBC AUTO-ENTMCNC: 30.1 G/DL (ref 31.4–37.4)
MCV RBC AUTO: 89 FL (ref 82–98)
MONOCYTES # BLD AUTO: 0.44 THOUSAND/ÂΜL (ref 0.17–1.22)
MONOCYTES NFR BLD AUTO: 10 % (ref 4–12)
NEUTROPHILS # BLD AUTO: 2.59 THOUSANDS/ÂΜL (ref 1.85–7.62)
NEUTS SEG NFR BLD AUTO: 57 % (ref 43–75)
NRBC BLD AUTO-RTO: 0 /100 WBCS
PLATELET # BLD AUTO: 151 THOUSANDS/UL (ref 149–390)
PMV BLD AUTO: 10.9 FL (ref 8.9–12.7)
POTASSIUM SERPL-SCNC: 4.1 MMOL/L (ref 3.5–5.3)
PROT SERPL-MCNC: 7.4 G/DL (ref 6.4–8.4)
RBC # BLD AUTO: 3.76 MILLION/UL (ref 3.88–5.62)
SODIUM SERPL-SCNC: 139 MMOL/L (ref 135–147)
TIBC SERPL-MCNC: 291.2 UG/DL (ref 250–450)
TRANSFERRIN SERPL-MCNC: 208 MG/DL (ref 203–362)
TSH SERPL DL<=0.05 MIU/L-ACNC: 1.41 UIU/ML (ref 0.45–4.5)
UIBC SERPL-MCNC: 255 UG/DL (ref 155–355)
WBC # BLD AUTO: 4.59 THOUSAND/UL (ref 4.31–10.16)

## 2025-08-13 PROCEDURE — 82103 ALPHA-1-ANTITRYPSIN TOTAL: CPT

## 2025-08-13 PROCEDURE — 82728 ASSAY OF FERRITIN: CPT

## 2025-08-13 PROCEDURE — 84443 ASSAY THYROID STIM HORMONE: CPT

## 2025-08-13 PROCEDURE — 36415 COLL VENOUS BLD VENIPUNCTURE: CPT

## 2025-08-13 PROCEDURE — 83880 ASSAY OF NATRIURETIC PEPTIDE: CPT

## 2025-08-13 PROCEDURE — 83550 IRON BINDING TEST: CPT

## 2025-08-13 PROCEDURE — 80053 COMPREHEN METABOLIC PANEL: CPT

## 2025-08-13 PROCEDURE — 84402 ASSAY OF FREE TESTOSTERONE: CPT

## 2025-08-13 PROCEDURE — 83540 ASSAY OF IRON: CPT

## 2025-08-13 PROCEDURE — 85025 COMPLETE CBC W/AUTO DIFF WBC: CPT

## 2025-08-13 PROCEDURE — 84403 ASSAY OF TOTAL TESTOSTERONE: CPT

## 2025-08-14 LAB
A1AT SERPL-MCNC: 180 MG/DL (ref 101–187)
TESTOST FREE SERPL-MCNC: 0.7 PG/ML (ref 6.6–18.1)
TESTOST SERPL-MCNC: 15 NG/DL (ref 264–916)

## 2025-08-18 ENCOUNTER — APPOINTMENT (EMERGENCY)
Dept: RADIOLOGY | Facility: HOSPITAL | Age: 70
End: 2025-08-18
Payer: COMMERCIAL

## 2025-08-18 ENCOUNTER — HOSPITAL ENCOUNTER (OUTPATIENT)
Facility: HOSPITAL | Age: 70
Setting detail: OBSERVATION
Discharge: HOME | End: 2025-08-19
Attending: EMERGENCY MEDICINE | Admitting: INTERNAL MEDICINE
Payer: COMMERCIAL

## 2025-08-18 DIAGNOSIS — R06.00 DYSPNEA: Primary | ICD-10-CM

## 2025-08-18 PROBLEM — B34.9 ACUTE VIRAL SYNDROME: Status: ACTIVE | Noted: 2025-08-18

## 2025-08-18 LAB
2HR DELTA HS TROPONIN: 0 NG/L
ALBUMIN SERPL BCG-MCNC: 3.6 G/DL (ref 3.5–5)
ALP SERPL-CCNC: 71 U/L (ref 34–104)
ALT SERPL W P-5'-P-CCNC: 12 U/L (ref 7–52)
ANION GAP SERPL CALCULATED.3IONS-SCNC: 6 MMOL/L (ref 4–13)
AST SERPL W P-5'-P-CCNC: 26 U/L (ref 13–39)
ATRIAL RATE: 214 BPM
ATRIAL RATE: 77 BPM
BASE EX.OXY STD BLDV CALC-SCNC: 64.1 % (ref 60–80)
BASE EXCESS BLDV CALC-SCNC: 5.4 MMOL/L
BASOPHILS # BLD AUTO: 0 THOUSANDS/ÂΜL (ref 0–0.1)
BASOPHILS NFR BLD AUTO: 0 % (ref 0–1)
BILIRUB SERPL-MCNC: 0.61 MG/DL (ref 0.2–1)
BNP SERPL-MCNC: 61 PG/ML (ref 0–100)
BUN SERPL-MCNC: 26 MG/DL (ref 5–25)
CALCIUM SERPL-MCNC: 8.9 MG/DL (ref 8.4–10.2)
CARDIAC TROPONIN I PNL SERPL HS: 4 NG/L (ref ?–50)
CARDIAC TROPONIN I PNL SERPL HS: 4 NG/L (ref ?–50)
CHLORIDE SERPL-SCNC: 99 MMOL/L (ref 96–108)
CO2 SERPL-SCNC: 31 MMOL/L (ref 21–32)
CREAT SERPL-MCNC: 1.17 MG/DL (ref 0.6–1.3)
EOSINOPHIL # BLD AUTO: 0.28 THOUSAND/ÂΜL (ref 0–0.61)
EOSINOPHIL NFR BLD AUTO: 6 % (ref 0–6)
ERYTHROCYTE [DISTWIDTH] IN BLOOD BY AUTOMATED COUNT: 13.7 % (ref 11.6–15.1)
FLUAV AG UPPER RESP QL IA.RAPID: NEGATIVE
FLUBV AG UPPER RESP QL IA.RAPID: NEGATIVE
GFR SERPL CREATININE-BSD FRML MDRD: 62 ML/MIN/1.73SQ M
GLUCOSE SERPL-MCNC: 135 MG/DL (ref 65–140)
GLUCOSE SERPL-MCNC: 181 MG/DL (ref 65–140)
HCO3 BLDV-SCNC: 33 MMOL/L (ref 24–30)
HCT VFR BLD AUTO: 34.4 % (ref 36.5–49.3)
HGB BLD-MCNC: 10.3 G/DL (ref 12–17)
IMM GRANULOCYTES # BLD AUTO: 0.01 THOUSAND/UL (ref 0–0.2)
IMM GRANULOCYTES NFR BLD AUTO: 0 % (ref 0–2)
LYMPHOCYTES # BLD AUTO: 0.97 THOUSANDS/ÂΜL (ref 0.6–4.47)
LYMPHOCYTES NFR BLD AUTO: 22 % (ref 14–44)
MCH RBC QN AUTO: 27.1 PG (ref 26.8–34.3)
MCHC RBC AUTO-ENTMCNC: 29.9 G/DL (ref 31.4–37.4)
MCV RBC AUTO: 91 FL (ref 82–98)
MONOCYTES # BLD AUTO: 0.45 THOUSAND/ÂΜL (ref 0.17–1.22)
MONOCYTES NFR BLD AUTO: 10 % (ref 4–12)
NEUTROPHILS # BLD AUTO: 2.73 THOUSANDS/ÂΜL (ref 1.85–7.62)
NEUTS SEG NFR BLD AUTO: 62 % (ref 43–75)
NRBC BLD AUTO-RTO: 0 /100 WBCS
O2 CT BLDV-SCNC: 9.3 ML/DL
P AXIS: 41 DEGREES
PCO2 BLDV: 65.8 MM HG (ref 42–50)
PH BLDV: 7.32 [PH] (ref 7.3–7.4)
PLATELET # BLD AUTO: 140 THOUSANDS/UL (ref 149–390)
PMV BLD AUTO: 10.4 FL (ref 8.9–12.7)
PO2 BLDV: 35.6 MM HG (ref 35–45)
POTASSIUM SERPL-SCNC: 4.1 MMOL/L (ref 3.5–5.3)
PR INTERVAL: 128 MS
PROCALCITONIN SERPL-MCNC: 0.08 NG/ML
PROT SERPL-MCNC: 7.5 G/DL (ref 6.4–8.4)
QRS AXIS: 20 DEGREES
QRS AXIS: 7 DEGREES
QRSD INTERVAL: 90 MS
QRSD INTERVAL: 96 MS
QT INTERVAL: 400 MS
QT INTERVAL: 410 MS
QTC INTERVAL: 433 MS
QTC INTERVAL: 453 MS
RBC # BLD AUTO: 3.8 MILLION/UL (ref 3.88–5.62)
SARS-COV+SARS-COV-2 AG RESP QL IA.RAPID: NEGATIVE
SODIUM SERPL-SCNC: 136 MMOL/L (ref 135–147)
T WAVE AXIS: 0 DEGREES
T WAVE AXIS: 32 DEGREES
VENTRICULAR RATE: 67 BPM
VENTRICULAR RATE: 77 BPM
WBC # BLD AUTO: 4.44 THOUSAND/UL (ref 4.31–10.16)

## 2025-08-18 PROCEDURE — 82805 BLOOD GASES W/O2 SATURATION: CPT | Performed by: EMERGENCY MEDICINE

## 2025-08-18 PROCEDURE — 87804 INFLUENZA ASSAY W/OPTIC: CPT

## 2025-08-18 PROCEDURE — 84484 ASSAY OF TROPONIN QUANT: CPT

## 2025-08-18 PROCEDURE — 36415 COLL VENOUS BLD VENIPUNCTURE: CPT

## 2025-08-18 PROCEDURE — 96365 THER/PROPH/DIAG IV INF INIT: CPT

## 2025-08-18 PROCEDURE — 93005 ELECTROCARDIOGRAM TRACING: CPT

## 2025-08-18 PROCEDURE — 82948 REAGENT STRIP/BLOOD GLUCOSE: CPT

## 2025-08-18 PROCEDURE — 71260 CT THORAX DX C+: CPT

## 2025-08-18 PROCEDURE — 84145 PROCALCITONIN (PCT): CPT

## 2025-08-18 PROCEDURE — 93010 ELECTROCARDIOGRAM REPORT: CPT | Performed by: INTERNAL MEDICINE

## 2025-08-18 PROCEDURE — 80053 COMPREHEN METABOLIC PANEL: CPT

## 2025-08-18 PROCEDURE — 94640 AIRWAY INHALATION TREATMENT: CPT

## 2025-08-18 PROCEDURE — 99223 1ST HOSP IP/OBS HIGH 75: CPT | Performed by: INTERNAL MEDICINE

## 2025-08-18 PROCEDURE — 83880 ASSAY OF NATRIURETIC PEPTIDE: CPT | Performed by: EMERGENCY MEDICINE

## 2025-08-18 PROCEDURE — 71046 X-RAY EXAM CHEST 2 VIEWS: CPT

## 2025-08-18 PROCEDURE — 85025 COMPLETE CBC W/AUTO DIFF WBC: CPT

## 2025-08-18 PROCEDURE — 99285 EMERGENCY DEPT VISIT HI MDM: CPT

## 2025-08-18 PROCEDURE — 87811 SARS-COV-2 COVID19 W/OPTIC: CPT

## 2025-08-18 PROCEDURE — 99285 EMERGENCY DEPT VISIT HI MDM: CPT | Performed by: EMERGENCY MEDICINE

## 2025-08-18 RX ORDER — TAMSULOSIN HYDROCHLORIDE 0.4 MG/1
0.4 CAPSULE ORAL
Status: DISCONTINUED | OUTPATIENT
Start: 2025-08-19 | End: 2025-08-19 | Stop reason: HOSPADM

## 2025-08-18 RX ORDER — INSULIN LISPRO 100 [IU]/ML
1-6 INJECTION, SOLUTION INTRAVENOUS; SUBCUTANEOUS
Status: DISCONTINUED | OUTPATIENT
Start: 2025-08-19 | End: 2025-08-19 | Stop reason: HOSPADM

## 2025-08-18 RX ORDER — ENOXAPARIN SODIUM 100 MG/ML
40 INJECTION SUBCUTANEOUS DAILY
Status: DISCONTINUED | OUTPATIENT
Start: 2025-08-19 | End: 2025-08-19 | Stop reason: HOSPADM

## 2025-08-18 RX ORDER — ASPIRIN 81 MG/1
81 TABLET ORAL DAILY
Status: DISCONTINUED | OUTPATIENT
Start: 2025-08-19 | End: 2025-08-19 | Stop reason: HOSPADM

## 2025-08-18 RX ORDER — DIPHENHYDRAMINE HCL 25 MG
25 TABLET ORAL ONCE
Status: COMPLETED | OUTPATIENT
Start: 2025-08-18 | End: 2025-08-18

## 2025-08-18 RX ORDER — ACETAMINOPHEN 325 MG/1
650 TABLET ORAL EVERY 6 HOURS PRN
Status: DISCONTINUED | OUTPATIENT
Start: 2025-08-18 | End: 2025-08-19 | Stop reason: HOSPADM

## 2025-08-18 RX ORDER — ALBUTEROL SULFATE 5 MG/ML
5 SOLUTION RESPIRATORY (INHALATION) ONCE
Status: COMPLETED | OUTPATIENT
Start: 2025-08-18 | End: 2025-08-18

## 2025-08-18 RX ORDER — FUROSEMIDE 40 MG/1
40 TABLET ORAL 2 TIMES DAILY PRN
Status: DISCONTINUED | OUTPATIENT
Start: 2025-08-18 | End: 2025-08-19 | Stop reason: HOSPADM

## 2025-08-18 RX ORDER — PANTOPRAZOLE SODIUM 40 MG/1
40 TABLET, DELAYED RELEASE ORAL DAILY
Status: DISCONTINUED | OUTPATIENT
Start: 2025-08-19 | End: 2025-08-19 | Stop reason: HOSPADM

## 2025-08-18 RX ORDER — ONDANSETRON 2 MG/ML
4 INJECTION INTRAMUSCULAR; INTRAVENOUS EVERY 6 HOURS PRN
Status: DISCONTINUED | OUTPATIENT
Start: 2025-08-18 | End: 2025-08-19 | Stop reason: HOSPADM

## 2025-08-18 RX ORDER — IPRATROPIUM BROMIDE AND ALBUTEROL SULFATE 2.5; .5 MG/3ML; MG/3ML
3 SOLUTION RESPIRATORY (INHALATION) ONCE
Status: COMPLETED | OUTPATIENT
Start: 2025-08-18 | End: 2025-08-18

## 2025-08-18 RX ORDER — INSULIN LISPRO 100 [IU]/ML
1-6 INJECTION, SOLUTION INTRAVENOUS; SUBCUTANEOUS
Status: DISCONTINUED | OUTPATIENT
Start: 2025-08-18 | End: 2025-08-19 | Stop reason: HOSPADM

## 2025-08-18 RX ORDER — BENZONATATE 100 MG/1
100 CAPSULE ORAL 3 TIMES DAILY PRN
Status: DISCONTINUED | OUTPATIENT
Start: 2025-08-18 | End: 2025-08-19 | Stop reason: HOSPADM

## 2025-08-18 RX ORDER — CLONAZEPAM 1 MG/1
1 TABLET ORAL 2 TIMES DAILY
Status: DISCONTINUED | OUTPATIENT
Start: 2025-08-18 | End: 2025-08-19 | Stop reason: HOSPADM

## 2025-08-18 RX ADMIN — DIPHENHYDRAMINE HCL 25 MG: 25 TABLET ORAL at 17:36

## 2025-08-18 RX ADMIN — CLONAZEPAM 1 MG: 1 TABLET ORAL at 21:16

## 2025-08-18 RX ADMIN — IOHEXOL 70 ML: 350 INJECTION, SOLUTION INTRAVENOUS at 13:43

## 2025-08-18 RX ADMIN — IPRATROPIUM BROMIDE AND ALBUTEROL SULFATE 3 ML: 2.5; .5 SOLUTION RESPIRATORY (INHALATION) at 12:50

## 2025-08-18 RX ADMIN — IPRATROPIUM BROMIDE 0.5 MG: 0.5 SOLUTION RESPIRATORY (INHALATION) at 11:05

## 2025-08-18 RX ADMIN — INSULIN LISPRO 1 UNITS: 100 INJECTION, SOLUTION INTRAVENOUS; SUBCUTANEOUS at 21:31

## 2025-08-18 RX ADMIN — ALBUTEROL SULFATE 5 MG: 2.5 SOLUTION RESPIRATORY (INHALATION) at 11:05

## 2025-08-18 RX ADMIN — CEFTRIAXONE SODIUM 2000 MG: 10 INJECTION, POWDER, FOR SOLUTION INTRAVENOUS at 16:57

## 2025-08-19 ENCOUNTER — TELEPHONE (OUTPATIENT)
Age: 70
End: 2025-08-19

## 2025-08-19 VITALS
TEMPERATURE: 97.4 F | SYSTOLIC BLOOD PRESSURE: 152 MMHG | DIASTOLIC BLOOD PRESSURE: 71 MMHG | OXYGEN SATURATION: 93 % | RESPIRATION RATE: 18 BRPM | HEART RATE: 87 BPM

## 2025-08-19 LAB
ERYTHROCYTE [DISTWIDTH] IN BLOOD BY AUTOMATED COUNT: 13.7 % (ref 11.6–15.1)
GLUCOSE SERPL-MCNC: 153 MG/DL (ref 65–140)
GLUCOSE SERPL-MCNC: 73 MG/DL (ref 65–140)
HCT VFR BLD AUTO: 32.7 % (ref 36.5–49.3)
HGB BLD-MCNC: 9.7 G/DL (ref 12–17)
MCH RBC QN AUTO: 26.9 PG (ref 26.8–34.3)
MCHC RBC AUTO-ENTMCNC: 29.7 G/DL (ref 31.4–37.4)
MCV RBC AUTO: 91 FL (ref 82–98)
PLATELET # BLD AUTO: 155 THOUSANDS/UL (ref 149–390)
PMV BLD AUTO: 10.6 FL (ref 8.9–12.7)
PROCALCITONIN SERPL-MCNC: 0.09 NG/ML
RBC # BLD AUTO: 3.6 MILLION/UL (ref 3.88–5.62)
WBC # BLD AUTO: 4.02 THOUSAND/UL (ref 4.31–10.16)

## 2025-08-19 PROCEDURE — 82948 REAGENT STRIP/BLOOD GLUCOSE: CPT

## 2025-08-19 PROCEDURE — 36415 COLL VENOUS BLD VENIPUNCTURE: CPT | Performed by: PHYSICIAN ASSISTANT

## 2025-08-19 PROCEDURE — 99239 HOSP IP/OBS DSCHRG MGMT >30: CPT

## 2025-08-19 PROCEDURE — 92610 EVALUATE SWALLOWING FUNCTION: CPT

## 2025-08-19 PROCEDURE — 84145 PROCALCITONIN (PCT): CPT | Performed by: PHYSICIAN ASSISTANT

## 2025-08-19 PROCEDURE — 85027 COMPLETE CBC AUTOMATED: CPT | Performed by: PHYSICIAN ASSISTANT

## 2025-08-19 RX ORDER — DOCUSATE SODIUM 100 MG/1
100 CAPSULE, LIQUID FILLED ORAL 2 TIMES DAILY
Status: DISCONTINUED | OUTPATIENT
Start: 2025-08-19 | End: 2025-08-19 | Stop reason: HOSPADM

## 2025-08-19 RX ADMIN — ACETAMINOPHEN 650 MG: 325 TABLET ORAL at 11:17

## 2025-08-19 RX ADMIN — CLONAZEPAM 1 MG: 1 TABLET ORAL at 08:08

## 2025-08-19 RX ADMIN — ENOXAPARIN SODIUM 40 MG: 40 INJECTION SUBCUTANEOUS at 08:08

## 2025-08-19 RX ADMIN — ASPIRIN 81 MG: 81 TABLET, COATED ORAL at 08:08

## 2025-08-19 RX ADMIN — INSULIN LISPRO 1 UNITS: 100 INJECTION, SOLUTION INTRAVENOUS; SUBCUTANEOUS at 11:18

## 2025-08-19 RX ADMIN — PANTOPRAZOLE SODIUM 40 MG: 40 TABLET, DELAYED RELEASE ORAL at 08:08

## 2025-08-19 RX ADMIN — DOCUSATE SODIUM 100 MG: 100 CAPSULE, LIQUID FILLED ORAL at 11:17

## 2025-08-20 ENCOUNTER — ANESTHESIA (OUTPATIENT)
Dept: GASTROENTEROLOGY | Facility: HOSPITAL | Age: 70
End: 2025-08-20
Payer: COMMERCIAL

## 2025-08-20 ENCOUNTER — TELEPHONE (OUTPATIENT)
Dept: FAMILY MEDICINE CLINIC | Facility: CLINIC | Age: 70
End: 2025-08-20

## 2025-08-20 ENCOUNTER — TRANSITIONAL CARE MANAGEMENT (OUTPATIENT)
Dept: FAMILY MEDICINE CLINIC | Facility: CLINIC | Age: 70
End: 2025-08-20

## 2025-08-20 ENCOUNTER — ANESTHESIA EVENT (OUTPATIENT)
Dept: GASTROENTEROLOGY | Facility: HOSPITAL | Age: 70
End: 2025-08-20
Payer: COMMERCIAL

## 2025-08-20 RX ORDER — ALBUTEROL SULFATE 90 UG/1
INHALANT RESPIRATORY (INHALATION) AS NEEDED
Status: DISCONTINUED | OUTPATIENT
Start: 2025-08-20 | End: 2025-08-20

## 2025-08-20 RX ORDER — LIDOCAINE HYDROCHLORIDE 10 MG/ML
INJECTION, SOLUTION EPIDURAL; INFILTRATION; INTRACAUDAL; PERINEURAL AS NEEDED
Status: DISCONTINUED | OUTPATIENT
Start: 2025-08-20 | End: 2025-08-20

## 2025-08-20 RX ORDER — PROPOFOL 10 MG/ML
INJECTION, EMULSION INTRAVENOUS AS NEEDED
Status: DISCONTINUED | OUTPATIENT
Start: 2025-08-20 | End: 2025-08-20

## 2025-08-20 RX ADMIN — PROPOFOL 40 MG: 10 INJECTION, EMULSION INTRAVENOUS at 09:57

## 2025-08-20 RX ADMIN — ALBUTEROL SULFATE 8 PUFF: 90 AEROSOL, METERED RESPIRATORY (INHALATION) at 10:08

## 2025-08-20 RX ADMIN — PROPOFOL 40 MG: 10 INJECTION, EMULSION INTRAVENOUS at 10:01

## 2025-08-20 RX ADMIN — PROPOFOL 100 MG: 10 INJECTION, EMULSION INTRAVENOUS at 09:53

## 2025-08-20 RX ADMIN — LIDOCAINE HYDROCHLORIDE 100 MG: 10 INJECTION, SOLUTION EPIDURAL; INFILTRATION; INTRACAUDAL; PERINEURAL at 09:31

## (undated) DEVICE — DRESSING MEPILEX AG BORDER 4 X 4 IN

## (undated) DEVICE — DRAPE SHEET THREE QUARTER

## (undated) DEVICE — GLOVE INDICATOR PI UNDERGLOVE SZ 7.5 BLUE

## (undated) DEVICE — SUT SILK 2-0 18 IN A185H

## (undated) DEVICE — MINOR PROCEDURE DRAPE: Brand: CONVERTORS

## (undated) DEVICE — 3M™ IOBAN™ 2 ANTIMICROBIAL INCISE DRAPE 6650EZ: Brand: IOBAN™ 2

## (undated) DEVICE — INSTRUMENT 7080902 PLATE HOLDING PIN

## (undated) DEVICE — INTENDED FOR TISSUE SEPARATION, AND OTHER PROCEDURES THAT REQUIRE A SHARP SURGICAL BLADE TO PUNCTURE OR CUT.: Brand: BARD-PARKER ® CARBON RIB-BACK BLADES

## (undated) DEVICE — LIGHT HANDLE COVER SLEEVE DISP BLUE STELLAR

## (undated) DEVICE — SPONGE SCRUB 4 PCT CHLORHEXIDINE

## (undated) DEVICE — DRAPE SHEET X-LG

## (undated) DEVICE — ABS MED DISTRACTION PIN, 14MM PATIENT (INNER): Brand: ABS MED DISTRACTION PIN

## (undated) DEVICE — SUT VICRYL 2-0 CT-2 27 IN J269H

## (undated) DEVICE — SUPPLY FEE STD

## (undated) DEVICE — ELECTRODE BLADE MOD E-Z CLEAN 2.5IN 6.4CM -0012M

## (undated) DEVICE — GLOVE SRG BIOGEL 7.5

## (undated) DEVICE — SURGI KIT INSTRUMENT ORGANIZER

## (undated) DEVICE — SWABSTCK, BENZOIN TINCTURE, 1/PK, STRL: Brand: APLICARE

## (undated) DEVICE — Device

## (undated) DEVICE — SUT MONOCRYL PLUS 3-0 PS-2 27 IN MCP427H

## (undated) DEVICE — PENCIL ELECTROSURG E-Z CLEAN -0035H

## (undated) DEVICE — MONITORING SPINAL IMPULSE CASE FEE

## (undated) DEVICE — TOOL MR8-10CY40BR MR8 10CM CYL BARL 4MM: Brand: MIDAS REX MR8

## (undated) DEVICE — DISPOSABLE EQUIPMENT COVER: Brand: SMALL TOWEL DRAPE

## (undated) DEVICE — TOOL MR8-14MH30 MR8 14CM MATCH 3MM: Brand: MIDAS REX MR8

## (undated) DEVICE — DRAPE MICROSCOPE OPMI PENTERO

## (undated) DEVICE — DRAPE C-ARMOUR

## (undated) DEVICE — C-ARM: Brand: UNBRANDED

## (undated) DEVICE — CHLORAPREP HI-LITE 26ML ORANGE

## (undated) DEVICE — ROSEBUD DISSECTORS: Brand: DEROYAL

## (undated) DEVICE — HEMOSTATIC MATRIX SURGIFLO 8ML W/THROMBIN

## (undated) DEVICE — BETHLEHEM UNIVERSAL SPINE, KIT: Brand: CARDINAL HEALTH

## (undated) DEVICE — PROXIMATE SKIN STAPLERS (35 WIDE) CONTAINS 35 STAINLESS STEEL STAPLES (FIXED HEAD): Brand: PROXIMATE

## (undated) DEVICE — INTENDED FOR TISSUE SEPARATION, AND OTHER PROCEDURES THAT REQUIRE A SHARP SURGICAL BLADE TO PUNCTURE OR CUT.: Brand: BARD-PARKER SAFETY BLADES SIZE 15, STERILE